# Patient Record
Sex: MALE | Race: WHITE | NOT HISPANIC OR LATINO | Employment: OTHER | URBAN - METROPOLITAN AREA
[De-identification: names, ages, dates, MRNs, and addresses within clinical notes are randomized per-mention and may not be internally consistent; named-entity substitution may affect disease eponyms.]

---

## 2017-02-28 DIAGNOSIS — N48.1 BALANITIS: ICD-10-CM

## 2017-02-28 DIAGNOSIS — I10 ESSENTIAL HYPERTENSION: ICD-10-CM

## 2017-02-28 RX ORDER — TRIAMCINOLONE ACETONIDE 0.25 MG/G
OINTMENT TOPICAL 2 TIMES DAILY PRN
Qty: 15 G | Refills: 0 | Status: SHIPPED | OUTPATIENT
Start: 2017-02-28 | End: 2017-06-09

## 2017-02-28 RX ORDER — AMLODIPINE BESYLATE 5 MG/1
5 TABLET ORAL DAILY
Qty: 90 TABLET | Refills: 0 | Status: SHIPPED | OUTPATIENT
Start: 2017-02-28 | End: 2017-06-09

## 2017-02-28 NOTE — TELEPHONE ENCOUNTER
triamcinolone (KENALOG) 0.025 % ointment      Last Written Prescription Date: 04/18/2016  Last Fill Quantity: 15g,  # refills: 2   [place] [facility] [place] [facility] or  Health prescribing provider: 04/18/2016                                             amLODIPine (NORVASC) 5 MG tablet      Last Written Prescription Date: 05/31/2016  Last Fill Quantity: 90, # refills: 2  Last [place] [facility] [place] [facility] or [facility] prescribing provider: 04/18/2016       Potassium   Date Value Ref Range Status   04/07/2016 4.2 3.4 - 5.3 mmol/L Final     Creatinine   Date Value Ref Range Status   04/07/2016 0.90 0.66 - 1.25 mg/dL Final     BP Readings from Last 3 Encounters:   12/27/16 138/76   12/21/16 148/82   06/20/16 127/64

## 2017-05-03 DIAGNOSIS — I10 ESSENTIAL HYPERTENSION: ICD-10-CM

## 2017-05-03 DIAGNOSIS — E78.5 HYPERLIPIDEMIA LDL GOAL <100: ICD-10-CM

## 2017-05-03 LAB
ALT SERPL W P-5'-P-CCNC: 25 U/L (ref 0–70)
ANION GAP SERPL CALCULATED.3IONS-SCNC: 9 MMOL/L (ref 3–14)
BUN SERPL-MCNC: 17 MG/DL (ref 7–30)
CALCIUM SERPL-MCNC: 8.5 MG/DL (ref 8.5–10.1)
CHLORIDE SERPL-SCNC: 109 MMOL/L (ref 94–109)
CHOLEST SERPL-MCNC: 125 MG/DL
CO2 SERPL-SCNC: 26 MMOL/L (ref 20–32)
CREAT SERPL-MCNC: 0.9 MG/DL (ref 0.66–1.25)
GFR SERPL CREATININE-BSD FRML MDRD: 82 ML/MIN/1.7M2
GLUCOSE SERPL-MCNC: 98 MG/DL (ref 70–99)
HDLC SERPL-MCNC: 47 MG/DL
LDLC SERPL CALC-MCNC: 55 MG/DL
NONHDLC SERPL-MCNC: 78 MG/DL
POTASSIUM SERPL-SCNC: 4.3 MMOL/L (ref 3.4–5.3)
SODIUM SERPL-SCNC: 144 MMOL/L (ref 133–144)
TRIGL SERPL-MCNC: 113 MG/DL

## 2017-05-03 PROCEDURE — 84460 ALANINE AMINO (ALT) (SGPT): CPT | Performed by: INTERNAL MEDICINE

## 2017-05-03 PROCEDURE — 80048 BASIC METABOLIC PNL TOTAL CA: CPT | Performed by: INTERNAL MEDICINE

## 2017-05-03 PROCEDURE — 36415 COLL VENOUS BLD VENIPUNCTURE: CPT | Performed by: INTERNAL MEDICINE

## 2017-05-03 PROCEDURE — 80061 LIPID PANEL: CPT | Performed by: INTERNAL MEDICINE

## 2017-05-08 ENCOUNTER — OFFICE VISIT (OUTPATIENT)
Dept: FAMILY MEDICINE | Facility: CLINIC | Age: 77
End: 2017-05-08
Payer: COMMERCIAL

## 2017-05-08 DIAGNOSIS — D48.5 NEOPLASM OF UNCERTAIN BEHAVIOR OF SKIN: ICD-10-CM

## 2017-05-08 DIAGNOSIS — Z85.828 STATUS POST MOHS SURGERY FOR BASAL CELL CARCINOMA: ICD-10-CM

## 2017-05-08 DIAGNOSIS — L57.0 AK (ACTINIC KERATOSIS): Primary | ICD-10-CM

## 2017-05-08 DIAGNOSIS — Z98.890 STATUS POST MOHS SURGERY FOR BASAL CELL CARCINOMA: ICD-10-CM

## 2017-05-08 PROCEDURE — 99213 OFFICE O/P EST LOW 20 MIN: CPT | Mod: 25 | Performed by: FAMILY MEDICINE

## 2017-05-08 PROCEDURE — 17000 DESTRUCT PREMALG LESION: CPT | Performed by: FAMILY MEDICINE

## 2017-05-08 PROCEDURE — 17003 DESTRUCT PREMALG LES 2-14: CPT | Performed by: FAMILY MEDICINE

## 2017-05-08 NOTE — PROGRESS NOTES
[facility] - PRIMARY CARE SKIN    CC : Rash   SUBJECTIVE:                                                    Ricardo Neely is a 76 year old male who presents to clinic today for follow-up of a(n) chronic, itchy rash on the genital area. He has applied topical triamcinolone 0.025% ointment daily to the area for 4 months; however, the topical steroid (prescribed in 2014) is no longer effective with daily application for control of itchiness. He reports redness in the area without scaling.    When last evaluated in 2014 by me, the area had only mild erythema with no suspicious characteristics at that time.    Pruritic : extremely itchy.  Symptoms appear to be : worsening.  Aggravating factors : none identified.  Relieving factors : none identified.    Previous similar history : NO.  Recent exposure history : none known   Any other family members with similar symptoms : NO.    Issue Two : Lesion on right side of temple has scabbed over and gotten sore.  Onset : unknown.  Enlarging : NO.  Bleeding : YES  Itchy or irritating : NO.  Pain or tenderness : YES.  Changing color : YES.    Sun Exposure History  Previous history of significant sun exposure:  Blistering sunburns : YES - many.  Tanning beds : NO.  Sunscreen Use : YES.  UV-protective clothing use : NO.  Wide-brimmed hats : NO.  UV-protective sunglasses : NO.  Avoids mid-day sun : NO.    Personal Medical History  Skin Cancer : YES - basal cell carcinoma on right ear posterior pinna (s/p [facility] 10/2014)  Eczema Psoriasis Rosacea Autoimmune   NO NO NO NO     Family Medical History  Skin Cancer : YES  Eczema Psoriasis Rosacea Autoimmune   NO NO NO YES - rheumatoid arthritis     Occupation : retired  (indoor).    Patient Active Problem List   Diagnosis     HISTORY OF URINARY CALCULI     Essential hypertension     Hyperlipidemia LDL goal <100     Obesity     GERD     Diaphragmatic hernia     Low Back Pain     Impaired fasting glucose     Hypertrophy of  prostate without urinary obstruction     Thoracic or lumbosacral neuritis or radiculitis, unspecified     Transient cerebral ischemia     Cerebrovascular disease     Advanced directives, counseling/discussion     Anxiety     Chronic low back pain     Sciatica     Nonallopathic lesion of lumbar region     Lumbar stenosis with neurogenic claudication     Cervicalgia     Headache     Status post Mohs surgery for basal cell carcinoma       Past Medical History:   Diagnosis Date     Anxiety 5/26/2011     Basal cell carcinoma      Benign Prostatic Hypertrophy      CEREBROVASC DISEASE, small vessel 12/07     Essential hypertension, benign      GERD      HIATAL HERNIA      HYPERPLASTIC POLYPS COLON 5/93, 3/06     Impaired fasting glucose      Low Back Pain      Obesity, unspecified      Other and unspecified hyperlipidemia      Personal history of urinary calculi 1960's     Pneumonia, organism unspecified 1992     TRANSIENT CEREBRAL ISCHEMIA 12/07    Past Surgical History:   Procedure Laterality Date     C NONSPECIFIC PROCEDURE  1959    pilonidal cystectomy     C NONSPECIFIC PROCEDURE  1966    kidney stone     C NONSPECIFIC PROCEDURE  approx 1999    R knee arthroscopy     Dr. Thomas RYDER NONSPECIFIC PROCEDURE  2005    L3-4 microdiskectomy   Dr. Fercho RYDER TOTAL KNEE ARTHROPLASTY  July 2010    Minimally invasive R TKA   Dr. Nichols     COLONOSCOPY       DISCECTOMY LUMBAR POSTERIOR MICROSCOPIC TWO LEVELS  8/28/2012    DISCECTOMY LUMBAR POSTERIOR MICROSCOPIC TWO LEVELS;  RIGHT L3-L4 REDO EXTENDED HEMILAMINECTOMY AND MICRO FORAMINOTOMY, LEFT L4-L5 EXTENDED HEMILAMINECTOMY AND MICRODISCECTOMY (PRONE) (SONYA MCCALLUM, C-ARM, METRIX II);  Surgeon: Bertram Mirza MD;  Location: [facility] OR     renal calculii removal 40 years ago  1965      Social History   Substance Use Topics     Smoking status: Former Smoker     Quit date: 1/1/1968     Smokeless tobacco: Never Used     Alcohol use No    Family History     Problem (# of  "Occurrences) Relation (Name,Age of Onset)    C.A.D. (1) Father (83): CABG 67    CEREBROVASCULAR DISEASE (1) Mother (89)    DIABETES (2) Brother (b 1940): d:age 66, Mother (89)    Family History Negative (1) Brother (b 1940): 1 healthy brother    HEART DISEASE (1) Father (83)    Hypertension (2) Sister (b 1936), Sister (b 1938)    Lipids (2) Sister (b 1934), Sister (b 1938)    Thyroid Disease (1) Daughter (b 1971): thyroid surgery, on replacement           Prescription Medications as of 5/8/2017             triamcinolone (KENALOG) 0.025 % ointment Apply topically 2 times daily as needed    amLODIPine (NORVASC) 5 MG tablet Take 1 tablet (5 mg) by mouth daily    ondansetron (ZOFRAN) 4 MG tablet Take 1 tablet (4 mg) by mouth every 8 hours as needed for nausea    atorvastatin (LIPITOR) 40 MG tablet Take 1 tablet (40 mg) by mouth daily    metoprolol (TOPROL-XL) 50 MG 24 hr tablet Take 1.5 tablets (75 mg) by mouth daily    clopidogrel (PLAVIX) 75 MG tablet Take 1 tablet (75 mg) by mouth daily    benazepril (LOTENSIN) 40 MG tablet Take 1 tablet (40 mg) by mouth daily    ketoconazole (NIZORAL) 2 % cream Apply topically 2 times daily as needed Profile Rx: patient will call when needed    loratadine (CLARITIN) 10 MG tablet Take 1 tablet (10 mg) by mouth daily    aspirin 81 MG tablet Take 1 tablet by mouth daily.            Allergies   Allergen Reactions     Meclizine Other (See Comments)     Over sedation, concentration problem     Tramadol Hcl Other (See Comments)     Pt feels very drugged, \"cloudy\" if used more than one day. Nausea also     Cardura [Doxazosin Mesylate] Other (See Comments)     fainted     Hydrochlorothiazide Other (See Comments)      lightheadedness     Naproxen Nausea     nausea     Prednisone GI Disturbance     Plan for PPI use in the future if med needed        INTEGUMENTARY/SKIN: POSITIVE for non-healing lesion and pruritis, NEGATIVE for scaling.    ROS : 14 point review of systems was negative except " the symptoms listed above in the HPI.    This document serves as a record of the services and decisions personally performed and made by Santa Pickens MD. It was created on her behalf by Jace De La Cruz, a trained medical scribe.  The creation of this document is based on the scribe's personal observations and the provider's statements to the medical scribe.  Jace De La Cruz, May 8, 2017 8:40 AM      OBJECTIVE:                                                    GENERAL: healthy, alert and no distress  SKIN: Vazquez Skin Type - III.  Face, Neck and Penis were examined. The dermatoscope was used to help evaluate pigmented lesions.  Skin Pertinent Findings:  Head of penis : 3 cm x 1 cm area of erythematous, flat lesion extending over the tip of the penis , 2 mm of pink raised tissue at the urethra  ? Squamous cell carcinoma  ? Other  Patient to return for shave procedure in 1 week after pausing Plavix for 3 days prior.    Face : 2 mm - 3 mm in size, erythematous, scaly, non-indurated lesion(s) most consistent with actinic keratoses.  Name : Liquid Nitrogen Cry-Ac Cryotherapy.  Indication : Pre-malignant lesion.  Locations:  Left temple - x1  Left lateral forehead -x3  Right lateral forehead - 1  Right upper forehead - x1  Right upper cutaneous lip - x1  Completed by : Santa Pickens MD  Note : Discussed natural history of lesion and treatment options. Prior to treatment, we discussed inflammation, tenderness post-procedure, the healing process, and the risks of pain, infection, scarring, blistering, and hypo-/hyperpigmentation after healing. Explained that these lesions may grow back and may need additional treatment or re-treatment. The patient expressed a desire to proceed with cryotherapy.    The lesion(s) was treated with liquid nitrogen Cry-Ac, five second freeze repeated twice with a pause to allow for the area to thaw. If this lesion should recur, then it needs to be re-evaluated.    Patient tolerated the procedure  well and left in good condition.  Total number of lesions treated : 8    Diagnostic Test Results:  none     MDM : discussed concerning appearance of lesion on the head of the penis, recommended shave biopsy, he will stop his Plavix for 3 days and then come in for biopsy.      ASSESSMENT:                                                      Encounter Diagnoses   Name Primary?     AK (actinic keratosis) Yes     Neoplasm of uncertain behavior of skin      Status post Mohs surgery for basal cell carcinoma          PLAN:                                                    Patient Instructions   FUTURE APPOINTMENTS  Follow up in 1 week(s) for shave procedure. Pause usage of Plavix for the three days prior to shave procedure.    CRYOTHERAPY FOR ACTINIC KERATOSES POST-TREATMENT CARE INSTRUCTIONS  Actinic keratoses are benign, scaly or gritty lesions that appear in sun-exposed areas and may progress to skin cancers. For this reason, it is important to treat them before they become cancerous.  Liquid nitrogen is mildly uncomfortable when applied to the skin, but the discomfort rapidly subsides.    Post-Treatment:  You may experience burning and/or stinging immediately following the procedure. The discomfort from the procedure may persist over the next 12-24 hours. The area treated will look pinker and slightly swollen before the healing process begins. You may also notice redness, swelling, tenderness, weeping and crusts or scabs.    Blister - You may or may notice blistering from the freezing. If you develop an uncomfortable blister from today's treatment, you may gently puncture this with a needle that has been cleaned with alcohol. However, do not remove the protective skin layer of the blister.    Scab - After a few days, you may notice scaliness or scab formation. Do not pick at the scabs because this may cause slower healing and a permanent scar.    The skin may appear temporarily darker at the treatment site, but this  "usually fades over a period of months, provided that the area is protected from the sun.    Care of the areas treated:    Wash the area with a mild cleanser.    Gently pat dry.    Do not rub.     Keep protected from the sun during the healing process and for a full year following treatment as the skin continues to remodel during this time.    Apply Vaseline or Aquaphor ointment sparingly to the site for the first 7 days after treatment.    Do not use Neosporin, as many people eventually develop a medication allergy, that can easily be confused with an infection, to Neomycin.    Return if:  There should not be any residual scaling. If there is any concern that the lesion has persisted after 4-6 weeks, make an appointment for a re-check. Healing time does vary depending on your individual healing process and the area of the body treated. Most patients will be healed in one month.    Signs of Infection:  Thankfully this is rare. However if you notice persistent colored drainage, increasing pain, fever or other signs of infection, please call us at: [phone]    TIPS FOR AVOIDING SKIN CANCER AND PREMATURE SKIN AGING  DOs    Wear a wide-brimmed hat and sunglasses.     Wear sun-protective clothing.    [employer] and other [employer] make sun protective clothing that is stylish, comfortable and cool.    [employer] and other [employer] make UV arm sleeves suitable for golfing, gardening and other activities.      Wear sunscreen on your face every day, even in the winter. (UVA \"aging rays\" penetrates window glass and is just as strong in the winter as in the summer) Sunscreen with SPF > 30 is recommended.    Wear sunscreen on your body and re-apply every 2 hours when exposed to sun. Sunscreen with SPF > 50 is recommended.    You should use about 3 tablespoons of sunscreen to protect your whole body. Thus a typical eight ounce bottle of sunscreen should last 4 applications. Remember, that the SPF rating is " "compromised if you don t apply enough. Most people only apply 1/2 - 1/3 of the amount they need. Also don t forget areas such as your ears, feet, upper back and harder to reach places. Keep in mind that these amounts should be increased for larger body sizes.    Note that spray sunscreens are only for touch-up application, not as a base layer. Also, use spray sunscreens with caution around small children due to inhalation risk.    Product Recommendations:    Look for broad spectrum sunscreen (blocks both UVA and UVB).    Look for titanium dioxide and/or zinc oxide in the active ingredients, which are physical blockers as opposed to chemical blockers. Chemical-free sunscreens should not irritate the skin.    Good examples include: Blue Lizard, EltaMD, Vanicream, Solbar, CeraVe.     For sensitive skin, consider : Skin[employer] Essential Defense Mineral Shield Broad Spectrum SPF 35      Avoid combination products that include both sunscreen and insect repellant, as sunscreen should be applied every 2 hours, but insect repellant should not be applied as frequently.    Avoid products that include oxybenzone or retinyl palmitate.    For more information:  http://www.skincancer.org/prevention/sun-protection/sunscreen/sunscreens-safe-and-effective    DON'Ts    All tanning damages the skin. Aim for ivory skin year round and you will have less trouble with your skin in years to come. There is no merit in getting \"a base tan\" before a warm weather vacation, as any tanning indicates your body's response to sun damage.    Never use tanning beds. Tanning beds are associated with much higher risks of skin cancer.    Avoid mid-day sunshine (10 AM to 3 PM), if possible.    Stop smoking. Smokers have higher rates of skin cancer and also have premature skin wrinkling.        PROCEDURES:                                                    None.    TT : 20 minutes.  CT : 15 minutes.      The information in this document, created by the " medical scribe for me, accurately reflects the services I personally performed and the decisions made by me. I have reviewed and approved this document for accuracy prior to leaving the patient care area.  Santa Pickens MD May 8, 2017 8:40 AM  [facility] - PRIMARY CARE SKIN

## 2017-05-08 NOTE — MR AVS SNAPSHOT
After Visit Summary   5/8/2017    Ricardo Neely    MRN: [id number]           Patient Information     Date Of Birth          1940        Visit Information        Provider Department      5/8/2017 8:40 AM Monica Pickens MD [facility] - Primary [facility] Skin        Today's Diagnoses     AK (actinic keratosis)    -  1    Neoplasm of uncertain behavior of skin        Status post Mohs surgery for basal cell carcinoma          Care Instructions    FUTURE APPOINTMENTS  Follow up in 1 week(s) for shave procedure. Pause usage of Plavix for the three days prior to shave procedure.    CRYOTHERAPY FOR ACTINIC KERATOSES POST-TREATMENT CARE INSTRUCTIONS  Actinic keratoses are benign, scaly or gritty lesions that appear in sun-exposed areas and may progress to skin cancers. For this reason, it is important to treat them before they become cancerous.  Liquid nitrogen is mildly uncomfortable when applied to the skin, but the discomfort rapidly subsides.    Post-Treatment:  You may experience burning and/or stinging immediately following the procedure. The discomfort from the procedure may persist over the next 12-24 hours. The area treated will look pinker and slightly swollen before the healing process begins. You may also notice redness, swelling, tenderness, weeping and crusts or scabs.    Blister - You may or may notice blistering from the freezing. If you develop an uncomfortable blister from today's treatment, you may gently puncture this with a needle that has been cleaned with alcohol. However, do not remove the protective skin layer of the blister.    Scab - After a few days, you may notice scaliness or scab formation. Do not pick at the scabs because this may cause slower healing and a permanent scar.    The skin may appear temporarily darker at the treatment site, but this usually fades over a period of months, provided that the area is protected from the sun.    Care of the areas  "treated:    Wash the area with a mild cleanser.    Gently pat dry.    Do not rub.     Keep protected from the sun during the healing process and for a full year following treatment as the skin continues to remodel during this time.    Apply Vaseline or Aquaphor ointment sparingly to the site for the first 7 days after treatment.    Do not use Neosporin, as many people eventually develop a medication allergy, that can easily be confused with an infection, to Neomycin.    Return if:  There should not be any residual scaling. If there is any concern that the lesion has persisted after 4-6 weeks, make an appointment for a re-check. Healing time does vary depending on your individual healing process and the area of the body treated. Most patients will be healed in one month.    Signs of Infection:  Thankfully this is rare. However if you notice persistent colored drainage, increasing pain, fever or other signs of infection, please call us at: [phone]    TIPS FOR AVOIDING SKIN CANCER AND PREMATURE SKIN AGING  DOs    Wear a wide-brimmed hat and sunglasses.     Wear sun-protective clothing.    [employer] and other [employer] make sun protective clothing that is stylish, comfortable and cool.    [employer] and other [employer] make UV arm sleeves suitable for golfing, gardening and other activities.      Wear sunscreen on your face every day, even in the winter. (UVA \"aging rays\" penetrates window glass and is just as strong in the winter as in the summer) Sunscreen with SPF > 30 is recommended.    Wear sunscreen on your body and re-apply every 2 hours when exposed to sun. Sunscreen with SPF > 50 is recommended.    You should use about 3 tablespoons of sunscreen to protect your whole body. Thus a typical eight ounce bottle of sunscreen should last 4 applications. Remember, that the SPF rating is compromised if you don t apply enough. Most people only apply 1/2 - 1/3 of the amount they need. Also don t forget " "areas such as your ears, feet, upper back and harder to reach places. Keep in mind that these amounts should be increased for larger body sizes.    Note that spray sunscreens are only for touch-up application, not as a base layer. Also, use spray sunscreens with caution around small children due to inhalation risk.    Product Recommendations:    Look for broad spectrum sunscreen (blocks both UVA and UVB).    Look for titanium dioxide and/or zinc oxide in the active ingredients, which are physical blockers as opposed to chemical blockers. Chemical-free sunscreens should not irritate the skin.    Good examples include: Blue Lizard, EltaMD, Vanicream, Solbar, CeraVe.     For sensitive skin, consider : [employer] Essential Defense Mineral Shield Broad Spectrum SPF 35      Avoid combination products that include both sunscreen and insect repellant, as sunscreen should be applied every 2 hours, but insect repellant should not be applied as frequently.    Avoid products that include oxybenzone or retinyl palmitate.    For more information:  http://www.skincancer.org/prevention/sun-protection/sunscreen/sunscreens-safe-and-effective    DON'Ts    All tanning damages the skin. Aim for ivory skin year round and you will have less trouble with your skin in years to come. There is no merit in getting \"a base tan\" before a warm weather vacation, as any tanning indicates your body's response to sun damage.    Never use tanning beds. Tanning beds are associated with much higher risks of skin cancer.    Avoid mid-day sunshine (10 AM to 3 PM), if possible.    Stop smoking. Smokers have higher rates of skin cancer and also have premature skin wrinkling.        Follow-ups after your visit        Your next 10 appointments already scheduled     May 12, 2017  8:30 AM CDT   MyChart Long with Storm Huddleston MD   [facility]    [address] [address] "   [phone]              Who to contact     If you have questions or need follow up information about today's clinic visit or your schedule please contact [facility] directly at [phone].  Normal or non-critical lab and imaging results will be communicated to you by MyChart, letter or phone within 4 business days after the clinic has received the results. If you do not hear from us within 7 days, please contact the clinic through MyChart or phone. If you have a critical or abnormal lab result, we will notify you by phone as soon as possible.  Submit refill requests through [employer] or call your pharmacy and they will forward the refill request to us. Please allow 3 business days for your refill to be completed.          Additional Information About Your Visit        [employer] Information     [employer] gives you secure access to your electronic health record. If you see a primary care provider, you can also send messages to your care team and make appointments. If you have questions, please call your primary care clinic.  If you do not have a primary care provider, please call [phone] and they will assist you.        Care EveryWhere ID     This is your Care EveryWhere ID. This could be used by other organizations to access your [place] medical records  [id number]         Blood Pressure from Last 3 Encounters:   12/27/16 138/76   12/21/16 148/82   06/20/16 127/64    Weight from Last 3 Encounters:   12/27/16 218 lb 6.4 oz (99.1 kg)   05/17/16 216 lb (98 kg)   05/11/16 216 lb (98 kg)              Today, you had the following     No orders found for display         Today's Medication Changes          These changes are accurate as of: 5/8/17  9:16 AM.  If you have any questions, ask your nurse or doctor.               These medicines have changed or have updated prescriptions.        Dose/Directions    aspirin 81 MG tablet   This may have changed:  when to take this   Used for:  Routine  general medical examination at a health care facility        Dose:  81 mg   Take 1 tablet by mouth daily.   Quantity:  100 tablet   Refills:  3                Primary Care Provider Office Phone # Fax #    Storm Huddleston [phone]       [facility] [address] [facility] [address]        Thank you!     Thank you for choosing [facility] - PR[facility] C[facility] SK[facility]  for your care. Our goal is always to provide you with excellent care. Hearing back from our patients is one way we can continue to improve our services. Please take a few minutes to complete the written survey that you may receive in the mail after your visit with us. Thank you!             Your Updated Medication List - Protect others around you: Learn how to safely use, store and throw away your medicines at www.disposemymeds.org.          This list is accurate as of: 5/8/17  9:16 AM.  Always use your most recent med list.                   Brand Name Dispense Instructions for use    amLODIPine 5 MG tablet    NORVASC    90 tablet    Take 1 tablet (5 mg) by mouth daily       aspirin 81 MG tablet     100 tablet    Take 1 tablet by mouth daily.       atorvastatin 40 MG tablet    LIPITOR    90 tablet    Take 1 tablet (40 mg) by mouth daily       benazepril 40 MG tablet    LOTENSIN    90 tablet    Take 1 tablet (40 mg) by mouth daily       clopidogrel 75 MG tablet    PLAVIX    90 tablet    Take 1 tablet (75 mg) by mouth daily       ketoconazole 2 % cream    NIZORAL    30 g    Apply topically 2 times daily as needed Profile Rx: patient will call when needed       loratadine 10 MG tablet    CLARITIN     Take 1 tablet (10 mg) by mouth daily       metoprolol 50 MG 24 hr tablet    TOPROL-XL    135 tablet    Take 1.5 tablets (75 mg) by mouth daily       ondansetron 4 MG tablet    ZOFRAN    12 tablet    Take 1 tablet (4 mg) by mouth every 8 hours as needed for nausea       triamcinolone 0.025 % ointment    KENALOG    15 g     Apply topically 2 times daily as needed

## 2017-05-08 NOTE — PATIENT INSTRUCTIONS
FUTURE APPOINTMENTS  Follow up in 1 week(s) for shave procedure. Pause usage of Plavix for the three days prior to shave procedure.    CRYOTHERAPY FOR ACTINIC KERATOSES POST-TREATMENT CARE INSTRUCTIONS  Actinic keratoses are benign, scaly or gritty lesions that appear in sun-exposed areas and may progress to skin cancers. For this reason, it is important to treat them before they become cancerous.  Liquid nitrogen is mildly uncomfortable when applied to the skin, but the discomfort rapidly subsides.    Post-Treatment:  You may experience burning and/or stinging immediately following the procedure. The discomfort from the procedure may persist over the next 12-24 hours. The area treated will look pinker and slightly swollen before the healing process begins. You may also notice redness, swelling, tenderness, weeping and crusts or scabs.    Blister - You may or may notice blistering from the freezing. If you develop an uncomfortable blister from today's treatment, you may gently puncture this with a needle that has been cleaned with alcohol. However, do not remove the protective skin layer of the blister.    Scab - After a few days, you may notice scaliness or scab formation. Do not pick at the scabs because this may cause slower healing and a permanent scar.    The skin may appear temporarily darker at the treatment site, but this usually fades over a period of months, provided that the area is protected from the sun.    Care of the areas treated:    Wash the area with a mild cleanser.    Gently pat dry.    Do not rub.     Keep protected from the sun during the healing process and for a full year following treatment as the skin continues to remodel during this time.    Apply Vaseline or Aquaphor ointment sparingly to the site for the first 7 days after treatment.    Do not use Neosporin, as many people eventually develop a medication allergy, that can easily be confused with an infection, to Neomycin.    Return  "if:  There should not be any residual scaling. If there is any concern that the lesion has persisted after 4-6 weeks, make an appointment for a re-check. Healing time does vary depending on your individual healing process and the area of the body treated. Most patients will be healed in one month.    Signs of Infection:  Thankfully this is rare. However if you notice persistent colored drainage, increasing pain, fever or other signs of infection, please call us at: [phone]    TIPS FOR AVOIDING SKIN CANCER AND PREMATURE SKIN AGING  DOs    Wear a wide-brimmed hat and sunglasses.     Wear sun-protective clothing.    [employer] and other [employer] make sun protective clothing that is stylish, comfortable and cool.    [employer] and other [employer] make UV arm sleeves suitable for golfing, gardening and other activities.      Wear sunscreen on your face every day, even in the winter. (UVA \"aging rays\" penetrates window glass and is just as strong in the winter as in the summer) Sunscreen with SPF > 30 is recommended.    Wear sunscreen on your body and re-apply every 2 hours when exposed to sun. Sunscreen with SPF > 50 is recommended.    You should use about 3 tablespoons of sunscreen to protect your whole body. Thus a typical eight ounce bottle of sunscreen should last 4 applications. Remember, that the SPF rating is compromised if you don t apply enough. Most people only apply 1/2 - 1/3 of the amount they need. Also don t forget areas such as your ears, feet, upper back and harder to reach places. Keep in mind that these amounts should be increased for larger body sizes.    Note that spray sunscreens are only for touch-up application, not as a base layer. Also, use spray sunscreens with caution around small children due to inhalation risk.    Product Recommendations:    Look for broad spectrum sunscreen (blocks both UVA and UVB).    Look for titanium dioxide and/or zinc oxide in the active ingredients, " "which are physical blockers as opposed to chemical blockers. Chemical-free sunscreens should not irritate the skin.    Good examples include: Blue Lizard, EltaMD, Vanicream, Solbar, CeraVe.     For sensitive skin, consider : [employer] Essential Defense Mineral Shield Broad Spectrum SPF 35      Avoid combination products that include both sunscreen and insect repellant, as sunscreen should be applied every 2 hours, but insect repellant should not be applied as frequently.    Avoid products that include oxybenzone or retinyl palmitate.    For more information:  http://www.skincancer.org/prevention/sun-protection/sunscreen/sunscreens-safe-and-effective    DON'Ts    All tanning damages the skin. Aim for ivory skin year round and you will have less trouble with your skin in years to come. There is no merit in getting \"a base tan\" before a warm weather vacation, as any tanning indicates your body's response to sun damage.    Never use tanning beds. Tanning beds are associated with much higher risks of skin cancer.    Avoid mid-day sunshine (10 AM to 3 PM), if possible.    Stop smoking. Smokers have higher rates of skin cancer and also have premature skin wrinkling.  "

## 2017-05-10 DIAGNOSIS — I10 ESSENTIAL HYPERTENSION: ICD-10-CM

## 2017-05-10 RX ORDER — BENAZEPRIL HYDROCHLORIDE 40 MG/1
TABLET ORAL
Qty: 90 TABLET | Refills: 0 | OUTPATIENT
Start: 2017-05-10

## 2017-05-10 RX ORDER — BENAZEPRIL HYDROCHLORIDE 40 MG/1
TABLET ORAL
Qty: 30 TABLET | Refills: 0 | Status: SHIPPED | OUTPATIENT
Start: 2017-05-10 | End: 2017-06-09

## 2017-05-15 ENCOUNTER — OFFICE VISIT (OUTPATIENT)
Dept: FAMILY MEDICINE | Facility: CLINIC | Age: 77
End: 2017-05-15
Payer: COMMERCIAL

## 2017-05-15 DIAGNOSIS — Z98.890 STATUS POST MOHS SURGERY FOR BASAL CELL CARCINOMA: ICD-10-CM

## 2017-05-15 DIAGNOSIS — Z85.828 STATUS POST MOHS SURGERY FOR BASAL CELL CARCINOMA: ICD-10-CM

## 2017-05-15 DIAGNOSIS — D48.5 NEOPLASM OF UNCERTAIN BEHAVIOR OF SKIN: Primary | ICD-10-CM

## 2017-05-15 PROCEDURE — 88305 TISSUE EXAM BY PATHOLOGIST: CPT | Performed by: FAMILY MEDICINE

## 2017-05-15 PROCEDURE — 99213 OFFICE O/P EST LOW 20 MIN: CPT | Mod: 25 | Performed by: FAMILY MEDICINE

## 2017-05-15 PROCEDURE — 54100 BIOPSY OF PENIS: CPT | Performed by: FAMILY MEDICINE

## 2017-05-15 PROCEDURE — 99000 SPECIMEN HANDLING OFFICE-LAB: CPT | Performed by: FAMILY MEDICINE

## 2017-05-15 NOTE — PROGRESS NOTES
[facility] - PRIMARY CARE SKIN    CC : Rash   SUBJECTIVE:                                                    Ricardo Neely is a 76 year old male who presents to clinic today for shave procedure of a lesion on the penis. He has paused Plavix for the last three days in preparation for today's procedure.    Recall that he topical triamcinolone 0.025% ointment has been ineffective in clearing this red lesion; he was previously prescribed the ointment for control of a chronic, itchy rash.    Sun Exposure History  Previous history of significant sun exposure:  Blistering sunburns : YES - many.  Tanning beds : NO.  Sunscreen Use : YES.  UV-protective clothing use : NO.  Wide-brimmed hats : NO.  UV-protective sunglasses : NO.  Avoids mid-day sun : NO.    Personal Medical History  Skin Cancer : YES - basal cell carcinoma on right ear posterior pinna (s/p [facility] 10/2014)  Eczema Psoriasis Rosacea Autoimmune   NO NO NO NO     Family Medical History  Skin Cancer : YES  Eczema Psoriasis Rosacea Autoimmune   NO NO NO YES - rheumatoid arthritis     Occupation : retired  (indoor).    Patient Active Problem List   Diagnosis     HISTORY OF URINARY CALCULI     Essential hypertension     Hyperlipidemia LDL goal <100     Obesity     GERD     Diaphragmatic hernia     Low Back Pain     Impaired fasting glucose     Hypertrophy of prostate without urinary obstruction     Thoracic or lumbosacral neuritis or radiculitis, unspecified     Transient cerebral ischemia     Cerebrovascular disease     Advanced directives, counseling/discussion     Anxiety     Chronic low back pain     Sciatica     Nonallopathic lesion of lumbar region     Lumbar stenosis with neurogenic claudication     Cervicalgia     Headache     Status post Mohs surgery for basal cell carcinoma       Past Medical History:   Diagnosis Date     Anxiety 5/26/2011     Basal cell carcinoma      Benign Prostatic Hypertrophy      CEREBROVASC DISEASE, small vessel 12/07      Essential hypertension, benign      GERD      HIATAL HERNIA      HYPERPLASTIC POLYPS COLON 5/93, 3/06     Impaired fasting glucose      Low Back Pain      Obesity, unspecified      Other and unspecified hyperlipidemia      Personal history of urinary calculi 1960's     Pneumonia, organism unspecified 1992     TRANSIENT CEREBRAL ISCHEMIA 12/07    Past Surgical History:   Procedure Laterality Date     C NONSPECIFIC PROCEDURE  1959    pilonidal cystectomy     C NONSPECIFIC PROCEDURE  1966    kidney stone     C NONSPECIFIC PROCEDURE  approx 1999    R knee arthroscopy     Dr. Thomas RYDER NONSPECIFIC PROCEDURE  2005    L3-4 microdiskectomy   Dr. Fercho RYDER TOTAL KNEE ARTHROPLASTY  July 2010    Minimally invasive R TKA   Dr. Nichols     COLONOSCOPY       DISCECTOMY LUMBAR POSTERIOR MICROSCOPIC TWO LEVELS  8/28/2012    DISCECTOMY LUMBAR POSTERIOR MICROSCOPIC TWO LEVELS;  RIGHT L3-L4 REDO EXTENDED HEMILAMINECTOMY AND MICRO FORAMINOTOMY, LEFT L4-L5 EXTENDED HEMILAMINECTOMY AND MICRODISCECTOMY (PRONE) (SONYA MCCALLUM, C-ARM, METRIX II);  Surgeon: Bertram Mirza MD;  Location: [facility] OR     renal calculii removal 40 years ago  1965      Social History   Substance Use Topics     Smoking status: Former Smoker     Quit date: 1/1/1968     Smokeless tobacco: Never Used     Alcohol use No    Family History     Problem (# of Occurrences) Relation (Name,Age of Onset)    C.A.D. (1) Father (83): CABG 67    CEREBROVASCULAR DISEASE (1) Mother (89)    DIABETES (2) Brother (b 1940): d:age 66, Mother (89)    Family History Negative (1) Brother (b 1940): 1 healthy brother    HEART DISEASE (1) Father (83)    Hypertension (2) Sister (b 1936), Sister (b 1938)    Lipids (2) Sister (b 1934), Sister (b 1938)    Thyroid Disease (1) Daughter (b 1971): thyroid surgery, on replacement           Prescription Medications as of 5/15/2017             benazepril (LOTENSIN) 40 MG tablet TAKE 1 TABLET BY MOUTH EVERY DAY    triamcinolone (KENALOG)  "0.025 % ointment Apply topically 2 times daily as needed    amLODIPine (NORVASC) 5 MG tablet Take 1 tablet (5 mg) by mouth daily    ondansetron (ZOFRAN) 4 MG tablet Take 1 tablet (4 mg) by mouth every 8 hours as needed for nausea    atorvastatin (LIPITOR) 40 MG tablet Take 1 tablet (40 mg) by mouth daily    metoprolol (TOPROL-XL) 50 MG 24 hr tablet Take 1.5 tablets (75 mg) by mouth daily    clopidogrel (PLAVIX) 75 MG tablet Take 1 tablet (75 mg) by mouth daily    ketoconazole (NIZORAL) 2 % cream Apply topically 2 times daily as needed Profile Rx: patient will call when needed    loratadine (CLARITIN) 10 MG tablet Take 1 tablet (10 mg) by mouth daily    aspirin 81 MG tablet Take 1 tablet by mouth daily.            Allergies   Allergen Reactions     Meclizine Other (See Comments)     Over sedation, concentration problem     Tramadol Hcl Other (See Comments)     Pt feels very drugged, \"cloudy\" if used more than one day. Nausea also     Cardura [Doxazosin Mesylate] Other (See Comments)     fainted     Hydrochlorothiazide Other (See Comments)      lightheadedness     Naproxen Nausea     nausea     Prednisone GI Disturbance     Plan for PPI use in the future if med needed        INTEGUMENTARY/SKIN: POSITIVE for non-healing lesion     ROS : 14 point review of systems was negative except the symptoms listed above in the HPI.    This document serves as a record of the services and decisions personally performed and made by Santa Pickens MD. It was created on her behalf by Jace De La Cruz, a trained medical scribe.  The creation of this document is based on the scribe's personal observations and the provider's statements to the medical scribe.  Jace De La Cruz, May 15, 2017 8:39 AM      OBJECTIVE:                                                    GENERAL: healthy, alert and no distress  SKIN: Vazquez Skin Type - III.  Face, Neck and Penis were examined. The dermatoscope was used to help evaluate pigmented lesions.  Skin Pertinent " "Findings:  Head of penis : 3 cm x 1 cm area of erythematous, flat lesion extending over the tip of the penis, 2 mm of pink raised tissue at the urethra  ? Squamous cell carcinoma  ? Other    Diagnostic Test Results:  none           ASSESSMENT:                                                      Encounter Diagnoses   Name Primary?     Neoplasm of uncertain behavior of skin Yes     Status post Mohs surgery for basal cell carcinoma          PLAN:                                                    Patient Instructions   FUTURE APPOINTMENTS  Follow up per pathology report.    WOUND CARE INSTRUCTIONS  1. After 24 hours, change dressing daily.  2. Wash hands before every dressing change.  3. Wash the wound area with a mild soap, then rinse  4. Gently pat dry with a sterile gauze or Q-tip.  5. Apply Vaseline or Aquaphor only over entire wound. Do NOT use Neosporin - as many people react to neomycin.  6. Repeat once daily until wound has healed.    Do not let the wound dry out.  The wound will heal faster and with better cosmetic results if routinely kept moist with step #5. It is a false belief that a wound heals better when it is exposed to air and allowed to dry out.      Soap, water and shampoo will not hurt this area.    Do not go swimming or take baths, but showering is encouraged.    Limit use of the area where the procedure was done for a few days to allow for optimal healing.    If you experience bleeding:  Repeat steps #2-5 and hold firm pressure on the area for 10 minutes without checking to see if the bleeding has stopped. \"Checking\" pulls off the protective wound clot and restarts the bleeding all over again. Re-apply pressure for 10 minutes if necessary to stop bleeding.  Use additional sterile gauze and tape to maintain pressure once bleeding has stopped.    Signs of Infection:  Infection can occur in any area where skin has been disrupted.  If you notice persistent redness, swelling, colored drainage, " increasing pain, fever or other signs of infection, please call us at: [phone] and ask to have me or my colleague paged. We will call you back to discuss.    Pathology Results:  You will be notified, generally via letter or MyChart, in approximately 10 days. If there is anything we need to discuss or further treatment needed, I will call you to discuss it.    Skin tissue can be some of the most challenging tissue for pathologists to examine, therefore we may use a specialist outside the [employer] system to read certain submitted tissue samples. When submitted to these outside specialists, [employer] will notify you that your tissue sample result has returned. However, this only means that the tissue sample has been sent to the specialist. These results are not available in [employer], so I will contact you when I receive the final report.    PATIENT INFORMATION : WOUNDS  During the healing process you will notice a number of changes. All wounds develop a small halo of redness surrounding the wound.  This means healing is occurring. Severe itching with extensive redness usually indicates sensitivity to the ointment or bandage tape used to dress the wound.  You should call our office if this develops.      Swelling  and/or discoloration around your surgical site is common, particularly when performed around the eye.    All wounds normally drain.  The larger the wound the more drainage there will be.  After 7-10 days, you will notice the wound beginning to shrink and new skin will begin to grow.  The wound is healed when you can see skin has formed over the entire area.  A healed wound has a healthy, shiny look to the surface and is red to dark pink in color to normalize.  Wounds may take approximately 4-6 weeks to heal.  Larger wounds may take 6-8 weeks. After the wound is healed you may discontinue dressing changes.    You may experience a sensation of tightness as your wound heals. This is normal and will gradually  subside.    Your healed wound may be sensitive to temperature changes. This sensitivity improves with time, but if you re having a lot of discomfort, try to avoid temperature extremes.    Patients frequently experience itching after their wound appears to have healed because of the continue healing under the skin.  Plain Vaseline will help relieve the itching.        PROCEDURES:                                                    Name : Shave Biopsy  Indication : Biopsy of tissue for pathology evaluation.  Location(s) : Head of penis : 3 cm x 1 cm area of erythematous, flat lesion extending over the tip of the penis , 2 mm of pink raised tissue at the urethra  ? Squamous cell carcinoma  ? Other.  Completed by : Santa Pickens MD  Photo Taken : no.  Anesthesia : Patient was anesthetized by infiltrating the area surrounding the lesion with 1% lidocaine.   epinephrine [id number] : NO.  Buffered with bicarbonate : YES.  Note : Discussed the risk of pain, infection, scarring, hypo- or hyperpigmentation and recurrence or need for re-treatment. The benefits of treatment and alternative treatments were also discussed.    During this procedure, the universal protocol was utilized. The patient's identity was confirmed by no less than two patient identifiers, correct procedure was verified, correct site was verified and marked as applicable and a final pause was completed.    Sterile technique was used throughout the procedure. The skin was cleaned and prepped with surgical cleanser. Once adequate anesthesia was obtained, the lesion was biopsied with a saucerization shave procedure. The specimen was sent to pathology.    Direct pressure and aluminum chloride and monopolar cautery was applied for hemostasis. No bleeding was present upon the completion of the procedure. The wound was coated with antibacterial ointment. A dry sterile dressing was applied. Patient tolerated the procedure well and left in satisfactory  condition.    Primary provider and referring provider will be informed regarding the tissue report when it returns.      The information in this document, created by the medical scribe for me, accurately reflects the services I personally performed and the decisions made by me. I have reviewed and approved this document for accuracy prior to leaving the patient care area.  Santa Pickens MD May 15, 2017 8:42 AM  [facility] - PRIMARY CARE SKIN

## 2017-05-15 NOTE — MR AVS SNAPSHOT
"              After Visit Summary   5/15/2017    Ricardo Neely    MRN: [id number]           Patient Information     Date Of Birth          1940        Visit Information        Provider Department      5/15/2017 8:40 AM Monica Pickens MD [facility] - Primary Care Skin        Today's Diagnoses     Neoplasm of uncertain behavior of skin    -  1    Status post Mohs surgery for basal cell carcinoma          Care Instructions    FUTURE APPOINTMENTS  Follow up per pathology report.    WOUND CARE INSTRUCTIONS  1. After 24 hours, change dressing daily.  2. Wash hands before every dressing change.  3. Wash the wound area with a mild soap, then rinse  4. Gently pat dry with a sterile gauze or Q-tip.  5. Apply Vaseline or Aquaphor only over entire wound. Do NOT use Neosporin - as many people react to neomycin.  6. Repeat once daily until wound has healed.    Do not let the wound dry out.  The wound will heal faster and with better cosmetic results if routinely kept moist with step #5. It is a false belief that a wound heals better when it is exposed to air and allowed to dry out.      Soap, water and shampoo will not hurt this area.    Do not go swimming or take baths, but showering is encouraged.    Limit use of the area where the procedure was done for a few days to allow for optimal healing.    If you experience bleeding:  Repeat steps #2-5 and hold firm pressure on the area for 10 minutes without checking to see if the bleeding has stopped. \"Checking\" pulls off the protective wound clot and restarts the bleeding all over again. Re-apply pressure for 10 minutes if necessary to stop bleeding.  Use additional sterile gauze and tape to maintain pressure once bleeding has stopped.    Signs of Infection:  Infection can occur in any area where skin has been disrupted.  If you notice persistent redness, swelling, colored drainage, increasing pain, fever or other signs of infection, please call us at: [phone] and ask to have me or my colleague paged. We will call you back to discuss.    Pathology Results:  You will be notified, generally via letter or MyChart, in approximately 10 days. If there is anything we need to discuss or further treatment needed, I will call you to discuss it.    Skin tissue can be some of the most challenging tissue for pathologists to examine, therefore we may use a specialist outside the [employer] system to read certain submitted tissue samples. When submitted to these outside specialists, [email or web address] will notify you that your tissue sample result has returned. However, this only means that the tissue sample has been sent to the specialist. These results are not available in [employer], so I will contact you when I receive the final report.    PATIENT INFORMATION : WOUNDS  During the healing process you will notice a number of changes. All wounds develop a small halo of redness surrounding the wound.  This means healing is occurring. Severe itching with extensive redness usually indicates sensitivity to the ointment or bandage tape used to dress the wound.  You should call our office if this develops.      Swelling  and/or discoloration around your surgical site is common, particularly when performed around the eye.    All wounds normally drain.  The larger the wound the more drainage there will be.  After 7-10 days, you will notice the wound beginning to shrink and new skin will begin to grow.  The wound is healed when you can see skin has formed over the entire area.  A healed wound has a healthy, shiny look to the surface and is red to dark pink in color to normalize.  Wounds may take approximately 4-6 weeks to heal.  Larger wounds may take 6-8 weeks. After the wound is healed you may discontinue dressing changes.    You may experience a sensation of tightness as your wound heals. This is normal and will gradually subside.    Your healed wound may be sensitive to temperature changes. This  sensitivity improves with time, but if you re having a lot of discomfort, try to avoid temperature extremes.    Patients frequently experience itching after their wound appears to have healed because of the continue healing under the skin.  Plain Vaseline will help relieve the itching.        Follow-ups after your visit        Your next 10 appointments already scheduled     Jun 09, 2017  9:00 AM CDT   Office Visit with Storm Huddleston MD   [facility]    [address] [address] [phone]           Bring a current list of meds and any records pertaining to this visit.  For Physicals, please bring immunization records and any forms needing to be filled out.  Please arrive 10 minutes early to complete paperwork.              Who to contact     If you have questions or need follow up information about today's clinic visit or your schedule please contact [facility] - PRIMARY CARE SKIN directly at [phone].  Normal or non-critical lab and imaging results will be communicated to you by [employer], letter or phone within 4 business days after the clinic has received the results. If you do not hear from us within 7 days, please contact the clinic through [employer] or phone. If you have a critical or abnormal lab result, we will notify you by phone as soon as possible.  Submit refill requests through [employer] or call your pharmacy and they will forward the refill request to us. Please allow 3 business days for your refill to be completed.          Additional Information About Your Visit        [employer] Information     [employer] gives you secure access to your electronic health record. If you see a primary care provider, you can also send messages to your care team and make appointments. If you have questions, please call your primary care clinic.  If you do not have a primary care provider, please call [phone] and they will  assist you.        Care EveryWhere ID     This is your Care EveryWhere ID. This could be used by other organizations to access your [facility] medical records  [id number]         Blood Pressure from Last 3 Encounters:   12/27/16 138/76   12/21/16 148/82   06/20/16 127/64    Weight from Last 3 Encounters:   12/27/16 218 lb 6.4 oz (99.1 kg)   05/17/16 216 lb (98 kg)   05/11/16 216 lb (98 kg)              Today, you had the following     No orders found for display         Today's Medication Changes          These changes are accurate as of: 5/15/17  8:46 AM.  If you have any questions, ask your nurse or doctor.               These medicines have changed or have updated prescriptions.        Dose/Directions    aspirin 81 MG tablet   This may have changed:  when to take this   Used for:  Routine general medical examination at a health care facility        Dose:  81 mg   Take 1 tablet by mouth daily.   Quantity:  100 tablet   Refills:  3                Primary Care Provider Office Phone # Fax #    Storm Huddleston [phone]       [facility] [address] [place] [phone]        Thank you!     Thank you for choosing [facility] - PRIMARY CARE SKIN  for your care. Our goal is always to provide you with excellent care. Hearing back from our patients is one way we can continue to improve our services. Please take a few minutes to complete the written survey that you may receive in the mail after your visit with us. Thank you!             Your Updated Medication List - Protect others around you: Learn how to safely use, store and throw away your medicines at www.disposemymeds.org.          This list is accurate as of: 5/15/17  8:46 AM.  Always use your most recent med list.                   Brand Name Dispense Instructions for use    amLODIPine 5 MG tablet    NORVASC    90 tablet    Take 1 tablet (5 mg) by mouth daily       aspirin 81 MG tablet     100 tablet    Take 1 tablet by  mouth daily.       atorvastatin 40 MG tablet    LIPITOR    90 tablet    Take 1 tablet (40 mg) by mouth daily       benazepril 40 MG tablet    LOTENSIN    30 tablet    TAKE 1 TABLET BY MOUTH EVERY DAY       clopidogrel 75 MG tablet    PLAVIX    90 tablet    Take 1 tablet (75 mg) by mouth daily       ketoconazole 2 % cream    NIZORAL    30 g    Apply topically 2 times daily as needed Profile Rx: patient will call when needed       loratadine 10 MG tablet    CLARITIN     Take 1 tablet (10 mg) by mouth daily       metoprolol 50 MG 24 hr tablet    TOPROL-XL    135 tablet    Take 1.5 tablets (75 mg) by mouth daily       ondansetron 4 MG tablet    ZOFRAN    12 tablet    Take 1 tablet (4 mg) by mouth every 8 hours as needed for nausea       triamcinolone 0.025 % ointment    KENALOG    15 g    Apply topically 2 times daily as needed

## 2017-05-15 NOTE — PATIENT INSTRUCTIONS
"FUTURE APPOINTMENTS  Follow up per pathology report.    WOUND CARE INSTRUCTIONS  1. After 24 hours, change dressing daily.  2. Wash hands before every dressing change.  3. Wash the wound area with a mild soap, then rinse  4. Gently pat dry with a sterile gauze or Q-tip.  5. Apply Vaseline or Aquaphor only over entire wound. Do NOT use Neosporin - as many people react to neomycin.  6. Repeat once daily until wound has healed.    Do not let the wound dry out.  The wound will heal faster and with better cosmetic results if routinely kept moist with step #5. It is a false belief that a wound heals better when it is exposed to air and allowed to dry out.      Soap, water and shampoo will not hurt this area.    Do not go swimming or take baths, but showering is encouraged.    Limit use of the area where the procedure was done for a few days to allow for optimal healing.    If you experience bleeding:  Repeat steps #2-5 and hold firm pressure on the area for 10 minutes without checking to see if the bleeding has stopped. \"Checking\" pulls off the protective wound clot and restarts the bleeding all over again. Re-apply pressure for 10 minutes if necessary to stop bleeding.  Use additional sterile gauze and tape to maintain pressure once bleeding has stopped.    Signs of Infection:  Infection can occur in any area where skin has been disrupted.  If you notice persistent redness, swelling, colored drainage, increasing pain, fever or other signs of infection, please call us at: [phone] and ask to have me or my colleague paged. We will call you back to discuss.    Pathology Results:  You will be notified, generally via letter or MyChart, in approximately 10 days. If there is anything we need to discuss or further treatment needed, I will call you to discuss it.    Skin tissue can be some of the most challenging tissue for pathologists to examine, therefore we may use a specialist outside the [facility] system to read " certain submitted tissue samples. When submitted to these outside specialists, [facility] will notify you that your tissue sample result has returned. However, this only means that the tissue sample has been sent to the specialist. These results are not available in [facility], so I will contact you when I receive the final report.    PATIENT INFORMATION : WOUNDS  During the healing process you will notice a number of changes. All wounds develop a small halo of redness surrounding the wound.  This means healing is occurring. Severe itching with extensive redness usually indicates sensitivity to the ointment or bandage tape used to dress the wound.  You should call our office if this develops.      Swelling  and/or discoloration around your surgical site is common, particularly when performed around the eye.    All wounds normally drain.  The larger the wound the more drainage there will be.  After 7-10 days, you will notice the wound beginning to shrink and new skin will begin to grow.  The wound is healed when you can see skin has formed over the entire area.  A healed wound has a healthy, shiny look to the surface and is red to dark pink in color to normalize.  Wounds may take approximately 4-6 weeks to heal.  Larger wounds may take 6-8 weeks. After the wound is healed you may discontinue dressing changes.    You may experience a sensation of tightness as your wound heals. This is normal and will gradually subside.    Your healed wound may be sensitive to temperature changes. This sensitivity improves with time, but if you re having a lot of discomfort, try to avoid temperature extremes.    Patients frequently experience itching after their wound appears to have healed because of the continue healing under the skin.  Plain Vaseline will help relieve the itching.

## 2017-05-22 NOTE — PROGRESS NOTES
ADDENDUM:                                                    May 22, 2017 8:25 AM  Pathology report results:

## 2017-05-24 ENCOUNTER — TELEPHONE (OUTPATIENT)
Dept: FAMILY MEDICINE | Facility: CLINIC | Age: 77
End: 2017-05-24

## 2017-05-24 NOTE — TELEPHONE ENCOUNTER
Encounter : phonecall  Discussed lab results :       Pathology report : the shave biopsy was consistent with inflammation or irritation but based on the clinical findings I am still not satisfied that with the nonspecific findings, recommended consult with Dr. Restrepo     We will arrange this appointment.

## 2017-05-30 ENCOUNTER — TELEPHONE (OUTPATIENT)
Dept: DERMATOLOGY | Facility: CLINIC | Age: 77
End: 2017-05-30

## 2017-05-30 NOTE — TELEPHONE ENCOUNTER
----- Message from Raymond Restrepo MD sent at 5/30/2017  7:07 AM CDT -----  Regarding: FW: work in  Please overbook for this Thursday at 1245pm      Thanks    Hernan   ----- Message -----     From: Monica Pickens MD     Sent: 5/24/2017   3:40 PM       To: Raymond Restrepo MD  Subject: work in                                          Hello Hernan,      This is a 76 year male with a 3 cm x 1 cm area of erythematous, flat erythematous  lesion extending over head of the penis to the urethra . I initially saw him in 2014 for small patch of erythema on the penis, treated him with triamcinolone 0.1% cream but then he has been self medicating every day for the past year. I was concerned about squamous cell carcinoma but a shave biopsy was nonspecific. Would it be possible for you to work him on a Thursday so I can get your opinion ?     Thanks ,   Santa

## 2017-06-01 ENCOUNTER — OFFICE VISIT (OUTPATIENT)
Dept: DERMATOLOGY | Facility: CLINIC | Age: 77
End: 2017-06-01
Payer: COMMERCIAL

## 2017-06-01 VITALS — OXYGEN SATURATION: 93 % | DIASTOLIC BLOOD PRESSURE: 70 MMHG | SYSTOLIC BLOOD PRESSURE: 126 MMHG | HEART RATE: 85 BPM

## 2017-06-01 DIAGNOSIS — L24.9 IRRITANT DERMATITIS: Primary | ICD-10-CM

## 2017-06-01 PROCEDURE — 99203 OFFICE O/P NEW LOW 30 MIN: CPT | Performed by: DERMATOLOGY

## 2017-06-01 RX ORDER — FLUOCINONIDE 0.5 MG/G
CREAM TOPICAL
Qty: 60 G | Refills: 1 | Status: SHIPPED | OUTPATIENT
Start: 2017-06-01 | End: 2019-07-01

## 2017-06-01 NOTE — PROGRESS NOTES
Ricardo Neely is a 76 year old year old male patient here today in consultation for rash on openis by Dr. Pickens since 2014. Patient states this has been present for years.  Location penis.  .  Patient reports the following symptoms:  itching .  Patient reports the following previous treatments topicals.  Patient reports the following modifying factors none.  Associated symptoms: none.  Bx was non specific.  No other symptoms, no oral lesions no other rash.  .  Patient has no other skin complaints today.  Remainder of the HPI, Meds, PMH, Allergies, FH, and SH was reviewed in chart.      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 Guzman     C NONSPECIFIC PROCEDURE  2005    L3-4 microdiskectomy   Dr. Fercho RYDER TOTAL KNEE ARTHROPLASTY  July 2010    Minimally invasive R TKA   Dr. Nichols     COLONOSCOPY       DISCECTOMY LUMBAR POSTERIOR MICROSCOPIC TWO LEVELS  8/28/2012    DISCECTOMY LUMBAR POSTERIOR MICROSCOPIC TWO LEVELS;  RIGHT L3-L4 REDO EXTENDED HEMILAMINECTOMY AND MICRO FORAMINOTOMY, LEFT L4-L5 EXTENDED HEMILAMINECTOMY AND MICRODISCECTOMY (PRONE) (SONYA MCCALLUM, C-ARM, METRIX II);  Surgeon: Bertram Mirza MD;  Location: [facility] OR     renal calculii removal 40 years ago  1965        Family History   Problem  Relation Age of Onset     Family History Negative Brother      1 healthy brother     DIABETES Brother      d:age 66     DIABETES Mother      CEREBROVASCULAR DISEASE Mother      C.A.D. Father      CABG 67     HEART DISEASE Father      Lipids Sister      Hypertension Sister      Lipids Sister      Hypertension Sister      Thyroid Disease Daughter      thyroid surgery, on replacement       Social History     Social History     Marital status:      Spouse name: N/A     Number of children: N/A     Years of education: N/A     Occupational History      Retired     Social History Main Topics     Smoking status: Former Smoker     Quit date: 1/1/1968     Smokeless tobacco: Never Used     Alcohol use No     Drug use: No     Sexual activity: Yes     Partners: Female     Other Topics Concern     Caffeine Concern No     Social History Narrative       Outpatient Encounter Prescriptions as of 6/1/2017   Medication Sig Dispense Refill     benazepril (LOTENSIN) 40 MG tablet TAKE 1 TABLET BY MOUTH EVERY DAY 30 tablet 0     triamcinolone (KENALOG) 0.025 % ointment Apply topically 2 times daily as needed 15 g 0     amLODIPine (NORVASC) 5 MG tablet Take 1 tablet (5 mg) by mouth daily 90 tablet 0     ondansetron (ZOFRAN) 4 MG tablet Take 1 tablet (4 mg) by mouth every 8 hours as needed for nausea 12 tablet 0     atorvastatin (LIPITOR) 40 MG tablet Take 1 tablet (40 mg) by mouth daily 90 tablet prn     metoprolol (TOPROL-XL) 50 MG 24 hr tablet Take 1.5 tablets (75 mg) by mouth daily 135 tablet 2     clopidogrel (PLAVIX) 75 MG tablet Take 1 tablet (75 mg) by mouth daily 90 tablet prn     ketoconazole (NIZORAL) 2 % cream Apply topically 2 times daily as needed Profile Rx: patient will call when needed 30 g 2     loratadine (CLARITIN) 10 MG tablet Take 1 tablet (10 mg) by mouth daily       aspirin 81 MG tablet Take 1 tablet by mouth daily. (Patient taking differently: Take 81 mg by mouth At Bedtime ) 100 tablet 3     No  facility-administered encounter medications on file as of 6/1/2017.              Review Of Systems  Skin: As above  Eyes: negative  Ears/Nose/Throat: negative  Respiratory: No shortness of breath, dyspnea on exertion, cough, or hemoptysis  Cardiovascular: negative  Gastrointestinal: negative  Genitourinary: negative  Musculoskeletal: negative  Neurologic: negative  Psychiatric: negative  Hematologic/Lymphatic/Immunologic: negative  Endocrine: negative      O:   NAD, WDWN, Alert & Oriented, Mood & Affect wnl, Vitals stable   Here today alone   /70  Pulse 85  SpO2 93%   General appearance shubham ii   Vitals stable   Alert, oriented and in no acute distress     Erythematous thin palque son head of penis deep in foreskin      Eyes: Conjunctivae/lids:Normal     ENT: Lips, buccal mucosa, tongue: normal    MSK:Normal    Pulm: Breathing Normal    Lymph Nodes: No Head and Neck Lymphadenopathy     Neuro/Psych: Orientation:Normal; Mood/Affect:Normal      A/P:  1. ?irritant derm v fungal  Lidex in am  desitin at ebdtime  Return to clinic 1 week  Skin care regimen reviewed with patient: Eliminate harsh soaps, i.e. Dial, zest, irsih spring; Mild soaps such as Cetaphil or Dove sensitive skin, avoid hot or cold showers, aggressive use of emollients including vanicream, cetaphil or cerave discussed with patient.

## 2017-06-01 NOTE — NURSING NOTE
"Initial /70  Pulse 85  SpO2 93% Estimated body mass index is 33.7 kg/(m^2) as calculated from the following:    Height as of 4/18/16: 1.715 m (5' 7.5\").    Weight as of 12/27/16: 99.1 kg (218 lb 6.4 oz). .      "

## 2017-06-01 NOTE — MR AVS SNAPSHOT
After Visit Summary   6/1/2017    Ricardo Neely    MRN: [id number]           Patient Information     Date Of Birth          1940        Visit Information        Provider Department      6/1/2017 1:00 PM Raymond Restrepo MD [facility]        Today's Diagnoses     Irritant dermatitis    -  1       Follow-ups after your visit        Your next 10 appointments already scheduled     Jun 09, 2017  9:00 AM CDT   Office Visit with Storm Huddleston MD   [facility]    [address] [id number] [phone]           Bring a current list of meds and any records pertaining to this visit.  For Physicals, please bring immunization records and any forms needing to be filled out.  Please arrive 10 minutes early to complete paperwork.              Who to contact     If you have questions or need follow up information about today's clinic visit or your schedule please contact [facility] directly at [phone].  Normal or non-critical lab and imaging results will be communicated to you by [employer], letter or phone within 4 business days after the clinic has received the results. If you do not hear from us within 7 days, please contact the clinic through [employer] or phone. If you have a critical or abnormal lab result, we will notify you by phone as soon as possible.  Submit refill requests through [email or web address] or call your pharmacy and they will forward the refill request to us. Please allow 3 business days for your refill to be completed.          Additional Information About Your Visit        MyChart Information     [email or web address] gives you secure access to your electronic health record. If you see a primary care provider, you can also send messages to your care team and make appointments. If you have questions, please call your primary care clinic.  If you do not have a primary care  provider, please call [phone] and they will assist you.        Care EveryWhere ID     This is your Care EveryWhere ID. This could be used by other organizations to access your [place] medical records  [id number]        Your Vitals Were     Pulse Pulse Oximetry                85 93%           Blood Pressure from Last 3 Encounters:   06/01/17 126/70   12/27/16 138/76   12/21/16 148/82    Weight from Last 3 Encounters:   12/27/16 99.1 kg (218 lb 6.4 oz)   05/17/16 98 kg (216 lb)   05/11/16 98 kg (216 lb)              Today, you had the following     No orders found for display         Today's Medication Changes          These changes are accurate as of: 6/1/17  1:07 PM.  If you have any questions, ask your nurse or doctor.               Start taking these medicines.        Dose/Directions    fluocinonide 0.05 % cream   Commonly known as:  LIDEX   Used for:  Irritant dermatitis   Started by:  Raymond Restrepo MD        Apply sparingly to affected area twice daily as needed.  Do not apply to face.   Quantity:  60 g   Refills:  1         These medicines have changed or have updated prescriptions.        Dose/Directions    aspirin 81 MG tablet   This may have changed:  when to take this   Used for:  Routine general medical examination at a health care facility        Dose:  81 mg   Take 1 tablet by mouth daily.   Quantity:  100 tablet   Refills:  3            Where to get your medicines      These medications were sent to [facility] [address] [place], [place] - [address] AT [facility] [place]  [address], [facility] [address]     Phone:  [phone]     fluocinonide 0.05 % cream                Primary Care Provider Office Phone # Fax #    Storm Huddleston [phone]       [facility] [address]  [facility] [address]        Thank you!     Thank you for choosing [facility]  for your care. Our goal is  always to provide you with excellent care. Hearing back from our patients is one way we can continue to improve our services. Please take a few minutes to complete the written survey that you may receive in the mail after your visit with us. Thank you!             Your Updated Medication List - Protect others around you: Learn how to safely use, store and throw away your medicines at www.disposemymeds.org.          This list is accurate as of: 6/1/17  1:07 PM.  Always use your most recent med list.                   Brand Name Dispense Instructions for use    amLODIPine 5 MG tablet    NORVASC    90 tablet    Take 1 tablet (5 mg) by mouth daily       aspirin 81 MG tablet     100 tablet    Take 1 tablet by mouth daily.       atorvastatin 40 MG tablet    LIPITOR    90 tablet    Take 1 tablet (40 mg) by mouth daily       benazepril 40 MG tablet    LOTENSIN    30 tablet    TAKE 1 TABLET BY MOUTH EVERY DAY       clopidogrel 75 MG tablet    PLAVIX    90 tablet    Take 1 tablet (75 mg) by mouth daily       fluocinonide 0.05 % cream    LIDEX    60 g    Apply sparingly to affected area twice daily as needed.  Do not apply to face.       ketoconazole 2 % cream    NIZORAL    30 g    Apply topically 2 times daily as needed Profile Rx: patient will call when needed       loratadine 10 MG tablet    CLARITIN     Take 1 tablet (10 mg) by mouth daily       metoprolol 50 MG 24 hr tablet    TOPROL-XL    135 tablet    Take 1.5 tablets (75 mg) by mouth daily       ondansetron 4 MG tablet    ZOFRAN    12 tablet    Take 1 tablet (4 mg) by mouth every 8 hours as needed for nausea       triamcinolone 0.025 % ointment    KENALOG    15 g    Apply topically 2 times daily as needed

## 2017-06-08 ENCOUNTER — OFFICE VISIT (OUTPATIENT)
Dept: DERMATOLOGY | Facility: CLINIC | Age: 77
End: 2017-06-08
Payer: COMMERCIAL

## 2017-06-08 VITALS — DIASTOLIC BLOOD PRESSURE: 75 MMHG | OXYGEN SATURATION: 94 % | SYSTOLIC BLOOD PRESSURE: 134 MMHG | HEART RATE: 78 BPM

## 2017-06-08 DIAGNOSIS — L24.9 IRRITANT DERMATITIS: Primary | ICD-10-CM

## 2017-06-08 PROCEDURE — 99212 OFFICE O/P EST SF 10 MIN: CPT | Performed by: DERMATOLOGY

## 2017-06-08 NOTE — NURSING NOTE
"Initial /75  Pulse 78  SpO2 94% Estimated body mass index is 33.7 kg/(m^2) as calculated from the following:    Height as of 4/18/16: 1.715 m (5' 7.5\").    Weight as of 12/27/16: 99.1 kg (218 lb 6.4 oz). .      "

## 2017-06-08 NOTE — MR AVS SNAPSHOT
After Visit Summary   6/8/2017    Ricardo Neely    MRN: [id number]           Patient Information     Date Of Birth          1940        Visit Information        Provider Department      6/8/2017 1:00 PM Raymond Restrepo MD [facility]        Today's Diagnoses     Irritant dermatitis    -  1       Follow-ups after your visit        Your next 10 appointments already scheduled     Jun 09, 2017  9:00 AM CDT   Office Visit with Storm Huddleston MD   [facility]    [address] [id number] [phone]           Bring a current list of meds and any records pertaining to this visit.  For Physicals, please bring immunization records and any forms needing to be filled out.  Please arrive 10 minutes early to complete paperwork.              Who to contact     If you have questions or need follow up information about today's clinic visit or your schedule please contact [facility] directly at [phone].  Normal or non-critical lab and imaging results will be communicated to you by [employer], letter or phone within 4 business days after the clinic has received the results. If you do not hear from us within 7 days, please contact the clinic through [employer] or phone. If you have a critical or abnormal lab result, we will notify you by phone as soon as possible.  Submit refill requests through [employer] or call your pharmacy and they will forward the refill request to us. Please allow 3 business days for your refill to be completed.          Additional Information About Your Visit        MyChart Information     [employer] gives you secure access to your electronic health record. If you see a primary care provider, you can also send messages to your care team and make appointments. If you have questions, please call your primary care clinic.  If you do not have a primary care  provider, please call [phone] and they will assist you.        Care EveryWhere ID     This is your Care EveryWhere ID. This could be used by other organizations to access your [place] medical records  [id number]        Your Vitals Were     Pulse Pulse Oximetry                78 94%           Blood Pressure from Last 3 Encounters:   06/08/17 134/75   06/01/17 126/70   12/27/16 138/76    Weight from Last 3 Encounters:   12/27/16 99.1 kg (218 lb 6.4 oz)   05/17/16 98 kg (216 lb)   05/11/16 98 kg (216 lb)              Today, you had the following     No orders found for display         Today's Medication Changes          These changes are accurate as of: 6/8/17  1:15 PM.  If you have any questions, ask your nurse or doctor.               These medicines have changed or have updated prescriptions.        Dose/Directions    aspirin 81 MG tablet   This may have changed:  when to take this   Used for:  Routine general medical examination at a health care facility        Dose:  81 mg   Take 1 tablet by mouth daily.   Quantity:  100 tablet   Refills:  3                Primary Care Provider Office Phone # Fax #    Storm Huddleston [phone]       [facility] [address] [facility] [address]        Thank you!     Thank you for choosing [facility]  for your care. Our goal is always to provide you with excellent care. Hearing back from our patients is one way we can continue to improve our services. Please take a few minutes to complete the written survey that you may receive in the mail after your visit with us. Thank you!             Your Updated Medication List - Protect others around you: Learn how to safely use, store and throw away your medicines at www.disposemymeds.org.          This list is accurate as of: 6/8/17  1:15 PM.  Always use your most recent med list.                   Brand Name Dispense Instructions for use    amLODIPine 5 MG tablet     NORVASC    90 tablet    Take 1 tablet (5 mg) by mouth daily       aspirin 81 MG tablet     100 tablet    Take 1 tablet by mouth daily.       atorvastatin 40 MG tablet    LIPITOR    90 tablet    Take 1 tablet (40 mg) by mouth daily       benazepril 40 MG tablet    LOTENSIN    30 tablet    TAKE 1 TABLET BY MOUTH EVERY DAY       clopidogrel 75 MG tablet    PLAVIX    90 tablet    Take 1 tablet (75 mg) by mouth daily       fluocinonide 0.05 % cream    LIDEX    60 g    Apply sparingly to affected area twice daily as needed.  Do not apply to face.       ketoconazole 2 % cream    NIZORAL    30 g    Apply topically 2 times daily as needed Profile Rx: patient will call when needed       loratadine 10 MG tablet    CLARITIN     Take 1 tablet (10 mg) by mouth daily       metoprolol 50 MG 24 hr tablet    TOPROL-XL    135 tablet    Take 1.5 tablets (75 mg) by mouth daily       ondansetron 4 MG tablet    ZOFRAN    12 tablet    Take 1 tablet (4 mg) by mouth every 8 hours as needed for nausea       triamcinolone 0.025 % ointment    KENALOG    15 g    Apply topically 2 times daily as needed

## 2017-06-08 NOTE — PROGRESS NOTES
Ricardo Neely is a 77 year old year old male patient here today for f/u rash on penis.  Rash clear. Patient reports the following modifying factors none.  Associated symptoms: none.  Patient has no other skin complaints today.  Remainder of the HPI, Meds, PMH, Allergies, FH, and SH was reviewed in chart.      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 Thomas RYDER NONSPECIFIC PROCEDURE  2005    L3-4 microdiskectomy   Dr. Fercho RYDER TOTAL KNEE ARTHROPLASTY  July 2010    Minimally invasive R TKA   Dr. Nichols     COLONOSCOPY       DISCECTOMY LUMBAR POSTERIOR MICROSCOPIC TWO LEVELS  8/28/2012    DISCECTOMY LUMBAR POSTERIOR MICROSCOPIC TWO LEVELS;  RIGHT L3-L4 REDO EXTENDED HEMILAMINECTOMY AND MICRO FORAMINOTOMY, LEFT L4-L5 EXTENDED HEMILAMINECTOMY AND MICRODISCECTOMY (PRONE) (SONYA MCCALLUM, C-ARM, METRIX II);  Surgeon: Bertram Mirza MD;  Location: [facility]     renal calculii removal 40 years ago  1965        Family History   Problem Relation Age of Onset     Family History Negative Brother      1 healthy brother     DIABETES Brother      d:age 66     DIABETES Mother      CEREBROVASCULAR DISEASE Mother      C.A.D. Father      CABG 67     HEART DISEASE Father      Lipids Sister      Hypertension Sister      Lipids  Sister      Hypertension Sister      Thyroid Disease Daughter      thyroid surgery, on replacement       Social History     Social History     Marital status:      Spouse name: N/A     Number of children: N/A     Years of education: N/A     Occupational History      Retired     Social History Main Topics     Smoking status: Former Smoker     Quit date: 1/1/1968     Smokeless tobacco: Never Used     Alcohol use No     Drug use: No     Sexual activity: Yes     Partners: Female     Other Topics Concern     Caffeine Concern No     Social History Narrative       Outpatient Encounter Prescriptions as of 6/8/2017   Medication Sig Dispense Refill     fluocinonide (LIDEX) 0.05 % cream Apply sparingly to affected area twice daily as needed.  Do not apply to face. 60 g 1     benazepril (LOTENSIN) 40 MG tablet TAKE 1 TABLET BY MOUTH EVERY DAY 30 tablet 0     triamcinolone (KENALOG) 0.025 % ointment Apply topically 2 times daily as needed 15 g 0     amLODIPine (NORVASC) 5 MG tablet Take 1 tablet (5 mg) by mouth daily 90 tablet 0     ondansetron (ZOFRAN) 4 MG tablet Take 1 tablet (4 mg) by mouth every 8 hours as needed for nausea 12 tablet 0     atorvastatin (LIPITOR) 40 MG tablet Take 1 tablet (40 mg) by mouth daily 90 tablet prn     metoprolol (TOPROL-XL) 50 MG 24 hr tablet Take 1.5 tablets (75 mg) by mouth daily 135 tablet 2     clopidogrel (PLAVIX) 75 MG tablet Take 1 tablet (75 mg) by mouth daily 90 tablet prn     ketoconazole (NIZORAL) 2 % cream Apply topically 2 times daily as needed Profile Rx: patient will call when needed 30 g 2     loratadine (CLARITIN) 10 MG tablet Take 1 tablet (10 mg) by mouth daily       aspirin 81 MG tablet Take 1 tablet by mouth daily. (Patient taking differently: Take 81 mg by mouth At Bedtime ) 100 tablet 3     No facility-administered encounter medications on file as of 6/8/2017.              Review Of Systems  Skin: As above  Eyes: negative  Ears/Nose/Throat: negative  Respiratory: No  shortness of breath, dyspnea on exertion, cough, or hemoptysis  Cardiovascular: negative  Gastrointestinal: negative  Genitourinary: negative  Musculoskeletal: negative  Neurologic: negative  Psychiatric: negative  Hematologic/Lymphatic/Immunologic: negative  Endocrine: negative      O:   NAD, WDWN, Alert & Oriented, Mood & Affect wnl, Vitals stable   Here today alone   /75  Pulse 78  SpO2 94%   General appearance normal   Vitals stable   Alert, oriented and in no acute distress     Clear      Eyes: Conjunctivae/lids:Normal     ENT: Lips, buccal mucosa, tongue: normal    MSK:Normal    Cardiovascular: peripheral edema none    Pulm: Breathing Normal    Neuro/Psych: Orientation:Normal; Mood/Affect:Normal      A/P:  1. Irritant derm  Skin care regimen reviewed with patient: Eliminate harsh soaps, i.e. Dial, zest, irsih spring; Mild soaps such as Cetaphil or Dove sensitive skin, avoid hot or cold showers, aggressive use of emollients including vanicream, cetaphil or cerave discussed with patient.    Return to clinic pnr

## 2017-06-09 ENCOUNTER — OFFICE VISIT (OUTPATIENT)
Dept: INTERNAL MEDICINE | Facility: CLINIC | Age: 77
End: 2017-06-09
Payer: COMMERCIAL

## 2017-06-09 VITALS
SYSTOLIC BLOOD PRESSURE: 130 MMHG | RESPIRATION RATE: 16 BRPM | HEART RATE: 67 BPM | BODY MASS INDEX: 32.61 KG/M2 | DIASTOLIC BLOOD PRESSURE: 62 MMHG | HEIGHT: 68 IN | TEMPERATURE: 98.2 F | OXYGEN SATURATION: 99 % | WEIGHT: 215.2 LBS

## 2017-06-09 DIAGNOSIS — G45.0 VERTEBROBASILAR ARTERY SYNDROME: ICD-10-CM

## 2017-06-09 DIAGNOSIS — I67.9 CEREBROVASCULAR DISEASE: ICD-10-CM

## 2017-06-09 DIAGNOSIS — R73.01 IMPAIRED FASTING GLUCOSE: ICD-10-CM

## 2017-06-09 DIAGNOSIS — I10 ESSENTIAL HYPERTENSION: ICD-10-CM

## 2017-06-09 DIAGNOSIS — E78.5 HYPERLIPIDEMIA LDL GOAL <100: Primary | ICD-10-CM

## 2017-06-09 DIAGNOSIS — H61.23 BILATERAL IMPACTED CERUMEN: ICD-10-CM

## 2017-06-09 PROCEDURE — 69210 REMOVE IMPACTED EAR WAX UNI: CPT | Mod: 50 | Performed by: INTERNAL MEDICINE

## 2017-06-09 PROCEDURE — 99214 OFFICE O/P EST MOD 30 MIN: CPT | Mod: 25 | Performed by: INTERNAL MEDICINE

## 2017-06-09 RX ORDER — CLOPIDOGREL BISULFATE 75 MG/1
75 TABLET ORAL DAILY
Qty: 90 TABLET | Status: SHIPPED | OUTPATIENT
Start: 2017-06-09 | End: 2018-03-12

## 2017-06-09 RX ORDER — ATORVASTATIN CALCIUM 40 MG/1
40 TABLET, FILM COATED ORAL DAILY
Qty: 90 TABLET | Status: SHIPPED | OUTPATIENT
Start: 2017-06-09 | End: 2018-06-11

## 2017-06-09 RX ORDER — BENAZEPRIL HYDROCHLORIDE 40 MG/1
40 TABLET ORAL DAILY
Qty: 90 TABLET | Refills: 3 | Status: SHIPPED | OUTPATIENT
Start: 2017-06-09 | End: 2018-06-06

## 2017-06-09 RX ORDER — METOPROLOL SUCCINATE 50 MG/1
75 TABLET, EXTENDED RELEASE ORAL DAILY
Qty: 135 TABLET | Refills: 3 | Status: SHIPPED | OUTPATIENT
Start: 2017-06-09 | End: 2018-06-06

## 2017-06-09 RX ORDER — AMLODIPINE BESYLATE 5 MG/1
5 TABLET ORAL DAILY
Qty: 90 TABLET | Refills: 3 | Status: SHIPPED | OUTPATIENT
Start: 2017-06-09 | End: 2018-06-06

## 2017-06-09 ASSESSMENT — ANXIETY QUESTIONNAIRES
1. FEELING NERVOUS, ANXIOUS, OR ON EDGE: SEVERAL DAYS
2. NOT BEING ABLE TO STOP OR CONTROL WORRYING: NOT AT ALL
6. BECOMING EASILY ANNOYED OR IRRITABLE: SEVERAL DAYS
7. FEELING AFRAID AS IF SOMETHING AWFUL MIGHT HAPPEN: NOT AT ALL
3. WORRYING TOO MUCH ABOUT DIFFERENT THINGS: SEVERAL DAYS
GAD7 TOTAL SCORE: 3
5. BEING SO RESTLESS THAT IT IS HARD TO SIT STILL: NOT AT ALL

## 2017-06-09 ASSESSMENT — PATIENT HEALTH QUESTIONNAIRE - PHQ9: 5. POOR APPETITE OR OVEREATING: NOT AT ALL

## 2017-06-09 NOTE — NURSING NOTE
"Chief Complaint   Patient presents with     Hypertension     Lipids       Initial /62  Pulse 67  Temp 98.2  F (36.8  C) (Oral)  Resp 16  Ht 5' 7.75\" (1.721 m)  Wt 215 lb 3.2 oz (97.6 kg)  SpO2 99%  BMI 32.96 kg/m2 Estimated body mass index is 32.96 kg/(m^2) as calculated from the following:    Height as of this encounter: 5' 7.75\" (1.721 m).    Weight as of this encounter: 215 lb 3.2 oz (97.6 kg).  Medication Reconciliation: complete   Geovanna Fraser MA   "

## 2017-06-09 NOTE — PROGRESS NOTES
"  SUBJECTIVE:                                                    Ricardo Neely is a 77 year old male who presents to clinic today for the following health issues:      Hyperlipidemia Follow-Up      Rate your low fat/cholesterol diet?: fair    Taking statin?  Yes, no muscle aches from statin    Other lipid medications/supplements?:  none     Hypertension Follow-up      Outpatient blood pressures are being checked at home.  Results are 120's/70's.    Low Salt Diet: no added salt       Amount of exercise or physical activity: 6-7 days/week for an average of 45-60 minutes    Problems taking medications regularly: No    Medication side effects: none    Diet: regular (no restrictions)      Reviewed and updated as needed this visit by clinical staff:  Medications  Allergies  Soc Hx   Additional history: as documented    ROS:  Some increased tinnitus, bilat.  No pain, plugging, hearing is fairly good  Constitutional, HEENT, cardiovascular, pulmonary, gi and gu systems are negative, except as otherwise noted.    OBJECTIVE:                                                      /62  Pulse 67  Temp 98.2  F (36.8  C) (Oral)  Resp 16  Ht 5' 7.75\" (1.721 m)  Wt 215 lb 3.2 oz (97.6 kg)  SpO2 99%  BMI 32.96 kg/m2  Body mass index is 32.96 kg/(m^2).   No apparent distress  Huge amount of wax is seen in both ear canals, almost occluding.  At patient request, wax extraction done by MD through use of a curette.   Complete results.  TM dull on right  Reg heart tones  No edema     see labs        ASSESSMENT:                                                      HYPERLIPIDEMIA, controlled  HYPERTENSION, controlled. Associated with the following complications: None  BILATERAL EAR WAX  TINNITUS, unclear if related to age, wax, etc.    PLAN:                                                      1.  MEDICATIONS:  Continue current medications without change  2.  If doing well, Check labs again in a year, then see MD.   3.  Continue " exercise, activity, weight management    Storm Huddleston MD  [facility]

## 2017-06-09 NOTE — MR AVS SNAPSHOT
After Visit Summary   6/9/2017    Ricardo Neely    MRN: [id number]           Patient Information     Date Of Birth          1940        Visit Information        Provider Department      6/9/2017 9:00 AM Storm Huddleston MD [facility]        Today's Diagnoses     Hyperlipidemia LDL goal <100    -  1    Essential hypertension        Cerebrovascular disease        Vertebrobasilar artery syndrome        Impaired fasting glucose        Bilateral impacted cerumen          Care Instructions    PLAN:                                                      1.  MEDICATIONS:  Continue current medications without change  2.  If doing well, Check labs again in a year, then see MD.   3.  Continue exercise, activity, weight management            Follow-ups after your visit        Future tests that were ordered for you today     Open Future Orders        Priority Expected Expires Ordered    Lipid panel reflex to direct LDL Routine 5/1/2018 6/9/2018 6/9/2017    ALT Routine 5/1/2018 6/9/2018 6/9/2017    Basic metabolic panel Routine 5/1/2018 6/9/2018 6/9/2017            Who to contact     If you have questions or need follow up information about today's clinic visit or your schedule please contact [facility] directly at [phone].  Normal or non-critical lab and imaging results will be communicated to you by MyChart, letter or phone within 4 business days after the clinic has received the results. If you do not hear from us within 7 days, please contact the clinic through [facility] or phone. If you have a critical or abnormal lab result, we will notify you by phone as soon as possible.  Submit refill requests through [employer] or call your pharmacy and they will forward the refill request to us. Please allow 3 business days for your refill to be completed.          Additional Information About Your Visit        MyChart Information     [employer] gives you secure access  "to your electronic health record. If you see a primary care provider, you can also send messages to your care team and make appointments. If you have questions, please call your primary care clinic.  If you do not have a primary care provider, please call [phone] and they will assist you.        Care EveryWhere ID     This is your Care EveryWhere ID. This could be used by other organizations to access your [facility] medical records  [id number]        Your Vitals Were     Pulse Temperature Respirations Height Pulse Oximetry BMI (Body Mass Index)    67 98.2  F (36.8  C) (Oral) 16 5' 7.75\" (1.721 m) 99% 32.96 kg/m2       Blood Pressure from Last 3 Encounters:   06/09/17 130/62   06/08/17 134/75   06/01/17 126/70    Weight from Last 3 Encounters:   06/09/17 215 lb 3.2 oz (97.6 kg)   12/27/16 218 lb 6.4 oz (99.1 kg)   05/17/16 216 lb (98 kg)              We Performed the Following     REMOVE IMPACTED SANDHYA          Today's Medication Changes          These changes are accurate as of: 6/9/17  9:58 AM.  If you have any questions, ask your nurse or doctor.               These medicines have changed or have updated prescriptions.        Dose/Directions    aspirin 81 MG tablet   This may have changed:  when to take this   Used for:  Routine general medical examination at a health care facility        Dose:  81 mg   Take 1 tablet by mouth daily.   Quantity:  100 tablet   Refills:  3       benazepril 40 MG tablet   Commonly known as:  LOTENSIN   This may have changed:  See the new instructions.   Used for:  Essential hypertension   Changed by:  Storm Huddleston MD        Dose:  40 mg   Take 1 tablet (40 mg) by mouth daily   Quantity:  90 tablet   Refills:  3            Where to get your medicines      These medications were sent to [facility] [id number] [place] [address] [address] AT [facility] [address]  [address], [facility] [address]     Phone:  [phone]     " amLODIPine 5 MG tablet    atorvastatin 40 MG tablet    benazepril 40 MG tablet    clopidogrel 75 MG tablet    metoprolol 50 MG 24 hr tablet                Primary Care Provider Office Phone # Fax #    Storm Huddleston [phone]       [facility] [address]  [facility] [address]        Thank you!     Thank you for choosing [facility]  for your care. Our goal is always to provide you with excellent care. Hearing back from our patients is one way we can continue to improve our services. Please take a few minutes to complete the written survey that you may receive in the mail after your visit with us. Thank you!             Your Updated Medication List - Protect others around you: Learn how to safely use, store and throw away your medicines at www.disposemymeds.org.          This list is accurate as of: 6/9/17  9:58 AM.  Always use your most recent med list.                   Brand Name Dispense Instructions for use    amLODIPine 5 MG tablet    NORVASC    90 tablet    Take 1 tablet (5 mg) by mouth daily       aspirin 81 MG tablet     100 tablet    Take 1 tablet by mouth daily.       atorvastatin 40 MG tablet    LIPITOR    90 tablet    Take 1 tablet (40 mg) by mouth daily       benazepril 40 MG tablet    LOTENSIN    90 tablet    Take 1 tablet (40 mg) by mouth daily       clopidogrel 75 MG tablet    PLAVIX    90 tablet    Take 1 tablet (75 mg) by mouth daily       fluocinonide 0.05 % cream    LIDEX    60 g    Apply sparingly to affected area twice daily as needed.  Do not apply to face.       loratadine 10 MG tablet    CLARITIN     Take 1 tablet (10 mg) by mouth daily       metoprolol 50 MG 24 hr tablet    TOPROL-XL    135 tablet    Take 1.5 tablets (75 mg) by mouth daily

## 2017-06-09 NOTE — PATIENT INSTRUCTIONS
PLAN:                                                      1.  MEDICATIONS:  Continue current medications without change  2.  If doing well, Check labs again in a year, then see MD.   3.  Continue exercise, activity, weight management

## 2017-06-10 ASSESSMENT — PATIENT HEALTH QUESTIONNAIRE - PHQ9: SUM OF ALL RESPONSES TO PHQ QUESTIONS 1-9: 0

## 2017-06-10 ASSESSMENT — ANXIETY QUESTIONNAIRES: GAD7 TOTAL SCORE: 3

## 2017-08-08 ENCOUNTER — TELEPHONE (OUTPATIENT)
Dept: FAMILY MEDICINE | Facility: CLINIC | Age: 77
End: 2017-08-08

## 2017-09-08 ENCOUNTER — TELEPHONE (OUTPATIENT)
Dept: NEUROSURGERY | Facility: CLINIC | Age: 77
End: 2017-09-08

## 2017-09-08 DIAGNOSIS — M51.369 DDD (DEGENERATIVE DISC DISEASE), LUMBAR: Primary | ICD-10-CM

## 2017-09-08 DIAGNOSIS — M54.50 LOW BACK PAIN: ICD-10-CM

## 2017-09-08 NOTE — TELEPHONE ENCOUNTER
[facility] requesting specifics on injection order (need levels and right or left sided). Requesting new order to be faxed to number listed above. Pt is scheduled for injection on Monday 9/11/17.

## 2017-09-11 ENCOUNTER — HOSPITAL ENCOUNTER (OUTPATIENT)
Facility: CLINIC | Age: 77
Discharge: HOME | End: 2017-09-11
Attending: RADIOLOGY | Admitting: RADIOLOGY
Payer: MEDICARE

## 2017-09-11 ENCOUNTER — HOSPITAL ENCOUNTER (OUTPATIENT)
Dept: GENERAL RADIOLOGY | Facility: CLINIC | Age: 77
End: 2017-09-11
Attending: NURSE PRACTITIONER | Admitting: RADIOLOGY
Payer: MEDICARE

## 2017-09-11 VITALS
DIASTOLIC BLOOD PRESSURE: 60 MMHG | OXYGEN SATURATION: 98 % | HEART RATE: 58 BPM | TEMPERATURE: 97 F | RESPIRATION RATE: 16 BRPM | SYSTOLIC BLOOD PRESSURE: 131 MMHG

## 2017-09-11 DIAGNOSIS — M51.369 DDD (DEGENERATIVE DISC DISEASE), LUMBAR: ICD-10-CM

## 2017-09-11 DIAGNOSIS — M54.50 LOW BACK PAIN: ICD-10-CM

## 2017-09-11 PROCEDURE — 25000128 H RX IP 250 OP 636: Performed by: PHYSICIAN ASSISTANT

## 2017-09-11 PROCEDURE — 62323 NJX INTERLAMINAR LMBR/SAC: CPT

## 2017-09-11 PROCEDURE — 40000863 ZZH STATISTIC RADIOLOGY XRAY, US, CT, MAR, NM

## 2017-09-11 PROCEDURE — 25500064 ZZH RX 255 OP 636: Performed by: PHYSICIAN ASSISTANT

## 2017-09-11 PROCEDURE — 25000125 ZZHC RX 250: Performed by: PHYSICIAN ASSISTANT

## 2017-09-11 RX ORDER — IOPAMIDOL 408 MG/ML
10 INJECTION, SOLUTION INTRATHECAL ONCE
Status: COMPLETED | OUTPATIENT
Start: 2017-09-11 | End: 2017-09-11

## 2017-09-11 RX ORDER — DEXAMETHASONE SODIUM PHOSPHATE 10 MG/ML
10 INJECTION, SOLUTION INTRAMUSCULAR; INTRAVENOUS ONCE
Status: COMPLETED | OUTPATIENT
Start: 2017-09-11 | End: 2017-09-11

## 2017-09-11 RX ORDER — LIDOCAINE HYDROCHLORIDE 10 MG/ML
30 INJECTION, SOLUTION EPIDURAL; INFILTRATION; INTRACAUDAL; PERINEURAL ONCE
Status: COMPLETED | OUTPATIENT
Start: 2017-09-11 | End: 2017-09-11

## 2017-09-11 RX ORDER — LIDOCAINE HYDROCHLORIDE 10 MG/ML
5 INJECTION, SOLUTION EPIDURAL; INFILTRATION; INTRACAUDAL; PERINEURAL ONCE
Status: COMPLETED | OUTPATIENT
Start: 2017-09-11 | End: 2017-09-11

## 2017-09-11 RX ADMIN — LIDOCAINE HYDROCHLORIDE 3 ML: 10 INJECTION, SOLUTION EPIDURAL; INFILTRATION; INTRACAUDAL; PERINEURAL at 09:49

## 2017-09-11 RX ADMIN — DEXAMETHASONE SODIUM PHOSPHATE 20 MG: 10 INJECTION, SOLUTION INTRAMUSCULAR; INTRAVENOUS at 09:54

## 2017-09-11 RX ADMIN — LIDOCAINE HYDROCHLORIDE 3 ML: 10 INJECTION, SOLUTION EPIDURAL; INFILTRATION; INTRACAUDAL; PERINEURAL at 09:54

## 2017-09-11 RX ADMIN — IOPAMIDOL 1 ML: 408 INJECTION, SOLUTION INTRATHECAL at 09:49

## 2017-09-11 NOTE — DISCHARGE INSTRUCTIONS
Steroid Injection Discharge Instructions     After you go home:      You may resume your normal diet.    Care of Puncture Site:      If you have a bandaid on your puncture site, you may remove it the next morning    You may shower tomorrow    No bath tubs, whirlpools or swimming for at least 3 days     Activity:      You may go back to normal activity in 24 hours    You should let pain be your guide as to the extent of your activities    Maintain any activity limitations as ordered by your provider    Do NOT drive a vehicle until tomorrow    Medicines:      You may resume all medications    Resume your Warfarin/Coumadin at your regular dose today. Follow up with your provider to have your INR rechecked    Resume your Platelet Inhibitors and Aspirin tomorrow at your regular dose    For minor pain, you may take Acetaminophen (Tylenol) or Ibuprofen (Advil)    Pain:       You may experience increased or different pain over the next 24-48 hours    For the next 48 hrs - you may use ice packs for discomfort     Call your primary care doctor if:      You have severe pain that does not improve with pain medication    You have chills or a fever greater than 101 F (38 C)    The site is red, swollen, hot or tender    New problems with your bowel or bladder    Any questions or concerns    Other Instructions:      New numbness down your leg post injection is temporary and may last for up to 6 hours. You may need assistance with activity until your leg has normal sensation.    If you are diabetic, monitor your blood sugar closely. Contact the provider who manages your diabetes to help you control your blood sugar if needed.    For Your Information:      A steroid was injected to help decrease swelling and may help to reduce pain. It may take up to 7-10 days to obtain full results.    Some patients will get lasting relief from a single injection. Others may require up to 3 injections to get results. If you have more than one  steroid injection, they should be given 2 weeks apart.    Side effects of your steroid injection are mild and will go away in 2-3 days  - Insomnia  - Heartburn  - Flushed face  - Water retention  - Increased appetite  - Increased blood sugar      If you have questions call:        [facility] Radiology Dept @ [phone]      The provider who performed your procedure was Alex.

## 2017-09-11 NOTE — PROGRESS NOTES
Admitted for lumbar epidural injection. Denies pain on admission. Procedure explained and questions answered. Discharge instructions done with pt and wife. States understanding.    1000Back to room post procedure. VSS. Denies pain. Taking juice. Site D/I    1035 Epidural site D/I. No c/o. Discharged to door #1 with wife.

## 2017-09-11 NOTE — IP AVS SNAPSHOT
[place] [address]    [place] [address]    Phone:  [phone]                                       After Visit Summary   9/11/2017    Ricardo Neely    MRN: [id number]           After Visit Summary Signature Page     I have received my discharge instructions, and my questions have been answered. I have discussed any challenges I see with this plan with the nurse or doctor.    ..........................................................................................................................................  Patient/Patient Representative Signature      ..........................................................................................................................................  Patient Representative Print Name and Relationship to Patient    ..................................................               ................................................  Date                                            Time    ..........................................................................................................................................  Reviewed by Signature/Title    ...................................................              ..............................................  Date                                                            Time

## 2017-09-11 NOTE — IP AVS SNAPSHOT
MRN:[id number]                      After Visit Summary   9/11/2017    Ricardo Neely    MRN: [id number]           Visit Information        Department      9/11/2017  8:32 AM [facility]          Review of your medicines      UNREVIEWED medicines. Ask your doctor about these medicines        Dose / Directions    amLODIPine 5 MG tablet   Commonly known as:  NORVASC   Used for:  Essential hypertension        Dose:  5 mg   Take 1 tablet (5 mg) by mouth daily   Quantity:  90 tablet   Refills:  3       aspirin 81 MG tablet   Used for:  Routine general medical examination at a health care facility        Dose:  81 mg   Take 1 tablet by mouth daily.   Quantity:  100 tablet   Refills:  3       atorvastatin 40 MG tablet   Commonly known as:  LIPITOR   Used for:  Hyperlipidemia LDL goal <100, Cerebrovascular disease        Dose:  40 mg   Take 1 tablet (40 mg) by mouth daily   Quantity:  90 tablet   Refills:  prn       benazepril 40 MG tablet   Commonly known as:  LOTENSIN   Used for:  Essential hypertension        Dose:  40 mg   Take 1 tablet (40 mg) by mouth daily   Quantity:  90 tablet   Refills:  3       clopidogrel 75 MG tablet   Commonly known as:  PLAVIX   Used for:  Cerebrovascular disease, Vertebrobasilar artery syndrome        Dose:  75 mg   Take 1 tablet (75 mg) by mouth daily   Quantity:  90 tablet   Refills:  prn       fluocinonide 0.05 % cream   Commonly known as:  LIDEX   Used for:  Irritant dermatitis        Apply sparingly to affected area twice daily as needed.  Do not apply to face.   Quantity:  60 g   Refills:  1       loratadine 10 MG tablet   Commonly known as:  CLARITIN   Used for:  Allergic rhinitis, unspecified allergic rhinitis type        Dose:  10 mg   Take 1 tablet (10 mg) by mouth daily   Refills:  0       metoprolol 50 MG 24 hr tablet   Commonly known as:  TOPROL-XL   Used for:  Essential hypertension        Dose:  75 mg   Take 1.5 tablets (75 mg) by mouth  daily   Quantity:  135 tablet   Refills:  3                Protect others around you: Learn how to safely use, store and throw away your medicines at www.disposemymeds.org.         Follow-ups after your visit         Care Instructions        Further instructions from your care team       Steroid Injection Discharge Instructions     After you go home:      You may resume your normal diet.    Care of Puncture Site:      If you have a bandaid on your puncture site, you may remove it the next morning    You may shower tomorrow    No bath tubs, whirlpools or swimming for at least 3 days     Activity:      You may go back to normal activity in 24 hours    You should let pain be your guide as to the extent of your activities    Maintain any activity limitations as ordered by your provider    Do NOT drive a vehicle until tomorrow    Medicines:      You may resume all medications    Resume your Warfarin/Coumadin at your regular dose today. Follow up with your provider to have your INR rechecked    Resume your Platelet Inhibitors and Aspirin tomorrow at your regular dose    For minor pain, you may take Acetaminophen (Tylenol) or Ibuprofen (Advil)    Pain:       You may experience increased or different pain over the next 24-48 hours    For the next 48 hrs - you may use ice packs for discomfort     Call your primary care doctor if:      You have severe pain that does not improve with pain medication    You have chills or a fever greater than 101 F (38 C)    The site is red, swollen, hot or tender    New problems with your bowel or bladder    Any questions or concerns    Other Instructions:      New numbness down your leg post injection is temporary and may last for up to 6 hours. You may need assistance with activity until your leg has normal sensation.    If you are diabetic, monitor your blood sugar closely. Contact the provider who manages your diabetes to help you control your blood sugar if needed.    For Your  Information:      A steroid was injected to help decrease swelling and may help to reduce pain. It may take up to 7-10 days to obtain full results.    Some patients will get lasting relief from a single injection. Others may require up to 3 injections to get results. If you have more than one steroid injection, they should be given 2 weeks apart.    Side effects of your steroid injection are mild and will go away in 2-3 days  - Insomnia  - Heartburn  - Flushed face  - Water retention  - Increased appetite  - Increased blood sugar      If you have questions call:        [facility] Radiology Dept @ [phone]      The provider who performed your procedure was Alex.     Additional Information About Your Visit        MyChart Information     [employer] gives you secure access to your electronic health record. If you see a primary care provider, you can also send messages to your care team and make appointments. If you have questions, please call your primary care clinic.  If you do not have a primary care provider, please call [phone] and they will assist you.        Care EveryWhere ID     This is your Care EveryWhere ID. This could be used by other organizations to access your [place] medical records  [id number]        Your Vitals Were     Blood Pressure Pulse Temperature Respirations Pulse Oximetry       133/56 (BP Location: Right arm) 61 97  F (36.1  C) (Oral) 18 98%        Primary Care Provider Office Phone # Fax #    Storm Huddleston [phone]      Equal Access to Services     [facility] : Hadii jane sellers Socristóbal, waaxda luqadaha, qaybta kaalmada ger kidd . So [facility] [phone].    ATENCIÓN: Si habla español, tiene a el disposición servicios gratuitos de asistencia lingüística. Llame al [phone].    We comply with applicable federal civil rights laws and [place] laws. We do not discriminate on the basis of race, color,  national origin, age, disability sex, sexual orientation or gender identity.            Thank you!     Thank you for choosing [facility] for your care. Our goal is always to provide you with excellent care. Hearing back from our patients is one way we can continue to improve our services. Please take a few minutes to complete the written survey that you may receive in the mail after you visit with us. Thank you!             Medication List: This is a list of all your medications and when to take them. Check marks below indicate your daily home schedule. Keep this list as a reference.      Medications           Morning Afternoon Evening Bedtime As Needed    amLODIPine 5 MG tablet   Commonly known as:  NORVASC   Take 1 tablet (5 mg) by mouth daily                                aspirin 81 MG tablet   Take 1 tablet by mouth daily.                                atorvastatin 40 MG tablet   Commonly known as:  LIPITOR   Take 1 tablet (40 mg) by mouth daily                                benazepril 40 MG tablet   Commonly known as:  LOTENSIN   Take 1 tablet (40 mg) by mouth daily                                clopidogrel 75 MG tablet   Commonly known as:  PLAVIX   Take 1 tablet (75 mg) by mouth daily                                fluocinonide 0.05 % cream   Commonly known as:  LIDEX   Apply sparingly to affected area twice daily as needed.  Do not apply to face.                                loratadine 10 MG tablet   Commonly known as:  CLARITIN   Take 1 tablet (10 mg) by mouth daily                                metoprolol 50 MG 24 hr tablet   Commonly known as:  TOPROL-XL   Take 1.5 tablets (75 mg) by mouth daily

## 2017-09-21 ENCOUNTER — VIRTUAL VISIT (OUTPATIENT)
Dept: INTERNAL MEDICINE | Facility: CLINIC | Age: 77
End: 2017-09-21
Payer: COMMERCIAL

## 2017-09-21 ENCOUNTER — TELEPHONE (OUTPATIENT)
Dept: NURSING | Facility: CLINIC | Age: 77
End: 2017-09-21

## 2017-09-21 DIAGNOSIS — T70.20XA ALTITUDE SICKNESS, INITIAL ENCOUNTER: Primary | ICD-10-CM

## 2017-09-21 DIAGNOSIS — I10 ESSENTIAL HYPERTENSION: ICD-10-CM

## 2017-09-21 PROCEDURE — 99442 ZZC PHYSICIAN TELEPHONE EVALUATION 11-20 MIN: CPT | Performed by: INTERNAL MEDICINE

## 2017-09-21 RX ORDER — ACETAZOLAMIDE 250 MG/1
250 TABLET ORAL 2 TIMES DAILY
Qty: 14 TABLET | Refills: 1 | Status: SHIPPED | OUTPATIENT
Start: 2017-09-21 | End: 2018-06-11

## 2017-09-21 NOTE — MR AVS SNAPSHOT
After Visit Summary   9/21/2017    Ricardo Neely    MRN: [id number]           Patient Information     Date Of Birth          1940        Visit Information        Provider Department      9/21/2017 7:15 PM Storm Huddleston MD [facility]        Today's Diagnoses     Altitude sickness, initial encounter    -  1    Essential hypertension           Follow-ups after your visit        Who to contact     If you have questions or need follow up information about today's clinic visit or your schedule please contact [facility] directly at [phone].  Normal or non-critical lab and imaging results will be communicated to you by ZenoLinkhart, letter or phone within 4 business days after the clinic has received the results. If you do not hear from us within 7 days, please contact the clinic through ZenoLinkhart or phone. If you have a critical or abnormal lab result, we will notify you by phone as soon as possible.  Submit refill requests through [employer] or call your pharmacy and they will forward the refill request to us. Please allow 3 business days for your refill to be completed.          Additional Information About Your Visit        MyChart Information     [employer] gives you secure access to your electronic health record. If you see a primary care provider, you can also send messages to your care team and make appointments. If you have questions, please call your primary care clinic.  If you do not have a primary care provider, please call [phone] and they will assist you.        Care EveryWhere ID     This is your Care EveryWhere ID. This could be used by other organizations to access your [facility] medical records  [id number]         Blood Pressure from Last 3 Encounters:   09/11/17 131/60   06/09/17 130/62   06/08/17 134/75    Weight from Last 3 Encounters:   06/09/17 215 lb 3.2 oz (97.6 kg)   12/27/16 218 lb 6.4 oz (99.1 kg)   05/17/16 216 lb (98 kg)               Today, you had the following     No orders found for display         Today's Medication Changes          These changes are accurate as of: 9/21/17  8:42 PM.  If you have any questions, ask your nurse or doctor.               Start taking these medicines.        Dose/Directions    acetaZOLAMIDE 250 MG tablet   Commonly known as:  DIAMOX   Used for:  Altitude sickness, initial encounter   Started by:  Storm Huddleston MD        Dose:  250 mg   Take 1 tablet (250 mg) by mouth 2 times daily   Quantity:  14 tablet   Refills:  1         These medicines have changed or have updated prescriptions.        Dose/Directions    aspirin 81 MG tablet   This may have changed:  when to take this   Used for:  Routine general medical examination at a health care facility        Dose:  81 mg   Take 1 tablet by mouth daily.   Quantity:  100 tablet   Refills:  3            Where to get your medicines      These medications were sent to [facility] [facility], [place] - [address]  [address] [facility] [place] [address]     Phone:  [phone]     acetaZOLAMIDE 250 MG tablet                Primary Care Provider Office Phone # Fax #    Storm Huddleston [phone]       [address] [place] [phone]        Equal Access to Services     REGINA NICHOLAS AH: Hadii jane morrison hadasho Sokatiuskaali, waaxda luqadaha, qaybta kaalmada adeegyada, ger ferrara. So [facility] [phone].    ATENCIÓN: Si habla español, tiene a el disposición servicios gratuitos de asistencia lingüística. [facility] [phone].    We comply with applicable federal civil rights laws and [place] laws. We do not discriminate on the basis of race, color, national origin, age, disability sex, sexual orientation or gender identity.            Thank you!     Thank you for choosing [facility]  for your care. Our goal is always to provide you with excellent care. Hearing back from our patients  is one way we can continue to improve our services. Please take a few minutes to complete the written survey that you may receive in the mail after your visit with us. Thank you!             Your Updated Medication List - Protect others around you: Learn how to safely use, store and throw away your medicines at www.disposemymeds.org.          This list is accurate as of: 9/21/17  8:42 PM.  Always use your most recent med list.                   Brand Name Dispense Instructions for use Diagnosis    acetaZOLAMIDE 250 MG tablet    DIAMOX    14 tablet    Take 1 tablet (250 mg) by mouth 2 times daily    Altitude sickness, initial encounter       ALLEGRA PO      Take 60 mg by mouth daily        amLODIPine 5 MG tablet    NORVASC    90 tablet    Take 1 tablet (5 mg) by mouth daily    Essential hypertension       aspirin 81 MG tablet     100 tablet    Take 1 tablet by mouth daily.    Routine general medical examination at a health care facility       atorvastatin 40 MG tablet    LIPITOR    90 tablet    Take 1 tablet (40 mg) by mouth daily    Hyperlipidemia LDL goal <100, Cerebrovascular disease       benazepril 40 MG tablet    LOTENSIN    90 tablet    Take 1 tablet (40 mg) by mouth daily    Essential hypertension       clopidogrel 75 MG tablet    PLAVIX    90 tablet    Take 1 tablet (75 mg) by mouth daily    Cerebrovascular disease, Vertebrobasilar artery syndrome       fluocinonide 0.05 % cream    LIDEX    60 g    Apply sparingly to affected area twice daily as needed.  Do not apply to face.    Irritant dermatitis       loratadine 10 MG tablet    CLARITIN     Take 1 tablet (10 mg) by mouth daily    Allergic rhinitis, unspecified allergic rhinitis type       metoprolol 50 MG 24 hr tablet    TOPROL-XL    135 tablet    Take 1.5 tablets (75 mg) by mouth daily    Essential hypertension

## 2017-09-21 NOTE — TELEPHONE ENCOUNTER
Yes, [place] be after 8 pm [place] time today, if OK with him.  .    In the meantime, start acetazolamide 250 mg twice daily.  Try to get to lower elevation now, if he can.  Prescription pended and can be approved to pharmacy in [place].

## 2017-09-21 NOTE — TELEPHONE ENCOUNTER
Patient notified. Station coordinator scheduling phone visit. Pharmacy pended. He said he has reservations for their hotel for 10 days.

## 2017-09-21 NOTE — TELEPHONE ENCOUNTER
Patient is in [place], at 10,000 feet, and feels he has altitude problems. Very lightheaded/dizzy and has headaches. Went to pharmacist there today and checked BP- 160/86 and 155/83. Pharmacist recommended he call PCP and did not feel he needs ED. Patient says he is on 3 different BP medications, took them all this morning before BP was checked. Headache 4/10. No blurred vision, no weakness, no numbness. No chest pain. Feels trouble getting enough oxygen d/t the air there. Feels a little SOB. Color is good. No nausea or vomiting. Denies any other symptoms. Oxygen issues, lightheadedness/dizziness, and headaches started once he got to this altitude 5 days ago. Had symptoms like this before in [place] and water pill was discontinued at that time then felt better. No longer on water pill.    Patient willing to do virtual visit- are you ok with this? He thinks he could get on wife's phone to get onto [employer] for evisit or he could do phone visit.

## 2017-09-22 NOTE — PROGRESS NOTES
SCHEDULED PHONE ENCOUNTER (Virtual)  The medication list has been reconciled by nursing staff.     Reason for visit: possible altitude sickness.    First time to [place], in [place].  9600 feet, first time for him to altitude.  Also, low humidity.   There since 9/16.  Next day, started having symptoms despite drinking lots of water.    No mental changes changes.  Symptoms are lightheadedness and headaches.  More in morning, less later in the day.   Mild shortness of breath.  No nausea,   except once.   Travels taking him to higher elevations, up to 11,500 feet.   Will be driving to [employer], will need to go to 10,500.    BP was high at pharmacy, 160/86    Picked up medication, hasn't started yet.    ROS: otherwise negative     ASSESSMENT:   1.  Altitude sickness, most probable  2.  HTN - no med changes for now, follow    PLAN:  1.  Avoid the peaks/higher altitude  2.  Start the acetazolamide twice daily, continue until 24 hour after symptoms gone or down from altitude.  3.  For any mental status changes, get to ED    Time spent on phone with patient:  12 minutes

## 2017-10-02 ENCOUNTER — MYC MEDICAL ADVICE (OUTPATIENT)
Dept: INTERNAL MEDICINE | Facility: CLINIC | Age: 77
End: 2017-10-02

## 2017-10-09 ENCOUNTER — DOCUMENTATION ONLY (OUTPATIENT)
Dept: OTHER | Facility: CLINIC | Age: 77
End: 2017-10-09

## 2017-10-09 DIAGNOSIS — Z71.89 ADVANCED DIRECTIVES, COUNSELING/DISCUSSION: Chronic | ICD-10-CM

## 2017-10-11 ENCOUNTER — ALLIED HEALTH/NURSE VISIT (OUTPATIENT)
Dept: NURSING | Facility: CLINIC | Age: 77
End: 2017-10-11
Payer: COMMERCIAL

## 2017-10-11 DIAGNOSIS — Z23 NEED FOR PROPHYLACTIC VACCINATION AND INOCULATION AGAINST INFLUENZA: Primary | ICD-10-CM

## 2017-10-11 PROCEDURE — 90662 IIV NO PRSV INCREASED AG IM: CPT

## 2017-10-11 PROCEDURE — G0008 ADMIN INFLUENZA VIRUS VAC: HCPCS

## 2017-10-11 NOTE — PROGRESS NOTES
Injectable Influenza Immunization Documentation    1.  Is the person to be vaccinated sick today?   No    2. Does the person to be vaccinated have an allergy to a component   of the vaccine?   No    3. Has the person to be vaccinated ever had a serious reaction   to influenza vaccine in the past?   No    4. Has the person to be vaccinated ever had Guillain-Barré syndrome?   No    Form completed by   Duyen Mustafa [facility]

## 2017-10-11 NOTE — MR AVS SNAPSHOT
After Visit Summary   10/11/2017    Ricardo Neely    MRN: [id number]           Patient Information     Date Of Birth          1940        Visit Information        Provider Department      10/11/2017 8:30 AM [facility]        Today's Diagnoses     Need for prophylactic vaccination and inoculation against influenza    -  1       Follow-ups after your visit        Who to contact     If you have questions or need follow up information about today's clinic visit or your schedule please contact [facility] directly at [phone].  Normal or non-critical lab and imaging results will be communicated to you by Lailaihuihart, letter or phone within 4 business days after the clinic has received the results. If you do not hear from us within 7 days, please contact the clinic through Hypejart or phone. If you have a critical or abnormal lab result, we will notify you by phone as soon as possible.  Submit refill requests through [employer] or call your pharmacy and they will forward the refill request to us. Please allow 3 business days for your refill to be completed.          Additional Information About Your Visit        MyChart Information     [employer] gives you secure access to your electronic health record. If you see a primary care provider, you can also send messages to your care team and make appointments. If you have questions, please call your primary care clinic.  If you do not have a primary care provider, please call [phone] and they will assist you.        Care EveryWhere ID     This is your Care EveryWhere ID. This could be used by other organizations to access your [place] medical records  [id number]         Blood Pressure from Last 3 Encounters:   09/11/17 131/60   06/09/17 130/62   06/08/17 134/75    Weight from Last 3 Encounters:   06/09/17 215 lb 3.2 oz (97.6 kg)   12/27/16 218 lb 6.4 oz (99.1 kg)   05/17/16 216 lb (98 kg)               We Performed the Following     ADMIN INFLUENZA (For MEDICARE Patients ONLY) []     FLU VACCINE, INCREASED ANTIGEN, PRESV FREE, AGE 65+ [47599]          Today's Medication Changes          These changes are accurate as of: 10/11/17  8:41 AM.  If you have any questions, ask your nurse or doctor.               These medicines have changed or have updated prescriptions.        Dose/Directions    aspirin 81 MG tablet   This may have changed:  when to take this   Used for:  Routine general medical examination at a health care facility        Dose:  81 mg   Take 1 tablet by mouth daily.   Quantity:  100 tablet   Refills:  3                Primary Care Provider Office Phone # Fax #    Storm Huddleston [phone] [address] [place] [phone]        Equal Access to Services     ISMA NICHOLAS : Laura munizo Socristóbla, waaxda luqadaha, qaybta kaalmada adeegyaastrid, ger mclean . So [facility] [phone].    ATENCIÓN: Si habla español, tiene a el disposición servicios gratuitos de asistencia lingüística. [facility] [phone].    We comply with applicable federal civil rights laws and [place] laws. We do not discriminate on the basis of race, color, national origin, age, disability, sex, sexual orientation, or gender identity.            Thank you!     Thank you for choosing [facility]  for your care. Our goal is always to provide you with excellent care. Hearing back from our patients is one way we can continue to improve our services. Please take a few minutes to complete the written survey that you may receive in the mail after your visit with us. Thank you!             Your Updated Medication List - Protect others around you: Learn how to safely use, store and throw away your medicines at www.disposemymeds.org.          This list is accurate as of: 10/11/17  8:41 AM.  Always use your most recent med list.                    Brand Name Dispense Instructions for use Diagnosis    acetaZOLAMIDE 250 MG tablet    DIAMOX    14 tablet    Take 1 tablet (250 mg) by mouth 2 times daily    Altitude sickness, initial encounter       ALLEGRA PO      Take 60 mg by mouth daily        amLODIPine 5 MG tablet    NORVASC    90 tablet    Take 1 tablet (5 mg) by mouth daily    Essential hypertension       aspirin 81 MG tablet     100 tablet    Take 1 tablet by mouth daily.    Routine general medical examination at a health care facility       atorvastatin 40 MG tablet    LIPITOR    90 tablet    Take 1 tablet (40 mg) by mouth daily    Hyperlipidemia LDL goal <100, Cerebrovascular disease       benazepril 40 MG tablet    LOTENSIN    90 tablet    Take 1 tablet (40 mg) by mouth daily    Essential hypertension       clopidogrel 75 MG tablet    PLAVIX    90 tablet    Take 1 tablet (75 mg) by mouth daily    Cerebrovascular disease, Vertebrobasilar artery syndrome       fluocinonide 0.05 % cream    LIDEX    60 g    Apply sparingly to affected area twice daily as needed.  Do not apply to face.    Irritant dermatitis       loratadine 10 MG tablet    CLARITIN     Take 1 tablet (10 mg) by mouth daily    Allergic rhinitis, unspecified allergic rhinitis type       metoprolol 50 MG 24 hr tablet    TOPROL-XL    135 tablet    Take 1.5 tablets (75 mg) by mouth daily    Essential hypertension

## 2017-11-15 ENCOUNTER — OFFICE VISIT (OUTPATIENT)
Dept: URGENT CARE | Facility: URGENT CARE | Age: 77
End: 2017-11-15
Payer: COMMERCIAL

## 2017-11-15 VITALS
WEIGHT: 220.3 LBS | BODY MASS INDEX: 34.58 KG/M2 | TEMPERATURE: 97.6 F | DIASTOLIC BLOOD PRESSURE: 60 MMHG | HEART RATE: 78 BPM | SYSTOLIC BLOOD PRESSURE: 140 MMHG | HEIGHT: 67 IN | OXYGEN SATURATION: 93 % | RESPIRATION RATE: 16 BRPM

## 2017-11-15 DIAGNOSIS — H57.11 EYE PAIN, RIGHT: Primary | ICD-10-CM

## 2017-11-15 LAB — ERYTHROCYTE [SEDIMENTATION RATE] IN BLOOD BY WESTERGREN METHOD: 7 MM/H (ref 0–20)

## 2017-11-15 PROCEDURE — 36415 COLL VENOUS BLD VENIPUNCTURE: CPT | Performed by: FAMILY MEDICINE

## 2017-11-15 PROCEDURE — 99214 OFFICE O/P EST MOD 30 MIN: CPT | Performed by: FAMILY MEDICINE

## 2017-11-15 PROCEDURE — 85652 RBC SED RATE AUTOMATED: CPT | Performed by: FAMILY MEDICINE

## 2017-11-15 NOTE — MR AVS SNAPSHOT
"              After Visit Summary   11/15/2017    Ricardo Neely    MRN: [id number]           Patient Information     Date Of Birth          1940        Visit Information        Provider Department      11/15/2017 9:10 AM Alex Parker, DO [facility]        Today's Diagnoses     Eye pain, right    -  1       Follow-ups after your visit        Who to contact     If you have questions or need follow up information about today's clinic visit or your schedule please contact [facility] directly at [phone].  Normal or non-critical lab and imaging results will be communicated to you by [employer], letter or phone within 4 business days after the clinic has received the results. If you do not hear from us within 7 days, please contact the clinic through [employer] or phone. If you have a critical or abnormal lab result, we will notify you by phone as soon as possible.  Submit refill requests through [employer] or call your pharmacy and they will forward the refill request to us. Please allow 3 business days for your refill to be completed.          Additional Information About Your Visit        MyChart Information     [employer] gives you secure access to your electronic health record. If you see a primary care provider, you can also send messages to your care team and make appointments. If you have questions, please call your primary care clinic.  If you do not have a primary care provider, please call [phone] and they will assist you.        Care EveryWhere ID     This is your Care EveryWhere ID. This could be used by other organizations to access your [facility] medical records  [id number]        Your Vitals Were     Pulse Temperature Respirations Height Pulse Oximetry BMI (Body Mass Index)    78 97.6  F (36.4  C) (Oral) 16 5' 7\" (1.702 m) 93% 34.5 kg/m2       Blood Pressure from Last 3 Encounters:   11/15/17 140/60   09/11/17 131/60   06/09/17 130/62    " Weight from Last 3 Encounters:   11/15/17 220 lb 4.8 oz (99.9 kg)   06/09/17 215 lb 3.2 oz (97.6 kg)   12/27/16 218 lb 6.4 oz (99.1 kg)              We Performed the Following     ESR: Erythrocyte sedimentation rate          Today's Medication Changes          These changes are accurate as of: 11/15/17 11:16 AM.  If you have any questions, ask your nurse or doctor.               These medicines have changed or have updated prescriptions.        Dose/Directions    aspirin 81 MG tablet   This may have changed:  when to take this   Used for:  Routine general medical examination at a health care facility        Dose:  81 mg   Take 1 tablet by mouth daily.   Quantity:  100 tablet   Refills:  3                Primary Care Provider Office Phone # Fax #    Storm Huddleston [phone]       [address] [place] [phone]        Equal Access to Services     [facility] : Hadii jane sellers Socristóbal, waaxda luqritu, qaybta kaalmada brandoyaastrid, ger mclean . So [facility] [phone].    ATENCIÓN: Si habla español, tiene a el disposición servicios gratuitos de asistencia lingüística. Llame al [phone].    We comply with applicable federal civil rights laws and [place] laws. We do not discriminate on the basis of race, color, national origin, age, disability, sex, sexual orientation, or gender identity.            Thank you!     Thank you for choosing [facility]  for your care. Our goal is always to provide you with excellent care. Hearing back from our patients is one way we can continue to improve our services. Please take a few minutes to complete the written survey that you may receive in the mail after your visit with us. Thank you!             Your Updated Medication List - Protect others around you: Learn how to safely use, store and throw away your medicines at www.disposemymeds.org.          This list is accurate as of: 11/15/17  11:16 AM.  Always use your most recent med list.                   Brand Name Dispense Instructions for use Diagnosis    acetaZOLAMIDE 250 MG tablet    DIAMOX    14 tablet    Take 1 tablet (250 mg) by mouth 2 times daily    Altitude sickness, initial encounter       ALLEGRA PO      Take 60 mg by mouth daily        amLODIPine 5 MG tablet    NORVASC    90 tablet    Take 1 tablet (5 mg) by mouth daily    Essential hypertension       aspirin 81 MG tablet     100 tablet    Take 1 tablet by mouth daily.    Routine general medical examination at a health care facility       atorvastatin 40 MG tablet    LIPITOR    90 tablet    Take 1 tablet (40 mg) by mouth daily    Hyperlipidemia LDL goal <100, Cerebrovascular disease       benazepril 40 MG tablet    LOTENSIN    90 tablet    Take 1 tablet (40 mg) by mouth daily    Essential hypertension       clopidogrel 75 MG tablet    PLAVIX    90 tablet    Take 1 tablet (75 mg) by mouth daily    Cerebrovascular disease, Vertebrobasilar artery syndrome       fluocinonide 0.05 % cream    LIDEX    60 g    Apply sparingly to affected area twice daily as needed.  Do not apply to face.    Irritant dermatitis       loratadine 10 MG tablet    CLARITIN     Take 1 tablet (10 mg) by mouth daily    Allergic rhinitis, unspecified allergic rhinitis type       metoprolol 50 MG 24 hr tablet    TOPROL-XL    135 tablet    Take 1.5 tablets (75 mg) by mouth daily    Essential hypertension

## 2017-11-15 NOTE — NURSING NOTE
"Chief Complaint   Patient presents with     Urgent Care     pt states right eye hurting, feels like something is inside the eye, right side feels tingling x 3 days        Initial /60 (BP Location: Left arm, Patient Position: Chair, Cuff Size: Adult Regular)  Pulse 78  Temp 97.6  F (36.4  C) (Oral)  Resp 16  Ht 5' 7\" (1.702 m)  Wt 220 lb 4.8 oz (99.9 kg)  SpO2 93%  BMI 34.5 kg/m2 Estimated body mass index is 34.5 kg/(m^2) as calculated from the following:    Height as of this encounter: 5' 7\" (1.702 m).    Weight as of this encounter: 220 lb 4.8 oz (99.9 kg).  Medication Reconciliation: complete      "

## 2017-11-15 NOTE — PROGRESS NOTES
"SUBJECTIVE: Ricardo Neely is a 77 year old male presenting with a chief complaint of rt eye pain 2 dayts ago, felt like something might have been in it, then the 1st day had a sensation of tingling around rt eye that lasted a day and now that discomfort or scratching sensation rt forehead close/periocular.  Course of illness is improving, less p[eriocular symptoms but eye pain cont the same.    Severity moderate  Current and Associated symptoms: none  Treatment measures tried include None tried.  Predisposing factors include None.    Past Medical History:   Diagnosis Date     Anxiety 5/26/2011     Basal cell carcinoma      Benign Prostatic Hypertrophy      CEREBROVASC DISEASE, small vessel 12/07     Essential hypertension, benign      GERD      HIATAL HERNIA      HYPERPLASTIC POLYPS COLON 5/93, 3/06     Impaired fasting glucose      Low Back Pain      Obesity, unspecified      Other and unspecified hyperlipidemia      Personal history of urinary calculi 1960's     Pneumonia, organism unspecified(486) 1992     TRANSIENT CEREBRAL ISCHEMIA 12/07     Allergies   Allergen Reactions     Meclizine Other (See Comments)     Over sedation, concentration problem     Tramadol Hcl Other (See Comments)     Pt feels very drugged, \"cloudy\" if used more than one day. Nausea also     Cardura [Doxazosin Mesylate] Other (See Comments)     fainted     Hydrochlorothiazide Other (See Comments)      lightheadedness     Naproxen Nausea     nausea     Social History   Substance Use Topics     Smoking status: Former Smoker     Quit date: 1/1/1968     Smokeless tobacco: Never Used     Alcohol use No       ROS:  SKIN: no rash  GI: no vomiting    OBJECTIVE:  /60 (BP Location: Left arm, Patient Position: Chair, Cuff Size: Adult Regular)  Pulse 78  Temp 97.6  F (36.4  C) (Oral)  Resp 16  Ht 5' 7\" (1.702 m)  Wt 220 lb 4.8 oz (99.9 kg)  SpO2 93%  BMI 34.5 kg/p9ICZUQAK APPEARANCE: healthy, alert and no distress  EYES: EOMI,  PERRL, " conjunctiva clear  HENT: ear canals and TM's normal.  Nose and mouth without ulcers, erythema or lesions  NECK: supple, nontender, no lymphadenopathy  NEURO: Normal strength and tone, sensory exam grossly normal,  normal speech and mentation  SKIN: no suspicious lesions or rashes  CN 2-12 grossly intact      ICD-10-CM    1. Eye pain, right H57.11 ESR: Erythrocyte sedimentation rate     Will have Opthalmology eval pt today for main symptom of eye pain.

## 2018-03-12 DIAGNOSIS — I67.9 CEREBROVASCULAR DISEASE: ICD-10-CM

## 2018-03-12 DIAGNOSIS — G45.0 VERTEBROBASILAR ARTERY SYNDROME: ICD-10-CM

## 2018-03-12 NOTE — TELEPHONE ENCOUNTER
"Requested Prescriptions   Pending Prescriptions Disp Refills     clopidogrel (PLAVIX) 75 MG tablet  Last Written Prescription Date:  06/09/2017  Last Fill Quantity: 90,  # refills: prn   Last office visit: 6/9/2017 with prescribing provider:     Future Office Visit:     90 tablet prn     Sig: Take 1 tablet (75 mg) by mouth daily    Plavix Failed    3/12/2018  3:39 PM       Failed - Normal HGB on file in past 12 months    Recent Labs   Lab Test  12/27/16   0950   HGB  15.3              Failed - Normal Platelets on file in past 12 months    Recent Labs   Lab Test  12/27/16   0950   PLT  302              Passed - No active PPI on record unless is Protonix       Passed - Recent (12 mo) or future (30 days) visit within the authorizing provider's specialty    Patient had office visit in the last 12 months or has a visit in the next 30 days with authorizing provider or within the authorizing provider's specialty.  See \"Patient Info\" tab in inbasket, or \"Choose Columns\" in Meds & Orders section of the refill encounter.           Passed - Patient is age 18 or older          "

## 2018-03-14 RX ORDER — CLOPIDOGREL BISULFATE 75 MG/1
75 TABLET ORAL DAILY
Qty: 90 TABLET | Status: SHIPPED | OUTPATIENT
Start: 2018-03-14 | End: 2018-10-15

## 2018-03-14 NOTE — TELEPHONE ENCOUNTER
Routing refill request to provider for review/approval because:  Labs not current:  HGB and Platelets done 12/2016

## 2018-05-11 ENCOUNTER — TRANSFERRED RECORDS (OUTPATIENT)
Dept: HEALTH INFORMATION MANAGEMENT | Facility: CLINIC | Age: 78
End: 2018-05-11

## 2018-05-23 DIAGNOSIS — E78.5 HYPERLIPIDEMIA LDL GOAL <100: ICD-10-CM

## 2018-05-23 DIAGNOSIS — I10 ESSENTIAL HYPERTENSION: ICD-10-CM

## 2018-05-23 DIAGNOSIS — R73.01 IMPAIRED FASTING GLUCOSE: ICD-10-CM

## 2018-05-23 LAB
ALT SERPL W P-5'-P-CCNC: 33 U/L (ref 0–70)
ANION GAP SERPL CALCULATED.3IONS-SCNC: 8 MMOL/L (ref 3–14)
BUN SERPL-MCNC: 19 MG/DL (ref 7–30)
CALCIUM SERPL-MCNC: 8.9 MG/DL (ref 8.5–10.1)
CHLORIDE SERPL-SCNC: 104 MMOL/L (ref 94–109)
CHOLEST SERPL-MCNC: 123 MG/DL
CO2 SERPL-SCNC: 25 MMOL/L (ref 20–32)
CREAT SERPL-MCNC: 0.86 MG/DL (ref 0.66–1.25)
GFR SERPL CREATININE-BSD FRML MDRD: 85 ML/MIN/1.7M2
GLUCOSE SERPL-MCNC: 95 MG/DL (ref 70–99)
HDLC SERPL-MCNC: 52 MG/DL
LDLC SERPL CALC-MCNC: 60 MG/DL
NONHDLC SERPL-MCNC: 71 MG/DL
POTASSIUM SERPL-SCNC: 4.5 MMOL/L (ref 3.4–5.3)
SODIUM SERPL-SCNC: 137 MMOL/L (ref 133–144)
TRIGL SERPL-MCNC: 57 MG/DL

## 2018-05-23 PROCEDURE — 80048 BASIC METABOLIC PNL TOTAL CA: CPT | Performed by: INTERNAL MEDICINE

## 2018-05-23 PROCEDURE — 36415 COLL VENOUS BLD VENIPUNCTURE: CPT | Performed by: INTERNAL MEDICINE

## 2018-05-23 PROCEDURE — 80061 LIPID PANEL: CPT | Performed by: INTERNAL MEDICINE

## 2018-05-23 PROCEDURE — 84460 ALANINE AMINO (ALT) (SGPT): CPT | Performed by: INTERNAL MEDICINE

## 2018-06-06 DIAGNOSIS — I10 ESSENTIAL HYPERTENSION: ICD-10-CM

## 2018-06-06 RX ORDER — AMLODIPINE BESYLATE 5 MG/1
TABLET ORAL
Qty: 90 TABLET | Refills: 1 | Status: SHIPPED | OUTPATIENT
Start: 2018-06-06 | End: 2018-12-22

## 2018-06-06 RX ORDER — METOPROLOL SUCCINATE 50 MG/1
TABLET, EXTENDED RELEASE ORAL
Qty: 135 TABLET | Refills: 1 | Status: SHIPPED | OUTPATIENT
Start: 2018-06-06 | End: 2018-12-22

## 2018-06-06 RX ORDER — AMLODIPINE BESYLATE 5 MG/1
TABLET ORAL
Qty: 30 TABLET | Refills: 0 | Status: SHIPPED | OUTPATIENT
Start: 2018-06-06 | End: 2018-06-06

## 2018-06-06 RX ORDER — METOPROLOL SUCCINATE 50 MG/1
TABLET, EXTENDED RELEASE ORAL
Qty: 45 TABLET | Refills: 0 | Status: SHIPPED | OUTPATIENT
Start: 2018-06-06 | End: 2018-06-06

## 2018-06-06 NOTE — TELEPHONE ENCOUNTER
There are no medications in this encounter.     Last Written Prescription Date:  6/6/18  Last Fill Quantity: 30,  # refills: 0   Last office visit: 6/9/2017 with prescribing provider:  6/9/17   Future Office Visit:   Next 5 appointments (look out 90 days)     Jun 11, 2018  8:30 AM CDT   PHYSICAL with Storm Huddleston MD   [facility]    [address] [address] [phone]

## 2018-06-06 NOTE — TELEPHONE ENCOUNTER
"Requested Prescriptions   Pending Prescriptions Disp Refills     metoprolol succinate (TOPROL-XL) 50 MG 24 hr tablet [Pharmacy Med Name: METOPROLOL ER SUCCINATE 50MG TABS] 135 tablet 0     Sig: TAKE 1& 1/2 TABLETS BY MOUTH ONCE DAILY    Beta-Blockers Protocol Failed    6/6/2018 11:13 AM       Failed - Blood pressure under 140/90 in past 12 months    BP Readings from Last 3 Encounters:   11/15/17 140/60   09/11/17 131/60   06/09/17 130/62                Passed - Patient is age 6 or older       Passed - Recent (12 mo) or future (30 days) visit within the authorizing provider's specialty    Patient had office visit in the last 12 months or has a visit in the next 30 days with authorizing provider or within the authorizing provider's specialty.  See \"Patient Info\" tab in inbasket, or \"Choose Columns\" in Meds & Orders section of the refill encounter.            amLODIPine (NORVASC) 5 MG tablet [Pharmacy Med Name: AMLODIPINE BESYLATE 5MG TABLETS] 90 tablet 0     Sig: TAKE 1 TABLET BY MOUTH EVERY DAY    Calcium Channel Blockers Protocol  Failed    6/6/2018 11:13 AM       Failed - Blood pressure under 140/90 in past 12 months    BP Readings from Last 3 Encounters:   11/15/17 140/60   09/11/17 131/60   06/09/17 130/62                Passed - Recent (12 mo) or future (30 days) visit within the authorizing provider's specialty    Patient had office visit in the last 12 months or has a visit in the next 30 days with authorizing provider or within the authorizing provider's specialty.  See \"Patient Info\" tab in inbasket, or \"Choose Columns\" in Meds & Orders section of the refill encounter.           Passed - Patient is age 18 or older       Passed - Normal serum creatinine on file in past 12 months    Recent Labs   Lab Test  05/23/18   0730   CR  0.86             Last Written Prescription Date:  6/6/18  Last Fill Quantity: 45,  # refills: 0   Last office visit: 6/9/2017 with prescribing provider:  6/9/17   Future Office Visit: "   Next 5 appointments (look out 90 days)     Jun 11, 2018  8:30 AM CDT   PHYSICAL with Storm Huddleston MD   [facility] ([facility]    [address] [place] [address] [phone]

## 2018-06-06 NOTE — TELEPHONE ENCOUNTER
"Requested Prescriptions   Pending Prescriptions Disp Refills     metoprolol succinate (TOPROL-XL) 50 MG 24 hr tablet [Pharmacy Med Name: METOPROLOL ER SUCCINATE 50MG TABS] 135 tablet 0     Sig: TAKE 1 AND 1/2 TABLETS BY MOUTH EVERY DAY    Beta-Blockers Protocol Failed    6/6/2018 10:20 AM       Failed - Blood pressure under 140/90 in past 12 months    BP Readings from Last 3 Encounters:   11/15/17 140/60   09/11/17 131/60   06/09/17 130/62                Passed - Patient is age 6 or older       Passed - Recent (12 mo) or future (30 days) visit within the authorizing provider's specialty    Patient had office visit in the last 12 months or has a visit in the next 30 days with authorizing provider or within the authorizing provider's specialty.  See \"Patient Info\" tab in inbasket, or \"Choose Columns\" in Meds & Orders section of the refill encounter.            amLODIPine (NORVASC) 5 MG tablet [Pharmacy Med Name: AMLODIPINE BESYLATE 5MG TABLETS] 90 tablet 0     Sig: TAKE 1 TABLET(5 MG) BY MOUTH DAILY    Calcium Channel Blockers Protocol  Failed    6/6/2018 10:20 AM       Failed - Blood pressure under 140/90 in past 12 months    BP Readings from Last 3 Encounters:   11/15/17 140/60   09/11/17 131/60   06/09/17 130/62                Passed - Recent (12 mo) or future (30 days) visit within the authorizing provider's specialty    Patient had office visit in the last 12 months or has a visit in the next 30 days with authorizing provider or within the authorizing provider's specialty.  See \"Patient Info\" tab in inbasket, or \"Choose Columns\" in Meds & Orders section of the refill encounter.           Passed - Patient is age 18 or older       Passed - Normal serum creatinine on file in past 12 months    Recent Labs   Lab Test  05/23/18   0730   CR  0.86               "

## 2018-06-07 DIAGNOSIS — I10 ESSENTIAL HYPERTENSION: ICD-10-CM

## 2018-06-07 RX ORDER — BENAZEPRIL HYDROCHLORIDE 40 MG/1
TABLET ORAL
Qty: 90 TABLET | Refills: 0 | OUTPATIENT
Start: 2018-06-07

## 2018-06-11 ENCOUNTER — OFFICE VISIT (OUTPATIENT)
Dept: INTERNAL MEDICINE | Facility: CLINIC | Age: 78
End: 2018-06-11
Payer: COMMERCIAL

## 2018-06-11 VITALS
SYSTOLIC BLOOD PRESSURE: 122 MMHG | WEIGHT: 214.6 LBS | TEMPERATURE: 97.9 F | HEART RATE: 71 BPM | DIASTOLIC BLOOD PRESSURE: 64 MMHG | RESPIRATION RATE: 18 BRPM | BODY MASS INDEX: 34.49 KG/M2 | OXYGEN SATURATION: 96 % | HEIGHT: 66 IN

## 2018-06-11 DIAGNOSIS — I67.9 CEREBROVASCULAR DISEASE: ICD-10-CM

## 2018-06-11 DIAGNOSIS — I10 ESSENTIAL HYPERTENSION: ICD-10-CM

## 2018-06-11 DIAGNOSIS — Z00.00 MEDICARE ANNUAL WELLNESS VISIT, SUBSEQUENT: Primary | ICD-10-CM

## 2018-06-11 DIAGNOSIS — E78.5 HYPERLIPIDEMIA LDL GOAL <100: ICD-10-CM

## 2018-06-11 PROCEDURE — 99397 PER PM REEVAL EST PAT 65+ YR: CPT | Performed by: INTERNAL MEDICINE

## 2018-06-11 RX ORDER — BENAZEPRIL HYDROCHLORIDE 40 MG/1
40 TABLET ORAL DAILY
Qty: 90 TABLET | Refills: 3 | Status: SHIPPED | OUTPATIENT
Start: 2018-06-11 | End: 2019-06-25

## 2018-06-11 RX ORDER — ATORVASTATIN CALCIUM 40 MG/1
40 TABLET, FILM COATED ORAL DAILY
Qty: 90 TABLET | Status: SHIPPED | OUTPATIENT
Start: 2018-06-11 | End: 2019-01-29

## 2018-06-11 ASSESSMENT — ANXIETY QUESTIONNAIRES
GAD7 TOTAL SCORE: 2
6. BECOMING EASILY ANNOYED OR IRRITABLE: SEVERAL DAYS
7. FEELING AFRAID AS IF SOMETHING AWFUL MIGHT HAPPEN: NOT AT ALL
2. NOT BEING ABLE TO STOP OR CONTROL WORRYING: NOT AT ALL
IF YOU CHECKED OFF ANY PROBLEMS ON THIS QUESTIONNAIRE, HOW DIFFICULT HAVE THESE PROBLEMS MADE IT FOR YOU TO DO YOUR WORK, TAKE CARE OF THINGS AT HOME, OR GET ALONG WITH OTHER PEOPLE: NOT DIFFICULT AT ALL
3. WORRYING TOO MUCH ABOUT DIFFERENT THINGS: SEVERAL DAYS
1. FEELING NERVOUS, ANXIOUS, OR ON EDGE: NOT AT ALL
5. BEING SO RESTLESS THAT IT IS HARD TO SIT STILL: NOT AT ALL

## 2018-06-11 ASSESSMENT — PATIENT HEALTH QUESTIONNAIRE - PHQ9: 5. POOR APPETITE OR OVEREATING: NOT AT ALL

## 2018-06-11 ASSESSMENT — PAIN SCALES - GENERAL: PAINLEVEL: NO PAIN (0)

## 2018-06-11 NOTE — PROGRESS NOTES
SUBJECTIVE:   Ricardo Neely is a 78 year old male who presents for Preventive Visit.    Are you in the first 12 months of your Medicare Part B coverage?  No    Healthy Habits:    Do you get at least three servings of calcium containing foods daily (dairy, green leafy vegetables, etc.)? yes    Amount of exercise or daily activities, outside of work: 7 day(s) per week    Problems taking medications regularly No    Medication side effects: No    Have you had an eye exam in the past two years? yes    Do you see a dentist twice per year? no    Do you have sleep apnea, excessive snoring or daytime drowsiness?no      Ability to successfully perform activities of daily living: Yes, no assistance needed    Home safety:  none identified     Hearing impairment: No    Fall risk:  Fallen 2 or more times in the past year?: No  Any fall with injury in the past year?: No        COGNITIVE SCREEN  1) Repeat 3 items (Banana, Sunrise, Chair)    2) Clock draw: NORMAL  3) 3 item recall: Recalls 2 objects   Results: NORMAL clock, 1-2 items recalled: COGNITIVE IMPAIRMENT LESS LIKELY    Mini-CogTM Copyright S Didier. Licensed by the author for use in [facility] [employer]; reprinted with permission ([email or web address]). All rights reserved.        Additional issues to address:  1.  Follow-up HTN.  Patient is checking outpatient blood pressures, at home.  Results are good.  He reports no side effects from BP medications.   2.  Follow-up of chronic hyperlipidemia, on Atorvastatin 40 mg.  Diet rating: good.  He reports no side effects of medications, including no significant myalgias or other side effects.    3.  Ongoing L knee pain.   Had steroid injection L knee last winter, fairly good results.    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   Dr. Nichols suggested TKA.  Bothers him a little now, but could not walk well before steroid injection.  Did get another shot last month, and no problems since.   "    Reviewed and updated as needed this visit by clinical staff  Tobacco  Allergies  Meds         Reviewed and updated as needed this visit by Provider        Social History   Substance Use Topics     Smoking status: Former Smoker     Quit date: 1/1/1968     Smokeless tobacco: Never Used     Alcohol use No       If you drink alcohol do you typically have >3 drinks per day or >7 drinks per week? No                        Today's PHQ-2 Score:   PHQ-2 ( 1999 [employer]) 6/11/2018 5/11/2016   Q1: Little interest or pleasure in doing things 0 0   Q2: Feeling down, depressed or hopeless 0 0   PHQ-2 Score 0 0   Q1: Little interest or pleasure in doing things - -   Q2: Feeling down, depressed or hopeless - -   PHQ-2 Score - -       Do you feel safe in your environment - Yes    Do you have a Health Care Directive?: Yes: Advance Directive has been received and scanned.    Current providers sharing in care for this patient include:   Patient Care Team:  Storm Huddleston MD as PCP - General    The following health maintenance items are reviewed in Epic and correct as of today:  Health Maintenance   Topic Date Due     FALL RISK ASSESSMENT  06/09/2018     YOEL QUESTIONNAIRE 1 YEAR  06/09/2018     PHQ-9 Q1YR  06/09/2018     ALT Q1 YR  05/23/2019     BMP Q1 YR  05/23/2019     LIPID MONITORING Q1 YEAR  05/23/2019     ADVANCE DIRECTIVE PLANNING Q5 YRS  10/09/2022     TETANUS IMMUNIZATION (SYSTEM ASSIGNED)  09/13/2023     PNEUMOCOCCAL  Completed     INFLUENZA VACCINE  Completed           ROS:  R eye laterally having floaters, and now spider web effect with a bright light.  Saw eye doctor, nothing wrong on exam  Constitutional, HEENT, cardiovascular, pulmonary, gi and gu systems are negative, except as otherwise noted.  Voiding slow but fine.  No headaches.   Intermittent GERD symptoms, takes TUMS prn.    OBJECTIVE:   /64  Pulse 71  Temp 97.9  F (36.6  C) (Oral)  Resp 18  Ht 5' 6\" (1.676 m)  Wt 214 lb 9.6 oz (97.3 kg)  SpO2 " "96%  BMI 34.64 kg/m2 Estimated body mass index is 34.64 kg/(m^2) as calculated from the following:    Height as of this encounter: 5' 6\" (1.676 m).    Weight as of this encounter: 214 lb 9.6 oz (97.3 kg).  EXAM:   GENERAL: healthy, alert and no distress  NECK: no adenopathy, no asymmetry, masses, or scars and thyroid normal to palpation  CV: regular rate and rhythm, normal S1 S2, no S3 or S4, no murmur, click or rub, no peripheral edema and peripheral pulses strong  ABDOMEN: soft, nontender, no hepatosplenomegaly, no masses and bowel sounds normal  MS: no gross musculoskeletal defects noted, no edema  L knee exam noncontributory, no effusion    ASSESSMENT / PLAN:     ASSESSMENT:   1.  Annual Wellness Visit  2.  HTN, controlled   3.  Hyperlipidemia, controlled   4.  L knee pain, now improved    PLAN:  1.  Continue present medications   2.  Check labs again in a year, then see MD.   3.  Consider new shingles vaccine:  Shingrix      Obtain recent ortho note from Dr. Nichols    End of Life Planning:  Patient currently has an advanced directive: Yes.  Practitioner is supportive of decision.    COUNSELING:  Reviewed preventive health counseling, as reflected in patient instructions      Estimated body mass index is 34.64 kg/(m^2) as calculated from the following:    Height as of this encounter: 5' 6\" (1.676 m).    Weight as of this encounter: 214 lb 9.6 oz (97.3 kg).  Weight management plan: Discussed healthy diet and exercise guidelines and patient will follow up in 12 months in clinic to re-evaluate.     reports that he quit smoking about 50 years ago. He has never used smokeless tobacco.      Appropriate preventive services were discussed with this patient, including applicable screening as appropriate for cardiovascular disease, diabetes, osteopenia/osteoporosis, and glaucoma.  As appropriate for age/gender, discussed screening for colorectal cancer, prostate cancer, breast cancer, and cervical cancer. Checklist " reviewing preventive services available has been given to the patient.    Reviewed patients plan of care and provided an AVS. The Basic Care Plan (routine screening as documented in Health Maintenance) for Ricardo meets the Care Plan requirement. This Care Plan has been established and reviewed with the Patient.    Counseling Resources:  ATP IV Guidelines  Pooled Cohorts Equation Calculator  Breast Cancer Risk Calculator  FRAX Risk Assessment  ICSI Preventive Guidelines  Dietary Guidelines for Americans, 2010  USDA's MyPlate  ASA Prophylaxis  Lung CA Screening    Storm Huddleston MD  [facility]

## 2018-06-11 NOTE — MR AVS SNAPSHOT
After Visit Summary   6/11/2018    Ricardo Neely    MRN: [id number]           Patient Information     Date Of Birth          1940        Visit Information        Provider Department      6/11/2018 8:30 AM Storm Huddleston MD [facility]        Today's Diagnoses     Medicare annual wellness visit, subsequent    -  1    Essential hypertension        Hyperlipidemia LDL goal <100        Cerebrovascular disease          Care Instructions      Preventive Health Recommendations:       Male Ages 65 and over    Yearly exam:             See your health care provider every year in order to  o   Review health changes.   o   Discuss preventive care.    o   Review your medicines if your doctor has prescribed any.    Talk with your health care provider about whether you should have a test to screen for prostate cancer (PSA).    Every 3 years, have a diabetes test (fasting glucose). If you are at risk for diabetes, you should have this test more often.    Every 5 years, have a cholesterol test. Have this test more often if you are at risk for high cholesterol or heart disease.     Every 10 years, have a colonoscopy. Or, have a yearly FIT test (stool test). These exams will check for colon cancer.    Talk to with your health care provider about screening for Abdominal Aortic Aneurysm if you have a family history of AAA or have a history of smoking.  Shots:     Get a flu shot each year.     Get a tetanus shot every 10 years.     Talk to your doctor about your pneumonia vaccines. There are now two you should receive - Pneumovax (PPSV 23) and Prevnar (PCV 13).    Talk to your doctor about a shingles vaccine.     Talk to your doctor about the hepatitis B vaccine.  Nutrition:     Eat at least 5 servings of fruits and vegetables each day.     Eat whole-grain bread, whole-wheat pasta and brown rice instead of white grains and rice.     Talk to your doctor about Calcium and Vitamin D.    Lifestyle    Exercise for at least 150 minutes a week (30 minutes a day, 5 days a week). This will help you control your weight and prevent disease.     Limit alcohol to one drink per day.     No smoking.     Wear sunscreen to prevent skin cancer.     See your dentist every six months for an exam and cleaning.     See your eye doctor every 1 to 2 years to screen for conditions such as glaucoma, macular degeneration and cataracts.    Preventive Health Recommendations:       Male Ages 65 and over    Yearly exam:             See your health care provider every year in order to  o   Review health changes.   o   Discuss preventive care.    o   Review your medicines if your doctor has prescribed any.    Talk with your health care provider about whether you should have a test to screen for prostate cancer (PSA).    Every 3 years, have a diabetes test (fasting glucose). If you are at risk for diabetes, you should have this test more often.    Every 5 years, have a cholesterol test. Have this test more often if you are at risk for high cholesterol or heart disease.     Every 10 years, have a colonoscopy. Or, have a yearly FIT test (stool test). These exams will check for colon cancer.    Talk to with your health care provider about screening for Abdominal Aortic Aneurysm if you have a family history of AAA or have a history of smoking.  Shots:     Get a flu shot each year.     Get a tetanus shot every 10 years.     Talk to your doctor about your pneumonia vaccines. There are now two you should receive - Pneumovax (PPSV 23) and Prevnar (PCV 13).    Talk to your doctor about a shingles vaccine.     Talk to your doctor about the hepatitis B vaccine.  Nutrition:     Eat at least 5 servings of fruits and vegetables each day.     Eat whole-grain bread, whole-wheat pasta and brown rice instead of white grains and rice.     Talk to your doctor about Calcium and Vitamin D.   Lifestyle    Exercise for at least 150 minutes a week (30  minutes a day, 5 days a week). This will help you control your weight and prevent disease.     Limit alcohol to one drink per day.     No smoking.     Wear sunscreen to prevent skin cancer.     See your dentist every six months for an exam and cleaning.     See your eye doctor every 1 to 2 years to screen for conditions such as glaucoma, macular degeneration and cataracts.      PLAN:  1.  Continue present medications   2.  Check labs again in a year, then see MD.   3.  Consider new shingles vaccine:  Shingrix            Follow-ups after your visit        Future tests that were ordered for you today     Open Future Orders        Priority Expected Expires Ordered    Lipid panel reflex to direct LDL Fasting Routine 5/15/2019 6/11/2019 6/11/2018    ALT Routine 5/15/2019 6/11/2019 6/11/2018    Basic metabolic panel Routine 5/15/2019 6/11/2019 6/11/2018            Who to contact     If you have questions or need follow up information about today's clinic visit or your schedule please contact [facility] directly at [phone].  Normal or non-critical lab and imaging results will be communicated to you by Mobile On Serviceshart, letter or phone within 4 business days after the clinic has received the results. If you do not hear from us within 7 days, please contact the clinic through Gastrofyt or phone. If you have a critical or abnormal lab result, we will notify you by phone as soon as possible.  Submit refill requests through GreenPal or call your pharmacy and they will forward the refill request to us. Please allow 3 business days for your refill to be completed.          Additional Information About Your Visit        GreenPal Information     GreenPal gives you secure access to your electronic health record. If you see a primary care provider, you can also send messages to your care team and make appointments. If you have questions, please call your primary care clinic.  If you do not have a primary care  "provider, please call [phone] and they will assist you.        Care EveryWhere ID     This is your Care EveryWhere ID. This could be used by other organizations to access your [facility] medical records  [id number]        Your Vitals Were     Pulse Temperature Respirations Height Pulse Oximetry BMI (Body Mass Index)    71 97.9  F (36.6  C) (Oral) 18 5' 6\" (1.676 m) 96% 34.64 kg/m2       Blood Pressure from Last 3 Encounters:   06/11/18 122/64   11/15/17 140/60   09/11/17 131/60    Weight from Last 3 Encounters:   06/11/18 214 lb 9.6 oz (97.3 kg)   11/15/17 220 lb 4.8 oz (99.9 kg)   06/09/17 215 lb 3.2 oz (97.6 kg)                 Today's Medication Changes          These changes are accurate as of 6/11/18  9:17 AM.  If you have any questions, ask your nurse or doctor.               These medicines have changed or have updated prescriptions.        Dose/Directions    aspirin 81 MG tablet   This may have changed:  when to take this   Used for:  Routine general medical examination at a health care facility        Dose:  81 mg   Take 1 tablet by mouth daily.   Quantity:  100 tablet   Refills:  3       benazepril 40 MG tablet   Commonly known as:  LOTENSIN   This may have changed:  See the new instructions.   Used for:  Essential hypertension   Changed by:  Storm Huddleston MD        Dose:  40 mg   Take 1 tablet (40 mg) by mouth daily   Quantity:  90 tablet   Refills:  3         Stop taking these medicines if you haven't already. Please contact your care team if you have questions.     ALLEGRA PO   Stopped by:  Storm Huddleston MD                Where to get your medicines      These medications were sent to [facility] [address] [place] [address] - [address] AT [facility] [place]  [address], [facility] [address]     Phone:  [phone]     atorvastatin 40 MG tablet    benazepril 40 MG tablet                Primary Care Provider Office Phone # Fax #    Storm Taylor " MD Kar[place] [phone]       [address] [facility] [address]        Equal Access to Services     ISMA NICHOLAS : Hadii aad ku hadaldenjojo [place], walos valenzuelanishantha, asiacarlos a kamarianada bernabe, ger renee laJordynalma delia ferrara. So [facility] [phone].    ATENCIÓN: Si habla español, tiene a el disposición servicios gratuitos de asistencia lingüística. Llame al [phone].    We comply with applicable federal civil rights laws and [place] laws. We do not discriminate on the basis of race, color, national origin, age, disability, sex, sexual orientation, or gender identity.            Thank you!     Thank you for choosing [facility]  for your care. Our goal is always to provide you with excellent care. Hearing back from our patients is one way we can continue to improve our services. Please take a few minutes to complete the written survey that you may receive in the mail after your visit with us. Thank you!             Your Updated Medication List - Protect others around you: Learn how to safely use, store and throw away your medicines at www.disposemymeds.org.          This list is accurate as of 6/11/18  9:17 AM.  Always use your most recent med list.                   Brand Name Dispense Instructions for use Diagnosis    amLODIPine 5 MG tablet    NORVASC    90 tablet    TAKE 1 TABLET BY MOUTH EVERY DAY    Essential hypertension       aspirin 81 MG tablet     100 tablet    Take 1 tablet by mouth daily.    Routine general medical examination at a health care facility       atorvastatin 40 MG tablet    LIPITOR    90 tablet    Take 1 tablet (40 mg) by mouth daily    Hyperlipidemia LDL goal <100, Cerebrovascular disease       benazepril 40 MG tablet    LOTENSIN    90 tablet    Take 1 tablet (40 mg) by mouth daily    Essential hypertension       clopidogrel 75 MG tablet    PLAVIX    90 tablet    Take 1 tablet (75 mg) by mouth daily    Cerebrovascular disease,  Vertebrobasilar artery syndrome       fluocinonide 0.05 % cream    LIDEX    60 g    Apply sparingly to affected area twice daily as needed.  Do not apply to face.    Irritant dermatitis       loratadine 10 MG tablet    CLARITIN     Take 1 tablet (10 mg) by mouth daily    Allergic rhinitis, unspecified allergic rhinitis type       metoprolol succinate 50 MG 24 hr tablet    TOPROL-XL    135 tablet    TAKE 1& 1/2 TABLETS BY MOUTH ONCE DAILY    Essential hypertension

## 2018-06-11 NOTE — PATIENT INSTRUCTIONS
Preventive Health Recommendations:       Male Ages 65 and over    Yearly exam:             See your health care provider every year in order to  o   Review health changes.   o   Discuss preventive care.    o   Review your medicines if your doctor has prescribed any.    Talk with your health care provider about whether you should have a test to screen for prostate cancer (PSA).    Every 3 years, have a diabetes test (fasting glucose). If you are at risk for diabetes, you should have this test more often.    Every 5 years, have a cholesterol test. Have this test more often if you are at risk for high cholesterol or heart disease.     Every 10 years, have a colonoscopy. Or, have a yearly FIT test (stool test). These exams will check for colon cancer.    Talk to with your health care provider about screening for Abdominal Aortic Aneurysm if you have a family history of AAA or have a history of smoking.  Shots:     Get a flu shot each year.     Get a tetanus shot every 10 years.     Talk to your doctor about your pneumonia vaccines. There are now two you should receive - Pneumovax (PPSV 23) and Prevnar (PCV 13).    Talk to your doctor about a shingles vaccine.     Talk to your doctor about the hepatitis B vaccine.  Nutrition:     Eat at least 5 servings of fruits and vegetables each day.     Eat whole-grain bread, whole-wheat pasta and brown rice instead of white grains and rice.     Talk to your doctor about Calcium and Vitamin D.   Lifestyle    Exercise for at least 150 minutes a week (30 minutes a day, 5 days a week). This will help you control your weight and prevent disease.     Limit alcohol to one drink per day.     No smoking.     Wear sunscreen to prevent skin cancer.     See your dentist every six months for an exam and cleaning.     See your eye doctor every 1 to 2 years to screen for conditions such as glaucoma, macular degeneration and cataracts.    Preventive Health Recommendations:       Male Ages 65 and  over    Yearly exam:             See your health care provider every year in order to  o   Review health changes.   o   Discuss preventive care.    o   Review your medicines if your doctor has prescribed any.    Talk with your health care provider about whether you should have a test to screen for prostate cancer (PSA).    Every 3 years, have a diabetes test (fasting glucose). If you are at risk for diabetes, you should have this test more often.    Every 5 years, have a cholesterol test. Have this test more often if you are at risk for high cholesterol or heart disease.     Every 10 years, have a colonoscopy. Or, have a yearly FIT test (stool test). These exams will check for colon cancer.    Talk to with your health care provider about screening for Abdominal Aortic Aneurysm if you have a family history of AAA or have a history of smoking.  Shots:     Get a flu shot each year.     Get a tetanus shot every 10 years.     Talk to your doctor about your pneumonia vaccines. There are now two you should receive - Pneumovax (PPSV 23) and Prevnar (PCV 13).    Talk to your doctor about a shingles vaccine.     Talk to your doctor about the hepatitis B vaccine.  Nutrition:     Eat at least 5 servings of fruits and vegetables each day.     Eat whole-grain bread, whole-wheat pasta and brown rice instead of white grains and rice.     Talk to your doctor about Calcium and Vitamin D.   Lifestyle    Exercise for at least 150 minutes a week (30 minutes a day, 5 days a week). This will help you control your weight and prevent disease.     Limit alcohol to one drink per day.     No smoking.     Wear sunscreen to prevent skin cancer.     See your dentist every six months for an exam and cleaning.     See your eye doctor every 1 to 2 years to screen for conditions such as glaucoma, macular degeneration and cataracts.      PLAN:  1.  Continue present medications   2.  Check labs again in a year, then see MD.   3.  Consider new shingles  vaccine:  Shingrix

## 2018-06-11 NOTE — PROGRESS NOTES
"SUBJECTIVE:   Ricardo Neely is a 78 year old male who presents for Preventive Visit.  {PVP to remind patient that this is not necessarily a physical exam; physical exam may or may not be done:528222::\"click delete button to remove this line now\"}  {PVP to inform patient that additional E&M charge may apply, if additional problems addressed:089741::\"click delete button to remove this line now\"}  Are you in the first 12 months of your Medicare coverage?  {No Yes:632470::\"No\"}    HPI  {Hearing Test Done (Optional):815109}  {Add if <65 person on Medicare  - Required Questions (Optional):885435}  Fall risk:  {Document Fall Risk in the Assessments Section of the Navigator:756344}  {If any of the above assessments are answered yes, consider ordering appropriate referrals (Optional):938002::\"click delete button to remove this line now\"}  {AWV Cognitive Screenin}    Reviewed and updated as needed this visit by clinical staff         Reviewed and updated as needed this visit by Provider        Social History   Substance Use Topics     Smoking status: Former Smoker     Quit date: 1968     Smokeless tobacco: Never Used     Alcohol use No       No flowsheet data found.{add AUDIT responses (Optional) (A score of 7 for adult men is an indication of hazardous drinking; a score of 8 or more is an indication of an alcohol use disorder.  A score of 7 or more for adult women is an indication of hazardous drinking or an alchohol use disorder):776201}    {Outside tests to abstract? :554902}    {additional problems to add (Optional):302778}    Today's PHQ-2 Score:   PHQ-2 (  [employer]) 2016   Q1: Little interest or pleasure in doing things 0   Q2: Feeling down, depressed or hopeless 0   PHQ-2 Score 0   Q1: Little interest or pleasure in doing things -   Q2: Feeling down, depressed or hopeless -   PHQ-2 Score -       Do you feel safe in your environment - {YES/NO/NA:648888}    Do you have a Health Care Directive?: " "{HEALTHCARE DIRECTIVE STATUS:686467}    Current providers sharing in care for this patient include:   Patient Care Team:  Storm Huddleston MD as PCP - General    The following health maintenance items are reviewed in Epic and correct as of today:  Health Maintenance   Topic Date Due     FALL RISK ASSESSMENT  2018     YOEL QUESTIONNAIRE 1 YEAR  2018     PHQ-9 Q1YR  2018     ALT Q1 YR  2019     BMP Q1 YR  2019     LIPID MONITORING Q1 YEAR  2019     ADVANCE DIRECTIVE PLANNING Q5 YRS  10/09/2022     TETANUS IMMUNIZATION (SYSTEM ASSIGNED)  2023     PNEUMOCOCCAL  Completed     INFLUENZA VACCINE  Completed     {Chronicprobdata (Optional):364760}    {Decision Support (Optional):538506}    Review of Systems  {ROS COMP:844519}    OBJECTIVE:   There were no vitals taken for this visit. Estimated body mass index is 34.5 kg/(m^2) as calculated from the following:    Height as of 11/15/17: 5' 7\" (1.702 m).    Weight as of 11/15/17: 220 lb 4.8 oz (99.9 kg).  Physical Exam  {Exam :137095}    ASSESSMENT / PLAN:   {Diag Picklist:286872}    End of Life Planning:  Patient currently has an advanced directive: { :423363}    COUNSELING:  {Medicare Counselin}    {BP Counseling- Complete if BP >= 120/80  (Optional):103297}    Estimated body mass index is 34.5 kg/(m^2) as calculated from the following:    Height as of 11/15/17: 5' 7\" (1.702 m).    Weight as of 11/15/17: 220 lb 4.8 oz (99.9 kg).  {Weight Management Plan -- Complete if patient has an abnormal BMI (Optional):765760}   reports that he quit smoking about 50 years ago. He has never used smokeless tobacco.  {Tobacco Cessation -- Complete if patient is a smoker (Optional):206007}    Appropriate preventive services were discussed with this patient, including applicable screening as appropriate for cardiovascular disease, diabetes, osteopenia/osteoporosis, and glaucoma.  As appropriate for age/gender, discussed screening for " colorectal cancer, prostate cancer, breast cancer, and cervical cancer. Checklist reviewing preventive services available has been given to the patient.    Reviewed patients plan of care and provided an AVS. The {CarePlan:906794} for Ricardo meets the Care Plan requirement. This Care Plan has been established and reviewed with the {PATIENT, FAMILY MEMBER, CAREGIVER:059284}.    Counseling Resources:  ATP IV Guidelines  Pooled Cohorts Equation Calculator  Breast Cancer Risk Calculator  FRAX Risk Assessment  ICSI Preventive Guidelines  Dietary Guidelines for Americans, 2010  USDA's MyPlate  ASA Prophylaxis  Lung CA Screening    Storm Huddleston MD  [facility]

## 2018-06-12 ASSESSMENT — ANXIETY QUESTIONNAIRES: GAD7 TOTAL SCORE: 2

## 2018-09-06 DIAGNOSIS — E78.5 HYPERLIPIDEMIA LDL GOAL <100: ICD-10-CM

## 2018-09-06 DIAGNOSIS — I67.9 CEREBROVASCULAR DISEASE: ICD-10-CM

## 2018-09-07 NOTE — TELEPHONE ENCOUNTER
"Requested Prescriptions   Pending Prescriptions Disp Refills     atorvastatin (LIPITOR) 40 MG tablet  Last Written Prescription Date:  06/11/2018  Last Fill Quantity: 90,  # refills: PRN   Last office visit: 6/11/2018 with prescribing provider:  FRANCIE   Future Office Visit:   Next 5 appointments (look out 90 days)     Sep 10, 2018  9:40 AM CDT   Return Visit with Valerie Serrano NP   [facility] ([facility])    [address] [address] [address] [phone]                  90 tablet prn     Sig: Take 1 tablet (40 mg) by mouth daily    Statins Protocol Passed    9/6/2018  6:31 PM       Passed - LDL on file in past 12 months    Recent Labs   Lab Test  05/23/18   0730   LDL  60            Passed - No abnormal creatine kinase in past 12 months    No lab results found.            Passed - Recent (12 mo) or future (30 days) visit within the authorizing provider's specialty    Patient had office visit in the last 12 months or has a visit in the next 30 days with authorizing provider or within the authorizing provider's specialty.  See \"Patient Info\" tab in inbasket, or \"Choose Columns\" in Meds & Orders section of the refill encounter.           Passed - Patient is age 18 or older          "

## 2018-09-10 ENCOUNTER — TELEPHONE (OUTPATIENT)
Dept: PALLIATIVE MEDICINE | Facility: CLINIC | Age: 78
End: 2018-09-10

## 2018-09-10 ENCOUNTER — OFFICE VISIT (OUTPATIENT)
Dept: NEUROSURGERY | Facility: CLINIC | Age: 78
End: 2018-09-10
Attending: NURSE PRACTITIONER
Payer: COMMERCIAL

## 2018-09-10 VITALS
SYSTOLIC BLOOD PRESSURE: 135 MMHG | BODY MASS INDEX: 33.75 KG/M2 | HEIGHT: 66 IN | HEART RATE: 74 BPM | WEIGHT: 210 LBS | DIASTOLIC BLOOD PRESSURE: 75 MMHG | OXYGEN SATURATION: 97 %

## 2018-09-10 DIAGNOSIS — M51.369 LUMBAR DEGENERATIVE DISC DISEASE: Primary | ICD-10-CM

## 2018-09-10 PROCEDURE — 99213 OFFICE O/P EST LOW 20 MIN: CPT | Performed by: NURSE PRACTITIONER

## 2018-09-10 RX ORDER — ATORVASTATIN CALCIUM 40 MG/1
40 TABLET, FILM COATED ORAL DAILY
Qty: 90 TABLET | OUTPATIENT
Start: 2018-09-10

## 2018-09-10 ASSESSMENT — PAIN SCALES - GENERAL: PAINLEVEL: MODERATE PAIN (4)

## 2018-09-10 NOTE — TELEPHONE ENCOUNTER
Dr Huddleston,     Patient is requesting to schedule an injection with us. This would require an approval to hold the Plavix for 7 days prior to injection with injection occuring on the 8th day. Barring any complications, typically patients resume their Plavix 12 hours post injection.  We are requesting your approval to hold the medication for this time frame.  Please do not close encounter but route back to the pool, we will contact patient for scheduling accordingly.     Sarahi DAVEYN, RN Care Coordinator  [facility]

## 2018-09-10 NOTE — LETTER
"    9/10/2018         RE: Ricardo Neely  [address]  [facility] [address]        Dear Colleague,    Thank you for referring your patient, Ricardo Neely, to the [facility]. Please see a copy of my visit note below.    [facility] Clinic  Neurosurgery followup:    HPI: Ricardo Neely is a 78 year old gentlemen who was seen at our office initially on 5/6/16 for evaluation of low back pain.  His history is significant for three previous lumbar surgeries, lastly in 2012.  He states his pain is mainly low back pain, a constant dull ache that turns more intense with activities, specifically walking.  He states he has intermittent pain extending into the buttocks bilaterally and along the hip and thigh bilaterally, \"Usually it starts after I walk about 1/2 a mile.\"  He denies falls, weakness to BLE or changes to bowel or bladder.  He attends exercises classes at his residential area which he finds helpful.  He has had previous lumbar ESIs, lastly in September 2017 and has found them helpful; the last injection providing about 7-8 months of significant relief.    Exam:  Constitutional:  Alert, well nourished, NAD.  HEENT: Normocephalic, atraumatic.   Pulm:  Without shortness of breath   CV:  No pitting edema of BLE.      Neurological:  Awake  Alert  Oriented x 3  Motor exam:        IP Q DF PF EHL  R   5  5   5   5    5  L   5  5   5   5    5     Lumbar Examination with tenderness to spine midline, minimal paraspinous tenderness.  ROM with some discomfort.  SLR negative    Gait is stead; able to stand on toes/heels       Imaging: Lumbar MRI 5/6/16:  IMPRESSION:    1. At L2-L3 there is moderate central stenosis and mild bilateral  foraminal stenosis.  2. At L3-L4 there is grade 1 degenerative spondylolisthesis. Moderate,if not moderate to severe, central stenosis. Mild-to-moderate bilateral foraminal stenosis.  3. At L4-L5 there is mild central and left foraminal stenosis.  4. No " "significant change.    A/P:   Lumbar DDD  Lumbar Radiculopathy    Ricardo Neely is a 78 year old gentlemen who was seen at our office initially on 5/6/16 for evaluation of low back pain.  His history is significant for three previous lumbar surgeries, lastly in 2012.  He states his pain is mainly low back pain, a constant dull ache that turns more intense with activities, specifically walking.  He states he has intermittent pain extending into the buttocks bilaterally and along the hip and thigh bilaterally, \"Usually it starts after I walk about 1/2 a mile.\"  He denies falls, weakness to BLE or changes to bowel or bladder.  He attends exercises classes at his residential area which he finds helpful.  He has had previous lumbar ESIs, lastly in September 2017 and has found them helpful; the last injection providing about 7-8 months of significant relief. The patient had met with Dr. Padilla lastly on 5/17/16 and he had recommended PT with traction and L3-4 LESI.  The patient states he would like to continue with conservative care, \"I do not want surgery because Dr. Padilla said he will have to put a plate in.\"  The patient feels injections have provided significant relief.  He does not feel the pain has progressed, and is the same pain pattern as before.  We will order a lumbar CHERI, he will wait to hold Plavix until his PCP gives the ok, and he has the appointment coordinated.  We did not order a repeat MRI at this time as the patient describes the pain to the same area without lower extremity weakness.  If an updated MRI is requested by the pain clinic prior to the injection the patient will contact us.    Patient Instructions   Schedule lumbar epidural steroid injection (someone will call you)  DO NOT stop blood thinners until ok'd by your primary physician and pain clinic  Please contact the clinic if pain persists at [phone].      Valerie Serrano [facility]  Spine and Brain Clinic  [facility]  [address] [address]    Tel [phone]  Pager [phone]        Again, thank you for allowing me to participate in the care of your patient.        Sincerely,        Valerie Serrano, NP

## 2018-09-10 NOTE — PROGRESS NOTES
"Spine and Brain Clinic  Neurosurgery followup:    HPI: Ricardo Neely is a 78 year old gentlemen who was seen at our office initially on 5/6/16 for evaluation of low back pain.  His history is significant for three previous lumbar surgeries, lastly in 2012.  He states his pain is mainly low back pain, a constant dull ache that turns more intense with activities, specifically walking.  He states he has intermittent pain extending into the buttocks bilaterally and along the hip and thigh bilaterally, \"Usually it starts after I walk about 1/2 a mile.\"  He denies falls, weakness to BLE or changes to bowel or bladder.  He attends exercises classes at his residential area which he finds helpful.  He has had previous lumbar ESIs, lastly in September 2017 and has found them helpful; the last injection providing about 7-8 months of significant relief.    Exam:  Constitutional:  Alert, well nourished, NAD.  HEENT: Normocephalic, atraumatic.   Pulm:  Without shortness of breath   CV:  No pitting edema of BLE.      Neurological:  Awake  Alert  Oriented x 3  Motor exam:        IP Q DF PF EHL  R   5  5   5   5    5  L   5  5   5   5    5     Lumbar Examination with tenderness to spine midline, minimal paraspinous tenderness.  ROM with some discomfort.  SLR negative    Gait is stead; able to stand on toes/heels       Imaging: Lumbar MRI 5/6/16:  IMPRESSION:    1. At L2-L3 there is moderate central stenosis and mild bilateral  foraminal stenosis.  2. At L3-L4 there is grade 1 degenerative spondylolisthesis. Moderate,if not moderate to severe, central stenosis. Mild-to-moderate bilateral foraminal stenosis.  3. At L4-L5 there is mild central and left foraminal stenosis.  4. No significant change.    A/P:   Lumbar DDD  Lumbar Radiculopathy    Ricardo Neely is a 78 year old gentlemen who was seen at our office initially on 5/6/16 for evaluation of low back pain.  His history is significant for three previous lumbar surgeries, lastly in " "2012.  He states his pain is mainly low back pain, a constant dull ache that turns more intense with activities, specifically walking.  He states he has intermittent pain extending into the buttocks bilaterally and along the hip and thigh bilaterally, \"Usually it starts after I walk about 1/2 a mile.\"  He denies falls, weakness to BLE or changes to bowel or bladder.  He attends exercises classes at his residential area which he finds helpful.  He has had previous lumbar ESIs, lastly in September 2017 and has found them helpful; the last injection providing about 7-8 months of significant relief. The patient had met with Dr. Padilla lastly on 5/17/16 and he had recommended PT with traction and L3-4 LESI.  The patient states he would like to continue with conservative care, \"I do not want surgery because Dr. Padilla said he will have to put a plate in.\"  The patient feels injections have provided significant relief.  He does not feel the pain has progressed, and is the same pain pattern as before.  We will order a lumbar CHERI, he will wait to hold Plavix until his PCP gives the ok, and he has the appointment coordinated.  We did not order a repeat MRI at this time as the patient describes the pain to the same area without lower extremity weakness.  If an updated MRI is requested by the pain clinic prior to the injection the patient will contact us.    Patient Instructions   Schedule lumbar epidural steroid injection (someone will call you)  DO NOT stop blood thinners until ok'd by your primary physician and pain clinic  Please contact the clinic if pain persists at [phone].      Valerie Serrano [facility]  Spine and Brain Clinic  [address] [address]    Tel [phone]  Pager [phone]      "

## 2018-09-10 NOTE — MR AVS SNAPSHOT
After Visit Summary   9/10/2018    Ricardo Neely    MRN: [id number]           Patient Information     Date Of Birth          1940        Visit Information        Provider Department      9/10/2018 9:40 AM Valerie Serrano NP [facility]        Today's Diagnoses     Lumbar degenerative disc disease    -  1      Care Instructions    Schedule lumbar epidural steroid injection (someone will call you)  DO NOT stop blood thinners until ok'd by your primary physician and pain clinic  Please contact the clinic if pain persists at [phone].            Follow-ups after your visit        Additional Services     PAIN MANAGEMENT REFERRAL       Your provider has referred you to: [facility]: [facility] -    Reason for Referral: Procedure Order Epidural:  Lumbar (Advanced imaging required in the last 3 years)      What is your diagnosis for the patient's pain? Lumbar ddd      For any questions, contact the [facility] at [phone].     **ANY DIAGNOSTIC TESTS THAT ARE NOT IN EPIC SHOULD BE SENT TO THE PAIN CENTER**    REGARDING OPIOID MEDICATIONS:  The discussion of opioids management, appropriateness of therapy, and dosing will be discussed in patients being seen for evaluation.  The pain management clinics are not long-term prescribing clinics, with transition of prescribing of medications ultimately going back to the referring provider/PCP.  If prescribing is taken over at the pain clinic, it is in actively involved patients whom are appropriate for opioids, urine drug screening is completed, and long-term prescribing plan has been determined.  Therefore, we will not be automatically taking over prescribing at the patient's first visit.  Is this agreeable to you? agrees.     Please be aware that coverage of these services is subject to the terms and limitations of your health insurance plan.  Call member services at your health plan with any  "benefit or coverage questions.      Please bring the following with you to your appointment:    (1) Any X-Rays, CTs or MRIs which have been performed.  Contact the facility where they were done to arrange for  prior to your scheduled appointment.    (2) List of current medications   (3) This referral request   (4) Any documents/labs given to you for this referral                  Who to contact     If you have questions or need follow up information about today's clinic visit or your schedule please contact [facility] directly at [phone].  Normal or non-critical lab and imaging results will be communicated to you by Piggybackrhart, letter or phone within 4 business days after the clinic has received the results. If you do not hear from us within 7 days, please contact the clinic through [employer] or phone. If you have a critical or abnormal lab result, we will notify you by phone as soon as possible.  Submit refill requests through [employer]) or call your pharmacy and they will forward the refill request to us. Please allow 3 business days for your refill to be completed.          Additional Information About Your Visit        PiggybackrharThinkr Information     [employer] gives you secure access to your electronic health record. If you see a primary care provider, you can also send messages to your care team and make appointments. If you have questions, please call your primary care clinic.  If you do not have a primary care provider, please call [phone] and they will assist you.        Care EveryWhere ID     This is your Care EveryWhere ID. This could be used by other organizations to access your [facility] medical records  [id number]        Your Vitals Were     Pulse Height Pulse Oximetry BMI (Body Mass Index)          74 5' 6\" (1.676 m) 97% 33.89 kg/m2         Blood Pressure from Last 3 Encounters:   09/10/18 135/75   06/11/18 122/64   11/15/17 140/60    Weight from Last 3 Encounters:   09/10/18 210 " lb (95.3 kg)   06/11/18 214 lb 9.6 oz (97.3 kg)   11/15/17 220 lb 4.8 oz (99.9 kg)              We Performed the Following     PAIN MANAGEMENT REFERRAL          Today's Medication Changes          These changes are accurate as of 9/10/18 10:07 AM.  If you have any questions, ask your nurse or doctor.               These medicines have changed or have updated prescriptions.        Dose/Directions    aspirin 81 MG tablet   This may have changed:  when to take this   Used for:  Routine general medical examination at a health care facility        Dose:  81 mg   Take 1 tablet by mouth daily.   Quantity:  100 tablet   Refills:  3                Primary Care Provider Office Phone # Fax #    Storm Brandon Huddleston [phone]       [address] [place] [id number]        Equal Access to Services     ISMA NICHOLAS : Laura Benton, waaxastrid alfredoqritu, qaybta kaalmaastrid kidd, ger mclean . So [facility] [phone].    ATENCIÓN: Si habla español, tiene a el disposición servicios gratuitos de asistencia lingüística. Peace al [phone].    We comply with applicable federal civil rights laws and [place] laws. We do not discriminate on the basis of race, color, national origin, age, disability, sex, sexual orientation, or gender identity.            Thank you!     Thank you for choosing [facility]  for your care. Our goal is always to provide you with excellent care. Hearing back from our patients is one way we can continue to improve our services. Please take a few minutes to complete the written survey that you may receive in the mail after your visit with us. Thank you!             Your Updated Medication List - Protect others around you: Learn how to safely use, store and throw away your medicines at www.disposemymeds.org.          This list is accurate as of 9/10/18 10:07 AM.  Always use your most recent med list.                   Brand Name  Dispense Instructions for use Diagnosis    amLODIPine 5 MG tablet    NORVASC    90 tablet    TAKE 1 TABLET BY MOUTH EVERY DAY    Essential hypertension       aspirin 81 MG tablet     100 tablet    Take 1 tablet by mouth daily.    Routine general medical examination at a health care facility       atorvastatin 40 MG tablet    LIPITOR    90 tablet    Take 1 tablet (40 mg) by mouth daily    Hyperlipidemia LDL goal <100, Cerebrovascular disease       benazepril 40 MG tablet    LOTENSIN    90 tablet    Take 1 tablet (40 mg) by mouth daily    Essential hypertension       clopidogrel 75 MG tablet    PLAVIX    90 tablet    Take 1 tablet (75 mg) by mouth daily    Cerebrovascular disease, Vertebrobasilar artery syndrome       fluocinonide 0.05 % cream    LIDEX    60 g    Apply sparingly to affected area twice daily as needed.  Do not apply to face.    Irritant dermatitis       loratadine 10 MG tablet    CLARITIN     Take 1 tablet (10 mg) by mouth daily    Allergic rhinitis, unspecified allergic rhinitis type       metoprolol succinate 50 MG 24 hr tablet    TOPROL-XL    135 tablet    TAKE 1& 1/2 TABLETS BY MOUTH ONCE DAILY    Essential hypertension

## 2018-09-10 NOTE — NURSING NOTE
"Ricardo Neely is a 78 year old male who presents for:  Chief Complaint   Patient presents with     Neurologic Problem     Continued low back and bilateral leg pain not constant         Vitals:    Vitals:    09/10/18 0953   BP: 135/75   BP Location: Right arm   Patient Position: Sitting   Cuff Size: Adult Large   Pulse: 74   SpO2: 97%   Weight: 210 lb (95.3 kg)   Height: 5' 6\" (1.676 m)       BMI:  Estimated body mass index is 33.89 kg/(m^2) as calculated from the following:    Height as of this encounter: 5' 6\" (1.676 m).    Weight as of this encounter: 210 lb (95.3 kg).    Pain Score:  Moderate Pain (4)      Do you feel safe in your environment?  Yes      Samantha Hernández"

## 2018-09-10 NOTE — TELEPHONE ENCOUNTER
Pre-screening Questions for Radiology Injections:    Injection to be done at which interventional clinic site? [facility]    Instruct patient to arrive as directed prior to the scheduled appointment time:    [place] and [place]: 30 minutes before      Procedure ordered by MAYA    Procedure ordered? Lumbar Epidural Steroid Injection    What insurance would patient like us to bill for this procedure? BCBS      Worker's comp or MVA (motor vehicle accident) -Any injection DO NOT SCHEDULE and route to Selam Stewart.      [employer] - For SI joint injections, DO NOT SCHEDULE and route Gabriella Vaca. Sape NO PA REQUIRED EFFECTIVE 11/1/2017      [employer]- MBB's must be scheduled at LEAST two weeks apart      [employer] - Any injection besides hip/shoulder/knee joint DO NOT SCHEDULE and route to Gabriella Vaca. She will obtain PA and call pt back to schedule procedure or notify pt of denial.       [employer]-Route to Gabriella for review    Any chance of pregnancy? NO   If YES, do NOT schedule and route to RN pool    Is an  needed? No     Patient has a drive home? (mandatory) YES:     Is patient taking any blood thinners (plavix, coumadin, jantoven, warfarin, heparin, pradaxa or dabigatran )? Yes - PLAVIX   If hold needed, do NOT schedule, route to RN pool      Is patient taking any aspirin products? Yes - Pt takes 81mg daily; instructed to hold 0 day(s) prior to procedure.      If more than 325mg/day do NOT schedule; route to RN pool     For CERVICAL procedures, hold all aspirin products for 6 days.      Does the patient have a bleeding or clotting disorder? No     If YES, okay to schedule AND route to RN nurse pool    **For any patients with platelet count <100, must be forwarded to provider**    Is patient diabetic?  NO If YES, have them bring their glucometer.    Does patient have an active infection or treated for one within the past week? No     Is patient currently  taking any antibiotics?  No     For patients on chronic, preventative, or prophylactic antibiotics, procedures may be scheduled.     For patients on antibiotics for active or recent infection:    Nola Ko Burton, Snitzer-antibiotic course must have been completed for 4 days    Is patient currently taking any steroid medications? (i.e. Prednisone, Medrol)  No     For patients on steroid medications:    Nola Ko Burton, Snitzer-steroid course must have been completed for 4 days    Reviewed with patient:  If you are started on any steroids or antibiotics between now and your appointment, you must contact us because it may affect our ability to perform your procedure.  Yes    Is patient actively being treated for cancer or immunocompromised? No  If YES, do NOT schedule and route to RN pool     Are you able to get on and off an exam table with minimal or no assistance? Yes  If NO, do NOT schedule and route to RN pool    Are you able to roll over and lay on your stomach with minimal or no assistance? Yes  If NO, do NOT schedule and route to RN pool     Any allergies to contrast dye, iodine, shellfish, or numbing and steroid medications? No  If YES, route to RN pool AND add allergy information to appointment notes    Allergies: Meclizine; Tramadol hcl; Cardura [doxazosin mesylate]; Hydrochlorothiazide; and Naproxen      Has the patient had a flu shot or any other vaccinations within 7 days before or after the procedure.  No     Does patient have an MRI/CT?  YES:   (SI joint, hip injections, lumbar sympathetic blocks, and stellate ganglion blocks do not require an MRI)    Was the MRI done w/in the last 3 years?  Yes    Was MRI done at [place]? Yes      If not, where was it done? N/A       If MRI was not done at [place] [facility], [facility] or [facility] do NOT schedule and route to nursing.  If pt has an imaging disc, the injection may be scheduled but pt has to bring disc to appt. If they show up  w/out disc the injection cannot be done    Reminders (please tell patient if applicable):       Instructed pt to arrive 30 minutes early for IV start if this is for a cervical procedure, ALL sympathetic (stellate ganglion, hypogastric, or lumbar sympathetic block) and all sedation procedures (RFA, spinal cord stimulation trials).  Not Applicable   -IVs are not routinely placed for Dr. Alexandre cervical cases   -Dr. Gay: IVs for cervical ESIs and cervical TBDs (not CMBBs/facet inj)      If NPO for sedation, informed patient that it is okay to take medications with sips of water (except if they are to hold blood thinners).  Not Applicable   *DO take blood pressure medication if it is prescribed*      If this is for a cervical CHERI, informed patient that aspirin needs to be held for 6 days.   Not Applicable      For all patients not having spinal cord stimulator (SCS) trials or radiofrequency ablations (RFAs), informed patient:    IV sedation is not provided for this procedure.  If you feel that an oral anti-anxiety medication is needed, you can discuss this further with your referring provider or primary care provider.  The Pain Clinic provider will discuss specifics of what the procedure includes at your appointment.  Most procedures last 10-20 minutes.  We use numbing medications to help with any discomfort during the procedure.  Not Applicable      Do not schedule procedures requiring IV placement in the first appointment of the day or first appointment after lunch. Do NOT schedule at 0745, 0815 or 1245.       For patients 85 or older we recommend having an adult stay w/ them for the remainder of the day.       Does the patient have any questions?    Gabriella Vaca  [facility]

## 2018-09-10 NOTE — PATIENT INSTRUCTIONS
Schedule lumbar epidural steroid injection (someone will call you)  DO NOT stop blood thinners until ok'd by your primary physician and pain clinic  Please contact the clinic if pain persists at [phone].

## 2018-09-11 NOTE — TELEPHONE ENCOUNTER
Called patient. Scheduled for 09/19/18. Advised on requirements for holding Plavix. Discussed need for . If becomes sick/ill/infection or started on ABX or steroids that we will need to be contacted for rescheduling. No vaccinations 7 days before or after. States no further questions.      Sarahi DAVEYN, RN Care Coordinator  [facility]

## 2018-09-18 NOTE — PROGRESS NOTES
[facility] - Procedure Note    Date of Service: 9/19/2018    Procedure performed: Left L3-4 transforaminal epidural steroid injection with fluoroscopic guidance  Diagnosis: Lumbar spondylosis; Lumbar radiculitis/radiculopathy  : Richa Alexandre MD & Sunny Guerrero DO (pain fellow)   Anesthesia: none    Indications: Ricardo Neely is a 78 year old male who is seen at the request of Valerie Serrano CNP for lumbar transforaminal epidural steroid injection. The patient describes left low back pain that radiates along the left posterior thigh stopping around knee level. The patient has been exhibiting symptoms consistent with lumbar intraspinal inflammation and radiculopathy. Symptoms have been persistent, disabling, and intermittently severe. The patient reports minimal improvement with conservative treatment, including PT and medications.    This is a repeat injection.  Previous left L3-4 TRANSFORAMINAL EPIDURAL STEROID INJECTION  done by radiology on 9/11/2017 provided good pain relief for a period of 8 months.       Lumbar MRI was done on 5/06/2016 which showed   FINDINGS: The report is dictated assuming five lumbar-type vertebral  bodies.  Sagittal images demonstrate normal vertebral body height.  Bone marrow signal is unremarkable. Tip of the conus medullaris and  cauda equina are unremarkable.     T12-L1:  Mild disc dehydration. Small annular fissure. No disc  herniation or stenosis. Facet joints are unremarkable.     L1-L2:  Mild disc dehydration. No stenosis. Facet joints are  unremarkable.     L2-L3:  Mild disc bulge and facet degenerative changes. Moderate  central stenosis. Mild bilateral foraminal stenosis. No significant  change.     L3-L4:  Grade 1 degenerative spondylolisthesis with facet hypertrophy  and disc bulge. Moderate, if not moderate to severe, central stenosis,  unchanged. Mild-to-moderate bilateral foraminal stenosis. No  significant change.     L4-L5:  Right greater  "than left moderate facet hypertrophy. Mild disc  bulge. Mild central stenosis. Mild left foraminal stenosis. Right  neural foramen is patent. No significant change.     L5-S1:  Mild disc bulge and facet degenerative changes. No central  stenosis. Neural foramina are patent.     Paraspinous soft tissues:  Unremarkable.       IMPRESSION:    1. At L2-L3 there is moderate central stenosis and mild bilateral  foraminal stenosis.  2. At L3-L4 there is grade 1 degenerative spondylolisthesis. Moderate,  if not moderate to severe, central stenosis. Mild-to-moderate  bilateral foraminal stenosis.  3. At L4-L5 there is mild central and left foraminal stenosis.  4. No significant change.    Allergies:      Allergies   Allergen Reactions     Meclizine Other (See Comments)     Over sedation, concentration problem     Tramadol Hcl Other (See Comments)     Pt feels very drugged, \"cloudy\" if used more than one day. Nausea also     Cardura [Doxazosin Mesylate] Other (See Comments)     fainted     Hydrochlorothiazide Other (See Comments)      lightheadedness     Naproxen Nausea     nausea        Vitals:  There were no vitals taken for this visit.    Review of Systems: The patient denies recent fever, chills, illness, use of antibiotics or anticoagulants. All other 10-point review of systems negative.     Procedure: The procedure and risks were explained, and informed written consent was obtained from the patient. Risks include but are not limited to: infection, bleeding, increased pain, and damage to soft tissue, nerve, muscle, and vasculature structures. After getting informed consent, patient was brought into the procedure suite and was placed in a prone position on the procedure table. A Pause for the Cause was performed. Patient was prepped and draped in sterile fashion.     After identifying the left L3-L4 neuroforamen, the C-arm was rotated to a left lateral oblique angle.  A total of 4.5 mL of Lidocaine 1% was used to " anesthetize the skin and the needle track at a skin entry site coaxial with the fluoroscopy beam, and overriding the superior aspect of the neuroforamen.  A 22 gauge 5 inch spinal needle was advanced under intermittent fluoroscopy until it entered the foramen superiorly.    The position was then inspected from anteroposterior and lateral views, and the needle adjusted appropriately.  After negative aspiration, a total of 2 mL of Omnipaque-300 was injected using static and continuous fluoroscopy confirming appropriate position, with spread along the nerve root sheath and into the epidural space, with no intravascular or intrathecal uptake. 8 mL of Omnipaque-300 was wasted.    2mL of 1% lidocaine with 20 mg of dexamethasone was injected.  The needle was removed. Hemostasis was achieved, the area was cleaned, and bandaids were placed when appropriate. Images were saved to PACS.    The patient tolerated the procedure well, and was taken to the recovery room, and there was no evidence of procedural complications. No new sensory or motor deficits were noted following the procedure. The patient was stable and able to ambulate on discharge home. Post-procedure instructions were provided.     Pre-procedure pain score: 5/10 in the back, 6/10 in the leg  Post-procedure pain score: 4/10 in the back, 4/10 in the leg    Assessment/Plan: Ricardo Neely is a 78 year old male s/p left L3-4 transforaminal epidural steroid injection today for lumbar spondylosis, radiculitis/radiculopathy.     1. Following today's procedure, the patient was advised to contact the [facility] for any of the following:   Fever, chills, or night sweats   New onset of pain, numbness, or weakness   Any questions/concerns regarding the procedure  If unable to contact the Pain Center, the patient was instructed to go to a local Emergency Room for any complications.   2. The patient will receive a follow-up call in 1 week.  3. The patient  should follow-up with the referring provider in 2 weeks for post-procedure evaluation.      Richa Alexandre MD   [facility]

## 2018-09-19 ENCOUNTER — RADIANT APPOINTMENT (OUTPATIENT)
Dept: GENERAL RADIOLOGY | Facility: CLINIC | Age: 78
End: 2018-09-19
Attending: ANESTHESIOLOGY
Payer: COMMERCIAL

## 2018-09-19 ENCOUNTER — RADIOLOGY INJECTION OFFICE VISIT (OUTPATIENT)
Dept: PALLIATIVE MEDICINE | Facility: CLINIC | Age: 78
End: 2018-09-19
Payer: COMMERCIAL

## 2018-09-19 VITALS — HEART RATE: 73 BPM | SYSTOLIC BLOOD PRESSURE: 149 MMHG | DIASTOLIC BLOOD PRESSURE: 74 MMHG | OXYGEN SATURATION: 97 %

## 2018-09-19 DIAGNOSIS — M51.369 LUMBAR DEGENERATIVE DISC DISEASE: ICD-10-CM

## 2018-09-19 DIAGNOSIS — M54.16 LUMBAR RADICULOPATHY: Primary | ICD-10-CM

## 2018-09-19 PROCEDURE — 64483 NJX AA&/STRD TFRM EPI L/S 1: CPT | Mod: LT | Performed by: ANESTHESIOLOGY

## 2018-09-19 ASSESSMENT — PAIN SCALES - GENERAL: PAINLEVEL: MODERATE PAIN (4)

## 2018-09-19 NOTE — NURSING NOTE
Pre-procedure Intake    Have you been fasting? No NA    If yes, for how long? NA    Are you taking a prescribed blood thinner such as coumadin, Plavix, Xarelto?    Yes -   Plavix    If yes, when did you take your last dose? 09/10/2018    Do you take aspirin?  Yes     If cervical procedure, have you held aspirin for 6 days?   Yes    Do you have any allergies to contrast dye, iodine, steroid and/or numbing medications?  NO    Are you currently taking antibiotics or have an active infection?  NO    Have you had a fever/elevated temperature within the past week? NO    Are you currently taking oral steroids? NO    Do you have a ? Yes       Are you pregnant or breastfeeding?  Not Applicable    Are the vital signs normal?  Yes

## 2018-09-19 NOTE — PATIENT INSTRUCTIONS
[facility] Procedure Discharge Instructions    Today you saw:   Dr. Richa Alexandre       Your procedure:   Lumbar Epidural steroid injection     Medications used:  Lidocaine (anesthetic)    Dexamethasone (steroid)  Omnipaque (contrast)             Be cautious when walking as numbness and/or weakness in the legs may occur up to 6-8 hours after the procedure due to effect of the local anesthetic    Do not drive for 6 hours. The effect of the local anesthetic could slow your reflexes.     Avoid strenuous activity for the first 24 hours. You may resume your regular activities after that.     You may shower, however avoid swimming, tub baths or hot tubs for 24 hours following your procedure    You may have a mild to moderate increase in pain for several days following the injection.      You may use ice packs for 10-15 minutes, 3 to 4 times a day at the injection site for comfort    Do not use heat to painful areas for 6 to 8 hours. This will give the local anesthetic time to wear off and prevent you from accidentally burning your skin.    You may use anti-inflammatory medications (such as Ibuprofen/Advil or Aleve) or Tylenol for pain control if necessary    It may take up to 14 days for the steroid medication to start working although you may feel the effect as early as a few days after the procedure.     Follow up with your referring provider in 2-3 weeks      If you experience any of the following, call the pain center line during work hours at [phone] or on-call physician after hours at [phone]:  -Fever over 100 degree F  -Swelling, bleeding, redness, drainage, warmth at the injection site  -Progressive weakness or numbness in your legs   -Loss of bowel or bladder function  -Unusual headache that is not relieved by Tylenol or your regular headache medication  -Unusual new onset of pain that is not improving

## 2018-09-19 NOTE — NURSING NOTE
Discharge Information    IV Discontiued Time:  NA    Amount of Fluid Infused:  NA    Discharge Criteria = When patient returns to baseline or as per MD order    Consciousness:  Pt is fully awake    Circulation:  BP +/- 20% of pre-procedure level    Respiration:  Patient is able to breathe deeply    O2 Sat:  Patient is able to maintain O2 Sat >92% on room air    Activity:  Moves 4 extremities on command    Ambulation:  Patient is able to stand and walk or stand and pivot into wheelchair    Dressing:  Clean/dry or No Dressing    Notes:   Discharge instructions and AVS given to patient    Patient meets criteria for discharge?  YES    Admitted to PCU?  No    Responsible adult present to accompany patient home?  Yes    Signature/Title:    Devora Jain RN Care Coordinator  [facility]

## 2018-09-19 NOTE — MR AVS SNAPSHOT
After Visit Summary   9/19/2018    Ricardo Neely    MRN: [id number]           Patient Information     Date Of Birth          1940        Visit Information        Provider Department      9/19/2018 1:45 PM Richa Alexandre MD [facility]        Care Instructions    [facility] Procedure Discharge Instructions    Today you saw:   Dr. Richa Alexandre       Your procedure:   Lumbar Epidural steroid injection     Medications used:  Lidocaine (anesthetic)    Dexamethasone (steroid)  Omnipaque (contrast)             Be cautious when walking as numbness and/or weakness in the legs may occur up to 6-8 hours after the procedure due to effect of the local anesthetic    Do not drive for 6 hours. The effect of the local anesthetic could slow your reflexes.     Avoid strenuous activity for the first 24 hours. You may resume your regular activities after that.     You may shower, however avoid swimming, tub baths or hot tubs for 24 hours following your procedure    You may have a mild to moderate increase in pain for several days following the injection.      You may use ice packs for 10-15 minutes, 3 to 4 times a day at the injection site for comfort    Do not use heat to painful areas for 6 to 8 hours. This will give the local anesthetic time to wear off and prevent you from accidentally burning your skin.    You may use anti-inflammatory medications (such as Ibuprofen/Advil or Aleve) or Tylenol for pain control if necessary    It may take up to 14 days for the steroid medication to start working although you may feel the effect as early as a few days after the procedure.     Follow up with your referring provider in 2-3 weeks      If you experience any of the following, call the pain center line during work hours at [phone] or on-call physician after hours at [phone]:  -Fever over 100 degree F  -Swelling, bleeding, redness, drainage, warmth at the injection site  -Progressive  weakness or numbness in your legs   -Loss of bowel or bladder function  -Unusual headache that is not relieved by Tylenol or your regular headache medication  -Unusual new onset of pain that is not improving            Follow-ups after your visit        Who to contact     If you have questions or need follow up information about today's clinic visit or your schedule please contact [facility] directly at [phone].  Normal or non-critical lab and imaging results will be communicated to you by [email or web address], letter or phone within 4 business days after the clinic has received the results. If you do not hear from us within 7 days, please contact the clinic through [employer] or phone. If you have a critical or abnormal lab result, we will notify you by phone as soon as possible.  Submit refill requests through [employer] or call your pharmacy and they will forward the refill request to us. Please allow 3 business days for your refill to be completed.          Additional Information About Your Visit        [email or web address] Information     [employer] gives you secure access to your electronic health record. If you see a primary care provider, you can also send messages to your care team and make appointments. If you have questions, please call your primary care clinic.  If you do not have a primary care provider, please call [phone] and they will assist you.        Care EveryWhere ID     This is your Care EveryWhere ID. This could be used by other organizations to access your [facility] medical records  [id number]        Your Vitals Were     Pulse Pulse Oximetry                80 96%           Blood Pressure from Last 3 Encounters:   09/19/18 138/67   09/10/18 135/75   06/11/18 122/64    Weight from Last 3 Encounters:   09/10/18 95.3 kg (210 lb)   06/11/18 97.3 kg (214 lb 9.6 oz)   11/15/17 99.9 kg (220 lb 4.8 oz)              Today, you had the following     No orders found for display         Today's Medication Changes           These changes are accurate as of 9/19/18  1:52 PM.  If you have any questions, ask your nurse or doctor.               These medicines have changed or have updated prescriptions.        Dose/Directions    aspirin 81 MG tablet   This may have changed:  when to take this   Used for:  Routine general medical examination at a health care facility        Dose:  81 mg   Take 1 tablet by mouth daily.   Quantity:  100 tablet   Refills:  3                Primary Care Provider Office Phone # Fax #    Storm Huddleston [phone]       [address] [place] [phone]        Equal Access to Services     [facility]: Hadii jane morrison hadasho Soomaali, waaxda luqadaha, qaybta kaalmada bernabe, ger mclean . So [facility] [phone].    ATENCIÓN: Si habla español, tiene a el disposición servicios gratuitos de asistencia lingüística. [facility] [phone].    We comply with applicable federal civil rights laws and [place] laws. We do not discriminate on the basis of race, color, national origin, age, disability, sex, sexual orientation, or gender identity.            Thank you!     Thank you for choosing [facility]  for your care. Our goal is always to provide you with excellent care. Hearing back from our patients is one way we can continue to improve our services. Please take a few minutes to complete the written survey that you may receive in the mail after your visit with us. Thank you!             Your Updated Medication List - Protect others around you: Learn how to safely use, store and throw away your medicines at www.disposemymeds.org.          This list is accurate as of 9/19/18  1:52 PM.  Always use your most recent med list.                   Brand Name Dispense Instructions for use Diagnosis    amLODIPine 5 MG tablet    NORVASC    90 tablet    TAKE 1 TABLET BY MOUTH EVERY DAY    Essential hypertension       aspirin 81 MG tablet     100  tablet    Take 1 tablet by mouth daily.    Routine general medical examination at a health care facility       atorvastatin 40 MG tablet    LIPITOR    90 tablet    Take 1 tablet (40 mg) by mouth daily    Hyperlipidemia LDL goal <100, Cerebrovascular disease       benazepril 40 MG tablet    LOTENSIN    90 tablet    Take 1 tablet (40 mg) by mouth daily    Essential hypertension       clopidogrel 75 MG tablet    PLAVIX    90 tablet    Take 1 tablet (75 mg) by mouth daily    Cerebrovascular disease, Vertebrobasilar artery syndrome       fluocinonide 0.05 % cream    LIDEX    60 g    Apply sparingly to affected area twice daily as needed.  Do not apply to face.    Irritant dermatitis       loratadine 10 MG tablet    CLARITIN     Take 1 tablet (10 mg) by mouth daily    Allergic rhinitis, unspecified allergic rhinitis type       metoprolol succinate 50 MG 24 hr tablet    TOPROL-XL    135 tablet    TAKE 1& 1/2 TABLETS BY MOUTH ONCE DAILY    Essential hypertension

## 2018-09-26 ENCOUNTER — TELEPHONE (OUTPATIENT)
Dept: PALLIATIVE MEDICINE | Facility: CLINIC | Age: 78
End: 2018-09-26

## 2018-09-26 NOTE — TELEPHONE ENCOUNTER
Patient had a Left L3-4 transforaminal epidural steroid injection  on 9/19/18.  Called patient for an update.      Left message that we were calling for an update about how he was doing after the injection.  LM that if he has any problems or questions to call the clinic at [phone].

## 2018-10-15 DIAGNOSIS — I67.9 CEREBROVASCULAR DISEASE: ICD-10-CM

## 2018-10-15 DIAGNOSIS — G45.0 VERTEBROBASILAR ARTERY SYNDROME: ICD-10-CM

## 2018-10-15 NOTE — TELEPHONE ENCOUNTER
"Requested Prescriptions   Pending Prescriptions Disp Refills     clopidogrel (PLAVIX) 75 MG tablet  Last Written Prescription Date:  03/14/2018  Last Fill Quantity: 90,  # refills: PRN   Last office visit: 6/11/2018 with prescribing provider:  FRANCIE   Future Office Visit:     90 tablet prn     Sig: Take 1 tablet (75 mg) by mouth daily    Plavix Failed    10/15/2018  6:27 PM       Failed - Normal HGB on file in past 12 months    Recent Labs   Lab Test  12/27/16   0950   HGB  15.3              Failed - Normal Platelets on file in past 12 months    Recent Labs   Lab Test  12/27/16   0950   PLT  302              Passed - No active PPI on record unless is Protonix       Passed - Recent (12 mo) or future (30 days) visit within the authorizing provider's specialty    Patient had office visit in the last 12 months or has a visit in the next 30 days with authorizing provider or within the authorizing provider's specialty.  See \"Patient Info\" tab in inbasket, or \"Choose Columns\" in Meds & Orders section of the refill encounter.           Passed - Patient is age 18 or older          "

## 2018-10-16 NOTE — TELEPHONE ENCOUNTER
Routing refill request to provider for review/approval because:  Labs not current:  Hgb, platelet

## 2018-10-18 RX ORDER — CLOPIDOGREL BISULFATE 75 MG/1
75 TABLET ORAL DAILY
Qty: 90 TABLET | Status: SHIPPED | OUTPATIENT
Start: 2018-10-18 | End: 2019-01-27

## 2018-11-16 ENCOUNTER — TELEPHONE (OUTPATIENT)
Dept: NEUROSURGERY | Facility: CLINIC | Age: 78
End: 2018-11-16

## 2018-11-16 DIAGNOSIS — M54.16 LUMBAR RADICULOPATHY: Primary | ICD-10-CM

## 2018-11-16 NOTE — TELEPHONE ENCOUNTER
Spoke to patient. He had an injection Sept. 2018 which was his only injection this year. Patient feels he did not recieve relief at all from this injection. He reports past injections provided him with relief but not this previous one.     Reports pain in low back with pain radiating to bilateral hips, and pain also down both legs. Hip pain is worse than the leg pain. Will discuss with care team for recommendation. Patient wanted us aware he is on plavix blood thinner.

## 2018-11-16 NOTE — TELEPHONE ENCOUNTER
Lisa Coleman CNP recommends patient repeat MRI. We will call him with results.     Called and discussed with patient. He is in a agreement with plan. Order placed in EPIC. Provided patient with central scheduling number.

## 2018-11-27 ENCOUNTER — HOSPITAL ENCOUNTER (OUTPATIENT)
Dept: MRI IMAGING | Facility: CLINIC | Age: 78
Discharge: HOME | End: 2018-11-27
Attending: NURSE PRACTITIONER | Admitting: NURSE PRACTITIONER
Payer: MEDICARE

## 2018-11-27 DIAGNOSIS — M54.16 LUMBAR RADICULOPATHY: ICD-10-CM

## 2018-11-27 LAB
CREAT BLD-MCNC: 0.9 MG/DL (ref 0.66–1.25)
GFR SERPL CREATININE-BSD FRML MDRD: 81 ML/MIN/1.7M2

## 2018-11-27 PROCEDURE — 82565 ASSAY OF CREATININE: CPT

## 2018-11-27 PROCEDURE — 72148 MRI LUMBAR SPINE W/O DYE: CPT

## 2018-11-27 PROCEDURE — 25500064 ZZH RX 255 OP 636: Performed by: NURSE PRACTITIONER

## 2018-11-27 PROCEDURE — A9585 GADOBUTROL INJECTION: HCPCS | Performed by: NURSE PRACTITIONER

## 2018-11-27 PROCEDURE — 72158 MRI LUMBAR SPINE W/O & W/DYE: CPT

## 2018-11-27 RX ORDER — GADOBUTROL 604.72 MG/ML
10 INJECTION INTRAVENOUS ONCE
Status: COMPLETED | OUTPATIENT
Start: 2018-11-27 | End: 2018-11-27

## 2018-11-27 RX ADMIN — GADOBUTROL 10 ML: 604.72 INJECTION INTRAVENOUS at 11:15

## 2018-11-29 ENCOUNTER — TELEPHONE (OUTPATIENT)
Dept: NEUROSURGERY | Facility: CLINIC | Age: 78
End: 2018-11-29

## 2018-11-29 DIAGNOSIS — M54.16 LUMBAR RADICULOPATHY: Primary | ICD-10-CM

## 2018-11-29 NOTE — TELEPHONE ENCOUNTER
Per Valerie Serrano, CNP ask pt to schedule appt to come in and review MRI and exam since he has not been seen for awhile. Schedule on a day when Dr Padilla is in clinic. He has cyst at L4-5 level which could be contributing to pain. Also please order flex/ext of lumbar for appt.    Called and discussed above recommendations with patient. He verbalized understanding. He did want us to know that he is leaving for out of town after [place] on 12/26 or 12/27 for  [place] for 4 months. He would like to know if he could get an injection prior to then. He is reporting same pain as previous phone call on 11/16/18 low back with pain radiating to bilateral hips, and pain also down both legs. Hip pain is worse than the leg pain and now Burning sensation in right thigh.

## 2018-11-29 NOTE — TELEPHONE ENCOUNTER
REASON FOR CALL: Pt called for his MRI results. If possible, pt states that he would like to get another INJ based on results.

## 2018-11-30 NOTE — TELEPHONE ENCOUNTER
Patient is requesting [place] location as he can get in sooner than 12/18/18 which is the soonest for [place]. Patient is scheduled on Tuesday 12/4/18 with XR prior.

## 2018-12-04 ENCOUNTER — TELEPHONE (OUTPATIENT)
Dept: PALLIATIVE MEDICINE | Facility: CLINIC | Age: 78
End: 2018-12-04

## 2018-12-04 ENCOUNTER — RADIANT APPOINTMENT (OUTPATIENT)
Dept: GENERAL RADIOLOGY | Facility: CLINIC | Age: 78
End: 2018-12-04
Attending: NURSE PRACTITIONER
Payer: COMMERCIAL

## 2018-12-04 ENCOUNTER — OFFICE VISIT (OUTPATIENT)
Dept: NEUROSURGERY | Facility: CLINIC | Age: 78
End: 2018-12-04
Attending: NEUROLOGICAL SURGERY
Payer: MEDICARE

## 2018-12-04 VITALS
SYSTOLIC BLOOD PRESSURE: 122 MMHG | DIASTOLIC BLOOD PRESSURE: 73 MMHG | OXYGEN SATURATION: 92 % | HEART RATE: 76 BPM | WEIGHT: 214 LBS | BODY MASS INDEX: 34.39 KG/M2 | HEIGHT: 66 IN

## 2018-12-04 DIAGNOSIS — M54.16 LUMBAR RADICULAR PAIN: Primary | ICD-10-CM

## 2018-12-04 DIAGNOSIS — M54.16 LUMBAR RADICULOPATHY: ICD-10-CM

## 2018-12-04 PROCEDURE — 72120 X-RAY BEND ONLY L-S SPINE: CPT

## 2018-12-04 PROCEDURE — G0463 HOSPITAL OUTPT CLINIC VISIT: HCPCS

## 2018-12-04 PROCEDURE — 99213 OFFICE O/P EST LOW 20 MIN: CPT | Performed by: NURSE PRACTITIONER

## 2018-12-04 ASSESSMENT — PAIN SCALES - GENERAL: PAINLEVEL: MILD PAIN (3)

## 2018-12-04 NOTE — MR AVS SNAPSHOT
After Visit Summary   12/4/2018    Ricardo Neely    MRN: [id number]           Patient Information     Date Of Birth          1940        Visit Information        Provider Department      12/4/2018 9:00 AM Lisa Coleman APRN CNP [facility]        Today's Diagnoses     Lumbar radicular pain    -  1      Care Instructions    1. Please schedule your injection. Someone will contact you from the pain clinic within 24 hours to schedule.        2. Please contact the clinic if pain persists at [phone]. Then we would have you see Dr. Johnson Padilla to discuss surgical options.            Follow-ups after your visit        Additional Services     PAIN MANAGEMENT REFERRAL       Your provider has referred you to: G: [facility] -  Dr. Richa Alexadnre     Reason for Referral: Procedure Order Epidural:  Lumbar (Advanced imaging required in the last 3 years)      What is your diagnosis for the patient's pain? Lumbar radicular pain TLESI please       For any questions, contact the [facility] at [phone].     **ANY DIAGNOSTIC TESTS THAT ARE NOT IN EPIC SHOULD BE SENT TO THE PAIN CENTER**    REGARDING OPIOID MEDICATIONS:  The discussion of opioids management, appropriateness of therapy, and dosing will be discussed in patients being seen for evaluation.  The pain management clinics are not long-term prescribing clinics, with transition of prescribing of medications ultimately going back to the referring provider/PCP.  If prescribing is taken over at the pain clinic, it is in actively involved patients whom are appropriate for opioids, urine drug screening is completed, and long-term prescribing plan has been determined.  Therefore, we will not be automatically taking over prescribing at the patient's first visit.  Is this agreeable to you? agrees.     Please be aware that coverage of these services is subject to the terms and  "limitations of your health insurance plan.  Call member services at your health plan with any benefit or coverage questions.      Please bring the following with you to your appointment:    (1) Any X-Rays, CTs or MRIs which have been performed.  Contact the facility where they were done to arrange for  prior to your scheduled appointment.    (2) List of current medications   (3) This referral request   (4) Any documents/labs given to you for this referral                  Who to contact     If you have questions or need follow up information about today's clinic visit or your schedule please contact [facility] directly at [phone].  Normal or non-critical lab and imaging results will be communicated to you by MyChart, letter or phone within 4 business days after the clinic has received the results. If you do not hear from us within 7 days, please contact the clinic through [employer] or phone. If you have a critical or abnormal lab result, we will notify you by phone as soon as possible.  Submit refill requests through [employer] or call your pharmacy and they will forward the refill request to us. Please allow 3 business days for your refill to be completed.          Additional Information About Your Visit        [employer] Information     [employer] gives you secure access to your electronic health record. If you see a primary care provider, you can also send messages to your care team and make appointments. If you have questions, please call your primary care clinic.  If you do not have a primary care provider, please call [phone] and they will assist you.        Care EveryWhere ID     This is your Care EveryWhere ID. This could be used by other organizations to access your [place] medical records  [id number]        Your Vitals Were     Pulse Height Pulse Oximetry BMI (Body Mass Index)          76 5' 6\" (1.676 m) 92% 34.54 kg/m2         Blood Pressure from Last 3 Encounters:   12/04/18 " 122/73   09/19/18 149/74   09/10/18 135/75    Weight from Last 3 Encounters:   12/04/18 214 lb (97.1 kg)   09/10/18 210 lb (95.3 kg)   06/11/18 214 lb 9.6 oz (97.3 kg)              We Performed the Following     PAIN MANAGEMENT REFERRAL          Today's Medication Changes          These changes are accurate as of 12/4/18  9:08 AM.  If you have any questions, ask your nurse or doctor.               These medicines have changed or have updated prescriptions.        Dose/Directions    aspirin 81 MG tablet   Commonly known as:  ASA   This may have changed:  when to take this   Used for:  Routine general medical examination at a health care facility        Dose:  81 mg   Take 1 tablet by mouth daily.   Quantity:  100 tablet   Refills:  3                Primary Care Provider Office Phone # Fax #    Storm Brandon Huddleston [phone]       [address] [facility] [address]        Equal Access to Services     ISMA NICHOLAS AH: Hadii aad ku hadasho Socristóbal, waaxda luqadaha, qaybta kaalmada adeegyada, ger mclean . So [facility] [phone].    ATENCIÓN: Si ashla espharjit, tiene a el disposición servicios gratuitos de asistencia lingüística. Llame al [phone].    We comply with applicable federal civil rights laws and [place] laws. We do not discriminate on the basis of race, color, national origin, age, disability, sex, sexual orientation, or gender identity.            Thank you!     Thank you for choosing [facility]  for your care. Our goal is always to provide you with excellent care. Hearing back from our patients is one way we can continue to improve our services. Please take a few minutes to complete the written survey that you may receive in the mail after your visit with us. Thank you!             Your Updated Medication List - Protect others around you: Learn how to safely use, store and throw away your medicines at www.disposemymeds.org.           This list is accurate as of 12/4/18  9:08 AM.  Always use your most recent med list.                   Brand Name Dispense Instructions for use Diagnosis    amLODIPine 5 MG tablet    NORVASC    90 tablet    TAKE 1 TABLET BY MOUTH EVERY DAY    Essential hypertension       aspirin 81 MG tablet    ASA    100 tablet    Take 1 tablet by mouth daily.    Routine general medical examination at a health care facility       atorvastatin 40 MG tablet    LIPITOR    90 tablet    Take 1 tablet (40 mg) by mouth daily    Hyperlipidemia LDL goal <100, Cerebrovascular disease       benazepril 40 MG tablet    LOTENSIN    90 tablet    Take 1 tablet (40 mg) by mouth daily    Essential hypertension       clopidogrel 75 MG tablet    PLAVIX    90 tablet    Take 1 tablet (75 mg) by mouth daily    Cerebrovascular disease, Vertebrobasilar artery syndrome       fluocinonide 0.05 % external cream    LIDEX    60 g    Apply sparingly to affected area twice daily as needed.  Do not apply to face.    Irritant dermatitis       loratadine 10 MG tablet    CLARITIN     Take 1 tablet (10 mg) by mouth daily    Allergic rhinitis, unspecified allergic rhinitis type       metoprolol succinate ER 50 MG 24 hr tablet    TOPROL-XL    135 tablet    TAKE 1& 1/2 TABLETS BY MOUTH ONCE DAILY    Essential hypertension

## 2018-12-04 NOTE — TELEPHONE ENCOUNTER
Patient is requesting to schedule an injection with the [facility].     This would require the patient to hold:                 Clopidogrel (Plavix)         Hold 7 days prior to procedure, restart 12 hours after procedure      We are requesting your approval to hold the medication for this time frame.    Please keep call open and route back to the nurse pool.     If hold approved, we will contact the patient for scheduling.    Routed to primary care provider.    Kimberly Caceres RN-BSN  [facility]

## 2018-12-04 NOTE — NURSING NOTE
"Ricardo Neely is a 78 year old male who presents for:  Chief Complaint   Patient presents with     Neurologic Problem     Review MRI results        Vitals:    Vitals:    12/04/18 0851   BP: 122/73   BP Location: Right arm   Patient Position: Sitting   Cuff Size: Adult Large   Pulse: 76   SpO2: 92%   Weight: 214 lb (97.1 kg)   Height: 5' 6\" (1.676 m)       BMI:  Estimated body mass index is 34.54 kg/(m^2) as calculated from the following:    Height as of this encounter: 5' 6\" (1.676 m).    Weight as of this encounter: 214 lb (97.1 kg).    Pain Score:  Mild Pain (3)      Do you feel safe in your environment?  Yes      Berenice Tariq    "

## 2018-12-04 NOTE — PATIENT INSTRUCTIONS
1. Please schedule your injection. Someone will contact you from the pain clinic within 24 hours to schedule.        2. Please contact the clinic if pain persists at [phone]. Then we would have you see Dr. Johnson Padilla to discuss surgical options.

## 2018-12-04 NOTE — TELEPHONE ENCOUNTER
Pre-screening Questions for Radiology Injections:    Injection to be done at which interventional clinic site? [facility]    Instruct patient to arrive as directed prior to the scheduled appointment time:    [place] [facility] [place]: 30 minutes before      Procedure ordered by SARAY DEJESUS    Procedure ordered? Lumbar Epidural Steroid Injection    What insurance would patient like us to bill for this procedure? BCBS      Worker's comp or MVA (motor vehicle accident) -Any injection DO NOT SCHEDULE and route to Gabriella Vaca.      [employer] - For SI joint injections, DO NOT SCHEDULE and route Gabriella Vaca. [facility] FREEDOM NO PA REQUIRED EFFECTIVE 11/1/2017      [employer]- MBB's must be scheduled at LEAST two weeks apart      [employer] - Any injection besides hip/shoulder/knee joint DO NOT SCHEDULE and route to Gabriella Vaca. She will obtain PA and call pt back to schedule procedure or notify pt of denial.       [employer]-Route to Gabriella for review    Any chance of pregnancy? Not Applicable   If YES, do NOT schedule and route to RN pool    Is an  needed? No     Patient has a drive home? (mandatory) YES:     Is patient taking any blood thinners (plavix, coumadin, jantoven, warfarin, heparin, pradaxa or dabigatran )? Yes - PLAVIX   If hold needed, do NOT schedule, route to RN pool     Is patient taking any aspirin products (includes Excedrin and Fiorinal)? Yes - Pt takes 81mg daily; instructed to hold 0 day(s) prior to procedure.      If more than 325mg/day do NOT schedule; route to RN pool     For CERVICAL procedures, hold all aspirin products for 6 days.     Tell pt that if aspirin product is not held for 6 days, the procedure WILL BE cancelled.      Does the patient have a bleeding or clotting disorder? No     If YES, okay to schedule AND route to RN nurse pool    For any patients with platelet count <100, must be forwarded to provider    Is patient diabetic?  No  If YES,  have them bring their glucometer.    Does patient have an active infection or treated for one within the past week? No     Is patient currently taking any antibiotics?  No     For patients on chronic, preventative, or prophylactic antibiotics, procedures may be scheduled.     For patients on antibiotics for active or recent infection:    Nola Ko Burton, [place]itzer-antibiotic course must have been completed for 4 days    Is patient currently taking any steroid medications? (i.e. Prednisone, Medrol)  No     For patients on steroid medications:    Nola Ko Burton, [place]itzer-steroid course must have been completed for 4 days    Reviewed with patient:  If you are started on any steroids or antibiotics between now and your appointment, you must contact us because the procedure may need to be cancelled.  Yes    Is patient actively being treated for cancer or immunocompromised? No  If YES, do NOT schedule and route to RN pool     Are you able to get on and off an exam table with minimal or no assistance? Yes  If NO, do NOT schedule and route to RN pool    Are you able to roll over and lay on your stomach with minimal or no assistance? Yes  If NO, do NOT schedule and route to RN pool     Any allergies to contrast dye, iodine, shellfish, or numbing and steroid medications? No  If YES, route to RN pool AND add allergy information to appointment notes    Allergies: Meclizine; Tramadol hcl; Cardura [doxazosin mesylate]; Hydrochlorothiazide; and Naproxen      Has the patient had a flu shot or any other vaccinations within 7 days before or after the procedure.  No     Does patient have an MRI/CT?  YES:   (SI joint, hip injections, lumbar sympathetic blocks, and stellate ganglion blocks do not require an MRI)    Was the MRI done w/in the last 3 years?  Yes    Was MRI done at [facility]? Yes      If not, where was it done? N/A       If MRI was not done at [facility], [facility] or [facility] do NOT schedule  and route to nursing.  If pt has an imaging disc, the injection may be scheduled but pt has to bring disc to appt. If they show up w/out disc the injection cannot be done    Reminders (please tell patient if applicable):       Instructed pt to arrive 30 minutes early for IV start if this is for a cervical procedure, ALL sympathetic (stellate ganglion, hypogastric, or lumbar sympathetic block) and all sedation procedures (RFA, spinal cord stimulation trials).  Not Applicable   -IVs are not routinely placed for Dr. Alexandre cervical cases   -Dr. Gay: IVs for cervical ESIs and cervical TBDs (not CMBBs/facet inj)      If NPO for sedation, informed patient that it is okay to take medications with sips of water (except if they are to hold blood thinners).  Not Applicable   *DO take blood pressure medication if it is prescribed*      If this is for a cervical CHERI, informed patient that aspirin needs to be held for 6 days.   Not Applicable      For all patients not having spinal cord stimulator (SCS) trials or radiofrequency ablations (RFAs), informed patient:    IV sedation is not provided for this procedure.  If you feel that an oral anti-anxiety medication is needed, you can discuss this further with your referring provider or primary care provider.  The Pain Clinic provider will discuss specifics of what the procedure includes at your appointment.  Most procedures last 10-20 minutes.  We use numbing medications to help with any discomfort during the procedure.  Not Applicable      Do not schedule procedures requiring IV placement in the first appointment of the day or first appointment after lunch. Do NOT schedule at 0745, 0815 or 1245.       For patients 85 or older we recommend having an adult stay w/ them for the remainder of the day.       Does the patient have any questions?    Gabreilla Vaca  [facility]

## 2018-12-04 NOTE — LETTER
12/4/2018         RE: Ricardo Neely  [address]  [place] [address]        Dear Colleague,    Thank you for referring your patient, Ricardo Neely, to the [facility]. Please see a copy of my visit note below.    [facility]  Neurosurgery followup:    HPI: Mr. Neely is a 78 year old male that returns today with ongoing low back and right leg pain. He notes constant but variable pain to his low back with radicular pain down his right lateral thigh to the knee. He states that this affects his daily activities. He denies any leg weakness. He has had extensive PT and injections without long term relief.  He states that ROM and walking make it worse.  He states that bending over can make his pain slightly better.       Exam:  Constitutional:  Alert, well nourished, NAD.  HEENT: Normocephalic, atraumatic.   Pulm:  Without shortness of breath   CV:  No pitting edema of BLE.      Neurological:  Awake  Alert  Oriented x 3  Motor exam:        IP Q DF PF EHL  R   5  5   5   5    5  L   5  5   5   5    5     Able to spontaneously move L/E bilaterally  Sensation intact throughout all L/E dermatomes       Imaging: IMPRESSION:   1. No significant changes since the prior exam of 5/6/2016.  2. Multilevel degenerative disc disease and postoperative changes as  described above. No acute disc protrusion. Mild central stenosis at  L2-L3 and L4-L5 and moderate central stenosis at L3-L4 related to  multiple factors.  3. Multilevel foraminal narrowing bilaterally as described above, most  prominent at L3-L4.       A/P: Mr. Neely is a 78 year old male that returns today with ongoing low back and right leg pain. He notes constant but variable pain to his low back with radicular pain down his right lateral thigh to the knee. He states that this affects his daily activities. He denies any leg weakness. He has had extensive PT and injections without long term relief.  He states that  ROM and walking make it worse.  He states that bending over can make his pain slightly better.  The pt is neurologically intact.  He states that [place] so he would like to hold off on surgery at this time.  He states that he was just concerned that he had a cyst. He is doing well and will call if his pain worsens.      Patient Instructions   1. Please schedule your injection. Someone will contact you from the pain clinic within 24 hours to schedule.        2. Please contact the clinic if pain persists at [phone]. Then we would have you see Dr. Johnson Padilla to discuss surgical options.           Lisa Cloeman CNP  [facility]  [address] [address]    Tel [phone]  Pager [phone]        Again, thank you for allowing me to participate in the care of your patient.        Sincerely,        CHERI Escamilla CNP

## 2018-12-04 NOTE — PROGRESS NOTES
[facility]  Neurosurgery followup:    HPI: Mr. Neely is a 78 year old male that returns today with ongoing low back and right leg pain. He notes constant but variable pain to his low back with radicular pain down his right lateral thigh to the knee. He states that this affects his daily activities. He denies any leg weakness. He has had extensive PT and injections without long term relief.  He states that ROM and walking make it worse.  He states that bending over can make his pain slightly better.       Exam:  Constitutional:  Alert, well nourished, NAD.  HEENT: Normocephalic, atraumatic.   Pulm:  Without shortness of breath   CV:  No pitting edema of BLE.      Neurological:  Awake  Alert  Oriented x 3  Motor exam:        IP Q DF PF EHL  R   5  5   5   5    5  L   5  5   5   5    5     Able to spontaneously move L/E bilaterally  Sensation intact throughout all L/E dermatomes       Imaging: IMPRESSION:   1. No significant changes since the prior exam of 5/6/2016.  2. Multilevel degenerative disc disease and postoperative changes as  described above. No acute disc protrusion. Mild central stenosis at  L2-L3 and L4-L5 and moderate central stenosis at L3-L4 related to  multiple factors.  3. Multilevel foraminal narrowing bilaterally as described above, most  prominent at L3-L4.       A/P: Mr. Neely is a 78 year old male that returns today with ongoing low back and right leg pain. He notes constant but variable pain to his low back with radicular pain down his right lateral thigh to the knee. He states that this affects his daily activities. He denies any leg weakness. He has had extensive PT and injections without long term relief.  He states that ROM and walking make it worse.  He states that bending over can make his pain slightly better.  The pt is neurologically intact.  He states that [place] is [place] so he would like to hold off on surgery at this time.  He states that he was  just concerned that he had a cyst. He is doing well and will call if his pain worsens.      Patient Instructions   1. Please schedule your injection. Someone will contact you from the pain clinic within 24 hours to schedule.        2. Please contact the clinic if pain persists at [phone]. Then we would have you see Dr. Johnson Padilla to discuss surgical options.           Lisa Coleman [facility]  [facility]  [address] [address]    Tel [phone]  Pager [phone]

## 2018-12-04 NOTE — TELEPHONE ENCOUNTER
Left message with household member to schedule YUNIER.          Gabriella SALAZAR    [facility]

## 2018-12-06 NOTE — TELEPHONE ENCOUNTER
Pt called back to inform that he is leaving on vacation in 3wks and would like to get this scheduled asap.      Julia CASTRO    [facility]

## 2018-12-06 NOTE — TELEPHONE ENCOUNTER
Called patient. Advised on requirements for holding Plavix. . Discussed need for . If becomes sick/ill/infection or started on ABX or steroids that we will need to be contacted for rescheduling. States no further questions.    Sarahi DAVEYN, RN Care Coordinator  [facility]

## 2018-12-12 NOTE — PROGRESS NOTES
[facility] - Procedure Note    Date of Service: 12/13/2018    Procedure performed: Bilateral L3-4 transforaminal epidural steroid injection with fluoroscopic guidance  Diagnosis: Lumbar spondylosis; Lumbar radiculitis/radiculopathy  : Antelmo Cook DO   Anesthesia: none    Indications: Ricardo Neely is a 78 year old male who is seen at the request of Lisa Coleman CNP for lumbar transforaminal epidural steroid injection. The patient describes pain in his low back and the right thigh. Pain is typically worse with ambulation and feels like a cramping in the buttocks and thighs. The patient has been exhibiting symptoms consistent with lumbar intraspinal inflammation and radiculopathy. Symptoms have been persistent, disabling, and intermittently severe. The patient reports minimal improvement with conservative treatment, including oral medications and therapy.      He underwent the following injections in the past:  9/19/18 Left L3-4 TFESI no relief  9/11/17 Left L3-4 TFESI with 8 months of relief    Lumbar MRI was done on 11/27/18 which showed   MR LUMBAR SPINE WITHOUT CONTRAST  11/27/2018 11:12 AM      HISTORY: Bilateral back pain. Prior lumbar surgery.     TECHNIQUE: Sagittal and axial pre- and post-IV contrast-enhanced  T1-weighted images, sagittal and axial T2-weighted images and sagittal  STIR images. 10 mL Gadavist contrast IV.     COMPARISON: 5/6/2016.     FINDINGS: Five functional lumbar vertebral segments are documented on  lumbar myelogram of 7/17/2012. The caudal thecal sac contents remain  intrinsically normal. Minimal grade 1 anterolisthesis L3 on L4 which  may be degenerative or postoperative but is unchanged. Alignment in  the sagittal view is otherwise normal. Benign peridiscal degenerative  signal change about the L3-L4 and T12-L1 disc spaces. No acute bony  abnormality.     T12-L1: Signal loss, mild posterior disc space narrowing and minimal  right central  annular fissuring with no disc protrusion or significant  central or foraminal stenosis. Posterior facets are normal. No change.     L1-L2: Early signal loss without disc bulge/protrusion or central or  foraminal stenosis. No significant posterior facet degenerative  changes. No change.     L2-L3: Signal loss, mild disc space narrowing and diffuse disc bulging  without acute disc protrusion. This combines with a congenitally small  bony canal, thickening of the ligament flavum and dorsal epidural fat  to cause at least mild central stenosis. Mild bilateral foraminal  stenosis. No significant posterior facet degenerative changes. No  change.     L3-L4: Signal loss and advanced degenerative disc space narrowing.  Prior right-sided partial facetectomy and laminotomy. Moderate central  stenosis and left L4 lateral recess stenosis related to multiple  factors, including grade 1 anterolisthesis and moderate degenerative  changes of the remaining left posterior facet joint and left-sided  thickening of the ligamentum flavum. Mild to moderate bilateral  foraminal stenosis. No change.     L4-L5: Signal loss, mild disc space narrowing and mild disc bulging  without acute disc protrusion or significant central stenosis.  Moderate to marked posterior facet degenerative changes bilaterally.  At least mild left foraminal stenosis, and the right foramen is  unremarkable. Just inferior to the disc space level there is a  multiloculated cystic focus in the left paramidline dorsal epidural  space measuring 1 x 0.6 x 0.5 cm consistent with a synovial cyst  (image 36 of series 8). This mildly indents the left dorsal lateral  thecal sac and is unchanged since the prior exam. There is mild  central stenosis at this level (image 36 of series 8). Probable prior  right laminotomy. Overall, no change at this level.     L5-S1: Signal loss, posterior disc space narrowing and mild disc  bulging without disc protrusion or significant central or  "foraminal  stenosis. Moderate left and minimal right posterior facet degenerative  changes. No change.     Paraspinal soft tissues are unremarkable.                                                                      IMPRESSION:   1. No significant changes since the prior exam of 5/6/2016.  2. Multilevel degenerative disc disease and postoperative changes as  described above. No acute disc protrusion. Mild central stenosis at  L2-L3 and L4-L5 and moderate central stenosis at L3-L4 related to  multiple factors.  3. Multilevel foraminal narrowing bilaterally as described above, most  prominent at L3-L4.     BO GOODWIN MD      Allergies:      Allergies   Allergen Reactions     Meclizine Other (See Comments)     Over sedation, concentration problem     Tramadol Hcl Other (See Comments)     Pt feels very drugged, \"cloudy\" if used more than one day. Nausea also     Cardura [Doxazosin Mesylate] Other (See Comments)     fainted     Hydrochlorothiazide Other (See Comments)      lightheadedness     Naproxen Nausea     nausea        Vitals:  /85   Pulse 74   SpO2 94%     Review of Systems: The patient denies recent fever, chills, illness, use of antibiotics or anticoagulants. All other 10-point review of systems negative.     Procedure: The procedure and risks were explained, and informed written consent was obtained from the patient. Risks include but are not limited to: infection, bleeding, increased pain, and damage to soft tissue, nerve, muscle, and vasculature structures. After getting informed consent, patient was brought into the procedure suite and was placed in a prone position on the procedure table. A Pause for the Cause was performed. Patient was prepped and draped in sterile fashion.     After identifying the right L3-4 neuroforamen, the C-arm was rotated to a right lateral oblique angle.  A total of 3 mL of Lidocaine 1% was used to anesthetize the skin and the needle track at a skin entry site " coaxial with the fluoroscopy beam, and overriding the superior aspect of the neuroforamen.  A 25 gauge 5 inch spinal needle was advanced under intermittent fluoroscopy until it entered the foramen superiorly.    This exact procedure was repeated at the left L3-4 neuroforamen.    The position was then inspected from anteroposterior and lateral views, and the needle adjusted appropriately.  After negative aspiration, a total of 1 mL of Omnipaque-300 was injected on each side using static and continuous fluoroscopy confirming appropriate position, with spread along the nerve root sheath and into the epidural space, with no intravascular or intrathecal uptake. 8 mL of Omnipaque-300 was wasted.    1mL of 1% lidocaine with 10 mg of dexamethasone was injected on each side.  The needle was removed. Hemostasis was achieved, the area was cleaned, and bandaids were placed when appropriate. Images were saved to PACS.    The patient tolerated the procedure well, and was taken to the recovery room, and there was no evidence of procedural complications. No new sensory or motor deficits were noted following the procedure. The patient was stable and able to ambulate on discharge home. Post-procedure instructions were provided.     Pre-procedure pain score: 5/10 in the back, 4/10 in the leg  Post-procedure pain score: 0/10 in the back, 0/10 in the leg    Assessment/Plan: Ricardo Neely is a 78 year old male s/p bilateral L3-4 transforaminal epidural steroid injection today for lumbar spondylosis, radiculitis/radiculopathy.     1. Following today's procedure, the patient was advised to contact the [facility] for any of the following:   Fever, chills, or night sweats   New onset of pain, numbness, or weakness   Any questions/concerns regarding the procedure  If unable to contact the Pain Center, the patient was instructed to go to a local Emergency Room for any complications.   2. The patient will receive a  follow-up call in 1 week.  3. The patient should follow-up with the referring provider in 2 weeks for post-procedure evaluation.        Antelmo Cook DO  [facility]

## 2018-12-13 ENCOUNTER — RADIOLOGY INJECTION OFFICE VISIT (OUTPATIENT)
Dept: PALLIATIVE MEDICINE | Facility: CLINIC | Age: 78
End: 2018-12-13
Payer: COMMERCIAL

## 2018-12-13 ENCOUNTER — ANCILLARY PROCEDURE (OUTPATIENT)
Dept: GENERAL RADIOLOGY | Facility: CLINIC | Age: 78
End: 2018-12-13
Attending: PHYSICAL MEDICINE & REHABILITATION
Payer: COMMERCIAL

## 2018-12-13 VITALS — SYSTOLIC BLOOD PRESSURE: 159 MMHG | DIASTOLIC BLOOD PRESSURE: 85 MMHG | OXYGEN SATURATION: 94 % | HEART RATE: 74 BPM

## 2018-12-13 DIAGNOSIS — M54.16 LUMBAR RADICULOPATHY: Primary | ICD-10-CM

## 2018-12-13 DIAGNOSIS — M54.16 LUMBAR RADICULAR PAIN: ICD-10-CM

## 2018-12-13 PROCEDURE — 64483 NJX AA&/STRD TFRM EPI L/S 1: CPT | Mod: 50 | Performed by: PHYSICAL MEDICINE & REHABILITATION

## 2018-12-13 NOTE — PATIENT INSTRUCTIONS
[facility] Procedure Discharge Instructions    Today you saw:     Dr. Antelmo Cook    Your procedure: Lumbar Epidural steroid injection      Medications used:  Lidocaine (anesthetic)  Dexamethasone (steroid)  Omnipaque (contrast)               Be cautious when walking as numbness and/or weakness in the legs may occur up to 6-8 hours after the procedure due to effect of the local anesthetic    Do not drive for 6 hours. The effect of the local anesthetic could slow your reflexes.     Avoid strenuous activity for the first 24 hours. You may resume your regular activities after that.     You may shower, however avoid swimming, tub baths or hot tubs for 24 hours following your procedure    You may have a mild to moderate increase in pain for several days following the injection.      You may use ice packs for 10-15 minutes, 3 to 4 times a day at the injection site for comfort    Do not use heat to painful areas for 6 to 8 hours. This will give the local anesthetic time to wear off and prevent you from accidentally burning your skin.    You may use anti-inflammatory medications (such as Ibuprofen/Advil or Aleve) or Tylenol for pain control if necessary    With diabetes, check your blood sugar more frequently than usual as your blood sugar may be higher than normal for 10-14 days following a steroid injection. Contact your doctor who manages your diabetes if your blood sugar is higher than usual    It may take up to 14 days for the steroid medication to start working although you may feel the effect as early as a few days after the procedure.     Follow up with your referring provider in 2-3 weeks      If you experience any of the following, call the pain center line during work hours at [phone] or on-call physician after hours at [phone]:  -Fever over 100 degree F  -Swelling, bleeding, redness, drainage, warmth at the injection site  -Progressive weakness or numbness in your legs or arms  -Loss  of bowel or bladder function  -Unusual headache that is not relieved by Tylenol or your regular headache medication  -Unusual new onset of pain that is not improving

## 2018-12-22 DIAGNOSIS — I10 ESSENTIAL HYPERTENSION: ICD-10-CM

## 2018-12-23 NOTE — TELEPHONE ENCOUNTER
"Requested Prescriptions   Pending Prescriptions Disp Refills     metoprolol succinate ER (TOPROL-XL) 50 MG 24 hr tablet [Pharmacy Med Name: METOPROLOL ER SUCCINATE 50MG TABS] 135 tablet 0    Last Written Prescription Date:  6/6/2018  Last Fill Quantity: 135,  # refills: 1   Last office visit: 6/11/2018 with prescribing provider:  6/11/2018   Future Office Visit:     Sig: TAKE 1 AND 1/2 TABLETS BY MOUTH EVERY DAY    Beta-Blockers Protocol Failed - 12/22/2018  7:06 PM       Failed - Blood pressure under 140/90 in past 12 months    BP Readings from Last 3 Encounters:   12/13/18 159/85   12/04/18 122/73   09/19/18 149/74                Passed - Patient is age 6 or older       Passed - Recent (12 mo) or future (30 days) visit within the authorizing provider's specialty    Patient had office visit in the last 12 months or has a visit in the next 30 days with authorizing provider or within the authorizing provider's specialty.  See \"Patient Info\" tab in inbasket, or \"Choose Columns\" in Meds & Orders section of the refill encounter.              amLODIPine (NORVASC) 5 MG tablet [Pharmacy Med Name: AMLODIPINE BESYLATE 5MG TABLETS] 90 tablet 0    Last Written Prescription Date:  6/6/2018  Last Fill Quantity: 90,  # refills: 1   Last office visit: 6/11/2018 with prescribing provider:  6/11/2018   Future Office Visit:     Sig: TAKE 1 TABLET BY MOUTH EVERY DAY    Calcium Channel Blockers Protocol  Failed - 12/22/2018  7:06 PM       Failed - Blood pressure under 140/90 in past 12 months    BP Readings from Last 3 Encounters:   12/13/18 159/85   12/04/18 122/73   09/19/18 149/74                Passed - Recent (12 mo) or future (30 days) visit within the authorizing provider's specialty    Patient had office visit in the last 12 months or has a visit in the next 30 days with authorizing provider or within the authorizing provider's specialty.  See \"Patient Info\" tab in inbasket, or \"Choose Columns\" in Meds & Orders section of the " refill encounter.             Passed - Patient is age 18 or older       Passed - Normal serum creatinine on file in past 12 months    Recent Labs   Lab Test 11/27/18  1036 05/23/18  0730   CR  --  0.86   CREAT 0.9  --

## 2018-12-24 RX ORDER — METOPROLOL SUCCINATE 50 MG/1
TABLET, EXTENDED RELEASE ORAL
Qty: 135 TABLET | Refills: 0 | Status: SHIPPED | OUTPATIENT
Start: 2018-12-24 | End: 2019-03-22

## 2018-12-24 RX ORDER — AMLODIPINE BESYLATE 5 MG/1
TABLET ORAL
Qty: 90 TABLET | Refills: 0 | Status: SHIPPED | OUTPATIENT
Start: 2018-12-24 | End: 2019-03-22

## 2019-01-27 DIAGNOSIS — G45.0 VERTEBROBASILAR ARTERY SYNDROME: ICD-10-CM

## 2019-01-27 DIAGNOSIS — I67.9 CEREBROVASCULAR DISEASE: ICD-10-CM

## 2019-01-28 DIAGNOSIS — I67.9 CEREBROVASCULAR DISEASE: ICD-10-CM

## 2019-01-28 DIAGNOSIS — E78.5 HYPERLIPIDEMIA LDL GOAL <100: ICD-10-CM

## 2019-01-28 NOTE — TELEPHONE ENCOUNTER
Requested Prescriptions   Pending Prescriptions Disp Refills     atorvastatin (LIPITOR) 40 MG tablet 90 tablet prn     Sig: Take 1 tablet (40 mg) by mouth daily    There is no refill protocol information for this order        Last Written Prescription Date:  6/11/18  Last Fill Quantity: 90,  # refills: prn   Last office visit: 6/11/2018 with prescribing provider:  6/11/18   Future Office Visit:

## 2019-01-28 NOTE — TELEPHONE ENCOUNTER
"Requested Prescriptions   Pending Prescriptions Disp Refills     clopidogrel (PLAVIX) 75 MG tablet [Pharmacy Med Name: CLOPIDOGREL 75MG TABLETS] 90 tablet 0     Sig: TAKE 1 TABLET(75 MG) BY MOUTH DAILY    Plavix Failed - 1/27/2019 12:01 PM       Failed - Normal HGB on file in past 12 months    Recent Labs   Lab Test 12/27/16  0950   HGB 15.3              Failed - Normal Platelets on file in past 12 months    Recent Labs   Lab Test 12/27/16  0950                 Passed - No active PPI on record unless is Protonix       Passed - Recent (12 mo) or future (30 days) visit within the authorizing provider's specialty    Patient had office visit in the last 12 months or has a visit in the next 30 days with authorizing provider or within the authorizing provider's specialty.  See \"Patient Info\" tab in inbasket, or \"Choose Columns\" in Meds & Orders section of the refill encounter.             Passed - Medication is active on med list       Passed - Patient is age 18 or older        Last Written Prescription Date:  10/18/18  Last Fill Quantity: 90,  # refills: prn   Last office visit: 6/11/2018 with prescribing provider:  6/11/18   Future Office Visit:      "

## 2019-01-29 RX ORDER — ATORVASTATIN CALCIUM 40 MG/1
40 TABLET, FILM COATED ORAL DAILY
Qty: 90 TABLET | Refills: 1 | Status: SHIPPED | OUTPATIENT
Start: 2019-01-29 | End: 2019-07-01

## 2019-01-29 RX ORDER — CLOPIDOGREL BISULFATE 75 MG/1
TABLET ORAL
Qty: 90 TABLET | Refills: 3 | Status: SHIPPED | OUTPATIENT
Start: 2019-01-29 | End: 2019-06-25

## 2019-01-29 NOTE — TELEPHONE ENCOUNTER
"Prescription approved per [facility] [place] [facility].    Requested Prescriptions   Signed Prescriptions Disp Refills     atorvastatin (LIPITOR) 40 MG tablet 90 tablet 1     Sig: Take 1 tablet (40 mg) by mouth daily    Statins Protocol Passed - 1/28/2019  2:04 PM       Passed - LDL on file in past 12 months    Recent Labs   Lab Test 05/23/18  0730   LDL 60            Passed - No abnormal creatine kinase in past 12 months    No lab results found.            Passed - Recent (12 mo) or future (30 days) visit within the authorizing provider's specialty    Patient had office visit in the last 12 months or has a visit in the next 30 days with authorizing provider or within the authorizing provider's specialty.  See \"Patient Info\" tab in inbasket, or \"Choose Columns\" in Meds & Orders section of the refill encounter.             Passed - Medication is active on med list       Passed - Patient is age 18 or older          "

## 2019-03-22 DIAGNOSIS — I10 ESSENTIAL HYPERTENSION: ICD-10-CM

## 2019-03-22 NOTE — TELEPHONE ENCOUNTER
"Requested Prescriptions   Pending Prescriptions Disp Refills     amLODIPine (NORVASC) 5 MG tablet [Pharmacy Med Name: AMLODIPINE BESYLATE 5MG TABLETS] 90 tablet 0     Sig: TAKE 1 TABLET BY MOUTH EVERY DAY    Calcium Channel Blockers Protocol  Failed - 3/22/2019 12:17 PM       Failed - Blood pressure under 140/90 in past 12 months    BP Readings from Last 3 Encounters:   12/13/18 159/85   12/04/18 122/73   09/19/18 149/74                Passed - Recent (12 mo) or future (30 days) visit within the authorizing provider's specialty    Patient had office visit in the last 12 months or has a visit in the next 30 days with authorizing provider or within the authorizing provider's specialty.  See \"Patient Info\" tab in inbasket, or \"Choose Columns\" in Meds & Orders section of the refill encounter.             Passed - Medication is active on med list       Passed - Patient is age 18 or older       Passed - Normal serum creatinine on file in past 12 months    Recent Labs   Lab Test 11/27/18  1036 05/23/18  0730   CR  --  0.86   CREAT 0.9  --              Last Written Prescription Date:  12/24/2018  Last Fill Quantity: 90,  # refills: 0   Last office visit: 6/11/2018 with prescribing provider:  Dr Perez   Future Office Visit:      "

## 2019-03-22 NOTE — TELEPHONE ENCOUNTER
"Requested Prescriptions   Pending Prescriptions Disp Refills     metoprolol succinate ER (TOPROL-XL) 50 MG 24 hr tablet [Pharmacy Med Name: METOPROLOL ER SUCCINATE 50MG TABS] 135 tablet 0    Last Written Prescription Date:  12/24/2018  Last Fill Quantity: 135,  # refills: 0   Last office visit: 6/11/2018 with prescribing provider:  6/11/2018   Future Office Visit:     Sig: TAKE 1 AND 1/2 TABLETS BY MOUTH EVERY DAY    Beta-Blockers Protocol Failed - 3/22/2019 11:41 AM       Failed - Blood pressure under 140/90 in past 12 months    BP Readings from Last 3 Encounters:   12/13/18 159/85   12/04/18 122/73   09/19/18 149/74                Passed - Patient is age 6 or older       Passed - Recent (12 mo) or future (30 days) visit within the authorizing provider's specialty    Patient had office visit in the last 12 months or has a visit in the next 30 days with authorizing provider or within the authorizing provider's specialty.  See \"Patient Info\" tab in inbasket, or \"Choose Columns\" in Meds & Orders section of the refill encounter.             Passed - Medication is active on med list          "

## 2019-03-25 RX ORDER — METOPROLOL SUCCINATE 50 MG/1
TABLET, EXTENDED RELEASE ORAL
Qty: 135 TABLET | Refills: 0 | Status: SHIPPED | OUTPATIENT
Start: 2019-03-25 | End: 2019-06-25

## 2019-03-25 RX ORDER — AMLODIPINE BESYLATE 5 MG/1
TABLET ORAL
Qty: 90 TABLET | Refills: 1 | Status: SHIPPED | OUTPATIENT
Start: 2019-03-25 | End: 2019-07-01

## 2019-04-18 ENCOUNTER — TELEPHONE (OUTPATIENT)
Dept: NEUROSURGERY | Facility: CLINIC | Age: 79
End: 2019-04-18

## 2019-04-18 DIAGNOSIS — M54.16 LUMBAR RADICULAR PAIN: Primary | ICD-10-CM

## 2019-04-18 NOTE — TELEPHONE ENCOUNTER
Patient called clinic requesting to repeat injection.    Type of injection: LESSTEPHANIA    Most recent injection date: 12/13/18     How long did injection provide symptomatic relief: received about 75% relief for 3-4 months    Current symptoms: low back radiates to right leg with some right foot numbness    Number of injections in last 12 months: 2    Plan: Will forward to care team for approval       Patient voiced understanding and agreement with plan.     Advised patient to call back with any questions or concerns.

## 2019-04-19 NOTE — TELEPHONE ENCOUNTER
Per Suleman Burden PA-C  Ok for inj and then can f/u with us if no better.    Order placed with [place] [facility]. They will call patient to schedule. Left detailed message with above for patient.

## 2019-04-23 ENCOUNTER — TELEPHONE (OUTPATIENT)
Dept: PALLIATIVE MEDICINE | Facility: CLINIC | Age: 79
End: 2019-04-23

## 2019-04-23 NOTE — TELEPHONE ENCOUNTER
CELINE for patient to schedule Lumbar CHERI      Gladis Rider    [facility]

## 2019-04-24 NOTE — TELEPHONE ENCOUNTER
Pre-screening Questions for Radiology Injections:    Injection to be done at which interventional clinic site? [facility]    Instruct patient to arrive as directed prior to the scheduled appointment time:    [place] minutes before      [place]: 30 minutes before; if IV needed 1 hour before     Procedure ordered by Suleman Burden    Procedure ordered? Lumbar Epidural Steroid Injection    What insurance would patient like us to bill for this procedure? [employer]      Worker's comp or MVA (motor vehicle accident) -Any injection DO NOT SCHEDULE and route to Gabriella Vaca.      [employer] insurance - For SI joint injections, DO NOT SCHEDULE and route Gabriella Vaca.       [employer] - Any injection besides hip/shoulder/knee joint DO NOT SCHEDULE and route to Gabriella Vaca. She will obtain PA and call pt back to schedule procedure or notify pt of denial.        [employer]-Route to [place] for review        IF SCHEDULING IN [place] AND NEEDS A PA, IT IS OKAY TO SCHEDULE. [place] HANDLES THEIR OWN PA'S AFTER THE PATIENT IS SCHEDULED. PLEASE SCHEDULE AT LEAST 1 WEEK OUT SO A PA CAN BE OBTAINED.      Any chance of pregnancy? Not Applicable   If YES, do NOT schedule and route to RN pool    Is an  needed? No     Patient has a drive home? (mandatory) YES: INFORMED    Is patient taking any blood thinners (plavix, coumadin, jantoven, warfarin, heparin, pradaxa or dabigatran )? Yes - PLAVIX   If hold needed, do NOT schedule, route to RN pool     Is patient taking any aspirin products (includes Excedrin and Fiorinal)? Yes - Pt takes 81mg daily; instructed to hold 0 day(s) prior to procedure.      If more than 325mg/day do NOT schedule; route to RN pool     For CERVICAL procedures, hold all aspirin products for 6 days.     Tell pt that if aspirin product is not held for 6 days, the procedure WILL BE cancelled.      Does the patient have a bleeding or clotting disorder? No     If YES, okay to schedule AND route to RN  nurse pool    For any patients with platelet count <100, must be forwarded to provider    Is patient diabetic?  No  If YES, have them bring their glucometer.    Does patient have an active infection or treated for one within the past week? No     Is patient currently taking any antibiotics?  No     For patients on chronic, preventative, or prophylactic antibiotics, procedures may be scheduled.     For patients on antibiotics for active or recent infection:antibiotic course must have been completed for 4 days    Is patient currently taking any steroid medications? (i.e. Prednisone, Medrol)  No     For patients on steroid medications, course must have been completed for 4 days    Reviewed with patient:  If you are started on any steroids or antibiotics between now and your appointment, you must contact us because the procedure may need to be cancelled.  Yes    Is patient actively being treated for cancer or immunocompromised? No  If YES, do NOT schedule and route to RN pool     Are you able to get on and off an exam table with minimal or no assistance? Yes  If NO, do NOT schedule and route to RN pool    Are you able to roll over and lay on your stomach with minimal or no assistance? Yes  If NO, do NOT schedule and route to RN pool     Any allergies to contrast dye, iodine, shellfish, or numbing and steroid medications? No  If YES, route to RN pool AND add allergy information to appointment notes    Allergies: Meclizine; Tramadol hcl; Cardura [doxazosin mesylate]; Hydrochlorothiazide; and Naproxen      Has the patient had a flu shot or any other vaccinations within 7 days before or after the procedure.  No     Does patient have an MRI/CT?  YES: MRI  (SI joint, hip injections, lumbar sympathetic blocks, and stellate ganglion blocks do not require an MRI)    Was the MRI done w/in the last 3 years?  Yes    Was MRI done at [place]? Yes      If not, where was it done? N/A       If MRI was not done at [place], [facility] or  [facility] do NOT schedule and route to nursing.  If pt has an imaging disc, the injection may be scheduled but pt has to bring disc to appt. If they show up w/out disc the injection cannot be done    Reminders (please tell patient if applicable):       Instructed pt to arrive 30 minutes early for IV start if this is for a cervical procedure, ALL sympathetic (stellate ganglion, hypogastric, or lumbar sympathetic block) and all sedation procedures (RFA, spinal cord stimulation trials).  Not Applicable   -IVs are not routinely placed for Dr. Alexandre cervical cases   -Dr. Gay: IVs for cervical ESIs and cervical TBDs (not CMBBs/facet inj)      If NPO for sedation, informed patient that it is okay to take medications with sips of water (except if they are to hold blood thinners).  Not Applicable   *DO take blood pressure medication if it is prescribed*      If this is for a cervical CHERI, informed patient that aspirin needs to be held for 6 days.   Not Applicable      For all patients not having spinal cord stimulator (SCS) trials or radiofrequency ablations (RFAs), informed patient:    IV sedation is not provided for this procedure.  If you feel that an oral anti-anxiety medication is needed, you can discuss this further with your referring provider or primary care provider.  The Pain Clinic provider will discuss specifics of what the procedure includes at your appointment.  Most procedures last 10-20 minutes.  We use numbing medications to help with any discomfort during the procedure.  Not Applicable      Do not schedule procedures requiring IV placement in the first appointment of the day or first appointment after lunch. Do NOT schedule at 0745, 0815 or 1245. N/A      For patients 85 or older we recommend having an adult stay w/ them for the remainder of the day.   N/A    Does the patient have any questions?  NO  Gladis Rider  [facility]

## 2019-04-26 NOTE — TELEPHONE ENCOUNTER
I would recommend repeating a bilateral L3-4 transforaminal epidural steroid injection. Per chart review he had this done in December with Dr. Cook with 75% relief for 3-4 months.     Kim Iniguez MD  [facility]

## 2019-04-26 NOTE — TELEPHONE ENCOUNTER
PA pending additional information - please specify exact level and laterality to be injected. Please also include the approach as there are different procedure codes for different approaches.         Gabriella SALAZAR    [facility]

## 2019-04-27 NOTE — TELEPHONE ENCOUNTER
Auth No / Request ID [id number]  Status Auto-Approved  Decision Approved  Effective Date 04/29/2019  Expiration Date 06/13/2019  Decision Date 04/27/2019      Routing to nursing, patient is on bloodthinner        Gabriella SALAZAR    [facility]

## 2019-04-29 NOTE — TELEPHONE ENCOUNTER
Patient is requesting to schedule an injection with the [facility].     This would require the patient to hold:               Clopidogrel (Plavix)      Hold 7 days prior to procedure, restart 12 hours after procedure  We are requesting your approval to hold the medication for this time frame.    Please keep call open and route back to the PAIN NURSE [[id number]] pool.     Routed to primary care provider.    Kimberly Caceres RN-BSN  [facility]

## 2019-04-29 NOTE — TELEPHONE ENCOUNTER
OK to hold plavix for this procedure, which typically occurs by 5 days after stopping.   That said, OK to hold for 7 days.

## 2019-04-29 NOTE — TELEPHONE ENCOUNTER
Called pt and left message that would call later or he can call the nurse line and ask to be transferred to a nurse.    Kimberly Caceres RN-BSN  [facility]

## 2019-05-02 NOTE — TELEPHONE ENCOUNTER
Called pt.  He is coming home from [place] right now. He will call to schedule when he returns.  A Aiming message was sent to pt w/ the phone #.    Kimberly Caceres RN-BSN  [facility]

## 2019-05-06 NOTE — TELEPHONE ENCOUNTER
Received call from patient who is wanting to schedule his Lumbar CHERI.       Gladis Rider    [facility]

## 2019-05-07 NOTE — TELEPHONE ENCOUNTER
Called patient to schedule injection. Patient scheduled for 5/16/19. Plavix hold starting 5/9/19. All questions answered.    Devora DAVEYN-RN Care Coordinator  [facility]

## 2019-05-16 ENCOUNTER — RADIOLOGY INJECTION OFFICE VISIT (OUTPATIENT)
Dept: PALLIATIVE MEDICINE | Facility: CLINIC | Age: 79
End: 2019-05-16
Payer: COMMERCIAL

## 2019-05-16 ENCOUNTER — TELEPHONE (OUTPATIENT)
Dept: PALLIATIVE MEDICINE | Facility: CLINIC | Age: 79
End: 2019-05-16

## 2019-05-16 ENCOUNTER — ANCILLARY PROCEDURE (OUTPATIENT)
Dept: GENERAL RADIOLOGY | Facility: CLINIC | Age: 79
End: 2019-05-16
Attending: ANESTHESIOLOGY
Payer: COMMERCIAL

## 2019-05-16 VITALS — HEART RATE: 76 BPM | OXYGEN SATURATION: 96 % | SYSTOLIC BLOOD PRESSURE: 145 MMHG | DIASTOLIC BLOOD PRESSURE: 75 MMHG

## 2019-05-16 DIAGNOSIS — M54.16 LUMBAR RADICULOPATHY: Primary | ICD-10-CM

## 2019-05-16 DIAGNOSIS — M54.16 LUMBAR RADICULOPATHY: ICD-10-CM

## 2019-05-16 PROCEDURE — 64483 NJX AA&/STRD TFRM EPI L/S 1: CPT | Mod: 50 | Performed by: ANESTHESIOLOGY

## 2019-05-16 NOTE — NURSING NOTE
Pre-procedure Intake    Have you been fasting? NA    If yes, for how long?     Are you taking a prescribed blood thinner such as coumadin, Plavix, Xarelto?    Yes -   Plavix    If yes, when did you take your last dose? 5/9/2019    Do you take aspirin?  Yes -   ASA    If cervical procedure, have you held aspirin for 6 days?   NA    Do you have any allergies to contrast dye, iodine, steroid and/or numbing medications?  NO    Are you currently taking antibiotics or have an active infection?  NO    Have you had a fever/elevated temperature within the past week? NO    Are you currently taking oral steroids? NO    Do you have a ? Yes       Are you pregnant or breastfeeding?  Not Applicable    Are the vital signs normal?  Yes

## 2019-05-16 NOTE — NURSING NOTE
Discharge Information    IV Discontiued Time:  NA    Amount of Fluid Infused:  NA    Discharge Criteria = When patient returns to baseline or as per MD order    Consciousness:  Pt is fully awake    Circulation:  BP +/- 20% of pre-procedure level    Respiration:  Patient is able to breathe deeply    O2 Sat:  Patient is able to maintain O2 Sat >92% on room air    Activity:  Moves 4 extremities on command    Ambulation:  Patient is able to stand and walk or stand and pivot into wheelchair    Dressing:  Clean/dry or No Dressing    Notes:   Discharge instructions and AVS given to patient    Patient meets criteria for discharge?  YES    Admitted to PCU?  No    Responsible adult present to accompany patient home?  Yes    Signature/Title:    Sarahi Martinez  RN Care Coordinator  [facility] [place]

## 2019-05-16 NOTE — TELEPHONE ENCOUNTER
Scheduled brandy Alexandre today.     Devora Jain   BSN-RN Care Coordinator  [facility] [place]

## 2019-05-16 NOTE — TELEPHONE ENCOUNTER
Patient was scheduled with Dr Cook today for an injection. He has been holding his plavix, please call to schedule ASAP. Provider is out today          Gabriella SALAZAR    [facility]

## 2019-05-16 NOTE — PATIENT INSTRUCTIONS
[facility] Procedure Discharge Instructions    Today you saw:   Dr. Richa Alexandre      Your procedure:  Epidural steroid injection      Medications used:  Lidocaine (anesthetic)   Dexamethasone (steroid)  Omnipaque (contrast)      **You may restart your Plavix 12 hours after your injection         Be cautious when walking as numbness and/or weakness in the legs may occur up to 6-8 hours after the procedure due to effect of the local anesthetic    Do not drive for 6 hours. The effect of the local anesthetic could slow your reflexes.     Avoid strenuous activity for the first 24 hours. You may resume your regular activities after that.     You may shower, however avoid swimming, tub baths or hot tubs for 24 hours following your procedure    You may have a mild to moderate increase in pain for several days following the injection.      You may use ice packs for 10-15 minutes, 3 to 4 times a day at the injection site for comfort    Do not use heat to painful areas for 6 to 8 hours. This will give the local anesthetic time to wear off and prevent you from accidentally burning your skin.    Unless you have been directed to avoid the use of anti-inflammatory medications (NSAIDS-ibuprofen, Aleve, Motrin), you may use these medications or Tylenol for pain control if needed.     With diabetes, check your blood sugar more frequently than usual as your blood sugar may be higher than normal for 10-14 days following a steroid injection. Contact your doctor who manages your diabetes if your blood sugar is higher than usual    Possible side effects of steroids that you may experience include flushing, elevated blood pressure, increased appetite, mild headaches and restlessness.  All of these symptoms will get better with time.    It may take up to 14 days for the steroid medication to start working although you may feel the effect as early as a few days after the procedure.     Follow up with your referring provider in 2-3  weeks      If you experience any of the following, call the pain center line during work hours at [phone] or on-call physician after hours at [phone]:  -Fever over 100 degree F  -Swelling, bleeding, redness, drainage, warmth at the injection site  -Progressive weakness or numbness in your legs or arms  -Loss of bowel or bladder function  -Unusual headache that is not relieved by Tylenol or your regular headache medication  -Unusual new onset of pain that is not improving

## 2019-05-21 DIAGNOSIS — E78.5 HYPERLIPIDEMIA LDL GOAL <100: ICD-10-CM

## 2019-05-21 DIAGNOSIS — I10 ESSENTIAL HYPERTENSION: ICD-10-CM

## 2019-05-21 LAB
ALT SERPL W P-5'-P-CCNC: 30 U/L (ref 0–70)
ANION GAP SERPL CALCULATED.3IONS-SCNC: 4 MMOL/L (ref 3–14)
BUN SERPL-MCNC: 14 MG/DL (ref 7–30)
CALCIUM SERPL-MCNC: 9.4 MG/DL (ref 8.5–10.1)
CHLORIDE SERPL-SCNC: 105 MMOL/L (ref 94–109)
CHOLEST SERPL-MCNC: 132 MG/DL
CO2 SERPL-SCNC: 28 MMOL/L (ref 20–32)
CREAT SERPL-MCNC: 0.87 MG/DL (ref 0.66–1.25)
GFR SERPL CREATININE-BSD FRML MDRD: 82 ML/MIN/{1.73_M2}
GLUCOSE SERPL-MCNC: 107 MG/DL (ref 70–99)
HDLC SERPL-MCNC: 51 MG/DL
LDLC SERPL CALC-MCNC: 57 MG/DL
NONHDLC SERPL-MCNC: 81 MG/DL
POTASSIUM SERPL-SCNC: 4.6 MMOL/L (ref 3.4–5.3)
SODIUM SERPL-SCNC: 137 MMOL/L (ref 133–144)
TRIGL SERPL-MCNC: 122 MG/DL

## 2019-05-21 PROCEDURE — 80061 LIPID PANEL: CPT | Performed by: INTERNAL MEDICINE

## 2019-05-21 PROCEDURE — 80048 BASIC METABOLIC PNL TOTAL CA: CPT | Performed by: INTERNAL MEDICINE

## 2019-05-21 PROCEDURE — 84460 ALANINE AMINO (ALT) (SGPT): CPT | Performed by: INTERNAL MEDICINE

## 2019-05-21 PROCEDURE — 36415 COLL VENOUS BLD VENIPUNCTURE: CPT | Performed by: INTERNAL MEDICINE

## 2019-06-25 DIAGNOSIS — G45.0 VERTEBROBASILAR ARTERY SYNDROME: ICD-10-CM

## 2019-06-25 DIAGNOSIS — I67.9 CEREBROVASCULAR DISEASE: ICD-10-CM

## 2019-06-25 DIAGNOSIS — I10 ESSENTIAL HYPERTENSION: ICD-10-CM

## 2019-06-26 NOTE — TELEPHONE ENCOUNTER
"Requested Prescriptions   Pending Prescriptions Disp Refills     benazepril (LOTENSIN) 40 MG tablet [Pharmacy Med Name: BENAZEPRIL 40MG TABLETS] 90 tablet 0     Sig: TAKE 1 TABLET BY MOUTH EVERY DAY       ACE Inhibitors (Including Combos) Protocol Failed - 6/25/2019  7:57 PM        Failed - Blood pressure under 140/90 in past 12 months     BP Readings from Last 3 Encounters:   05/16/19 145/75   12/13/18 159/85   12/04/18 122/73                 Passed - Recent (12 mo) or future (30 days) visit within the authorizing provider's specialty     Patient had office visit in the last 12 months or has a visit in the next 30 days with authorizing provider or within the authorizing provider's specialty.  See \"Patient Info\" tab in inbasket, or \"Choose Columns\" in Meds & Orders section of the refill encounter.              Passed - Medication is active on med list        Passed - Patient is age 18 or older        Passed - Normal serum creatinine on file in past 12 months     Recent Labs   Lab Test 05/21/19  0812 11/27/18  1036   CR 0.87  --    CREAT  --  0.9             Passed - Normal serum potassium on file in past 12 months     Recent Labs   Lab Test 05/21/19  0812   POTASSIUM 4.6             clopidogrel (PLAVIX) 75 MG tablet [Pharmacy Med Name: CLOPIDOGREL 75MG TABLETS] 90 tablet 0     Sig: TAKE 1 TABLET BY MOUTH EVERY DAY       Plavix Failed - 6/25/2019  7:57 PM        Failed - Normal HGB on file in past 12 months     Recent Labs   Lab Test 12/27/16  0950   HGB 15.3               Failed - Normal Platelets on file in past 12 months     Recent Labs   Lab Test 12/27/16  0950                  Passed - No active PPI on record unless is Protonix        Passed - Recent (12 mo) or future (30 days) visit within the authorizing provider's specialty     Patient had office visit in the last 12 months or has a visit in the next 30 days with authorizing provider or within the authorizing provider's specialty.  See \"Patient Info\" " "tab in inbasket, or \"Choose Columns\" in Meds & Orders section of the refill encounter.              Passed - Medication is active on med list        Passed - Patient is age 18 or older        metoprolol succinate ER (TOPROL-XL) 50 MG 24 hr tablet [Pharmacy Med Name: METOPROLOL ER SUCCINATE 50MG TABS] 135 tablet 0     Sig: TAKE 1 AND 1/2 TABLETS BY MOUTH EVERY DAY       Beta-Blockers Protocol Failed - 6/25/2019  7:57 PM        Failed - Blood pressure under 140/90 in past 12 months     BP Readings from Last 3 Encounters:   05/16/19 145/75   12/13/18 159/85   12/04/18 122/73                 Passed - Patient is age 6 or older        Passed - Recent (12 mo) or future (30 days) visit within the authorizing provider's specialty     Patient had office visit in the last 12 months or has a visit in the next 30 days with authorizing provider or within the authorizing provider's specialty.  See \"Patient Info\" tab in inEyeSciencesket, or \"Choose Columns\" in Meds & Orders section of the refill encounter.              Passed - Medication is active on med list      benazepril (LOTENSIN) 40 MG tablet  Last Written Prescription Date:  6/11/18  Last Fill Quantity: 90,  # refills: 3   Last office visit: 6/11/2018 with prescribing provider:  6/11/18   Future Office Visit:   Next 5 appointments (look out 90 days)    Jul 01, 2019  2:30 PM CDT  [facility] [address] [place] [id number] [phone]           clopidogrel (PLAVIX) 75 MG tablet  Last Written Prescription Date:  1/29/19  Last Fill Quantity: 90,  # refills: 3   Last office visit: 6/11/2018 with prescribing provider:  6/11/18   Future Office Visit:   Next 5 appointments (look out 90 days)    Jul 01, 2019  2:30 PM CDT  PHYSICAL with [facility]  [facility] [address] [place] [address] " [id number]  [phone]           metoprolol succinate ER (TOPROL-XL) 50 MG 24 hr tablet  Last Written Prescription Date:  3/25/19  Last Fill Quantity: 135,  # refills: 0   Last office visit: 6/11/2018 with prescribing provider:  6/11/18   Future Office Visit:   Next 5 appointments (look out 90 days)    Jul 01, 2019  2:30 PM CDT  PHYSICAL with [facility] MD  [facility] [address] [address]  [phone]

## 2019-06-27 RX ORDER — METOPROLOL SUCCINATE 50 MG/1
TABLET, EXTENDED RELEASE ORAL
Qty: 135 TABLET | Refills: 0 | Status: SHIPPED | OUTPATIENT
Start: 2019-06-27 | End: 2019-07-01

## 2019-06-27 RX ORDER — CLOPIDOGREL BISULFATE 75 MG/1
TABLET ORAL
Qty: 90 TABLET | Refills: 0 | Status: SHIPPED | OUTPATIENT
Start: 2019-06-27 | End: 2019-07-01

## 2019-06-27 RX ORDER — BENAZEPRIL HYDROCHLORIDE 40 MG/1
TABLET ORAL
Qty: 90 TABLET | Refills: 0 | Status: SHIPPED | OUTPATIENT
Start: 2019-06-27 | End: 2019-07-01

## 2019-07-01 ENCOUNTER — OFFICE VISIT (OUTPATIENT)
Dept: INTERNAL MEDICINE | Facility: CLINIC | Age: 79
End: 2019-07-01
Payer: COMMERCIAL

## 2019-07-01 VITALS
TEMPERATURE: 97.8 F | DIASTOLIC BLOOD PRESSURE: 62 MMHG | OXYGEN SATURATION: 98 % | SYSTOLIC BLOOD PRESSURE: 130 MMHG | WEIGHT: 219 LBS | BODY MASS INDEX: 35.2 KG/M2 | HEIGHT: 66 IN | RESPIRATION RATE: 16 BRPM | HEART RATE: 77 BPM

## 2019-07-01 DIAGNOSIS — G45.0 VERTEBROBASILAR ARTERY SYNDROME: ICD-10-CM

## 2019-07-01 DIAGNOSIS — L24.9 IRRITANT DERMATITIS: ICD-10-CM

## 2019-07-01 DIAGNOSIS — M54.16 LUMBAR RADICULOPATHY: ICD-10-CM

## 2019-07-01 DIAGNOSIS — Z00.00 MEDICARE ANNUAL WELLNESS VISIT, SUBSEQUENT: ICD-10-CM

## 2019-07-01 DIAGNOSIS — E66.01 SEVERE OBESITY WITH BODY MASS INDEX (BMI) OF 35.0 TO 39.9 WITH COMORBIDITY (H): ICD-10-CM

## 2019-07-01 DIAGNOSIS — I10 ESSENTIAL HYPERTENSION: ICD-10-CM

## 2019-07-01 DIAGNOSIS — E78.5 HYPERLIPIDEMIA LDL GOAL <100: ICD-10-CM

## 2019-07-01 PROCEDURE — 99214 OFFICE O/P EST MOD 30 MIN: CPT | Mod: 25 | Performed by: INTERNAL MEDICINE

## 2019-07-01 PROCEDURE — 99397 PER PM REEVAL EST PAT 65+ YR: CPT | Performed by: INTERNAL MEDICINE

## 2019-07-01 RX ORDER — ATORVASTATIN CALCIUM 40 MG/1
40 TABLET, FILM COATED ORAL DAILY
Qty: 90 TABLET | Refills: 3 | Status: SHIPPED | OUTPATIENT
Start: 2019-07-01 | End: 2019-09-16

## 2019-07-01 RX ORDER — FLUOCINONIDE 0.5 MG/G
CREAM TOPICAL
Qty: 60 G | Refills: 1 | Status: SHIPPED | OUTPATIENT
Start: 2019-07-01 | End: 2022-10-16

## 2019-07-01 RX ORDER — AMLODIPINE BESYLATE 5 MG/1
5 TABLET ORAL DAILY
Qty: 90 TABLET | Refills: 3 | Status: SHIPPED | OUTPATIENT
Start: 2019-07-01 | End: 2020-05-14

## 2019-07-01 RX ORDER — METOPROLOL SUCCINATE 50 MG/1
TABLET, EXTENDED RELEASE ORAL
Qty: 135 TABLET | Refills: 3 | Status: ON HOLD | OUTPATIENT
Start: 2019-07-01 | End: 2019-08-26

## 2019-07-01 RX ORDER — CLOPIDOGREL BISULFATE 75 MG/1
75 TABLET ORAL DAILY
Qty: 90 TABLET | Refills: 3 | Status: ON HOLD | OUTPATIENT
Start: 2019-07-01 | End: 2019-08-26

## 2019-07-01 RX ORDER — BENAZEPRIL HYDROCHLORIDE 40 MG/1
40 TABLET ORAL DAILY
Qty: 90 TABLET | Refills: 3 | Status: SHIPPED | OUTPATIENT
Start: 2019-07-01 | End: 2020-05-14 | Stop reason: DRUGHIGH

## 2019-07-01 ASSESSMENT — ACTIVITIES OF DAILY LIVING (ADL): CURRENT_FUNCTION: NO ASSISTANCE NEEDED

## 2019-07-01 ASSESSMENT — MIFFLIN-ST. JEOR: SCORE: 1646.13

## 2019-07-01 NOTE — PATIENT INSTRUCTIONS
Talk with pharmacy down stairs if they still have supply of Shnigrix. Repeat 1nd dose 2-6 months later  Dr Cook will review chart and talk with Dr Karo sun: if still need combo aspirin and Plavix  Calorie/carbohydrate (sugar, bread, potato, pasta, etc) reduction in diet for weight loss.  Increase color on your plate with fruits and vegetables. Increase  frequency of walking or other aerobic exercise as able (goal is daily)  Follow-up with Neurosurgery clinic to discuss possible surgical traertment with Dr Perez vs medication (gabapentin/Lyrica). Call for appt  Continue current meds  Prescriptions refilled.

## 2019-07-02 PROBLEM — E66.01 SEVERE OBESITY WITH BODY MASS INDEX (BMI) OF 35.0 TO 39.9 WITH COMORBIDITY (H): Status: ACTIVE | Noted: 2019-07-02

## 2019-07-14 DIAGNOSIS — E78.5 HYPERLIPIDEMIA LDL GOAL <100: ICD-10-CM

## 2019-07-14 DIAGNOSIS — I10 ESSENTIAL HYPERTENSION: Primary | ICD-10-CM

## 2019-07-22 ENCOUNTER — OFFICE VISIT (OUTPATIENT)
Dept: NEUROSURGERY | Facility: CLINIC | Age: 79
End: 2019-07-22
Attending: NEUROLOGICAL SURGERY
Payer: COMMERCIAL

## 2019-07-22 VITALS
SYSTOLIC BLOOD PRESSURE: 138 MMHG | TEMPERATURE: 96.9 F | HEART RATE: 75 BPM | DIASTOLIC BLOOD PRESSURE: 71 MMHG | WEIGHT: 212 LBS | OXYGEN SATURATION: 96 % | BODY MASS INDEX: 34.22 KG/M2

## 2019-07-22 DIAGNOSIS — M54.16 LUMBAR RADICULOPATHY: ICD-10-CM

## 2019-07-22 PROCEDURE — G0463 HOSPITAL OUTPT CLINIC VISIT: HCPCS

## 2019-07-22 PROCEDURE — 99213 OFFICE O/P EST LOW 20 MIN: CPT | Performed by: NEUROLOGICAL SURGERY

## 2019-07-22 ASSESSMENT — PAIN SCALES - GENERAL: PAINLEVEL: MODERATE PAIN (5)

## 2019-07-22 NOTE — PROGRESS NOTES
[facility]  Neurosurgery followup:    HPI: Mr. Neely is a 78 year old male that returns today with ongoing low back and right leg pain. He notes constant but variable pain to his low back with radicular pain down his right lateral thigh to the knee. He states that this affects his daily activities. He denies any leg weakness. He has had extensive PT and injections without long term relief.  He states that ROM and walking make it worse.  He states that bending over can make his pain slightly better.      Returns for follow up.  6 months of worsening right leg pain radiating to the knee.  9/10 sharp pain, worse with walking or activity.  Has undergone PT and injections without improvement.  MRI with L3-4 post-decompression spondylolisthesis with central and foraminal stenosis.  Also with prior left L4-5 decompression and synovial cyst.     Exam:  Constitutional:  Alert, well nourished, NAD.  HEENT: Normocephalic, atraumatic.   Pulm:  Without shortness of breath   CV:  No pitting edema of BLE.      Neurological:  Awake  Alert  Oriented x 3  Motor exam:        IP Q DF PF EHL  R   5  4+   5   5    5  L   5  5   5   5    5     Able to spontaneously move L/E bilaterally  Right thigh numbness       Imaging: IMPRESSION:   1. No significant changes since the prior exam of 5/6/2016.  2. Multilevel degenerative disc disease and postoperative changes as  described above. No acute disc protrusion. Mild central stenosis at  L2-L3 and L4-L5 and moderate central stenosis at L3-L4 related to  multiple factors.  3. Multilevel foraminal narrowing bilaterally as described above, most  prominent at L3-L4.       A/P:     Discussed options of continued conservative management versus surgery  If he decides to proceed with surgery, would first obtain new MRI and CT  Likely surgical plan of L3-5 TLIFs

## 2019-07-22 NOTE — NURSING NOTE
"Ricardo Neely is a 79 year old male who presents for:  Chief Complaint   Patient presents with     Neurologic Problem     lumbar rad. was a canidate of Dr. Padilla         Initial Vitals:  /71   Pulse 75   Temp 96.9  F (36.1  C)   Wt 212 lb (96.2 kg)   SpO2 96%   BMI 34.22 kg/m   Estimated body mass index is 34.22 kg/m  as calculated from the following:    Height as of 7/1/19: 5' 6\" (1.676 m).    Weight as of this encounter: 212 lb (96.2 kg).. Body surface area is 2.12 meters squared. BP completed   Moderate Pain (5)        Nursing Comments: LBP into right leg         Gloria Vo LPN  [facility]    "

## 2019-07-22 NOTE — LETTER
7/22/2019         RE: Ricardo Neely  [address]  [place] [address]        Dear Colleague,    Thank you for referring your patient, Ricardo Neely, to the [facility]. Please see a copy of my visit note below.    [facility]  Neurosurgery followup:    HPI: Mr. Neely is a 78 year old male that returns today with ongoing low back and right leg pain. He notes constant but variable pain to his low back with radicular pain down his right lateral thigh to the knee. He states that this affects his daily activities. He denies any leg weakness. He has had extensive PT and injections without long term relief.  He states that ROM and walking make it worse.  He states that bending over can make his pain slightly better.      Returns for follow up.  6 months of worsening right leg pain radiating to the knee.  9/10 sharp pain, worse with walking or activity.  Has undergone PT and injections without improvement.  MRI with L3-4 post-decompression spondylolisthesis with central and foraminal stenosis.  Also with prior left L4-5 decompression and synovial cyst.     Exam:  Constitutional:  Alert, well nourished, NAD.  HEENT: Normocephalic, atraumatic.   Pulm:  Without shortness of breath   CV:  No pitting edema of BLE.      Neurological:  Awake  Alert  Oriented x 3  Motor exam:        IP Q DF PF EHL  R   5  4+   5   5    5  L   5  5   5   5    5     Able to spontaneously move L/E bilaterally  Right thigh numbness       Imaging: IMPRESSION:   1. No significant changes since the prior exam of 5/6/2016.  2. Multilevel degenerative disc disease and postoperative changes as  described above. No acute disc protrusion. Mild central stenosis at  L2-L3 and L4-L5 and moderate central stenosis at L3-L4 related to  multiple factors.  3. Multilevel foraminal narrowing bilaterally as described above, most  prominent at L3-L4.       A/P:     Discussed options of continued  conservative management versus surgery  If he decides to proceed with surgery, would first obtain new MRI and CT  Likely surgical plan of L3-5 TLIFs          Again, thank you for allowing me to participate in the care of your patient.        Sincerely,        Harrison Perez MD

## 2019-07-26 ENCOUNTER — TELEPHONE (OUTPATIENT)
Dept: NEUROSURGERY | Facility: CLINIC | Age: 79
End: 2019-07-26

## 2019-07-26 DIAGNOSIS — M54.16 LUMBAR RADICULOPATHY: Primary | ICD-10-CM

## 2019-07-26 NOTE — TELEPHONE ENCOUNTER
Spoke to patient. He saw Dr Perez 7/22/19. He would like to move forward with surgery. Per Dr Perez, patient needs new MRI and CT prior to surgery. Patient verbalized understanding. Orders placed. Provided central scheduling number. Also , scheduled patient to meet with Dr Perez 8/5/19 to discuss MRI and CT results and surgical plan.

## 2019-08-01 ENCOUNTER — HOSPITAL ENCOUNTER (OUTPATIENT)
Dept: CT IMAGING | Facility: CLINIC | Age: 79
End: 2019-08-01
Attending: NEUROLOGICAL SURGERY
Payer: COMMERCIAL

## 2019-08-01 ENCOUNTER — TELEPHONE (OUTPATIENT)
Dept: NEUROSURGERY | Facility: OTHER | Age: 79
End: 2019-08-01

## 2019-08-01 ENCOUNTER — HOSPITAL ENCOUNTER (OUTPATIENT)
Dept: MRI IMAGING | Facility: CLINIC | Age: 79
Discharge: HOME | End: 2019-08-01
Attending: NEUROLOGICAL SURGERY | Admitting: NEUROLOGICAL SURGERY
Payer: COMMERCIAL

## 2019-08-01 DIAGNOSIS — M54.16 LUMBAR RADICULOPATHY: ICD-10-CM

## 2019-08-01 PROCEDURE — 72131 CT LUMBAR SPINE W/O DYE: CPT

## 2019-08-01 PROCEDURE — 72148 MRI LUMBAR SPINE W/O DYE: CPT

## 2019-08-01 NOTE — TELEPHONE ENCOUNTER
LVM requesting a return call to discuss MRI results.    Per Dr. Perez As expected with L3-4 spondy, L4-5 post-surgical change.  If he wants to do surgery, he can come back to finalize plans.

## 2019-08-05 ENCOUNTER — OFFICE VISIT (OUTPATIENT)
Dept: NEUROSURGERY | Facility: CLINIC | Age: 79
End: 2019-08-05
Attending: NEUROLOGICAL SURGERY
Payer: COMMERCIAL

## 2019-08-05 VITALS
SYSTOLIC BLOOD PRESSURE: 145 MMHG | OXYGEN SATURATION: 94 % | WEIGHT: 212 LBS | HEART RATE: 78 BPM | TEMPERATURE: 98.2 F | DIASTOLIC BLOOD PRESSURE: 77 MMHG | HEIGHT: 67 IN | BODY MASS INDEX: 33.27 KG/M2

## 2019-08-05 DIAGNOSIS — M43.16 SPONDYLOLISTHESIS OF LUMBAR REGION: Primary | ICD-10-CM

## 2019-08-05 PROCEDURE — G0463 HOSPITAL OUTPT CLINIC VISIT: HCPCS

## 2019-08-05 PROCEDURE — 99213 OFFICE O/P EST LOW 20 MIN: CPT | Performed by: NEUROLOGICAL SURGERY

## 2019-08-05 ASSESSMENT — PAIN SCALES - GENERAL: PAINLEVEL: MODERATE PAIN (5)

## 2019-08-05 ASSESSMENT — MIFFLIN-ST. JEOR: SCORE: 1630.26

## 2019-08-05 NOTE — PATIENT INSTRUCTIONS
Surgery scheduled at [facility] for  R L3-L5 TLIF (transforaminal lumbar interbody fusion)     Pre-Operative:  -Surgical risks: blood clots in the leg or lung, problems urinating, nerve damage, drainage from the incision, infection, stiffness  - Pre-operative physical with primary care physician within 30 days of surgical date.   -Stop all solid foods and liquids 8 hours prior to surgery.  -Shower procedure: Please shower with antibacterial soap the night before surgery and morning of surgery. Refer to information sheet in folder.  - Discontinue Aspirin, NSAIDs (Advil, Ibuprofen, Naproxen, Nuprin, Diclofenac, Meloxicam, Aleve, Celebrex) x 7 days prior to surgical date. After surgery, do not begin taking these medications until given clearance. May cause bleeding and interfere with healing.  - May try Tylenol for pain.    Post-Operative:  - 2-4 night hospitalization stay  - Post operative pain may require pain medications and muscle relaxants. You will receive medications upon discharge.  -Do NOT drive while taking narcotic pain medication.  -Post operative incision care- Watch for signs of infection: redness, swelling, warmth, drainage, and fever of 101 degrees or higher. Notify clinic [phone].  -Keep incision clean and dry. You may shower. No submerging incision in water such as pools, hot tubs, baths for at least 8 weeks or until incision is healed.   - Post operative activity limitations for 6 weeks after surgery: no lifting > 10 pounds, no bending, twisting, or overhead reaching. You will be re-evaluated at your follow up appointments.   -If a brace is required per Dr. Perez, [facility] will fit you for the brace in the hospital.  -If you are currently employed, you will need to be off work for recovery and healing. Please fax any FMLA/short term disability paperwork to [phone]. You may call our clinic when you'd like to return to work and we can provide a work letter.   - Follow up appointments:  2 weeks post op for suture/staple removal. Then 6 week post op, 3 months post op, 6 months post op, 1 year post op with an xray before each appointment. Please call to schedule these appointments at [phone].

## 2019-08-05 NOTE — NURSING NOTE
"Ricardo Neely is a 79 year old male who presents for:  Chief Complaint   Patient presents with     Follow Up     Discuss surgical plan and review MRI and CT scans.         Initial Vitals:  BP (!) 145/77 (BP Location: Right arm, Patient Position: Sitting, Cuff Size: Adult Large)   Pulse 78   Temp 98.2  F (36.8  C) (Oral)   Ht 5' 7\" (1.702 m)   Wt 212 lb (96.2 kg)   SpO2 94%   BMI 33.20 kg/m   Estimated body mass index is 33.2 kg/m  as calculated from the following:    Height as of this encounter: 5' 7\" (1.702 m).    Weight as of this encounter: 212 lb (96.2 kg).. Body surface area is 2.13 meters squared. BP completed using cuff size: large  Moderate Pain (5)        Nursing Comments: Patient states his pain level today is at a 5 out of 10 and he is having low back pain along with right sided leg pain.         Oriana Ordonez    "

## 2019-08-05 NOTE — PROGRESS NOTES
[facility]  Neurosurgery followup:    HPI: Mr. Neely is a 78 year old male that returns today with ongoing low back and right leg pain. He notes constant but variable pain to his low back with radicular pain down his right lateral thigh to the knee. He states that this affects his daily activities. He denies any leg weakness. He has had extensive PT and injections without long term relief.  He states that ROM and walking make it worse.  He states that bending over can make his pain slightly better.      Returns for follow up.  6 months of worsening right leg pain radiating to the knee.  9/10 sharp pain, worse with walking or activity.  Has undergone PT and injections without improvement.  MRI with L3-4 post-decompression spondylolisthesis with central and foraminal stenosis.  Also with prior left L4-5 decompression and synovial cyst.    Returns for follow up.  Continued severe symptoms as above.  New CT and MR obtained with continued L3-4 spondylolisthesis and L4-5 post-surgical changes.     Exam:  Constitutional:  Alert, well nourished, NAD.  HEENT: Normocephalic, atraumatic.   Pulm:  Without shortness of breath   CV:  No pitting edema of BLE.      Neurological:  Awake  Alert  Oriented x 3  Motor exam:        IP Q DF PF EHL  R   5  4+   5   5    5  L   5  5   5   5    5     Able to spontaneously move L/E bilaterally  Right thigh numbness       Imaging: IMPRESSION:   1. No significant changes since the prior exam of 5/6/2016.  2. Multilevel degenerative disc disease and postoperative changes as  described above. No acute disc protrusion. Mild central stenosis at  L2-L3 and L4-L5 and moderate central stenosis at L3-L4 related to  multiple factors.  3. Multilevel foraminal narrowing bilaterally as described above, most  prominent at L3-L4.       A/P:     Will plan for L3-5 TLIFs  Risks and benefits discussed

## 2019-08-05 NOTE — LETTER
8/5/2019         RE: Ricardo Neely  [address]  [place] [place] [address]        Dear Colleague,    Thank you for referring your patient, Ricardo Neely, to the [facility]. Please see a copy of my visit note below.    [facility]  Neurosurgery followup:    HPI: Mr. Neely is a 78 year old male that returns today with ongoing low back and right leg pain. He notes constant but variable pain to his low back with radicular pain down his right lateral thigh to the knee. He states that this affects his daily activities. He denies any leg weakness. He has had extensive PT and injections without long term relief.  He states that ROM and walking make it worse.  He states that bending over can make his pain slightly better.      Returns for follow up.  6 months of worsening right leg pain radiating to the knee.  9/10 sharp pain, worse with walking or activity.  Has undergone PT and injections without improvement.  MRI with L3-4 post-decompression spondylolisthesis with central and foraminal stenosis.  Also with prior left L4-5 decompression and synovial cyst.    Returns for follow up.  Continued severe symptoms as above.  New CT and MR obtained with continued L3-4 spondylolisthesis and L4-5 post-surgical changes.     Exam:  Constitutional:  Alert, well nourished, NAD.  HEENT: Normocephalic, atraumatic.   Pulm:  Without shortness of breath   CV:  No pitting edema of BLE.      Neurological:  Awake  Alert  Oriented x 3  Motor exam:        IP Q DF PF EHL  R   5  4+   5   5    5  L   5  5   5   5    5     Able to spontaneously move L/E bilaterally  Right thigh numbness       Imaging: IMPRESSION:   1. No significant changes since the prior exam of 5/6/2016.  2. Multilevel degenerative disc disease and postoperative changes as  described above. No acute disc protrusion. Mild central stenosis at  L2-L3 and L4-L5 and moderate central stenosis at L3-L4 related to  multiple  factors.  3. Multilevel foraminal narrowing bilaterally as described above, most  prominent at L3-L4.       A/P:     Will plan for L3-5 TLIFs  Risks and benefits discussed          Again, thank you for allowing me to participate in the care of your patient.        Sincerely,        Harrison Perez MD

## 2019-08-05 NOTE — NURSING NOTE
Patient Education    Education included but not limited to:  - Surgical risks: blood clots, urinating difficulties, nerve damage, infection.  - Pre-operative physical with primary care physician within 30 days of surgical date.   - Pre-operative clearance from other pertaining specialties.   - Discontinue NSAIDS x 7 days prior to surgical date.   - Discontinue Plavix x 7-10 days prior to surgical date, stay off Plavix 3-4 days post operatively. Discontinue Xarelto 5-7 days prior to surgery. Discontinue coumadin/warfarin 5-7 days prior to surgery. INR needs to be <1.4.  - Do not begin taking NSAIDs (Advil, Motrin, Ibuprofen, Nuprin, Diclofenac, Meloxicam, Aleve, Celebrex, Aspirin, etc.) until 6 weeks after surgery if you had a fusion. May cause bleeding and interfere with bone healing.    -May try Tylenol for pain.    -Discussed being off work after surgery, short term disability, FMLA, etc.   -Forms to be completed    -Pre-op timeline: NPO, shower, medications    -Hospital stay: Checking in, surgery, recovery room, hospital room.    - Post operative pain management: narcotics, muscle relaxants, ice, etc.   -No driving while taking narcotics     -Post operative incision care:   Keep your incision clean and dry.   Okay to shower. No submerging in water until incision healed.   Watch for signs of infection and notify clinic if drainage or fever develops.   - Post operative activity limitations recommended until follow up appointment: no lifting > 10 pounds; limited bending, twisting, overhead reaching.  -If a brace is required per Dr. Perez, [facility] will fit you for the brace in the hospital.  - Follow up appointments: Suture/staple removal at 2 weeks post op, 6 week post op, 3 months post op, 6 months post op, 1 year post op. You will need to an xray before each appointment. Please call to schedule follow up appointment at [phone].   - Education book was also given to the patient for further review.       Patient verbalized understanding of above instructions. All questions were answered to the best of my ability and the patient's satisfaction. Patient advised to call with any additional questions or concerns.    Gloria Kasper RN

## 2019-08-06 ENCOUNTER — OFFICE VISIT (OUTPATIENT)
Dept: INTERNAL MEDICINE | Facility: CLINIC | Age: 79
End: 2019-08-06
Payer: COMMERCIAL

## 2019-08-06 VITALS
SYSTOLIC BLOOD PRESSURE: 130 MMHG | BODY MASS INDEX: 34.37 KG/M2 | WEIGHT: 219 LBS | TEMPERATURE: 97.9 F | HEART RATE: 77 BPM | DIASTOLIC BLOOD PRESSURE: 70 MMHG | OXYGEN SATURATION: 97 % | RESPIRATION RATE: 14 BRPM | HEIGHT: 67 IN

## 2019-08-06 DIAGNOSIS — M43.16 SPONDYLOLISTHESIS OF LUMBAR REGION: ICD-10-CM

## 2019-08-06 DIAGNOSIS — E78.5 HYPERLIPIDEMIA LDL GOAL <100: ICD-10-CM

## 2019-08-06 DIAGNOSIS — M54.16 LUMBAR RADICULOPATHY: ICD-10-CM

## 2019-08-06 DIAGNOSIS — I10 ESSENTIAL HYPERTENSION: ICD-10-CM

## 2019-08-06 DIAGNOSIS — E66.01 SEVERE OBESITY WITH BODY MASS INDEX (BMI) OF 35.0 TO 39.9 WITH COMORBIDITY (H): ICD-10-CM

## 2019-08-06 DIAGNOSIS — R73.01 IMPAIRED FASTING GLUCOSE: ICD-10-CM

## 2019-08-06 DIAGNOSIS — Z01.818 PREOP GENERAL PHYSICAL EXAM: Primary | ICD-10-CM

## 2019-08-06 DIAGNOSIS — I67.9 CEREBROVASCULAR DISEASE: ICD-10-CM

## 2019-08-06 LAB
ANION GAP SERPL CALCULATED.3IONS-SCNC: 7 MMOL/L (ref 3–14)
BUN SERPL-MCNC: 18 MG/DL (ref 7–30)
CALCIUM SERPL-MCNC: 8.7 MG/DL (ref 8.5–10.1)
CHLORIDE SERPL-SCNC: 107 MMOL/L (ref 94–109)
CO2 SERPL-SCNC: 27 MMOL/L (ref 20–32)
CREAT SERPL-MCNC: 0.84 MG/DL (ref 0.66–1.25)
ERYTHROCYTE [DISTWIDTH] IN BLOOD BY AUTOMATED COUNT: 13.2 % (ref 10–15)
GFR SERPL CREATININE-BSD FRML MDRD: 83 ML/MIN/{1.73_M2}
GLUCOSE SERPL-MCNC: 100 MG/DL (ref 70–99)
HCT VFR BLD AUTO: 44.3 % (ref 40–53)
HGB BLD-MCNC: 15.4 G/DL (ref 13.3–17.7)
MCH RBC QN AUTO: 29.8 PG (ref 26.5–33)
MCHC RBC AUTO-ENTMCNC: 34.8 G/DL (ref 31.5–36.5)
MCV RBC AUTO: 86 FL (ref 78–100)
PLATELET # BLD AUTO: 247 10E9/L (ref 150–450)
POTASSIUM SERPL-SCNC: 4.4 MMOL/L (ref 3.4–5.3)
RBC # BLD AUTO: 5.17 10E12/L (ref 4.4–5.9)
SODIUM SERPL-SCNC: 141 MMOL/L (ref 133–144)
WBC # BLD AUTO: 7.5 10E9/L (ref 4–11)

## 2019-08-06 PROCEDURE — 99215 OFFICE O/P EST HI 40 MIN: CPT | Performed by: PHYSICIAN ASSISTANT

## 2019-08-06 PROCEDURE — 93000 ELECTROCARDIOGRAM COMPLETE: CPT | Performed by: PHYSICIAN ASSISTANT

## 2019-08-06 PROCEDURE — 85027 COMPLETE CBC AUTOMATED: CPT | Performed by: PHYSICIAN ASSISTANT

## 2019-08-06 PROCEDURE — 36415 COLL VENOUS BLD VENIPUNCTURE: CPT | Performed by: PHYSICIAN ASSISTANT

## 2019-08-06 PROCEDURE — 80048 BASIC METABOLIC PNL TOTAL CA: CPT | Performed by: PHYSICIAN ASSISTANT

## 2019-08-06 ASSESSMENT — MIFFLIN-ST. JEOR: SCORE: 1662.01

## 2019-08-06 NOTE — PATIENT INSTRUCTIONS
Stop aspirin 7- 10 days prior to surgery - Stop August 12th  Stop Plavix 7- 10 days prior to surgery     Before Your Surgery      Call your surgeon if there is any change in your health. This includes signs of a cold or flu (such as a sore throat, runny nose, cough, rash or fever).    Do not smoke, drink alcohol or take over the counter medicine (unless your surgeon or primary care doctor tells you to) for the 24 hours before and after surgery.    If you take prescribed drugs: Follow your doctor s orders about which medicines to take and which to stop until after surgery.    Eating and drinking prior to surgery: follow the instructions from your surgeon    Take a shower or bath the night before surgery. Use the soap your surgeon gave you to gently clean your skin. If you do not have soap from your surgeon, use your regular soap. Do not shave or scrub the surgery site.  Wear clean pajamas and have clean sheets on your bed.

## 2019-08-06 NOTE — PROGRESS NOTES
[address] [address]  [phone]  Dept: [phone]    PRE-OP EVALUATION:  Today's date: 2019    Ricardo Neely (: 1940) presents for pre-operative evaluation assessment as requested by Harrison Donaldson MD.  He requires evaluation and anesthesia risk assessment prior to undergoing surgery/procedure for treatment of lumbar radiculopathy .    Proposed Surgery/ Procedure: L3-5 LUMBAR TRANSFORAMINAL INTERBODY FUSION WITH STEALTH (SONYA FRAME, O-ARM, MIDAS SONYA, LEICA MICROSCOPE, JEANNIE SERATO)    Date of Surgery/ Procedure: 19  Time of Surgery/ Procedure: 7:30   Hospital/Surgical Facility:  [facility]  Fax number for surgical facility: [phone]  Primary Physician: Storm Huddleston  Type of Anesthesia Anticipated: General    Patient has a Health Care Directive or Living Will:  YES in chart    1. NO - Do you have a history of heart attack, stroke, stent, bypass or surgery on an artery in the head, neck, heart or legs?  2. NO - Do you ever have any pain or discomfort in your chest?  3. NO - Do you have a history of  Heart Failure?  4. NO - Are you troubled by shortness of breath when: walking on the level, up a slight hill or at night?  5. NO - Do you currently have a cold, bronchitis or other respiratory infection?  6. No- DO YOU HAVE A COUGH, SHORTNESS OF BREATH OR WHEEZING?   7. YES - DO YOU SOMETIMES GET PAINS IN THE CALVES OF YOUR LEGS WHEN YOU WALK? Due to low back pain and radiculopathy   8. YES - DO YOU OR ANYONE IN YOUR FAMILY HAVE PREVIOUS HISTORY OF BLOOD CLOTS? Mother with hx of blood clot   9. NO - Do you or does anyone in your family have a serious bleeding problem such as prolonged bleeding following surgeries or cuts?  10. NO - Have you ever had problems with anemia or been told to take iron pills?  11. NO - Have you had any abnormal blood loss such as black, tarry or bloody stools, or abnormal vaginal  bleeding?  12. NO - Have you ever had a blood transfusion?  13. NO - Have you or any of your relatives ever had problems with anesthesia?  14. NO - Do you have sleep apnea, excessive snoring or daytime drowsiness?  15. NO - Do you have any prosthetic heart valves?  16. YES - DO YOU HAVE PROSTHETIC JOINTS? knee  17. NO - Is there any chance that you may be pregnant?      HPI:     HPI related to upcoming procedure: lumbar radiculopathy. Pain continues despite PT, and injections.         See problem list for active medical problems.  Problems all longstanding and stable, except as noted/documented.  See ROS for pertinent symptoms related to these conditions.    HYPERLIPIDEMIA - Patient has a long history of significant Hyperlipidemia requiring medication for treatment with recent good control. Patient reports no problems or side effects with the medication.     HYPERTENSION - Patient has longstanding history of HTN , currently denies any symptoms referable to elevated blood pressure. Specifically denies chest pain, palpitations, dyspnea, orthopnea, PND or peripheral edema. Blood pressure readings have been in normal range. Current medication regimen is as listed below. Patient denies any side effects of medication.       MEDICAL HISTORY:     Patient Active Problem List    Diagnosis Date Noted     Advance Care Planning 05/26/2011     Priority: High     Advance Directive Problem List Overview:Advance Directive received and scanned. Click on Code in the patient header to view. 1/23/2012   Advance Directive Follow-up Visit Ricardo Neely presents for advanced care planning session accompanied by designated health care agent.  Reviewed definitions of advanced care planning and advance directive form, and informational handouts given to patient previously.  Patient voices understanding of advance care planning and advance directive form Designated healthcare agent is identifed and accepts responsibility for this role.  Healthcare agent s name is  Lisa Neely. Designated healthcare agent concerns:  None. My Hopes and Wishes reviewed and patient and designated healthcare agent are in agreement. Finalized wishes are clear to both patient and designated healthcare agent: Yes. Patient and designated healthcare agent are aware that document may be changed at any time in the future. Advanced directive form: completed at this visit. Advanced directive form: verified with notary signature. Original and two copies of advanced directive form given to patient copy sent to  for scanning into EMR GENE Ragsdale RN  Advance Directive Problem List Overview: Discussed advance care planning with patient; information given to patient to review. 5/26/2011        Severe obesity with body mass index (BMI) of 35.0 to 39.9 with comorbidity (H) 07/02/2019     Priority: Medium     Status post Mohs surgery for basal cell carcinoma 05/08/2017     Priority: Medium     Cervicalgia 10/22/2014     Priority: Medium     Headache 10/22/2014     Priority: Medium     Lumbar stenosis with neurogenic claudication 08/06/2012     Priority: Medium     Chronic low back pain 07/20/2011     Priority: Medium     Sciatica 07/20/2011     Priority: Medium     Epidural 11/2014 effective       Nonallopathic lesion of lumbar region 07/20/2011     Priority: Medium     Problem list name updated by automated process. Provider to review       Anxiety 05/26/2011     Priority: Medium     Transient cerebral ischemia      Priority: Medium     Vision disturbance, felt due to vertebrobasilar insufficiency  ASA/Plavix       Cerebrovascular disease      Priority: Medium     Also with vertebral artery stenosis seen on MRA       Thoracic or lumbosacral neuritis or radiculitis, unspecified 11/15/2007     Priority: Medium     Hypertrophy of prostate without urinary obstruction      Priority: Medium     Impaired fasting glucose      Priority: Medium     Normal oral glucose tolerance test  10/2012       Diaphragmatic hernia 09/23/2002     Priority: Medium     Problem list name updated by automated process. Provider to review       HISTORY OF URINARY CALCULI 08/19/2002     Priority: Medium     Essential hypertension 08/19/2002     Priority: Medium     Hyperlipidemia LDL goal <100 08/19/2002     Priority: Medium     Obesity 08/19/2002     Priority: Medium     GERD 08/19/2002     Priority: Medium      Past Medical History:   Diagnosis Date     Anxiety 5/26/2011     Basal cell carcinoma      Benign Prostatic Hypertrophy      CEREBROVASC DISEASE, small vessel 12/07     Essential hypertension, benign      GERD      HIATAL HERNIA      HYPERPLASTIC POLYPS COLON 5/93, 3/06     Impaired fasting glucose      Low Back Pain      Obesity, unspecified      Other and unspecified hyperlipidemia      Personal history of urinary calculi 1960's     Pneumonia, organism unspecified(486) 1992     TRANSIENT CEREBRAL ISCHEMIA 12/07     Past Surgical History:   Procedure Laterality Date     C NONSPECIFIC PROCEDURE  1959    pilonidal cystectomy     C NONSPECIFIC PROCEDURE  1966    kidney stone     C NONSPECIFIC PROCEDURE  approx 1999    R knee arthroscopy     Dr. Thomas RYDER NONSPECIFIC PROCEDURE  2005    L3-4 microdiskectomy   Dr. Fercho RYDER TOTAL KNEE ARTHROPLASTY  July 2010    Minimally invasive R TKA   Dr. Nichols     COLONOSCOPY       DISCECTOMY LUMBAR POSTERIOR MICROSCOPIC TWO LEVELS  8/28/2012    DISCECTOMY LUMBAR POSTERIOR MICROSCOPIC TWO LEVELS;  RIGHT L3-L4 REDO EXTENDED HEMILAMINECTOMY AND MICRO FORAMINOTOMY, LEFT L4-L5 EXTENDED HEMILAMINECTOMY AND MICRODISCECTOMY (PRONE) (SONYA MCCALLUM, C-ARM, METRIX II);  Surgeon: Bertram Mirza MD;  Location: [facility]     renal calculii removal 40 years ago  1965     Current Outpatient Medications   Medication Sig Dispense Refill     amLODIPine (NORVASC) 5 MG tablet Take 1 tablet (5 mg) by mouth daily 90 tablet 3     aspirin 81 MG tablet Take 1 tablet by mouth daily.  "(Patient taking differently: Take 81 mg by mouth At Bedtime ) 100 tablet 3     atorvastatin (LIPITOR) 40 MG tablet Take 1 tablet (40 mg) by mouth daily 90 tablet 3     benazepril (LOTENSIN) 40 MG tablet Take 1 tablet (40 mg) by mouth daily 90 tablet 3     clopidogrel (PLAVIX) 75 MG tablet Take 1 tablet (75 mg) by mouth daily 90 tablet 3     fluocinonide (LIDEX) 0.05 % external cream Apply sparingly to affected area twice daily as needed.  Do not apply to face. 60 g 1     loratadine (CLARITIN) 10 MG tablet Take 1 tablet (10 mg) by mouth daily       metoprolol succinate ER (TOPROL-XL) 50 MG 24 hr tablet TAKE 1 AND 1/2 TABLETS BY MOUTH EVERY  tablet 3     OTC products: None, except as noted above    Allergies   Allergen Reactions     Meclizine Other (See Comments)     Over sedation, concentration problem     Tramadol Hcl Other (See Comments)     Pt feels very drugged, \"cloudy\" if used more than one day. Nausea also     Cardura [Doxazosin Mesylate] Other (See Comments)     fainted     Hydrochlorothiazide Other (See Comments)      lightheadedness     Naproxen Nausea     nausea      Latex Allergy: NO    Social History     Tobacco Use     Smoking status: Former Smoker     Last attempt to quit: 1968     Years since quittin.6     Smokeless tobacco: Never Used   Substance Use Topics     Alcohol use: No     Alcohol/week: 0.0 oz     History   Drug Use No       REVIEW OF SYSTEMS:   CONSTITUTIONAL: NEGATIVE for fever, chills, change in weight  INTEGUMENTARY/SKIN: NEGATIVE for worrisome rashes, moles or lesions  EYES: NEGATIVE for vision changes   Irritation due to eyelid droop,   ENT/MOUTH: NEGATIVE for ear, mouth and throat problems  RESP: NEGATIVE for significant cough or SOB  CV: NEGATIVE for chest pain, palpitations or peripheral edema  GI: NEGATIVE for nausea, abdominal pain, heartburn, or change in bowel habits  MUSCULOSKELETAL: NEGATIVE for significant arthralgias or myalgia  NEURO: NEGATIVE for weakness, " "dizziness or paresthesias  ENDOCRINE: NEGATIVE for temperature intolerance, skin/hair changes  HEME/ALLERGY/IMMUNE: NEGATIVE for bleeding problems  PSYCHIATRIC: NEGATIVE for changes in mood or affect  ROS otherwise negative    EXAM:   /70   Pulse 77   Temp 97.9  F (36.6  C) (Oral)   Resp 14   Ht 1.702 m (5' 7\")   Wt 99.3 kg (219 lb)   SpO2 97%   BMI 34.30 kg/m    GENERAL APPEARANCE: healthy, alert and no distress  HENT: ear canals and TM's normal and nose and mouth without ulcers or lesions  NECK: no adenopathy, no asymmetry, masses, or scars and thyroid normal to palpation  RESP: lungs clear to auscultation - no rales, rhonchi or wheezes  CV: regular rate and rhythm, normal S1 S2, no S3 or S4 and no murmur, click or rub   ABDOMEN: soft, nontender, no HSM or masses and bowel sounds normal  MS: extremities normal- no gross deformities noted  SKIN: no suspicious lesions or rashes  NEURO: Normal strength and tone, sensory exam grossly normal, mentation intact and speech normal  PSYCH: mentation appears normal and affect normal/bright    DIAGNOSTICS:     EKG: appears normal, NSR, anterior fasicular block, left axis, normal intervals, no acute ST/T changes c/w ischemia, no LVH by voltage criteria, unchanged from previous tracings  Labs Drawn and in Process:   Unresulted Labs Ordered in the Past 30 Days of this Admission     Date and Time Order Name Status Description    8/6/2019 0829 BASIC METABOLIC PANEL In process     8/6/2019 0829 CBC WITH PLATELETS In process           Recent Labs   Lab Test 05/21/19  0812 05/23/18  0730  12/27/16  0950  09/13/13  0929  08/28/12  1706   HGB  --   --   --  15.3  --  14.5  --   --    PLT  --   --   --  302  --  307  --   --     137   < >  --    < > 144   < >  --    POTASSIUM 4.6 4.5   < >  --    < > 4.6   < >  --    CR 0.87 0.86   < >  --    < > 0.75   < >  --    A1C  --   --   --   --   --   --   --  5.5    < > = values in this interval not displayed.    "     IMPRESSION:   Reason for surgery/procedure: spondylolistheses lumbar, lumbar radiculopathy   Diagnosis/reason for consult: hx of CVA, TIA, Hyperlipidemia, HTN, Impaired fasting glucose obesity     The proposed surgical procedure is considered INTERMEDIATE risk.    REVISED CARDIAC RISK INDEX  The patient has the following serious cardiovascular risks for perioperative complications such as (MI, PE, VFib and 3  AV Block):  Cerebrovascular Disease (TIA or CVA)  INTERPRETATION: 1 risks: Class II (low risk - 0.9% complication rate)    The patient has the following additional risks for perioperative complications:   obesity      ICD-10-CM    1. Preop general physical exam Z01.818 EKG 12-lead complete w/read - Clinics     CBC with platelets     Basic metabolic panel   2. Spondylolisthesis of lumbar region M43.16 EKG 12-lead complete w/read - Clinics     CBC with platelets     Basic metabolic panel   3. Lumbar radiculopathy M54.16 EKG 12-lead complete w/read - Clinics     CBC with platelets     Basic metabolic panel   4. Cerebrovascular disease I67.9    5. Hyperlipidemia LDL goal <100 E78.5    6. Essential hypertension I10 EKG 12-lead complete w/read - Clinics     CBC with platelets     Basic metabolic panel   7. Impaired fasting glucose R73.01 EKG 12-lead complete w/read - Clinics     CBC with platelets     Basic metabolic panel   8. Severe obesity with body mass index (BMI) of 35.0 to 39.9 with comorbidity (H) E66.01        RECOMMENDATIONS:     --Consult hospital rounder / IM to assist post-op medical management    Cardiovascular Risk  Patient is already on a Beta Blocker. Continue Betablocker therapy after surgery, using Beta blocker order set as necessary for NPO status.      --Patient is to take all scheduled medications on the day of surgery EXCEPT for modifications listed below.    Anticoagulant or Antiplatelet Medication Use  ASPIRIN: Discontinue ASA 7-10 days prior to procedure to reduce bleeding risk.  It  should be resumed post-operatively.  PLAVIX: No contraindication to stopping plavix.  Stop 7-10 days prior to surgery        ACE Inhibitor or Angiotensin Receptor Blocker (ARB) Use  Ace inhibitor or Angiotensin Receptor Blocker (ARB) and will continue this medication due to the higher risk of uncontrolled perioperative hypertension (e.g. neurosurgical procedure)      Pending review of diagnostic evaluation, APPROVAL GIVEN to proceed with proposed procedure, without further diagnostic evaluation.       Signed Electronically by: Keara Mcfarlane PA-C    Copy of this evaluation report is provided to requesting physician.    [place] Preop Guidelines    Revised Cardiac Risk Index

## 2019-08-09 ENCOUNTER — TELEPHONE (OUTPATIENT)
Dept: NEUROSURGERY | Facility: CLINIC | Age: 79
End: 2019-08-09

## 2019-08-09 NOTE — TELEPHONE ENCOUNTER
Type of surgery: L3-5 TLIFs  Location of surgery: [facility]  Date and time of surgery: 08/21/22019 at 7:30am  Surgeon: ORION Perez  Pre-Op Appt Date: 08/06/2019 at 8:00am w/ RONAL Mcfarlane ([facility])  Post-Op Appt Date: 09/04/2019  Packet sent out: Yes  Pre-cert/Authorization completed:  Yes  Date: 08/09/2019 STELLA

## 2019-08-20 ENCOUNTER — ANESTHESIA EVENT (OUTPATIENT)
Dept: SURGERY | Facility: CLINIC | Age: 79
DRG: 460 | End: 2019-08-20
Payer: COMMERCIAL

## 2019-08-21 ENCOUNTER — APPOINTMENT (OUTPATIENT)
Dept: GENERAL RADIOLOGY | Facility: CLINIC | Age: 79
DRG: 460 | End: 2019-08-21
Attending: NEUROLOGICAL SURGERY
Payer: COMMERCIAL

## 2019-08-21 ENCOUNTER — ANESTHESIA (OUTPATIENT)
Dept: SURGERY | Facility: CLINIC | Age: 79
DRG: 460 | End: 2019-08-21
Payer: COMMERCIAL

## 2019-08-21 ENCOUNTER — HOSPITAL ENCOUNTER (INPATIENT)
Facility: CLINIC | Age: 79
LOS: 5 days | Discharge: HOME | DRG: 460 | End: 2019-08-26
Attending: NEUROLOGICAL SURGERY | Admitting: NEUROLOGICAL SURGERY
Payer: COMMERCIAL

## 2019-08-21 DIAGNOSIS — Z98.1 STATUS POST LUMBAR SPINAL FUSION: Primary | ICD-10-CM

## 2019-08-21 DIAGNOSIS — Z00.00 ROUTINE GENERAL MEDICAL EXAMINATION AT A HEALTH CARE FACILITY: ICD-10-CM

## 2019-08-21 DIAGNOSIS — I10 ESSENTIAL HYPERTENSION: ICD-10-CM

## 2019-08-21 DIAGNOSIS — G45.0 VERTEBROBASILAR ARTERY SYNDROME: ICD-10-CM

## 2019-08-21 LAB
ABO + RH BLD: NORMAL
ABO + RH BLD: NORMAL
BLD GP AB SCN SERPL QL: NORMAL
BLOOD BANK CMNT PATIENT-IMP: NORMAL
POTASSIUM SERPL-SCNC: 3.9 MMOL/L (ref 3.4–5.3)
SPECIMEN EXP DATE BLD: NORMAL

## 2019-08-21 PROCEDURE — 0ST20ZZ RESECTION OF LUMBAR VERTEBRAL DISC, OPEN APPROACH: ICD-10-PCS | Performed by: NEUROLOGICAL SURGERY

## 2019-08-21 PROCEDURE — 25000128 H RX IP 250 OP 636: Performed by: NURSE ANESTHETIST, CERTIFIED REGISTERED

## 2019-08-21 PROCEDURE — 25800030 ZZH RX IP 258 OP 636: Performed by: PHYSICIAN ASSISTANT

## 2019-08-21 PROCEDURE — 71000013 ZZH RECOVERY PHASE 1 LEVEL 1 EA ADDTL HR: Performed by: NEUROLOGICAL SURGERY

## 2019-08-21 PROCEDURE — 22634 ARTHRD CMBN 1NTRSPC EA ADDL: CPT | Performed by: NEUROLOGICAL SURGERY

## 2019-08-21 PROCEDURE — 27210794 ZZH OR GENERAL SUPPLY STERILE: Performed by: NEUROLOGICAL SURGERY

## 2019-08-21 PROCEDURE — 12000000 ZZH R&B MED SURG/OB

## 2019-08-21 PROCEDURE — 86850 RBC ANTIBODY SCREEN: CPT | Performed by: ANESTHESIOLOGY

## 2019-08-21 PROCEDURE — 22633 ARTHRD CMBN 1NTRSPC LUMBAR: CPT | Mod: AS | Performed by: PHYSICIAN ASSISTANT

## 2019-08-21 PROCEDURE — P9041 ALBUMIN (HUMAN),5%, 50ML: HCPCS | Performed by: NURSE ANESTHETIST, CERTIFIED REGISTERED

## 2019-08-21 PROCEDURE — 25800030 ZZH RX IP 258 OP 636: Performed by: ANESTHESIOLOGY

## 2019-08-21 PROCEDURE — 63048 LAM FACETEC &FORAMOT EA ADDL: CPT | Mod: AS | Performed by: PHYSICIAN ASSISTANT

## 2019-08-21 PROCEDURE — 36415 COLL VENOUS BLD VENIPUNCTURE: CPT | Performed by: ANESTHESIOLOGY

## 2019-08-21 PROCEDURE — 63047 LAM FACETEC & FORAMOT LUMBAR: CPT | Mod: AS | Performed by: PHYSICIAN ASSISTANT

## 2019-08-21 PROCEDURE — 25000125 ZZHC RX 250: Performed by: NEUROLOGICAL SURGERY

## 2019-08-21 PROCEDURE — 37000008 ZZH ANESTHESIA TECHNICAL FEE, 1ST 30 MIN: Performed by: NEUROLOGICAL SURGERY

## 2019-08-21 PROCEDURE — 22634 ARTHRD CMBN 1NTRSPC EA ADDL: CPT | Mod: AS | Performed by: PHYSICIAN ASSISTANT

## 2019-08-21 PROCEDURE — 25000128 H RX IP 250 OP 636: Performed by: ANESTHESIOLOGY

## 2019-08-21 PROCEDURE — 63048 LAM FACETEC &FORAMOT EA ADDL: CPT | Performed by: NEUROLOGICAL SURGERY

## 2019-08-21 PROCEDURE — 25000132 ZZH RX MED GY IP 250 OP 250 PS 637: Performed by: PHYSICIAN ASSISTANT

## 2019-08-21 PROCEDURE — C1763 CONN TISS, NON-HUMAN: HCPCS | Performed by: NEUROLOGICAL SURGERY

## 2019-08-21 PROCEDURE — 20930 SP BONE ALGRFT MORSEL ADD-ON: CPT | Performed by: NEUROLOGICAL SURGERY

## 2019-08-21 PROCEDURE — 36000077 ZZH SURGERY LEVEL 6 W FLUORO 1ST 30 MIN: Performed by: NEUROLOGICAL SURGERY

## 2019-08-21 PROCEDURE — 84132 ASSAY OF SERUM POTASSIUM: CPT | Performed by: ANESTHESIOLOGY

## 2019-08-21 PROCEDURE — 22842 INSERT SPINE FIXATION DEVICE: CPT | Performed by: NEUROLOGICAL SURGERY

## 2019-08-21 PROCEDURE — 25000566 ZZH SEVOFLURANE, EA 15 MIN: Performed by: NEUROLOGICAL SURGERY

## 2019-08-21 PROCEDURE — 25800030 ZZH RX IP 258 OP 636: Performed by: NURSE ANESTHETIST, CERTIFIED REGISTERED

## 2019-08-21 PROCEDURE — 86900 BLOOD TYPING SEROLOGIC ABO: CPT | Performed by: ANESTHESIOLOGY

## 2019-08-21 PROCEDURE — 25000128 H RX IP 250 OP 636: Performed by: PHYSICIAN ASSISTANT

## 2019-08-21 PROCEDURE — 71000012 ZZH RECOVERY PHASE 1 LEVEL 1 FIRST HR: Performed by: NEUROLOGICAL SURGERY

## 2019-08-21 PROCEDURE — 22633 ARTHRD CMBN 1NTRSPC LUMBAR: CPT | Performed by: NEUROLOGICAL SURGERY

## 2019-08-21 PROCEDURE — C1713 ANCHOR/SCREW BN/BN,TIS/BN: HCPCS | Performed by: NEUROLOGICAL SURGERY

## 2019-08-21 PROCEDURE — 01NB0ZZ RELEASE LUMBAR NERVE, OPEN APPROACH: ICD-10-PCS | Performed by: NEUROLOGICAL SURGERY

## 2019-08-21 PROCEDURE — 22853 INSJ BIOMECHANICAL DEVICE: CPT | Performed by: NEUROLOGICAL SURGERY

## 2019-08-21 PROCEDURE — 86901 BLOOD TYPING SEROLOGIC RH(D): CPT | Performed by: ANESTHESIOLOGY

## 2019-08-21 PROCEDURE — 8E0WXBG COMPUTER ASSISTED PROCEDURE OF TRUNK REGION, WITH COMPUTERIZED TOMOGRAPHY: ICD-10-PCS | Performed by: NEUROLOGICAL SURGERY

## 2019-08-21 PROCEDURE — 37000009 ZZH ANESTHESIA TECHNICAL FEE, EACH ADDTL 15 MIN: Performed by: NEUROLOGICAL SURGERY

## 2019-08-21 PROCEDURE — 22853 INSJ BIOMECHANICAL DEVICE: CPT | Mod: AS | Performed by: PHYSICIAN ASSISTANT

## 2019-08-21 PROCEDURE — 36000075 ZZH SURGERY LEVEL 6 EA 15 ADDTL MIN: Performed by: NEUROLOGICAL SURGERY

## 2019-08-21 PROCEDURE — 25000125 ZZHC RX 250: Performed by: NURSE ANESTHETIST, CERTIFIED REGISTERED

## 2019-08-21 PROCEDURE — 0SG10AJ FUSION OF 2 OR MORE LUMBAR VERTEBRAL JOINTS WITH INTERBODY FUSION DEVICE, POSTERIOR APPROACH, ANTERIOR COLUMN, OPEN APPROACH: ICD-10-PCS | Performed by: NEUROLOGICAL SURGERY

## 2019-08-21 PROCEDURE — 40000170 ZZH STATISTIC PRE-PROCEDURE ASSESSMENT II: Performed by: NEUROLOGICAL SURGERY

## 2019-08-21 PROCEDURE — 22842 INSERT SPINE FIXATION DEVICE: CPT | Mod: AS | Performed by: PHYSICIAN ASSISTANT

## 2019-08-21 PROCEDURE — 25000128 H RX IP 250 OP 636: Performed by: NEUROLOGICAL SURGERY

## 2019-08-21 PROCEDURE — 63047 LAM FACETEC & FORAMOT LUMBAR: CPT | Mod: 51 | Performed by: NEUROLOGICAL SURGERY

## 2019-08-21 PROCEDURE — 40000277 XR SURGERY CARM FLUORO LESS THAN 5 MIN W STILLS

## 2019-08-21 DEVICE — IMPLANTABLE DEVICE: Type: IMPLANTABLE DEVICE | Site: SPINE LUMBAR | Status: FUNCTIONAL

## 2019-08-21 DEVICE — GRAFT BONE FOAM STRIP VITOSS BIO ACTIVE 25X100X8MM 2102-1520: Type: IMPLANTABLE DEVICE | Site: SPINE LUMBAR | Status: FUNCTIONAL

## 2019-08-21 RX ORDER — SODIUM CHLORIDE 9 MG/ML
INJECTION, SOLUTION INTRAVENOUS CONTINUOUS
Status: DISCONTINUED | OUTPATIENT
Start: 2019-08-21 | End: 2019-08-23

## 2019-08-21 RX ORDER — OXYCODONE HYDROCHLORIDE 5 MG/1
5-10 TABLET ORAL EVERY 4 HOURS PRN
Status: DISCONTINUED | OUTPATIENT
Start: 2019-08-21 | End: 2019-08-26 | Stop reason: HOSPADM

## 2019-08-21 RX ORDER — BUPIVACAINE HYDROCHLORIDE AND EPINEPHRINE 5; 5 MG/ML; UG/ML
INJECTION, SOLUTION PERINEURAL PRN
Status: DISCONTINUED | OUTPATIENT
Start: 2019-08-21 | End: 2019-08-21 | Stop reason: HOSPADM

## 2019-08-21 RX ORDER — AMOXICILLIN 250 MG
1 CAPSULE ORAL 2 TIMES DAILY
Status: DISCONTINUED | OUTPATIENT
Start: 2019-08-21 | End: 2019-08-26 | Stop reason: HOSPADM

## 2019-08-21 RX ORDER — METOCLOPRAMIDE HYDROCHLORIDE 5 MG/ML
5 INJECTION INTRAMUSCULAR; INTRAVENOUS EVERY 6 HOURS PRN
Status: DISCONTINUED | OUTPATIENT
Start: 2019-08-21 | End: 2019-08-26 | Stop reason: HOSPADM

## 2019-08-21 RX ORDER — NALOXONE HYDROCHLORIDE 0.4 MG/ML
.1-.4 INJECTION, SOLUTION INTRAMUSCULAR; INTRAVENOUS; SUBCUTANEOUS
Status: ACTIVE | OUTPATIENT
Start: 2019-08-21 | End: 2019-08-22

## 2019-08-21 RX ORDER — NEOSTIGMINE METHYLSULFATE 1 MG/ML
VIAL (ML) INJECTION PRN
Status: DISCONTINUED | OUTPATIENT
Start: 2019-08-21 | End: 2019-08-21

## 2019-08-21 RX ORDER — PROCHLORPERAZINE MALEATE 5 MG
5 TABLET ORAL EVERY 6 HOURS PRN
Status: DISCONTINUED | OUTPATIENT
Start: 2019-08-21 | End: 2019-08-26 | Stop reason: HOSPADM

## 2019-08-21 RX ORDER — CEFAZOLIN SODIUM 1 G/3ML
1 INJECTION, POWDER, FOR SOLUTION INTRAMUSCULAR; INTRAVENOUS EVERY 8 HOURS
Status: COMPLETED | OUTPATIENT
Start: 2019-08-21 | End: 2019-08-22

## 2019-08-21 RX ORDER — HYDROMORPHONE HYDROCHLORIDE 1 MG/ML
.3-.5 INJECTION, SOLUTION INTRAMUSCULAR; INTRAVENOUS; SUBCUTANEOUS EVERY 5 MIN PRN
Status: DISCONTINUED | OUTPATIENT
Start: 2019-08-21 | End: 2019-08-21 | Stop reason: HOSPADM

## 2019-08-21 RX ORDER — ACETAMINOPHEN 325 MG/1
650 TABLET ORAL EVERY 4 HOURS PRN
Status: DISCONTINUED | OUTPATIENT
Start: 2019-08-24 | End: 2019-08-26 | Stop reason: HOSPADM

## 2019-08-21 RX ORDER — SODIUM CHLORIDE, SODIUM LACTATE, POTASSIUM CHLORIDE, CALCIUM CHLORIDE 600; 310; 30; 20 MG/100ML; MG/100ML; MG/100ML; MG/100ML
INJECTION, SOLUTION INTRAVENOUS CONTINUOUS
Status: DISCONTINUED | OUTPATIENT
Start: 2019-08-21 | End: 2019-08-21 | Stop reason: HOSPADM

## 2019-08-21 RX ORDER — ACETAMINOPHEN 325 MG/1
650 TABLET ORAL EVERY 4 HOURS PRN
Status: DISCONTINUED | OUTPATIENT
Start: 2019-08-21 | End: 2019-08-21

## 2019-08-21 RX ORDER — LIDOCAINE HYDROCHLORIDE 20 MG/ML
INJECTION, SOLUTION INFILTRATION; PERINEURAL PRN
Status: DISCONTINUED | OUTPATIENT
Start: 2019-08-21 | End: 2019-08-21

## 2019-08-21 RX ORDER — ACETAMINOPHEN 325 MG/1
975 TABLET ORAL ONCE
Status: COMPLETED | OUTPATIENT
Start: 2019-08-21 | End: 2019-08-21

## 2019-08-21 RX ORDER — MEPERIDINE HYDROCHLORIDE 25 MG/ML
12.5 INJECTION INTRAMUSCULAR; INTRAVENOUS; SUBCUTANEOUS EVERY 5 MIN PRN
Status: DISCONTINUED | OUTPATIENT
Start: 2019-08-21 | End: 2019-08-21 | Stop reason: HOSPADM

## 2019-08-21 RX ORDER — VECURONIUM BROMIDE 1 MG/ML
INJECTION, POWDER, LYOPHILIZED, FOR SOLUTION INTRAVENOUS PRN
Status: DISCONTINUED | OUTPATIENT
Start: 2019-08-21 | End: 2019-08-21

## 2019-08-21 RX ORDER — CEFAZOLIN SODIUM 2 G/100ML
2 INJECTION, SOLUTION INTRAVENOUS
Status: COMPLETED | OUTPATIENT
Start: 2019-08-21 | End: 2019-08-21

## 2019-08-21 RX ORDER — SODIUM CHLORIDE, SODIUM LACTATE, POTASSIUM CHLORIDE, CALCIUM CHLORIDE 600; 310; 30; 20 MG/100ML; MG/100ML; MG/100ML; MG/100ML
INJECTION, SOLUTION INTRAVENOUS CONTINUOUS PRN
Status: DISCONTINUED | OUTPATIENT
Start: 2019-08-21 | End: 2019-08-21

## 2019-08-21 RX ORDER — ONDANSETRON 4 MG/1
4 TABLET, ORALLY DISINTEGRATING ORAL EVERY 6 HOURS PRN
Status: DISCONTINUED | OUTPATIENT
Start: 2019-08-21 | End: 2019-08-26 | Stop reason: HOSPADM

## 2019-08-21 RX ORDER — FENTANYL CITRATE 50 UG/ML
INJECTION, SOLUTION INTRAMUSCULAR; INTRAVENOUS PRN
Status: DISCONTINUED | OUTPATIENT
Start: 2019-08-21 | End: 2019-08-21

## 2019-08-21 RX ORDER — ONDANSETRON 2 MG/ML
4 INJECTION INTRAMUSCULAR; INTRAVENOUS EVERY 30 MIN PRN
Status: DISCONTINUED | OUTPATIENT
Start: 2019-08-21 | End: 2019-08-21 | Stop reason: HOSPADM

## 2019-08-21 RX ORDER — ALBUMIN, HUMAN INJ 5% 5 %
SOLUTION INTRAVENOUS CONTINUOUS PRN
Status: DISCONTINUED | OUTPATIENT
Start: 2019-08-21 | End: 2019-08-21

## 2019-08-21 RX ORDER — CEFAZOLIN SODIUM 1 G/3ML
1 INJECTION, POWDER, FOR SOLUTION INTRAMUSCULAR; INTRAVENOUS SEE ADMIN INSTRUCTIONS
Status: DISCONTINUED | OUTPATIENT
Start: 2019-08-21 | End: 2019-08-21 | Stop reason: HOSPADM

## 2019-08-21 RX ORDER — GABAPENTIN 100 MG/1
100 CAPSULE ORAL
Status: COMPLETED | OUTPATIENT
Start: 2019-08-21 | End: 2019-08-21

## 2019-08-21 RX ORDER — ACETAMINOPHEN 325 MG/1
650 TABLET ORAL EVERY 6 HOURS PRN
Status: ON HOLD | COMMUNITY
End: 2019-08-26

## 2019-08-21 RX ORDER — METHOCARBAMOL 750 MG/1
750 TABLET, FILM COATED ORAL EVERY 6 HOURS PRN
Status: DISCONTINUED | OUTPATIENT
Start: 2019-08-21 | End: 2019-08-26 | Stop reason: HOSPADM

## 2019-08-21 RX ORDER — BACITRACIN ZINC 500 [USP'U]/G
OINTMENT TOPICAL PRN
Status: DISCONTINUED | OUTPATIENT
Start: 2019-08-21 | End: 2019-08-21 | Stop reason: HOSPADM

## 2019-08-21 RX ORDER — EPHEDRINE SULFATE 50 MG/ML
INJECTION, SOLUTION INTRAMUSCULAR; INTRAVENOUS; SUBCUTANEOUS PRN
Status: DISCONTINUED | OUTPATIENT
Start: 2019-08-21 | End: 2019-08-21

## 2019-08-21 RX ORDER — METOCLOPRAMIDE 5 MG/1
5 TABLET ORAL EVERY 6 HOURS PRN
Status: DISCONTINUED | OUTPATIENT
Start: 2019-08-21 | End: 2019-08-26 | Stop reason: HOSPADM

## 2019-08-21 RX ORDER — HYDROXYZINE HYDROCHLORIDE 10 MG/1
10 TABLET, FILM COATED ORAL EVERY 6 HOURS PRN
Status: DISCONTINUED | OUTPATIENT
Start: 2019-08-21 | End: 2019-08-26 | Stop reason: HOSPADM

## 2019-08-21 RX ORDER — LIDOCAINE 40 MG/G
CREAM TOPICAL
Status: DISCONTINUED | OUTPATIENT
Start: 2019-08-21 | End: 2019-08-26 | Stop reason: HOSPADM

## 2019-08-21 RX ORDER — AMOXICILLIN 250 MG
2 CAPSULE ORAL 2 TIMES DAILY
Status: DISCONTINUED | OUTPATIENT
Start: 2019-08-21 | End: 2019-08-26 | Stop reason: HOSPADM

## 2019-08-21 RX ORDER — NALOXONE HYDROCHLORIDE 0.4 MG/ML
.1-.4 INJECTION, SOLUTION INTRAMUSCULAR; INTRAVENOUS; SUBCUTANEOUS
Status: DISCONTINUED | OUTPATIENT
Start: 2019-08-21 | End: 2019-08-26 | Stop reason: HOSPADM

## 2019-08-21 RX ORDER — PROPOFOL 10 MG/ML
INJECTION, EMULSION INTRAVENOUS PRN
Status: DISCONTINUED | OUTPATIENT
Start: 2019-08-21 | End: 2019-08-21

## 2019-08-21 RX ORDER — ONDANSETRON 2 MG/ML
4 INJECTION INTRAMUSCULAR; INTRAVENOUS EVERY 6 HOURS PRN
Status: DISCONTINUED | OUTPATIENT
Start: 2019-08-21 | End: 2019-08-26 | Stop reason: HOSPADM

## 2019-08-21 RX ORDER — ONDANSETRON 2 MG/ML
INJECTION INTRAMUSCULAR; INTRAVENOUS PRN
Status: DISCONTINUED | OUTPATIENT
Start: 2019-08-21 | End: 2019-08-21

## 2019-08-21 RX ORDER — HYDROMORPHONE HYDROCHLORIDE 1 MG/ML
.3-.5 INJECTION, SOLUTION INTRAMUSCULAR; INTRAVENOUS; SUBCUTANEOUS
Status: DISCONTINUED | OUTPATIENT
Start: 2019-08-21 | End: 2019-08-26 | Stop reason: HOSPADM

## 2019-08-21 RX ORDER — ONDANSETRON 4 MG/1
4 TABLET, ORALLY DISINTEGRATING ORAL EVERY 30 MIN PRN
Status: DISCONTINUED | OUTPATIENT
Start: 2019-08-21 | End: 2019-08-21 | Stop reason: HOSPADM

## 2019-08-21 RX ORDER — ACETAMINOPHEN 325 MG/1
975 TABLET ORAL EVERY 8 HOURS
Status: COMPLETED | OUTPATIENT
Start: 2019-08-21 | End: 2019-08-24

## 2019-08-21 RX ORDER — FENTANYL CITRATE 50 UG/ML
25-50 INJECTION, SOLUTION INTRAMUSCULAR; INTRAVENOUS
Status: DISCONTINUED | OUTPATIENT
Start: 2019-08-21 | End: 2019-08-21 | Stop reason: HOSPADM

## 2019-08-21 RX ORDER — GLYCOPYRROLATE 0.2 MG/ML
INJECTION, SOLUTION INTRAMUSCULAR; INTRAVENOUS PRN
Status: DISCONTINUED | OUTPATIENT
Start: 2019-08-21 | End: 2019-08-21

## 2019-08-21 RX ORDER — CALCIUM CARBONATE 500 MG/1
1000 TABLET, CHEWABLE ORAL 4 TIMES DAILY PRN
Status: DISCONTINUED | OUTPATIENT
Start: 2019-08-21 | End: 2019-08-26 | Stop reason: HOSPADM

## 2019-08-21 RX ADMIN — SODIUM CHLORIDE, POTASSIUM CHLORIDE, SODIUM LACTATE AND CALCIUM CHLORIDE: 600; 310; 30; 20 INJECTION, SOLUTION INTRAVENOUS at 08:38

## 2019-08-21 RX ADMIN — PHENYLEPHRINE HYDROCHLORIDE 100 MCG: 10 INJECTION INTRAVENOUS at 08:23

## 2019-08-21 RX ADMIN — FENTANYL CITRATE 25 MCG: 50 INJECTION, SOLUTION INTRAMUSCULAR; INTRAVENOUS at 09:53

## 2019-08-21 RX ADMIN — FENTANYL CITRATE 50 MCG: 50 INJECTION, SOLUTION INTRAMUSCULAR; INTRAVENOUS at 10:46

## 2019-08-21 RX ADMIN — ALBUMIN HUMAN: 0.05 INJECTION, SOLUTION INTRAVENOUS at 08:21

## 2019-08-21 RX ADMIN — VECURONIUM BROMIDE 1 MG: 1 INJECTION, POWDER, LYOPHILIZED, FOR SOLUTION INTRAVENOUS at 09:54

## 2019-08-21 RX ADMIN — SODIUM CHLORIDE, POTASSIUM CHLORIDE, SODIUM LACTATE AND CALCIUM CHLORIDE: 600; 310; 30; 20 INJECTION, SOLUTION INTRAVENOUS at 13:20

## 2019-08-21 RX ADMIN — PROPOFOL 140 MG: 10 INJECTION, EMULSION INTRAVENOUS at 07:42

## 2019-08-21 RX ADMIN — CEFAZOLIN 1 G: 1 INJECTION, POWDER, FOR SOLUTION INTRAMUSCULAR; INTRAVENOUS at 17:13

## 2019-08-21 RX ADMIN — RANITIDINE 150 MG: 150 TABLET ORAL at 22:24

## 2019-08-21 RX ADMIN — Medication 5 MG: at 08:10

## 2019-08-21 RX ADMIN — HYDROMORPHONE HYDROCHLORIDE 0.5 MG: 1 INJECTION, SOLUTION INTRAMUSCULAR; INTRAVENOUS; SUBCUTANEOUS at 09:59

## 2019-08-21 RX ADMIN — GABAPENTIN 100 MG: 100 CAPSULE ORAL at 06:08

## 2019-08-21 RX ADMIN — PHENYLEPHRINE HYDROCHLORIDE 100 MCG: 10 INJECTION INTRAVENOUS at 08:30

## 2019-08-21 RX ADMIN — SENNOSIDES AND DOCUSATE SODIUM 1 TABLET: 8.6; 5 TABLET ORAL at 22:24

## 2019-08-21 RX ADMIN — DEXMEDETOMIDINE HYDROCHLORIDE 12 MCG: 100 INJECTION, SOLUTION INTRAVENOUS at 10:51

## 2019-08-21 RX ADMIN — Medication 5 MG: at 08:21

## 2019-08-21 RX ADMIN — ACETAMINOPHEN 975 MG: 325 TABLET, FILM COATED ORAL at 17:05

## 2019-08-21 RX ADMIN — CEFAZOLIN 1 G: 1 INJECTION, POWDER, FOR SOLUTION INTRAMUSCULAR; INTRAVENOUS at 09:59

## 2019-08-21 RX ADMIN — FENTANYL CITRATE 50 MCG: 50 INJECTION, SOLUTION INTRAMUSCULAR; INTRAVENOUS at 08:17

## 2019-08-21 RX ADMIN — Medication 5 MG: at 08:15

## 2019-08-21 RX ADMIN — OXYCODONE HYDROCHLORIDE 5 MG: 5 TABLET ORAL at 22:28

## 2019-08-21 RX ADMIN — VECURONIUM BROMIDE 1 MG: 1 INJECTION, POWDER, LYOPHILIZED, FOR SOLUTION INTRAVENOUS at 10:27

## 2019-08-21 RX ADMIN — ROCURONIUM BROMIDE 50 MG: 10 INJECTION INTRAVENOUS at 07:42

## 2019-08-21 RX ADMIN — SODIUM CHLORIDE, POTASSIUM CHLORIDE, SODIUM LACTATE AND CALCIUM CHLORIDE: 600; 310; 30; 20 INJECTION, SOLUTION INTRAVENOUS at 07:55

## 2019-08-21 RX ADMIN — SODIUM CHLORIDE: 9 INJECTION, SOLUTION INTRAVENOUS at 17:13

## 2019-08-21 RX ADMIN — PHENYLEPHRINE HYDROCHLORIDE 100 MCG: 10 INJECTION INTRAVENOUS at 08:27

## 2019-08-21 RX ADMIN — LIDOCAINE HYDROCHLORIDE 70 MG: 20 INJECTION, SOLUTION INFILTRATION; PERINEURAL at 07:42

## 2019-08-21 RX ADMIN — PHENYLEPHRINE HYDROCHLORIDE 50 MCG: 10 INJECTION INTRAVENOUS at 07:42

## 2019-08-21 RX ADMIN — PHENYLEPHRINE HYDROCHLORIDE 0.3 MCG/KG/MIN: 10 INJECTION INTRAVENOUS at 08:35

## 2019-08-21 RX ADMIN — PHENYLEPHRINE HYDROCHLORIDE 100 MCG: 10 INJECTION INTRAVENOUS at 08:20

## 2019-08-21 RX ADMIN — Medication 1 LOZENGE: at 17:05

## 2019-08-21 RX ADMIN — SODIUM CHLORIDE, POTASSIUM CHLORIDE, SODIUM LACTATE AND CALCIUM CHLORIDE: 600; 310; 30; 20 INJECTION, SOLUTION INTRAVENOUS at 06:08

## 2019-08-21 RX ADMIN — FENTANYL CITRATE 25 MCG: 50 INJECTION, SOLUTION INTRAMUSCULAR; INTRAVENOUS at 09:19

## 2019-08-21 RX ADMIN — ALBUMIN HUMAN: 0.05 INJECTION, SOLUTION INTRAVENOUS at 09:45

## 2019-08-21 RX ADMIN — NEOSTIGMINE METHYLSULFATE 5 MG: 1 INJECTION, SOLUTION INTRAVENOUS at 11:19

## 2019-08-21 RX ADMIN — HYDROMORPHONE HYDROCHLORIDE 0.5 MG: 1 INJECTION, SOLUTION INTRAMUSCULAR; INTRAVENOUS; SUBCUTANEOUS at 17:10

## 2019-08-21 RX ADMIN — HYDROMORPHONE HYDROCHLORIDE 0.5 MG: 1 INJECTION, SOLUTION INTRAMUSCULAR; INTRAVENOUS; SUBCUTANEOUS at 13:16

## 2019-08-21 RX ADMIN — CEFAZOLIN SODIUM 2 G: 2 INJECTION, SOLUTION INTRAVENOUS at 08:00

## 2019-08-21 RX ADMIN — ACETAMINOPHEN 975 MG: 325 TABLET, FILM COATED ORAL at 22:24

## 2019-08-21 RX ADMIN — ONDANSETRON 4 MG: 2 INJECTION INTRAMUSCULAR; INTRAVENOUS at 10:57

## 2019-08-21 RX ADMIN — HYDROMORPHONE HYDROCHLORIDE 0.3 MG: 1 INJECTION, SOLUTION INTRAMUSCULAR; INTRAVENOUS; SUBCUTANEOUS at 22:33

## 2019-08-21 RX ADMIN — GLYCOPYRROLATE 1 MG: 0.2 INJECTION, SOLUTION INTRAMUSCULAR; INTRAVENOUS at 11:19

## 2019-08-21 RX ADMIN — VECURONIUM BROMIDE 3 MG: 1 INJECTION, POWDER, LYOPHILIZED, FOR SOLUTION INTRAVENOUS at 08:33

## 2019-08-21 RX ADMIN — ACETAMINOPHEN 975 MG: 325 TABLET, FILM COATED ORAL at 06:09

## 2019-08-21 RX ADMIN — PHENYLEPHRINE HYDROCHLORIDE 100 MCG: 10 INJECTION INTRAVENOUS at 08:09

## 2019-08-21 RX ADMIN — FENTANYL CITRATE 100 MCG: 50 INJECTION, SOLUTION INTRAMUSCULAR; INTRAVENOUS at 07:42

## 2019-08-21 ASSESSMENT — MIFFLIN-ST. JEOR: SCORE: 1634.79

## 2019-08-21 ASSESSMENT — ACTIVITIES OF DAILY LIVING (ADL)
ADLS_ACUITY_SCORE: 11
ADLS_ACUITY_SCORE: 11

## 2019-08-21 NOTE — OP NOTE
Date of surgery: 8/21/2019  Surgeon: Harrison Perez MD  Assistant: RONAL Webber  Note: Suleman Burden was present for and assisted with the entire surgery, and his/her role as an assistant was crucial for aid in positioning, exposure, suctioning, retraction, and closure     Preoperative diagnosis: L3-4 spondylolisthesis, L3-4 and L4-5 prior decompression surgery, Severe lumbar stenosis  Postoperative diagnosis: L3-4 spondylolisthesis, L3-4 and L4-5 prior decompression surgery, Severe lumbar stenosis     Procedure:  1.  L3-L5 posterior segmental instrumentation with insertion of bilateral pedicle screws and rods ([place]rent[place] Garcia)  2.  L3-4 bilateral laminectomies for decompression of central stenosis  3.  L4-5 bilateral laminectomies for decompression of central stenosis  4.  Right L3-4 complete facetectomy, transforaminal discectomy, and interbody arthrodesis  5.  Right L4-5 complete facetectomy, transforaminal discectomy, and interbody arthrodesis  6.  L3-4 insertion of [place] Tritanium intervertebral graft with Vitoss allograft  7.  L4-5 insertion of [place] Tritanium intervertebral graft with Vitoss allograft  8.  Use of O-Arm intraoperative CT Scan  9.  Use of Medtronic Stealth intraoperative neuro-navigation  10.  Use of intraoperative microscope and fluoroscopy     EBL: 500 mL     Indications: 79-year-old male with multiple prior lumbar decompressions, presented with severe back pain, right leg pain, and weakness.  MRI showed L3-4 sspondylolisthesis and severe stenosis, as well as L4-5 degeneration and surgical changes from prior surgeries, with stenosis.  Risks, benefits, indications, and alternatives were discussed with the patient and family in detail, and they wished to proceed.      Description of surgery: The patient was positioned prone.  Sterile prepping and draping procedures were performed.  Antibiotics were administered and timeout was performed.  A midline lumbar incision was performed.  The  monopolar was used to expose the spinous processes, lamina, facets, and transverse processes from L3-L5.  The reference frame was attached, and an O-arm intra-operative CT scan was performed.  Medtronic Stealth neuro-navigation was registered.  Under navigation, bilateral pedicle screws were inserted at L3, L4, and L5.  The [place] rongeurs was used to remove the spinous processes at L3-4 and L4-5.  The high speed drill was then used to perform bilateral laminectomies at L3-4, and L4-5.  The [place] rongeurs were then used to dissect the scar tissue and remove the ligamentum flavum with decompression of the nerve roots.  The drill was used to perform complete right facetectomies at L3-4 and L4-5.  The 15 blade was used to perform an annulotomy in the disk space at L4-5.  A complete discectomy was performed with joseph, the pituitary rongeurs, and curets.  Interbody arthrodesis was performed with joseph and curets.  A StrykerTritanium intervertebral graft was inserted into the disk space at L4-5 with Vitoss allograft.  Next, the 15 blade was used to perform an annulotomy in the disk space at L3-4.  A complete discectomy was performed with joseph, the pituitary rongeurs, and curets.  Interbody arthrodesis was performed with joseph and curets.  A [place] Tritanium intervertebral graft was inserted into the disk space at L3-4 with Vitoss allograft.  Rods and set screws were inserted.  Antibiotic irrigation was performed.  Hemostasis was achieved.  Fluoroscopy demonstrated excellent positioning of the hardware.  The fascia was closed with 0-Vicryl sutures, and the dermal layer was closed with 2-0 vicryl sutures.  The skin was closed with staples.  There were no intraprocedural complications.

## 2019-08-21 NOTE — BRIEF OP NOTE
[facility] Brief Operative Note    Pre-operative diagnosis: lumbar spondylolisthesis   Post-operative diagnosis same   Procedure: Procedure(s):  L3-5 LUMBAR TRANSFORAMINAL INTERBODY FUSION WITH STEALTH   Surgeon(s): Surgeon(s) and Role:     * Harrison Perez MD - Primary     * Suleman Burden PA-C - Assisting   Estimated blood loss: 900 mL    Specimens: * No specimens in log *   Findings: Lumbar disc degeneration       Suleman Burden PA-C  [facility]

## 2019-08-21 NOTE — PROGRESS NOTES
Admission medication history interview status for the 8/21/2019  admission is complete. See EPIC admission navigator for prior to admission medications     Medication history source reliability:Good    Medication history interview source(s):Patient    Medication history resources (including written lists, pill bottles, clinic record):None    [email or web address]    Additional medication history information not noted on PTA med list :none    Time spent in this activity: 35 minutes    Prior to Admission medications    Medication Sig Last Dose Taking? Auth Provider   amLODIPine (NORVASC) 5 MG tablet Take 1 tablet (5 mg) by mouth daily 8/21/2019 at 0400 Yes Familia Cook MD   aspirin 81 MG tablet Take 1 tablet by mouth daily.  Patient taking differently: Take 81 mg by mouth At Bedtime  8/12/2019 at 0700 Yes Storm Huddleston MD   atorvastatin (LIPITOR) 40 MG tablet Take 1 tablet (40 mg) by mouth daily  Patient taking differently: Take 40 mg by mouth every evening  8/20/2019 at 1900 Yes Familia Cook MD   benazepril (LOTENSIN) 40 MG tablet Take 1 tablet (40 mg) by mouth daily 8/20/2019 at 1900 Yes Familia Cook MD   loratadine (CLARITIN) 10 MG tablet Take 1 tablet (10 mg) by mouth daily 8/20/2019 at 1900 Yes Storm Huddleston MD   metoprolol succinate ER (TOPROL-XL) 50 MG 24 hr tablet TAKE 1 AND 1/2 TABLETS BY MOUTH EVERY DAY 8/21/2019 at 0400 Yes Familia Cook MD   clopidogrel (PLAVIX) 75 MG tablet Take 1 tablet (75 mg) by mouth daily 8/12/2019 at 0800  Familia Cook MD   fluocinonide (LIDEX) 0.05 % external cream Apply sparingly to affected area twice daily as needed.  Do not apply to face. Unknown at prn  Familia Cook MD

## 2019-08-21 NOTE — OR NURSING
Patient Ricardo Neely is in stable condition and has met criteria for PACU discharge.  MALISSA Friedman at bedside and a sign out was given for Unit/Station transfer.

## 2019-08-21 NOTE — PLAN OF CARE
A&Ox4, VSS, O2 saturation at 91% on 1 ltr via nasal cannula, LS clear, BS hypoactive, denies nausea, tolerating clear liquids well, CMS intact, drssing on incision clean dry and intact, Pain controlled with PRN medications.

## 2019-08-21 NOTE — ANESTHESIA PREPROCEDURE EVALUATION
Anesthesia Pre-Procedure Evaluation    Patient: Ricardo Fowleropp   MRN: [id number] : 1940          Preoperative Diagnosis: lumbar spondylothesis    Procedure(s):  L3-5 LUMBAR TRANSFORAMINAL INTERBODY FUSION WITH STEALTH (SONYA FRAME, O-ARM, MIDAS SONYA, LEICA MICROSCOPE, JEANNIE SERATO)^    Past Medical History:   Diagnosis Date     Anxiety 2011     Basal cell carcinoma      Benign Prostatic Hypertrophy      CEREBROVASC DISEASE, small vessel      Essential hypertension, benign      GERD      HIATAL HERNIA      HYPERPLASTIC POLYPS COLON , 3/06     Impaired fasting glucose      Low Back Pain      Obesity, unspecified      Other and unspecified hyperlipidemia      Personal history of urinary calculi      Pneumonia, organism unspecified(486)      TRANSIENT CEREBRAL ISCHEMIA      Past Surgical History:   Procedure Laterality Date     C NONSPECIFIC PROCEDURE      pilonidal cystectomy     C NONSPECIFIC PROCEDURE      kidney stone     C NONSPECIFIC PROCEDURE  approx     R knee arthroscopy     Dr. Thomas RYDER NONSPECIFIC PROCEDURE      L3-4 microdiskectomy   Dr. Fercho RYDER TOTAL KNEE ARTHROPLASTY  2010    Minimally invasive R TKA   Dr. Nichols     COLONOSCOPY       DISCECTOMY LUMBAR POSTERIOR MICROSCOPIC TWO LEVELS  2012    DISCECTOMY LUMBAR POSTERIOR MICROSCOPIC TWO LEVELS;  RIGHT L3-L4 REDO EXTENDED HEMILAMINECTOMY AND MICRO FORAMINOTOMY, LEFT L4-L5 EXTENDED HEMILAMINECTOMY AND MICRODISCECTOMY (PRONE) (SONYA ON XENA, C-ARM, METRIX II);  Surgeon: Bertram Mirza MD;  Location: [facility]     renal calculii removal 40 years ago         Anesthesia Evaluation     .             ROS/MED HX    ENT/Pulmonary:       Neurologic:     (+)TIA other neuro LBP/Sciatica    Cardiovascular:     (+) Dyslipidemia, hypertension----. Taking blood thinners Pt has received instructions: . . . :. .       METS/Exercise Tolerance:     Hematologic:  - neg hematologic  ROS      "  Musculoskeletal:  - neg musculoskeletal ROS       GI/Hepatic:     (+) GERD hiatal hernia,       Renal/Genitourinary:     (+) BPH,       Endo:     (+) Obesity, .      Psychiatric:     (+) psychiatric history anxiety      Infectious Disease:         Malignancy:         Other:                          Physical Exam  Normal systems: cardiovascular and pulmonary    Airway   Mallampati: II  TM distance: >3 FB  Neck ROM: full    Dental   (+) lower dentures and upper dentures    Cardiovascular       Pulmonary             Lab Results   Component Value Date    WBC 7.5 08/06/2019    HGB 15.4 08/06/2019    HCT 44.3 08/06/2019     08/06/2019    SED 7 11/15/2017     08/06/2019    POTASSIUM 3.9 08/21/2019    CHLORIDE 107 08/06/2019    CO2 27 08/06/2019    BUN 18 08/06/2019    CR 0.84 08/06/2019     (H) 08/06/2019    RICARDO 8.7 08/06/2019    ALBUMIN 4.0 09/13/2013    PROTTOTAL 7.4 09/13/2013    ALT 30 05/21/2019    AST 25 09/13/2013    ALKPHOS 99 09/13/2013    BILITOTAL 0.5 09/13/2013    AMYLASE 81 09/13/2013    PTT 26 07/12/2010    INR 1.93 (H) 07/15/2010    TSH 0.69 07/09/2013       Preop Vitals  BP Readings from Last 3 Encounters:   08/21/19 136/65   08/06/19 130/70   08/05/19 (!) 145/77    Pulse Readings from Last 3 Encounters:   08/21/19 82   08/06/19 77   08/05/19 78      Resp Readings from Last 3 Encounters:   08/21/19 16   08/06/19 14   07/01/19 16    SpO2 Readings from Last 3 Encounters:   08/21/19 95%   08/06/19 97%   08/05/19 94%      Temp Readings from Last 1 Encounters:   08/21/19 36.8  C (98.2  F) (Temporal)    Ht Readings from Last 1 Encounters:   08/21/19 1.702 m (5' 7\")      Wt Readings from Last 1 Encounters:   08/21/19 96.6 kg (213 lb)    Estimated body mass index is 33.36 kg/m  as calculated from the following:    Height as of this encounter: 1.702 m (5' 7\").    Weight as of this encounter: 96.6 kg (213 lb).       Anesthesia Plan      History & Physical Review  History and physical reviewed " and following examination; no interval change.    ASA Status:  3 .    NPO Status:  > 8 hours    Plan for General and ETT with Intravenous induction. Maintenance will be Balanced.    PONV prophylaxis:  Ondansetron (or other 5HT-3) and Dexamethasone or Solumedrol  Additional equipment: Arterial Line and 2nd IV      Postoperative Care  Postoperative pain management:  IV analgesics.      Consents  Anesthetic plan, risks, benefits and alternatives discussed with:  Patient.  Use of blood products discussed: Yes.   Use of blood products discussed with Patient.  Consented to blood products.  .                 Dell Mera, , DO

## 2019-08-21 NOTE — PLAN OF CARE
Alert and oriented X 4. Lethargic, sleeping between cares since arrival from PACU. Opens eyes to voice or spontaneously. VSS on 1L. Tolerating clear liquids. Pain improved to 1/10 with IV Dilaudid. IVF's running. Dressing to LB CDI. NEELA drain in place. Bull patent and draining. Neuros, CMS intact. Continue to monitor.

## 2019-08-21 NOTE — ANESTHESIA CARE TRANSFER NOTE
Patient: Ricardo Fowleroptevin    Procedure(s):  L3-5 LUMBAR TRANSFORAMINAL INTERBODY FUSION WITH STEALTH    Diagnosis: lumbar spondylothesis  Diagnosis Additional Information: No value filed.    Anesthesia Type:   General, ETT     Note:  Airway :Face Mask  Patient transferred to:PACU  Comments: Neuromuscular blockade reversed after TOF 4/4, spontaneous respirations, adequate tidal volumes, followed commands to voice, oropharynx suctioned with soft flexible catheter, extubated atraumatically, airway patent after extubation.  Oxygen via facemask at 6 liters per minute to PACU. Oxygen tubing connected to wall O2 in PACU, SpO2, NiBP, and EKG monitors and alarms on and functioning, report on patient's clinical status given to PACU RN, RN questions answered. Handoff Report: Identifed the Patient, Identified the Reponsible Provider, Reviewed the pertinent medical history, Discussed the surgical course, Reviewed Intra-OP anesthesia mangement and issues during anesthesia, Set expectations for post-procedure period and Allowed opportunity for questions and acknowledgement of understanding      Vitals: (Last set prior to Anesthesia Care Transfer)    CRNA VITALS  8/21/2019 1101 - 8/21/2019 1137      8/21/2019             Resp Rate (observed): 10                Electronically Signed By: CHERI Wharton CRNA  August 21, 2019  11:37 AM

## 2019-08-21 NOTE — ANESTHESIA PROCEDURE NOTES
ARTERIAL LINE PROCEDURE NOTE:   Pre-Procedure  Performed by Dell Mera DO  Location: pre-op      Pre-Anesthestic Checklist: patient identified, IV checked, risks and benefits discussed, informed consent, monitors and equipment checked, pre-op evaluation and at physician/surgeon's request    Timeout  Correct Patient: Yes   Correct Procedure: Yes   Correct Site: Yes   Correct Laterality: Yes   Correct Position: Yes   Site Marked: Yes   .   Procedure Documentation  Procedure: arterial line    Supine  Insertion Site:radial, left.Skin infiltrated with 1 mL of 1% lidocaine. Injection technique: Seldinger Technique and ultrasound guided  .  .  Patient Prep;all elements of maximal sterile barrier technique followed, mask, hat, sterile gown, sterile gloves, draped, hand hygiene, chlorhexidine gluconate and isopropyl alcohol    Assessment/Narrative    Catheter: 20 gauge, 1.75 in/4.5 cm quick cath (integral wire)      Tegaderm dressing used.    Arterial waveform: Yes IBP within 10% of NIBP: Yes

## 2019-08-21 NOTE — ANESTHESIA POSTPROCEDURE EVALUATION
Patient: Ricardo Fowleroptevin    Procedure(s):  L3-5 LUMBAR TRANSFORAMINAL INTERBODY FUSION WITH STEALTH    Diagnosis:lumbar spondylothesis  Diagnosis Additional Information: No value filed.    Anesthesia Type:  General, ETT    Note:  Anesthesia Post Evaluation    Patient location during evaluation: PACU  Patient participation: Able to fully participate in evaluation  Level of consciousness: awake  Pain management: adequate  Airway patency: patent  Cardiovascular status: acceptable  Respiratory status: acceptable  Hydration status: acceptable  PONV: none     Anesthetic complications: None          Last vitals:  Vitals:    08/21/19 1320 08/21/19 1326 08/21/19 1400   BP: 100/54  117/46   Pulse: 80     Resp: 16 17 16   Temp:   36.9  C (98.4  F)   SpO2: 92%  91%         Electronically Signed By: Jessee Friedman MD  August 21, 2019  2:22 PM

## 2019-08-22 ENCOUNTER — APPOINTMENT (OUTPATIENT)
Dept: PHYSICAL THERAPY | Facility: CLINIC | Age: 79
DRG: 460 | End: 2019-08-22
Attending: NEUROLOGICAL SURGERY
Payer: COMMERCIAL

## 2019-08-22 ENCOUNTER — APPOINTMENT (OUTPATIENT)
Dept: OCCUPATIONAL THERAPY | Facility: CLINIC | Age: 79
DRG: 460 | End: 2019-08-22
Attending: PHYSICIAN ASSISTANT
Payer: COMMERCIAL

## 2019-08-22 LAB
CREAT SERPL-MCNC: 0.9 MG/DL (ref 0.66–1.25)
GFR SERPL CREATININE-BSD FRML MDRD: 80 ML/MIN/{1.73_M2}
GLUCOSE BLDC GLUCOMTR-MCNC: 103 MG/DL (ref 70–99)
HGB BLD-MCNC: 11.3 G/DL (ref 13.3–17.7)

## 2019-08-22 PROCEDURE — 97530 THERAPEUTIC ACTIVITIES: CPT | Mod: GO

## 2019-08-22 PROCEDURE — 25000128 H RX IP 250 OP 636: Performed by: PHYSICIAN ASSISTANT

## 2019-08-22 PROCEDURE — 97116 GAIT TRAINING THERAPY: CPT | Mod: GP

## 2019-08-22 PROCEDURE — 00000146 ZZHCL STATISTIC GLUCOSE BY METER IP

## 2019-08-22 PROCEDURE — 97161 PT EVAL LOW COMPLEX 20 MIN: CPT | Mod: GP

## 2019-08-22 PROCEDURE — 36415 COLL VENOUS BLD VENIPUNCTURE: CPT | Performed by: NEUROLOGICAL SURGERY

## 2019-08-22 PROCEDURE — 25000132 ZZH RX MED GY IP 250 OP 250 PS 637: Performed by: PHYSICIAN ASSISTANT

## 2019-08-22 PROCEDURE — 97166 OT EVAL MOD COMPLEX 45 MIN: CPT | Mod: GO

## 2019-08-22 PROCEDURE — 25800030 ZZH RX IP 258 OP 636: Performed by: PHYSICIAN ASSISTANT

## 2019-08-22 PROCEDURE — 85018 HEMOGLOBIN: CPT | Performed by: PHYSICIAN ASSISTANT

## 2019-08-22 PROCEDURE — 97530 THERAPEUTIC ACTIVITIES: CPT | Mod: GP

## 2019-08-22 PROCEDURE — 12000000 ZZH R&B MED SURG/OB

## 2019-08-22 PROCEDURE — 36415 COLL VENOUS BLD VENIPUNCTURE: CPT | Performed by: PHYSICIAN ASSISTANT

## 2019-08-22 PROCEDURE — 97535 SELF CARE MNGMENT TRAINING: CPT | Mod: GO

## 2019-08-22 PROCEDURE — 25000132 ZZH RX MED GY IP 250 OP 250 PS 637: Performed by: NEUROLOGICAL SURGERY

## 2019-08-22 PROCEDURE — 82565 ASSAY OF CREATININE: CPT | Performed by: NEUROLOGICAL SURGERY

## 2019-08-22 RX ORDER — ASPIRIN 81 MG/1
81 TABLET ORAL AT BEDTIME
Status: DISCONTINUED | OUTPATIENT
Start: 2019-08-22 | End: 2019-08-25

## 2019-08-22 RX ORDER — METOPROLOL SUCCINATE 50 MG/1
50 TABLET, EXTENDED RELEASE ORAL DAILY
Status: DISCONTINUED | OUTPATIENT
Start: 2019-08-22 | End: 2019-08-22

## 2019-08-22 RX ORDER — LISINOPRIL 40 MG/1
40 TABLET ORAL EVERY EVENING
Status: DISCONTINUED | OUTPATIENT
Start: 2019-08-22 | End: 2019-08-23

## 2019-08-22 RX ORDER — AMLODIPINE BESYLATE 5 MG/1
5 TABLET ORAL DAILY
Status: DISCONTINUED | OUTPATIENT
Start: 2019-08-22 | End: 2019-08-23

## 2019-08-22 RX ORDER — LORATADINE 10 MG/1
10 TABLET ORAL EVERY EVENING
Status: DISCONTINUED | OUTPATIENT
Start: 2019-08-22 | End: 2019-08-26 | Stop reason: HOSPADM

## 2019-08-22 RX ORDER — ATORVASTATIN CALCIUM 40 MG/1
40 TABLET, FILM COATED ORAL EVERY EVENING
Status: DISCONTINUED | OUTPATIENT
Start: 2019-08-22 | End: 2019-08-26 | Stop reason: HOSPADM

## 2019-08-22 RX ORDER — CLOPIDOGREL BISULFATE 75 MG/1
75 TABLET ORAL DAILY
Status: DISCONTINUED | OUTPATIENT
Start: 2019-08-22 | End: 2019-08-25

## 2019-08-22 RX ADMIN — METOPROLOL SUCCINATE 75 MG: 50 TABLET, EXTENDED RELEASE ORAL at 11:01

## 2019-08-22 RX ADMIN — SENNOSIDES AND DOCUSATE SODIUM 2 TABLET: 8.6; 5 TABLET ORAL at 20:21

## 2019-08-22 RX ADMIN — AMLODIPINE BESYLATE 5 MG: 5 TABLET ORAL at 09:27

## 2019-08-22 RX ADMIN — OXYCODONE HYDROCHLORIDE 5 MG: 5 TABLET ORAL at 02:34

## 2019-08-22 RX ADMIN — ACETAMINOPHEN 975 MG: 325 TABLET, FILM COATED ORAL at 22:10

## 2019-08-22 RX ADMIN — OXYCODONE HYDROCHLORIDE 5 MG: 5 TABLET ORAL at 13:55

## 2019-08-22 RX ADMIN — RANITIDINE 150 MG: 150 TABLET ORAL at 20:21

## 2019-08-22 RX ADMIN — CLOPIDOGREL BISULFATE 75 MG: 75 TABLET ORAL at 09:27

## 2019-08-22 RX ADMIN — ACETAMINOPHEN 975 MG: 325 TABLET, FILM COATED ORAL at 13:56

## 2019-08-22 RX ADMIN — SENNOSIDES AND DOCUSATE SODIUM 1 TABLET: 8.6; 5 TABLET ORAL at 09:27

## 2019-08-22 RX ADMIN — ATORVASTATIN CALCIUM 40 MG: 40 TABLET, FILM COATED ORAL at 20:21

## 2019-08-22 RX ADMIN — SODIUM CHLORIDE: 9 INJECTION, SOLUTION INTRAVENOUS at 04:01

## 2019-08-22 RX ADMIN — ACETAMINOPHEN 975 MG: 325 TABLET, FILM COATED ORAL at 06:11

## 2019-08-22 RX ADMIN — LORATADINE 10 MG: 10 TABLET ORAL at 20:21

## 2019-08-22 RX ADMIN — HYDROMORPHONE HYDROCHLORIDE 0.3 MG: 1 INJECTION, SOLUTION INTRAMUSCULAR; INTRAVENOUS; SUBCUTANEOUS at 06:11

## 2019-08-22 RX ADMIN — ASPIRIN 81 MG: 81 TABLET, DELAYED RELEASE ORAL at 22:10

## 2019-08-22 RX ADMIN — CEFAZOLIN 1 G: 1 INJECTION, POWDER, FOR SOLUTION INTRAMUSCULAR; INTRAVENOUS at 02:25

## 2019-08-22 RX ADMIN — LISINOPRIL 40 MG: 40 TABLET ORAL at 20:21

## 2019-08-22 RX ADMIN — RANITIDINE 150 MG: 150 TABLET ORAL at 09:26

## 2019-08-22 ASSESSMENT — ACTIVITIES OF DAILY LIVING (ADL)
PREVIOUS_RESPONSIBILITIES: HOUSEKEEPING;MEDICATION MANAGEMENT;FINANCES;DRIVING
ADLS_ACUITY_SCORE: 11
ADLS_ACUITY_SCORE: 13
ADLS_ACUITY_SCORE: 13
ADLS_ACUITY_SCORE: 11
ADLS_ACUITY_SCORE: 13
ADLS_ACUITY_SCORE: 11

## 2019-08-22 NOTE — PLAN OF CARE
POD 1 Posterior lumbar spinal fusion. A&Ox4. CMS/ neuros intact. VSS on 1 L NC, unable to wean to RA- drops to 90% O2. Tmax 99.3. Tolerating regular diet. Incisional pain controlled with scheduled Tylenol and PRN Oxycodone 5mg x1. PIV SL'd x2. Up with 1 + GB walker. Bull removed at 0600, voiding frequent small amounts since. NEELA drain with copious amount of bloody/ red output (470 ml in 12 hours)- RONAL Daniel aware. Ice applied to NEELA site to decrease output. Hgb 11.3. Incisional dressing with scant amount of drainage marked. Discharge date pending.

## 2019-08-22 NOTE — PLAN OF CARE
Discharge Planner PT   Patient plan for discharge: Home  Current status: PT orders received, eval completed, treatment initiated, Pt is 79 y.o. M POD # 1 L3-L5 transforaminal interbody fusion. Pt lives with spouse in apartment in senior co-op building. Ind with all mobility at baseline, pt does own FWW and SEC. Bathroom set-up includes walk in shower with grab bar and build-in small bench, elevated toilet. Pt does have to ambulate ~200' from elevator to apartment.    Currently, pt received seated at EOB, requesting to use bathroom, agreeable to PT. Edu on role of PT, POC, precautions. Sit<>stand from bed and commode over toilet with SBA and FWW, cues for arm placement. Ambulated 1x 150' with FWW and SBA, 1 small LOB crossing threshold, recovered with SBA of PT for safety, cues for upright posture and forward gaze. Ambulated 1 x 100' with no AD with CGA with some unsteadiness, WBOS, short step length, decreased arm swing. Pt with 2L O2 via NC at start of session, removed for mobility with SpSO2 90-96 throughout session. SpO2 95 on RA upon departure of PT, pt sitting in chair, all needs in reach.    Barriers to return to prior living situation: none anticipated  Recommendations for discharge: Home  Rationale for recommendations: Pt moving well during session. Anticipate pt will progress with continued therapy in order for discharge home with use of AD as needed. Will continue to assess need for AD with continued therapy in IP setting. Pt does have FWW and SEC available at home.       Entered by: Brisa Brown 08/22/2019 11:54 AM

## 2019-08-22 NOTE — PROVIDER NOTIFICATION
MD Notification    Notified Person: MD    Notified Person Name: RONAL Lee    Notification Date/Time: 8/22 0829    Notification Interaction: page    Purpose of Notification: Can we order PTA BP/cardiac medications ASAP? Thank you!    Orders Received: OK to re-order PTA medications    Comments:

## 2019-08-22 NOTE — PROVIDER NOTIFICATION
Paged out to hospitalist & then Dr. Perez's office regarding pt's Temperature of 102.6, asking for PRN tylenol.

## 2019-08-22 NOTE — PLAN OF CARE
"Pt A&Ox4, Kvxs326.6 with other VSS on 1L O2. Scheduled tylenol given early. Neuros intact, CMS intact. Pain managed with IV dil for breakthrough and then PO oxycodone. Dressing CDI. NEELA drain output minimal. Bull in place. Discharge TBD.    0700 update: NEELA draining copious amounts 330ml from 0000 to 0600. Dressing bled through, changed linens, reinforced dressing with ABD pads, drops of drainage have come through the new dressing. Dangled at bedside, stood and took side steps. Reported some lightheadedness when he first sat up that subsided. Bull removed at 0600, DTV. Pt is \"ready to get moving.\"  "

## 2019-08-22 NOTE — PLAN OF CARE
OT: Eval complete and Tx initiated. Seen POD #1 of L3-5 transforminal interbody fusion. Prior to surgery, pt lives in a senior apt with wife and reports I with ADL/IADLs, including driving.     Discharge Planner OT   Patient plan for discharge: Home in ~3 days    Current status: Pt required min A with FWW for functional transfers. Declined trial of LE dressing, but education provided. O2 sats remain in low 90s on room air during activity; thus, discontinued supplemental O2 at end of session.    Barriers to return to prior living situation: None anticipated    Recommendations for discharge: Home with A from wife for dressing, bathing, and household tasks as needed    Rationale for recommendations: Pt limited by spinal precautions, minimal pain, impaired safety, and decreased activity tolerance; however, doing fairly well. OT will continue with daily intervention to ensure safety and I with ADLs. Pt reports adequate A from wife at discharge.        Entered by: Aric Suarez 08/22/2019 9:18 AM

## 2019-08-22 NOTE — PROVIDER NOTIFICATION
MD Notification    Notified Person: MD    Notified Person Name: RONAL Webber     Notification Date/Time: 8/22 1685    Notification Interaction: web page     Purpose of Notification: NEELA with copious amounts serosang output (345ml in past 8 hrs) and bladder scan for 315ml. Bull removed at 0600. please advise    Orders Received: NEELA amount OK for POD 1- continue to monitor. See if pt can void on his own this evening, if unable to void will consider starting pt on Flomax.     Comments:

## 2019-08-22 NOTE — PROGRESS NOTES
08/22/19 1100   Quick Adds   Type of Visit Initial PT Evaluation   Living Environment   Lives With spouse   Living Arrangements apartment  (senior co-op)   Home Accessibility no concerns   Transportation Anticipated car, drives self;family or friend will provide   Living Environment Comment Bathroom set up includes walk in shower with grab bars and built in small bench, elevated toilet no grab bars.   Self-Care   Usual Activity Tolerance good   Current Activity Tolerance moderate   Regular Exercise Yes   Activity/Exercise Type   (exercise classes 3x/week in building)   Equipment Currently Used at Home grab bar, tub/shower   Functional Level Prior   Ambulation 0-->independent   Transferring 0-->independent   Toileting 1-->assistive equipment  (elevated toilet)   Bathing 1-->assistive equipment  (grab bar)   Fall history within last six months no   Which of the above functional risks had a recent onset or change? ambulation;transferring   General Information   Onset of Illness/Injury or Date of Surgery - Date 08/21/19   Referring Physician Suleman Burden, ANAMARIA   Patient/Family Goals Statement to go home   Pertinent History of Current Problem (include personal factors and/or comorbidities that impact the POC) Pt is 79 y.o. M POD # 1 L3-L5 transforaminal interbody fusion.   Precautions/Limitations fall precautions   General Info Comments Activity: Ambulate   Cognitive Status Examination   Orientation orientation to person, place and time   Level of Consciousness alert   Follows Commands and Answers Questions 100% of the time;able to follow multistep instructions   Personal Safety and Judgment intact   Pain Assessment   Patient Currently in Pain Yes, see Vital Sign flowsheet  (denies pain but rates soreness 5/10)   Integumentary/Edema   Integumentary/Edema Comments large dressing on back appears intact, NEELA drain noted   Posture    Posture Forward head position   Range of Motion (ROM)   ROM Comment Spine ROM limited  "secondary to post-op status and precautions, UE and LE ROM appears WFL   Strength   Strength Comments B hip flexion 4+/5, B knee extension & flexion 5/5, B ankle DF 5/5   Bed Mobility   Bed Mobility Comments sit<>supine with SBA at flat bed with log roll technique without rail.   Transfer Skills   Transfer Comments Sit<>stand from bed with FWW and SBA   Gait   Gait Comments Ambulated 15' to/from bathroom with FWW and CGA with moderate pace, moderate step length.   Balance   Balance Comments Good balance with UE support on FWW, fair balance without UE support   Sensory Examination   Sensory Perception Comments Pt reports R leg was \"a little bit numb\" earlier today sitting up in chair but feels OK now. States he did not have this prior to surgery.   Modality Interventions   Planned Modality Interventions Cryotherapy   General Therapy Interventions   Planned Therapy Interventions balance training;bed mobility training;gait training;neuromuscular re-education;strengthening;transfer training;home program guidelines;progressive activity/exercise   Clinical Impression   Criteria for Skilled Therapeutic Intervention yes, treatment indicated   PT Diagnosis difficulty ambulating   Influenced by the following impairments decreased balance, decreased activity tolerance, pain   Functional limitations due to impairments difficulty with bed mobility, transfers, ambulation and stairs   Clinical Presentation Stable/Uncomplicated   Clinical Presentation Rationale clinical judgement   Clinical Decision Making (Complexity) Low complexity   Therapy Frequency 2x/day   Predicted Duration of Therapy Intervention (days/wks) 2 days   Anticipated Equipment Needs at Discharge   (none, pt owns FWW)   Anticipated Discharge Disposition Home with Assist  (assist for household tasks, transportation)   Risk & Benefits of therapy have been explained Yes   Patient, Family & other staff in agreement with plan of care Yes   Clinical Impression Comments " "Pt moving well during session. Anticipate pt will progress with continued therapy in order for discharge home with use of AD as needed. Will continue to assess need for AD with continued therapy in IP setting. Pt does have FWW and SEC available at home.   [facility] TM \"6 Clicks\"   2016, Trustees of [facility], under license to [employer].  All rights reserved.   6 Clicks Short Forms Basic Mobility Inpatient Short Form   [facility]  \"6 Clicks\" V.2 Basic Mobility Inpatient Short Form   1. Turning from your back to your side while in a flat bed without using bedrails? 3 - A Little   2. Moving from lying on your back to sitting on the side of a flat bed without using bedrails? 3 - A Little   3. Moving to and from a bed to a chair (including a wheelchair)? 3 - A Little   4. Standing up from a chair using your arms (e.g., wheelchair, or bedside chair)? 3 - A Little   5. To walk in hospital room? 3 - A Little   6. Climbing 3-5 steps with a railing? 3 - A Little   Basic Mobility Raw Score (Score out of 24.Lower scores equate to lower levels of function) 18   Total Evaluation Time   Total Evaluation Time (Minutes) 10     "

## 2019-08-22 NOTE — PROGRESS NOTES
[facility]    Neurosurgery  Daily Post-Op Note    Assessment & Plan   Procedure(s):  L3-5 LUMBAR TRANSFORAMINAL INTERBODY FUSION WITH STEALTH   -1 Day Post-Op  Doing well.  Tolerating physical therapy and rehabilitation well.  Still with same right leg pain, maybe a bit improved.   Plan:  -Advance activity as tolerated  -Continue supportive and symptomatic treatment  -Start or continue physical therapy  -leave NEELA today    Suleman Burden    Interval History   Stable.  Doing well.  Improving slowly.  Pain is reasonably controlled.  No fevers.     Physical Exam   Temp: 98.4  F (36.9  C) Temp src: Oral BP: (!) 152/66 Pulse: 81 Heart Rate: 96 Resp: 20 SpO2: 90 % O2 Device: None (Room air) Oxygen Delivery: 1 LPM  Vitals:    08/21/19 0550   Weight: 213 lb (96.6 kg)     Vital Signs with Ranges  Temp:  [97.3  F (36.3  C)-102.6  F (39.2  C)] 98.4  F (36.9  C)  Pulse:  [] 81  Heart Rate:  [] 96  Resp:  [14-22] 20  BP: (100-152)/(46-66) 152/66  SpO2:  [89 %-98 %] 90 %  I/O last 3 completed shifts:  In: 3600 [I.V.:3100]  Out: 2335 [Urine:1050; Drains:385; Blood:900]    Alert and oriented.  Moves all extremities equally.  \  Incision: CDI      Medications     sodium chloride 100 mL/hr at 08/22/19 0401        acetaminophen  975 mg Oral Q8H     amLODIPine  5 mg Oral Daily     aspirin  81 mg Oral At Bedtime     atorvastatin  40 mg Oral QPM     clopidogrel  75 mg Oral Daily     ranitidine  150 mg Oral Q12H    Or     famotidine  20 mg Intravenous Q12H     lisinopril  40 mg Oral QPM     loratadine  10 mg Oral QPM     metoprolol succinate ER  75 mg Oral Daily     senna-docusate  1 tablet Oral BID    Or     senna-docusate  2 tablet Oral BID     sodium chloride (PF)  3 mL Intracatheter Q8H       Data         Suleman Burden PA-C  [facility]

## 2019-08-22 NOTE — PROGRESS NOTES
08/22/19 0843   Quick Adds   Type of Visit Initial Occupational Therapy Evaluation   Living Environment   Lives With spouse   Living Arrangements apartment  (Senior building)   Home Accessibility no concerns   Transportation Anticipated car, drives self;family or friend will provide  (Wife can provide for now.)   Self-Care   Usual Activity Tolerance good   Current Activity Tolerance moderate   Equipment Currently Used at Home grab bar, tub/shower   Functional Level   Ambulation 0-->independent   Transferring 0-->independent   Toileting 0-->independent  (comfort height toilets)   Bathing 1-->assistive equipment   Dressing 0-->independent   Eating 0-->independent   Communication 0-->understands/communicates without difficulty   Swallowing 0-->swallows foods/liquids without difficulty   Cognition 0 - no cognition issues reported   Fall history within last six months no   General Information   Onset of Illness/Injury or Date of Surgery - Date 08/21/19   Referring Physician Suleman Burden PA-C   Patient/Family Goals Statement Pt plans to discharge home in 3 days   Additional Occupational Profile Info/Pertinent History of Current Problem Seen POD #1 of L3-5 lumbar transforminal interbody fusion.    Precautions/Limitations fall precautions;spinal precautions   Cognitive Status Examination   Orientation orientation to person, place and time   Level of Consciousness alert   Follows Commands (Cognition) WNL   Memory intact   Visual Perception   Visual Perception No deficits were identified;Wears glasses   Pain Assessment   Patient Currently in Pain Yes, see Vital Sign flowsheet  (1/10 at rest and with activity)   Strength   Strength Comments B UE not tested due to spinal precautions   Mobility   Bed Mobility Bed mobility skill: Rolling/Turning;Bed mobility skill: Scooting/Bridging;Bed mobility skill: Sit to supine;Bed mobility skill: Supine to sit;Bed mobility analysis   Bed Mobility Skill: Rolling/Turning   Level of  Humble - Bed Mobility Skill Rolling Turning minimum assist (75% patients effort)   Bed Mobility Skill: Scooting/Bridging   Level of Humble: Scooting/Bridging minimum assist (75% patients effort)   Bed Mobility Skill: Sit to Supine   Level of Humble: Sit/Supine minimum assist (75% patients effort)   Bed Mobility Skill: Supine to Sit   Level of Humble: Supine/Sit minimum assist (75% patients effort)   Bed Mobility Analysis   Impairments Contributing to Impaired Bed Mobility pain   Transfer Skills   Transfer Transfer Safety Analysis Bed/Chair;Transfer Skill: Stand to Sit;Transfer Safety Analysis Sit/Stand   Transfer Skill: Bed to Chair/Chair to Bed   Level of Humble: Bed to Chair minimum assist (75% patients effort)   Assistive Device - Transfer Skill Bed to Chair Chair to Bed Rehab Eval standard walker   Transfer Safety Analysis Bed/Chair   Impairments Contributing to Impaired Transfers pain   Transfer Skill: Sit to Stand   Level of Humble: Sit/Stand minimum assist (75% patients effort)   Transfer Safety Analysis Sit/Stand   Impaired Transfers: Sit/Stand impaired balance;pain   Toilet Transfer   Toilet Transfer Toilet Transfer Skill;Toilet Transfer Safety Analysis   Transfer Skill: Toilet Transfer   Level of Humble: Toilet minimum assist (75% patients effort)   Assistive Device seat riser;grab bars;standard walker   Transfer Safety Analysis Toilet   Transfer Safety Analysis Toilet pain;impaired balance   Upper Body Dressing   Level of Humble: Dress Upper Body minimum assist (75% patients effort)   Lower Body Dressing   Level of Humble: Dress Lower Body moderate assist (50% patients effort)   Toileting   Level of Humble: Toilet moderate assist (50% patients effort)   Instrumental Activities of Daily Living (IADL)   Previous Responsibilities housekeeping;medication management;finances;driving   Activities of Daily Living Analysis   Impairments Contributing to  "Impaired Activities of Daily Living pain;post surgical precautions;balance impaired   General Therapy Interventions   Planned Therapy Interventions ADL retraining;transfer training   Clinical Impression   Criteria for Skilled Therapeutic Interventions Met yes, treatment indicated   OT Diagnosis Decreased I and safety with ADLs   Influenced by the following impairments Pain; Spinal precautions; Impaired safety; decreased balance and activity tolerance   Assessment of Occupational Performance 3-5 Performance Deficits   Identified Performance Deficits Dressing; toileting; bathing   Clinical Decision Making (Complexity) Moderate complexity   Therapy Frequency Daily   Predicted Duration of Therapy Intervention (days/wks) 4 days   Anticipated Discharge Disposition Home with Assist   Risks and Benefits of Treatment have been explained. Yes   Patient, Family & other staff in agreement with plan of care Yes   [facility] TM \"6 Clicks\"   2016, Trustees of [facility], under license to [employer].  All rights reserved.   6 Clicks Short Forms Daily Activity Inpatient Short Form   [facility]  \"6 Clicks\" Daily Activity Inpatient Short Form   1. Putting on and taking off regular lower body clothing? 2 - A Lot   2. Bathing (including washing, rinsing, drying)? 3 - A Little   3. Toileting, which includes using toilet, bedpan or urinal? 3 - A Little   4. Putting on and taking off regular upper body clothing? 3 - A Little   5. Taking care of personal grooming such as brushing teeth? 4 - None   6. Eating meals? 4 - None   Daily Activity Raw Score (Score out of 24.Lower scores equate to lower levels of function) 19   Total Evaluation Time   Total Evaluation Time (Minutes) 10     "

## 2019-08-23 ENCOUNTER — APPOINTMENT (OUTPATIENT)
Dept: PHYSICAL THERAPY | Facility: CLINIC | Age: 79
DRG: 460 | End: 2019-08-23
Attending: NEUROLOGICAL SURGERY
Payer: COMMERCIAL

## 2019-08-23 ENCOUNTER — APPOINTMENT (OUTPATIENT)
Dept: OCCUPATIONAL THERAPY | Facility: CLINIC | Age: 79
DRG: 460 | End: 2019-08-23
Attending: NEUROLOGICAL SURGERY
Payer: COMMERCIAL

## 2019-08-23 ENCOUNTER — APPOINTMENT (OUTPATIENT)
Dept: GENERAL RADIOLOGY | Facility: CLINIC | Age: 79
DRG: 460 | End: 2019-08-23
Attending: HOSPITALIST
Payer: COMMERCIAL

## 2019-08-23 LAB
ALBUMIN SERPL-MCNC: 2.6 G/DL (ref 3.4–5)
ALP SERPL-CCNC: 76 U/L (ref 40–150)
ALT SERPL W P-5'-P-CCNC: 15 U/L (ref 0–70)
ANION GAP SERPL CALCULATED.3IONS-SCNC: 7 MMOL/L (ref 3–14)
AST SERPL W P-5'-P-CCNC: 18 U/L (ref 0–45)
BILIRUB SERPL-MCNC: 0.8 MG/DL (ref 0.2–1.3)
BUN SERPL-MCNC: 12 MG/DL (ref 7–30)
CALCIUM SERPL-MCNC: 8.3 MG/DL (ref 8.5–10.1)
CHLORIDE SERPL-SCNC: 102 MMOL/L (ref 94–109)
CO2 SERPL-SCNC: 25 MMOL/L (ref 20–32)
CREAT SERPL-MCNC: 0.78 MG/DL (ref 0.66–1.25)
ERYTHROCYTE [DISTWIDTH] IN BLOOD BY AUTOMATED COUNT: 12.9 % (ref 10–15)
GFR SERPL CREATININE-BSD FRML MDRD: 85 ML/MIN/{1.73_M2}
GLUCOSE SERPL-MCNC: 112 MG/DL (ref 70–99)
HCT VFR BLD AUTO: 28.3 % (ref 40–53)
HGB BLD-MCNC: 10 G/DL (ref 13.3–17.7)
HGB BLD-MCNC: 10.1 G/DL (ref 13.3–17.7)
LACTATE BLD-SCNC: 1.3 MMOL/L (ref 0.7–2)
MCH RBC QN AUTO: 29.7 PG (ref 26.5–33)
MCHC RBC AUTO-ENTMCNC: 35.3 G/DL (ref 31.5–36.5)
MCV RBC AUTO: 84 FL (ref 78–100)
PLATELET # BLD AUTO: 187 10E9/L (ref 150–450)
POTASSIUM SERPL-SCNC: 3.4 MMOL/L (ref 3.4–5.3)
PROCALCITONIN SERPL-MCNC: 0.27 NG/ML
PROT SERPL-MCNC: 6 G/DL (ref 6.8–8.8)
RBC # BLD AUTO: 3.37 10E12/L (ref 4.4–5.9)
SODIUM SERPL-SCNC: 134 MMOL/L (ref 133–144)
TROPONIN I SERPL-MCNC: <0.015 UG/L (ref 0–0.04)
TSH SERPL DL<=0.005 MIU/L-ACNC: 0.32 MU/L (ref 0.4–4)
WBC # BLD AUTO: 13.4 10E9/L (ref 4–11)

## 2019-08-23 PROCEDURE — 81001 URINALYSIS AUTO W/SCOPE: CPT | Performed by: HOSPITALIST

## 2019-08-23 PROCEDURE — 25000132 ZZH RX MED GY IP 250 OP 250 PS 637: Performed by: NEUROLOGICAL SURGERY

## 2019-08-23 PROCEDURE — 83605 ASSAY OF LACTIC ACID: CPT | Performed by: HOSPITALIST

## 2019-08-23 PROCEDURE — 87040 BLOOD CULTURE FOR BACTERIA: CPT | Performed by: HOSPITALIST

## 2019-08-23 PROCEDURE — 12000000 ZZH R&B MED SURG/OB

## 2019-08-23 PROCEDURE — 80053 COMPREHEN METABOLIC PANEL: CPT | Performed by: HOSPITALIST

## 2019-08-23 PROCEDURE — 25000132 ZZH RX MED GY IP 250 OP 250 PS 637: Performed by: PHYSICIAN ASSISTANT

## 2019-08-23 PROCEDURE — 84439 ASSAY OF FREE THYROXINE: CPT | Performed by: HOSPITALIST

## 2019-08-23 PROCEDURE — 84145 PROCALCITONIN (PCT): CPT | Performed by: HOSPITALIST

## 2019-08-23 PROCEDURE — 36415 COLL VENOUS BLD VENIPUNCTURE: CPT | Performed by: PHYSICIAN ASSISTANT

## 2019-08-23 PROCEDURE — 71045 X-RAY EXAM CHEST 1 VIEW: CPT

## 2019-08-23 PROCEDURE — 97116 GAIT TRAINING THERAPY: CPT | Mod: GP

## 2019-08-23 PROCEDURE — 99222 1ST HOSP IP/OBS MODERATE 55: CPT | Performed by: HOSPITALIST

## 2019-08-23 PROCEDURE — 85027 COMPLETE CBC AUTOMATED: CPT | Performed by: HOSPITALIST

## 2019-08-23 PROCEDURE — 36415 COLL VENOUS BLD VENIPUNCTURE: CPT | Performed by: HOSPITALIST

## 2019-08-23 PROCEDURE — 84443 ASSAY THYROID STIM HORMONE: CPT | Performed by: HOSPITALIST

## 2019-08-23 PROCEDURE — 97535 SELF CARE MNGMENT TRAINING: CPT | Mod: GO | Performed by: OCCUPATIONAL THERAPIST

## 2019-08-23 PROCEDURE — 25800030 ZZH RX IP 258 OP 636: Performed by: HOSPITALIST

## 2019-08-23 PROCEDURE — 84484 ASSAY OF TROPONIN QUANT: CPT | Performed by: HOSPITALIST

## 2019-08-23 PROCEDURE — 93005 ELECTROCARDIOGRAM TRACING: CPT

## 2019-08-23 PROCEDURE — 97530 THERAPEUTIC ACTIVITIES: CPT | Mod: GO | Performed by: OCCUPATIONAL THERAPIST

## 2019-08-23 PROCEDURE — 99207 ZZC CONSULT E&M CHANGED TO INITIAL LEVEL: CPT | Performed by: HOSPITALIST

## 2019-08-23 PROCEDURE — 85018 HEMOGLOBIN: CPT | Performed by: PHYSICIAN ASSISTANT

## 2019-08-23 PROCEDURE — 93010 ELECTROCARDIOGRAM REPORT: CPT | Performed by: INTERNAL MEDICINE

## 2019-08-23 PROCEDURE — 40000275 ZZH STATISTIC RCP TIME EA 10 MIN

## 2019-08-23 PROCEDURE — 25000128 H RX IP 250 OP 636: Performed by: HOSPITALIST

## 2019-08-23 RX ORDER — LABETALOL HYDROCHLORIDE 5 MG/ML
10 INJECTION, SOLUTION INTRAVENOUS
Status: DISCONTINUED | OUTPATIENT
Start: 2019-08-23 | End: 2019-08-26 | Stop reason: HOSPADM

## 2019-08-23 RX ORDER — SODIUM CHLORIDE 9 MG/ML
INJECTION, SOLUTION INTRAVENOUS CONTINUOUS
Status: DISCONTINUED | OUTPATIENT
Start: 2019-08-23 | End: 2019-08-26 | Stop reason: HOSPADM

## 2019-08-23 RX ADMIN — METOPROLOL SUCCINATE 75 MG: 50 TABLET, EXTENDED RELEASE ORAL at 08:01

## 2019-08-23 RX ADMIN — RANITIDINE 150 MG: 150 TABLET ORAL at 21:05

## 2019-08-23 RX ADMIN — OXYCODONE HYDROCHLORIDE 5 MG: 5 TABLET ORAL at 21:51

## 2019-08-23 RX ADMIN — SODIUM CHLORIDE: 9 INJECTION, SOLUTION INTRAVENOUS at 23:55

## 2019-08-23 RX ADMIN — LORATADINE 10 MG: 10 TABLET ORAL at 21:04

## 2019-08-23 RX ADMIN — ACETAMINOPHEN 975 MG: 325 TABLET, FILM COATED ORAL at 06:17

## 2019-08-23 RX ADMIN — SODIUM CHLORIDE 500 ML: 9 INJECTION, SOLUTION INTRAVENOUS at 22:40

## 2019-08-23 RX ADMIN — AMLODIPINE BESYLATE 5 MG: 5 TABLET ORAL at 08:01

## 2019-08-23 RX ADMIN — CLOPIDOGREL BISULFATE 75 MG: 75 TABLET ORAL at 08:01

## 2019-08-23 RX ADMIN — ACETAMINOPHEN 975 MG: 325 TABLET, FILM COATED ORAL at 21:51

## 2019-08-23 RX ADMIN — SENNOSIDES AND DOCUSATE SODIUM 2 TABLET: 8.6; 5 TABLET ORAL at 08:01

## 2019-08-23 RX ADMIN — ASPIRIN 81 MG: 81 TABLET, DELAYED RELEASE ORAL at 21:51

## 2019-08-23 RX ADMIN — SENNOSIDES AND DOCUSATE SODIUM 1 TABLET: 8.6; 5 TABLET ORAL at 21:04

## 2019-08-23 RX ADMIN — RANITIDINE 150 MG: 150 TABLET ORAL at 08:01

## 2019-08-23 RX ADMIN — ATORVASTATIN CALCIUM 40 MG: 40 TABLET, FILM COATED ORAL at 21:04

## 2019-08-23 RX ADMIN — ACETAMINOPHEN 975 MG: 325 TABLET, FILM COATED ORAL at 13:19

## 2019-08-23 ASSESSMENT — ACTIVITIES OF DAILY LIVING (ADL)
ADLS_ACUITY_SCORE: 15
ADLS_ACUITY_SCORE: 15
ADLS_ACUITY_SCORE: 16
ADLS_ACUITY_SCORE: 15

## 2019-08-23 NOTE — PLAN OF CARE
Discharge Planner PT   Patient plan for discharge: home  Current status: Pt received supine in bed, agreeable to PT. Demos mod ind supine>sit with log roll technique from flat bed without rail, needs extra time. Sit>stand mod ind with cane and FWW. Ambulated 1x200', 1x130' with SEC and SBA, slight unsteadiness when fatigued with 1 LOB- self recovered, reaching for wall rail when fatigued.  seated post gait, with 3' rest needed to recover to 110s, fluctuating during recovery. Ambulated 1x100' with FWW- demos mod ind level with FWW. HR 140s seated post gait, recovering to 120s with ~1.5' seated rest. Pt sitting up in chair upon departure, all needs in reach. RN updated.  Barriers to return to prior living situation: no mobility barriers  Recommendations for discharge: Home with assist of spouse for household tasks  Rationale for recommendations: Patient moving well, demos mod ind ambulation with FWW. Mobility appropriate for return home. Will continue to follow to build toward ambulation with SEC/ without AD.       Entered by: Brisa Brown 08/23/2019 10:30 AM

## 2019-08-23 NOTE — PROGRESS NOTES
[facility]    Neurosurgery  Daily Post-Op Note    Assessment & Plan   Procedure(s):  L3-5 LUMBAR TRANSFORAMINAL INTERBODY FUSION WITH STEALTH   -2 Days Post-Op  Doing well.  Pain well-controlled.  Tolerating physical therapy and rehabilitation well.  Ambulating in halls with minimal assistance.   Plan:  -Advance activity as tolerated  -Continue supportive and symptomatic treatment  -Start or continue physical therapy  -home tomorrow after drain is out.     Suleman Burden    Interval History   Stable.  Doing well.  Improving slowly.  Pain is reasonably controlled.  No fevers.     Physical Exam   Temp: 99.5  F (37.5  C) Temp src: Oral BP: 110/54 Pulse: 81 Heart Rate: 93 Resp: 18 SpO2: 92 % O2 Device: None (Room air) Oxygen Delivery: 1 LPM  Vitals:    08/21/19 0550   Weight: 96.6 kg (213 lb)     Vital Signs with Ranges  Temp:  [98.3  F (36.8  C)-99.5  F (37.5  C)] 99.5  F (37.5  C)  Pulse:  [81] 81  Heart Rate:  [89-93] 93  Resp:  [18-20] 18  BP: (109-152)/(51-66) 110/54  SpO2:  [89 %-94 %] 92 %  I/O last 3 completed shifts:  In: 480 [P.O.:480]  Out: 1885 [Urine:1175; Drains:710]    Alert and oriented.  Moves all extremities equally.    Incision: CDI.       Medications        acetaminophen  975 mg Oral Q8H     amLODIPine  5 mg Oral Daily     aspirin  81 mg Oral At Bedtime     atorvastatin  40 mg Oral QPM     clopidogrel  75 mg Oral Daily     ranitidine  150 mg Oral Q12H    Or     famotidine  20 mg Intravenous Q12H     lisinopril  40 mg Oral QPM     loratadine  10 mg Oral QPM     metoprolol succinate ER  75 mg Oral Daily     senna-docusate  1 tablet Oral BID    Or     senna-docusate  2 tablet Oral BID     sodium chloride (PF)  3 mL Intracatheter Q8H             Suleman Burden PA-C  [facility]

## 2019-08-23 NOTE — PLAN OF CARE
DOS 8/21/19 Lumbar Fusion.  CMS intact.  NEELA drain 60cc 7a-3p.  Back dressing intact, marked with no change. Back pain managed with scheduled tylenol, states pain when he transitions.  Bowel movement today.  Up to chair frequently with SBA, gait belt and walker.  Plan for discharge home tomorrow.  Aggression prevention code: Green

## 2019-08-23 NOTE — PLAN OF CARE
POD 2 posterior lumbar spine fusion per Dr. Perez. Pt is A&Ox4. VSS on 1L O2 overnight. Denies pain, declines pain meds. CMS/neuro intact. Dressing to back with scant dried drainage, dressing reinforced. NEELA drain with large serosanguinous output- MD aware. Voiding frequently. PIV SL x2. Tolerating regular diet. Up SBA + GB. Discharge pending

## 2019-08-23 NOTE — PLAN OF CARE
POD 1 lumbar spinal fusion. A&Ox4. Tmax 99.4, other VSS on RA. CMS intact. Denies pain at rest, declines PRN pain meds, scheduled tylenol given. Voiding frequently and in small amounts, reports this is baseline. BS active, denies nausea. NEELA with serosanguinous output- 90mL for 7-11pm shift. Dressing with small amount of marked dried drainage. Up with A1 using BG and walker. PIV SL. Discharge pending.

## 2019-08-24 ENCOUNTER — APPOINTMENT (OUTPATIENT)
Dept: PHYSICAL THERAPY | Facility: CLINIC | Age: 79
DRG: 460 | End: 2019-08-24
Attending: NEUROLOGICAL SURGERY
Payer: COMMERCIAL

## 2019-08-24 LAB
ALBUMIN UR-MCNC: 10 MG/DL
APPEARANCE UR: CLEAR
BILIRUB UR QL STRIP: NEGATIVE
COLOR UR AUTO: YELLOW
ERYTHROCYTE [DISTWIDTH] IN BLOOD BY AUTOMATED COUNT: 13 % (ref 10–15)
GLUCOSE BLDC GLUCOMTR-MCNC: 103 MG/DL (ref 70–99)
GLUCOSE UR STRIP-MCNC: NEGATIVE MG/DL
HCT VFR BLD AUTO: 28.8 % (ref 40–53)
HGB BLD-MCNC: 10.1 G/DL (ref 13.3–17.7)
HGB UR QL STRIP: ABNORMAL
KETONES UR STRIP-MCNC: 10 MG/DL
LEUKOCYTE ESTERASE UR QL STRIP: NEGATIVE
MCH RBC QN AUTO: 29.7 PG (ref 26.5–33)
MCHC RBC AUTO-ENTMCNC: 35.1 G/DL (ref 31.5–36.5)
MCV RBC AUTO: 85 FL (ref 78–100)
NITRATE UR QL: NEGATIVE
PH UR STRIP: 6 PH (ref 5–7)
PLATELET # BLD AUTO: 211 10E9/L (ref 150–450)
RBC # BLD AUTO: 3.4 10E12/L (ref 4.4–5.9)
RBC #/AREA URNS AUTO: 2 /HPF (ref 0–2)
SOURCE: ABNORMAL
SP GR UR STRIP: 1.01 (ref 1–1.03)
UROBILINOGEN UR STRIP-MCNC: NORMAL MG/DL (ref 0–2)
WBC # BLD AUTO: 11 10E9/L (ref 4–11)
WBC #/AREA URNS AUTO: 3 /HPF (ref 0–5)

## 2019-08-24 PROCEDURE — 99207 ZZC MOONLIGHTING INDICATOR: CPT | Performed by: PHYSICIAN ASSISTANT

## 2019-08-24 PROCEDURE — 97116 GAIT TRAINING THERAPY: CPT | Mod: GP

## 2019-08-24 PROCEDURE — 25000125 ZZHC RX 250: Performed by: HOSPITALIST

## 2019-08-24 PROCEDURE — 36415 COLL VENOUS BLD VENIPUNCTURE: CPT | Performed by: PHYSICIAN ASSISTANT

## 2019-08-24 PROCEDURE — 25000132 ZZH RX MED GY IP 250 OP 250 PS 637: Performed by: PHYSICIAN ASSISTANT

## 2019-08-24 PROCEDURE — 25800030 ZZH RX IP 258 OP 636: Performed by: HOSPITALIST

## 2019-08-24 PROCEDURE — 85027 COMPLETE CBC AUTOMATED: CPT | Performed by: PHYSICIAN ASSISTANT

## 2019-08-24 PROCEDURE — 99207 ZZC CDG-MDM COMPONENT: MEETS MODERATE - UP CODED: CPT | Performed by: PHYSICIAN ASSISTANT

## 2019-08-24 PROCEDURE — 00000146 ZZHCL STATISTIC GLUCOSE BY METER IP

## 2019-08-24 PROCEDURE — 12000000 ZZH R&B MED SURG/OB

## 2019-08-24 PROCEDURE — 99232 SBSQ HOSP IP/OBS MODERATE 35: CPT | Performed by: PHYSICIAN ASSISTANT

## 2019-08-24 PROCEDURE — 25000132 ZZH RX MED GY IP 250 OP 250 PS 637: Performed by: NEUROLOGICAL SURGERY

## 2019-08-24 PROCEDURE — 25000128 H RX IP 250 OP 636: Performed by: INTERNAL MEDICINE

## 2019-08-24 RX ORDER — LISINOPRIL 40 MG/1
40 TABLET ORAL EVERY EVENING
Status: DISCONTINUED | OUTPATIENT
Start: 2019-08-24 | End: 2019-08-26 | Stop reason: HOSPADM

## 2019-08-24 RX ORDER — METOPROLOL TARTRATE 1 MG/ML
2.5-5 INJECTION, SOLUTION INTRAVENOUS EVERY 6 HOURS PRN
Status: DISCONTINUED | OUTPATIENT
Start: 2019-08-24 | End: 2019-08-26 | Stop reason: HOSPADM

## 2019-08-24 RX ORDER — AMLODIPINE BESYLATE 5 MG/1
5 TABLET ORAL DAILY
Status: DISCONTINUED | OUTPATIENT
Start: 2019-08-24 | End: 2019-08-26 | Stop reason: HOSPADM

## 2019-08-24 RX ADMIN — SODIUM CHLORIDE: 9 INJECTION, SOLUTION INTRAVENOUS at 12:20

## 2019-08-24 RX ADMIN — AMLODIPINE BESYLATE 5 MG: 5 TABLET ORAL at 18:09

## 2019-08-24 RX ADMIN — SENNOSIDES AND DOCUSATE SODIUM 1 TABLET: 8.6; 5 TABLET ORAL at 20:32

## 2019-08-24 RX ADMIN — METOPROLOL TARTRATE 5 MG: 5 INJECTION INTRAVENOUS at 00:55

## 2019-08-24 RX ADMIN — ACETAMINOPHEN 650 MG: 325 TABLET ORAL at 18:12

## 2019-08-24 RX ADMIN — ACETAMINOPHEN 975 MG: 325 TABLET, FILM COATED ORAL at 05:49

## 2019-08-24 RX ADMIN — ACETAMINOPHEN 650 MG: 325 TABLET ORAL at 22:20

## 2019-08-24 RX ADMIN — SENNOSIDES AND DOCUSATE SODIUM 1 TABLET: 8.6; 5 TABLET ORAL at 09:15

## 2019-08-24 RX ADMIN — CLOPIDOGREL BISULFATE 75 MG: 75 TABLET ORAL at 09:15

## 2019-08-24 RX ADMIN — LISINOPRIL 40 MG: 40 TABLET ORAL at 20:32

## 2019-08-24 RX ADMIN — ATORVASTATIN CALCIUM 40 MG: 40 TABLET, FILM COATED ORAL at 20:32

## 2019-08-24 RX ADMIN — RANITIDINE 150 MG: 150 TABLET ORAL at 20:32

## 2019-08-24 RX ADMIN — METOPROLOL SUCCINATE 75 MG: 50 TABLET, EXTENDED RELEASE ORAL at 09:15

## 2019-08-24 RX ADMIN — SODIUM CHLORIDE 1000 ML: 9 INJECTION, SOLUTION INTRAVENOUS at 10:32

## 2019-08-24 RX ADMIN — RANITIDINE 150 MG: 150 TABLET ORAL at 09:15

## 2019-08-24 RX ADMIN — ASPIRIN 81 MG: 81 TABLET, DELAYED RELEASE ORAL at 22:20

## 2019-08-24 RX ADMIN — OXYCODONE HYDROCHLORIDE 5 MG: 5 TABLET ORAL at 13:25

## 2019-08-24 RX ADMIN — SODIUM CHLORIDE: 9 INJECTION, SOLUTION INTRAVENOUS at 05:53

## 2019-08-24 RX ADMIN — ACETAMINOPHEN 650 MG: 325 TABLET ORAL at 13:15

## 2019-08-24 RX ADMIN — LORATADINE 10 MG: 10 TABLET ORAL at 20:32

## 2019-08-24 ASSESSMENT — ACTIVITIES OF DAILY LIVING (ADL)
ADLS_ACUITY_SCORE: 13
ADLS_ACUITY_SCORE: 15
ADLS_ACUITY_SCORE: 13
ADLS_ACUITY_SCORE: 15
ADLS_ACUITY_SCORE: 13
ADLS_ACUITY_SCORE: 13

## 2019-08-24 NOTE — PROVIDER NOTIFICATION
MD Notification    Notified Person: MD    Notified Person Name: kristian anthony [facility]    Notification Date/Time:8/24/19 0043    Notification Interaction: page    Purpose of Notification: Pt HR still sustaining in 140-150s, asymptomatic.     Orders Received:  TORB 2.5-5mg IV metroprolol q6hr PRN for HR greater than 120     Comments:

## 2019-08-24 NOTE — PROVIDER NOTIFICATION
MD Notification    Notified Person: MD    Notified Person Name: nicolas VILLEDA    Notification Date/Time: 8/24 0002    Notification Interaction: page    Purpose of Notification: NEELA with 75mL/hr out in 1.5 hours. Do you want drain open to gravity?     Orders Received:    Comments:

## 2019-08-24 NOTE — PROGRESS NOTES
[facility]    Neurosurgery  Daily Post-Op Note    Assessment & Plan   Procedure(s):  L3-5 LUMBAR TRANSFORAMINAL INTERBODY FUSION WITH STEALTH   -3 Days Post-Op  Doing well.  Pain well-controlled.  Tolerating physical therapy and rehabilitation well.  NEELA still with more than 90 mL overnight.     Plan:  -Advance activity as tolerated  -Continue supportive and symptomatic treatment  -keep drain for today, discontinue once output less than 30 mL/shift.   -home when drain is out.   -recommended that he f/u with his PCP regarding episodes of tachycardia.       Suleman Burden    Interval History   Stable.  Doing well.  Improving slowly.  Pain is reasonably controlled.  No fevers.     Physical Exam   Temp: 99  F (37.2  C) Temp src: Oral BP: 130/59   Heart Rate: 92 Resp: 16 SpO2: 96 % O2 Device: None (Room air)    Vitals:    08/21/19 0550   Weight: 96.6 kg (213 lb)     Vital Signs with Ranges  Temp:  [97.4  F (36.3  C)-100.7  F (38.2  C)] 99  F (37.2  C)  Heart Rate:  [] 92  Resp:  [16] 16  BP: (104-139)/(58-86) 130/59  SpO2:  [91 %-97 %] 96 %  I/O last 3 completed shifts:  In: 1214 [P.O.:240; I.V.:974]  Out: 1570 [Urine:1350; Drains:220]    Alert and oriented.  Moves all extremities equally.    Incision: CDI, NEELA with 90 mL overnight.       Medications     sodium chloride 75 mL/hr at 08/24/19 0553        sodium chloride 0.9%  1,000 mL Intravenous Once     aspirin  81 mg Oral At Bedtime     atorvastatin  40 mg Oral QPM     clopidogrel  75 mg Oral Daily     ranitidine  150 mg Oral Q12H    Or     famotidine  20 mg Intravenous Q12H     loratadine  10 mg Oral QPM     metoprolol succinate ER  75 mg Oral Daily     senna-docusate  1 tablet Oral BID    Or     senna-docusate  2 tablet Oral BID     sodium chloride (PF)  3 mL Intracatheter Q8H       Data         Suleman Burden PAKIMBERLEY  [facility]

## 2019-08-24 NOTE — PROVIDER NOTIFICATION
"MD Notification    Notified Person: MD    Notified Person Name: Fredy Burden PA    Notification Date/Time: 2132 8/23    Notification Interaction: page    Purpose of Notification: \"Pt HR sustaining in 150s at rest, asymptomatic.  systolic. fever 100.7. O2 sats 97%. please advise.\"    Orders Received: hospitalist consult    Comments:    "

## 2019-08-24 NOTE — PROVIDER NOTIFICATION
MD Notification    Notified Person: MD    Notified Person Name: hosptialist    Notification Date/Time: 2140    Notification Interaction: page    Purpose of Notification:Pt HR sustaining in 150s at rest, asymptomatic.  systolic. fever 100.7. O2 sats 97%. please advise.    Orders Received: stat EKG, tele    Comments:

## 2019-08-24 NOTE — CONSULTS
[facility]    Hospitalist Consultation    Date of Admission:  8/21/2019  Date of Consult (When I saw the patient): 08/23/19    Assessment & Plan   Ricardo Neely is a 79 year old male who was admitted on 8/21/2019. I was asked to see the patient for tachycardia.    Tachycardia with fever: Patient with elevated heart rate and EKG showing sinus tachycardia. Nursing staff also reporting fever.   - U/a with CXR. BC x2.   - TSH with reflex.   - IVF.   - Monitor hemodynamic status closely.   - Hold PTA Amlodipine  - Hold PTA Benazepril.   - PTA Atorvastatin.   - PTA Metoprolol.     Status post L3-L5 lumbar transforaminal interbody fusion: Surgery completed on 8/21/19.   - Defer PTA ASA 81mg to surgery team.    - Defer PTA Clopidogrel to Surgery team.   - Surgery following.     DVT Prophylaxis: Defer to primary service  Code Status: Full Code    Disposition: Defer to primary service.    Dr. Jorge James D.O.  [facility]  Pager [phone]    Reason for Consult   Reason for consult: I was asked by Neurosurgery to evaluate this patient for tachycardia.    Primary Care Physician   Dr. Storm Huddleston    Chief Complaint   Tachycardia    History is obtained from the patient and medical records.    History of Present Illness   Ricardo Neely is a 79 year old male who presents with tachycardia.  Patient with recent lumbar fusion completed by neurosurgery service.  Patient states that he has been having higher heart rate.  Patient denies abdominal pain, nausea, vomiting, dysuria, blood in stool or urine, headache, sore throat, fever.  Patient notes minimal shortness of breath and no active chest pain.  Patient states that intermittently has pain after surgery in his back.    Past Medical History    I have reviewed this patient's medical history and updated it with pertinent information if needed.   Past Medical History:   Diagnosis Date     Anxiety 5/26/2011     Basal cell carcinoma       Benign Prostatic Hypertrophy      CEREBROVASC DISEASE, small vessel 12/07     Essential hypertension, benign      GERD      HIATAL HERNIA      HYPERPLASTIC POLYPS COLON 5/93, 3/06     Impaired fasting glucose      Low Back Pain      Obesity, unspecified      Other and unspecified hyperlipidemia      Personal history of urinary calculi 1960's     Pneumonia, organism unspecified(486) 1992     TRANSIENT CEREBRAL ISCHEMIA 12/07       Past Surgical History   I have reviewed this patient's surgical history and updated it with pertinent information if needed.  Past Surgical History:   Procedure Laterality Date     C NONSPECIFIC PROCEDURE  1959    pilonidal cystectomy     C NONSPECIFIC PROCEDURE  1966    kidney stone     C NONSPECIFIC PROCEDURE  approx 1999    R knee arthroscopy     Dr. Thomas RYDER NONSPECIFIC PROCEDURE  2005    L3-4 microdiskectomy   Dr. Fercho RYDER TOTAL KNEE ARTHROPLASTY  July 2010    Minimally invasive R TKA   Dr. Nichols     COLONOSCOPY       DISCECTOMY LUMBAR POSTERIOR MICROSCOPIC TWO LEVELS  8/28/2012    DISCECTOMY LUMBAR POSTERIOR MICROSCOPIC TWO LEVELS;  RIGHT L3-L4 REDO EXTENDED HEMILAMINECTOMY AND MICRO FORAMINOTOMY, LEFT L4-L5 EXTENDED HEMILAMINECTOMY AND MICRODISCECTOMY (PRONE) (SONYA MCCALLUM, C-ARM, METRIX II);  Surgeon: Bertram Mirza MD;  Location: [facility] OR     renal calculii removal 40 years ago  1965       Prior to Admission Medications   Prior to Admission Medications   Prescriptions Last Dose Informant Patient Reported? Taking?   acetaminophen (TYLENOL) 325 MG tablet 8/19/2019 at 2200 Self Yes Yes   Sig: Take 650 mg by mouth every 6 hours as needed   amLODIPine (NORVASC) 5 MG tablet 8/21/2019 at 0400 Self No Yes   Sig: Take 1 tablet (5 mg) by mouth daily   aspirin 81 MG tablet 8/12/2019 at 0700 Self No Yes   Sig: Take 1 tablet by mouth daily.   Patient taking differently: Take 81 mg by mouth At Bedtime    atorvastatin (LIPITOR) 40 MG tablet 8/20/2019 at 1900 Self No Yes  "  Sig: Take 1 tablet (40 mg) by mouth daily   Patient taking differently: Take 40 mg by mouth every evening    benazepril (LOTENSIN) 40 MG tablet 8/20/2019 at 1900 Self No Yes   Sig: Take 1 tablet (40 mg) by mouth daily   clopidogrel (PLAVIX) 75 MG tablet 8/12/2019 at 0800 Self No No   Sig: Take 1 tablet (75 mg) by mouth daily   fluocinonide (LIDEX) 0.05 % external cream Unknown at prn Self No No   Sig: Apply sparingly to affected area twice daily as needed.  Do not apply to face.   loratadine (CLARITIN) 10 MG tablet 8/20/2019 at 1900 Self No Yes   Sig: Take 1 tablet (10 mg) by mouth daily   metoprolol succinate ER (TOPROL-XL) 50 MG 24 hr tablet 8/21/2019 at 0400 Self No Yes   Sig: TAKE 1 AND 1/2 TABLETS BY MOUTH EVERY DAY      Facility-Administered Medications: None     Allergies   Allergies   Allergen Reactions     Meclizine Other (See Comments)     Over sedation, concentration problem     Tramadol Hcl Other (See Comments)     Pt feels very drugged, \"cloudy\" if used more than one day. Nausea also     Cardura [Doxazosin Mesylate] Other (See Comments)     fainted     Hydrochlorothiazide Other (See Comments)      lightheadedness     Naproxen Nausea     nausea       Social History   I have reviewed this patient's social history and updated it with pertinent information if needed. Ricardo Jones Chin  reports that he quit smoking about 51 years ago. He has never used smokeless tobacco. He reports that he does not drink alcohol or use drugs.    Family History   I have reviewed this patient's family history and updated it with pertinent information if needed.   Family History   Problem Relation Age of Onset     Family History Negative Brother         1 healthy brother     Diabetes Brother         d:age 66     Diabetes Mother      Cerebrovascular Disease Mother      C.A.D. Father         CABG 67     Heart Disease Father      Lipids Sister      Hypertension Sister      Lipids Sister      Hypertension Sister      Thyroid " Disease Daughter         thyroid surgery, on replacement       Review of Systems   The 10 point Review of Systems is negative other than noted in the HPI or here.     Physical Exam   Temp: 100.7  F (38.2  C) Temp src: Oral BP: 126/86   Heart Rate: 150 Resp: 16 SpO2: 97 % O2 Device: None (Room air) Oxygen Delivery: 1 LPM  Vital Signs with Ranges  Temp:  [98.3  F (36.8  C)-100.7  F (38.2  C)] 100.7  F (38.2  C)  Heart Rate:  [] 150  Resp:  [14-18] 16  BP: (102-126)/(40-86) 126/86  SpO2:  [89 %-99 %] 97 %  213 lbs 0 oz    GENERAL: Alert and oriented. NAD. Conversational, appropriate.   HEENT: Normocephalic. EOMI. No icterus or injection. Nares normal.   LUNGS: Clear to auscultation. No dyspnea at rest.   HEART: Regular rate, tachycardic. Extremities perfused.   ABDOMEN: Soft, nontender, and nondistended. Positive bowel sounds.   EXTREMITIES: No LE edema noted.   NEUROLOGIC: Moves extremities x4, follows commands.     Data   -Data reviewed today: All pertinent laboratory and imaging results from this encounter were reviewed.   Recent Labs   Lab 08/23/19  0825 08/22/19  1129 08/22/19  0801 08/21/19  0554   HGB 10.1*  --  11.3*  --    POTASSIUM  --   --   --  3.9   CR  --  0.90  --   --        No results found for this or any previous visit (from the past 24 hour(s)).

## 2019-08-24 NOTE — PLAN OF CARE
Pt is POD 3 L3-5 Fusion with Dr. Perez. A/O x4. VSS except Tmax 99.5, tylenol given. Pain controlled with as needed tylenol and oxycodone. Completed 1 L bolus, continues on 0.9 at 75 mL/hr. CMS intact. Dressing with scant drainage. NEELA intact to gravity, 50 mL total shift. Tolerating regular diet. Vdg frequently, PVR 5. Up with SBA, walker and belt. Plan for discharge to home pending drain output tomorrow.     3-7 PM Addendum: no changes.

## 2019-08-24 NOTE — PLAN OF CARE
Discharge Planner PT   Patient plan for discharge: home  Current status: Pt received supine in bed, agreeable to PT. Mod ind supine>sit with log roll technique at start of session. Mod ind sit<>stand with FWW. Ambulated 220' with FWW and supervision- demos mod ind level. Ambulated 220' with no AD with SBA, reduced step length, increased TANI, some unsteadiness but no LOB noted. Pt somewhat short of breath, with 2 brief standing rest breaks. , SpO2 94 seated post gait. Performed sit>supine with SBA, pt with increased pain. Pt supine in bed upon departure, all needs in reach.   Barriers to return to prior living situation: no mobility barriers  Recommendations for discharge: Home with assist of spouse for household tasks  Rationale for recommendations: Patient moving well, demos mod ind ambulation with FWW. Mobility appropriate for return home. Will continue to see daily to build toward ambulation with SEC/ without AD.       Entered by: Brisa Brown 08/24/2019 10:03 AM

## 2019-08-24 NOTE — PLAN OF CARE
POD 3 from a L3-5 lumbar fusion with dr Perez. A&Ox4. CMS intact. Denies numbness and tingeling. Bowel sounds active, passing flatus, tolerating regular diet. VSS ex fever and HR sustaining in the 150s, asymptomatic- MD aware, Bolus given and IV fluids initiated and 2.5 mg of IV metoprolol given- HR improved to the 90s. Dressing reinforced with tape- CDI. NEELA open to gravity. Up with A1 GBW. C/o 3/10  pain, decreased with oxycodone x1 and scheduled tylenol. Pt scoring green on the Aggression Stop Light Tool. Plan for possible Discharge pending drain output.

## 2019-08-24 NOTE — PROVIDER NOTIFICATION
MD Notification    Notified Person: MD    Notified Person Name: kristian anthony    Notification Date/Time: 8/23 2219    Notification Interaction: page    Purpose of Notification: EKG resulted thanks    Orders Received:    Comments:

## 2019-08-24 NOTE — PROGRESS NOTES
[facility]  Hospitalist Progress Note  Date of Service (when I saw the patient): 08/24/2019    Assessment & Plan   Ricardo Neely is a 79 year old year old male who has a PMH significant for hx of CVA, TIA, Hyperlipidemia, HTN, Impaired fasting glucose, obesity, BPH and low back pain . Pt was admitted to [facility] on 8/21/2019 by Dr. Perez following a L3-L5 decompression for severe lumbar stenosis. Post-operatively, pt was noted to have tachycardia, so the hospitalist service was consulted  The following problems were addressed during his hospitalization:    Summary:  Tachycardia with mild fever: Patient with elevated heart rate and EKG showing sinus tachycardia. Pt was asymptomatic and denies weakness, SOB, or palpitations. UA and CXR unremarkable; TSH benign. WBC elevated 8/23, but WNL 8/24 following fluids and incentive spirometry. Blood cultures 8/23 without growth. Pt with diminished appetite post-operatively, but no nausea or vomiting. BP improved today, so plan to resume PTA Amlodipine and Benzapril tomorrow as long as pt continues to improve. Pt feeling well overall and is ambulating and voiding without concern.   - Monitor vitals and hemodynamic status closely  - IV fluid bolus today with continued IV fluids  - Hold PTA Amlodipine  - Hold PTA Benazepril  - Continue PTA Atorvastatin.   - Increased PTA Metoprolol from 50mg to 75mg     Status post L3-L5 lumbar transforaminal interbody fusion: Surgery completed on 8/21/19.   - Defer post-operative cares to surgery team including pain control and disposition  - Defer PTA ASA 81mg to surgery team.    - Defer PTA Clopidogrel to surgery team.       DVT Prophylaxis: Defer to primary service  Code Status: Full Code  Disposition: Defer to primary service    Rosalind Díaz PA-C    Interval History   Pt found sitting at bedside with wife. Pt very pleasant and feels he's doing well post-operatively. Pt feels his pain is very  well-controlled and he has no complaints. He is walking well without concern. He did feel warm with fever/sweats and then cold with shivering once overnight, but no recurrence of this. He feels he's breathing well without cough or congestion. He has had a diminished appetite post-op, but this seems to be slowly improving and no nausea or vomiting.     -Data reviewed today: I reviewed all new labs and imaging results over the last 24 hours. I personally reviewed no images or EKG's today.    Physical Exam   Temp: 99.5  F (37.5  C) Temp src: Oral BP: 137/64   Heart Rate: 95 Resp: 16 SpO2: 94 % O2 Device: None (Room air)    Vitals:    08/21/19 0550   Weight: 96.6 kg (213 lb)     Vital Signs with Ranges  Temp:  [98.3  F (36.8  C)-100.7  F (38.2  C)] 99.5  F (37.5  C)  Heart Rate:  [] 95  Resp:  [14-18] 16  BP: (102-137)/(40-86) 137/64  SpO2:  [91 %-99 %] 94 %  I/O last 3 completed shifts:  In: 1214 [P.O.:240; I.V.:974]  Out: 1570 [Urine:1350; Drains:220]    Constitutional: alert, oriented, no acute distress  Eyes: No scleral icterus or injection  ENT: Normocephalic, atraumatic  Respiratory: No secondary muscle use or labored breathing. Lungs clear to auscultation bilaterally without wheezes or rhonchi   Cardiovascular: RRR without murmur  GI: Abdomen soft, non-tender to palpation, non-distended.  Bowel sounds active  MSK: able to move extremities on command without weakness, walks without weakness or ataxia. Lumbar dressing C/D/I with NEELA drain in place  Skin/Integumen: warm, dry, in tact without rash or hives  Lymph:No LE edema     Medications     sodium chloride 75 mL/hr at 08/24/19 0553       aspirin  81 mg Oral At Bedtime     atorvastatin  40 mg Oral QPM     clopidogrel  75 mg Oral Daily     ranitidine  150 mg Oral Q12H    Or     famotidine  20 mg Intravenous Q12H     loratadine  10 mg Oral QPM     metoprolol succinate ER  75 mg Oral Daily     senna-docusate  1 tablet Oral BID    Or     senna-docusate  2 tablet  Oral BID     sodium chloride (PF)  3 mL Intracatheter Q8H       Data   Recent Labs   Lab 08/23/19  2248 08/23/19  0825 08/22/19  1129 08/22/19  0801 08/21/19  0554   WBC 13.4*  --   --   --   --    HGB 10.0* 10.1*  --  11.3*  --    MCV 84  --   --   --   --      --   --   --   --      --   --   --   --    POTASSIUM 3.4  --   --   --  3.9   CHLORIDE 102  --   --   --   --    CO2 25  --   --   --   --    BUN 12  --   --   --   --    CR 0.78  --  0.90  --   --    ANIONGAP 7  --   --   --   --    RICARDO 8.3*  --   --   --   --    *  --   --   --   --    ALBUMIN 2.6*  --   --   --   --    PROTTOTAL 6.0*  --   --   --   --    BILITOTAL 0.8  --   --   --   --    ALKPHOS 76  --   --   --   --    ALT 15  --   --   --   --    AST 18  --   --   --   --    TROPI <0.015  --   --   --   --        No results found for this or any previous visit (from the past 24 hour(s)).

## 2019-08-24 NOTE — PLAN OF CARE
Assumed care from 7- 10:10am. POD 3 from a L3-L5 L/F. A&O x4. CMS intact. Bowel sounds active, +flatus. Tolerating regular diet. VSS on RA except tachy at times. Tele ST. Dressing CDI. NEELA open to gravity. Up A1/gb/walker. Voiding adequately. Pt scoring green on the Aggression Stop Light Tool. Report given to oncoming Nurse.

## 2019-08-25 ENCOUNTER — APPOINTMENT (OUTPATIENT)
Dept: PHYSICAL THERAPY | Facility: CLINIC | Age: 79
DRG: 460 | End: 2019-08-25
Attending: NEUROLOGICAL SURGERY
Payer: COMMERCIAL

## 2019-08-25 LAB — T4 FREE SERPL-MCNC: 1.68 NG/DL (ref 0.76–1.46)

## 2019-08-25 PROCEDURE — 25000132 ZZH RX MED GY IP 250 OP 250 PS 637: Performed by: NEUROLOGICAL SURGERY

## 2019-08-25 PROCEDURE — 97116 GAIT TRAINING THERAPY: CPT | Mod: GP

## 2019-08-25 PROCEDURE — 12000000 ZZH R&B MED SURG/OB

## 2019-08-25 PROCEDURE — 25000132 ZZH RX MED GY IP 250 OP 250 PS 637: Performed by: PHYSICIAN ASSISTANT

## 2019-08-25 PROCEDURE — 99207 ZZC MOONLIGHTING INDICATOR: CPT | Performed by: PHYSICIAN ASSISTANT

## 2019-08-25 PROCEDURE — 99231 SBSQ HOSP IP/OBS SF/LOW 25: CPT | Performed by: PHYSICIAN ASSISTANT

## 2019-08-25 PROCEDURE — 25800030 ZZH RX IP 258 OP 636: Performed by: HOSPITALIST

## 2019-08-25 RX ORDER — METOPROLOL SUCCINATE 100 MG/1
100 TABLET, EXTENDED RELEASE ORAL DAILY
Status: DISCONTINUED | OUTPATIENT
Start: 2019-08-25 | End: 2019-08-26 | Stop reason: HOSPADM

## 2019-08-25 RX ADMIN — RANITIDINE 150 MG: 150 TABLET ORAL at 20:52

## 2019-08-25 RX ADMIN — ACETAMINOPHEN 650 MG: 325 TABLET ORAL at 06:21

## 2019-08-25 RX ADMIN — ACETAMINOPHEN 650 MG: 325 TABLET ORAL at 17:49

## 2019-08-25 RX ADMIN — LISINOPRIL 40 MG: 40 TABLET ORAL at 20:52

## 2019-08-25 RX ADMIN — METOPROLOL SUCCINATE 100 MG: 100 TABLET, EXTENDED RELEASE ORAL at 08:28

## 2019-08-25 RX ADMIN — SODIUM CHLORIDE: 9 INJECTION, SOLUTION INTRAVENOUS at 15:25

## 2019-08-25 RX ADMIN — SENNOSIDES AND DOCUSATE SODIUM 1 TABLET: 8.6; 5 TABLET ORAL at 20:52

## 2019-08-25 RX ADMIN — SODIUM CHLORIDE: 9 INJECTION, SOLUTION INTRAVENOUS at 02:17

## 2019-08-25 RX ADMIN — OXYCODONE HYDROCHLORIDE 5 MG: 5 TABLET ORAL at 00:56

## 2019-08-25 RX ADMIN — AMLODIPINE BESYLATE 5 MG: 5 TABLET ORAL at 08:28

## 2019-08-25 RX ADMIN — OXYCODONE HYDROCHLORIDE 5 MG: 5 TABLET ORAL at 23:12

## 2019-08-25 RX ADMIN — LORATADINE 10 MG: 10 TABLET ORAL at 20:52

## 2019-08-25 RX ADMIN — RANITIDINE 150 MG: 150 TABLET ORAL at 08:28

## 2019-08-25 RX ADMIN — ACETAMINOPHEN 650 MG: 325 TABLET ORAL at 13:12

## 2019-08-25 RX ADMIN — ATORVASTATIN CALCIUM 40 MG: 40 TABLET, FILM COATED ORAL at 20:52

## 2019-08-25 RX ADMIN — ACETAMINOPHEN 650 MG: 325 TABLET ORAL at 23:12

## 2019-08-25 ASSESSMENT — ACTIVITIES OF DAILY LIVING (ADL)
ADLS_ACUITY_SCORE: 15

## 2019-08-25 NOTE — PROGRESS NOTES
[facility]  Hospitalist Progress Note  Date of Service (when I saw the patient): 08/25/2019    Assessment & Plan   Ricardo Neely is a 79 year old year old male who has a PMH significant for hx of CVA, TIA, Hyperlipidemia, HTN, Impaired fasting glucose, obesity, BPH and low back pain . Pt was admitted to [facility] on 8/21/2019 by Dr. Perez following a L3-L5 decompression for severe lumbar stenosis. Post-operatively, pt was noted to have tachycardia, so the hospitalist service was consulted  The following problems were addressed during his hospitalization:    Summary:  Tachycardia (mild) with mild fever (fever resolved 8/24): Patient with elevated heart rate and EKG showing sinus tachycardia. Pt was asymptomatic and denies weakness, SOB, or palpitations. UA and CXR unremarkable; TSH benign. WBC elevated 8/23, but WNL 8/24 following fluids and incentive spirometry. Blood cultures 8/23 without growth. Pt with diminished appetite post-operatively, but no nausea or vomiting. BP improved 8/24 and mildly Hypertensive in the evening, resumed PTA Amlodipine and Benzapril. Pt on Metoprolol 50mg daily outpatient, but given tachycardia and recovered SBP, have increased this to 100mg daily outpatient. Pt feeling well overall and is ambulating and voiding without concern. Suspect mild fever likely related to post-op atelectasis. Pt at baseline has mild tachycardia that may be increased post-operatively given acute surgical blood loss and expected post-operative pain.   - Monitor vitals and hemodynamic status  - Continued IV fluids, discontinue at discharge  - Resumed PTA Amlodipine  - Resumed PTA Benazepril  - Continue PTA Atorvastatin.   - Increased PTA Metoprolol from 50mg to 100mg daily     Status post L3-L5 lumbar transforaminal interbody fusion: Surgery completed on 8/21/19.   - Defer post-operative cares to spinal surgery team including pain control and disposition  - Defer PTA ASA 81mg  to surgery team.    - Defer PTA Clopidogrel to surgery team.      DVT Prophylaxis: Defer to primary service  Code Status: Full Code  Disposition: Defer to primary service. Pt medically cleared to discharge    Rosalind Díaz PA-C    Interval History   Pt found sleeping sound this morning. Pt reports that he continues to feel well.     -Data reviewed today: I reviewed all new labs and imaging results over the last 24 hours. I personally reviewed no images or EKG's today.    Physical Exam   Temp: 98.9  F (37.2  C) Temp src: Oral BP: 133/66 Pulse: 93 Heart Rate: 93 Resp: 16 SpO2: 94 % O2 Device: None (Room air)    Vitals:    08/21/19 0550   Weight: 96.6 kg (213 lb)     Vital Signs with Ranges  Temp:  [98.1  F (36.7  C)-99.7  F (37.6  C)] 98.9  F (37.2  C)  Pulse:  [] 93  Heart Rate:  [] 93  Resp:  [16] 16  BP: (118-154)/(51-88) 133/66  SpO2:  [94 %-97 %] 94 %  I/O last 3 completed shifts:  In: 2107 [P.O.:960; I.V.:1147]  Out: 2415 [Urine:2250; Drains:165]    Constitutional: alert, oriented, no acute distress  Eyes: No scleral icterus or injection  ENT: Normocephalic, atraumatic  Respiratory: No secondary muscle use or labored breathing. Lungs clear to auscultation bilaterally without wheezes or rhonchi   Cardiovascular: RRR without murmur  GI: Abdomen soft, non-tender to palpation, non-distended.  Bowel sounds active  MSK: able to move extremities on command without weakness, walks without weakness or ataxia. Lumbar dressing C/D/I with NEELA drain in place  Skin/Integumen: warm, dry, in tact without rash or hives  Lymph:No LE edema or calf tenderness      Medications     sodium chloride 75 mL/hr at 08/25/19 0217       amLODIPine  5 mg Oral Daily     aspirin  81 mg Oral At Bedtime     atorvastatin  40 mg Oral QPM     ranitidine  150 mg Oral Q12H    Or     famotidine  20 mg Intravenous Q12H     lisinopril  40 mg Oral QPM     loratadine  10 mg Oral QPM     metoprolol succinate ER  100 mg Oral Daily      senna-docusate  1 tablet Oral BID    Or     senna-docusate  2 tablet Oral BID     sodium chloride (PF)  3 mL Intracatheter Q8H       Data   Recent Labs   Lab 08/24/19  1051 08/23/19  2248 08/23/19  0825 08/22/19  1129  08/21/19  0554   WBC 11.0 13.4*  --   --   --   --    HGB 10.1* 10.0* 10.1*  --    < >  --    MCV 85 84  --   --   --   --     187  --   --   --   --    NA  --  134  --   --   --   --    POTASSIUM  --  3.4  --   --   --  3.9   CHLORIDE  --  102  --   --   --   --    CO2  --  25  --   --   --   --    BUN  --  12  --   --   --   --    CR  --  0.78  --  0.90  --   --    ANIONGAP  --  7  --   --   --   --    RICARDO  --  8.3*  --   --   --   --    GLC  --  112*  --   --   --   --    ALBUMIN  --  2.6*  --   --   --   --    PROTTOTAL  --  6.0*  --   --   --   --    BILITOTAL  --  0.8  --   --   --   --    ALKPHOS  --  76  --   --   --   --    ALT  --  15  --   --   --   --    AST  --  18  --   --   --   --    TROPI  --  <0.015  --   --   --   --     < > = values in this interval not displayed.       No results found for this or any previous visit (from the past 24 hour(s)).

## 2019-08-25 NOTE — PLAN OF CARE
Pt is POD 4 L3-5 Fusion with Dr. Perez. A/O x4. VSS. Tele ST with BBB. Pain controlled with as needed tylenol and rest. 0.9 at 75 mL/hr. CMS intact except RLE leg sensation feels different- baseline per pt. Dressing with scant drainage. NEELA intact to gravity, 65 mL total shift. Tolerating regular diet. Vdg frequently. Up with SBA, walker and belt. Plan for discharge to home pending drain output tomorrow. Pt scoring Green on the Stoplight Aggression tool.    3-7 PM addendum: no changes.

## 2019-08-25 NOTE — PROVIDER NOTIFICATION
"Fredy VILLEDA, \"Please confirm if you would like pt on Plavix. Thank you.\"    1006: Spoke with Fredy VILLEDA. Pt should not have been restarted on Plavix. PA will discontinue medication. Pt to hold medication 2 weeks after surgery date. PA will update orders.   "

## 2019-08-25 NOTE — PLAN OF CARE
POD 4 from a L3-5 fusion with ranjeet. A&Ox4. CMS intact. Bowel sounds active, postive flatus, tolerating regualr diet. VSS on RA ex low grade fever. Dressing reinforced with tape- CDI. Hemovac/NEELA patent open to gravity 40 mL out overnight. Up with A1GBW. C/o 4/10 pain, decreased with tylenol x2, and oxycodone x1. Pt scoring green on the Aggression Stop Light Tool. Plan for discharge home once drain removed.

## 2019-08-25 NOTE — PROGRESS NOTES
[facility]    Neurosurgery  Daily Post-Op Note    Assessment & Plan   Procedure(s):  L3-5 LUMBAR TRANSFORAMINAL INTERBODY FUSION WITH STEALTH   -4 Days Post-Op  Doing well.  No immediate surgical complications identified.  Pain well-controlled.  Tolerating physical therapy and rehabilitation well.  NEELA with more than 50 out overnight.   Plan:  -Advance activity as tolerated  -Continue supportive and symptomatic treatment  -can discharge home once drain output slows enough.       Suleman Burden    Interval History   Stable.  Doing well.  Improving slowly.  Pain is reasonably controlled.  No fevers.     Physical Exam   Temp: 98.8  F (37.1  C) Temp src: Oral BP: 118/51 Pulse: 108 Heart Rate: 108 Resp: 16 SpO2: 94 % O2 Device: None (Room air)    Vitals:    08/21/19 0550   Weight: 96.6 kg (213 lb)     Vital Signs with Ranges  Temp:  [98.1  F (36.7  C)-99.7  F (37.6  C)] 98.8  F (37.1  C)  Pulse:  [100-108] 108  Heart Rate:  [] 108  Resp:  [16] 16  BP: (118-154)/(51-88) 118/51  SpO2:  [94 %-97 %] 94 %  I/O last 3 completed shifts:  In: 2107 [P.O.:960; I.V.:1147]  Out: 2415 [Urine:2250; Drains:165]    Alert and oriented.  Moves all extremities equally.      Incision: CDI      Medications     sodium chloride 75 mL/hr at 08/25/19 0217        amLODIPine  5 mg Oral Daily     aspirin  81 mg Oral At Bedtime     atorvastatin  40 mg Oral QPM     ranitidine  150 mg Oral Q12H    Or     famotidine  20 mg Intravenous Q12H     lisinopril  40 mg Oral QPM     loratadine  10 mg Oral QPM     metoprolol succinate ER  100 mg Oral Daily     senna-docusate  1 tablet Oral BID    Or     senna-docusate  2 tablet Oral BID     sodium chloride (PF)  3 mL Intracatheter Q8H       Data         Suleman Burden PA-C  [facility]

## 2019-08-26 ENCOUNTER — APPOINTMENT (OUTPATIENT)
Dept: PHYSICAL THERAPY | Facility: CLINIC | Age: 79
DRG: 460 | End: 2019-08-26
Attending: NEUROLOGICAL SURGERY
Payer: COMMERCIAL

## 2019-08-26 VITALS
TEMPERATURE: 98.7 F | HEART RATE: 93 BPM | WEIGHT: 213 LBS | HEIGHT: 67 IN | SYSTOLIC BLOOD PRESSURE: 135 MMHG | DIASTOLIC BLOOD PRESSURE: 64 MMHG | RESPIRATION RATE: 16 BRPM | BODY MASS INDEX: 33.43 KG/M2 | OXYGEN SATURATION: 94 %

## 2019-08-26 PROCEDURE — 97116 GAIT TRAINING THERAPY: CPT | Mod: GP

## 2019-08-26 PROCEDURE — 25000132 ZZH RX MED GY IP 250 OP 250 PS 637: Performed by: PHYSICIAN ASSISTANT

## 2019-08-26 PROCEDURE — 25000132 ZZH RX MED GY IP 250 OP 250 PS 637: Performed by: HOSPITALIST

## 2019-08-26 PROCEDURE — 97530 THERAPEUTIC ACTIVITIES: CPT | Mod: GP

## 2019-08-26 PROCEDURE — 25800030 ZZH RX IP 258 OP 636: Performed by: HOSPITALIST

## 2019-08-26 PROCEDURE — 99231 SBSQ HOSP IP/OBS SF/LOW 25: CPT | Performed by: HOSPITALIST

## 2019-08-26 RX ORDER — ACETAMINOPHEN 325 MG/1
650 TABLET ORAL EVERY 4 HOURS PRN
Start: 2019-08-26 | End: 2020-10-16

## 2019-08-26 RX ORDER — CLOPIDOGREL BISULFATE 75 MG/1
75 TABLET ORAL DAILY
Qty: 90 TABLET | Refills: 3
Start: 2019-09-04 | End: 2020-05-14

## 2019-08-26 RX ORDER — METOPROLOL SUCCINATE 50 MG/1
TABLET, EXTENDED RELEASE ORAL
Qty: 135 TABLET | Refills: 3
Start: 2019-08-26 | End: 2019-11-19

## 2019-08-26 RX ORDER — OXYCODONE HYDROCHLORIDE 5 MG/1
5-10 TABLET ORAL EVERY 4 HOURS PRN
Qty: 25 TABLET | Refills: 0 | Status: SHIPPED | OUTPATIENT
Start: 2019-08-26 | End: 2019-08-30

## 2019-08-26 RX ADMIN — Medication 1 LOZENGE: at 03:51

## 2019-08-26 RX ADMIN — RANITIDINE 150 MG: 150 TABLET ORAL at 08:11

## 2019-08-26 RX ADMIN — METOPROLOL SUCCINATE 100 MG: 100 TABLET, EXTENDED RELEASE ORAL at 08:11

## 2019-08-26 RX ADMIN — Medication 1 LOZENGE: at 08:11

## 2019-08-26 RX ADMIN — AMLODIPINE BESYLATE 5 MG: 5 TABLET ORAL at 08:11

## 2019-08-26 RX ADMIN — SENNOSIDES AND DOCUSATE SODIUM 2 TABLET: 8.6; 5 TABLET ORAL at 08:11

## 2019-08-26 RX ADMIN — SODIUM CHLORIDE: 9 INJECTION, SOLUTION INTRAVENOUS at 04:01

## 2019-08-26 RX ADMIN — ACETAMINOPHEN 650 MG: 325 TABLET ORAL at 03:51

## 2019-08-26 RX ADMIN — ACETAMINOPHEN 650 MG: 325 TABLET ORAL at 08:10

## 2019-08-26 ASSESSMENT — ACTIVITIES OF DAILY LIVING (ADL)
ADLS_ACUITY_SCORE: 15

## 2019-08-26 NOTE — DISCHARGE INSTRUCTIONS
[facility] at [facility]  Dr. Perez Discharge Instructions Following Spine Surgery  [phone]  Monday - Friday; 8:00 AM - 4:00 PM    In General:   After you have had surgery on your spine, remember do not twist, or excessively flex or extend the area that you had surgery.  These activities can prevent healing.  Pain is normal and to be expected following surgery.  Please call our office to schedule your appointment follow up appointment.      Bowel Care:  Many people have constipation (hard stools) after surgery.  To help prevent constipation: Drink plenty of fluid (8-10 glasses/day); Eat more fiber, such as whole grain bread, bran cereal, and fruits and vegetables; Stay active by walking; Over the counter stool softener may also help.      Medications:  Spine surgery and pain management is unique to all patients.  You will generally be given medications for pain, muscle spasms or tightness, and for constipation during the immediate post op period.  It is important that you use these as prescribed.  Please remember to bring your pill bottles to all of your appointments. Avoid alcoholic beverages while taking narcotic pain medications. You can use ice to areas of pain as needed, 20 minutes at a time.  Changing positions and walking will help loosen your muscles as well.    Driving:  No driving while on narcotic pain medications.  It is state law not to drive while under the influence of a drug to a degree which renders you incapable of safely driving.  The narcotic medication you will be taking after surgery falls under this category.     Activity:   After surgery, most people feel less pain than they have had in a long time.  Walking and light activities will help you regain the use of your muscles.  You are encouraged to walk: start with short walks 5-10 minutes at a time for 4-5 times per day and increase as tolerated.  Stair climbing as tolerated, we recommend you use the railing.  No lifting greater than 10 pounds: approximately equal to one gallon of milk. No twisting, bending in the area you have had surgery. No housework, vacuuming, laundry, leaf raking, lawn mowing, or snow removal. Wear your brace (if ordered) as directed.    Showers:  If you have sutures or staples you may shower two days after surgery. It is ok to let water run over your incision but do not touch or scrub on the incision. Pat dry immediately after showering. If there is a dressing in place, you may remove it 2 days after surgery. If you were closed with Derma mao (glue), you may shower without covering the incision. No baths, hot tubs, or pool activity for at least 6 weeks.     Nutrition:  In general, your diet restrictions will not change with your surgery.  You may need to eat small frequent meals initially until your appetite returns.  Eat plenty of high fiber foods and drink plenty of fluids. If you do not have a fluid restriction from or prior to surgery, we recommend 6-8 (8oz) glasses of water per day. Other fluids are fine, but water is best. Nausea is not uncommon; it is a common side effect to many pain medications.  We recommend that you take the pain medications with food, if this does not improve your symptoms, please call us.     Smoking:  For proper healing it is required that you quit using all tobacco products.  This includes smoking, chewing, nicotine gums, and nicotine patches.  Call Dr. Perez if these occur: Drainage from your incision, increased pain/redness/swelling, temperatures greater than 101.5, increased leg pain or swelling or unrelieved headaches    Go to the nearest Emergency Room if you experience: chest pain, shortness of breath, neck swelling or swallowing problems

## 2019-08-26 NOTE — PLAN OF CARE
POD 5 from a L3-5 fusion with ranjeet. A&Ox4. CMS intact ex some decreased sensation on RLE- baseline per patient. Bowel sounds active, postive flatus, tolerating regualr diet. VSS on RA. NEELA with 25 out on eves- NEELA removed at 0015, dressing changed- CDI. Up with A1GB. C/o 4/10 pain, decreased with tylenol x2, and oxycodone x1. Showered this AM- dressing replaced. Pt scoring green on the Aggression Stop Light Tool. Plan for discharge home today.

## 2019-08-26 NOTE — PLAN OF CARE
A and O x4. VSS. Up with 1, belt. Good appetite on regular diet. Voiding without difficulty. Dressing CDI. Pain well controlled, took tylenol only this morning for pain level 2/10. Numbness in RLE. Wife at bedside for discharge instructions. Pt discharged home with assistance from wife.

## 2019-08-26 NOTE — DISCHARGE SUMMARY
[facility]    Discharge Summary  Neurosurgery    Date of Admission:  8/21/2019  Date of Discharge:  8/26/2019  Discharging Provider: Flores Portillo  Date of Service (when I saw the patient): 08/26/19    Discharge Diagnoses   Active Problems:    Status post lumbar spinal fusion      History of Present Illness   Ricardo Neely is an 79 year old male who presented with multiple prior lumbar decompressions, presented with severe back pain, right leg pain, and weakness. 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 On 8/21/2019 he underwent a L3-5 TLIF with Dr. Perez. Today he reports mild incisional pain, but denies radicular pain. He reports right thigh numbness. He denies weakness. He has been tolerating a diet and ambulating with therapies who recommend home with assistance from wife.     Hospital Course   Ricardo Neely was admitted on 8/21/2019.  The following problems were addressed during his hospitalization:    Active Problems:    Status post lumbar spinal fusion    Assessment: s/p L3-5 TLIF    Plan: Discharge to home        I have discussed the following assessment and plan with Dr. Perez who is in agreement with initial plan and will follow up with further consultation recommendations.    Flores Portillo CNP  [facility]  [address] [address]    Tel [phone]  Pager [phone]        Code Status   Full Code    Primary Care Physician   Storm Huddleston    Physical Exam   Temp: 98.7  F (37.1  C) Temp src: Oral BP: 135/64 Pulse: 93 Heart Rate: 100 Resp: 16 SpO2: 94 % O2 Device: None (Room air)    Vitals:    08/21/19 0550   Weight: 213 lb (96.6 kg)     Vital Signs with Ranges  Temp:  [98.6  F (37  C)-99.1  F (37.3  C)] 98.7  F (37.1  C)  Pulse:  [93] 93  Heart Rate:  [] 100  Resp:  [16] 16  BP:  (133-148)/(59-73) 135/64  SpO2:  [93 %-96 %] 94 %  I/O last 3 completed shifts:  In: 2797 [P.O.:1180; I.V.:1617]  Out: 2925 [Urine:2635; Drains:90; Blood:200]    Constitutional: Awake, alert, cooperative, no apparent distress, and appears stated age.  Eyes: Lids and lashes normal, pupils equal, round and reactive to light, extra ocular muscles intact  ENT: Normocephalic, without obvious abnormality, atraumatic  Respiratory: No increased work of breathing  Skin: No bruising or bleeding, normal skin color, texture, turgor, no redness, warmth, or swelling.  Incision with staples intact, minimal drainage, open to air.  Musculoskeletal: There is no redness, warmth, or swelling of the joints.  Full range of motion noted.  Motor strength is 5 out of 5 all extremities bilaterally.  Tone is normal.  Neurologic: Awake, alert, oriented to name, place and time.  Cranial nerves II-XII are grossly intact.  Motor is 5 out of 5 bilaterally.     Neuropsychiatric: Calm, normal eye contact, alert, normal affect, oriented to self, place, time and situation, memory for past and recent events intact and thought process normal.       Time Spent on this Encounter   I, Flores Portillo NP, personally saw the patient today and spent less than or equal to 30 minutes discharging this patient.    Discharge Disposition   Discharged to home  Condition at discharge: Stable    Consultations This Hospital Stay   OCCUPATIONAL THERAPY ADULT IP CONSULT  PHYSICAL THERAPY ADULT IP CONSULT  HOSPITALIST IP CONSULT    Discharge Orders      X-ray lumbar spine 2-3 views*     Follow-up and recommended labs and tests     Follow up with primary care provider, Storm Huddleston, within 7 days for hospital follow- up.  The following labs/tests are recommended: TSH/free T4 in about 4 weeks.     Reason for your hospital stay    Back surgery     Follow-up and recommended labs and tests     Please [facility] at [facility] in 2 weeks for staple removal  and in 6 weeks with lumbar x-ray prior.  Please call the clinic at [phone] to schedule your appointment.     Activity    Discharge instructions:  No lifting of more than 10 pounds, no bending, no twisting until follow up visit.    Ok to shampoo hair, shower or bathe, but, do not scrub or submerge incision under water until evaluated post-operatively in clinic.    Ok to walk as tolerated, avoid bed rest and prolonged sitting.    No contact sports until after follow up visit  No high impact activities such as; running/jogging, snowmobile or 4 waldron riding or any other recreational vehicles.    Do not take NSAIDS (ibuprofen, advil, aleve, naproxen, etc) for pain control.    Do NOT drive while taking narcotic pain medication.    Call the [facility] at [phone] for increasing redness, swelling or pus draining from the incision, increased pain or any other questions and concerns.     Wound care and dressings    Keep your incision clean and dry at all times.  OK to remove dressing on postop day 2.  OK to shower on postop day 3 and allow water to run over incision, pat dry after shower.  No bathing swimming or submerging in water.  Call immediately or come to ED if any drainage occurs, or you develop new pain, redness, or swelling.     Diet    Follow this diet upon discharge: home diet     Discharge Medications   Current Discharge Medication List      START taking these medications    Details   oxyCODONE (ROXICODONE) 5 MG tablet Take 1-2 tablets (5-10 mg) by mouth every 4 hours as needed for moderate to severe pain  Qty: 25 tablet, Refills: 0    Associated Diagnoses: Status post lumbar spinal fusion         CONTINUE these medications which have CHANGED    Details   acetaminophen (TYLENOL) 325 MG tablet Take 2 tablets (650 mg) by mouth every 4 hours as needed for other (multimodal surgical pain management along with NSAIDS and opioid medication as indicated based on pain control and physical function.)  "   Associated Diagnoses: Status post lumbar spinal fusion      aspirin (ASA) 81 MG tablet Take 1 tablet (81 mg) by mouth At Bedtime    Associated Diagnoses: Routine general medical examination at a health care facility      clopidogrel (PLAVIX) 75 MG tablet Take 1 tablet (75 mg) by mouth daily  Qty: 90 tablet, Refills: 3    Associated Diagnoses: Vertebrobasilar artery syndrome      metoprolol succinate ER (TOPROL-XL) 50 MG 24 hr tablet TAKE 2 TABLETS BY MOUTH EVERY DAY  Qty: 135 tablet, Refills: 3    Associated Diagnoses: Essential hypertension         CONTINUE these medications which have NOT CHANGED    Details   amLODIPine (NORVASC) 5 MG tablet Take 1 tablet (5 mg) by mouth daily  Qty: 90 tablet, Refills: 3    Associated Diagnoses: Essential hypertension      atorvastatin (LIPITOR) 40 MG tablet Take 1 tablet (40 mg) by mouth daily  Qty: 90 tablet, Refills: 3    Comments: Profile only. Pt doesn't require refill at this time  Associated Diagnoses: Hyperlipidemia LDL goal <100      benazepril (LOTENSIN) 40 MG tablet Take 1 tablet (40 mg) by mouth daily  Qty: 90 tablet, Refills: 3    Comments: Profile only. Pt doesn't require refill at this time  Associated Diagnoses: Essential hypertension      loratadine (CLARITIN) 10 MG tablet Take 1 tablet (10 mg) by mouth daily    Associated Diagnoses: Allergic rhinitis, unspecified allergic rhinitis type      fluocinonide (LIDEX) 0.05 % external cream Apply sparingly to affected area twice daily as needed.  Do not apply to face.  Qty: 60 g, Refills: 1    Associated Diagnoses: Irritant dermatitis           Allergies   Allergies   Allergen Reactions     Meclizine Other (See Comments)     Over sedation, concentration problem     Tramadol Hcl Other (See Comments)     Pt feels very drugged, \"cloudy\" if used more than one day. Nausea also     Cardura [Doxazosin Mesylate] Other (See Comments)     fainted     Hydrochlorothiazide Other (See Comments)      lightheadedness     Naproxen " Nausea     nausea

## 2019-08-26 NOTE — PLAN OF CARE
Discharge Planner PT   Patient plan for discharge: home  Current status: pt demonstrates independence with bed mobility using log roll technique, transfers, gait x 600' with no LOB despite balance challenges. Able to recall 3/3 spine precautions.  Barriers to return to prior living situation: none  Recommendations for discharge: home with wife assist for household tasks  Rationale for recommendations: Goals met. Will sign off.       Entered by: Hope Muñoz 08/26/2019 8:36 AM       Physical Therapy Discharge Summary    Reason for therapy discharge:    Discharged to home.    Progress towards therapy goal(s). See goals on Care Plan in [facility] electronic health record for goal details.  Goals met    Therapy recommendation(s):    Continue home exercise program.

## 2019-08-26 NOTE — PROGRESS NOTES
[facility]    Medicine Progress Note - Hospitalist Service       Date of Admission:  8/21/2019  Assessment & Plan   Ricardo Neely is a 79 year old male admitted on 8/21/2019.  PMH significant for hx of CVA, TIA, Hyperlipidemia, HTN, Impaired fasting glucose, obesity, BPH and low back pain . Pt was admitted to [facility] on 8/21/2019 by Dr. Perez following a L3-L5 decompression for severe lumbar stenosis. Post-operatively, pt was noted to have tachycardia, so the hospitalist service was consulted.       Tachycardia (mild) with mild fever (fever resolved 8/24)  Possibly related to surgery.  Initial fever 8/21 (102.6) and leukocytosis (13.4 on 8/23) both resolved without intervention.  Infectious work-up including CXR, UA, blood cultures all unremarkable and patient without infectious complaints.  EKG showed sinus tachycardia.    - vitals wnl 8/26    Possible hyperthyroidism  TSH mildly low with mildly elevated free T4 this admission.  Denies any symptoms of hyperthyroidism.    - follow up with PCP with repeat studies in about 4 weeks    HTN  - continue PTA amlodipine, benazepril  - metoprolol increased to 100 mg daily  - follow up with PCP within 1 week for BP check    Hx CVA  HLD  - continue PTA atorvastatin  - Plavix held per NSG     Status post L3-L5 lumbar transforaminal interbody fusion 8/21/19  - Defer post-operative cares to spinal surgery team including pain control and disposition         Diet: Advance Diet as Tolerated: Regular Diet Adult    DVT Prophylaxis: Defer to primary service  Bull Catheter: not present  Code Status: Full Code      Disposition Plan   Expected discharge: per Neurosurgery, ok to discharge from Hospitalist perspective  Entered: Madi Vicente MD 08/26/2019, 8:16 AM       The patient's care was discussed with the Bedside Nurse and Patient.    Madi Vicente MD  Hospitalist Service  [facility]  [facility]    ______________________________________________________________________    Interval History   Feeling well, offers no complaints.  Denies any fever/chills, palpitations, heat intolerance, increased appetite, weight loss.    Data reviewed today: I reviewed all medications, new labs and imaging results over the last 24 hours. I personally reviewed no images or EKG's today.    Physical Exam   Vital Signs: Temp: 98.7  F (37.1  C) Temp src: Oral BP: 135/64 Pulse: 93 Heart Rate: 100 Resp: 16 SpO2: 94 % O2 Device: None (Room air)    Weight: 213 lbs 0 oz  General Appearance: Well developed, well nourished male in NAD  Respiratory: lungs CTAB, no wheezes or crackles, no tachypnea  Cardiovascular: RRR, normal s1/s2 without murmur  GI: normal bowel sounds  Lymph: no peripheral edema  Other: Alert and appropriate, cranial nerves grossly intact     Data   Recent Labs   Lab 08/24/19  1051 08/23/19  2248 08/23/19  0825 08/22/19  1129  08/21/19  0554   WBC 11.0 13.4*  --   --   --   --    HGB 10.1* 10.0* 10.1*  --    < >  --    MCV 85 84  --   --   --   --     187  --   --   --   --    NA  --  134  --   --   --   --    POTASSIUM  --  3.4  --   --   --  3.9   CHLORIDE  --  102  --   --   --   --    CO2  --  25  --   --   --   --    BUN  --  12  --   --   --   --    CR  --  0.78  --  0.90  --   --    ANIONGAP  --  7  --   --   --   --    RICARDO  --  8.3*  --   --   --   --    GLC  --  112*  --   --   --   --    ALBUMIN  --  2.6*  --   --   --   --    PROTTOTAL  --  6.0*  --   --   --   --    BILITOTAL  --  0.8  --   --   --   --    ALKPHOS  --  76  --   --   --   --    ALT  --  15  --   --   --   --    AST  --  18  --   --   --   --    TROPI  --  <0.015  --   --   --   --     < > = values in this interval not displayed.

## 2019-08-27 ENCOUNTER — TELEPHONE (OUTPATIENT)
Dept: INTERNAL MEDICINE | Facility: CLINIC | Age: 79
End: 2019-08-27

## 2019-08-28 ENCOUNTER — TELEPHONE (OUTPATIENT)
Dept: INTERNAL MEDICINE | Facility: CLINIC | Age: 79
End: 2019-08-28

## 2019-08-28 LAB — INTERPRETATION ECG - MUSE: NORMAL

## 2019-08-28 NOTE — TELEPHONE ENCOUNTER
I wouldn't do anything about the 92 pulse, since this is in the normal range (though high for him).   Continue present dose of beta-blocker (metoprolol)

## 2019-08-28 NOTE — TELEPHONE ENCOUNTER
ED / Discharge Outreach Protocol    Patient Contact    Attempt # 1    Was call answered?  No.  Left message on voicemail with information to call me back.    Claribel ARCHULETA RN, BSN, PHN

## 2019-08-28 NOTE — TELEPHONE ENCOUNTER
"Patient called back. Notes his pulse is still high today-92. No symptoms. Says his pulse was 159 one night after surgery. Metoprolol was increased. Also notes a nurse started him on plavix in the hospital before it was supposed to be started so had issues with drainage form incision after that and had to stay an extra 2 days. Now ASA and plavix on hold until the 4th.    Hospital/TCU/ED for chronic condition Discharge Protocol    \"Hi, my name is Divya Pollock RN, a registered nurse, and I am calling from [facility].  I am calling to follow up and see how things are going for you after your recent emergency visit/hospital/TCU stay.\"    Tell me how you are doing now that you are home?\" fine, pulse still high but no symptoms with that. See above.      Discharge Instructions    \"Let's review your discharge instructions.  What is/are the follow-up recommendations?  Pt. Response: f/u with surgery and PCP    \"Has an appointment with your primary care provider been scheduled?\"   Yes. (confirm)    \"When you see the provider, I would recommend that you bring your medications with you.\"    Medications    \"Tell me what changed about your medicines when you discharged?\"    Changes to chronic meds?    0-1    \"What questions do you have about your medications?\"    None     New diagnoses of heart failure, COPD, diabetes, or MI?    No              Post Discharge Medication Reconciliation Status: discharge medications reconciled and changed, per note/orders (see AVS).    Was MTM referral placed (*Make sure to put transitions as reason for referral)?   No    Call Summary    \"What questions or concerns do you have about your recent visit and your follow-up care?\"     none    \"If you have questions or things don't continue to improve, we encourage you contact us through the main clinic number (give number).  Even if the clinic is not open, triage nurses are available 24/7 to help you.     We would like you to know that our clinic " "has extended hours (provide information).  We also have urgent care (provide details on closest location and hours/contact info)\"      \"Thank you for your time and take care!\"             "

## 2019-08-30 DIAGNOSIS — Z98.1 STATUS POST LUMBAR SPINAL FUSION: ICD-10-CM

## 2019-08-30 LAB
BACTERIA SPEC CULT: NO GROWTH
BACTERIA SPEC CULT: NO GROWTH
Lab: NORMAL
Lab: NORMAL
SPECIMEN SOURCE: NORMAL
SPECIMEN SOURCE: NORMAL

## 2019-08-30 RX ORDER — AMOXICILLIN 250 MG
1-2 CAPSULE ORAL 2 TIMES DAILY PRN
Qty: 30 TABLET | Refills: 0 | Status: SHIPPED | OUTPATIENT
Start: 2019-08-30 | End: 2019-09-04

## 2019-08-30 RX ORDER — OXYCODONE HYDROCHLORIDE 5 MG/1
5-10 TABLET ORAL EVERY 4 HOURS PRN
Qty: 30 TABLET | Refills: 0 | Status: SHIPPED | OUTPATIENT
Start: 2019-08-30 | End: 2019-09-10

## 2019-08-30 NOTE — TELEPHONE ENCOUNTER
Prescription Refill Request    Medication: Oxycodone    Surgical procedure/date: L3-5 TLIF on 8/21/19    Current regimen/number of tabs per day: 4-5 tabs per day. He has 12 tabs left but would like a refill before the weekend.    Last refill:   8/26/19 (upon hospital discharge)    Pain scale today (0-10):  4/10     location: [facility]      Any patient questions or concerns: Patient also reports constipation. He hasn't had a BM since he got home on Monday. He would like a prescription if possible. Will prescribe Senna. Advised to also try increasing fiber and fluid intake.     Patient states his wife will  both prescriptions at our [facility] clinic and bring to their pharmacy.     Will forward request to care team for approval.

## 2019-09-03 ENCOUNTER — OFFICE VISIT (OUTPATIENT)
Dept: INTERNAL MEDICINE | Facility: CLINIC | Age: 79
End: 2019-09-03
Payer: COMMERCIAL

## 2019-09-03 VITALS
RESPIRATION RATE: 16 BRPM | DIASTOLIC BLOOD PRESSURE: 66 MMHG | BODY MASS INDEX: 32.83 KG/M2 | SYSTOLIC BLOOD PRESSURE: 148 MMHG | OXYGEN SATURATION: 96 % | HEART RATE: 94 BPM | WEIGHT: 209.6 LBS

## 2019-09-03 DIAGNOSIS — R94.6 ABNORMAL FINDING ON THYROID FUNCTION TEST: ICD-10-CM

## 2019-09-03 DIAGNOSIS — M79.604 PAIN OF RIGHT LOWER EXTREMITY: Primary | ICD-10-CM

## 2019-09-03 PROCEDURE — 99214 OFFICE O/P EST MOD 30 MIN: CPT | Performed by: INTERNAL MEDICINE

## 2019-09-03 RX ORDER — GABAPENTIN 300 MG/1
600 CAPSULE ORAL AT BEDTIME
Qty: 60 CAPSULE | Refills: 1 | Status: SHIPPED | OUTPATIENT
Start: 2019-09-03 | End: 2019-10-25

## 2019-09-03 NOTE — PATIENT INSTRUCTIONS
PLAN:                                                      1.  MEDICATIONS:        - Trial of gabapentin 600 mg at bedtime       - Continue other medications without change  2.  Discuss R leg pain with neurosurgery at appointment tomorrow.   If symptoms not helped by gabapentin, consider trial of non-steroidal anti-inflammatory drug (NSAID)   3.  Recheck thyroid function tests middle of October, then message MD.   Recheck blood pressure same day.

## 2019-09-03 NOTE — PROGRESS NOTES
Subjective     Ricardo Neely is a 79 year old male who presents to clinic today for the following health issues:    [facility]       Hospital Follow-up Visit:    Hospital/Nursing Home/IP Rehab Facility: [facility]  Date of Admission: 08/21/2019  Date of Discharge: 08/26/2019  Reason(s) for Admission: Lumbar Spinal Fusion            Problems taking medications regularly:  None       Medication changes since discharge: Oxycodone and senna        Problems adhering to non-medication therapy:  None    Summary of hospitalization:  [facility] discharge summary reviewed  Diagnostic Tests/Treatments reviewed.  Follow up needed: neurosurg  Other Healthcare Providers Involved in Patient s Care:         Specialist appointment - tomorrow  Update since discharge:  Moderate pain at noc.   Pseudoclaudication symptoms in R gone, but now with new pain R hip down to below the knee.  Only happens at noc, fine when up and moving.   Gets up at noc, and moving helps a little.   Gone 2 hours after gets up.   Taking oxycodone 5 mg q 6 hours and tylenol.   Oxy doesn't help R leg pains at noc.       Post Discharge Medication Reconciliation: discharge medications reconciled, continue medications without change.  Plan of care communicated with patient     Coding guidelines for this visit:  Type of Medical   Decision Making Face-to-Face Visit       within 7 Days of discharge Face-to-Face Visit        within 14 days of discharge   Moderate Complexity [id number]   High Complexity [id number]            Problem list and histories reviewed & adjusted, as indicated.  Additional history: as documented    Additional issues to address:  1.  Constipated after surgery, and now feels he had hemorroids.   Using Preparation H suppository.   Has now been put on stool softener.    2.  Follow-up HTN.  Patient is checking outpatient blood pressures, at home.  Results are normal except today - 159/76.  He reports no side effects from BP  medications.  Toprol was increased in hospital.       ROS:    Constitutional, HEENT, cardiovascular, pulmonary, gi and gu systems are negative, except as otherwise noted.    OBJECTIVE:                                                    BP (!) 148/66   Pulse 94   Resp 16   Wt 95.1 kg (209 lb 9.6 oz)   SpO2 96%   BMI 32.83 kg/m    Body mass index is 32.83 kg/m .   No apparent distress  Recheck 142/60  Reg tach heart tones with 1/6 systolic ejection type murmur heard best at the base    negative straight leg-raise bilat  Minimal tenderness R lateral thigh below the hip  No tenderness over trochanter, bursa  Normal ROM R leg  patient declines rectal exam  Thyroid normal, no masses or enlargement       ASSESSMENT:                                                      1.  Postop lumbar surgery, recovering well.  2.  New R lateral thigh pain, with minimal tenderness.   Unclear if tenderness  3.  HTN, blood pressure higher just today.   Will observe.    4.  Constipation from surgery/meds, improved with treatment   5.  Abnormal thyroid function tests found in hospital.   No history of thyroid problems.   Positive family history.       PLAN:                                                      1.  MEDICATIONS:        - Trial of gabapentin 600 mg at bedtime       - Continue other medications without change  2.  Discuss R leg pain with neurosurgery at appointment tomorrow.   If symptoms not helped by gabapentin, consider trial of non-steroidal anti-inflammatory drug (NSAID)   3.  Recheck thyroid function tests middle of October, then message MD.    Recheck blood pressure same day with RN.    Storm Huddleston MD  [facility]

## 2019-09-04 ENCOUNTER — OFFICE VISIT (OUTPATIENT)
Dept: NEUROSURGERY | Facility: CLINIC | Age: 79
End: 2019-09-04
Attending: PHYSICIAN ASSISTANT
Payer: COMMERCIAL

## 2019-09-04 VITALS
HEIGHT: 67 IN | HEART RATE: 83 BPM | SYSTOLIC BLOOD PRESSURE: 138 MMHG | TEMPERATURE: 98 F | RESPIRATION RATE: 19 BRPM | DIASTOLIC BLOOD PRESSURE: 72 MMHG | OXYGEN SATURATION: 98 % | BODY MASS INDEX: 32.8 KG/M2 | WEIGHT: 209 LBS

## 2019-09-04 DIAGNOSIS — K59.00 CONSTIPATION: Primary | ICD-10-CM

## 2019-09-04 DIAGNOSIS — Z98.1 STATUS POST LUMBAR SPINAL FUSION: ICD-10-CM

## 2019-09-04 PROCEDURE — 99207 ZZC NO CHARGE NURSE ONLY: CPT

## 2019-09-04 PROCEDURE — 40000269 ZZH STATISTIC NO CHARGE FACILITY FEE

## 2019-09-04 RX ORDER — AMOXICILLIN 250 MG
1-2 CAPSULE ORAL 2 TIMES DAILY PRN
Qty: 30 TABLET | Refills: 0 | Status: SHIPPED | OUTPATIENT
Start: 2019-09-04 | End: 2020-05-14

## 2019-09-04 ASSESSMENT — MIFFLIN-ST. JEOR: SCORE: 1616.65

## 2019-09-04 ASSESSMENT — PAIN SCALES - GENERAL: PAINLEVEL: MILD PAIN (3)

## 2019-09-04 NOTE — NURSING NOTE
"Ricardo Neely is a 79 year old male who presents for:  Chief Complaint   Patient presents with     Surgical Followup     2 weeks f/u for staple removal s/p 8/21/2019 L3-5 TLIF        Initial Vitals:  /72   Pulse 83   Temp 98  F (36.7  C) (Oral)   Resp 19   Ht 5' 7\" (1.702 m)   Wt 209 lb (94.8 kg)   SpO2 98%   BMI 32.73 kg/m   Estimated body mass index is 32.73 kg/m  as calculated from the following:    Height as of this encounter: 5' 7\" (1.702 m).    Weight as of this encounter: 209 lb (94.8 kg).. Body surface area is 2.12 meters squared. BP completed using cuff size: regular  Mild Pain (3)        Nursing Comments: Pt present today for 2 weeks  post op f/u. Pt states that off and on pain of about 3/10. Pt denies any numbness ot=r tingling.        Byron Orellana, TED    "

## 2019-09-04 NOTE — PROGRESS NOTES
Nurse Suture/Staple removal visit note:  Pain  Patient presents today s/p L3-L5 posterior instrumentation by Dr. Perez. Pain rates 3/10 at night, on his lower back. Patient taking 1 Oxycodone every 6 hours. PMD started patient on Gabapentin yesterday. Provided patient with Stool Softener refill today.  Incision  Wound is healing nicely without erythema, fluctuation, induration, drainage, or fever. Incision edges well approximated without signs of infection. Staples removed by writer. Steri-strips applied. Patient tolerated with minimal discomfort.  Assessment/Patient Questions  Radiculopathy down right leg resolved as of today. Continues to have lower back pain.    Denies numbness/tingling to bilateral lower extremities. Denies weakness. Able to dorsi-flex and plantar flex with equal strength.  All questions answered.  Instructions for Patient    Keep your incision clean and dry at all times. Steri strips applied and should remain in place for 7-10 days.    No bathing, swimming, or submerging in water until incision is well healed.      No lifting greater than 10-15 pounds. No bending, twisting, or overhead reaching.    Return in 4 weeks for follow up appointment and xray piror.    Weaning from narcotic pain medications: When it is time, start weaning by extending the time between doses.     For refills, please call our clinic. A nurse will call you back to obtain a pain assessment. Please call 3-4 business days before you run out so we can ensure there is a provider available at the location you prefer for .    Call the clinic or go to Emergency Room if you develop any new pain, drainage, swelling, or fever. Go to the Emergency Room if sudden onset of severe headache, weakness, confusion, change in level of consciousness, pain, or loss of movement.    Post-operative appointments: Arrive 30 minutes before your 6 week post op, 3 months post op, 6 months post op, 1 year post-op appointments to allow time for an  x-ray before each.    Please call clinic with any further questions or concerns    ROXI Austin  [facility]  [place] [address]  [place] [address]  T:  [phone]  F:  [phone]

## 2019-09-04 NOTE — LETTER
9/4/2019         RE: Ricardo Neely  [address]  [facility] [address]        Dear Colleague,    Thank you for referring your patient, Ricardo Neely, to the [facility]UR[facility]IC. Please see a copy of my visit note below.    Nurse Suture/Staple removal visit note:  Pain  Patient presents today s/p L3-L5 posterior instrumentation by Dr. Perez. Pain rates 3/10 at night, on his lower back. Patient taking 1 Oxycodone every 6 hours. PMD started patient on Gabapentin yesterday. Provided patient with Stool Softener refill today.  Incision  Wound is healing nicely without erythema, fluctuation, induration, drainage, or fever. Incision edges well approximated without signs of infection. Staples removed by writer. Steri-strips applied. Patient tolerated with minimal discomfort.  Assessment/Patient Questions  Radiculopathy down right leg resolved as of today. Continues to have lower back pain.    Denies numbness/tingling to bilateral lower extremities. Denies weakness. Able to dorsi-flex and plantar flex with equal strength.  All questions answered.  Instructions for Patient    Keep your incision clean and dry at all times. Steri strips applied and should remain in place for 7-10 days.    No bathing, swimming, or submerging in water until incision is well healed.      No lifting greater than 10-15 pounds. No bending, twisting, or overhead reaching.    Return in 4 weeks for follow up appointment and xray piror.    Weaning from narcotic pain medications: When it is time, start weaning by extending the time between doses.     For refills, please call our clinic. A nurse will call you back to obtain a pain assessment. Please call 3-4 business days before you run out so we can ensure there is a provider available at the location you prefer for .    Call the clinic or go to Emergency Room if you develop any new pain, drainage, swelling, or fever. Go to the Emergency Room if sudden  onset of severe headache, weakness, confusion, change in level of consciousness, pain, or loss of movement.    Post-operative appointments: Arrive 30 minutes before your 6 week post op, 3 months post op, 6 months post op, 1 year post-op appointments to allow time for an x-ray before each.    Please call clinic with any further questions or concerns    [facility] [place]  [facility]  [address] [address]  T:  [phone]  F:  [phone]      Again, thank you for allowing me to participate in the care of your patient.        Sincerely,         Neurosurgery Nurse

## 2019-09-04 NOTE — PATIENT INSTRUCTIONS
Instructions for Patient  - Start taking your Aspirin and Plavix as prescribed beginning today.    Keep your incision clean and dry at all times. Steri strips intact please keep on for 10-14 days they will fall off on their own.    No bathing, swimming, or submerging in water until incision is well healed.      No lifting greater than 10-15 pounds. No bending, twisting, or overhead reaching.    Return in 4 weeks for follow up appointment and xray prior.    Weaning from narcotic pain medications: When it is time, start weaning by extending the time between doses.     For refills, please call our clinic. A nurse will call you back to obtain a pain assessment. Please call 3-4 business days before you run out so we can ensure there is a provider available at the location you prefer for .    Call the clinic or go to Emergency Room if you develop any new pain, drainage, swelling, or fever. Go to the Emergency Room if sudden onset of severe headache, weakness, confusion, change in level of consciousness, pain, or loss of movement.    Post-operative appointments: Arrive 30 minutes before your 6 week post op, 3 months post op, 6 months post op, 1 year post-op appointments to allow time for an x-ray before each.    Please call clinic with any further questions or concerns

## 2019-09-06 ENCOUNTER — TELEPHONE (OUTPATIENT)
Dept: NEUROSURGERY | Facility: CLINIC | Age: 79
End: 2019-09-06

## 2019-09-06 DIAGNOSIS — Z98.1 STATUS POST LUMBAR SPINAL FUSION: Primary | ICD-10-CM

## 2019-09-06 RX ORDER — METHOCARBAMOL 750 MG/1
750 TABLET, FILM COATED ORAL 3 TIMES DAILY PRN
Qty: 60 TABLET | Refills: 0 | Status: SHIPPED | OUTPATIENT
Start: 2019-09-06 | End: 2020-05-14

## 2019-09-06 NOTE — TELEPHONE ENCOUNTER
Spoke with Caridad CONRAD and she suggested Robaxin for patient. Sent to patient's preferred pharmacy.

## 2019-09-06 NOTE — TELEPHONE ENCOUNTER
Patient is s/p L3-5 LUMBAR TRANSFORAMINAL INTERBODY FUSION on 08/21/19 by Dr. Perez. Patient calling today stating overnight he had sharp right leg pain extending from his hip to toes. His pain is currently 5/10, tolerable but not comfortable. His PMD started Gabapentin. Currently he is taking 1 Oxycodone every six hours and 1 750mg Tylenol every 6 hours. He has been walking daily since surgery. Yesterday he walked 2 miles and wondering if that was too much.  Nursing reassured him that his nerves are still irritated from surgery, and have good memory. Educated him that if he does activity that causes more pain, to scale back until his body is more recovered. The pain, confirmed by him, is the same pain he had preop- last night it was just more intense.    He stated he was never given a muscle relaxant when he discharged, so requesting this be sent to his preferred pharmacy to see if this provides more relief.   Will discuss with care team to make sure there is no contraindication for the muscle relaxant and send to preferred pharmacy [facility] in [place].

## 2019-09-10 ENCOUNTER — TELEPHONE (OUTPATIENT)
Dept: INTERNAL MEDICINE | Facility: CLINIC | Age: 79
End: 2019-09-10

## 2019-09-10 ENCOUNTER — TELEPHONE (OUTPATIENT)
Dept: NEUROSURGERY | Facility: CLINIC | Age: 79
End: 2019-09-10

## 2019-09-10 DIAGNOSIS — Z98.1 STATUS POST LUMBAR SPINAL FUSION: ICD-10-CM

## 2019-09-10 NOTE — TELEPHONE ENCOUNTER
Patient is s/p L3-5 LUMBAR TRANSFORAMINAL INTERBODY FUSION on 08/21/19 by Dr. Perez. Nursing last spoke with him on 09/06, when Robaxin was sent to his preferred pharmacy. Patient rates pain in hip down anterior portion of leg to knee 5/10, worsening at night up to 10/10.   Patient states he is taking 2 Oxycodone per day. Robaxin he states he is taking 2 per day. He takes 2 Gabapentin at bedtime prescribed by his PMD. Ambulating no issues.  No bowel or bladder issues. No increased weakness.   Patient wondering if there are any further recommendations to help with pain, and also requesting a refill of Oxycodone to be picked up at [facility]. Will discuss with team.

## 2019-09-10 NOTE — TELEPHONE ENCOUNTER
Patient informed of note below. Expressed understanding.     Encouraged patient to follow up with Neurosurgery just incase. Patient had already called today. Per chart review still awaiting NP response.     Patient will try the recommendations below and update if no improvement.     Claribel ARCHULETA RN, BSN, PHN

## 2019-09-10 NOTE — TELEPHONE ENCOUNTER
Patient calling regarding right leg pain.    Pain is keeping him up at night.  Not sleeping. Pain is sharp. No swelling. He is up and walking as tolerated but felt that his leg became weak when walking down some stairs. He is taking the  Gabapentin as prescribed and not helping; Also took muscle relaxer and oxycodone (spreaded out). Reports no relief.       Would like to know how else to go about pain relief. Gabapentin was started about 1 week ago, please advise if patient should give medication more time.    Claribel ARCHULETA RN, BSN, PHN

## 2019-09-10 NOTE — TELEPHONE ENCOUNTER
1.   Will notify surgeon of worsening symptoms -->  Unclear if this is a complication of recent surgery, as symptoms were not there previously.    May need imaging.   Please also call neurosurgery to make sure they are aware.  2.  Please contact patient and find out if having any side effects from the gabapentin.   If not, then increase dose to 900-1200 mg at bedtime and may take 300 mg in am and midday, as well  3.  See if he can tolerate ibuprofen without problems.   If so, try taking 600 mg three times daily prn next few days.   Stop for any GI distress.  OTC fine, or we can Rx.  4.  Will need follow-up appointment if not improving

## 2019-09-11 RX ORDER — OXYCODONE HYDROCHLORIDE 5 MG/1
5-10 TABLET ORAL EVERY 4 HOURS PRN
Qty: 30 TABLET | Refills: 0 | Status: SHIPPED | OUTPATIENT
Start: 2019-09-11 | End: 2020-05-14

## 2019-09-11 NOTE — TELEPHONE ENCOUNTER
Called patient to let him know Oxycodone script was ready. He will  today at the [facility]. No further questions.

## 2019-09-11 NOTE — TELEPHONE ENCOUNTER
Called patient to follow up and offer a Medrol dose pack to help with his 10/10 nighttime pain that he spoke with nursing about yesterday. He stated today that he had a good night, his primary increased his Gabapentin by 1 pill. He decided to forego the dose pack at this time. Nursing will call him this afternoon when Oxycodone script is ready to be picked up.

## 2019-09-16 DIAGNOSIS — E78.5 HYPERLIPIDEMIA LDL GOAL <100: ICD-10-CM

## 2019-09-17 RX ORDER — ATORVASTATIN CALCIUM 40 MG/1
TABLET, FILM COATED ORAL
Qty: 90 TABLET | Refills: 2 | Status: SHIPPED | OUTPATIENT
Start: 2019-09-17 | End: 2020-05-14

## 2019-09-17 NOTE — TELEPHONE ENCOUNTER
"Requested Prescriptions   Pending Prescriptions Disp Refills     atorvastatin (LIPITOR) 40 MG tablet [Pharmacy Med Name: ATORVASTATIN 40MG TABLETS]  Last Written Prescription Date:  07/01/2019  Last Fill Quantity: 90,  # refills: 03   Last Office Visit: 9/3/2019   Future Office Visit:    Next 5 appointments (look out 90 days)    Oct 02, 2019  3:00 PM CDT  Return Visit with Flores Portillo NP  [facility] ([facility]) [address] [address]  [phone]   Oct 16, 2019  9:00 AM CDT  [facility] ([facility]) [address] [id number] [phone]   Nov 20, 2019  1:30 PM CST  Return Visit with Caridad Russo PA-C  [facility] ([facility]) [address] [place] [id number]  [phone]          90 tablet 0     Sig: TAKE 1 TABLET BY MOUTH EVERY DAY       [facility] Protocol Passed - 9/16/2019  7:59 PM        Passed - LDL on file in past 12 months     Recent Labs   Lab Test 05/21/19  0812   LDL 57             Passed - No abnormal creatine kinase in past 12 months     No lab results found.             Passed - Recent (12 mo) or future (30 days) visit within the authorizing provider's specialty     Patient had office visit in the last 12 months or has a visit in the next 30 days with authorizing provider or within the authorizing provider's specialty.  See \"Patient Info\" tab in inbasket, or \"Choose Columns\" in Meds & Orders section of the refill encounter.              Passed - Medication is active on med list        Passed - Patient is age 18 or older          "

## 2019-10-02 ENCOUNTER — HEALTH MAINTENANCE LETTER (OUTPATIENT)
Age: 79
End: 2019-10-02

## 2019-10-02 ENCOUNTER — ANCILLARY PROCEDURE (OUTPATIENT)
Dept: GENERAL RADIOLOGY | Facility: CLINIC | Age: 79
End: 2019-10-02
Attending: NURSE PRACTITIONER
Payer: COMMERCIAL

## 2019-10-02 ENCOUNTER — OFFICE VISIT (OUTPATIENT)
Dept: NEUROSURGERY | Facility: CLINIC | Age: 79
End: 2019-10-02
Attending: NURSE PRACTITIONER
Payer: COMMERCIAL

## 2019-10-02 VITALS
TEMPERATURE: 98.4 F | WEIGHT: 209 LBS | OXYGEN SATURATION: 96 % | HEART RATE: 96 BPM | RESPIRATION RATE: 16 BRPM | DIASTOLIC BLOOD PRESSURE: 64 MMHG | BODY MASS INDEX: 32.8 KG/M2 | HEIGHT: 67 IN | SYSTOLIC BLOOD PRESSURE: 126 MMHG

## 2019-10-02 DIAGNOSIS — Z98.1 STATUS POST LUMBAR SPINAL FUSION: Primary | ICD-10-CM

## 2019-10-02 DIAGNOSIS — Z98.1 STATUS POST LUMBAR SPINAL FUSION: ICD-10-CM

## 2019-10-02 PROCEDURE — G0463 HOSPITAL OUTPT CLINIC VISIT: HCPCS

## 2019-10-02 PROCEDURE — 72100 X-RAY EXAM L-S SPINE 2/3 VWS: CPT

## 2019-10-02 PROCEDURE — 99024 POSTOP FOLLOW-UP VISIT: CPT | Performed by: NURSE PRACTITIONER

## 2019-10-02 ASSESSMENT — MIFFLIN-ST. JEOR: SCORE: 1616.65

## 2019-10-02 ASSESSMENT — PAIN SCALES - GENERAL: PAINLEVEL: MODERATE PAIN (4)

## 2019-10-02 NOTE — PATIENT INSTRUCTIONS
- May increase lifting restriction to 20 pounds   - Follow up in 6 weeks with xray prior   - Call the clinic at [phone] for increased pain or any other questions and concerns.

## 2019-10-02 NOTE — NURSING NOTE
"Ricardo Neely is a 79 year old male who presents for:  Chief Complaint   Patient presents with     Follow Up        Initial Vitals:  /64   Pulse 96   Temp 98.4  F (36.9  C) (Oral)   Resp 16   Ht 5' 7\" (1.702 m)   Wt 209 lb (94.8 kg)   SpO2 96%   BMI 32.73 kg/m   Estimated body mass index is 32.73 kg/m  as calculated from the following:    Height as of this encounter: 5' 7\" (1.702 m).    Weight as of this encounter: 209 lb (94.8 kg).. Body surface area is 2.12 meters squared. BP completed using cuff size: regular  Moderate Pain (4)        Nursing Comments: Pt present today for follow up.        Byron Orellana [facility]    "

## 2019-10-02 NOTE — LETTER
10/2/2019         RE: Ricardo Neely  [address]  [facility] [address]        Dear Colleague,    Thank you for referring your patient, Ricardo Neely, to the [facility]UROSURGERY CLINIC. Please see a copy of my visit note below.    [facility]  Neurosurgery followup:    HPI: 6 weeks s/p L3-5 TLIF.  Doing well. Continues to have low back and right thigh pain. States this is similar as prior to surgery. He states he tries to continue restrictions as advised but may be overdoing it. No weakness.   Exam:  Constitutional:  Alert, well nourished, NAD.  HEENT: Normocephalic, atraumatic.   Pulm:  Without shortness of breath   CV:  No pitting edema of BLE.      Neurological:  Awake  Alert  Oriented x 3  Motor exam:        IP Q DF PF EHL  R   5  5   5   5    5  L   5  5   5   5    5     Able to spontaneously move L/E bilaterally  Sensation intact throughout all L/E dermatomes     Incisions:  Healing nicely.  Imaging: AP and lateral films reveal intact and stable hardware.    A/P: 6 weeks s/p L3-5 TLIF.  Doing well. Continues to have low back and right thigh pain. States this is similar as prior to surgery. He states he tries to continue restrictions as advised but may be overdoing it. No weakness. Reviewed lumbar XR results in clinic. May increase weight restrictions to 20 pounds. Follow up in 6 weeks with XR prior. He verbalized understanding and agreement.    Patient Instructions   - May increase lifting restriction to 20 pounds   - Follow up in 6 weeks with xray prior   - Call the clinic at [phone] for increased pain or any other questions and concerns.    Flores Portillo CNP  [facility]  [address] [address]    Tel [phone]      Again, thank you for allowing me to participate in the care of your patient.        Sincerely,        Flores Portillo, GURWINDER

## 2019-10-02 NOTE — PROGRESS NOTES
[facility]  Neurosurgery followup:    HPI: 6 weeks s/p L3-5 TLIF.  Doing well. Continues to have low back and right thigh pain. States this is similar as prior to surgery. He states he tries to continue restrictions as advised but may be overdoing it. No weakness.   Exam:  Constitutional:  Alert, well nourished, NAD.  HEENT: Normocephalic, atraumatic.   Pulm:  Without shortness of breath   CV:  No pitting edema of BLE.      Neurological:  Awake  Alert  Oriented x 3  Motor exam:        IP Q DF PF EHL  R   5  5   5   5    5  L   5  5   5   5    5     Able to spontaneously move L/E bilaterally  Sensation intact throughout all L/E dermatomes     Incisions:  Healing nicely.  Imaging: AP and lateral films reveal intact and stable hardware.    A/P: 6 weeks s/p L3-5 TLIF.  Doing well. Continues to have low back and right thigh pain. States this is similar as prior to surgery. He states he tries to continue restrictions as advised but may be overdoing it. No weakness. Reviewed lumbar XR results in clinic. May increase weight restrictions to 20 pounds. Follow up in 6 weeks with XR prior. He verbalized understanding and agreement.    Patient Instructions   - May increase lifting restriction to 20 pounds   - Follow up in 6 weeks with xray prior   - Call the clinic at [phone] for increased pain or any other questions and concerns.    Flores Portillo CNP  [facility]  [address] [address]    Tel [phone]

## 2019-10-16 ENCOUNTER — ALLIED HEALTH/NURSE VISIT (OUTPATIENT)
Dept: NURSING | Facility: CLINIC | Age: 79
End: 2019-10-16
Payer: COMMERCIAL

## 2019-10-16 VITALS — DIASTOLIC BLOOD PRESSURE: 74 MMHG | SYSTOLIC BLOOD PRESSURE: 124 MMHG

## 2019-10-16 DIAGNOSIS — R94.6 ABNORMAL FINDING ON THYROID FUNCTION TEST: ICD-10-CM

## 2019-10-16 DIAGNOSIS — I10 ESSENTIAL HYPERTENSION: Primary | ICD-10-CM

## 2019-10-16 LAB
T4 FREE SERPL-MCNC: 0.88 NG/DL (ref 0.76–1.46)
TSH SERPL DL<=0.005 MIU/L-ACNC: 0.86 MU/L (ref 0.4–4)

## 2019-10-16 PROCEDURE — 36415 COLL VENOUS BLD VENIPUNCTURE: CPT | Performed by: INTERNAL MEDICINE

## 2019-10-16 PROCEDURE — 84443 ASSAY THYROID STIM HORMONE: CPT | Performed by: INTERNAL MEDICINE

## 2019-10-16 PROCEDURE — 84439 ASSAY OF FREE THYROXINE: CPT | Performed by: INTERNAL MEDICINE

## 2019-10-25 DIAGNOSIS — M79.604 PAIN OF RIGHT LOWER EXTREMITY: ICD-10-CM

## 2019-10-25 RX ORDER — GABAPENTIN 300 MG/1
CAPSULE ORAL
Qty: 60 CAPSULE | Refills: 0 | Status: SHIPPED | OUTPATIENT
Start: 2019-10-25 | End: 2019-11-29

## 2019-10-25 NOTE — TELEPHONE ENCOUNTER
Requested Prescriptions   Pending Prescriptions Disp Refills     gabapentin (NEURONTIN) 300 MG capsule [Pharmacy Med Name: GABAPENTIN 300MG CAPSULES] 60 capsule 0     Sig: TAKE 2 CAPSULES(600 MG) BY MOUTH AT BEDTIME       There is no refill protocol information for this order          Last Written Prescription Date:  9/3/19  Last Fill Quantity: 60,   # refills: 1  Last Office Visit: 09/03/19  Future Office visit:    Next 5 appointments (look out 90 days)    Nov 20, 2019  1:30 PM CST  Return Visit with Caridad Russo PA-C  [facility] ([facility]) [address] [place] [phone]  [phone]           Routing refill request to provider for review/approval because:  Drug not on the [facility], [facility] or [facility] refill protocol or controlled substance

## 2019-11-18 DIAGNOSIS — I10 ESSENTIAL HYPERTENSION: ICD-10-CM

## 2019-11-19 RX ORDER — METOPROLOL SUCCINATE 50 MG/1
TABLET, EXTENDED RELEASE ORAL
Qty: 135 TABLET | Refills: 2 | Status: SHIPPED | OUTPATIENT
Start: 2019-11-19 | End: 2019-12-09

## 2019-11-19 NOTE — TELEPHONE ENCOUNTER
"Requested Prescriptions   Pending Prescriptions Disp Refills     metoprolol succinate ER (TOPROL-XL) 50 MG 24 hr tablet 135 tablet 3     Sig: TAKE 2 TABLETS BY MOUTH EVERY DAY       Beta-Blockers Protocol Passed - 11/18/2019  8:38 AM        Passed - Blood pressure under 140/90 in past 12 months     BP Readings from Last 3 Encounters:   10/16/19 124/74   10/02/19 126/64   09/04/19 138/72                 Passed - Patient is age 6 or older        Passed - Recent (12 mo) or future (30 days) visit within the authorizing provider's specialty     Patient has had an office visit with the authorizing provider or a provider within the authorizing providers department within the previous 12 mos or has a future within next 30 days. See \"Patient Info\" tab in inbasket, or \"Choose Columns\" in Meds & Orders section of the refill encounter.              Passed - Medication is active on med list          "

## 2019-11-20 ENCOUNTER — OFFICE VISIT (OUTPATIENT)
Dept: NEUROSURGERY | Facility: CLINIC | Age: 79
End: 2019-11-20
Attending: PHYSICIAN ASSISTANT
Payer: COMMERCIAL

## 2019-11-20 ENCOUNTER — ANCILLARY PROCEDURE (OUTPATIENT)
Dept: GENERAL RADIOLOGY | Facility: CLINIC | Age: 79
End: 2019-11-20
Payer: COMMERCIAL

## 2019-11-20 VITALS
DIASTOLIC BLOOD PRESSURE: 70 MMHG | TEMPERATURE: 98.3 F | HEART RATE: 77 BPM | SYSTOLIC BLOOD PRESSURE: 132 MMHG | WEIGHT: 210 LBS | OXYGEN SATURATION: 97 % | HEIGHT: 67 IN | BODY MASS INDEX: 32.96 KG/M2

## 2019-11-20 DIAGNOSIS — Z98.1 STATUS POST LUMBAR SPINAL FUSION: Primary | ICD-10-CM

## 2019-11-20 PROCEDURE — 72100 X-RAY EXAM L-S SPINE 2/3 VWS: CPT

## 2019-11-20 PROCEDURE — 99024 POSTOP FOLLOW-UP VISIT: CPT | Performed by: PHYSICIAN ASSISTANT

## 2019-11-20 PROCEDURE — G0463 HOSPITAL OUTPT CLINIC VISIT: HCPCS

## 2019-11-20 ASSESSMENT — MIFFLIN-ST. JEOR: SCORE: 1621.18

## 2019-11-20 ASSESSMENT — PAIN SCALES - GENERAL: PAINLEVEL: MILD PAIN (2)

## 2019-11-20 NOTE — LETTER
11/20/2019         RE: Ricardo Neely  [address]  [place] [facility] [address]        Dear Colleague,    Thank you for referring your patient, Ricardo Neely, to the [facility]. Please see a copy of my visit note below.    [facility]  Neurosurgery followup:    HPI: 3 months s/p L3-5 TLIF.  Recovering well with improvement in back and right leg pain. States he does have some increased right leg pain into calf with increased physical activity and walking, but overall much improved. He is leaving for [place] until April and hoping to increase exercise. No weakness.  Exam:  Constitutional:  Alert, well nourished, NAD.  HEENT: Normocephalic, atraumatic.   Pulm:  Without shortness of breath   CV:  No pitting edema of BLE.      Neurological:  Awake  Alert  Oriented x 3  Motor exam:        IP Q DF PF EHL  R   5  5   5   5    5  L   5  5   5   5    5     Reflexes are 2+ in the patellar and Achilles. There is no clonus. Downgoing Babinski.    Able to spontaneously move L/E bilaterally  Sensation intact throughout all L/E dermatomes     Incision: Nicely healed  Imaging: AP and lateral lumbar films revealed with stable hardware  A/P: 3 months s/p L3-5 TLIF. Recovering well with improvement in back and right leg pain. States he does have some increased right leg pain into calf with increased physical activity and walking, but overall much improved. XRs with stable hardware. Incision nicely healed. Full strength on exam. PT script given to start when he is in [place]. Advised he can follow up when he returns in April and can call with any questions/concerns in the mean time. He can also taper off of gabapentin if he feels he is not benefiting from it (currently down to 1 tab per day). Patient voiced understanding and agreement.   - May increase lifting restriction to 30 pounds and gradually increase as tolerated  - followup in 3 months with xray prior   - Call the clinic at  [phone] for increased pain or any other questions and concerns.      Caridad Russo PA-C  [facility]  [address] [address]    Tel [phone]  Pager [phone]      Again, thank you for allowing me to participate in the care of your patient.        Sincerely,        Caridad Russo PA-C

## 2019-11-20 NOTE — NURSING NOTE
"Ricardo Neely is a 79 year old male who presents for:  Chief Complaint   Patient presents with     Surgical Followup     90 day follow up s/p 8/21/19 L3-5 TLIF.         Initial Vitals:  /70 (BP Location: Right arm, Patient Position: Sitting, Cuff Size: Adult Large)   Pulse 77   Temp 98.3  F (36.8  C) (Oral)   Ht 5' 7\" (1.702 m)   Wt 210 lb (95.3 kg)   SpO2 97%   BMI 32.89 kg/m   Estimated body mass index is 32.89 kg/m  as calculated from the following:    Height as of this encounter: 5' 7\" (1.702 m).    Weight as of this encounter: 210 lb (95.3 kg).. Body surface area is 2.12 meters squared. BP completed using cuff size: large  Mild Pain (2)        Nursing Comments: Patient states that as he walks the pain increases in his right leg.         Oriana Ordonez, TED    "

## 2019-11-20 NOTE — PATIENT INSTRUCTIONS
-Order for physical therapy. You can bring this to a clinic close to your home in [place].    -May increase lifting to 30 lbs then gradually increase per comfort.    -Follow up in clinic with xray prior once you return from [place].    Please call with any further questions/concerns.    [facility]  Phone: [phone]

## 2019-11-20 NOTE — PROGRESS NOTES
[facility]  Neurosurgery followup:    HPI: 3 months s/p L3-5 TLIF.  Recovering well with improvement in back and right leg pain. States he does have some increased right leg pain into calf with increased physical activity and walking, but overall much improved. He is leaving for [place] until April and hoping to increase exercise. No weakness.  Exam:  Constitutional:  Alert, well nourished, NAD.  HEENT: Normocephalic, atraumatic.   Pulm:  Without shortness of breath   CV:  No pitting edema of BLE.      Neurological:  Awake  Alert  Oriented x 3  Motor exam:        IP Q DF PF EHL  R   5  5   5   5    5  L   5  5   5   5    5     Reflexes are 2+ in the patellar and Achilles. There is no clonus. Downgoing Babinski.    Able to spontaneously move L/E bilaterally  Sensation intact throughout all L/E dermatomes     Incision: Nicely healed  Imaging: AP and lateral lumbar films revealed with stable hardware  A/P: 3 months s/p L3-5 TLIF. Recovering well with improvement in back and right leg pain. States he does have some increased right leg pain into calf with increased physical activity and walking, but overall much improved. XRs with stable hardware. Incision nicely healed. Full strength on exam. PT script given to start when he is in [place]. Advised he can follow up when he returns in April and can call with any questions/concerns in the mean time. He can also taper off of gabapentin if he feels he is not benefiting from it (currently down to 1 tab per day). Patient voiced understanding and agreement.   - May increase lifting restriction to 30 pounds and gradually increase as tolerated  - followup in 3 months with xray prior   - Call the clinic at [phone] for increased pain or any other questions and concerns.      Caridad Russo PA-C  [facility]  [address]  [address] [place] [address]    Tel [phone]  Pager [phone]

## 2019-11-29 DIAGNOSIS — M79.604 PAIN OF RIGHT LOWER EXTREMITY: ICD-10-CM

## 2019-11-29 RX ORDER — GABAPENTIN 300 MG/1
CAPSULE ORAL
Qty: 180 CAPSULE | Refills: 3 | Status: SHIPPED | OUTPATIENT
Start: 2019-11-29 | End: 2020-05-14

## 2019-11-29 NOTE — TELEPHONE ENCOUNTER
Requested Prescriptions   Pending Prescriptions Disp Refills     gabapentin (NEURONTIN) 300 MG capsule [Pharmacy Med Name: GABAPENTIN 300MG CAPSULES] 60 capsule 0     Sig: TAKE 2 CAPSULES(600 MG) BY MOUTH AT BEDTIME       There is no refill protocol information for this order        Last Written Prescription Date:  10/25/2019  Last Fill Quantity: 60,  # refills: 0   Last Office Visit: 9/3/2019   Future Office Visit:

## 2019-11-29 NOTE — TELEPHONE ENCOUNTER
Routing refill request to provider for review/approval because:  Listed as patient taking differently on current med list  Drug not on [facility] protocol or is controlled substance

## 2019-12-09 DIAGNOSIS — I10 ESSENTIAL HYPERTENSION: ICD-10-CM

## 2019-12-09 NOTE — TELEPHONE ENCOUNTER
Patient wants 3 months sent to the [facility] while they are there for 4 months.

## 2019-12-10 RX ORDER — METOPROLOL SUCCINATE 50 MG/1
TABLET, EXTENDED RELEASE ORAL
Qty: 180 TABLET | Refills: 0 | Status: SHIPPED | OUTPATIENT
Start: 2019-12-10 | End: 2020-02-24

## 2019-12-10 NOTE — TELEPHONE ENCOUNTER
"Requested Prescriptions   Pending Prescriptions Disp Refills     metoprolol succinate ER (TOPROL-XL) 50 MG 24 hr tablet 180 tablet 0     Sig: TAKE 2 TABLETS BY MOUTH EVERY DAY       Beta-Blockers Protocol Passed - 12/9/2019 11:44 AM        Passed - Blood pressure under 140/90 in past 12 months     BP Readings from Last 3 Encounters:   11/20/19 132/70   10/16/19 124/74   10/02/19 126/64                 Passed - Patient is age 6 or older        Passed - Recent (12 mo) or future (30 days) visit within the authorizing provider's specialty     Patient has had an office visit with the authorizing provider or a provider within the authorizing providers department within the previous 12 mos or has a future within next 30 days. See \"Patient Info\" tab in inbasket, or \"Choose Columns\" in Meds & Orders section of the refill encounter.              Passed - Medication is active on med list          Prescription approved per [facility] Refill Protocol.    Claribel DAVEYN, RN, PHN      "

## 2020-01-21 ENCOUNTER — TRANSFERRED RECORDS (OUTPATIENT)
Dept: HEALTH INFORMATION MANAGEMENT | Facility: CLINIC | Age: 80
End: 2020-01-21

## 2020-02-22 DIAGNOSIS — I10 ESSENTIAL HYPERTENSION: ICD-10-CM

## 2020-02-24 RX ORDER — METOPROLOL SUCCINATE 50 MG/1
TABLET, EXTENDED RELEASE ORAL
Qty: 180 TABLET | Refills: 1 | Status: SHIPPED | OUTPATIENT
Start: 2020-02-24 | End: 2020-05-14

## 2020-04-24 ENCOUNTER — NURSE TRIAGE (OUTPATIENT)
Dept: INTERNAL MEDICINE | Facility: CLINIC | Age: 80
End: 2020-04-24

## 2020-04-24 NOTE — TELEPHONE ENCOUNTER
Dr. Huddleston, - please advise.    Right hip pain (sharp pain in hip is constant), travels down leg - Burning sensation in thigh, and stops at knee.   Gets better when sitting, but walking/movement brings pain back.  [place] is [place] now.  Tyl not helping at all.  Back surgery in August.   Was walking 5 miles/day.  Got up 1 morning, and pain was just there.   Has been there 4-5 days.  When walking will have some lower back pain too.  No waking up at night, walks with a cane (maybe slight limp).  No fever.   Ever since back surgery has had tingling in right leg. But nothing more than normal. No numbness or tingling on leg.    Please advise on recommendations, and call pt back.   (pharmacy pending).      Additional Information    Negative: Looks like a broken bone or dislocated joint (e.g., crooked or deformed)    Negative: Sounds like a life-threatening emergency to the triager    Negative: SEVERE pain (e.g., excruciating, unable to do any normal activities) and fever    Negative: Can't stand (bear weight) or walk    Negative: Fever and red area (or area very tender to touch)    Negative: Patient sounds very sick or weak to the triager    Negative: SEVERE pain (e.g., excruciating, unable to do any normal activities)    Negative: Red area or streak and large (> 2 in. or 5 cm)    Negative: Painful rash with multiple small blisters grouped together (i.e., dermatomal distribution or 'band' or 'stripe')    Negative: Looks like a boil, infected sore, deep ulcer, or other infected rash (spreading redness, pus)    Negative: Localized rash is very painful (no fever)    Negative: Numbness in a leg or foot (i.e., loss of sensation)    Negative: Patient wants to be seen    MODERATE pain (e.g., interferes with normal activities, limping) and present > 3 days    Protocols used: HIP PAIN-A-OH

## 2020-04-24 NOTE — TELEPHONE ENCOUNTER
Let me suggest video visit as best place to start.  I can then decide if I need to bring him in the office for an exam.  Thanks

## 2020-04-27 ENCOUNTER — TELEPHONE (OUTPATIENT)
Dept: NEUROSURGERY | Facility: CLINIC | Age: 80
End: 2020-04-27

## 2020-04-27 NOTE — TELEPHONE ENCOUNTER
Spoke with patient, he has appt with Dr. Perez in Neurosurgery, he would like to f/u with him,      Will call back and schedule Dr. Huddleston if needed    Karly Ho CMA

## 2020-04-27 NOTE — TELEPHONE ENCOUNTER
Per Britney NP, he should be scheduled for a follow up. Can be done via phone visit as it sounds like he is in [place].  Notified patient. Scheduled for a phone visit with Britney this Thursday.

## 2020-04-27 NOTE — TELEPHONE ENCOUNTER
LOV with Caridad Russo PA-C on 11/20/2019. He had a procedure with Dr. Perez on 08/21/2019 with Dr. Perez.  Patient currently good in [place]. Patient stated he was back to walking 5 miles per day and back to normal speed.   He woke up one morning about 4-5 days ago with a sharp pain in hip to outer thigh down to front of knee. He also has lower back pain across the whole back. 3/10 aching, constant pain. Nothing triggered this pain that he knows of.  Some days are worse than others.   No new numbness/tingling, weakness, or bowel/bladder incontinence.    Has tried heat, helped for awhile but no longer. Will attempt ice.    Patient has not had any follow up imaging since surgery due to being in [place]. Will discuss with provider to see what they prefer in order to make further recommendations.

## 2020-04-30 ENCOUNTER — VIRTUAL VISIT (OUTPATIENT)
Dept: NEUROSURGERY | Facility: CLINIC | Age: 80
End: 2020-04-30
Attending: NURSE PRACTITIONER
Payer: COMMERCIAL

## 2020-04-30 DIAGNOSIS — Z98.1 STATUS POST LUMBAR SPINAL FUSION: Primary | ICD-10-CM

## 2020-04-30 PROCEDURE — 99213 OFFICE O/P EST LOW 20 MIN: CPT | Mod: 95 | Performed by: NURSE PRACTITIONER

## 2020-04-30 RX ORDER — METHYLPREDNISOLONE 4 MG
TABLET, DOSE PACK ORAL
Qty: 21 TABLET | Refills: 0 | Status: SHIPPED | OUTPATIENT
Start: 2020-04-30 | End: 2020-05-14

## 2020-04-30 NOTE — PROGRESS NOTES
"The patient has been notified of following:     \"This telephone visit will be conducted via a call between you and your physician/provider. We have found that certain health care needs can be provided without the need for a physical exam.  This service lets us provide the care you need with a short phone conversation.  If a prescription is necessary we can send it directly to your pharmacy.  If lab work is needed we can place an order for that and you can then stop by our lab to have the test done at a later time.    If during the course of the call the physician/provider feels a telephone visit is not appropriate, you will not be charged for this service.\"     Physician has received verbal consent for a Telephone Visit from the patient? Yes      HPI: Ricardo Neely is a 79 year old male 8 months s/p L3-5 TLIF with Dr. Perez. Was doing well up until 10 days ago. At that time he woke up with worsening low back and right hip pain that radiated to his right anterior thigh to the knee. He denies injury at the onset of symptoms. He denies weakness. He denies bowel/bladder incontinence and foot drop. He states pain has improved since initial onset.     Pain at its worst 7-8 (at onset)  Pain right now:  3    Medical, surgical, family, and social history unchanged since prior exam.    ROS: 10 point ROS neg other than the symptoms noted above in the HPI.    Imaging: None to review    Assessment/Plan:  S/p L3-5 TLIF  - Medrol dose pack sent to the pharmacy.  - Continue to monitor symptoms. Contact the clinic if symptoms persist or worsen.  - Routine follow up in 4 months.    Phone call duration: 9 minutes    Flores Portillo, [facility]  [address] [place] [id number]  Tel [phone]  Fax [phone]      "

## 2020-05-14 ENCOUNTER — VIRTUAL VISIT (OUTPATIENT)
Dept: INTERNAL MEDICINE | Facility: CLINIC | Age: 80
End: 2020-05-14
Payer: COMMERCIAL

## 2020-05-14 DIAGNOSIS — G45.0 VERTEBROBASILAR ARTERY SYNDROME: ICD-10-CM

## 2020-05-14 DIAGNOSIS — M79.604 PAIN OF RIGHT LOWER EXTREMITY: ICD-10-CM

## 2020-05-14 DIAGNOSIS — I10 ESSENTIAL HYPERTENSION: ICD-10-CM

## 2020-05-14 DIAGNOSIS — E78.5 HYPERLIPIDEMIA LDL GOAL <100: ICD-10-CM

## 2020-05-14 PROCEDURE — 99214 OFFICE O/P EST MOD 30 MIN: CPT | Mod: 95 | Performed by: INTERNAL MEDICINE

## 2020-05-14 RX ORDER — ATORVASTATIN CALCIUM 40 MG/1
40 TABLET, FILM COATED ORAL DAILY
Qty: 90 TABLET | Refills: 0 | Status: SHIPPED | OUTPATIENT
Start: 2020-05-14 | End: 2020-09-16

## 2020-05-14 RX ORDER — CLOPIDOGREL BISULFATE 75 MG/1
75 TABLET ORAL DAILY
Qty: 90 TABLET | Refills: 0 | Status: SHIPPED | OUTPATIENT
Start: 2020-05-14 | End: 2020-08-13

## 2020-05-14 RX ORDER — AMLODIPINE BESYLATE 5 MG/1
5 TABLET ORAL DAILY
Qty: 90 TABLET | Refills: 0 | Status: SHIPPED | OUTPATIENT
Start: 2020-05-14 | End: 2020-08-11

## 2020-05-14 RX ORDER — METOPROLOL SUCCINATE 50 MG/1
TABLET, EXTENDED RELEASE ORAL
Qty: 180 TABLET | Refills: 0 | Status: SHIPPED | OUTPATIENT
Start: 2020-05-14 | End: 2020-10-16 | Stop reason: DRUGHIGH

## 2020-05-14 RX ORDER — BENAZEPRIL HYDROCHLORIDE 20 MG/1
20 TABLET ORAL DAILY
Qty: 90 TABLET | Refills: 0 | Status: SHIPPED | OUTPATIENT
Start: 2020-05-14 | End: 2020-08-11

## 2020-05-14 RX ORDER — GABAPENTIN 300 MG/1
CAPSULE ORAL
Qty: 180 CAPSULE | Refills: 0 | Status: SHIPPED | OUTPATIENT
Start: 2020-05-14 | End: 2020-08-11

## 2020-05-14 NOTE — PROGRESS NOTES
"Ricardo Neely is a 79 year old male who is being evaluated via a billable video visit.      The patient has been notified of following:     \"This video visit will be conducted via a call between you and your physician/provider. We have found that certain health care needs can be provided without the need for an in-person physical exam.  This service lets us provide the care you need with a video conversation.  If a prescription is necessary we can send it directly to your pharmacy.  If lab work is needed we can place an order for that and you can then stop by our lab to have the test done at a later time.    Video visits are billed at different rates depending on your insurance coverage.  Please reach out to your insurance provider with any questions.    If during the course of the call the physician/provider feels a video visit is not appropriate, you will not be charged for this service.\"    Patient has given verbal consent for Video visit? Yes    How would you like to obtain your AVS? Booker    Patient would like the video invitation sent by: Send to e-mail at: [email or web address]!    Will anyone else be joining your video visit? No      Subjective     Ricardo Neely is a 79 year old male who presents today via video visit for the following health issues:    HPI  Hyperlipidemia Follow-Up      Are you regularly taking any medication or supplement to lower your cholesterol?   Yes- Atorvastatin    Are you having muscle aches or other side effects that you think could be caused by your cholesterol lowering medication?  No    Hypertension Follow-up      Do you check your blood pressure regularly outside of the clinic? Yes     Are you following a low salt diet? Yes    Are your blood pressures ever more than 140 on the top number (systolic) OR more   than 90 on the bottom number (diastolic), for example 140/90? Yes-One last Saturday 134/72      How many servings of fruits and vegetables do you eat daily?  2-3    On " average, how many sweetened beverages do you drink each day (Examples: soda, juice, sweet tea, etc.  Do NOT count diet or artificially sweetened beverages)?   0    How many days per week do you exercise enough to make your heart beat faster? 3 or less-Having Hip Issues    How many minutes a day do you exercise enough to make your heart beat faster? 9 or less    How many days per week do you miss taking your medication? 0       Video Start Time: 2:15 pm    Pt's past medical history, family history, habits, medications and allergies were reviewed with the patient today.  Most recent lab results reviewed with pt. Problem list and histories reviewed & adjusted, as indicated.  Additional history as below:    HPI:   Normally followed by Dr Huddleston.  BP and statin meds reviewed. Last FLP done 1 year ago and at goal.  Denies chest pain, shortness of breath, abdominal pain, headache, vision changes or side effects with medications.   Hx s/p L3-5 TLIF with Dr. Perez 9 ms ago. Was doing well up until 3 weeks ago when  woke up with worsening low back and right hip pain that radiated to his right anterior thigh to the knee. Had f/u virtual visit with neurosurgery 4/30/20 and treated with Medrol dose pack.  Had some lightheadeness with steroids that resolved. Had been walking 5 miles/day and now  only 2 miles. Has some pain in low back but most pain is in the hip and thigh area  Patient currently living in [place] where he spends the winter.  However, because of this, patient and his wife have made a decision to stay in [place] where he had a home able to socially isolate rather than living in a senior apartment building here in [place]  Chart shows patient is on benazepril 40 mg tablet the patient states he has been cutting the medication in half for months so blood pressure above reflects a 20 mg daily dose actually.    Additional ROS: .    Constitutional, HEENT, Cardiovascular, Pulmonary, GI and , Neuro, MSK and Psych review  of systems/symptoms are otherwise negative or unchanged from previous, except as noted above.           Objective     No vitals obtained today. Recent /72 per pt as above    Physical Exam   GENERAL: Healthy, alert and no distress  EYES: Eyes grossly normal to inspection, conjunctivae and sclerae normal  RESP: no audible wheeze, cough, or visible cyanosis.  No visible retractions or increased work of breathing.  Able to speak fully in complete sentences.  NEURO: Cranial nerves grossly intact, mentation intact and speech normal  PSYCH: mentation appears normal, affect normal/bright, judgement and insight intact, normal speech and appearance well-groomed  MSK: Patient able to bend well from the waist without inducing much back pain.  Exhibits some  signs of pain when he pushes on the right trochanter bursa area       ASSESSMENT:  1. Essential hypertension  Recent blood pressure at goal.  Since patient has been cutting benazepril 40 mg tablets in half, will change to a 20 mg daily tablet  - metoprolol succinate ER (TOPROL-XL) 50 MG 24 hr tablet; TAKE 2 TABLETS BY MOUTH EVERY DAY  Dispense: 180 tablet; Refill: 0  - amLODIPine (NORVASC) 5 MG tablet; Take 1 tablet (5 mg) by mouth daily  Dispense: 90 tablet; Refill: 0  - benazepril (LOTENSIN) 20 MG tablet; Take 1 tablet (20 mg) by mouth daily  Dispense: 90 tablet; Refill: 0    2. Hyperlipidemia LDL goal <100  On statin therapy.  Due for fasting lipid panel but not able to draw as the patient will be staying in [place].  Will have done through outside clinic.  See plan below  - atorvastatin (LIPITOR) 40 MG tablet; Take 1 tablet (40 mg) by mouth daily  Dispense: 90 tablet; Refill: 0    3. Vertebrobasilar artery syndrome  Patient denies vertigo or other neurologic deficits.  Continue medication  - clopidogrel (PLAVIX) 75 MG tablet; Take 1 tablet (75 mg) by mouth daily  Dispense: 90 tablet; Refill: 0    4. Pain of right lower extremity  On gabapentin chronically and  refilled. However,  Some of issue may be related to trochanter bursitis. Pt to see MD in [place] for exam and possible trial of bursa steroid injection vs other  - gabapentin (NEURONTIN) 300 MG capsule; TAKE 2 CAPSULES(600 MG) BY MOUTH AT BEDTIME  Dispense: 180 capsule; Refill: 0      PLAN:   Stop Benazepril 40mg tab and start Benazepril 20mg tab, 1 tab daily in PM for blood pressure   Continue other meds.  Prescriptions refilled for 90 days   Establish care with Nurse Practitioner/ MD in clinic there in [place] to get fasting labs done (Lipid panel, Comprehensive Metabolic panel) and for further evaluation of right  Hip area pain (pssible trochanter bursitis) and probable physical therapy vs trial bursa steroid injection. Have clinic in [place] manage care until planning on returning to [place].  Future med refills per  [place] clinic until returning to [place]          Video-Visit Details    Type of service:  Video Visit    Video End Time: 2:39 pm    Originating Location (pt. Location): Home    Distant Location (provider location):  [facility]     Platform used for Video Visit: Monty Cook MD  Internal Medicine Department  [facility]        (Chart documentation was completed, in part, with [employer] voice-recognition software. Even though reviewed, some grammatical, spelling, and word errors may remain.)

## 2020-05-14 NOTE — PATIENT INSTRUCTIONS
Stop Benazepril 40mg tab and start Benazepril 20mg tab, 1 tab daily in PM for blood pressure   Continue other meds.  Prescriptions refilled for 90 days   Establish care with Nurse Practitioner/ MD in clinic there in [place] top get fasting labs done (Lipid panel, Comprehensive Metabolic panel) and for further evaluation of right  Hip area pain (pssible trochanter bursitis) and probable physical therapy vs trial bursa steroid injection. Have clinic in [place] manage care until planning on returning to [place].  Future med refills per  [place] clinic until returning to [place]

## 2020-05-18 ENCOUNTER — TRANSFERRED RECORDS (OUTPATIENT)
Dept: HEALTH INFORMATION MANAGEMENT | Facility: CLINIC | Age: 80
End: 2020-05-18

## 2020-05-18 LAB
ALT SERPL-CCNC: 21 IU/L (ref 0–44)
AST SERPL-CCNC: 19 IU/L (ref 0–40)
CHOLEST SERPL-MCNC: 137 MG/DL (ref 100–199)
CREAT SERPL-MCNC: 0.83 MG/DL (ref 0.76–1.27)
GFR SERPL CREATININE-BSD FRML MDRD: 83 ML/MIN/1.73M2
GLUCOSE SERPL-MCNC: 99 MG/DL (ref 65–99)
HDLC SERPL-MCNC: 47 MG/DL
LDLC SERPL CALC-MCNC: 70 MG/DL (ref 0–99)
POTASSIUM SERPL-SCNC: 5.1 MMOL/L (ref 3.5–5.2)
TRIGL SERPL-MCNC: 102 MG/DL (ref 0–149)

## 2020-08-11 DIAGNOSIS — M79.604 PAIN OF RIGHT LOWER EXTREMITY: ICD-10-CM

## 2020-08-11 DIAGNOSIS — I10 ESSENTIAL HYPERTENSION: ICD-10-CM

## 2020-08-11 RX ORDER — AMLODIPINE BESYLATE 5 MG/1
TABLET ORAL
Qty: 90 TABLET | Refills: 0 | Status: SHIPPED | OUTPATIENT
Start: 2020-08-11 | End: 2020-10-16 | Stop reason: ALTCHOICE

## 2020-08-11 RX ORDER — BENAZEPRIL HYDROCHLORIDE 20 MG/1
TABLET ORAL
Qty: 90 TABLET | Refills: 0 | Status: SHIPPED | OUTPATIENT
Start: 2020-08-11 | End: 2020-10-16 | Stop reason: ALTCHOICE

## 2020-08-11 RX ORDER — GABAPENTIN 300 MG/1
CAPSULE ORAL
Qty: 180 CAPSULE | Refills: 0 | Status: SHIPPED | OUTPATIENT
Start: 2020-08-11 | End: 2020-10-16

## 2020-08-11 NOTE — TELEPHONE ENCOUNTER
Amlodipine & Benazepril    Prescription approved per [facility] Refill Protocol.    Claribel DAVEYN, RN, PHN

## 2020-08-11 NOTE — TELEPHONE ENCOUNTER
Gabapentin      Last Written Prescription Date:  5/14/2020  Last Fill Quantity: 180,   # refills: 0  Last Office Visit: 5/14/2020-virtual visit  Future Office visit:    Next 5 appointments (look out 90 days)    Aug 17, 2020 11:30 AM CDT  Return Visit with Flores Portillo NP  [facility] ([facility]) [address] [place] [id number] [phone]           Routing refill request to provider for review/approval because:  Drug not on the [facility], [facility] or  Health refill protocol or controlled substance    Claribel ARCHULETA BSN, RN, PHN

## 2020-08-17 ENCOUNTER — ANCILLARY PROCEDURE (OUTPATIENT)
Dept: GENERAL RADIOLOGY | Facility: CLINIC | Age: 80
End: 2020-08-17
Attending: PHYSICIAN ASSISTANT
Payer: COMMERCIAL

## 2020-08-17 ENCOUNTER — OFFICE VISIT (OUTPATIENT)
Dept: NEUROSURGERY | Facility: CLINIC | Age: 80
End: 2020-08-17
Attending: NURSE PRACTITIONER
Payer: COMMERCIAL

## 2020-08-17 VITALS
DIASTOLIC BLOOD PRESSURE: 70 MMHG | WEIGHT: 184 LBS | HEIGHT: 67 IN | TEMPERATURE: 98 F | SYSTOLIC BLOOD PRESSURE: 126 MMHG | BODY MASS INDEX: 28.88 KG/M2 | HEART RATE: 61 BPM | OXYGEN SATURATION: 97 %

## 2020-08-17 DIAGNOSIS — Z98.1 STATUS POST LUMBAR SPINAL FUSION: Primary | ICD-10-CM

## 2020-08-17 DIAGNOSIS — Z98.1 STATUS POST LUMBAR SPINAL FUSION: ICD-10-CM

## 2020-08-17 PROCEDURE — 72100 X-RAY EXAM L-S SPINE 2/3 VWS: CPT

## 2020-08-17 PROCEDURE — 99213 OFFICE O/P EST LOW 20 MIN: CPT | Performed by: NURSE PRACTITIONER

## 2020-08-17 PROCEDURE — G0463 HOSPITAL OUTPT CLINIC VISIT: HCPCS

## 2020-08-17 ASSESSMENT — MIFFLIN-ST. JEOR: SCORE: 1498.25

## 2020-08-17 ASSESSMENT — PAIN SCALES - GENERAL: PAINLEVEL: NO PAIN (0)

## 2020-08-17 NOTE — PROGRESS NOTES
"[facility]  Neurosurgery Follow Up:    HPI: Ricardo Neely is a 80 year old male 12 months s/p L3-5 TLIF with Dr. Perez.  Doing well. Continues to have some mild back pain. Denies radicular pain. Reports right foot paresthesias that are resolved with moving the foot around. No weakness.     Medical, surgical, family, and social history unchanged since prior exam.  Exam:  Constitutional:  Alert, well nourished, NAD.  HEENT: Normocephalic, atraumatic.   Pulm:  Without shortness of breath   CV:  No pitting edema of BLE.      Vital Signs:  /70 (BP Location: Right arm, Patient Position: Sitting)   Pulse 61   Temp 98  F (36.7  C)   Ht 5' 7\" (1.702 m)   Wt 184 lb (83.5 kg)   SpO2 97%   BMI 28.82 kg/m      Neurological:  Awake  Alert  Oriented x 3  Motor exam:        IP Q DF PF EHL  R   5  5   5   5    5  L   5  5   5   5    5     Able to spontaneously move L/E bilaterally  Sensation intact throughout all L/E dermatomes     Incisions:  Healed nicely.  Imaging: AP and lateral films reveal intact and stable hardware.     A/P:  Ricardo Neely is a 80 year old male 12 months s/p L3-5 TLIF with Dr. Perez.  Doing well. Continues to have some mild back pain. Denies radicular pain. Reports right foot paresthesias that are resolved with moving the foot around. No weakness. Interested in therapy. Will place order. No scheduled follow up. Follow up with new or worsening symptoms. He verbalized understanding and agreement.    Patient Instructions   -Physical therapy ordered. They will contact you to schedule.  -No scheduled follow up at this time. Follow up as needed.  -Please contact the clinic with questions or concerns at [phone].      Floers Portillo, JENNIFER  [facility]  [address] [place] [address]  Tel [phone]  Fax [phone]    "

## 2020-08-17 NOTE — PATIENT INSTRUCTIONS
-Physical therapy ordered. They will contact you to schedule.  -No scheduled follow up at this time. Follow up as needed.  -Please contact the clinic with questions or concerns at [phone].

## 2020-08-17 NOTE — NURSING NOTE
"Ricardo Neely is a 80 year old male who presents for:  Chief Complaint   Patient presents with     Neurologic Problem     1 year f/u lumbar fusion        Initial Vitals:  /70 (BP Location: Right arm, Patient Position: Sitting)   Pulse 61   Temp 98  F (36.7  C)   Ht 5' 7\" (1.702 m)   Wt 184 lb (83.5 kg)   SpO2 97%   BMI 28.82 kg/m   Estimated body mass index is 28.82 kg/m  as calculated from the following:    Height as of this encounter: 5' 7\" (1.702 m).    Weight as of this encounter: 184 lb (83.5 kg).. Body surface area is 1.99 meters squared. BP completed using cuff size: regular  No Pain (0)    Nursing Comments: Patient presents for 1 year f/u lumbar fusion    Hubert Randolph MA  "

## 2020-08-17 NOTE — LETTER
"    8/17/2020         RE: Ricardo Neely  [address]  [place] [address]        Dear Colleague,    Thank you for referring your patient, Ricardo Neely, to the [facility]. Please see a copy of my visit note below.    [facility]  Neurosurgery Follow Up:    HPI: Ricardo Neely is a 80 year old male 12 months s/p L3-5 TLIF with Dr. Perez.  Doing well. Continues to have some mild back pain. Denies radicular pain. Reports right foot paresthesias that are resolved with moving the foot around. No weakness.     Medical, surgical, family, and social history unchanged since prior exam.  Exam:  Constitutional:  Alert, well nourished, NAD.  HEENT: Normocephalic, atraumatic.   Pulm:  Without shortness of breath   CV:  No pitting edema of BLE.      Vital Signs:  /70 (BP Location: Right arm, Patient Position: Sitting)   Pulse 61   Temp 98  F (36.7  C)   Ht 5' 7\" (1.702 m)   Wt 184 lb (83.5 kg)   SpO2 97%   BMI 28.82 kg/m      Neurological:  Awake  Alert  Oriented x 3  Motor exam:        IP Q DF PF EHL  R   5  5   5   5    5  L   5  5   5   5    5     Able to spontaneously move L/E bilaterally  Sensation intact throughout all L/E dermatomes     Incisions:  Healed nicely.  Imaging: AP and lateral films reveal intact and stable hardware.     A/P:  Ricardo Neely is a 80 year old male 12 months s/p L3-5 TLIF with Dr. Perez.  Doing well. Continues to have some mild back pain. Denies radicular pain. Reports right foot paresthesias that are resolved with moving the foot around. No weakness. Interested in therapy. Will place order. No scheduled follow up. Follow up with new or worsening symptoms. He verbalized understanding and agreement.    Patient Instructions   -Physical therapy ordered. They will contact you to schedule.  -No scheduled follow up at this time. Follow up as needed.  -Please contact the clinic with questions or concerns at " [phone].      Flores Portillo, JENNIFER  [facility]  [address] [address]  Tel [phone]  Fax [phone]      Again, thank you for allowing me to participate in the care of your patient.        Sincerely,        Flores Portillo, NP

## 2020-08-24 ENCOUNTER — THERAPY VISIT (OUTPATIENT)
Dept: PHYSICAL THERAPY | Facility: CLINIC | Age: 80
End: 2020-08-24
Attending: NURSE PRACTITIONER
Payer: COMMERCIAL

## 2020-08-24 DIAGNOSIS — M54.16 LUMBAR BACK PAIN WITH RADICULOPATHY AFFECTING RIGHT LOWER EXTREMITY: ICD-10-CM

## 2020-08-24 DIAGNOSIS — Z98.1 STATUS POST LUMBAR SPINAL FUSION: ICD-10-CM

## 2020-08-24 PROCEDURE — 97110 THERAPEUTIC EXERCISES: CPT | Mod: GP | Performed by: PHYSICAL THERAPIST

## 2020-08-24 PROCEDURE — 97161 PT EVAL LOW COMPLEX 20 MIN: CPT | Mod: GP | Performed by: PHYSICAL THERAPIST

## 2020-08-24 NOTE — PROGRESS NOTES
"[facility] Initial Evaluation  Subjective:  The history is provided by the patient. No  was used.   Therapist Generated HPI Evaluation  Problem details: Patient had chronic LB and right LE pain, multiple injections and PT and underwent L3-L4 fusion August 2019.  Pain right LE was abolished after surgery but LBP remained.  January 2020 had onset of right great toe \"coldness/numbness\" with walking, progressed and now toes 1-4 go numb after walking 30'.  Patient continues to have intermittent pain across the LB ranging 0-5/10.  Also, had right lateral hip and thigh pain with walking in March 2020, went away. Symptoms (toe numbness) only with walking ~30', no worse with further walking.   Currently has LBP with standing <5' and right 1-4 toe numbness with walking >30'.  Symptoms decrease/abolish with sitting.  No current surgical restrictions.  PT orders 8-17-20.  Patient continues to do several exercises (3x/day) from post-surgical rehab: right standing gastroc stretch, sidestepping, and right eccentric gastroc strengthening on step and 2-leg concentric gastroc on step 10-15 reps 3x/day.  Patient also walks 2x/day - 2.5 miles in a.m. and 1-1.5 miles in the afternoon.  Patient denies weakness.  Patient spends significant time sitting during the day - recliner primarily.  Patient sleeps prone without issue.  .                     Pain is the same all the time.  Since onset symptoms are unchanged.     Special tests included:  X-ray.  Previous treatment includes physical therapy (post surgical).   Barriers include:  None as reported by patient.    Patient Health History           General health as reported by patient is excellent.  Pertinent medical history includes: high blood pressure and implanted device.   Red flags:  None as reported by patient.  Medical allergies: none.   Surgeries include:  Orthopedic surgery. Other surgery history details: lumbar 3 surgeries, last one " fusion, right TKA.    Current medications:  High blood pressure medication (cholesterol).    Current occupation is Retired .                                       Objective:  Standing Alignment:        Lumbar:  Lordosis decr, convex scoliosis L and lateral shift L (healed surgical incision)            Gait:    Gait Type:  Normal   Assistive Devices:  None                 Lumbar/SI Evaluation  ROM:    AROM Lumbar:   Flexion:          WNL  Ext:                    50%   Side Bend:        Left:     Right:   Rotation:           Left:     Right:   Side Glide:        Left:  Moderate loss    Right:  Moderate loss           Lumbar Myotomes:  normal            Lumbar DTR's:  not assessed        Lumbar Dermtomes:  Lumbar dermatomes: decreased right L4 dermatome right, rest WNL.                Neural Tension/Mobility:      Left side:Slump  negative.     Right side:   Slump  negative.                                                        General     ROS  Symptoms prior to test movements:  0/10  Correction of sitting posture with lumbar roll:  No effect  Hooklyin/10  RFIL:  Pain right LB initially, decrease with further reps  Prone:  0/10  REIL:  No effect  YAHIR sustained:  Produced tingling right toes 1-4    Assessment/Plan:    Patient is a 80 year old male with lumbar complaints.    Patient has the following significant findings with corresponding treatment plan.                Diagnosis 1:  LBP s/p spinal fusion    Pain -  self management, education and home program  Decreased ROM/flexibility - therapeutic exercise and home program    Cumulative Therapy Evaluation is: Low complexity.    Previous and current functional limitations:  (See Goal Flow Sheet for this information)    Short term and Long term goals: (See Goal Flow Sheet for this information)     Communication ability:  Patient appears to be able to clearly communicate and understand verbal and written communication and follow directions  correctly.  Treatment Explanation - The following has been discussed with the patient:   RX ordered/plan of care  Anticipated outcomes  Possible risks and side effects  This patient would benefit from PT intervention to resume normal activities.   Rehab potential is good.    Frequency:  1 X week, once daily  Duration:  for 6 weeks  Discharge Plan:  Achieve all LTG.  Independent in home treatment program.  Reach maximal therapeutic benefit.    Please refer to the daily flowsheet for treatment today, total treatment time and time spent performing 1:1 timed codes.

## 2020-09-16 DIAGNOSIS — E78.5 HYPERLIPIDEMIA LDL GOAL <100: ICD-10-CM

## 2020-09-16 RX ORDER — ATORVASTATIN CALCIUM 40 MG/1
40 TABLET, FILM COATED ORAL DAILY
Qty: 90 TABLET | Refills: 2 | Status: SHIPPED | OUTPATIENT
Start: 2020-09-16 | End: 2020-10-16

## 2020-10-11 ENCOUNTER — IMMUNIZATION (OUTPATIENT)
Dept: NURSING | Facility: CLINIC | Age: 80
End: 2020-10-11
Payer: COMMERCIAL

## 2020-10-11 DIAGNOSIS — Z23 NEED FOR PROPHYLACTIC VACCINATION AND INOCULATION AGAINST INFLUENZA: Primary | ICD-10-CM

## 2020-10-11 PROCEDURE — 90662 IIV NO PRSV INCREASED AG IM: CPT

## 2020-10-11 PROCEDURE — G0008 ADMIN INFLUENZA VIRUS VAC: HCPCS

## 2020-10-11 NOTE — PROGRESS NOTES
Patient consents to receive outdoor care: Yes    Upon arrival, patient instructed to proceed to designated location, place vehicle in park, turn off, and remove keys     If we are unable to safely and ergonomically able to provide care- is the patient able to safely able to get out of car and transfer to a chair? Yes        Patient would like to receive their AVS .

## 2020-10-15 ASSESSMENT — ACTIVITIES OF DAILY LIVING (ADL): CURRENT_FUNCTION: NO ASSISTANCE NEEDED

## 2020-10-16 ENCOUNTER — OFFICE VISIT (OUTPATIENT)
Dept: INTERNAL MEDICINE | Facility: CLINIC | Age: 80
End: 2020-10-16
Payer: COMMERCIAL

## 2020-10-16 VITALS
WEIGHT: 183 LBS | TEMPERATURE: 97.2 F | OXYGEN SATURATION: 98 % | SYSTOLIC BLOOD PRESSURE: 124 MMHG | RESPIRATION RATE: 16 BRPM | DIASTOLIC BLOOD PRESSURE: 66 MMHG | HEART RATE: 62 BPM | BODY MASS INDEX: 28.72 KG/M2 | HEIGHT: 67 IN

## 2020-10-16 DIAGNOSIS — E78.5 HYPERLIPIDEMIA LDL GOAL <100: ICD-10-CM

## 2020-10-16 DIAGNOSIS — G45.0 VERTEBROBASILAR ARTERY SYNDROME: ICD-10-CM

## 2020-10-16 DIAGNOSIS — I10 ESSENTIAL HYPERTENSION: ICD-10-CM

## 2020-10-16 DIAGNOSIS — M72.0 CONTRACTURE OF PALMAR FASCIA: ICD-10-CM

## 2020-10-16 DIAGNOSIS — Z00.00 MEDICARE ANNUAL WELLNESS VISIT, SUBSEQUENT: ICD-10-CM

## 2020-10-16 DIAGNOSIS — M25.532 LEFT WRIST PAIN: ICD-10-CM

## 2020-10-16 PROBLEM — E66.01 SEVERE OBESITY WITH BODY MASS INDEX (BMI) OF 35.0 TO 39.9 WITH COMORBIDITY (H): Status: RESOLVED | Noted: 2019-07-02 | Resolved: 2020-10-16

## 2020-10-16 PROBLEM — M54.16 LUMBAR BACK PAIN WITH RADICULOPATHY AFFECTING RIGHT LOWER EXTREMITY: Status: RESOLVED | Noted: 2020-08-24 | Resolved: 2020-10-16

## 2020-10-16 PROCEDURE — 99214 OFFICE O/P EST MOD 30 MIN: CPT | Mod: 25 | Performed by: INTERNAL MEDICINE

## 2020-10-16 PROCEDURE — 99397 PER PM REEVAL EST PAT 65+ YR: CPT | Performed by: INTERNAL MEDICINE

## 2020-10-16 RX ORDER — CLOPIDOGREL BISULFATE 75 MG/1
TABLET ORAL
Qty: 90 TABLET | Refills: 3 | Status: SHIPPED | OUTPATIENT
Start: 2020-10-16 | End: 2021-11-08

## 2020-10-16 RX ORDER — AMLODIPINE AND BENAZEPRIL HYDROCHLORIDE 5; 20 MG/1; MG/1
1 CAPSULE ORAL DAILY
Qty: 90 CAPSULE | Refills: 3 | Status: SHIPPED | OUTPATIENT
Start: 2020-10-16 | End: 2020-11-19

## 2020-10-16 RX ORDER — METOPROLOL SUCCINATE 100 MG/1
100 TABLET, EXTENDED RELEASE ORAL DAILY
Qty: 90 TABLET | Refills: 3 | Status: SHIPPED | OUTPATIENT
Start: 2020-10-16 | End: 2021-11-08

## 2020-10-16 RX ORDER — ATORVASTATIN CALCIUM 40 MG/1
40 TABLET, FILM COATED ORAL DAILY
Qty: 90 TABLET | Refills: 3 | Status: SHIPPED | OUTPATIENT
Start: 2020-10-16 | End: 2021-06-08

## 2020-10-16 ASSESSMENT — ACTIVITIES OF DAILY LIVING (ADL): CURRENT_FUNCTION: NO ASSISTANCE NEEDED

## 2020-10-16 ASSESSMENT — MIFFLIN-ST. JEOR: SCORE: 1493.71

## 2020-10-16 NOTE — PROGRESS NOTES
"SUBJECTIVE:   Ricardo Neely is a 80 year old male who presents for Preventive Visit and follow-up regarding hypertension, hyperlipidemia and evaluation of hand/wrist issues.     Are you in the first 12 months of your Medicare coverage?  No    Healthy Habits:     In general, how would you rate your overall health?  Good    Frequency of exercise:  6-7 days/week    Duration of exercise:  30-45 minutes    Do you usually eat at least 4 servings of fruit and vegetables a day, include whole grains    & fiber and avoid regularly eating high fat or \"junk\" foods?  Yes    Taking medications regularly:  Yes    Medication side effects:  None    Ability to successfully perform activities of daily living:  No assistance needed    Home Safety:  No safety concerns identified    Hearing Impairment:  No hearing concerns    In the past 6 months, have you been bothered by leaking of urine?  No    In general, how would you rate your overall mental or emotional health?  Good      PHQ-2 Total Score: 0    Additional concerns today:  Yes    Do you feel safe in your environment? Yes    Have you ever done Advance Care Planning? (For example, a Health Directive, POLST, or a discussion with a medical provider or your loved ones about your wishes): Yes, advance care planning is on file.      Fall risk  Fallen 2 or more times in the past year?: No  Any fall with injury in the past year?: No    Cognitive Screening   1) Repeat 3 items (Leader, Season, Table)    2) Clock draw: NORMAL  3) 3 item recall: Recalls 2 objects   Results: NORMAL clock, 1-2 items recalled: COGNITIVE IMPAIRMENT LESS LIKELY    Mini-CogTM Copyright KALI Velásquez. Licensed by the author for use in [facility]; reprinted with permission (leandro@.[facility]). All rights reserved.      Do you have sleep apnea, excessive snoring or daytime drowsiness?: no    Reviewed and updated as needed this visit by clinical staff  Tobacco  Allergies  Meds              Reviewed and " updated as needed this visit by Provider                Social History     Tobacco Use     Smoking status: Former Smoker     Quit date: 1968     Years since quittin.8     Smokeless tobacco: Never Used   Substance Use Topics     Alcohol use: No     Alcohol/week: 0.0 standard drinks     If you drink alcohol do you typically have >3 drinks per day or >7 drinks per week? No    Alcohol Use 10/15/2020   Prescreen: >3 drinks/day or >7 drinks/week? Not Applicable   Prescreen: >3 drinks/day or >7 drinks/week? -         Current providers sharing in care for this patient include:   Patient Care Team:  Storm Huddleston MD as PCP - Familia Rodriguez MD as Assigned PCP    The following health maintenance items are reviewed in Epic and correct as of today:  Health Maintenance   Topic Date Due     URINE DRUG SCREEN  1940     MEDICARE ANNUAL WELLNESS VISIT  2020     FALL RISK ASSESSMENT  2020     BMP  2020     ZOSTER IMMUNIZATION (3 of 3) 2019     ALT  2021     LIPID  2021     ADVANCE CARE PLANNING  10/09/2022     DTAP/TDAP/TD IMMUNIZATION (4 - Td) 2023     PHQ-2  Completed     INFLUENZA VACCINE  Completed     Pneumococcal Vaccine: 65+ Years  Completed     Pneumococcal Vaccine: Pediatrics (0 to 5 Years) and At-Risk Patients (6 to 64 Years)  Aged Out     IPV IMMUNIZATION  Aged Out     MENINGITIS IMMUNIZATION  Aged Out     HEPATITIS B IMMUNIZATION  Aged Out     Labs reviewed in EPIC      Review of Systems  CONSTITUTIONAL: NEGATIVE for fever, chills. Weigh down 27 pounds with diet  INTEGUMENTARY/SKIN: NEGATIVE for worrisome rashes, moles or lesions  EYES: NEGATIVE for vision changes or irritation. Eye exam 2 years ago  ENT/MOUTH: NEGATIVE for ear, mouth and throat problems. occ clear rhinorrhea. Not bothersome  RESP: NEGATIVE for significant cough or SOB  CV: NEGATIVE for chest pain, palpitations or peripheral edema. BP well controlled  GI: NEGATIVE for nausea, abdominal  "pain, heartburn, or change in bowel habits  : NEGATIVE for frequency, dysuria, or hematuria  MUSCULOSKELETAL: NEGATIVE for significant arthralgias or myalgia except right 3rd finger  Slight contracture with occ catch and some pain in mid palm. occ left wrist p[ain. Plays guitar. Back much better after previous surgery  NEURO: NEGATIVE for weakness, dizziness. Rare  right numbness/tingling RLE  foot after undergoing previous back surgery. Down to Gabapentin 300mg daily and will be stopping this week after prior taper  ENDOCRINE: NEGATIVE for temperature intolerance. On statin therapy  HEME: NEGATIVE for bleeding problems  PSYCHIATRIC: NEGATIVE for changes in mood or affect. Denies any current anxiety/depression issues    OBJECTIVE:   /66   Pulse 62   Temp 97.2  F (36.2  C) (Temporal)   Resp 16   Ht 1.702 m (5' 7\")   Wt 83 kg (183 lb)   SpO2 98%   BMI 28.66 kg/m   Estimated body mass index is 28.66 kg/m  as calculated from the following:    Height as of this encounter: 1.702 m (5' 7\").    Weight as of this encounter: 83 kg (183 lb).  Physical Exam  General appearance - healthy, alert, no distress  Skin - No rashes or lesions.  Head - normocephalic, atraumatic  Eyes - JOSHUA, EOMI, fundi exam with nondilated pupils negative.  Ears - External ears normal. Canals clear except for trace cerumen right ear. TM's normal.  Nose/Sinuses - Nares normal. Septum midline. Mucosa normal. No drainage or sinus tenderness.  Oropharynx - No erythema, no adenopathy, no exudates.  Neck - Supple without adenopathy or thyromegaly. No bruits.  Lungs - Clear to auscultation without wheezes/rhonchi.  Heart - Regular rate and rhythm without murmurs, clicks, or gallops.  Nodes - No supraclavicular, axillary, or inguinal adenopathy palpable.  Abdomen - Abdomen soft, non-tender. BS normal. No masses or hepatosplenomegaly palpable. No bruits.  Extremities -No cyanosis, clubbing or edema.    Musculoskeletal - Spine ROM normal. " Muscular strength intact.  Partial flexion contracture right 3rd finger with some thickness palpable distal right palm area. Mlid tednerness to F/E left wrist.  No current tenderness to palpation paralumbar musculature  Peripheral pulses - radial=4/4, femoral=4/4, posterior tibial=4/4, dorsalis pedis=4/4,  Neuro - Gait normal. Reflexes normal and symmetric. Sensation grossly WNL including light touch sensation bilateral distal lower extremity.  Genital - Normal-appearing male external genitalia. No scrotal masses or inguinal hernia palpable.   Rectal - Guaic negative stool. Normal tone. Prostate 1 plus  in size to palpation. No rectal masses or prostate nodularity palpable      ASSESSMENT / PLAN:   1. Medicare annual wellness visit, subsequent  Healthcare maintenance up-to-date.  No safety concerns.  Patient plans on monitoring again in [place] and will return to [place] next May.  Practicing mask use and good social distancing in times of Covid pandemic.  Has improved diet with excellent weight loss and now at goal weight for age      2. Hyperlipidemia LDL goal <100  Controlled.  Last lipids done in [place] May 2020.  Continue current statin therapy and repeat labs in May 2021 when patient returns back to [place]  - atorvastatin (LIPITOR) 40 MG tablet; Take 1 tablet (40 mg) by mouth daily  Dispense: 90 tablet; Refill: 3  - Lipid panel reflex to direct LDL Fasting; Future  - Comprehensive metabolic panel; Future    3. Vertebrobasilar artery syndrome  Patient denies lightheadedness, vertigo or other associated symptoms.  Continue Plavix therapy  - clopidogrel (PLAVIX) 75 MG tablet; TAKE 1 TABLET(75 MG) BY MOUTH DAILY  Dispense: 90 tablet; Refill: 3    4. Essential hypertension  Controlled.  Continue current antihypertensive doses but to reduce pill quantity, will combine amlodipine and benazepril to generic Lotrel and increase metoprolol tablet dosage as below  - metoprolol succinate ER (TOPROL-XL) 100 MG 24 hr  "tablet; Take 1 tablet (100 mg) by mouth daily  Dispense: 90 tablet; Refill: 3  - amLODIPine-benazepril (LOTREL) 5-20 MG capsule; Take 1 capsule by mouth daily  Dispense: 90 capsule; Refill: 3    5. Contracture of palmar fascia  Causing contracture of the right third finger with decreased flexion and causing some pulmonary discomfort.  Patient to see hand surgeon to discuss possible injection therapy versus other  - Orthopedic & Spine  Referral; Future    6. Left wrist pain  Relates this to his guitar playing.  Mild tenderness to full flexion/extension of the wrist.  No history of other trauma.  Patient to see hand surgeon as above.  Will defer x-rays to Ortho as needed  - Orthopedic & Spine  Referral; Future      Patient has been advised of split billing requirements and indicates understanding: Yes       COUNSELING:  Reviewed preventive health counseling, as reflected in patient instructions    Estimated body mass index is 28.66 kg/m  as calculated from the following:    Height as of this encounter: 1.702 m (5' 7\").    Weight as of this encounter: 83 kg (183 lb).        He reports that he quit smoking about 52 years ago. He has never used smokeless tobacco.      Appropriate preventive services were discussed with this patient, including applicable screening as appropriate for cardiovascular disease, diabetes, osteopenia/osteoporosis, and glaucoma.  As appropriate for age/gender, discussed screening for colorectal cancer, prostate cancer, breast cancer, and cervical cancer. Checklist reviewing preventive services available has been given to the patient.    Reviewed patients plan of care and provided an AVS. The Basic Care Plan (routine screening as documented in Health Maintenance) for Ricardo meets the Care Plan requirement. This Care Plan has been established and reviewed with the Patient.    Counseling Resources:  ATP IV Guidelines  Pooled Cohorts Equation Calculator  Breast Cancer Risk " Calculator  Breast Cancer: Medication to Reduce Risk  FRAX Risk Assessment  ICSI Preventive Guidelines  Dietary Guidelines for Americans, 2010  USDA's MyPlate  ASA Prophylaxis  Lung CA Screening      PLAN:   When run out of Metoprolol XL 50mg tabs (2 per day), then change to Metoprolol XL 100mg tab, 1 tab daily for blood pressure   When run out of separate  Amlodipine and benazepril tabs, then change to generic Lotrel (Amlodipine/ benazepril 5/20ng capsule), 1 capsule daily for blood pressure   Continue other meds   Call  [phone] or use [employer] to schedule a future lab appointment  fasting in May 2021   when back in [place]  For fasting labs, please refrain from eating for 8 hours or more.   Drink 2 glasses of water before your lab appointment. It is fine to take your  oral medications on the morning of the lab test as usual  Referral to Dr Toya Washington at  ortho re: right palm contracture and left wrist pain. Call [phone] to schedule appt at the [place] site  Pt was informed regarding extra E&M billing for management of new or established medical issues not related to today's wellness visit  Continue diet and exercise      Familia Cook MD  [facility]

## 2020-10-16 NOTE — PATIENT INSTRUCTIONS
When run out of Metoprolol XL 50mg tabs (2 per day), then change to Metoprolol XL 100mg tab, 1 tab daily for blood pressure   When run out of separate  Amlodipine and benazepril tabs, then change to generic Lotrel (Amlodipine/ benazepril 5/20ng capsule), 1 capsule daily for blood pressure   Continue other meds   Call  [phone] or use [employer] to schedule a future lab appointment  fasting in May 2021   when back in [place]  For fasting labs, please refrain from eating for 8 hours or more.   Drink 2 glasses of water before your lab appointment. It is fine to take your  oral medications on the morning of the lab test as usual  Referral to Dr Toya Washington at  ortho re: right palm contracture and left wrist pain. Call [phone] to schedule appt at the [place] site  Pt was informed regarding extra E&M billing for management of new or established medical issues not related to today's wellness visit  Continue diet and exercise

## 2020-10-29 ENCOUNTER — TRANSFERRED RECORDS (OUTPATIENT)
Dept: HEALTH INFORMATION MANAGEMENT | Facility: CLINIC | Age: 80
End: 2020-10-29

## 2020-11-09 DIAGNOSIS — I10 ESSENTIAL HYPERTENSION: ICD-10-CM

## 2020-11-10 ENCOUNTER — TELEPHONE (OUTPATIENT)
Dept: INTERNAL MEDICINE | Facility: CLINIC | Age: 80
End: 2020-11-10

## 2020-11-10 DIAGNOSIS — I10 ESSENTIAL HYPERTENSION: ICD-10-CM

## 2020-11-10 NOTE — TELEPHONE ENCOUNTER
Patient should be seen for progressive symptoms, signs of infection, etc.   Without those, could treat symptomatically.    Suggest warm soaks in tub, stool softener if and only if having tenesmus, and use of over the counter hydrocortisone cream twice daily, as needed.   If a fissure, should heal on it's own.    I have added patient instructions.   Can you please share with him?    Thanks

## 2020-11-10 NOTE — TELEPHONE ENCOUNTER
"Patient calling,     Reports he has a \"tear\" from the tail bone somewhere in the middle of backside (between tail bone and close to rectum). He can feel an open sore which makes it hard to sit down due to tenderness.     Sx have been ongoing for a week now. He has tried OTC creams (Vasaline and an old tube of Ketoconazole 2%). He has not been able to see the area but can feel it is the size of a 1/2\". No bleeding. No drainage. No rash. No recent bedrest or prolonged travel. However, patient is planning his road trip for [place] to vacation during the winter months.      Patient recalls he had this several years ago> 10 years ago. Patient not able to attach photo to [email or web address].     PCP are you able to advise on this? Triage encouraged an in clinic visit for re-assement in which patient was ok with. Pt. Was asking for providers thoughts?    Claribel BRADSHAW, RN, PHN      "

## 2020-11-10 NOTE — TELEPHONE ENCOUNTER
Reason for Call:  Other call back    Detailed comments: The patient called and stated that he thinks he may have anal fissures.  He is wondering if Dr. Huddleston can prescribe something for him without an appointment. He would like a call back to discuss what the best course of action would be.     Phone Number Patient can be reached at: Home number on file [phone] (home)    Best Time: Any    Can we leave a detailed message on this number? YES    Call taken on 11/10/2020 at 10:43 AM by Genesis Davila

## 2020-11-10 NOTE — PATIENT INSTRUCTIONS
Patient Education     Understanding Anal Fissures  An anal fissure is a small rip or tear in the lining of the anus. The anus is the opening where stool leaves the body.  Anal fissures are common. They are sometimes confused with hemorrhoids, which can cause similar symptoms.   What causes an anal fissure?  Having constipation or straining during bowel movements is the most common cause of an anal fissure. You can also get one from:    Diarrhea    Childbirth    Anal sex  Less commonly, a fissure may be caused by:    Inflammatory bowel disease    An anal infection    An anal tumor  Symptoms of an anal fissure  The main symptom of an anal fissure is sharp pain during a bowel movement. This pain may last a few minutes to hours after. You may also have:    Minor bleeding during or after a bowel movement    Itching or irritation around the anus    A small lump near the anus  Treatment for an anal fissure  Most anal fissures will get better with no or minor treatment. Your healthcare provider may advise:    Changes in your diet. Eating more fiber-filled foods, such as fruits and vegetables, can help keep your bowel movements regular. It can also make your stool easier to pass. So, too, can drinking more water. Your healthcare provider may also tell you to take a fiber supplement if diet changes are not enough.    Stool softeners or laxatives. These medicines can help prevent constipation.    Sitz baths. Soaking in warm water for 10 to 20 minutes a day can help ease symptoms.    Medicines. Ointments, creams, and suppositories may help ease pain and aid with healing.    Botulinum toxin injections. This treatment may be used if an anal fissure is not healing well. It can help relax the anal sphincter muscles. This may increase blood flow to the area and help healing.    Surgery. Your healthcare provider may advise surgery if the anal fissure isn t healing or keeps coming back. You ll likely have a lateral internal  sphincterotomy. During this procedure, a cut is made in the anal sphincter to lower pressure inside the anus and increase blood flow. One possible complication of surgery is fecal incontinence.     When to call your healthcare provider  Call your healthcare provider right away if you have any of these:    Fever of 100.4 F (38 C) or higher, or as directed by your healthcare provider    Symptoms that don t get better, or get worse    New symptoms   Marvin last reviewed this educational content on     [id number] [employer]. [address] [place], [place] [id number]. All rights reserved. This information is not intended as a substitute for professional medical care. Always follow your healthcare professional's instructions.

## 2020-11-10 NOTE — TELEPHONE ENCOUNTER
Is there a reason we are switching from the combined med to individual components?    See 10/16 visit with Dr. Cook.  Patient was given a year's Rx for combined med.

## 2020-11-11 RX ORDER — METOPROLOL SUCCINATE 50 MG/1
TABLET, EXTENDED RELEASE ORAL
Qty: 180 TABLET | Refills: 2 | Status: SHIPPED | OUTPATIENT
Start: 2020-11-11 | End: 2021-07-20 | Stop reason: DRUGHIGH

## 2020-11-11 RX ORDER — AMLODIPINE BESYLATE 5 MG/1
TABLET ORAL
Qty: 90 TABLET | Refills: 0 | OUTPATIENT
Start: 2020-11-11

## 2020-11-11 RX ORDER — BENAZEPRIL HYDROCHLORIDE 20 MG/1
TABLET ORAL
Qty: 90 TABLET | Refills: 0 | OUTPATIENT
Start: 2020-11-11

## 2020-11-11 NOTE — TELEPHONE ENCOUNTER
Error in request. They have combo pill stored on file for him to  at any time.    Claribel DAVEYN, RN, PHN

## 2020-11-12 ENCOUNTER — OFFICE VISIT (OUTPATIENT)
Dept: INTERNAL MEDICINE | Facility: CLINIC | Age: 80
End: 2020-11-12
Payer: COMMERCIAL

## 2020-11-12 VITALS
WEIGHT: 180.1 LBS | DIASTOLIC BLOOD PRESSURE: 64 MMHG | BODY MASS INDEX: 28.21 KG/M2 | RESPIRATION RATE: 16 BRPM | OXYGEN SATURATION: 98 % | TEMPERATURE: 98 F | SYSTOLIC BLOOD PRESSURE: 118 MMHG | HEART RATE: 65 BPM

## 2020-11-12 DIAGNOSIS — L89.151 PRESSURE INJURY OF SACRAL REGION, STAGE 1: Primary | ICD-10-CM

## 2020-11-12 PROCEDURE — 99213 OFFICE O/P EST LOW 20 MIN: CPT | Performed by: PHYSICIAN ASSISTANT

## 2020-11-12 NOTE — PROGRESS NOTES
Subjective     Ricardo Neely is a 80 year old male who presents to clinic today for the following health issues:    HPI         Anal concern - Coccyx tear   Onset/Duration: 10 days  Description:   Marie-anal lump: no  Pain: YES  Itching: YES  Accompanying Signs & Symptoms:  Blood in stool: no  Changes in stool pattern: no  History:   Any previous GI studies done:none  Family History of colon cancer: no  Precipitating factors:   None  Alleviating factors:  None  Therapies tried and outcome: vaseline, hot pack       Objective    /64   Pulse 65   Temp 98  F (36.7  C) (Temporal)   Resp 16   Wt 81.7 kg (180 lb 1.6 oz)   SpO2 98%   BMI 28.21 kg/m    Body mass index is 28.21 kg/m .  Physical Exam   GENERAL: healthy, alert and no distress  SKIN: superficial skin breakdown with small ulceration in midline, no surrounding erythema, drainage or warmth     No results found for this or any previous visit (from the past 24 hour(s)).        Assessment & Plan     Pressure injury of sacral region, stage 1  -Reviewed treatment options with keeping the area clean and dry, discussed holding off on any ointments in the area, and needs to avoid pressure in the area as well as moving around frequently  -Follow-up if symptoms fail to improve or worsen          Return in about 2 weeks (around 11/26/2020) for only if no improvement .    ANAMARIA Vargas [facility]

## 2020-11-19 DIAGNOSIS — I10 ESSENTIAL HYPERTENSION: ICD-10-CM

## 2020-11-19 RX ORDER — AMLODIPINE AND BENAZEPRIL HYDROCHLORIDE 5; 20 MG/1; MG/1
1 CAPSULE ORAL DAILY
Qty: 90 CAPSULE | Refills: 3 | Status: SHIPPED | OUTPATIENT
Start: 2020-11-19 | End: 2021-11-08

## 2020-11-19 NOTE — TELEPHONE ENCOUNTER
Reason for Call:  Medication or medication refill:    Do you use a [facility]?  Name of the pharmacy and phone number for the current request:     [id number] [address] AT [facility] [address]      Name of the medication requested: amLODIPine-benazepril (LOTREL) 5-20 MG capsule       Other request: The patient called and stated that he needs this medication filled. He is completely out of medication so he will need this filled as soon as possible.     Can we leave a detailed message on this number? YES    Phone number patient can be reached at: Home number on file [phone] (home)    Best Time: Any    Call taken on 11/19/2020 at 9:51 AM by Genesis Davila

## 2020-12-01 ENCOUNTER — TRANSFERRED RECORDS (OUTPATIENT)
Dept: HEALTH INFORMATION MANAGEMENT | Facility: CLINIC | Age: 80
End: 2020-12-01

## 2021-02-08 RX ORDER — GABAPENTIN 300 MG/1
CAPSULE ORAL
Status: CANCELLED | OUTPATIENT
Start: 2021-02-08

## 2021-02-08 RX ORDER — GABAPENTIN 300 MG/1
CAPSULE ORAL
COMMUNITY
Start: 2021-01-05 | End: 2021-07-20

## 2021-02-09 NOTE — TELEPHONE ENCOUNTER
Gabapentin    Routing refill request to provider for review/approval because:  Drug not on the [facility] refill protocol   Medication is reported/historical    Claribel DAVEYN, RN, PHN

## 2021-02-12 NOTE — TELEPHONE ENCOUNTER
Please verify patient still taking 600 mg at bedtime, and that it is working for him.  Has he tried going off medication?  What symptoms is he still taking it for?

## 2021-02-25 NOTE — TELEPHONE ENCOUNTER
Left message on machine to call back. When pt calls back please ask questions below and then route back to PCP. Thank you.

## 2021-03-30 NOTE — TELEPHONE ENCOUNTER
CTC in chart, detailed message left asking pt to return call and let us know how many gabapentin he is taking per day, and when    Karly Ho CMA

## 2021-03-31 ENCOUNTER — MYC MEDICAL ADVICE (OUTPATIENT)
Dept: INTERNAL MEDICINE | Facility: CLINIC | Age: 81
End: 2021-03-31

## 2021-03-31 DIAGNOSIS — M79.604 PAIN OF RIGHT LOWER EXTREMITY: Primary | ICD-10-CM

## 2021-03-31 RX ORDER — GABAPENTIN 300 MG/1
CAPSULE ORAL
Status: CANCELLED | OUTPATIENT
Start: 2021-03-31

## 2021-04-01 RX ORDER — GABAPENTIN 300 MG/1
300 CAPSULE ORAL AT BEDTIME
Qty: 90 CAPSULE | Refills: 1 | Status: SHIPPED | OUTPATIENT
Start: 2021-04-01 | End: 2021-05-05

## 2021-04-01 NOTE — TELEPHONE ENCOUNTER
Patient clarified he take Gabapentin 300 mg, one daily at hs.    Amanda Cooper, MSN, RN  Triage RN  [facility]

## 2021-05-04 ENCOUNTER — HOSPITAL ENCOUNTER (OUTPATIENT)
Dept: GENERAL RADIOLOGY | Facility: CLINIC | Age: 81
Discharge: HOME | End: 2021-05-04
Attending: NURSE PRACTITIONER | Admitting: NURSE PRACTITIONER
Payer: COMMERCIAL

## 2021-05-04 DIAGNOSIS — Z98.1 STATUS POST LUMBAR SPINAL FUSION: ICD-10-CM

## 2021-05-04 PROCEDURE — 72100 X-RAY EXAM L-S SPINE 2/3 VWS: CPT

## 2021-05-05 ENCOUNTER — OFFICE VISIT (OUTPATIENT)
Dept: NEUROSURGERY | Facility: CLINIC | Age: 81
End: 2021-05-05
Attending: NURSE PRACTITIONER
Payer: COMMERCIAL

## 2021-05-05 VITALS — HEART RATE: 66 BPM | DIASTOLIC BLOOD PRESSURE: 76 MMHG | OXYGEN SATURATION: 96 % | SYSTOLIC BLOOD PRESSURE: 137 MMHG

## 2021-05-05 DIAGNOSIS — M54.16 LUMBAR RADICULOPATHY: ICD-10-CM

## 2021-05-05 DIAGNOSIS — Z98.1 STATUS POST LUMBAR SPINAL FUSION: Primary | ICD-10-CM

## 2021-05-05 PROCEDURE — G0463 HOSPITAL OUTPT CLINIC VISIT: HCPCS

## 2021-05-05 PROCEDURE — 99213 OFFICE O/P EST LOW 20 MIN: CPT | Performed by: NURSE PRACTITIONER

## 2021-05-05 NOTE — NURSING NOTE
"May 5, 2021 10:41 AM   Ricardo Neely is a 80 year old male who presents for:    Chief Complaint   Patient presents with     RECHECK     Results     LUMBAR XRAYS     Initial Vitals: /76   Pulse 66   SpO2 96%  Estimated body mass index is 28.21 kg/m  as calculated from the following:    Height as of 10/16/20: 5' 7\" (1.702 m).    Weight as of 11/12/20: 180 lb 1.6 oz (81.7 kg). There is no height or weight on file to calculate BSA.  Data Unavailable Comment: Data Unavailable       Clinical concerns: Ricardo Neely is here today for IMAGING REVIEW  Haley Galvez MA  "

## 2021-05-05 NOTE — PATIENT INSTRUCTIONS
-Physical therapy ordered. They will contact you to schedule.  -Lumbar MRI ordered. Please contact [phone] to schedule.  -Please contact our clinic with questions or concerns at [phone].

## 2021-05-05 NOTE — LETTER
5/5/2021         RE: Ricardo Neely  [address]  [place] [address]        Dear Colleague,    Thank you for referring your patient, Ricardo Neely, to the [facility] [place]. Please see a copy of my visit note below.    [facility]  Neurosurgery Follow Up:    HPI: 1 year 9 months s/p L3-5 TLIF with Dr. Perez.  Was doing well postoperatively with improvement in preoperative leg pain up until he moved back from [place] last summer. He states he was doing PT in [place] and it was going well. When he moved here he started with a different PT and did a different routine. He states since that time he has been experiencing low back pain and right foot paresthesias. He denies overt weakness, bowel/bladder complaints and foot drop. He states symptoms are improved with sitting and aggravated by movement. He has undergone PT with worsening of symptoms. He has had a lumbar XR with intact hardware. He has not had a recent lumbar MRI.    Medical, surgical, family, and social history unchanged since prior exam.  Exam:  Constitutional:  Alert, well nourished, NAD.  HEENT: Normocephalic, atraumatic.   Pulm:  Without shortness of breath   CV:  No pitting edema of BLE.      Vital Signs:  /76   Pulse 66   SpO2 96%     Neurological:  Awake  Alert  Oriented x 3  Motor exam:        IP Q DF PF EHL  R   5  5   5   5    5  L   5  5   5   5    5     Able to spontaneously move L/E bilaterally  Sensation intact throughout all L/E dermatomes     Incisions:  Healed nicely.  Imaging: AP and lateral films reveal intact and stable hardware.     A/P: Lumbar back pain s/p lumbar spine fusion. Undergone PT with worsening of symptoms. Plan for different PT to work with him and lumbar MRI for further evaluation. He verbalized understanding and agreement.    Patient Instructions   -Physical therapy ordered. They will contact you to schedule.  -Lumbar MRI ordered. Please contact  [phone] to schedule.  -Please contact our clinic with questions or concerns at [phone].    Flores Portillo CNP  [address] [address]  Tel [phone]  Fax [phone]        Again, thank you for allowing me to participate in the care of your patient.        Sincerely,        Flores Portillo, NP

## 2021-05-05 NOTE — PROGRESS NOTES
[facility]  Neurosurgery Follow Up:    HPI: 1 year 9 months s/p L3-5 TLIF with Dr. Perez.  Was doing well postoperatively with improvement in preoperative leg pain up until he moved back from [place] last summer. He states he was doing PT in [place] and it was going well. When he moved here he started with a different PT and did a different routine. He states since that time he has been experiencing low back pain and right foot paresthesias. He denies overt weakness, bowel/bladder complaints and foot drop. He states symptoms are improved with sitting and aggravated by movement. He has undergone PT with worsening of symptoms. He has had a lumbar XR with intact hardware. He has not had a recent lumbar MRI.    Medical, surgical, family, and social history unchanged since prior exam.  Exam:  Constitutional:  Alert, well nourished, NAD.  HEENT: Normocephalic, atraumatic.   Pulm:  Without shortness of breath   CV:  No pitting edema of BLE.      Vital Signs:  /76   Pulse 66   SpO2 96%     Neurological:  Awake  Alert  Oriented x 3  Motor exam:        IP Q DF PF EHL  R   5  5   5   5    5  L   5  5   5   5    5     Able to spontaneously move L/E bilaterally  Sensation intact throughout all L/E dermatomes     Incisions:  Healed nicely.  Imaging: AP and lateral films reveal intact and stable hardware.     A/P: Lumbar back pain s/p lumbar spine fusion. Undergone PT with worsening of symptoms. Plan for different PT to work with him and lumbar MRI for further evaluation. He verbalized understanding and agreement.    Patient Instructions   -Physical therapy ordered. They will contact you to schedule.  -Lumbar MRI ordered. Please contact [phone] to schedule.  -Please contact our clinic with questions or concerns at [phone].    Flores Portillo, JENNIFER  [facility]  [address] [place] [address]  [address] [place] [id number]  Tel [phone]  Fax [phone]

## 2021-05-06 ENCOUNTER — THERAPY VISIT (OUTPATIENT)
Dept: PHYSICAL THERAPY | Facility: CLINIC | Age: 81
End: 2021-05-06
Attending: NURSE PRACTITIONER
Payer: COMMERCIAL

## 2021-05-06 DIAGNOSIS — Z98.1 STATUS POST LUMBAR SPINAL FUSION: ICD-10-CM

## 2021-05-06 DIAGNOSIS — M54.16 LUMBAR RADICULOPATHY: ICD-10-CM

## 2021-05-06 PROCEDURE — 97162 PT EVAL MOD COMPLEX 30 MIN: CPT | Mod: GP | Performed by: PHYSICAL THERAPIST

## 2021-05-06 PROCEDURE — 97110 THERAPEUTIC EXERCISES: CPT | Mod: GP | Performed by: PHYSICAL THERAPIST

## 2021-05-06 NOTE — PROGRESS NOTES
Physical Therapy Initial Evaluation  Subjective:  Patient is referred with c/o gradually increasing LBP along with R anterior thigh and R calf soreness. Sxs have been increasing over the past 2 1/2 months. PMH includes L 3-L5 lumbar fusion performed 21 months ago. He was doing well until summer of 2020 when he started noting increasing sxs in back and L/E. Notes increasing sxs in leg after walking 1 1/2 miles and he has to flex his trunk which abolishes sxs temporarily. Better sitting and worse standing and walking.    The history is provided by the patient.   Therapist Generated HPI Evaluation         Type of problem:  Lumbar.    This is a recurrent condition.  Condition occurred with:  Insidious onset.  Where condition occurred: for unknown reasons.  Patient reports pain:  Lumbar spine right, lumbar spine left and central lumbar spine.  Pain is described as aching and sharp and is intermittent.  Pain radiates to:  Thigh right, lower leg right and foot right. Pain is worse during the day.  Since onset symptoms are gradually worsening.  Associated symptoms:  Loss of motion/stiffness, numbness and tingling (burning in R thigh). Symptoms are exacerbated by standing and walking  Relieved by: trunk flexion standing, sitting.  Special tests included:  X-ray.  Previous treatment includes physical therapy. There was mild improvement following previous treatment.  Barriers include:  None as reported by patient.    Patient Health History  Ricardo Neely being seen for back pain, R leg pain.          Pain is reported as 0/10 on pain scale.  General health as reported by patient is good.  Pertinent medical history includes: high blood pressure.   Red flags:  None as reported by patient.     Surgeries include:  Orthopedic surgery. Other surgery history details: 2 level lumbar fusion, L3-L5 in 2019.    Current medications:  High blood pressure medication.                                           Objective:  System    Physical  Exam      Ike Lumbar Evaluation    Posture:  Sitting: fair  Standing: fair  Lordosis: Accentuated  Lateral Shift: no  Correction of Posture: no effect    Movement Loss:  Flexion (Flex): nil  Extension (EXT): mod and pain  Side Glide R (SG R): mod  Side Glide L (SG L): mod  Test Movements:  FIS: During: produces  After: no worse  Mechanical Response: no effectPretest Movements: no pain  Repeat FIS: During: no effect  After: no effect  Mechanical Response: no effect  EIS: During: produces  After: worse  Mechanical Response: no effect    FILIBERTO: During: increases  After: no worse  Mechanical Response: no effect  Repeat FILIBERTO: During: decreases  After: better  Mechanical Response: no effect          Conclusion: other (DDD, stenosis)  Principle of Treatment:    Flexion: SKTC x 10/ 3 x daily, FISitting x 10/ 3 x daily, standing flexion PRN      Other: core strengthening                                       ROS    Assessment/Plan:    Patient is a 80 year old male with lumbar complaints.    Patient has the following significant findings with corresponding treatment plan.                Diagnosis 1:  LBP, R sciatica  Pain -  manual therapy, self management, education, directional preference exercise and home program  Decreased ROM/flexibility - manual therapy, therapeutic exercise and home program  Impaired gait - gait training and home program  Decreased function - therapeutic activities and home program    Therapy Evaluation Codes:   1) History comprised of:   Personal factors that impact the plan of care:      Time since onset of symptoms.    Comorbidity factors that impact the plan of care are:      None.     Medications impacting care: None.  2) Examination of Body Systems comprised of:   Body structures and functions that impact the plan of care:      Lumbar spine.   Activity limitations that impact the plan of care are:      Cooking, Standing and Walking.  3) Clinical presentation characteristics  are:   Evolving/Changing.  4) Decision-Making    Moderate complexity using standardized patient assessment instrument and/or measureable assessment of functional outcome.  Cumulative Therapy Evaluation is: Moderate complexity.    Previous and current functional limitations:  (See Goal Flow Sheet for this information)    Short term and Long term goals: (See Goal Flow Sheet for this information)     Communication ability:  Patient appears to be able to clearly communicate and understand verbal and written communication and follow directions correctly.  Treatment Explanation - The following has been discussed with the patient:   RX ordered/plan of care  Anticipated outcomes  Possible risks and side effects  This patient would benefit from PT intervention to resume normal activities.   Rehab potential is fair.    Frequency:  1 X week, once daily  Duration:  for 4 weeks  Discharge Plan:  Achieve all LTG.  Independent in home treatment program.  Reach maximal therapeutic benefit.    Please refer to the daily flowsheet for treatment today, total treatment time and time spent performing 1:1 timed codes.        Congruent

## 2021-05-07 ENCOUNTER — HOSPITAL ENCOUNTER (OUTPATIENT)
Dept: MRI IMAGING | Facility: CLINIC | Age: 81
Discharge: HOME | End: 2021-05-07
Attending: NURSE PRACTITIONER | Admitting: NURSE PRACTITIONER
Payer: COMMERCIAL

## 2021-05-07 DIAGNOSIS — Z98.1 STATUS POST LUMBAR SPINAL FUSION: ICD-10-CM

## 2021-05-07 DIAGNOSIS — M54.16 LUMBAR RADICULOPATHY: ICD-10-CM

## 2021-05-07 LAB
CREAT BLD-MCNC: 0.8 MG/DL (ref 0.66–1.25)
GFR SERPL CREATININE-BSD FRML MDRD: >90 ML/MIN/{1.73_M2}

## 2021-05-07 PROCEDURE — 82565 ASSAY OF CREATININE: CPT

## 2021-05-07 PROCEDURE — 255N000002 HC RX 255 OP 636: Performed by: NURSE PRACTITIONER

## 2021-05-07 PROCEDURE — 72158 MRI LUMBAR SPINE W/O & W/DYE: CPT

## 2021-05-07 PROCEDURE — A9585 GADOBUTROL INJECTION: HCPCS | Performed by: NURSE PRACTITIONER

## 2021-05-07 RX ORDER — GADOBUTROL 604.72 MG/ML
8 INJECTION INTRAVENOUS ONCE
Status: COMPLETED | OUTPATIENT
Start: 2021-05-07 | End: 2021-05-07

## 2021-05-07 RX ADMIN — GADOBUTROL 8 ML: 604.72 INJECTION INTRAVENOUS at 17:52

## 2021-05-11 ENCOUNTER — TELEPHONE (OUTPATIENT)
Dept: NEUROSURGERY | Facility: CLINIC | Age: 81
End: 2021-05-11

## 2021-05-11 DIAGNOSIS — M54.16 LUMBAR RADICULOPATHY: Primary | ICD-10-CM

## 2021-05-12 NOTE — RESULT ENCOUNTER NOTE
No notes recorded by provider Nsaids Counseling: NSAID Counseling: I discussed with the patient that NSAIDs should be taken with food. Prolonged use of NSAIDs can result in the development of stomach ulcers.  Patient advised to stop taking NSAIDs if abdominal pain occurs.  The patient verbalized understanding of the proper use and possible adverse effects of NSAIDs.  All of the patient's questions and concerns were addressed.

## 2021-05-13 ENCOUNTER — TELEPHONE (OUTPATIENT)
Dept: PALLIATIVE MEDICINE | Facility: CLINIC | Age: 81
End: 2021-05-13

## 2021-05-13 NOTE — TELEPHONE ENCOUNTER
PA pending additional information - please specify exact laterality and if it will be interlaminar or transforaminal       Gabriella SALAZAR    [facility]

## 2021-05-13 NOTE — TELEPHONE ENCOUNTER
Screening Questions for Radiology Injections:    Injection to be done at which interventional clinic site? [facility]    If [facility] location, tell patient that this procedure requires a COVID-19 lab test be done within 4 days of the procedure. Would you still like to move forward with scheduling the procedure?  Not Applicable   If YES, let patient know that someone will call them to schedule the COVID-19 test and that they will only receive a call back if the result is positive. Route to nursing to enter order.     Instruct patient to arrive as directed prior to the scheduled appointment time:    [place] minutes before      [place]: 30 minutes before; if IV needed 1 hour before     Procedure ordered by Bandar     Procedure ordered? L2-3 CHERI      Transforaminal Cervical CHERI - no pain provider currently performing    As a reminder, receiving steroids can decrease your body's ability to fight infection.   Would you still like to move forward with scheduling the injection?  Yes    What insurance would patient like us to bill for this procedure? [employer]- pt stated he has the special insurance where they cover out of network      Worker's comp or MVA (motor vehicle accident) -Any injection DO NOT SCHEDULE and route to Gabriella Vaca.      [employer] insurance - For SI joint injections, DO NOT SCHEDULE and route Gabriella Vaca.       ALL BCBS, [employer] and [employer]-Route to Gabriella for review DO NOT SCHEDULE      IF SCHEDULING IN [place] AND NEEDS A PA, IT IS OKAY TO SCHEDULE. [place] HANDLES THEIR OWN PA'S AFTER THE PATIENT IS SCHEDULED. PLEASE SCHEDULE AT LEAST 1 WEEK OUT SO A PA CAN BE OBTAINED.    Any chance of pregnancy? NO   If YES, do NOT schedule and route to RN pool    Is an  needed? No     Patient has a drive home? (mandatory) YES: Informed     Is patient taking any blood thinners (i.e. plavix, coumadin, jantoven, warfarin, heparin, pradaxa or dabigatran, etc)? Yes -  Plavix    If hold needed, do NOT schedule, route to RN pool     Is patient taking any aspirin products (includes Excedrin and Fiorinal)? Yes 80- no Hold     If more than 325mg/day, OK to schedule; Instruct pt to decrease to less than 325 mg for 7 days AND route to RN pool    For CERVICAL procedures, hold all aspirin products for 6 days.     Tell pt that if aspirin product is not held for 6 days, the procedure WILL BE cancelled.      Does the patient have a bleeding or clotting disorder? No     If YES, okay to schedule AND route to RN nurse pool    For any patients with platelet count <100, must be forwarded to provider    Any allergies to contrast dye, iodine, shellfish, or numbing and steroid medications? No    If YES, add allergy information to appointment notes AND route to the RN pool     If CHERI and Contrast Dye Allergy? DO NOT SCHEDULE, route to RN pool    Allergies: Meclizine, Tramadol hcl, Cardura [doxazosin mesylate], Hydrochlorothiazide, and Naproxen     Is patient diabetic?  No  If YES, instruct them to bring their glucometer.    Does patient have an active infection or treated for one within the past week? No     Is patient currently taking any antibiotics?  No     For patients on chronic, preventative, or prophylactic antibiotics, procedures may be scheduled.     For patients on antibiotics for active or recent infection:antibiotic course must have been completed for 4 days    Is patient currently taking any steroid medications? (i.e. Prednisone, Medrol)  No     For patients on steroid medications, course must have been completed for 4 days    Is patient actively being treated for cancer or immunocompromised? No  If YES, do NOT schedule and route to RN pool     Are you able to get on and off an exam table with minimal or no assistance? Yes  If NO, do NOT schedule and route to RN pool    Are you able to roll over and lay on your stomach with minimal or no assistance? Yes  If NO, do NOT schedule and route  to RN pool     Has the patient had a flu shot or any other vaccinations within 7 days before or after the procedure.  No     Have you recently had a COVID vaccine or have plans to get it in the near future? Yes - Last one( 02/24)     If yes, explain that for the vaccine to work best they need to:       wait 1 week before and 1 week after getting Vaccine #1    wait 1 week before and 2 weeks after getting Vaccine #2    If patient has concerns about the timing, send to RN pool     Does patient have an MRI/CT?  YES: 2021  Check Procedure Scheduling Grid to see if required.      Was the MRI done within the last 3 years?  Yes    If yes, where was the MRI done i.e.[facility], [facility], [facility], [facility] etc? [place]      If no, do not schedule and route to RN pool    If MRI was not done at [facility], [facility] or [facility] do NOT schedule and route to RN pool.      If pt has an imaging disc, the injection MAY be scheduled but pt has to bring disc to appt.     If they show up without the disc the injection cannot be done    Procedure Specific Instructions:      If celiac plexus block, informed patient NPO for 6 hours and that it is okay to take medications with sips of water, especially blood pressure medications  Not Applicable         If this is for a cervical procedure, informed patient that aspirin needs to be held for 6 days.   Not Applicable      If IV needed:    Do not schedule procedures requiring IV placement in the first appointment of the day or first appointment after lunch. Do NOT schedule at 0745, 0815 or 1245.     Instructed pt to arrive 30 minutes early for IV start if required. (Check Procedure Scheduling Grid)  Not Applicable    Reminders:      If you are started on any steroids or antibiotics between now and your appointment, you must contact us because the procedure may need to be cancelled.  No      For all procedures except radiofrequency ablations (RFAs) and spinal cord stimulator (SCS)  trials, informed patient:    IV sedation is not provided for this procedure.  If you feel that an oral anti-anxiety medication is needed, you can discuss this further with your referring provider or primary care provider.  The Pain Clinic provider will discuss specifics of what the procedure includes at your appointment.  Most procedures last 10-20 minutes.  We use numbing medications to help with any discomfort during the procedure.  Not Applicable      For patients 85 or older we recommend having an adult stay w/ them for the remainder of the day.       Does the patient have any questions?  NO  Giselle Edouard  [facility]

## 2021-05-13 NOTE — TELEPHONE ENCOUNTER
PA approved.  Effective date: 5/13/21-06/27/2021  PA reference #: [id number].(temp web auth #)  Pt. notified:   Okay to schedule with Dr Baldomero SALAZAR    [facility]

## 2021-05-13 NOTE — TELEPHONE ENCOUNTER
Patient is requesting to schedule an injection with the [facility].     This would require the patient to hold               Clopidogrel (Plavix)      Hold 7 days prior to procedure, restart 12 hours after procedure    We are requesting your approval to hold the medication for this time frame.    Please keep call open and route back to the PAIN NURSE [[id number]] pool.     Routed to primary care provider    ROXI Harris-BSN  [facility]

## 2021-05-17 NOTE — TELEPHONE ENCOUNTER
Called pt.     Injection scheduled for:  6/3/21  Plavix hold starts on:  5/26/21  Is lovenox bridging needed?  Not Applicable  INR order in?: Not Applicable  Lab appointment done?  Not Applicable  Injection intake questions reviewed?  YES  Infection/antibiotic information reviewed?  YES  Location confirmed: :Yes [facility]  Is pt arriving early?:No  COVID test order in? N/A  Has pt had COVID vaccine?  :Yes 2/2021    It is recommended that the?scheduling?of elective steroid injections (for joint pain, tendinopathies, and spine procedures) should be  by at least one week before or after receiving the 1st COVID vaccine and at least 2 weeks after receiving the 2nd COVID vaccine in order to allow for one's body to fully respond to the vaccine.     Kimberly Caceres RN-BSN  [facility]

## 2021-05-20 ENCOUNTER — THERAPY VISIT (OUTPATIENT)
Dept: PHYSICAL THERAPY | Facility: CLINIC | Age: 81
End: 2021-05-20
Payer: COMMERCIAL

## 2021-05-20 DIAGNOSIS — M54.41 RIGHT-SIDED LOW BACK PAIN WITH RIGHT-SIDED SCIATICA: ICD-10-CM

## 2021-05-20 DIAGNOSIS — E78.5 HYPERLIPIDEMIA LDL GOAL <100: ICD-10-CM

## 2021-05-20 DIAGNOSIS — Z98.1 STATUS POST LUMBAR SPINAL FUSION: Primary | ICD-10-CM

## 2021-05-20 LAB
ALBUMIN SERPL-MCNC: 3.8 G/DL (ref 3.4–5)
ALP SERPL-CCNC: 98 U/L (ref 40–150)
ALT SERPL W P-5'-P-CCNC: 30 U/L (ref 0–70)
ANION GAP SERPL CALCULATED.3IONS-SCNC: 2 MMOL/L (ref 3–14)
AST SERPL W P-5'-P-CCNC: 21 U/L (ref 0–45)
BILIRUB SERPL-MCNC: 0.7 MG/DL (ref 0.2–1.3)
BUN SERPL-MCNC: 15 MG/DL (ref 7–30)
CALCIUM SERPL-MCNC: 9.2 MG/DL (ref 8.5–10.1)
CHLORIDE SERPL-SCNC: 103 MMOL/L (ref 94–109)
CHOLEST SERPL-MCNC: 140 MG/DL
CO2 SERPL-SCNC: 30 MMOL/L (ref 20–32)
CREAT SERPL-MCNC: 0.88 MG/DL (ref 0.66–1.25)
GFR SERPL CREATININE-BSD FRML MDRD: 80 ML/MIN/{1.73_M2}
GLUCOSE SERPL-MCNC: 92 MG/DL (ref 70–99)
HDLC SERPL-MCNC: 58 MG/DL
LDLC SERPL CALC-MCNC: 66 MG/DL
NONHDLC SERPL-MCNC: 82 MG/DL
POTASSIUM SERPL-SCNC: 4.6 MMOL/L (ref 3.4–5.3)
PROT SERPL-MCNC: 7.1 G/DL (ref 6.8–8.8)
SODIUM SERPL-SCNC: 135 MMOL/L (ref 133–144)
TRIGL SERPL-MCNC: 81 MG/DL

## 2021-05-20 PROCEDURE — 97110 THERAPEUTIC EXERCISES: CPT | Mod: GP | Performed by: PHYSICAL THERAPIST

## 2021-05-20 PROCEDURE — 36415 COLL VENOUS BLD VENIPUNCTURE: CPT | Performed by: INTERNAL MEDICINE

## 2021-05-20 PROCEDURE — 80061 LIPID PANEL: CPT | Performed by: INTERNAL MEDICINE

## 2021-05-20 PROCEDURE — 97012 MECHANICAL TRACTION THERAPY: CPT | Mod: GP | Performed by: PHYSICAL THERAPIST

## 2021-05-20 PROCEDURE — 80053 COMPREHEN METABOLIC PANEL: CPT | Performed by: INTERNAL MEDICINE

## 2021-05-24 ENCOUNTER — OFFICE VISIT (OUTPATIENT)
Dept: DERMATOLOGY | Facility: CLINIC | Age: 81
End: 2021-05-24
Payer: COMMERCIAL

## 2021-05-24 VITALS — DIASTOLIC BLOOD PRESSURE: 65 MMHG | SYSTOLIC BLOOD PRESSURE: 125 MMHG

## 2021-05-24 DIAGNOSIS — L82.1 SEBORRHEIC KERATOSES: ICD-10-CM

## 2021-05-24 DIAGNOSIS — Z85.828 HISTORY OF BASAL CELL CARCINOMA (BCC): ICD-10-CM

## 2021-05-24 DIAGNOSIS — D22.9 MULTIPLE BENIGN NEVI: ICD-10-CM

## 2021-05-24 DIAGNOSIS — Z91.89 HISTORY OF SUN EXPOSURE, SEVERE: ICD-10-CM

## 2021-05-24 DIAGNOSIS — L57.8 SOLAR ELASTOSIS: ICD-10-CM

## 2021-05-24 DIAGNOSIS — D48.5 NEOPLASM OF UNCERTAIN BEHAVIOR OF SKIN: ICD-10-CM

## 2021-05-24 DIAGNOSIS — L81.4 LENTIGINES: ICD-10-CM

## 2021-05-24 DIAGNOSIS — L57.0 ACTINIC KERATOSIS: Primary | ICD-10-CM

## 2021-05-24 PROCEDURE — 11102 TANGNTL BX SKIN SINGLE LES: CPT | Performed by: PHYSICIAN ASSISTANT

## 2021-05-24 PROCEDURE — 17003 DESTRUCT PREMALG LES 2-14: CPT | Mod: 59 | Performed by: PHYSICIAN ASSISTANT

## 2021-05-24 PROCEDURE — 17000 DESTRUCT PREMALG LESION: CPT | Mod: 59 | Performed by: PHYSICIAN ASSISTANT

## 2021-05-24 PROCEDURE — 99214 OFFICE O/P EST MOD 30 MIN: CPT | Mod: 25 | Performed by: PHYSICIAN ASSISTANT

## 2021-05-24 PROCEDURE — 88305 TISSUE EXAM BY PATHOLOGIST: CPT | Performed by: PATHOLOGY

## 2021-05-24 NOTE — LETTER
5/24/2021         RE: Ricardo Neely  [address]  [facility] [address]        Dear Colleague,    Thank you for referring your patient, Ricardo Neely, to the [facility]. Please see a copy of my visit note below.    HPI:  I was asked to see pt by Dr. Huddleston. Ricardo Neely is a 80 year old male patient here today for spot on ear .  Patient states this has been present for a while.  Patient reports the following symptoms: scaly, took about three months to heal .  Patient reports the following previous treatments: none.  Patient reports the following modifying factors: none.  Associated symptoms: none. Pt states he tans at the pool for about 1.5hours every day.  Patient has no other skin complaints today.  Remainder of the HPI, Meds, PMH, Allergies, FH, and SH was reviewed in chart.    Pertinent Hx:   Family history of BCC. No family history of skin cancer    Past Medical History:   Diagnosis Date     Anxiety 5/26/2011     Basal cell carcinoma      Benign Prostatic Hypertrophy      CEREBROVASC DISEASE, small vessel 12/07     Essential hypertension, benign      GERD      HIATAL HERNIA      HYPERPLASTIC POLYPS COLON 5/93, 3/06     Impaired fasting glucose      Low Back Pain      Obesity, unspecified      Other and unspecified hyperlipidemia      Personal history of urinary calculi 1960's     Pneumonia, organism unspecified(486) 1992     TRANSIENT CEREBRAL ISCHEMIA 12/07       Past Surgical History:   Procedure Laterality Date     C TOTAL KNEE ARTHROPLASTY  July 2010    Minimally invasive R TKA   Dr. Nichols     COLONOSCOPY       DISCECTOMY LUMBAR POSTERIOR MICROSCOPIC TWO LEVELS  8/28/2012    DISCECTOMY LUMBAR POSTERIOR MICROSCOPIC TWO LEVELS;  RIGHT L3-L4 REDO EXTENDED HEMILAMINECTOMY AND MICRO FORAMINOTOMY, LEFT L4-L5 EXTENDED HEMILAMINECTOMY AND MICRODISCECTOMY (PRONE) (SONYA MCCALLUM, C-ARM, METRIX II);  Surgeon: Bertram Mirza MD;  Location:  OR      OPTICAL TRACKING SYSTEM FUSION SPINE POSTERIOR LUMBAR TWO LEVELS N/A 2019    Procedure: L3-5 LUMBAR TRANSFORAMINAL INTERBODY FUSION WITH STEALTH;  Surgeon: Harrison Perez MD;  Location: [facility] OR     renal calculii removal 40 years ago       Z NONSPECIFIC PROCEDURE      pilonidal cystectomy     Z NONSPECIFIC PROCEDURE  1966    kidney stone     [facility] NONSPECIFIC PROCEDURE  approx 1999    R knee arthroscopy     Dr. Guzman     [facility] NONSPECIFIC PROCEDURE  2005    L3-4 microdiskectomy   Dr. Brantley        Family History   Problem Relation Age of Onset     Family History Negative Brother         1 healthy brother     Diabetes Brother         d:age 66     Diabetes Mother      Cerebrovascular Disease Mother      C.A.D. Father         CABG 67     Heart Disease Father      Lipids Sister      Hypertension Sister      Lipids Sister      Hypertension Sister      Thyroid Disease Daughter         thyroid surgery, on replacement       Social History     Socioeconomic History     Marital status:      Spouse name: Not on file     Number of children: Not on file     Years of education: Not on file     Highest education level: Not on file   Occupational History     Employer: RETIRED   Social Needs     Financial resource strain: Not on file     Food insecurity     Worry: Not on file     Inability: Not on file     Transportation needs     Medical: Not on file     Non-medical: Not on file   Tobacco Use     Smoking status: Former Smoker     Quit date: 1968     Years since quittin.4     Smokeless tobacco: Never Used   Substance and Sexual Activity     Alcohol use: No     Alcohol/week: 0.0 standard drinks     Drug use: No     Sexual activity: Yes     Partners: Female   Lifestyle     Physical activity     Days per week: Not on file     Minutes per session: Not on file     Stress: Not on file   Relationships     Social connections     Talks on phone: Not on file     Gets together: Not on file     Attends  Temple service: Not on file     Active member of club or organization: Not on file     Attends meetings of clubs or organizations: Not on file     Relationship status: Not on file     Intimate partner violence     Fear of current or ex partner: Not on file     Emotionally abused: Not on file     Physically abused: Not on file     Forced sexual activity: Not on file   Other Topics Concern      Service Not Asked     Blood Transfusions Not Asked     Caffeine Concern No     Occupational Exposure Not Asked     Hobby Hazards Not Asked     Sleep Concern Not Asked     Stress Concern Not Asked     Weight Concern Not Asked     Special Diet Not Asked     Back Care Not Asked     Exercise Not Asked     Bike Helmet Not Asked     Seat Belt Not Asked     Self-Exams Not Asked     Parent/sibling w/ CABG, MI or angioplasty before 65F 55M? Not Asked   Social History Narrative     Not on file       Outpatient Encounter Medications as of 5/24/2021   Medication Sig Dispense Refill     amLODIPine-benazepril (LOTREL) 5-20 MG capsule Take 1 capsule by mouth daily 90 capsule 3     aspirin (ASA) 81 MG tablet Take 1 tablet (81 mg) by mouth At Bedtime       atorvastatin (LIPITOR) 40 MG tablet Take 1 tablet (40 mg) by mouth daily 90 tablet 3     clopidogrel (PLAVIX) 75 MG tablet TAKE 1 TABLET(75 MG) BY MOUTH DAILY 90 tablet 3     fluocinonide (LIDEX) 0.05 % external cream Apply sparingly to affected area twice daily as needed.  Do not apply to face. 60 g 1     gabapentin (NEURONTIN) 300 MG capsule        metoprolol succinate ER (TOPROL-XL) 100 MG 24 hr tablet Take 1 tablet (100 mg) by mouth daily 90 tablet 3     metoprolol succinate ER (TOPROL-XL) 50 MG 24 hr tablet TAKE 2 TABLETS BY MOUTH EVERY  tablet 2     No facility-administered encounter medications on file as of 5/24/2021.        Review Of Systems:  Skin: spot  Eyes: negative  Ears/Nose/Throat: negative  Respiratory: No shortness of breath, dyspnea on exertion, cough, or  hemoptysis  Cardiovascular: negative  Gastrointestinal: negative  Genitourinary: negative  Musculoskeletal: negative  Neurologic: negative  Psychiatric: negative  Hematologic/Lymphatic/Immunologic: negative  Endocrine: negative      Objective:     /65   Eyes: Conjunctivae/lids: Normal   ENT: Lips:  Normal  MSK: Normal  Cardiovascular: Peripheral edema none  Pulm: Breathing Normal  Neuro/Psych: Orientation: A/O x 3. Normal; Mood/Affect: Normal, NAD, WDWN  Pt accompanied by: self  Following areas examined: Scalp, face, eyelids, lips, neck, chest, abdomen, back, and R&L upper and lower extremities,  buttock, hips. Defers exam of groin and genitals.   Vazquez skin type:iii-pt very tan   Findings:  Well circumscribed macules with symmetric color distribution on trunk and extremities.  Tan WD smooth macules on face, neck, trunk, and extremities.  Pink scaly papule on right upper back 0.6cm  Pink gritty macule/s on right mid helix, forehead, cheeks x 6  Rhytides, hypo/hyperpigmentation, and atrophy  Brown, stuck-on scaly appearing papules on trunk and extremities.      Assessment and Plan:     1) Benign nevi, Lentigines, seborrheic keratoses     I discussed the specifics of tumor, prognosis, and genetics of benign lesions.  I explained that treatment of these lesions would be purely cosmetic and not medically neccessary.  I discussed with patient different removal options including excision, cryotherapy, cautery and /or laser.  Lesion may recur and/or may not completely resolve. May need additional treatment.     2) Neoplasm of uncertain behavior on right upper back 0.6cm  KA vs SCC vs ruptured cyst  TANGENTIAL BIOPSY:  After consent, anesthesia with LEC and prep, tangential biopsy performed.  No complications and routine wound care.  May grow back and will get a scar. Based on lesion type may need to completely remove lesion. Patient will be notified in 7-10 days of results. Wound care directions given.    3)  Actinic keratoses x 6 and solar elastosis    LN2 for 5 seconds x 2. Discussed AE include hypopigmentation (white spot) and recurrence. Follow up in 2-3 months to recheck lesions. There is a risk of AKs developing into a SCC.   Treatment options include LN2 vs PDT vs Efudex. Pt elected LN2    4) history of bcc, history of severe sun exposure  No evidence of recurrence of bcc  Disc discharge tanning and sun protection    Signs and Symptoms of non-melanoma skin cancer and ABCDEs of melanoma reviewed with patient. Patient encouraged to perform monthly self skin exams and educated on how to perform them. UV precautions reviewed with patient. Patient was asked about new or changing moles/lesions on body.   Wear a sunscreen with at least SPF 30 on your face, ears, neck and V of the chest daily. Wear sunscreen on other areas of the body if those areas are exposed to the sun throughout the day. Sunscreens can contain physical and/or chemical blockers. Physical blockers are less likely to clog pores, these include zinc oxide and titanium dioxide. Reapply every two hour and after swimming. Sunscreen examples include Neutrogena, CeraVe, Blue Lizard, Elta MD and many others.    Proper skin care from [facility]:    -Eliminate harsh soaps as they strip the natural oils from the skin, often resulting in dry itchy skin ( i.e. Dial, Zest, [place] Spring)  -Use mild soaps such as Cetaphil or Dove Sensitive Skin in the shower. You do not need to use soap on arms, legs, and trunk every time you shower unless visibly soiled.   -Avoid hot or cold showers.  -After showering, lightly dry off and apply moisturizing within 2-3 minutes. This will help trap moisture in the skin.   -Aggressive use of a moisturizer at least 1-2 times a day to the entire body (including -Vanicream, Cetaphil, Aquaphor or Cerave) and moisturize hands after every washing.  -We recommend using moisturizers that come in a tub that needs to be scooped out, not a  pump. This has more of an oil base. It will hold moisture in your skin much better than a water base moisturizer. The above recommended are non-pore clogging.         It was a pleasure speaking with Ricardo Neely today.       Follow up in 1-2 months to recheck aks.  Yearly FBE        Again, thank you for allowing me to participate in the care of your patient.        Sincerely,        Shakila Loredo PA-C

## 2021-05-24 NOTE — PROGRESS NOTES
HPI:  I was asked to see pt by Dr. Huddleston. Ricardo Neely is a 80 year old male patient here today for spot on ear .  Patient states this has been present for a while.  Patient reports the following symptoms: scaly, took about three months to heal .  Patient reports the following previous treatments: none.  Patient reports the following modifying factors: none.  Associated symptoms: none. Pt states he tans at the pool for about 1.5hours every day.  Patient has no other skin complaints today.  Remainder of the HPI, Meds, PMH, Allergies, FH, and SH was reviewed in chart.    Pertinent Hx:   Family history of BCC. No family history of skin cancer    Past Medical History:   Diagnosis Date     Anxiety 5/26/2011     Basal cell carcinoma      Benign Prostatic Hypertrophy      CEREBROVASC DISEASE, small vessel 12/07     Essential hypertension, benign      GERD      HIATAL HERNIA      HYPERPLASTIC POLYPS COLON 5/93, 3/06     Impaired fasting glucose      Low Back Pain      Obesity, unspecified      Other and unspecified hyperlipidemia      Personal history of urinary calculi 1960's     Pneumonia, organism unspecified(486) 1992     TRANSIENT CEREBRAL ISCHEMIA 12/07       Past Surgical History:   Procedure Laterality Date     C TOTAL KNEE ARTHROPLASTY  July 2010    Minimally invasive R TKA   Dr. Nichols     COLONOSCOPY       DISCECTOMY LUMBAR POSTERIOR MICROSCOPIC TWO LEVELS  8/28/2012    DISCECTOMY LUMBAR POSTERIOR MICROSCOPIC TWO LEVELS;  RIGHT L3-L4 REDO EXTENDED HEMILAMINECTOMY AND MICRO FORAMINOTOMY, LEFT L4-L5 EXTENDED HEMILAMINECTOMY AND MICRODISCECTOMY (PRONE) (SONYA MCCALLUM, C-ARM, METRIX II);  Surgeon: Bertram Mirza MD;  Location:  OR     OPTICAL TRACKING SYSTEM FUSION SPINE POSTERIOR LUMBAR TWO LEVELS N/A 8/21/2019    Procedure: L3-5 LUMBAR TRANSFORAMINAL INTERBODY FUSION WITH STEALTH;  Surgeon: Harrison Perez MD;  Location:  OR     renal calculii removal 40 years ago  1965     [facility] NONSPECIFIC  PROCEDURE  1959    pilonidal cystectomy     [facility] NONSPECIFIC PROCEDURE  1966    kidney stone     Z NONSPECIFIC PROCEDURE  approx 1999    R knee arthroscopy     Dr. Guzman     [facility] NONSPECIFIC PROCEDURE  2005    L3-4 microdiskectomy   Dr. Brantley        Family History   Problem Relation Age of Onset     Family History Negative Brother         1 healthy brother     Diabetes Brother         d:age 66     Diabetes Mother      Cerebrovascular Disease Mother      C.A.D. Father         CABG 67     Heart Disease Father      Lipids Sister      Hypertension Sister      Lipids Sister      Hypertension Sister      Thyroid Disease Daughter         thyroid surgery, on replacement       Social History     Socioeconomic History     Marital status:      Spouse name: Not on file     Number of children: Not on file     Years of education: Not on file     Highest education level: Not on file   Occupational History     Employer: RETIRED   Social Needs     Financial resource strain: Not on file     Food insecurity     Worry: Not on file     Inability: Not on file     Transportation needs     Medical: Not on file     Non-medical: Not on file   Tobacco Use     Smoking status: Former Smoker     Quit date: 1968     Years since quittin.4     Smokeless tobacco: Never Used   Substance and Sexual Activity     Alcohol use: No     Alcohol/week: 0.0 standard drinks     Drug use: No     Sexual activity: Yes     Partners: Female   Lifestyle     Physical activity     Days per week: Not on file     Minutes per session: Not on file     Stress: Not on file   Relationships     Social connections     Talks on phone: Not on file     Gets together: Not on file     Attends Gnosticist service: Not on file     Active member of club or organization: Not on file     Attends meetings of clubs or organizations: Not on file     Relationship status: Not on file     Intimate partner violence     Fear of current or ex partner: Not on file     Emotionally  abused: Not on file     Physically abused: Not on file     Forced sexual activity: Not on file   Other Topics Concern      Service Not Asked     Blood Transfusions Not Asked     Caffeine Concern No     Occupational Exposure Not Asked     Hobby Hazards Not Asked     Sleep Concern Not Asked     Stress Concern Not Asked     Weight Concern Not Asked     Special Diet Not Asked     Back Care Not Asked     Exercise Not Asked     Bike Helmet Not Asked     Seat Belt Not Asked     Self-Exams Not Asked     Parent/sibling w/ CABG, MI or angioplasty before 65F 55M? Not Asked   Social History Narrative     Not on file       Outpatient Encounter Medications as of 5/24/2021   Medication Sig Dispense Refill     amLODIPine-benazepril (LOTREL) 5-20 MG capsule Take 1 capsule by mouth daily 90 capsule 3     aspirin (ASA) 81 MG tablet Take 1 tablet (81 mg) by mouth At Bedtime       atorvastatin (LIPITOR) 40 MG tablet Take 1 tablet (40 mg) by mouth daily 90 tablet 3     clopidogrel (PLAVIX) 75 MG tablet TAKE 1 TABLET(75 MG) BY MOUTH DAILY 90 tablet 3     fluocinonide (LIDEX) 0.05 % external cream Apply sparingly to affected area twice daily as needed.  Do not apply to face. 60 g 1     gabapentin (NEURONTIN) 300 MG capsule        metoprolol succinate ER (TOPROL-XL) 100 MG 24 hr tablet Take 1 tablet (100 mg) by mouth daily 90 tablet 3     metoprolol succinate ER (TOPROL-XL) 50 MG 24 hr tablet TAKE 2 TABLETS BY MOUTH EVERY  tablet 2     No facility-administered encounter medications on file as of 5/24/2021.        Review Of Systems:  Skin: spot  Eyes: negative  Ears/Nose/Throat: negative  Respiratory: No shortness of breath, dyspnea on exertion, cough, or hemoptysis  Cardiovascular: negative  Gastrointestinal: negative  Genitourinary: negative  Musculoskeletal: negative  Neurologic: negative  Psychiatric: negative  Hematologic/Lymphatic/Immunologic: negative  Endocrine: negative      Objective:     /65   Eyes:  Conjunctivae/lids: Normal   ENT: Lips:  Normal  MSK: Normal  Cardiovascular: Peripheral edema none  Pulm: Breathing Normal  Neuro/Psych: Orientation: A/O x 3. Normal; Mood/Affect: Normal, NAD, WDWN  Pt accompanied by: self  Following areas examined: Scalp, face, eyelids, lips, neck, chest, abdomen, back, and R&L upper and lower extremities,  buttock, hips. Defers exam of groin and genitals.   Vazquez skin type:iii-pt very tan   Findings:  Well circumscribed macules with symmetric color distribution on trunk and extremities.  Tan WD smooth macules on face, neck, trunk, and extremities.  Pink scaly papule on right upper back 0.6cm  Pink gritty macule/s on right mid helix, forehead, cheeks x 6  Rhytides, hypo/hyperpigmentation, and atrophy  Brown, stuck-on scaly appearing papules on trunk and extremities.      Assessment and Plan:     1) Benign nevi, Lentigines, seborrheic keratoses     I discussed the specifics of tumor, prognosis, and genetics of benign lesions.  I explained that treatment of these lesions would be purely cosmetic and not medically neccessary.  I discussed with patient different removal options including excision, cryotherapy, cautery and /or laser.  Lesion may recur and/or may not completely resolve. May need additional treatment.     2) Neoplasm of uncertain behavior on right upper back 0.6cm  KA vs SCC vs ruptured cyst  TANGENTIAL BIOPSY:  After consent, anesthesia with LEC and prep, tangential biopsy performed.  No complications and routine wound care.  May grow back and will get a scar. Based on lesion type may need to completely remove lesion. Patient will be notified in 7-10 days of results. Wound care directions given.    3) Actinic keratoses x 6 and solar elastosis    LN2 for 5 seconds x 2. Discussed AE include hypopigmentation (white spot) and recurrence. Follow up in 2-3 months to recheck lesions. There is a risk of AKs developing into a SCC.   Treatment options include LN2 vs PDT vs  Efudex. Pt elected LN2    4) history of bcc, history of severe sun exposure  No evidence of recurrence of bcc  Disc discharge tanning and sun protection    Signs and Symptoms of non-melanoma skin cancer and ABCDEs of melanoma reviewed with patient. Patient encouraged to perform monthly self skin exams and educated on how to perform them. UV precautions reviewed with patient. Patient was asked about new or changing moles/lesions on body.   Wear a sunscreen with at least SPF 30 on your face, ears, neck and V of the chest daily. Wear sunscreen on other areas of the body if those areas are exposed to the sun throughout the day. Sunscreens can contain physical and/or chemical blockers. Physical blockers are less likely to clog pores, these include zinc oxide and titanium dioxide. Reapply every two hour and after swimming. Sunscreen examples include Neutrogena, CeraVe, Blue Lizard, Elta MD and many others.    Proper skin care from [facility]:    -Eliminate harsh soaps as they strip the natural oils from the skin, often resulting in dry itchy skin ( i.e. Dial, Zest, Wendy Spring)  -Use mild soaps such as Cetaphil or Dove Sensitive Skin in the shower. You do not need to use soap on arms, legs, and trunk every time you shower unless visibly soiled.   -Avoid hot or cold showers.  -After showering, lightly dry off and apply moisturizing within 2-3 minutes. This will help trap moisture in the skin.   -Aggressive use of a moisturizer at least 1-2 times a day to the entire body (including -Vanicream, Cetaphil, Aquaphor or Cerave) and moisturize hands after every washing.  -We recommend using moisturizers that come in a tub that needs to be scooped out, not a pump. This has more of an oil base. It will hold moisture in your skin much better than a water base moisturizer. The above recommended are non-pore clogging.         It was a pleasure speaking with Ricardo Neely today.       Follow up in 1-2 months to recheck aks.   Yearly FBE

## 2021-05-24 NOTE — PATIENT INSTRUCTIONS
Wound Care Instructions     FOR SUPERFICIAL WOUNDS     [facility] [phone]    [facility] [phone]          AFTER 24 HOURS YOU SHOULD REMOVE THE BANDAGE AND BEGIN DAILY DRESSING CHANGES AS FOLLOWS:     1) Remove Dressing.     2) Clean and dry the area with tap water using a Q-tip or sterile gauze pad.     3) Apply Vaseline, Aquaphor, Polysporin ointment or Bacitracin ointment over entire wound.  Do NOT use Neosporin ointment.     4) Cover the wound with a band-aid, or a sterile non-stick gauze pad and micropore paper tape      REPEAT THESE INSTRUCTIONS AT LEAST ONCE A DAY UNTIL THE WOUND HAS COMPLETELY HEALED.    It is an old wives tale that a wound heals better when it is exposed to air and allowed to dry out. The wound will heal faster with a better cosmetic result if it is kept moist with ointment and covered with a bandage.    **Do not let the wound dry out.**      Supplies Needed:      *Cotton tipped applicators (Q-tips)    *Polysporin Ointment or Bacitracin Ointment (NOT NEOSPORIN)    *Band-aids or non-stick gauze pads and micropore paper tape.      PATIENT INFORMATION:    During the healing process you will notice a number of changes. All wounds develop a small halo of redness surrounding the wound.  This means healing is occurring. Severe itching with extensive redness usually indicates sensitivity to the ointment or bandage tape used to dress the wound.  You should call our office if this develops.      Swelling  and/or discoloration around your surgical site is common, particularly when performed around the eye.    All wounds normally drain.  The larger the wound the more drainage there will be.  After 7-10 days, you will notice the wound beginning to shrink and new skin will begin to grow.  The wound is healed when you can see skin has formed over the entire area.  A healed wound has a healthy, shiny look to the surface and is red to dark pink in color to  normalize.  Wounds may take approximately 4-6 weeks to heal.  Larger wounds may take 6-8 weeks.  After the wound is healed you may discontinue dressing changes.    You may experience a sensation of tightness as your wound heals. This is normal and will gradually subside.    Your healed wound may be sensitive to temperature changes. This sensitivity improves with time, but if you re having a lot of discomfort, try to avoid temperature extremes.    Patients frequently experience itching after their wound appears to have healed because of the continue healing under the skin.  Plain Vaseline will help relieve the itching.        POSSIBLE COMPLICATIONS    BLEEDIN. Leave the bandage in place.  2. Use tightly rolled up gauze or a cloth to apply direct pressure over the bandage for 30  minutes.  3. Reapply pressure for an additional 30 minutes if necessary  4. Use additional gauze and tape to maintain pressure once the bleeding has stopped.    WOUND CARE INSTRUCTIONS  FOR CRYOSURGERY        This area treated with liquid nitrogen will form a blister. You do not need to bandage the area until after the blister forms and breaks (which may be a few days).  When the blister breaks, begin daily dressing changes as follows:    1) Clean and dry the area with tap water using clean Q-tip or sterile gauze pad.    2) Apply Polysporin ointment or Bacitracin ointment over entire wound.  Do NOT use Neosporin ointment.    3) Cover the wound with a band-aid or sterile non-stick gauze pad and micropore paper tape.      REPEAT THESE INSTRUCTIONS AT LEAST ONCE A DAY UNTIL THE WOUND HAS COMPLETELY HEALED.        It is an old wives tale that a wound heals better when it is exposed to air and allowed to dry out. The wound will heal faster with a better cosmetic result if it is kept moist with ointment and covered with a bandage.  Do not let the wound dry out.      Supplies Needed:     *Cotton tipped applicators (Q-tips)   *Polysporin  ointment or Bacitracin ointment (NOT NEOSPORIN)   *Band-aids, or non stick gauze pads and micropore paper tape    PATIENT INFORMATION    During the healing process you will notice a number of changes. All wounds develop a small halo of redness surrounding the wound.  This means healing is occurring. Severe itching with extensive redness usually indicates sensitivity to the ointment or bandage tape used to dress the wound.  You should call our office if this develops.      Swelling and/or discoloration around your surgical site is common, particularly when performed around the eye.    All wounds normally drain.  The larger the wound the more drainage there will be.  After 7-10 days, you will notice the wound beginning to shrink and new skin will begin to grow.  The wound is healed when you can see skin has formed over the entire area.  A healed wound has a healthy, shiny look to the surface and is red to dark pink in color to normalize.  Wounds may take approximately 4-6 weeks to heal.  Larger wounds may take 6-8 weeks.  After the wound is healed you may discontinue dressing changes.    You may experience a sensation of tightness as your wound heals. This is normal and will gradually subside.    Your healed wound may be sensitive to temperature changes. This sensitivity improves with time, but if you re having a lot of discomfort, try to avoid temperature extremes.    Patients frequently experience itching after their wound appears to have healed because of the continue healing under the skin.  Plain Vaseline will help relieve the itching.             Proper skin care from [facility]:    -Eliminate harsh soaps as they strip the natural oils from the skin, often resulting in dry itchy skin ( i.e. Dial, Zest, Wendy Spring)  -Use mild soaps such as Cetaphil or Dove Sensitive Skin in the shower. You do not need to use soap on arms, legs, and trunk every time you shower unless visibly soiled.   -Avoid hot or  cold showers.  -After showering, lightly dry off and apply moisturizing within 2-3 minutes. This will help trap moisture in the skin.   -Aggressive use of a moisturizer at least 1-2 times a day to the entire body (including -Vanicream, Cetaphil, Aquaphor or Cerave) and moisturize hands after every washing.  -We recommend using moisturizers that come in a tub that needs to be scooped out, not a pump. This has more of an oil base. It will hold moisture in your skin much better than a water base moisturizer. The above recommended are non-pore clogging.      Wear a sunscreen with at least SPF 30 on your face, ears, neck and V of the chest daily. Wear sunscreen on other areas of the body if those areas are exposed to the sun throughout the day. Sunscreens can contain physical and/or chemical blockers. Physical blockers are less likely to clog pores, these include zinc oxide and titanium dioxide. Reapply every two hour and after swimming. Sunscreen examples include Neutrogena, CeraVe, Augie Lizard, Elta MD and many others.    UV radiation  UVA radiation remains constant throughout the day and throughout the year. It is a longer wavelength than UVB and therefore penetrates deeper into the skin leading to immediate and delayed tanning, photoaging, and skin cancer. 70-80% of UVA and UVB radiation occurs between the hours of 10am-2pm.  UVB radiation  UVB radiation causes the most harmful effects and is more significant during the summer months. However, snow and ice can reflect UVB radiation leading to skin damage during the winter months as well. UVB radiation is responsible for tanning, burning, inflammation, delayed erythema (pinkness), pigmentation (brown spots), and skin cancer.     I recommend self monthly full body exams and yearly full body exams with a dermatology provider. If you develop a new or changing lesion please follow up for examination. Most skin cancers are pink and scaly or pink and pearly. However, we do  see blue/brown/black skin cancers.  Consider the ABCDEs of melanoma when giving yourself your monthly full body exam ( don't forget the groin, buttocks, feet, toes, etc). A-asymmetry, B-borders, C-color, D-diameter, E-elevation or evolving. If you see any of these changes please follow up in clinic. If you cannot see your back I recommend purchasing a hand held mirror to use with a larger wall mirror.

## 2021-05-26 LAB — COPATH REPORT: NORMAL

## 2021-06-01 ENCOUNTER — TELEPHONE (OUTPATIENT)
Dept: DERMATOLOGY | Facility: CLINIC | Age: 81
End: 2021-06-01

## 2021-06-01 NOTE — TELEPHONE ENCOUNTER
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

## 2021-06-01 NOTE — LETTER
[facility]  [address]  [address]  [phone]        6/3/2021       Ricardo Neely  [address]   [facility] [address]      Dear Ricardo:    You are scheduled for Mohs Surgery on: Thursday, July 22, 2021 at 1:30 pm    Please check in at [facility], [address].     You don't need to arrive more than 5-10 minutes prior to your appointment time.     Be sure to eat a good breakfast and bathe and wash your hair prior to surgery.     If you are taking any anti-coagulants that are prescribed by your Doctor (such as Coumadin/Warfarin, Plavix, Aspirin, Ibuprofen), please continue taking them.     However, if you are taking anti-coagulants over the counter without a Doctor's order for a medical condition, please discontinue them 10 days prior to surgery.     Please wear loose comfortable clothing as it could possibly be 4-6 hours until your surgery is completed depending upon how many layers of tissue need to be removed.      Thank you,    SHONNA Restrepo MD

## 2021-06-01 NOTE — TELEPHONE ENCOUNTER
Called and LM for patient to call back in regards to biopsy results x1.    ROXI Avila-BSN-PHN  [facility]  [phone]

## 2021-06-02 ENCOUNTER — TELEPHONE (OUTPATIENT)
Dept: PALLIATIVE MEDICINE | Facility: CLINIC | Age: 81
End: 2021-06-02

## 2021-06-02 NOTE — TELEPHONE ENCOUNTER
Spoke to patient, reminded patient of date, time and location of appointment.      Reminded patient to eat and drink as usual.    Remained to hold any blood thinners or pain medications that they were asked to hold per nurse.     Reminded patient they will need a  for this appointment.      Reminded patient that both patient and  must wear a mask during their visit.        Cece Mills MA  [facility] [place]

## 2021-06-02 NOTE — TELEPHONE ENCOUNTER
Called and LM for patient to call back in regards to biopsy results x1.    ROXI Avila-BSN-PHN  [facility]  [phone]

## 2021-06-03 ENCOUNTER — RADIOLOGY INJECTION OFFICE VISIT (OUTPATIENT)
Dept: PALLIATIVE MEDICINE | Facility: CLINIC | Age: 81
End: 2021-06-03
Payer: COMMERCIAL

## 2021-06-03 VITALS — SYSTOLIC BLOOD PRESSURE: 138 MMHG | HEART RATE: 61 BPM | OXYGEN SATURATION: 96 % | DIASTOLIC BLOOD PRESSURE: 66 MMHG

## 2021-06-03 DIAGNOSIS — M54.16 LUMBAR RADICULOPATHY: Primary | ICD-10-CM

## 2021-06-03 PROCEDURE — 62323 NJX INTERLAMINAR LMBR/SAC: CPT | Performed by: PHYSICAL MEDICINE & REHABILITATION

## 2021-06-03 NOTE — NURSING NOTE
Discharge Information    IV Discontiued Time:  NA    Amount of Fluid Infused:  NA    Discharge Criteria = When patient returns to baseline or as per MD order    Consciousness:  Pt is fully awake    Circulation:  BP +/- 20% of pre-procedure level    Respiration:  Patient is able to breathe deeply    O2 Sat:  Patient is able to maintain O2 Sat >92% on room air    Activity:  Moves 4 extremities on command    Ambulation:  Patient is able to stand and walk or stand and pivot into wheelchair    Dressing:  Clean/dry or No Dressing    Notes:   Discharge instructions and AVS given to patient    Patient meets criteria for discharge?  YES    Admitted to PCU?  No    Responsible adult present to accompany patient home?  Yes    Signature/Title:    Corina Haas RN Care Coordinator  [facility] [place]

## 2021-06-03 NOTE — Clinical Note
L1-2 melba completed. If no significant improvement, consider caudal melba for radicular symptoms.    [facility]

## 2021-06-03 NOTE — TELEPHONE ENCOUNTER
patient notified of test results and mohs procedure explained- appointment scheduled- letter mailed.    Shereen BROWN RN BSN  [facility]  [phone]  [facility]   [phone]

## 2021-06-03 NOTE — PATIENT INSTRUCTIONS
[facility] Procedure Discharge Instructions    Today you saw:     Dr. Antelmo Cook    Your procedure:  Epidural steroid injection       Medications used:  Lidocaine (anesthetic) Kenalog (steroid)  Omnipaque (contrast)    Normal Saline    You were holding your plavix medication, please restart taking it: 12 hours after the procedure          Be cautious when walking as numbness and/or weakness in the legs may occur up to 6-8 hours after the procedure due to effect of the local anesthetic    Do not drive for 6 hours. The effect of the local anesthetic could slow your reflexes.     Avoid strenuous activity for the first 24 hours. You may resume your regular activities after that.     You may shower, however avoid swimming, tub baths or hot tubs for 24 hours following your procedure    You may have a mild to moderate increase in pain for several days following the injection.      You may use ice packs for 10-15 minutes, 3 to 4 times a day at the injection site for comfort    Do not use heat to painful areas for 6 to 8 hours. This will give the local anesthetic time to wear off and prevent you from accidentally burning your skin.    Unless you have been directed to avoid the use of anti-inflammatory medications (NSAIDS-ibuprofen, Aleve, Motrin), you may use these medications or Tylenol for pain control if needed.     With diabetes, check your blood sugar more frequently than usual as your blood sugar may be higher than normal for 10-14 days following a steroid injection. Contact your doctor who manages your diabetes if your blood sugar is higher than usual    Possible side effects of steroids that you may experience include flushing, elevated blood pressure, increased appetite, mild headaches and restlessness.  All of these symptoms will get better with time.    It may take up to 14 days for the steroid medication to start working although you may feel the effect as early as a few days after the  procedure.     Follow up with your referring provider in 2-3 weeks. Dr Portillo.      If you experience any of the following, call the pain center line during work hours at [phone] or on-call physician after hours at [phone]:  -Fever over 100 degree F  -Swelling, bleeding, redness, drainage, warmth at the injection site  -Progressive weakness or numbness in your legs  -Loss of bowel or bladder function  -Unusual headache that is not relieved by Tylenol or your regular headache medication  -Unusual new onset of pain that is not improving

## 2021-06-03 NOTE — PROGRESS NOTES
[facility] - Procedure Note    Date of Visit: 6/3/2021    Procedure performed: Lumbar 1-2 interlaminar epidural steroid injection  Diagnosis: Lumbar spondylosis; Lumbar radiculitis/radiculopathy  : Antelmo Cook DO & Madi Garcia DO (Fellow)   Anesthesia: none    Indications: Ricardo Neely is a 81 year old male who is seen at the request of Flores Portillo,  for a lumbar epidural steroid injection. The patient describes pain across his low back and into the bilateral lower extremities. The patient has been exhibiting symptoms consistent with lumbar intraspinal inflammation and radiculopathy. Symptoms have been persistent, disabling, and intermittently severe. The patient reports minimal improvement with conservative treatment, including oral medications and exercises. They have had a L3-5 decompression and fusion in the past.    Lumbar MRI was done on 5/7/21 which showed   Segmental analysis:  T12-L1: Disc desiccation with minimal disc height loss. Posterocentral  annular fissure. Shallow symmetric disc bulge. Mild facet arthropathy.  No spinal canal stenosis. No significant neural foraminal stenosis.  Overall, no significant change.     L1-L2: Disc desiccation without disc height loss. No herniation. Mild  facet arthropathy. No spinal canal stenosis. No significant neural  foraminal stenosis. Overall, no change.     L2-L3: Interval progression of now mild to moderate disc height loss.  Increased size of a diffuse disc bulge with posterolateral endplate  osteophytes and moderate to severe facet arthropathy. Ligamentum  flavum thickening appears mildly increased. These factors result in  now moderate to severe spinal canal stenosis (series 801 image 20)  with bilateral lateral recess stenosis. There is mild bilateral neural  foraminal stenosis which is slightly progressed from prior.     L3-L4: Postoperative level. No spinal canal stenosis. Unchanged mild  bilateral  "neural foraminal stenosis.     L4-L5: Postoperative level. No spinal canal stenosis. Unchanged mild  left neural foraminal stenosis. The right neural foramen is patent.     L5-S1: Mild to moderate disc height loss. Symmetric disc bulge with  small posterolateral endplate osteophytes. Moderate left and mild  right facet arthropathy. No significant spinal canal stenosis. No  significant neural foraminal stenosis.                                                                      IMPRESSION:  1. Postoperative changes of posterior decompression and interbody and  posterior instrumented fusion from L3-L5, as described.  2. Increased degenerative findings at the L2-L3 level now resulting in  moderate to severe spinal canal stenosis with bilateral lateral recess  stenosis.  3. No significant change in mild multilevel neural foraminal stenosis.  No high-grade neural foraminal narrowing is identified.     ANYA AVILES MD    Allergies:      Allergies   Allergen Reactions     Meclizine Other (See Comments)     Over sedation, concentration problem     Tramadol Hcl Other (See Comments)     Pt feels very drugged, \"cloudy\" if used more than one day. Nausea also     Cardura [Doxazosin Mesylate] Other (See Comments)     fainted     Hydrochlorothiazide Other (See Comments)      lightheadedness     Naproxen Nausea     nausea        Vitals:  /66 (BP Location: Left arm, Cuff Size: Adult Regular)   Pulse 61   SpO2 96%     Review of Systems: The patient denies recent fever, chills, illness, use of antibiotics or anticoagulants. All other 10-point review of systems negative.     Procedure: The procedure and risks were explained, and informed written consent was obtained from the patient. Risks include but are not limited to: infection, bleeding, increased pain, and damage to soft tissue, nerve, muscle, and vasculature structures. After getting informed consent, patient was brought into the procedure suite and was placed in a " prone position on the procedure table. A Pause for the Cause was performed. Patient was prepped and draped in sterile fashion.     The L1-2 interspace was identified with use of fluoroscopy in AP view. A 25-gauge, 1.5 inch needle was used to anesthetize the skin and subcutaneous tissue entry site with a total of 2 ml of 1% lidocaine. Under fluoroscopic visualization, a 22-gauge, 4.5 inch Tuohy epidural needle was slowly advanced towards the epidural space a few millimeters left-sided of midline. The latter part of the needle advancement was guided with fluoroscopy in the lateral view. The epidural space was identified using loss of resistance technique. After negative aspiration for heme and cerebrospinal fluid, a total of 1 mL of non-ionic contrast was injected to confirm needle placement with 9 mL of contrast wasted. Epidurogram confirmed spread within the posterior epidural space. 1 ml of 40mg/ml of triamcinolone, 1 ml of 1% lidocaine, and 3 ml of preservative free saline was injected. The needle was removed.  Images were saved to PACS.    The patient tolerated the procedure well, and there was no evidence of procedural complications. No new sensory or motor deficits were noted following the procedure. The patient was stable and able to ambulate on discharge home. Post-procedure instructions were provided.     Pre-procedure pain score: 7/10 in the back, 8/10 in the leg  Post-procedure pain score: 0/10 in the back, 0/10 in the leg    Assessment/Plan: Ricardo Neely is a 81 year old male s/p lumbar interlaminar epidural steroid injection today for lumbar spondylosis and radiculitis/radiculopathy.     1. Following today's procedure, the patient was advised to contact the [facility] for any of the following:   Fever, chills, or night sweats   New onset of pain, numbness, or weakness   Any questions/concerns regarding the procedure  If unable to contact the Pain Center, the patient was instructed  to go to a local Emergency Room for any complications.   2. Follow-up with the referring provider in 2-4 weeks for post-procedure evaluation.        Antelmo Cook DO  [facility] [place]

## 2021-06-03 NOTE — NURSING NOTE
Pre-procedure Intake    Have you been fasting? NA    If yes, for how long? NA    Are you taking a prescribed blood thinner such as coumadin, Plavix, Xarelto?    Yes -   Plavix    If yes, when did you take your last dose? Patient been on a Plavix Hold for 9 days.     Do you take aspirin?  Yes -   ASA    If cervical procedure, have you held aspirin for 6 days?   NA    Do you have any allergies to contrast dye, iodine, steroid and/or numbing medications?  NO    Are you currently taking antibiotics or have an active infection?  NO    Have you had a fever/elevated temperature within the past week? NO    Are you currently taking oral steroids? NO    Do you have a ? Yes       Are you pregnant or breastfeeding?  Not Applicable    Have you received the COVID-19 vaccine? Yes       If yes, was it your 1st, 2nd or only dose needed? 2nd dose    Date of most recent vaccine: 02/24/21    Notify provider and RNs if systolic BP >170, diastolic BP >100, P >100 or O2 sats < 90%

## 2021-06-08 ENCOUNTER — THERAPY VISIT (OUTPATIENT)
Dept: PHYSICAL THERAPY | Facility: CLINIC | Age: 81
End: 2021-06-08
Payer: COMMERCIAL

## 2021-06-08 DIAGNOSIS — E78.5 HYPERLIPIDEMIA LDL GOAL <100: ICD-10-CM

## 2021-06-08 DIAGNOSIS — M54.41 RIGHT-SIDED LOW BACK PAIN WITH RIGHT-SIDED SCIATICA: ICD-10-CM

## 2021-06-08 PROCEDURE — 97110 THERAPEUTIC EXERCISES: CPT | Mod: GP | Performed by: PHYSICAL THERAPIST

## 2021-06-08 RX ORDER — ATORVASTATIN CALCIUM 40 MG/1
TABLET, FILM COATED ORAL
Qty: 90 TABLET | Refills: 0 | Status: SHIPPED | OUTPATIENT
Start: 2021-06-08 | End: 2021-11-08

## 2021-07-20 ENCOUNTER — OFFICE VISIT (OUTPATIENT)
Dept: INTERNAL MEDICINE | Facility: CLINIC | Age: 81
End: 2021-07-20
Payer: COMMERCIAL

## 2021-07-20 VITALS
BODY MASS INDEX: 27.45 KG/M2 | HEART RATE: 61 BPM | OXYGEN SATURATION: 98 % | TEMPERATURE: 97.3 F | DIASTOLIC BLOOD PRESSURE: 62 MMHG | WEIGHT: 174.9 LBS | HEIGHT: 67 IN | SYSTOLIC BLOOD PRESSURE: 132 MMHG

## 2021-07-20 DIAGNOSIS — I10 ESSENTIAL HYPERTENSION: ICD-10-CM

## 2021-07-20 DIAGNOSIS — D64.9 ANEMIA, UNSPECIFIED TYPE: ICD-10-CM

## 2021-07-20 DIAGNOSIS — H02.109 ECTROPION OF LOWER EYELID: ICD-10-CM

## 2021-07-20 DIAGNOSIS — N48.1 BALANITIS CIRCINATA: ICD-10-CM

## 2021-07-20 DIAGNOSIS — Z86.73 HX OF TRANSIENT ISCHEMIC ATTACK (TIA): ICD-10-CM

## 2021-07-20 DIAGNOSIS — Z01.818 PREOPERATIVE EXAMINATION: Primary | ICD-10-CM

## 2021-07-20 DIAGNOSIS — E78.5 HYPERLIPIDEMIA LDL GOAL <100: ICD-10-CM

## 2021-07-20 PROBLEM — Z85.828 STATUS POST MOHS SURGERY FOR BASAL CELL CARCINOMA: Status: RESOLVED | Noted: 2017-05-08 | Resolved: 2021-07-20

## 2021-07-20 PROBLEM — Z98.890 STATUS POST MOHS SURGERY FOR BASAL CELL CARCINOMA: Status: RESOLVED | Noted: 2017-05-08 | Resolved: 2021-07-20

## 2021-07-20 LAB
ERYTHROCYTE [DISTWIDTH] IN BLOOD BY AUTOMATED COUNT: 13.2 % (ref 10–15)
HCT VFR BLD AUTO: 46.2 % (ref 40–53)
HGB BLD-MCNC: 15.6 G/DL (ref 13.3–17.7)
IRON SATN MFR SERPL: 34 % (ref 15–46)
IRON SERPL-MCNC: 126 UG/DL (ref 35–180)
MCH RBC QN AUTO: 30.1 PG (ref 26.5–33)
MCHC RBC AUTO-ENTMCNC: 33.8 G/DL (ref 31.5–36.5)
MCV RBC AUTO: 89 FL (ref 78–100)
PLATELET # BLD AUTO: 277 10E3/UL (ref 150–450)
RBC # BLD AUTO: 5.19 10E6/UL (ref 4.4–5.9)
TIBC SERPL-MCNC: 368 UG/DL (ref 240–430)
WBC # BLD AUTO: 6.6 10E3/UL (ref 4–11)

## 2021-07-20 PROCEDURE — 36415 COLL VENOUS BLD VENIPUNCTURE: CPT | Performed by: INTERNAL MEDICINE

## 2021-07-20 PROCEDURE — 99214 OFFICE O/P EST MOD 30 MIN: CPT | Performed by: INTERNAL MEDICINE

## 2021-07-20 PROCEDURE — 83550 IRON BINDING TEST: CPT | Performed by: INTERNAL MEDICINE

## 2021-07-20 PROCEDURE — 85027 COMPLETE CBC AUTOMATED: CPT | Performed by: INTERNAL MEDICINE

## 2021-07-20 RX ORDER — GABAPENTIN 300 MG/1
300 CAPSULE ORAL AT BEDTIME
COMMUNITY
Start: 2021-07-20 | End: 2021-11-08

## 2021-07-20 ASSESSMENT — MIFFLIN-ST. JEOR: SCORE: 1451.97

## 2021-07-20 NOTE — PROGRESS NOTES
[address] [address]  Phone: [phone]  Primary Provider: Familia Arredondo  Pre-op Performing Provider: FAMILIA ARREDONDO      PREOPERATIVE EVALUATION:  Today's date: 7/20/2021    Ricardo Neely is a 81 year old male who presents for a preoperative evaluation.    Surgical Information:  Surgery/Procedure: Ectropion repair  Surgery Location: [facility]  Surgeon: Dr. Tito Plascencia  Surgery Date: 08/27/2021  Time of Surgery: 130pm  Where patient plans to recover: At home with family  Fax number for surgical facility: [phone]    Type of Anesthesia Anticipated: to be determined    Assessment & Plan     The proposed surgical procedure is considered LOW risk.    Preoperative examination  Appears medically stable to proceed with surgery    Ectropion of lower eyelid    Hyperlipidemia LDL goal <100  Controlled with statin    Essential hypertension  Controlled with current medication    Anemia, unspecified type  Per labs 1 year ago.  Follow-up labs today show resolution of anemia  - CBC with platelets; Future  - Iron & Iron Binding Capacity; Future  - CBC with platelets  - Iron & Iron Binding Capacity    Hx of transient ischemic attack (TIA)  Distant past.  On aspirin/Plavix.  No recurrence.  Currently asymptomatic     Balanitis  Appears related to tinea under the foreskin. Will treat with Clotrimazole      Risks and Recommendations:  The patient has the following additional risks and recommendations for perioperative complications:   - No identified additional risk factors other than previously addressed    Patient instructions:   Clotrimazole/Lotrimin antifungal cream applied to  head of penis twice a  day (AM after bathing and bedtime) until rash resolved   Covid testing through surgery office  No solid food after midnight. May have clear liquids up to 4 hours prior to surgery   Hold Aspirin 10 days prior to surgery and hold Plavix/Clopidogrel 5  days prior to surgery (per instructions from [facility] for oculoplastic procedures)   Restart both after surgery as usual   Take other usual meds the AM of surgery    RECOMMENDATION:  APPROVAL GIVEN to proceed with proposed procedure, without further diagnostic evaluation.           Subjective     HPI related to upcoming procedure:   Hx right eye lower  eyelid ectropion for a couple years. Causing intermittent pain. Presents for clearance to undergo surgical correction  Walking 2 miles per day without CP or SOB    Preop Questions 7/17/2021   1. Have you ever had a heart attack or stroke? No stroke. Hx prior TIA in distant past   2. Have you ever had surgery on your heart or blood vessels, such as a stent placement, a coronary artery bypass, or surgery on an artery in your head, neck, heart, or legs? No   3. Do you have chest pain with activity? No   4. Do you have a history of  heart failure? No   5. Do you currently have a cold, bronchitis or symptoms of other infection? No   6. Do you have a cough, shortness of breath, or wheezing? No   7. Do you or anyone in your family have previous history of blood clots? No   8. Do you or does anyone in your family have a serious bleeding problem such as prolonged bleeding following surgeries or cuts? No   9. Have you ever had problems with anemia or been told to take iron pills? No   10. Have you had any abnormal blood loss such as black, tarry or bloody stools? No   11. Have you ever had a blood transfusion? No   12. Are you willing to have a blood transfusion if it is medically needed before, during, or after your surgery? Yes   13. Have you or any of your relatives ever had problems with anesthesia? No   14. Do you have sleep apnea, excessive snoring or daytime drowsiness? No   15. Do you have any artifical heart valves or other implanted medical devices like a pacemaker, defibrillator, or continuous glucose monitor? No   16. Do you have artificial joints? YES -  right TKA   17. Are you allergic to latex? No       Health Care Directive:  Patient has a Health Care Directive on file      Preoperative Review of :   reviewed - no record of controlled substances prescribed.      Status of Chronic Conditions:  HYPERLIPIDEMIA - Patient has a long history of significant Hyperlipidemia requiring medication for treatment with recent good control. Patient reports no problems or side effects with the medication.     HYPERTENSION - Patient has longstanding history of HTN , currently denies any symptoms referable to elevated blood pressure. Specifically denies chest pain, palpitations, dyspnea, orthopnea, PND or peripheral edema. Blood pressure readings have been in normal range. Current medication regimen is as listed below. Patient denies any side effects of medication.       Review of Systems  CONSTITUTIONAL: NEGATIVE for fever, chills. Weight down 10 pounds in 1 year with diet  INTEGUMENTARY/SKIN:  POSITIVE for SCC right upper back. Having Mohs later this week  EYES: NEGATIVE for vision changes with glasses. See HPI in addition  ENT/MOUTH: NEGATIVE for mouth and throat problems. Has 6 mos of occ soreness right ear  RESP: NEGATIVE for significant cough or SOB  CV: NEGATIVE for chest pain, palpitations or peripheral edema  GI: NEGATIVE for nausea, abdominal pain, heartburn, or change in bowel habits  : NEGATIVE for frequency, dysuria, or hematuria. Has some soreness on penis  MUSCULOSKELETAL:  POSITIVE for chronic right LBP with occ radicular sx to the RLE. Previous LESI without benefit. Follwoed by pain clinic  NEURO: NEGATIVE for weakness, dizziness. See above RLE  ENDOCRINE: NEGATIVE for temperature intolerance. On statin  HEME: NEGATIVE for bleeding problems overall. Occ bruising with Plavix/ASA  PSYCHIATRIC: NEGATIVE for changes in mood or affect    Patient Active Problem List    Diagnosis Date Noted     Advance Care Planning 05/26/2011     Priority: High     Advance  Directive Problem List Overview:Advance Directive received and scanned. Click on Code in the patient header to view. 1/23/2012   Advance Directive Follow-up Visit Ricardo Neely presents for advanced care planning session accompanied by designated health care agent.  Reviewed definitions of advanced care planning and advance directive form, and informational handouts given to patient previously.  Patient voices understanding of advance care planning and advance directive form Designated healthcare agent is identifed and accepts responsibility for this role. Healthcare agent s name is  Lisa Fowleryaima. Designated healthcare agent concerns:  None. My Hopes and Wishes reviewed and patient and designated healthcare agent are in agreement. Finalized wishes are clear to both patient and designated healthcare agent: Yes. Patient and designated healthcare agent are aware that document may be changed at any time in the future. Advanced directive form: completed at this visit. Advanced directive form: verified with notary signature. Original and two copies of advanced directive form given to patient copy sent to  for scanning into EMR GENE Ragsdale RN  Advance Directive Problem List Overview: Discussed advance care planning with patient; information given to patient to review. 5/26/2011        Hx of transient ischemic attack (TIA) 08/01/2021     Priority: Medium     Right-sided low back pain with right-sided sciatica 05/20/2021     Priority: Medium     Contracture of palmar fascia 10/16/2020     Priority: Medium     Anxiety 05/26/2011     Priority: Medium     Hypertrophy of prostate without urinary obstruction      Priority: Medium     Diaphragmatic hernia 09/23/2002     Priority: Medium     Problem list name updated by automated process. Provider to review       HISTORY OF URINARY CALCULI 08/19/2002     Priority: Medium     Essential hypertension 08/19/2002     Priority: Medium     Hyperlipidemia LDL goal <100 08/19/2002      Priority: Medium     GERD 08/19/2002     Priority: Medium      Past Medical History:   Diagnosis Date     Anxiety 5/26/2011     Basal cell carcinoma      Benign Prostatic Hypertrophy      CEREBROVASC DISEASE, small vessel 12/07     Essential hypertension, benign      GERD      HIATAL HERNIA      HYPERPLASTIC POLYPS COLON 5/93, 3/06     Impaired fasting glucose      Low Back Pain      Obesity, unspecified      Other and unspecified hyperlipidemia      Personal history of urinary calculi 1960's     Pneumonia, organism unspecified(486) 1992     TRANSIENT CEREBRAL ISCHEMIA 12/07     Past Surgical History:   Procedure Laterality Date     C TOTAL KNEE ARTHROPLASTY  July 2010    Minimally invasive R TKA   Dr. Nichols     COLONOSCOPY       DISCECTOMY LUMBAR POSTERIOR MICROSCOPIC TWO LEVELS  8/28/2012    DISCECTOMY LUMBAR POSTERIOR MICROSCOPIC TWO LEVELS;  RIGHT L3-L4 REDO EXTENDED HEMILAMINECTOMY AND MICRO FORAMINOTOMY, LEFT L4-L5 EXTENDED HEMILAMINECTOMY AND MICRODISCECTOMY (PRONE) (SONYA MCCALLUM, C-ARM, METRIX II);  Surgeon: Bertram Mirza MD;  Location:  OR     OPTICAL TRACKING SYSTEM FUSION SPINE POSTERIOR LUMBAR TWO LEVELS N/A 8/21/2019    Procedure: L3-5 LUMBAR TRANSFORAMINAL INTERBODY FUSION WITH STEALTH;  Surgeon: Harrison Perez MD;  Location:  OR     renal calculii removal 40 years ago  1965     [facility] NONSPECIFIC PROCEDURE  1959    pilonidal cystectomy     [facility] NONSPECIFIC PROCEDURE  1966    kidney stone     [facility] NONSPECIFIC PROCEDURE  approx 1999    R knee arthroscopy     Dr. Guzman     [facility] NONSPECIFIC PROCEDURE  2005    L3-4 microdiskectomy   Dr. Brantley     Current Outpatient Medications   Medication Sig Dispense Refill     amLODIPine-benazepril (LOTREL) 5-20 MG capsule Take 1 capsule by mouth daily 90 capsule 3     aspirin (ASA) 81 MG tablet Take 1 tablet (81 mg) by mouth At Bedtime       atorvastatin (LIPITOR) 40 MG tablet TAKE 1 TABLET(40 MG) BY MOUTH DAILY 90 tablet 0     clopidogrel (PLAVIX)  "75 MG tablet TAKE 1 TABLET(75 MG) BY MOUTH DAILY 90 tablet 3     fluocinonide (LIDEX) 0.05 % external cream Apply sparingly to affected area twice daily as needed.  Do not apply to face. 60 g 1     gabapentin (NEURONTIN) 300 MG capsule Take 1 capsule (300 mg) by mouth At Bedtime       metoprolol succinate ER (TOPROL-XL) 100 MG 24 hr tablet Take 1 tablet (100 mg) by mouth daily 90 tablet 3       Allergies   Allergen Reactions     Meclizine Other (See Comments)     Over sedation, concentration problem     Tramadol Hcl Other (See Comments)     Pt feels very drugged, \"cloudy\" if used more than one day. Nausea also     Cardura [Doxazosin Mesylate] Other (See Comments)     fainted     Hydrochlorothiazide Other (See Comments)      lightheadedness     Naproxen Nausea     nausea        Social History     Tobacco Use     Smoking status: Former Smoker     Quit date: 1968     Years since quittin.5     Smokeless tobacco: Never Used   Substance Use Topics     Alcohol use: No     Alcohol/week: 0.0 standard drinks     Family History   Problem Relation Age of Onset     Family History Negative Brother         1 healthy brother     Diabetes Brother         d:age 66     Diabetes Mother      Cerebrovascular Disease Mother      C.A.D. Father         CABG 67     Heart Disease Father      Lipids Sister      Hypertension Sister      Lipids Sister      Hypertension Sister      Thyroid Disease Daughter         thyroid surgery, on replacement     History   Drug Use No         Objective     /62   Pulse 61   Temp 97.3  F (36.3  C) (Tympanic)   Ht 1.702 m (5' 7\")   Wt 79.3 kg (174 lb 14.4 oz)   SpO2 98%   BMI 27.39 kg/m      Physical Exam  Eye: PERRL, EOMI. Ectropion right lower lid  HENT: ear canals and TM's normal and nose and mouth without ulcers or lesions   Neck: no adenopathy. Thyroid normal to palpation. No bruits  Pulm: Lungs clear to auscultation   CV: Regular rates and rhythm  GI: Soft, nontender, Normal active " bowel sounds, No hepatosplenomegaly or masses palpable  Ext: Peripheral pulses intact. No edema.  Neuro: Normal strength and tone, sensory exam grossly normal   : Uncircumcised. Mild erythema under foreskin around penis head      Recent Labs   Lab Test 05/20/21  0946 05/18/20  0000 08/24/19  1051 08/23/19  2248   HGB  --   --  10.1* 10.0*   PLT  --   --  211 187     --   --  134   POTASSIUM 4.6 5.1  --  3.4   CR 0.88 0.83  --  0.78        Diagnostics:  Component      Latest Ref Rng & Units 7/20/2021   WBC      4.0 - 11.0 10e3/uL 6.6   RBC Count      4.40 - 5.90 10e6/uL 5.19   Hemoglobin      13.3 - 17.7 g/dL 15.6   Hematocrit      40.0 - 53.0 % 46.2   MCV      78 - 100 fL 89   MCH      26.5 - 33.0 pg 30.1   MCHC      31.5 - 36.5 g/dL 33.8   RDW      10.0 - 15.0 % 13.2   Platelet Count      150 - 450 10e3/uL 277   Iron      35 - 180 ug/dL 126   Iron Binding Cap      240 - 430 ug/dL 368   Iron Saturation Index      15 - 46 % 34       No EKG required for low risk surgery      Revised Cardiac Risk Index (RCRI):  The patient has the following serious cardiovascular risks for perioperative complications:   - Cerebrovascular Disease (TIA or CVA) = 1 point     RCRI Interpretation: 1 point: Class II (low risk - 0.9% complication rate)           Signed Electronically by: Familia Cook MD  Copy of this evaluation report is provided to requesting physician.

## 2021-07-20 NOTE — PATIENT INSTRUCTIONS
Clotrimazole/Lotrimin antifungal cream applied to  head of penis twice a  day (AM after bathing and bedtime) until rash resolved   Covid testing through surgery office  No solid food after midnight. May have clear liquids up to 4 hours prior to surgery   Hold Aspirin 10 days prior to surgery   Hold Plavix/Clopidogrel 5 days prior to surgery   Restart both after surgery as usual   Take other usual meds the AM of surgery

## 2021-07-22 ENCOUNTER — OFFICE VISIT (OUTPATIENT)
Dept: DERMATOLOGY | Facility: CLINIC | Age: 81
End: 2021-07-22
Payer: COMMERCIAL

## 2021-07-22 DIAGNOSIS — C44.529 SQUAMOUS CELL CARCINOMA OF BACK: Primary | ICD-10-CM

## 2021-07-22 PROCEDURE — 12001 RPR S/N/AX/GEN/TRNK 2.5CM/<: CPT | Performed by: DERMATOLOGY

## 2021-07-22 PROCEDURE — 17313 MOHS 1 STAGE T/A/L: CPT | Performed by: DERMATOLOGY

## 2021-07-22 PROCEDURE — 99207 PR NO CHARGE LOS: CPT | Performed by: DERMATOLOGY

## 2021-07-22 NOTE — PROGRESS NOTES
Ricardo Neely is an extremely pleasant 81 year old year old male patient here today for evaluation and managment of squamous cell carcinoma on right upper back. Patient has no other skin complaints today.  Remainder of the HPI, Meds, PMH, Allergies, FH, and SH was reviewed in chart.      Past Medical History:   Diagnosis Date     Anxiety 5/26/2011     Basal cell carcinoma      Benign Prostatic Hypertrophy      CEREBROVASC DISEASE, small vessel 12/07     Essential hypertension, benign      GERD      HIATAL HERNIA      HYPERPLASTIC POLYPS COLON 5/93, 3/06     Impaired fasting glucose      Low Back Pain      Obesity, unspecified      Other and unspecified hyperlipidemia      Personal history of urinary calculi 1960's     Pneumonia, organism unspecified(486) 1992     Squamous cell carcinoma of skin, unspecified      TRANSIENT CEREBRAL ISCHEMIA 12/07       Past Surgical History:   Procedure Laterality Date     C TOTAL KNEE ARTHROPLASTY  July 2010    Minimally invasive R TKA   Dr. Nichols     COLONOSCOPY       DISCECTOMY LUMBAR POSTERIOR MICROSCOPIC TWO LEVELS  8/28/2012    DISCECTOMY LUMBAR POSTERIOR MICROSCOPIC TWO LEVELS;  RIGHT L3-L4 REDO EXTENDED HEMILAMINECTOMY AND MICRO FORAMINOTOMY, LEFT L4-L5 EXTENDED HEMILAMINECTOMY AND MICRODISCECTOMY (PRONE) (SONYA MCCALLUM, C-ARM, METRIX II);  Surgeon: Bertram Mirza MD;  Location:  OR     OPTICAL TRACKING SYSTEM FUSION SPINE POSTERIOR LUMBAR TWO LEVELS N/A 8/21/2019    Procedure: L3-5 LUMBAR TRANSFORAMINAL INTERBODY FUSION WITH STEALTH;  Surgeon: Harrison Perez MD;  Location:  OR     renal calculii removal 40 years ago  1965     [facility] NONSPECIFIC PROCEDURE  1959    pilonidal cystectomy     [facility] NONSPECIFIC PROCEDURE  1966    kidney stone     [facility] NONSPECIFIC PROCEDURE  approx 1999    R knee arthroscopy     Dr. Guzman     [facility] NONSPECIFIC PROCEDURE  2005    L3-4 microdiskectomy   Dr. Brantley        Family History   Problem Relation Age of Onset     Family History  Negative Brother         1 healthy brother     Diabetes Brother         d:age 66     Diabetes Mother      Cerebrovascular Disease Mother      C.A.D. Father         CABG 67     Heart Disease Father      Lipids Sister      Hypertension Sister      Lipids Sister      Hypertension Sister      Thyroid Disease Daughter         thyroid surgery, on replacement       Social History     Socioeconomic History     Marital status:      Spouse name: Not on file     Number of children: Not on file     Years of education: Not on file     Highest education level: Not on file   Occupational History     Employer: RETIRED   Tobacco Use     Smoking status: Former Smoker     Quit date: 1968     Years since quittin.5     Smokeless tobacco: Never Used   Substance and Sexual Activity     Alcohol use: No     Alcohol/week: 0.0 standard drinks     Drug use: No     Sexual activity: Yes     Partners: Female   Other Topics Concern      Service Not Asked     Blood Transfusions Not Asked     Caffeine Concern No     Occupational Exposure Not Asked     Hobby Hazards Not Asked     Sleep Concern Not Asked     Stress Concern Not Asked     Weight Concern Not Asked     Special Diet Not Asked     Back Care Not Asked     Exercise Not Asked     Bike Helmet Not Asked     Seat Belt Not Asked     Self-Exams Not Asked     Parent/sibling w/ CABG, MI or angioplasty before 65F 55M? Not Asked   Social History Narrative     Not on file     Social Determinants of Health     Financial Resource Strain:      Difficulty of Paying Living Expenses:    Food Insecurity:      Worried About Running Out of Food in the Last Year:      Ran Out of Food in the Last Year:    Transportation Needs:      Lack of Transportation (Medical):      Lack of Transportation (Non-Medical):    Physical Activity:      Days of Exercise per Week:      Minutes of Exercise per Session:    Stress:      Feeling of Stress :    Social Connections:      Frequency of Communication  with Friends and Family:      Frequency of Social Gatherings with Friends and Family:      Attends Temple Services:      Active Member of Clubs or Organizations:      Attends Club or Organization Meetings:      Marital Status:    Intimate Partner Violence:      Fear of Current or Ex-Partner:      Emotionally Abused:      Physically Abused:      Sexually Abused:        Outpatient Encounter Medications as of 7/22/2021   Medication Sig Dispense Refill     amLODIPine-benazepril (LOTREL) 5-20 MG capsule Take 1 capsule by mouth daily 90 capsule 3     aspirin (ASA) 81 MG tablet Take 1 tablet (81 mg) by mouth At Bedtime       atorvastatin (LIPITOR) 40 MG tablet TAKE 1 TABLET(40 MG) BY MOUTH DAILY 90 tablet 0     clopidogrel (PLAVIX) 75 MG tablet TAKE 1 TABLET(75 MG) BY MOUTH DAILY 90 tablet 3     fluocinonide (LIDEX) 0.05 % external cream Apply sparingly to affected area twice daily as needed.  Do not apply to face. 60 g 1     gabapentin (NEURONTIN) 300 MG capsule Take 1 capsule (300 mg) by mouth At Bedtime       metoprolol succinate ER (TOPROL-XL) 100 MG 24 hr tablet Take 1 tablet (100 mg) by mouth daily 90 tablet 3     No facility-administered encounter medications on file as of 7/22/2021.             O:   NAD, WDWN, Alert & Oriented, Mood & Affect wnl, Vitals stable   Here today alone   General appearance normal   Vitals stable   Alert, oriented and in no acute distress      Following lymph nodes palpated: axilla no lad   R upper back 7mm scaly papule   Eyes: Conjunctivae/lids:Normal     ENT: Lips, buccal mucosa, tongue: normal    MSK:Normal    Cardiovascular: peripheral edema none    Pulm: Breathing Normal    Neuro/Psych: Orientation:Alert and Orientedx3 ; Mood/Affect:normal       A/P:  1. R upper back squamous cell carcinoma   MOHS:   Size    The rationale for Mohs surgery was discussed with the patient and consent was obtained.  The risks and benefits as well as alternatives to therapy were discussed, in detail.   Specifically, the risks of infection, scarring, bleeding, prolonged wound healing, incomplete removal, allergy to anesthesia, nerve injury and recurrence were addressed.  Indication for Mohs was Size. Prior to the procedure, the treatment site was clearly identified and, if available, confirmed with previous photos and confirmed by the patient   All components of the Universal Protocol/PAUSE rule were completed.  The Mohs surgeon operated in two distinct and integrated capacities as the surgeon and pathologist.      The area was prepped with Betasept.  A rim of normal appearing skin was marked circumferentially around the lesion.  The area was infiltrated with local anesthesia.  The tumor was first debulked to remove all clinically apparent tumor.  An incision following the standard Mohs approach was done and the specimen was oriented,mapped and placed in 1 block(s).  Each specimen was then chromacoded and processed in the Mohs laboratory using standard Mohs technique and submitted for frozen section histology.  Frozen section analysis showed no residual tumor but CLEAR MARGINS.      The tumor was excised using standard Mohs technique in 1 stages(s).  CLEAR MARGINS OBTAINED and Final defect size was 1.2 cm.     We discussed the options for wound management in full with the patient including risks/benefits/ possible outcomes.        REPAIR SECOND INTENT: We discussed the options for wound management in full with the patient including risks/benefits/possible outcomes. Decision made to allow the wound to heal by second intention. Cautery was used for for hemostasis. EBL minimal; complications none; wound care routine.  The patient was discharged in good condition and will return in one month or prn for wound evaluation.  It was a pleasure speaking to Ricardo Neely today.  Previous clinic notes and pertinent laboratory tests were reviewed prior to Ricardo Neely's visit.  Signs and Symptoms of skin cancer discussed  with patient.  Patient encouraged to perform monthly skin exams.  UV precautions reviewed with patient.  Risks of non-melanoma skin cancer discussed with patient   Return to clinic 6 months

## 2021-07-22 NOTE — LETTER
7/22/2021         RE: Ricardo Neely  [address]  [facility] [address]        Dear Colleague,    Thank you for referring your patient, Ricardo Neely, to the [facility]. Please see a copy of my visit note below.    Surgical Office Location :   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 Patient has no other skin complaints today.  Remainder of the HPI, Meds, PMH, Allergies, FH, and SH was reviewed in chart.      Past Medical History:   Diagnosis Date     Anxiety 5/26/2011     Basal cell carcinoma      Benign Prostatic Hypertrophy      CEREBROVASC DISEASE, small vessel 12/07     Essential hypertension, benign      GERD      HIATAL HERNIA      HYPERPLASTIC POLYPS COLON 5/93, 3/06     Impaired fasting glucose      Low Back Pain      Obesity, unspecified      Other and unspecified hyperlipidemia      Personal history of urinary calculi 1960's     Pneumonia, organism unspecified(486) 1992     Squamous cell carcinoma of skin, unspecified      TRANSIENT CEREBRAL ISCHEMIA 12/07       Past Surgical History:   Procedure Laterality Date     C TOTAL KNEE ARTHROPLASTY  July 2010    Minimally invasive R TKA   Dr. Nichols     COLONOSCOPY       DISCECTOMY LUMBAR POSTERIOR MICROSCOPIC TWO LEVELS  8/28/2012    DISCECTOMY LUMBAR POSTERIOR MICROSCOPIC TWO LEVELS;  RIGHT L3-L4 REDO EXTENDED HEMILAMINECTOMY AND MICRO FORAMINOTOMY, LEFT L4-L5 EXTENDED HEMILAMINECTOMY AND MICRODISCECTOMY (PRONE) (SONYA MCCALLUM, C-ARM, METRIX II);  Surgeon: Bertram Mirza MD;  Location:  OR     OPTICAL TRACKING SYSTEM FUSION SPINE POSTERIOR LUMBAR TWO LEVELS N/A 8/21/2019    Procedure: L3-5 LUMBAR TRANSFORAMINAL INTERBODY FUSION WITH STEALTH;  Surgeon: Harrison Perez MD;  Location:  OR      renal calculii removal 40 years ago       [facility] NONSPECIFIC PROCEDURE      pilonidal cystectomy     [facility] NONSPECIFIC PROCEDURE  1966    kidney stone     [facility] NONSPECIFIC PROCEDURE  approx 1999    R knee arthroscopy     Dr. Guzman     [facility] NONSPECIFIC PROCEDURE  2005    L3-4 microdiskectomy   Dr. Brantley        Family History   Problem Relation Age of Onset     Family History Negative Brother         1 healthy brother     Diabetes Brother         d:age 66     Diabetes Mother      Cerebrovascular Disease Mother      C.A.D. Father         CABG 67     Heart Disease Father      Lipids Sister      Hypertension Sister      Lipids Sister      Hypertension Sister      Thyroid Disease Daughter         thyroid surgery, on replacement       Social History     Socioeconomic History     Marital status:      Spouse name: Not on file     Number of children: Not on file     Years of education: Not on file     Highest education level: Not on file   Occupational History     Employer: RETIRED   Tobacco Use     Smoking status: Former Smoker     Quit date: 1968     Years since quittin.5     Smokeless tobacco: Never Used   Substance and Sexual Activity     Alcohol use: No     Alcohol/week: 0.0 standard drinks     Drug use: No     Sexual activity: Yes     Partners: Female   Other Topics Concern      Service Not Asked     Blood Transfusions Not Asked     Caffeine Concern No     Occupational Exposure Not Asked     Hobby Hazards Not Asked     Sleep Concern Not Asked     Stress Concern Not Asked     Weight Concern Not Asked     Special Diet Not Asked     Back Care Not Asked     Exercise Not Asked     Bike Helmet Not Asked     Seat Belt Not Asked     Self-Exams Not Asked     Parent/sibling w/ CABG, MI or angioplasty before 65F 55M? Not Asked   Social History Narrative     Not on file     Social Determinants of Health     Financial Resource Strain:      Difficulty of Paying Living Expenses:    Food Insecurity:       Worried About Running Out of Food in the Last Year:      Ran Out of Food in the Last Year:    Transportation Needs:      Lack of Transportation (Medical):      Lack of Transportation (Non-Medical):    Physical Activity:      Days of Exercise per Week:      Minutes of Exercise per Session:    Stress:      Feeling of Stress :    Social Connections:      Frequency of Communication with Friends and Family:      Frequency of Social Gatherings with Friends and Family:      Attends [employer] Services:      Active Member of Clubs or Organizations:      Attends Club or Organization Meetings:      Marital Status:    Intimate Partner Violence:      Fear of Current or Ex-Partner:      Emotionally Abused:      Physically Abused:      Sexually Abused:        Outpatient Encounter Medications as of 7/22/2021   Medication Sig Dispense Refill     amLODIPine-benazepril (LOTREL) 5-20 MG capsule Take 1 capsule by mouth daily 90 capsule 3     aspirin (ASA) 81 MG tablet Take 1 tablet (81 mg) by mouth At Bedtime       atorvastatin (LIPITOR) 40 MG tablet TAKE 1 TABLET(40 MG) BY MOUTH DAILY 90 tablet 0     clopidogrel (PLAVIX) 75 MG tablet TAKE 1 TABLET(75 MG) BY MOUTH DAILY 90 tablet 3     fluocinonide (LIDEX) 0.05 % external cream Apply sparingly to affected area twice daily as needed.  Do not apply to face. 60 g 1     gabapentin (NEURONTIN) 300 MG capsule Take 1 capsule (300 mg) by mouth At Bedtime       metoprolol succinate ER (TOPROL-XL) 100 MG 24 hr tablet Take 1 tablet (100 mg) by mouth daily 90 tablet 3     No facility-administered encounter medications on file as of 7/22/2021.             O:   NAD, WDWN, Alert & Oriented, Mood & Affect wnl, Vitals stable   Here today alone   General appearance normal   Vitals stable   Alert, oriented and in no acute distress      Following lymph nodes palpated: axilla no lad   R upper back 7mm scaly papule   Eyes: Conjunctivae/lids:Normal     ENT: Lips, buccal mucosa, tongue:  normal    MSK:Normal    Cardiovascular: peripheral edema none    Pulm: Breathing Normal    Neuro/Psych: Orientation:Alert and Orientedx3 ; Mood/Affect:normal       A/P:  1. R upper back squamous cell carcinoma   MOHS:   Size    The rationale for Mohs surgery was discussed with the patient and consent was obtained.  The risks and benefits as well as alternatives to therapy were discussed, in detail.  Specifically, the risks of infection, scarring, bleeding, prolonged wound healing, incomplete removal, allergy to anesthesia, nerve injury and recurrence were addressed.  Indication for Mohs was Size. Prior to the procedure, the treatment site was clearly identified and, if available, confirmed with previous photos and confirmed by the patient   All components of the Universal Protocol/PAUSE rule were completed.  The Mohs surgeon operated in two distinct and integrated capacities as the surgeon and pathologist.      The area was prepped with Betasept.  A rim of normal appearing skin was marked circumferentially around the lesion.  The area was infiltrated with local anesthesia.  The tumor was first debulked to remove all clinically apparent tumor.  An incision following the standard Mohs approach was done and the specimen was oriented,mapped and placed in 1 block(s).  Each specimen was then chromacoded and processed in the Mohs laboratory using standard Mohs technique and submitted for frozen section histology.  Frozen section analysis showed no residual tumor but CLEAR MARGINS.      The tumor was excised using standard Mohs technique in 1 stages(s).  CLEAR MARGINS OBTAINED and Final defect size was 1.2 cm.     We discussed the options for wound management in full with the patient including risks/benefits/ possible outcomes.        REPAIR SECOND INTENT: We discussed the options for wound management in full with the patient including risks/benefits/possible outcomes. Decision made to allow the wound to heal by second  intention. Cautery was used for for hemostasis. EBL minimal; complications none; wound care routine.  The patient was discharged in good condition and will return in one month or prn for wound evaluation.  It was a pleasure speaking to Ricardo Neely today.  Previous clinic notes and pertinent laboratory tests were reviewed prior to Ricardo Neely's visit.  Signs and Symptoms of skin cancer discussed with patient.  Patient encouraged to perform monthly skin exams.  UV precautions reviewed with patient.  Risks of non-melanoma skin cancer discussed with patient   Return to clinic 6 months        Again, thank you for allowing me to participate in the care of your patient.        Sincerely,        Raymond Restrepo MD

## 2021-07-22 NOTE — PATIENT INSTRUCTIONS
Open Wound Care     for ______________    ? No strenuous activity for 48 hours    ? Take Tylenol as needed for discomfort.                                                .         ? Do not drink alcoholic beverages for 48 hours.    ? Keep the pressure bandage in place for 24 hours. If the bandage becomes blood tinged or loose, reinforce it with gauze and tape.        (Refer to the reverse side of this page for management of bleeding).    ? Remove bandage in 24 hours and begin wound care as follows:     1. Clean area with tap water using a Q tip or gauze pad, (shower / bathe normally)  2. Dry wound with Q tip or gauze pad  3. Apply Aquaphor, Vaseline, Polysporin or Bacitracin Ointment with a Q tip    Do NOT use Neosporin Ointment   4. Cover the wound with a band-aid or nonstick gauze pad and paper tape.  5. Repeat wound care once a day until wound is completely healed.    It is an old wives tale that a wound heals better when it is exposed to air and allowed to dry out. The wound will heal faster with a better cosmetic result if it is kept moist with ointment and covered with a bandage.  Do not let the wound dry out.    Supplies Needed:                Qtips or gauze pads                Vaseline, Aquaphor, Polysporin Ointment or Bacitracin Ointment (NOT NEOSPORIN)                Bandaids or nonstick gauze pads and paper tape    Wound care kits and brown paper tape are available for purchase at   the pharmacy.    BLEEDIN. Use tightly rolled up gauze or cloth to apply direct pressure over the bandage for 20   minutes.  2. Reapply pressure for an additional 20 minutes if necessary  3. Call the office or go to the nearest emergency room if pressure fails to stop the bleeding.  4. Use additional gauze and tape to maintain pressure once the bleeding has stopped.  5. Begin wound care 24 hours after surgery as directed.                WOUND HEALING    1. One week after surgery a pink / red halo will form around the  outside of the wound.   This is new skin.  2. The center of the wound will appear yellowish white and produce some drainage.  3. The pink halo will slowly migrate in toward the center of the wound until the wound is covered with new shiny pink skin.  4. There will be no more drainage when the wound is completely healed.  5. It will take six months to one year for the redness to fade.  6. The scar may be itchy, tight and sensitive to extreme temperatures for a year after the surgery.  7. Massaging the area several times a day for several minutes after the wound is completely healed will help the scar soften and normalize faster. Begin massage only after healing is complete.    In case of emergency call: Dr Restrepo: [phone]     [facility]: [phone]    [facility]: [phone]

## 2021-07-22 NOTE — PROGRESS NOTES
Surgical Office Location :   [facility]  [address] [place] [address]

## 2021-08-01 PROBLEM — Z86.73 HX OF TRANSIENT ISCHEMIC ATTACK (TIA): Status: ACTIVE | Noted: 2021-08-01

## 2021-08-19 PROBLEM — M54.41 RIGHT-SIDED LOW BACK PAIN WITH RIGHT-SIDED SCIATICA: Status: RESOLVED | Noted: 2021-05-20 | Resolved: 2021-08-19

## 2021-08-19 NOTE — PROGRESS NOTES
Discharge Note    Progress reporting period is from last progress note on   to Jun 8, 2021.    Ricardo failed to follow up and current status is unknown.  Please see information below for last relevant information on current status.  Patient seen for 3 visits.    SUBJECTIVE  Subjective changes noted by patient:  Had epidural last week and has noted no pain since.  .  Current pain level is  .     Previous pain level was  0/10.   Changes in function:  Yes (See Goal flowsheet attached for changes in current functional level)  Adverse reaction to treatment or activity: None    OBJECTIVE  Changes noted in objective findings: Sxs-free today. Reviewed HEP and added HS release, nerve flossing and progression of core routine     ASSESSMENT/PLAN  Diagnosis: LBP, R sciatica   Updated problem list and treatment plan:   Pain - HEP  STG/LTGs have been met or progress has been made towards goals:  Yes, please see goal flowsheet for most current information  Assessment of Progress: current status is unknown.    Last current status: Pt is progressing well   Self Management Plans:  HEP  I have re-evaluated this patient and find that the nature, scope, duration and intensity of the therapy is appropriate for the medical condition of the patient.  Ricardo continues to require the following intervention to meet STG and LTG's:  HEP.    Recommendations:  Discharge with current home program.  Patient to follow up with MD as needed.    Please refer to the daily flowsheet for treatment today, total treatment time and time spent performing 1:1 timed codes.

## 2021-09-04 ENCOUNTER — HEALTH MAINTENANCE LETTER (OUTPATIENT)
Age: 81
End: 2021-09-04

## 2021-09-14 ENCOUNTER — TRANSFERRED RECORDS (OUTPATIENT)
Dept: HEALTH INFORMATION MANAGEMENT | Facility: CLINIC | Age: 81
End: 2021-09-14

## 2021-10-11 ENCOUNTER — OFFICE VISIT (OUTPATIENT)
Dept: INTERNAL MEDICINE | Facility: CLINIC | Age: 81
End: 2021-10-11
Payer: COMMERCIAL

## 2021-10-11 VITALS
WEIGHT: 182.6 LBS | SYSTOLIC BLOOD PRESSURE: 130 MMHG | DIASTOLIC BLOOD PRESSURE: 60 MMHG | HEART RATE: 74 BPM | OXYGEN SATURATION: 99 % | TEMPERATURE: 97.6 F | BODY MASS INDEX: 28.6 KG/M2 | RESPIRATION RATE: 16 BRPM

## 2021-10-11 DIAGNOSIS — H93.13 TINNITUS, BILATERAL: Primary | ICD-10-CM

## 2021-10-11 PROCEDURE — 99213 OFFICE O/P EST LOW 20 MIN: CPT | Performed by: PHYSICIAN ASSISTANT

## 2021-10-11 NOTE — PROGRESS NOTES
"    Assessment & Plan     Tinnitus, bilateral    - Otolaryngology Referral; Future             BMI:   Estimated body mass index is 28.6 kg/m  as calculated from the following:    Height as of 7/20/21: 1.702 m (5' 7\").    Weight as of this encounter: 82.8 kg (182 lb 9.6 oz).       See Patient Instructions    No follow-ups on file.    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      Objective    /60   Pulse 74   Temp 97.6  F (36.4  C) (Tympanic)   Resp 16   Wt 82.8 kg (182 lb 9.6 oz)   SpO2 99%   BMI 28.60 kg/m    Body mass index is 28.6 kg/m .  Physical Exam   GENERAL: healthy, alert and no distress  HENT: normal cephalic/atraumatic, right ear: normal: no effusions, no erythema, normal landmarks and left ear: normal: no effusions, no erythema, normal landmarks  RESP: lungs clear to auscultation - no rales, rhonchi or wheezes  CV: regular rates and rhythm and normal S1 S2, no S3 or S4  SKIN: no suspicious lesions or rashes                "

## 2021-10-25 ENCOUNTER — TELEPHONE (OUTPATIENT)
Dept: INTERNAL MEDICINE | Facility: CLINIC | Age: 81
End: 2021-10-25

## 2021-10-25 NOTE — TELEPHONE ENCOUNTER
Patient Quality Outreach      Summary:    Patient has the following on his problem list/HM:   IVD     ASA: Passed    Last LDL:    Lab Results   Component Value Date    CHOL 140 2021     Lab Results   Component Value Date    HDL 58 2021     Lab Results   Component Value Date    LDL 66 2021     Lab Results   Component Value Date    TRIG 81 2021        Lab Results   Component Value Date    CHOLHDLRATIO 2.5 2015        Is the patient on a Statin? Yes   Is the patient on Aspirin? Yes                  Medications     HMG CoA Reductase Inhibitors     atorvastatin (LIPITOR) 40 MG tablet       Salicylates     aspirin (ASA) 81 MG tablet             Last three blood pressure readings:  BP Readings from Last 3 Encounters:   10/11/21 130/60   21 132/62   21 138/66        Tobacco History:       Tobacco Use      Smoking status: Former Smoker        Quit date: 1968        Years since quittin.8      Smokeless tobacco: Never Used      Hypertension   Last three blood pressure readings:  BP Readings from Last 3 Encounters:   10/11/21 130/60   21 132/62   21 138/66     Blood pressure: Passed    HTN Guidelines:  ? 139/89     Patient is due/failing the following:   Physical  - Due after 10/16/2021  Immunizations  -  Influenza    Type of outreach:    Sent [employer] message.    Questions for provider review:    None                                                                                                                                     Keara Shepherd CMA       Chart routed to Care Team.

## 2021-10-27 ENCOUNTER — TRANSFERRED RECORDS (OUTPATIENT)
Dept: HEALTH INFORMATION MANAGEMENT | Facility: CLINIC | Age: 81
End: 2021-10-27
Payer: COMMERCIAL

## 2021-11-08 ENCOUNTER — OFFICE VISIT (OUTPATIENT)
Dept: INTERNAL MEDICINE | Facility: CLINIC | Age: 81
End: 2021-11-08
Payer: COMMERCIAL

## 2021-11-08 VITALS
DIASTOLIC BLOOD PRESSURE: 68 MMHG | WEIGHT: 178 LBS | HEIGHT: 67 IN | RESPIRATION RATE: 16 BRPM | BODY MASS INDEX: 27.94 KG/M2 | OXYGEN SATURATION: 99 % | TEMPERATURE: 97.6 F | SYSTOLIC BLOOD PRESSURE: 120 MMHG | HEART RATE: 65 BPM

## 2021-11-08 DIAGNOSIS — I10 ESSENTIAL HYPERTENSION: ICD-10-CM

## 2021-11-08 DIAGNOSIS — M70.62 TROCHANTERIC BURSITIS OF LEFT HIP: ICD-10-CM

## 2021-11-08 DIAGNOSIS — M54.16 LUMBAR RADICULOPATHY: ICD-10-CM

## 2021-11-08 DIAGNOSIS — Z00.00 MEDICARE ANNUAL WELLNESS VISIT, SUBSEQUENT: ICD-10-CM

## 2021-11-08 DIAGNOSIS — E78.5 HYPERLIPIDEMIA LDL GOAL <100: ICD-10-CM

## 2021-11-08 DIAGNOSIS — G45.0 VERTEBROBASILAR ARTERY SYNDROME: ICD-10-CM

## 2021-11-08 PROCEDURE — 99397 PER PM REEVAL EST PAT 65+ YR: CPT | Performed by: INTERNAL MEDICINE

## 2021-11-08 PROCEDURE — 99214 OFFICE O/P EST MOD 30 MIN: CPT | Mod: 25 | Performed by: INTERNAL MEDICINE

## 2021-11-08 RX ORDER — AMLODIPINE AND BENAZEPRIL HYDROCHLORIDE 5; 20 MG/1; MG/1
1 CAPSULE ORAL DAILY
Qty: 90 CAPSULE | Refills: 3 | Status: SHIPPED | OUTPATIENT
Start: 2021-11-08 | End: 2022-11-02

## 2021-11-08 RX ORDER — METOPROLOL SUCCINATE 100 MG/1
100 TABLET, EXTENDED RELEASE ORAL DAILY
Qty: 90 TABLET | Refills: 3 | Status: SHIPPED | OUTPATIENT
Start: 2021-11-08 | End: 2022-11-02

## 2021-11-08 RX ORDER — ATORVASTATIN CALCIUM 40 MG/1
40 TABLET, FILM COATED ORAL DAILY
Qty: 90 TABLET | Refills: 3 | Status: SHIPPED | OUTPATIENT
Start: 2021-11-08 | End: 2022-11-02

## 2021-11-08 RX ORDER — CLOPIDOGREL BISULFATE 75 MG/1
TABLET ORAL
Qty: 90 TABLET | Refills: 3 | Status: SHIPPED | OUTPATIENT
Start: 2021-11-08 | End: 2022-11-02

## 2021-11-08 RX ORDER — GABAPENTIN 300 MG/1
300 CAPSULE ORAL AT BEDTIME
Qty: 90 CAPSULE | Refills: 3 | Status: SHIPPED | OUTPATIENT
Start: 2021-11-08 | End: 2022-10-16

## 2021-11-08 SDOH — HEALTH STABILITY: PHYSICAL HEALTH: ON AVERAGE, HOW MANY MINUTES DO YOU ENGAGE IN EXERCISE AT THIS LEVEL?: 40 MIN

## 2021-11-08 SDOH — ECONOMIC STABILITY: INCOME INSECURITY: HOW HARD IS IT FOR YOU TO PAY FOR THE VERY BASICS LIKE FOOD, HOUSING, MEDICAL CARE, AND HEATING?: NOT HARD AT ALL

## 2021-11-08 SDOH — ECONOMIC STABILITY: FOOD INSECURITY: WITHIN THE PAST 12 MONTHS, THE FOOD YOU BOUGHT JUST DIDN'T LAST AND YOU DIDN'T HAVE MONEY TO GET MORE.: NEVER TRUE

## 2021-11-08 SDOH — ECONOMIC STABILITY: TRANSPORTATION INSECURITY
IN THE PAST 12 MONTHS, HAS LACK OF TRANSPORTATION KEPT YOU FROM MEETINGS, WORK, OR FROM GETTING THINGS NEEDED FOR DAILY LIVING?: NO

## 2021-11-08 SDOH — ECONOMIC STABILITY: TRANSPORTATION INSECURITY
IN THE PAST 12 MONTHS, HAS THE LACK OF TRANSPORTATION KEPT YOU FROM MEDICAL APPOINTMENTS OR FROM GETTING MEDICATIONS?: NO

## 2021-11-08 SDOH — HEALTH STABILITY: PHYSICAL HEALTH: ON AVERAGE, HOW MANY DAYS PER WEEK DO YOU ENGAGE IN MODERATE TO STRENUOUS EXERCISE (LIKE A BRISK WALK)?: 6 DAYS

## 2021-11-08 SDOH — ECONOMIC STABILITY: FOOD INSECURITY: WITHIN THE PAST 12 MONTHS, YOU WORRIED THAT YOUR FOOD WOULD RUN OUT BEFORE YOU GOT MONEY TO BUY MORE.: NEVER TRUE

## 2021-11-08 SDOH — ECONOMIC STABILITY: INCOME INSECURITY: IN THE LAST 12 MONTHS, WAS THERE A TIME WHEN YOU WERE NOT ABLE TO PAY THE MORTGAGE OR RENT ON TIME?: NO

## 2021-11-08 ASSESSMENT — SOCIAL DETERMINANTS OF HEALTH (SDOH)
HOW OFTEN DO YOU ATTEND CHURCH OR RELIGIOUS SERVICES?: NEVER
HOW OFTEN DO YOU GET TOGETHER WITH FRIENDS OR RELATIVES?: ONCE A WEEK
DO YOU BELONG TO ANY CLUBS OR ORGANIZATIONS SUCH AS CHURCH GROUPS UNIONS, FRATERNAL OR ATHLETIC GROUPS, OR SCHOOL GROUPS?: NO
IN A TYPICAL WEEK, HOW MANY TIMES DO YOU TALK ON THE PHONE WITH FAMILY, FRIENDS, OR NEIGHBORS?: ONCE A WEEK

## 2021-11-08 ASSESSMENT — ACTIVITIES OF DAILY LIVING (ADL): CURRENT_FUNCTION: NO ASSISTANCE NEEDED

## 2021-11-08 ASSESSMENT — MIFFLIN-ST. JEOR: SCORE: 1466.03

## 2021-11-08 ASSESSMENT — LIFESTYLE VARIABLES
HOW OFTEN DO YOU HAVE SIX OR MORE DRINKS ON ONE OCCASION: PATIENT DECLINED
HOW MANY STANDARD DRINKS CONTAINING ALCOHOL DO YOU HAVE ON A TYPICAL DAY: PATIENT DECLINED
HOW OFTEN DO YOU HAVE A DRINK CONTAINING ALCOHOL: NEVER

## 2021-11-08 NOTE — PATIENT INSTRUCTIONS
May try stopping gabapentin to see if nerve pain from back into the legs remain absent.  If so, then can try stopping gabapentin.  If symptoms return, then continue gabapentin therapy at bedtime.  Gabapentin prescription put on file at pharmacy  Continue other medications.  Those medications have been refilled at pharmacy  Call  [phone] or use [employer] to schedule a future lab appointment  fasting in 6 months.   For fasting labs, please refrain from eating for 8 hours or more.   Drink 2 glasses of water before your lab appointment. It is fine to take your  oral medications on the morning of the lab test as usual  Schedule a follow up appointment with me in clinic a few days after these future labs are drawn to review results and other medical issues as necessary  Reduce calorie/carbohydrate (sugar, bread, potato, pasta, rice, alcohol etc)  intake in diet.  Increase color on your plate with fruits and vegetables. Continue frequency of walking or other aerobic exercise as able (goal is daily)   Stretching of left trochanter bursa and  ITB  in the AM (left foot on right knee and then bring the left knee inward to the right). Ice later part of the day to the bursa area as needed. If worsened, then see ortho for possible cortisone injeciton  Pt was informed regarding extra E&M billing for management of new or established medical issues not related to today's wellness visit

## 2021-11-08 NOTE — PROGRESS NOTES
"SUBJECTIVE:   Ricardo Neely is a 81 year old male who presents for Preventive Visit and follow-up regarding hypertension, hyperlipidemia, vascular disease.      Patient has been advised of split billing requirements and indicates understanding: Yes   Are you in the first 12 months of your Medicare coverage?  No    Healthy Habits:     In general, how would you rate your overall health?  Excellent    Frequency of exercise:  6-7 days/week    Duration of exercise:  45-60 minutes    Do you usually eat at least 4 servings of fruit and vegetables a day, include whole grains    & fiber and avoid regularly eating high fat or \"junk\" foods?  Yes    Taking medications regularly:  Yes    Medication side effects:  None    Ability to successfully perform activities of daily living:  No assistance needed    Home Safety:  No safety concerns identified    Hearing Impairment:  No hearing concerns    In the past 6 months, have you been bothered by leaking of urine?  No    In general, how would you rate your overall mental or emotional health?  Excellent      PHQ-2 Total Score: 0    Additional concerns today:  No    Do you feel safe in your environment? Yes    Have you ever done Advance Care Planning? (For example, a Health Directive, POLST, or a discussion with a medical provider or your loved ones about your wishes): Yes, advance care planning is on file.       Fall risk  Fallen 2 or more times in the past year?: No  Any fall with injury in the past year?: No    Cognitive Screening   1) Repeat 3 items (Leader, Season, Table)    2) Clock draw: NORMAL  3) 3 item recall: Recalls 3 objects  Results: NORMAL clock, 3 items recalled: COGNITIVE IMPAIRMENT LESS LIKELY    Mini-CogTM Copyright KALI Velásquez. Licensed by the author for use in [facility]; reprinted with permission (leandro@.[facility]). All rights reserved.      Do you have sleep apnea, excessive snoring or daytime drowsiness?: no    Reviewed and updated as needed this visit by " clinical staff   Allergies  Meds             Reviewed and updated as needed this visit by Provider               Social History     Tobacco Use     Smoking status: Former Smoker     Quit date: 1968     Years since quittin.8     Smokeless tobacco: Never Used   Substance Use Topics     Alcohol use: No     Alcohol/week: 0.0 standard drinks     If you drink alcohol do you typically have >3 drinks per day or >7 drinks per week? No    Alcohol Use 10/16/2020   Prescreen: >3 drinks/day or >7 drinks/week? -   Prescreen: >3 drinks/day or >7 drinks/week? No         Current providers sharing in care for this patient include:   Patient Care Team:  Familia Cook MD as PCP - General (Internal Medicine)  Familia Cook MD as Assigned PCP  Flores Portillo NP as Assigned Neuroscience Provider  Raymond Restrepo MD as Assigned Surgical Provider    The following health maintenance items are reviewed in Epic and correct as of today:  Health Maintenance Due   Topic Date Due     COVID-19 Vaccine (3 - Booster for Pfizer series) 2021     INFLUENZA VACCINE (1) 2021     FALL RISK ASSESSMENT  10/16/2021     MEDICARE ANNUAL WELLNESS VISIT  10/16/2021     Labs reviewed in EPIC          Review of Systems  CONSTITUTIONAL: NEGATIVE for fever, chills, change in weight past 1 year overall  INTEGUMENTARY/SKIN: NEGATIVE for worrisome rashes, moles or lesions  EYES: NEGATIVE for vision changes or irritation. Has glasses. Eye exam 2 weeks ago  ENT/MOUTH: NEGATIVE for ear pain, mouth and throat problems. Saw ENT last week. Told candidate for hearing aids but not erady to use yet and will follow-up again 1 year later per pt report  RESP: NEGATIVE for significant cough or SOB  CV: NEGATIVE for chest pain, palpitations or peripheral edema  GI: NEGATIVE for nausea, abdominal pain, heartburn, or change in bowel habits  : NEGATIVE for frequency, dysuria, or hematuria  MUSCULOSKELETAL: NEGATIVE for significant arthralgias  "or myalgia that limit activity overall. Some mild chronic LBP in AM. No radiation to LEs. Better with stretching in AM. Rare Tylenol. Occ left trochanter bursa area soreness. No groin pain,. Walking 4 miles/day  NEURO: NEGATIVE for weakness, dizziness or paresthesias  ENDOCRINE: NEGATIVE for temperature intolerance. On statin and lipids UTD and at goal  HEME: NEGATIVE for bleeding problems overall. Occ bruise with Plavix  PSYCHIATRIC: NEGATIVE for changes in mood or affect    OBJECTIVE:   /68   Pulse 65   Temp 97.6  F (36.4  C) (Temporal)   Resp 16   Ht 1.702 m (5' 7\")   Wt 80.7 kg (178 lb)   SpO2 99%   BMI 27.88 kg/m   Estimated body mass index is 27.88 kg/m  as calculated from the following:    Height as of this encounter: 1.702 m (5' 7\").    Weight as of this encounter: 80.7 kg (178 lb).  Physical Exam  General appearance - healthy, alert, no distress  Skin - No rashes or lesions. Single skin trag left low back  Head - normocephalic, atraumatic  Eyes - JOSHUA, EOMI, fundi exam with nondilated pupils negative.  Ears - External ears normal. Canals clear. TM's normal.  Nose/Sinuses - Nares normal. Septum midline. Mucosa normal. No drainage or sinus tenderness.  Oropharynx - No erythema, no adenopathy, no exudates. Has dentures  Neck - Supple without adenopathy or thyromegaly. No bruits.  Lungs - Clear to auscultation without wheezes/rhonchi.  Heart - Regular rate and rhythm without murmurs, clicks, or gallops.  Nodes - No supraclavicular, axillary, or inguinal adenopathy palpable.  Abdomen - Abdomen soft, non-tender. BS normal. No masses or hepatosplenomegaly palpable. No bruits.  Extremities -No cyanosis, clubbing or edema.    Musculoskeletal - Spine ROM normal. Muscular strength intact.  Mild tenderness to palpation left trochanter bursa. No tenderness to I/E  Rotation or F/E of the l;eft hip joint  Peripheral pulses - radial=4/4, femoral=4/4, posterior tibial=4/4, dorsalis pedis=4/4,  Neuro - Gait " normal. Reflexes normal and symmetric. Sensation grossly WNL.  Genital - Normal-appearing male external genitalia. No scrotal masses or inguinal hernia palpable.  Uncircumcised  Rectal - Guaic negative stool. Normal tone. Prostate 1 plus  in size to palpation. No rectal masses or prostate nodularity palpable      ASSESSMENT / PLAN:   1. Medicare annual wellness visit, subsequent  Healthcare maintenance reviewed and up-to-date for age    2. Hyperlipidemia LDL goal <100  Previously controlled.  Continue current medication.  Repeat lab 6 months  - Lipid panel reflex to direct LDL Fasting; Future  - Comprehensive metabolic panel; Future  - atorvastatin (LIPITOR) 40 MG tablet; Take 1 tablet (40 mg) by mouth daily  Dispense: 90 tablet; Refill: 3    3. Trochanteric bursitis of left hip  No tenderness at the hip joint.  Patient tender over the left trochanteric bursa.  Discussed stretching exercises and icing.  Cortisone injection in future if not improving.  Patient still able to walk 4 miles per day currently    4. Essential hypertension  Controlled.  Continue current medication  - amLODIPine-benazepril (LOTREL) 5-20 MG capsule; Take 1 capsule by mouth daily  Dispense: 90 capsule; Refill: 3  - metoprolol succinate ER (TOPROL-XL) 100 MG 24 hr tablet; Take 1 tablet (100 mg) by mouth daily  Dispense: 90 tablet; Refill: 3    5. Vertebrobasilar artery syndrome  Denies any vertigo/dizziness issues.  History of mild to moderate vertebral stenosis on MRA 2008.  Continue medical management with Plavix/aspirin and blood pressure lipid control as above  - clopidogrel (PLAVIX) 75 MG tablet; TAKE 1 TABLET(75 MG) BY MOUTH DAILY  Dispense: 90 tablet; Refill: 3    6. Lumbar radiculopathy  Patient not having radicular symptoms currently.  On low-dose gabapentin at night only.  Patient may try stopping the medication to see if symptoms remain absent.  If so, can stop taking gabapentin.  Otherwise continue medication if symptoms recur off  "of medication.  Medication placed on file at pharmacy  - gabapentin (NEURONTIN) 300 MG capsule; Take 1 capsule (300 mg) by mouth At Bedtime  Dispense: 90 capsule; Refill: 3      Patient has been advised of split billing requirements and indicates understanding: Yes       COUNSELING:  Reviewed preventive health counseling, as reflected in patient instructions    Estimated body mass index is 28.6 kg/m  as calculated from the following:    Height as of 7/20/21: 1.702 m (5' 7\").    Weight as of 10/11/21: 82.8 kg (182 lb 9.6 oz).        He reports that he quit smoking about 53 years ago. He has never used smokeless tobacco.      Appropriate preventive services were discussed with this patient, including applicable screening as appropriate for cardiovascular disease, diabetes, osteopenia/osteoporosis, and glaucoma.  As appropriate for age/gender, discussed screening for colorectal cancer, prostate cancer, breast cancer, and cervical cancer. Checklist reviewing preventive services available has been given to the patient.    Reviewed patients plan of care and provided an AVS. The Basic Care Plan (routine screening as documented in Health Maintenance) for Ricardo meets the Care Plan requirement. This Care Plan has been established and reviewed with the Patient.    Counseling Resources:  ATP IV Guidelines  Pooled Cohorts Equation Calculator  Breast Cancer Risk Calculator  Breast Cancer: Medication to Reduce Risk  FRAX Risk Assessment  ICSI Preventive Guidelines  Dietary Guidelines for Americans, 2010  USDA's MyPlate  ASA Prophylaxis  Lung CA Screening      PLAN:  May try stopping gabapentin to see if nerve pain from back into the legs remain absent.  If so, then can try stopping gabapentin.  If symptoms return, then continue gabapentin therapy at bedtime.  Gabapentin prescription put on file at pharmacy  Continue other medications.  Those medications have been refilled at pharmacy  Call  [phone] or use [facility] to schedule " a future lab appointment  fasting in 6 months.   For fasting labs, please refrain from eating for 8 hours or more.   Drink 2 glasses of water before your lab appointment. It is fine to take your  oral medications on the morning of the lab test as usual  Schedule a follow up appointment with me in clinic a few days after these future labs are drawn to review results and other medical issues as necessary  Reduce calorie/carbohydrate (sugar, bread, potato, pasta, rice, alcohol etc)  intake in diet.  Increase color on your plate with fruits and vegetables. Continue frequency of walking or other aerobic exercise as able (goal is daily)   Stretching of left trochanter bursa and  ITB  in the AM (left foot on right knee and then bring the left knee inward to the right). Ice later part of the day to the bursa area as needed. If worsened, then see ortho for possible cortisone injeciton  Pt was informed regarding extra E&M billing for management of new or established medical issues not related to today's wellness visit    Familia Cook MD  [facility]

## 2022-05-13 ENCOUNTER — LAB (OUTPATIENT)
Dept: LAB | Facility: CLINIC | Age: 82
End: 2022-05-13
Payer: COMMERCIAL

## 2022-05-13 DIAGNOSIS — E78.5 HYPERLIPIDEMIA LDL GOAL <100: Primary | ICD-10-CM

## 2022-05-13 LAB
ALBUMIN SERPL-MCNC: 3.9 G/DL (ref 3.4–5)
ALP SERPL-CCNC: 96 U/L (ref 40–150)
ALT SERPL W P-5'-P-CCNC: 24 U/L (ref 0–70)
ANION GAP SERPL CALCULATED.3IONS-SCNC: 7 MMOL/L (ref 3–14)
AST SERPL W P-5'-P-CCNC: 11 U/L (ref 0–45)
BILIRUB SERPL-MCNC: 0.7 MG/DL (ref 0.2–1.3)
BUN SERPL-MCNC: 14 MG/DL (ref 7–30)
CALCIUM SERPL-MCNC: 9 MG/DL (ref 8.5–10.1)
CHLORIDE BLD-SCNC: 100 MMOL/L (ref 94–109)
CHOLEST SERPL-MCNC: 127 MG/DL
CO2 SERPL-SCNC: 27 MMOL/L (ref 20–32)
CREAT SERPL-MCNC: 0.73 MG/DL (ref 0.66–1.25)
FASTING STATUS PATIENT QL REPORTED: YES
GFR SERPL CREATININE-BSD FRML MDRD: >90 ML/MIN/1.73M2
GLUCOSE BLD-MCNC: 99 MG/DL (ref 70–99)
HDLC SERPL-MCNC: 60 MG/DL
LDLC SERPL CALC-MCNC: 50 MG/DL
NONHDLC SERPL-MCNC: 67 MG/DL
POTASSIUM BLD-SCNC: 4 MMOL/L (ref 3.4–5.3)
PROT SERPL-MCNC: 7.1 G/DL (ref 6.8–8.8)
SODIUM SERPL-SCNC: 134 MMOL/L (ref 133–144)
TRIGL SERPL-MCNC: 87 MG/DL

## 2022-05-13 PROCEDURE — 80061 LIPID PANEL: CPT

## 2022-05-13 PROCEDURE — 36415 COLL VENOUS BLD VENIPUNCTURE: CPT

## 2022-05-13 PROCEDURE — 80053 COMPREHEN METABOLIC PANEL: CPT

## 2022-07-26 ENCOUNTER — TRANSFERRED RECORDS (OUTPATIENT)
Dept: INTERNAL MEDICINE | Facility: CLINIC | Age: 82
End: 2022-07-26

## 2022-08-01 ENCOUNTER — OFFICE VISIT (OUTPATIENT)
Dept: URGENT CARE | Facility: URGENT CARE | Age: 82
End: 2022-08-01
Payer: COMMERCIAL

## 2022-08-01 ENCOUNTER — TELEPHONE (OUTPATIENT)
Dept: NURSING | Facility: CLINIC | Age: 82
End: 2022-08-01

## 2022-08-01 VITALS
SYSTOLIC BLOOD PRESSURE: 130 MMHG | RESPIRATION RATE: 16 BRPM | HEART RATE: 75 BPM | TEMPERATURE: 97.8 F | DIASTOLIC BLOOD PRESSURE: 62 MMHG | OXYGEN SATURATION: 99 %

## 2022-08-01 DIAGNOSIS — B37.0 THRUSH: ICD-10-CM

## 2022-08-01 DIAGNOSIS — R07.0 THROAT PAIN: Primary | ICD-10-CM

## 2022-08-01 LAB
DEPRECATED S PYO AG THROAT QL EIA: NEGATIVE
GROUP A STREP BY PCR: NOT DETECTED

## 2022-08-01 PROCEDURE — 99213 OFFICE O/P EST LOW 20 MIN: CPT | Performed by: FAMILY MEDICINE

## 2022-08-01 PROCEDURE — 87651 STREP A DNA AMP PROBE: CPT | Performed by: FAMILY MEDICINE

## 2022-08-01 RX ORDER — CLOTRIMAZOLE 10 MG/1
10 LOZENGE ORAL
Qty: 50 LOZENGE | Refills: 0 | Status: SHIPPED | OUTPATIENT
Start: 2022-08-01 | End: 2022-08-11

## 2022-08-01 NOTE — TELEPHONE ENCOUNTER
Pt called, stating that [facility] does not have his prescription for mycelex. His chart shows the prescription was sent electronically at 1400. Advised pt that prescriptions were slowly going through and to wait about an hour before calling and checking again. Pt advised ok.     ALEXANDER DUNLAP RN

## 2022-08-01 NOTE — PROGRESS NOTES
"SUBJECTIVE:Ricardo Neely is a 82 year old male with a chief complaint of sore throat.    Onset of symptoms was 3 day(s) ago.    Course of illness: still present and worsening.    Severity moderate  Predisposing factors include noted after steroid shot.    Past Medical History:   Diagnosis Date     Anxiety 2011     Basal cell carcinoma      Benign Prostatic Hypertrophy      CEREBROVASC DISEASE, small vessel      Essential hypertension, benign      GERD      HIATAL HERNIA      HYPERPLASTIC POLYPS COLON , 3/06     Impaired fasting glucose      Low Back Pain      Obesity, unspecified      Other and unspecified hyperlipidemia      Personal history of urinary calculi      Pneumonia, organism unspecified(486)      Squamous cell carcinoma of skin, unspecified      TRANSIENT CEREBRAL ISCHEMIA      Allergies   Allergen Reactions     Meclizine Other (See Comments)     Over sedation, concentration problem     Tramadol Hcl Other (See Comments)     Pt feels very drugged, \"cloudy\" if used more than one day. Nausea also     Cardura [Doxazosin Mesylate] Other (See Comments)     fainted     Hydrochlorothiazide Other (See Comments)      lightheadedness     Naproxen Nausea     nausea     Social History     Tobacco Use     Smoking status: Former Smoker     Quit date: 1968     Years since quittin.6     Smokeless tobacco: Never Used   Substance Use Topics     Alcohol use: No     Alcohol/week: 0.0 standard drinks       ROS:  SKIN: no rash  GI: no vomiting    OBJECTIVE:   /62   Pulse 75   Temp 97.8  F (36.6  C) (Tympanic)   Resp 16   SpO2 99% GENERAL APPEARANCE: healthy, alert and no distress  EYES: EOMI,  PERRL, conjunctiva clear  HENT: TM's normal bilaterally and white patches diffuse oralcavity  RESP: lungs clear to auscultation - no rales, rhonchi or wheezes  SKIN: no suspicious lesions or rashes    Rapid Strep test is negative; await throat culture results.      ICD-10-CM    1. Throat " pain  R07.0 Streptococcus A Rapid Screen w/Reflex to PCR     Group A Streptococcus PCR Throat Swab     clotrimazole (MYCELEX) 10 MG lozenge   2. Thrush  B37.0 clotrimazole (MYCELEX) 10 MG lozenge       Symptomatic treat with gargles, lozenges, and OTC analgesic as needed.  Follow-up with primary clinic if not improving.

## 2022-08-26 ENCOUNTER — TRANSFERRED RECORDS (OUTPATIENT)
Dept: HEALTH INFORMATION MANAGEMENT | Facility: CLINIC | Age: 82
End: 2022-08-26

## 2022-10-16 ENCOUNTER — APPOINTMENT (OUTPATIENT)
Dept: CT IMAGING | Facility: CLINIC | Age: 82
End: 2022-10-16
Attending: EMERGENCY MEDICINE
Payer: COMMERCIAL

## 2022-10-16 ENCOUNTER — APPOINTMENT (OUTPATIENT)
Dept: GENERAL RADIOLOGY | Facility: CLINIC | Age: 82
End: 2022-10-16
Attending: EMERGENCY MEDICINE
Payer: COMMERCIAL

## 2022-10-16 ENCOUNTER — HOSPITAL ENCOUNTER (OUTPATIENT)
Facility: CLINIC | Age: 82
Setting detail: OBSERVATION
Discharge: HOME | End: 2022-10-17
Attending: EMERGENCY MEDICINE | Admitting: EMERGENCY MEDICINE
Payer: COMMERCIAL

## 2022-10-16 DIAGNOSIS — R00.0 TACHYCARDIA: ICD-10-CM

## 2022-10-16 DIAGNOSIS — R55 NEAR SYNCOPE: ICD-10-CM

## 2022-10-16 LAB
ALBUMIN SERPL-MCNC: 3.5 G/DL (ref 3.4–5)
ALBUMIN UR-MCNC: NEGATIVE MG/DL
ALP SERPL-CCNC: 86 U/L (ref 40–150)
ALT SERPL W P-5'-P-CCNC: 27 U/L (ref 0–70)
ANION GAP SERPL CALCULATED.3IONS-SCNC: 7 MMOL/L (ref 3–14)
APPEARANCE UR: CLEAR
AST SERPL W P-5'-P-CCNC: 22 U/L (ref 0–45)
ATRIAL RATE - MUSE: 128 BPM
ATRIAL RATE - MUSE: 83 BPM
BASOPHILS # BLD AUTO: 0.1 10E3/UL (ref 0–0.2)
BASOPHILS NFR BLD AUTO: 1 %
BILIRUB SERPL-MCNC: 0.5 MG/DL (ref 0.2–1.3)
BILIRUB UR QL STRIP: NEGATIVE
BUN SERPL-MCNC: 14 MG/DL (ref 7–30)
CALCIUM SERPL-MCNC: 8.9 MG/DL (ref 8.5–10.1)
CHLORIDE BLD-SCNC: 103 MMOL/L (ref 94–109)
CO2 SERPL-SCNC: 25 MMOL/L (ref 20–32)
COLOR UR AUTO: NORMAL
CREAT SERPL-MCNC: 0.64 MG/DL (ref 0.66–1.25)
D DIMER PPP FEU-MCNC: 0.9 UG/ML FEU (ref 0–0.5)
DIASTOLIC BLOOD PRESSURE - MUSE: NORMAL MMHG
DIASTOLIC BLOOD PRESSURE - MUSE: NORMAL MMHG
EOSINOPHIL # BLD AUTO: 0.1 10E3/UL (ref 0–0.7)
EOSINOPHIL NFR BLD AUTO: 1 %
ERYTHROCYTE [DISTWIDTH] IN BLOOD BY AUTOMATED COUNT: 13.3 % (ref 10–15)
GFR SERPL CREATININE-BSD FRML MDRD: >90 ML/MIN/1.73M2
GLUCOSE BLD-MCNC: 146 MG/DL (ref 70–99)
GLUCOSE UR STRIP-MCNC: NEGATIVE MG/DL
HCT VFR BLD AUTO: 41 % (ref 40–53)
HGB BLD-MCNC: 14.4 G/DL (ref 13.3–17.7)
HGB UR QL STRIP: NEGATIVE
HOLD SPECIMEN: NORMAL
IMM GRANULOCYTES # BLD: 0 10E3/UL
IMM GRANULOCYTES NFR BLD: 0 %
INTERPRETATION ECG - MUSE: NORMAL
INTERPRETATION ECG - MUSE: NORMAL
KETONES UR STRIP-MCNC: NEGATIVE MG/DL
LEUKOCYTE ESTERASE UR QL STRIP: NEGATIVE
LYMPHOCYTES # BLD AUTO: 3.1 10E3/UL (ref 0.8–5.3)
LYMPHOCYTES NFR BLD AUTO: 34 %
MCH RBC QN AUTO: 29.9 PG (ref 26.5–33)
MCHC RBC AUTO-ENTMCNC: 35.1 G/DL (ref 31.5–36.5)
MCV RBC AUTO: 85 FL (ref 78–100)
MONOCYTES # BLD AUTO: 1 10E3/UL (ref 0–1.3)
MONOCYTES NFR BLD AUTO: 12 %
NEUTROPHILS # BLD AUTO: 4.7 10E3/UL (ref 1.6–8.3)
NEUTROPHILS NFR BLD AUTO: 52 %
NITRATE UR QL: NEGATIVE
NRBC # BLD AUTO: 0 10E3/UL
NRBC BLD AUTO-RTO: 0 /100
NT-PROBNP SERPL-MCNC: 84 PG/ML (ref 0–1800)
P AXIS - MUSE: 35 DEGREES
P AXIS - MUSE: 72 DEGREES
PH UR STRIP: 5.5 [PH] (ref 5–7)
PLATELET # BLD AUTO: 248 10E3/UL (ref 150–450)
POTASSIUM BLD-SCNC: 3.4 MMOL/L (ref 3.4–5.3)
PR INTERVAL - MUSE: 130 MS
PR INTERVAL - MUSE: 184 MS
PROT SERPL-MCNC: 7.1 G/DL (ref 6.8–8.8)
QRS DURATION - MUSE: 140 MS
QRS DURATION - MUSE: 154 MS
QT - MUSE: 338 MS
QT - MUSE: 380 MS
QTC - MUSE: 446 MS
QTC - MUSE: 493 MS
R AXIS - MUSE: -68 DEGREES
R AXIS - MUSE: -87 DEGREES
RBC # BLD AUTO: 4.81 10E6/UL (ref 4.4–5.9)
RBC URINE: 1 /HPF
SARS-COV-2 RNA RESP QL NAA+PROBE: NEGATIVE
SODIUM SERPL-SCNC: 135 MMOL/L (ref 133–144)
SP GR UR STRIP: 1.01 (ref 1–1.03)
SYSTOLIC BLOOD PRESSURE - MUSE: NORMAL MMHG
SYSTOLIC BLOOD PRESSURE - MUSE: NORMAL MMHG
T AXIS - MUSE: 65 DEGREES
T AXIS - MUSE: 73 DEGREES
TROPONIN I SERPL HS-MCNC: 26 NG/L
TROPONIN I SERPL HS-MCNC: 33 NG/L
TSH SERPL DL<=0.005 MIU/L-ACNC: 0.58 MU/L (ref 0.4–4)
UROBILINOGEN UR STRIP-MCNC: NORMAL MG/DL
VENTRICULAR RATE- MUSE: 128 BPM
VENTRICULAR RATE- MUSE: 83 BPM
WBC # BLD AUTO: 9 10E3/UL (ref 4–11)
WBC URINE: <1 /HPF

## 2022-10-16 PROCEDURE — 71275 CT ANGIOGRAPHY CHEST: CPT

## 2022-10-16 PROCEDURE — 84484 ASSAY OF TROPONIN QUANT: CPT | Performed by: EMERGENCY MEDICINE

## 2022-10-16 PROCEDURE — 250N000009 HC RX 250: Performed by: EMERGENCY MEDICINE

## 2022-10-16 PROCEDURE — 80053 COMPREHEN METABOLIC PANEL: CPT | Performed by: EMERGENCY MEDICINE

## 2022-10-16 PROCEDURE — 250N000011 HC RX IP 250 OP 636: Performed by: EMERGENCY MEDICINE

## 2022-10-16 PROCEDURE — 99285 EMERGENCY DEPT VISIT HI MDM: CPT | Mod: 25

## 2022-10-16 PROCEDURE — 250N000013 HC RX MED GY IP 250 OP 250 PS 637: Performed by: INTERNAL MEDICINE

## 2022-10-16 PROCEDURE — 96361 HYDRATE IV INFUSION ADD-ON: CPT

## 2022-10-16 PROCEDURE — 93005 ELECTROCARDIOGRAM TRACING: CPT

## 2022-10-16 PROCEDURE — 36415 COLL VENOUS BLD VENIPUNCTURE: CPT | Performed by: EMERGENCY MEDICINE

## 2022-10-16 PROCEDURE — 85025 COMPLETE CBC W/AUTO DIFF WBC: CPT | Performed by: EMERGENCY MEDICINE

## 2022-10-16 PROCEDURE — U0005 INFEC AGEN DETEC AMPLI PROBE: HCPCS | Performed by: EMERGENCY MEDICINE

## 2022-10-16 PROCEDURE — 85379 FIBRIN DEGRADATION QUANT: CPT | Performed by: EMERGENCY MEDICINE

## 2022-10-16 PROCEDURE — 84484 ASSAY OF TROPONIN QUANT: CPT | Performed by: INTERNAL MEDICINE

## 2022-10-16 PROCEDURE — C9803 HOPD COVID-19 SPEC COLLECT: HCPCS

## 2022-10-16 PROCEDURE — 258N000003 HC RX IP 258 OP 636: Performed by: INTERNAL MEDICINE

## 2022-10-16 PROCEDURE — 83880 ASSAY OF NATRIURETIC PEPTIDE: CPT | Performed by: EMERGENCY MEDICINE

## 2022-10-16 PROCEDURE — 99219 PR INITIAL OBSERVATION CARE,LEVEL II: CPT | Performed by: INTERNAL MEDICINE

## 2022-10-16 PROCEDURE — G0378 HOSPITAL OBSERVATION PER HR: HCPCS

## 2022-10-16 PROCEDURE — 96374 THER/PROPH/DIAG INJ IV PUSH: CPT | Mod: 59

## 2022-10-16 PROCEDURE — 71045 X-RAY EXAM CHEST 1 VIEW: CPT

## 2022-10-16 PROCEDURE — 84443 ASSAY THYROID STIM HORMONE: CPT | Performed by: EMERGENCY MEDICINE

## 2022-10-16 PROCEDURE — 81001 URINALYSIS AUTO W/SCOPE: CPT | Performed by: EMERGENCY MEDICINE

## 2022-10-16 PROCEDURE — 36415 COLL VENOUS BLD VENIPUNCTURE: CPT | Performed by: INTERNAL MEDICINE

## 2022-10-16 RX ORDER — SODIUM CHLORIDE 9 MG/ML
INJECTION, SOLUTION INTRAVENOUS CONTINUOUS
Status: ACTIVE | OUTPATIENT
Start: 2022-10-16 | End: 2022-10-17

## 2022-10-16 RX ORDER — METOPROLOL TARTRATE 1 MG/ML
5 INJECTION, SOLUTION INTRAVENOUS ONCE
Status: COMPLETED | OUTPATIENT
Start: 2022-10-16 | End: 2022-10-16

## 2022-10-16 RX ORDER — LISINOPRIL 20 MG/1
20 TABLET ORAL DAILY
Status: DISCONTINUED | OUTPATIENT
Start: 2022-10-17 | End: 2022-10-17 | Stop reason: HOSPADM

## 2022-10-16 RX ORDER — IOPAMIDOL 755 MG/ML
67 INJECTION, SOLUTION INTRAVASCULAR ONCE
Status: COMPLETED | OUTPATIENT
Start: 2022-10-16 | End: 2022-10-16

## 2022-10-16 RX ORDER — ACETAMINOPHEN 650 MG/1
650 SUPPOSITORY RECTAL EVERY 6 HOURS PRN
Status: DISCONTINUED | OUTPATIENT
Start: 2022-10-16 | End: 2022-10-17 | Stop reason: HOSPADM

## 2022-10-16 RX ORDER — ACETAMINOPHEN 325 MG/1
650 TABLET ORAL EVERY 6 HOURS PRN
Status: DISCONTINUED | OUTPATIENT
Start: 2022-10-16 | End: 2022-10-17 | Stop reason: HOSPADM

## 2022-10-16 RX ORDER — CLOPIDOGREL BISULFATE 75 MG/1
75 TABLET ORAL EVERY EVENING
Status: DISCONTINUED | OUTPATIENT
Start: 2022-10-16 | End: 2022-10-17 | Stop reason: HOSPADM

## 2022-10-16 RX ORDER — ASPIRIN 81 MG/1
81 TABLET ORAL AT BEDTIME
Status: DISCONTINUED | OUTPATIENT
Start: 2022-10-17 | End: 2022-10-17 | Stop reason: HOSPADM

## 2022-10-16 RX ORDER — AMOXICILLIN 250 MG
2 CAPSULE ORAL 2 TIMES DAILY PRN
Status: DISCONTINUED | OUTPATIENT
Start: 2022-10-16 | End: 2022-10-17 | Stop reason: HOSPADM

## 2022-10-16 RX ORDER — ONDANSETRON 2 MG/ML
4 INJECTION INTRAMUSCULAR; INTRAVENOUS EVERY 6 HOURS PRN
Status: DISCONTINUED | OUTPATIENT
Start: 2022-10-16 | End: 2022-10-17 | Stop reason: HOSPADM

## 2022-10-16 RX ORDER — AMLODIPINE BESYLATE 5 MG/1
5 TABLET ORAL DAILY
Status: DISCONTINUED | OUTPATIENT
Start: 2022-10-17 | End: 2022-10-17 | Stop reason: HOSPADM

## 2022-10-16 RX ORDER — AMOXICILLIN 250 MG
1 CAPSULE ORAL 2 TIMES DAILY PRN
Status: DISCONTINUED | OUTPATIENT
Start: 2022-10-16 | End: 2022-10-17 | Stop reason: HOSPADM

## 2022-10-16 RX ORDER — METOPROLOL SUCCINATE 100 MG/1
100 TABLET, EXTENDED RELEASE ORAL AT BEDTIME
Status: DISCONTINUED | OUTPATIENT
Start: 2022-10-16 | End: 2022-10-17 | Stop reason: HOSPADM

## 2022-10-16 RX ORDER — ONDANSETRON 4 MG/1
4 TABLET, ORALLY DISINTEGRATING ORAL EVERY 6 HOURS PRN
Status: DISCONTINUED | OUTPATIENT
Start: 2022-10-16 | End: 2022-10-17 | Stop reason: HOSPADM

## 2022-10-16 RX ADMIN — IOPAMIDOL 67 ML: 755 INJECTION, SOLUTION INTRAVENOUS at 14:58

## 2022-10-16 RX ADMIN — CLOPIDOGREL BISULFATE 75 MG: 75 TABLET ORAL at 20:12

## 2022-10-16 RX ADMIN — SODIUM CHLORIDE 92 ML: 900 INJECTION INTRAVENOUS at 14:58

## 2022-10-16 RX ADMIN — METOPROLOL TARTRATE 5 MG: 5 INJECTION INTRAVENOUS at 13:30

## 2022-10-16 RX ADMIN — SODIUM CHLORIDE: 9 INJECTION, SOLUTION INTRAVENOUS at 20:13

## 2022-10-16 RX ADMIN — METOPROLOL SUCCINATE 100 MG: 100 TABLET, EXTENDED RELEASE ORAL at 20:13

## 2022-10-16 ASSESSMENT — ACTIVITIES OF DAILY LIVING (ADL)
ADLS_ACUITY_SCORE: 35
ADLS_ACUITY_SCORE: 31
ADLS_ACUITY_SCORE: 35
ADLS_ACUITY_SCORE: 31
ADLS_ACUITY_SCORE: 31

## 2022-10-16 ASSESSMENT — COLUMBIA-SUICIDE SEVERITY RATING SCALE - C-SSRS
3. HAVE YOU BEEN THINKING ABOUT HOW YOU MIGHT KILL YOURSELF?: NO
1. IN THE PAST MONTH, HAVE YOU WISHED YOU WERE DEAD OR WISHED YOU COULD GO TO SLEEP AND NOT WAKE UP?: NO
2. HAVE YOU ACTUALLY HAD ANY THOUGHTS OF KILLING YOURSELF IN THE PAST MONTH?: NO

## 2022-10-16 NOTE — PROGRESS NOTES
RECEIVING UNIT ED HANDOFF REVIEW    ED Nurse Handoff Report was reviewed by: Mitzi Noonan RN on October 16, 2022 at 5:56 PM

## 2022-10-16 NOTE — PROGRESS NOTES
"Paged Dr. Quinones the following, \"509 KR - new admission on obs. Can you address code status?\"    MD to address when she finishes her admission/H&P.       "

## 2022-10-16 NOTE — ED PROVIDER NOTES
History     Chief Complaint:  Syncope       HPI   Ricardo Neely is a 82 year old male who presents to the emergency department via EMS with an episode of near syncope.  The patient was at home walking around when he felt suddenly very weak and lightheaded like he might pass out but did not lose consciousness.  He denied any chest pain but did state that he felt palpitations.  He is otherwise been in his normal health without any fevers, cough, chest pain, shortness of breath, vomiting, diarrhea, urinary symptoms.  He does take metoprolol once a day and is not sure if he took it today.  Medics report that they performed an EKG which they were not sure if it was a STEMI so they gave him 325 of aspirin and 1 sublingual nitro however in the setting of no chest pain or shortness of breath.  They report that his blood sugar was 140 on the scene.  They report that he was hypertensive with a blood pressure of 180.  On arrival to the emergency room the patient has no complaints when sitting on the gurney.  He denies any smoking any recent travel or any change in his medications.  He denies any cardiac history or arrhythmia.  He does not drink caffeine or alcohol.  Of note on chart review he did have a sinus tachycardia with fascicular block back in 2019 while in the hospital after back surgery this had resolved by the time he was discharged.  He also states that he has worn a Holter monitor in the past many years ago that showed about 402 beat flutters an hour but they did not put him on any medication.    ROS:  Review of Systems  As per the HPI the remaining 10 system review was negative    Allergies:  Meclizine  Tramadol Hcl  Cardura [Doxazosin Mesylate]  Hydrochlorothiazide  Naproxen     Medications:    No current outpatient medications on file.      Past Medical History:    Past Medical History:   Diagnosis Date     Anxiety 5/26/2011     Basal cell carcinoma      Benign Prostatic Hypertrophy      CEREBROVASC DISEASE,  small vessel 12/07     Essential hypertension, benign      GERD      HIATAL HERNIA      HYPERPLASTIC POLYPS COLON 5/93, 3/06     Impaired fasting glucose      Low Back Pain      Obesity, unspecified      Other and unspecified hyperlipidemia      Personal history of urinary calculi 1960's     Pneumonia, organism unspecified(486) 1992     Squamous cell carcinoma of skin, unspecified      TRANSIENT CEREBRAL ISCHEMIA 12/07       Past Surgical History:    Past Surgical History:   Procedure Laterality Date     COLONOSCOPY       DISCECTOMY LUMBAR POSTERIOR MICROSCOPIC TWO LEVELS  8/28/2012    DISCECTOMY LUMBAR POSTERIOR MICROSCOPIC TWO LEVELS;  RIGHT L3-L4 REDO EXTENDED HEMILAMINECTOMY AND MICRO FORAMINOTOMY, LEFT L4-L5 EXTENDED HEMILAMINECTOMY AND MICRODISCECTOMY (PRONE) (SONYA MCCALLUM, C-ARM, METRIX II);  Surgeon: Bertram Mirza MD;  Location:  OR     OPTICAL TRACKING SYSTEM FUSION SPINE POSTERIOR LUMBAR TWO LEVELS N/A 8/21/2019    Procedure: L3-5 LUMBAR TRANSFORAMINAL INTERBODY FUSION WITH STEALTH;  Surgeon: Harrison Perez MD;  Location:  OR     renal calculii removal 40 years ago  1965     [facility] NONSPECIFIC PROCEDURE  1959    pilonidal cystectomy     [facility] NONSPECIFIC PROCEDURE  1966    kidney stone     [facility] NONSPECIFIC PROCEDURE  approx 1999    R knee arthroscopy     Dr. Guzman     [facility] NONSPECIFIC PROCEDURE  2005    L3-4 microdiskectomy   Dr. Brantley     [facility] TOTAL KNEE ARTHROPLASTY  July 2010    Minimally invasive R TKA   Dr. Nichols        Family History:    family history includes C.A.D. in his father; Cerebrovascular Disease in his mother; Diabetes in his brother and mother; Family History Negative in his brother; Heart Disease in his father; Hypertension in his sister and sister; Lipids in his sister and sister; Thyroid Disease in his daughter.    Social History:   reports that he quit smoking about 54 years ago. He has never used smokeless tobacco. He reports that he does not drink alcohol and does  not use drugs.  PCP: Familia Cook     Physical Exam     Patient Vitals for the past 24 hrs:   BP Temp Temp src Pulse Resp SpO2   10/16/22 1820 -- -- -- 75 -- 95 %   10/16/22 1814 120/61 -- -- 79 20 --   10/16/22 1600 125/65 -- -- 84 23 98 %   10/16/22 1427 123/57 -- -- -- -- --   10/16/22 1344 121/59 -- -- 92 20 98 %   10/16/22 1332 137/71 -- -- 115 24 98 %   10/16/22 1331 -- 98.5  F (36.9  C) Temporal -- -- --   10/16/22 1327 (!) 155/81 -- -- 94 14 97 %      Orthostatics  Supine blood pressure 151/58 pulse is 81  Upright blood pressure 114/63 pulse is 81  There were no symptoms    Physical Exam    Physical Exam   Constitutional:  Patient is oriented to person, place, and time. They appear well-developed and well-nourished.    HENT:   Mouth/Throat:   Oropharynx is clear and moist.   Eyes:    Conjunctivae normal and EOM are normal. Pupils are equal, round, and reactive to light.   Neck:    Normal range of motion.   Cardiovascular: Tachycardic, regular rhythm and normal heart sounds.  Exam reveals no gallop and no friction rub.  No murmur heard.  Pulmonary/Chest:  Effort normal and breath sounds normal. Patient has no wheezes. Patient has no rales.   Abdominal:   Soft. Bowel sounds are normal. Patient exhibits no mass. There is no tenderness. There is no rebound and no guarding.   Musculoskeletal:  Normal range of motion. Patient exhibits no edema.   Neurological:   Patient is alert and oriented to person, place, and time. Patient has normal strength. No cranial nerve deficit or sensory deficit. GCS 15  Skin:   Skin is warm and dry. No rash noted. No erythema.   Psychiatric:   Patient has a normal mood and affect. Patient's behavior is normal. Judgment and thought content normal.         Emergency Department Course   ECG:  ECG results from 10/16/22   EKG 12-lead, tracing only     Value    Systolic Blood Pressure     Diastolic Blood Pressure     Ventricular Rate 128    Atrial Rate 128    NY Interval 130    QRS Duration  140        QTc 493    P Axis 35    R AXIS -87    T Axis 73    Interpretation ECG      Sinus tachycardia  Right bundle branch block  Left anterior fascicular block   Bifascicular block   Abnormal ECG  When compared with ECG of 23-AUG-2019 22:00,  Sinus rhythm has replaced Ectopic atrial rhythm  (RBBB and left anterior fascicular block) is now Present  Confirmed by GENERATED REPORT, COMPUTER ([id number]),  Clinton Keith ([id number]) on 10/16/2022 1:53:42 PM     EKG 12-lead, tracing only     Value    Systolic Blood Pressure     Diastolic Blood Pressure     Ventricular Rate 83    Atrial Rate 83    TN Interval 184    QRS Duration 154        QTc 446    P Axis 72    R AXIS -68    T Axis 65    Interpretation ECG      Sinus rhythm  Left axis deviation  Right bundle branch block  Abnormal ECG  When compared with ECG of 16-OCT-2022 13:24,  Vent. rate has decreased BY  45 BPM  ST now depressed in Inferior leads  T wave inversion no longer evident in Anterior leads  Confirmed by GENERATED REPORT, COMPUTER ([id number]),  Clinton Keith ([id number]) on 10/16/2022 2:36:15 PM         Imaging:  CT Chest Pulmonary Embolism w Contrast   Final Result   IMPRESSION:   No acute abnormality in the chest. No evidence for pulmonary embolism.      XR Chest Port 1 View   Final Result   IMPRESSION: Negative chest.         Report per radiology    Laboratory:  Labs Ordered and Resulted from Time of ED Arrival to Time of ED Departure   COMPREHENSIVE METABOLIC PANEL - Abnormal       Result Value    Sodium 135      Potassium 3.4      Chloride 103      Carbon Dioxide (CO2) 25      Anion Gap 7      Urea Nitrogen 14      Creatinine 0.64 (*)     Calcium 8.9      Glucose 146 (*)     Alkaline Phosphatase 86      AST 22      ALT 27      Protein Total 7.1      Albumin 3.5      Bilirubin Total 0.5      GFR Estimate >90     D DIMER QUANTITATIVE - Abnormal    D-Dimer Quantitative 0.90 (*)    TROPONIN I - Normal    Troponin I High Sensitivity 26     NT  PROBNP INPATIENT - Normal    N terminal Pro BNP Inpatient 84     COVID-19 VIRUS (CORONAVIRUS) BY PCR - Normal    SARS CoV2 PCR Negative     ROUTINE UA WITH MICROSCOPIC REFLEX TO CULTURE - Normal    Color Urine Straw      Appearance Urine Clear      Glucose Urine Negative      Bilirubin Urine Negative      Ketones Urine Negative      Specific Gravity Urine 1.011      Blood Urine Negative      pH Urine 5.5      Protein Albumin Urine Negative      Urobilinogen Urine Normal      Nitrite Urine Negative      Leukocyte Esterase Urine Negative      RBC Urine 1      WBC Urine <1     TSH WITH FREE T4 REFLEX - Normal    TSH 0.58     CBC WITH PLATELETS AND DIFFERENTIAL    WBC Count 9.0      RBC Count 4.81      Hemoglobin 14.4      Hematocrit 41.0      MCV 85      MCH 29.9      MCHC 35.1      RDW 13.3      Platelet Count 248      % Neutrophils 52      % Lymphocytes 34      % Monocytes 12      % Eosinophils 1      % Basophils 1      % Immature Granulocytes 0      NRBCs per 100 WBC 0      Absolute Neutrophils 4.7      Absolute Lymphocytes 3.1      Absolute Monocytes 1.0      Absolute Eosinophils 0.1      Absolute Basophils 0.1      Absolute Immature Granulocytes 0.0      Absolute NRBCs 0.0          Emergency Department Course:         Reviewed:  I reviewed nursing notes, vitals and past medical history    Assessments:  1321 patient arrived via EMS to stable 3.  I obtained history and examined the patient as noted above.   1324     EKG performed.  I was able to compare to an EKG done in August 2019 in which he had it looks like this tachycardia    I rechecked the patient and explained findings.         Interventions:  Medications   ondansetron (ZOFRAN ODT) ODT tab 4 mg (has no administration in time range)     Or   ondansetron (ZOFRAN) injection 4 mg (has no administration in time range)   acetaminophen (TYLENOL) tablet 650 mg (has no administration in time range)     Or   acetaminophen (TYLENOL) Suppository 650 mg (has no  administration in time range)   senna-docusate (SENOKOT-S/PERICOLACE) 8.6-50 MG per tablet 1 tablet (has no administration in time range)     Or   senna-docusate (SENOKOT-S/PERICOLACE) 8.6-50 MG per tablet 2 tablet (has no administration in time range)   metoprolol (LOPRESSOR) injection 5 mg (5 mg Intravenous Given 10/16/22 1330)   iopamidol (ISOVUE-370) solution 67 mL (67 mLs Intravenous Given 10/16/22 1458)   Saline (92 mLs As instructed Given 10/16/22 1458)        Disposition:  The patient was admitted to the hospital under the care of Dr. Quinones.     Impression & Plan    CMS Diagnoses: None    Medical Decision Making:  Ricardo Neely is an 82-year-old gentleman presenting to the emergency room with tachycardia lightheadedness but no chest pain shortness of breath or focal neurologic deficits.  On arrival we performed a twelve-lead there was a sinus tachycardia with bifascicular block.  He had no symptoms at that time.  Blood work and imaging was obtained.  He has no acute anemia or leukocytosis.  His TSH is negative he is COVID-negative as well.  He has no acute metabolic derangement.  His troponin and BNP are within normal limits.  I did do a D-dimer and this was mildly elevatedAnd so a CTA of his chest was performed.  The CT does not show anything acute such as PE dissection infiltrate effusion.    He did receive a dose of metoprolol on arrival as he does take this normally and was not sure if he took it today as his wife normally helps him with his medications he did come down to more regular rate and a repeat EKG was performed.  It does show some abnormalities but no STEMI.  At this time I am recommending admission to hospital for further eval evaluation of his palpitations and tachycardia.  Again he had worn a Holter monitor in the past which does sound abnormal.  He has no diagnosis of a arrhythmia.  Although troponin is normal it still within an hour or so of the symptoms onset however they have fully  resolved at this time.  Orthostatics were performed he was asymptomatic he got up to the bathroom and did not have any lightheadedness weakness or palpitations.  Admit to Dr. Quinones.    Diagnosis:     ICD-10-CM    1. Tachycardia  R00.0       2. Near syncope  R55            Discharge Medications:  Current Discharge Medication List           10/16/2022   Keara Abreu MD Bochert, Michelle Ann, MD  10/16/22 1828

## 2022-10-16 NOTE — H&P
[facility]    History and Physical - Hospitalist Service       Date of Admission:  10/16/2022    Assessment & Plan      Ricardo Neely is a 82 year old male admitted on 10/16/2022. He sudden onset of lightheadedness/dizziness and tachycardia.  He is noted to be in sinus tach, right bundle branch block and other work-up was negative.  Admission requested under observation.    Lightheadedness/dizziness-resolved  Sinus tachycardia-resolved  Presents with sudden onset of dizziness/lightheadedness and palpitation.  No syncopal episode.  EKG in the ED showed sinus tachycardia, right bundle branch block.  He was normotensive.  Orthostatics were within the normal range.  He was given metoprolol 5 mg IV and heart rate slowed down to the 80s.  Admission for observation and monitoring was requested.  Currently patient is symptom-free.  CT chest negative for PE or other acute findings.  BMP, LFT in the normal range.  N-terminal proBNP is 84.  Troponin 26.  TSH is 0.58.  UA is negative.  SARS-CoV-2 is negative.  -obtain serial troponin  -Echocardiogram  - IVF 75cc/hr x 12h  -Telemetry for any signs of arrhythmia  -Consider discharge with Zio patch if no acute finding on telemetry    Hypertension  Hyperlipidemia  -Resume prior to admission amlodipine-benazepril or formulary substitute  -Resume PTA metoprolol 100 mg daily in the evening  -Resume statin at discharge since and admitted under observation    Vertebrobasilar artery syndrome  Per chart review, MRA in 2008 showed mild to moderate vertebral stenosis.  This is medically managed and he takes aspirin and Plavix.  -Resume PTA aspirin and Plavix.  Aspirin ordered for tomorrow since he already got full dose aspirin by EMS in route    History of lumbar radiculopathy  It appears as he was previously on gabapentin.  No longer on this medication     Diet: Regular Diet Adult    DVT Prophylaxis: Ambulate every shift  Bull Catheter: Not present  Central  "Lines: None  Cardiac Monitoring: ACTIVE order. Indication: tachycardia, presyncope  Code Status:   no intubation-ok for CPR, shocks and code meds, discussed with patient.    Clinically Significant Risk Factors Present on Admission                # Platelet Defect: home medication list includes an antiplatelet medication  # Hypertension: home medication list includes antihypertensive(s)    # Overweight: Estimated body mass index is 28.51 kg/m  as calculated from the following:    Height as of this encounter: 1.702 m (5' 7\").    Weight as of this encounter: 82.6 kg (182 lb).        Disposition Plan      Expected Discharge Date: 10/17/2022                The patient's care was discussed with the Bedside Nurse and Patient.    Gray Quinones MD  Hospitalist Service  [facility]  Securely message with the Vocera Web Console (learn more here)  Text page via streamOnce Paging/Directory         ______________________________________________________________________    Chief Complaint   Lightheadedness    History is obtained from the patient, chart review and discussion with ED provider    History of Present Illness   Ricardo Neely is a 82 year old male with past medical history of hyperlipidemia, hypertension, vertebral basilar artery dizziness, lumbar radiculopathy who presents for evaluation of dizziness/lightheadedness.  Patient reports after lunch he was walking when he suddenly started feeling dizzy/lightheaded and also felt his heart pounding in his ears.  He checked his blood pressure and systolic was in the 160s and heart rate in the 120s.  He called his insurance line and advised him to go to the emergency room and called EMS for him while he was still on the phone.  Patient otherwise denies associated chest pain, shortness of breath, diaphoresis, nausea, vomiting, headache, focal weakness or numbness.  Denies any recent URI symptoms, cough or fever.  He denies similar symptoms in the past. "  Patient reports symptoms lasted about 30 minutes.  He currently feels back to his baseline without symptoms of dizziness or palpitation.    Per report, medics performed an EKG which they were not sure if it was STEMI so they gave aspirin and 1 sublingual nitro glycerin, however no reported chest pain or shortness of breath.      In the ED, temperature was 98.5, pulse 155/81, saturation 97% on room air. BMP, CMP, troponin, N-terminal proBNP, TSH within the normal limit.  CBC also within normal limits.  UA was negative.  SARS-CoV-2 negative.  D-dimer 0.9.  CT chest was negative for pulmonary embolism or other acute abnormalities in the chest.  Orthostatic vitals are negative.  Initial EKG showed sinus tachycardia with heart rate of 128, right bundle branch block and left anterior fascicular block.  He was given metoprolol IV 5 mg.  Repeat EKG after metoprolol shows sinus rhythm with heart rate of 83, right bundle branch block and left axis deviation.  Given his presentation, admission for observation requested.      Review of Systems    The 10 point Review of Systems is negative other than noted in the HPI    Past Medical History    I have reviewed this patient's medical history and updated it with pertinent information if needed.   Past Medical History:   Diagnosis Date     Anxiety 5/26/2011     Basal cell carcinoma      Benign Prostatic Hypertrophy      CEREBROVASC DISEASE, small vessel 12/07     Essential hypertension, benign      GERD      HIATAL HERNIA      HYPERPLASTIC POLYPS COLON 5/93, 3/06     Impaired fasting glucose      Low Back Pain      Obesity, unspecified      Other and unspecified hyperlipidemia      Personal history of urinary calculi 1960's     Pneumonia, organism unspecified(486) 1992     Squamous cell carcinoma of skin, unspecified      TRANSIENT CEREBRAL ISCHEMIA 12/07       Past Surgical History   I have reviewed this patient's surgical history and updated it with pertinent information if  needed.  Past Surgical History:   Procedure Laterality Date     COLONOSCOPY       DISCECTOMY LUMBAR POSTERIOR MICROSCOPIC TWO LEVELS  2012    DISCECTOMY LUMBAR POSTERIOR MICROSCOPIC TWO LEVELS;  RIGHT L3-L4 REDO EXTENDED HEMILAMINECTOMY AND MICRO FORAMINOTOMY, LEFT L4-L5 EXTENDED HEMILAMINECTOMY AND MICRODISCECTOMY (PRONE) (SONYA MCCALLUM, C-ARM, METRIX II);  Surgeon: Bertram Mirza MD;  Location:  OR     OPTICAL TRACKING SYSTEM FUSION SPINE POSTERIOR LUMBAR TWO LEVELS N/A 2019    Procedure: L3-5 LUMBAR TRANSFORAMINAL INTERBODY FUSION WITH STEALTH;  Surgeon: Harrison Perez MD;  Location:  OR     renal calculii removal 40 years ago       [facility] NONSPECIFIC PROCEDURE      pilonidal cystectomy     [facility] NONSPECIFIC PROCEDURE      kidney stone     [facility] NONSPECIFIC PROCEDURE  1999    R knee arthroscopy     Dr. Guzman     [facility] NONSPECIFIC PROCEDURE      L3-4 microdiskectomy   Dr. Brantley     [facility] TOTAL KNEE ARTHROPLASTY  2010    Minimally invasive R TKA   Dr. Nichols       Social History   I have reviewed this patient's social history and updated it with pertinent information if needed.  Social History     Tobacco Use     Smoking status: Former     Types: Cigarettes     Quit date: 1968     Years since quittin.8     Smokeless tobacco: Never   Substance Use Topics     Alcohol use: No     Alcohol/week: 0.0 standard drinks     Drug use: No       Family History   I have reviewed this patient's family history and updated it with pertinent information if needed.  Family History   Problem Relation Age of Onset     Family History Negative Brother         1 healthy brother     Diabetes Brother         d:age 66     Diabetes Mother      Cerebrovascular Disease Mother      C.A.D. Father         CABG 67     Heart Disease Father      Lipids Sister      Hypertension Sister      Lipids Sister      Hypertension Sister      Thyroid Disease Daughter         thyroid surgery, on  "replacement       Prior to Admission Medications   Prior to Admission Medications   Prescriptions Last Dose Informant Patient Reported? Taking?   amLODIPine-benazepril (LOTREL) 5-20 MG capsule 10/16/2022 at am Pharmacy No Yes   Sig: Take 1 capsule by mouth daily   aspirin (ASA) 81 MG tablet 10/15/2022 at pm Pharmacy, Self No Yes   Sig: Take 1 tablet (81 mg) by mouth At Bedtime   atorvastatin (LIPITOR) 40 MG tablet 10/16/2022 at am Pharmacy No Yes   Sig: Take 1 tablet (40 mg) by mouth daily   clopidogrel (PLAVIX) 75 MG tablet 10/15/2022 at pm Pharmacy No Yes   Sig: TAKE 1 TABLET(75 MG) BY MOUTH DAILY   metoprolol succinate ER (TOPROL-XL) 100 MG 24 hr tablet 10/15/2022 at pm Pharmacy No Yes   Sig: Take 1 tablet (100 mg) by mouth daily      Facility-Administered Medications: None     Allergies   Allergies   Allergen Reactions     Meclizine Other (See Comments)     Over sedation, concentration problem     Tramadol Hcl Other (See Comments)     Pt feels very drugged, \"cloudy\" if used more than one day. Nausea also     Cardura [Doxazosin Mesylate] Other (See Comments)     fainted     Hydrochlorothiazide Other (See Comments)      lightheadedness     Naproxen Nausea     nausea       Physical Exam   Vital Signs: Temp: 98.1  F (36.7  C) Temp src: Oral BP: 120/61 Pulse: 75   Resp: 20 SpO2: 95 % O2 Device: None (Room air)    Weight: 182 lbs 0 oz    General Appearance: Alert, awake, no apparent distress  HEENT: Normocephalic, atraumatic, oral mucosa is moist, no pharyngeal erythema or exudates, oral mucosa is moist, no pharyngeal erythema or exudates  Respiratory-clear to auscultation bilaterally, no wheezing  Cardiovascular: Regular rate and rhythm  GI: Soft and nontender, nondistended  Skin: Warm and dry  Musculoskeletal: Normal muscle tone, no lower extremity edema  Neurologic: Alert and oriented, moving all extremities equally  Psychiatric: Mood and affect are normal    Data   Data reviewed today: I reviewed all medications, " new labs and imaging results over the last 24 hours. I personally reviewed the CT chest and EKG image(s) showing As mentioned above.    Recent Labs   Lab 10/16/22  1329   WBC 9.0   HGB 14.4   MCV 85         POTASSIUM 3.4   CHLORIDE 103   CO2 25   BUN 14   CR 0.64*   ANIONGAP 7   RICARDO 8.9   *   ALBUMIN 3.5   PROTTOTAL 7.1   BILITOTAL 0.5   ALKPHOS 86   ALT 27   AST 22     Recent Results (from the past 24 hour(s))   XR Chest Port 1 View    Narrative    EXAM: XR CHEST PORT 1 VIEW  LOCATION: [facility]  DATE/TIME: 10/16/2022 1:40 PM    INDICATION: Lightheadedness.  COMPARISON: 08/23/2019.      Impression    IMPRESSION: Negative chest.   CT Chest Pulmonary Embolism w Contrast    Narrative    EXAM: CT CHEST PULMONARY EMBOLISM W CONTRAST  LOCATION: [facility]  DATE/TIME: 10/16/2022 3:06 PM    INDICATION: Tachycardia. Elevated d-dimer.  COMPARISON: None.  TECHNIQUE: CT chest pulmonary angiogram during arterial phase injection of IV contrast. Multiplanar reformats and MIP reconstructions were performed. Dose reduction techniques were used.   CONTRAST: 67 mL Isovue 370    FINDINGS:  ANGIOGRAM CHEST: Pulmonary arteries are normal caliber and negative for pulmonary emboli. The thoracic aorta is not well opacified, but there is no evidence for aneurysm. Moderate atherosclerotic calcification of the thoracic aorta.    LUNGS AND PLEURA: No pleural effusions. No lung masses or consolidations. No pneumothorax.    MEDIASTINUM/AXILLAE: No enlarged lymph nodes in the chest. No pericardial effusion.    CORONARY ARTERY CALCIFICATION: Moderate.    UPPER ABDOMEN: Small hiatal hernia.    MUSCULOSKELETAL: Degenerative changes in the thoracic spine.      Impression    IMPRESSION:  No acute abnormality in the chest. No evidence for pulmonary embolism.

## 2022-10-16 NOTE — ED NOTES
"[facility]  ED Nurse Handoff Report    ED Chief complaint: Syncope      ED Diagnosis:   Final diagnoses:   Tachycardia   Near syncope       Code Status: Full Code    Allergies:   Allergies   Allergen Reactions     Meclizine Other (See Comments)     Over sedation, concentration problem     Tramadol Hcl Other (See Comments)     Pt feels very drugged, \"cloudy\" if used more than one day. Nausea also     Cardura [Doxazosin Mesylate] Other (See Comments)     fainted     Hydrochlorothiazide Other (See Comments)      lightheadedness     Naproxen Nausea     nausea       Patient Story: Patient was walking around his house when he suddenly became very weak and light headed like he might pass out. Did not pass out. Denies chest pain but had palpitations. Medics reported abnormal EKG possible STEMI. 324 mg Aspirin and 1 nitroglycerin.   Focused Assessment:  Light headed/ weakness.     Treatments and/or interventions provided: EKG, Lab work, PE CT  Patient's response to treatments and/or interventions: Elevated D-dimer. No other abnormalities in blood work. No PE on CT    To be done/followed up on inpatient unit:  Cardiology follow up    Does this patient have any cognitive concerns?: none    Activity level - Baseline/Home:  Independent  Activity Level - Current:   Independent    Patient's Preferred language: English   Needed?: No    Isolation: None  Infection: Not Applicable  Patient tested for COVID 19 prior to admission: YES  Bariatric?: No    Vital Signs:   Vitals:    10/16/22 1332 10/16/22 1344 10/16/22 1427 10/16/22 1600   BP: 137/71 121/59 123/57 125/65   Pulse: 115 92  84   Resp: 24 20  23   Temp:       TempSrc:       SpO2: 98% 98%  98%       Cardiac Rhythm:     Was the PSS-3 completed:   Yes  What interventions are required if any?               Family Comments: None  OBS brochure/video discussed/provided to patient/family: Yes              Name of person given brochure if not patient: n/a     "          Relationship to patient: n/a    For the majority of the shift this patient's behavior was Green.   Behavioral interventions performed were none.    ED NURSE PHONE NUMBER: [phone]

## 2022-10-16 NOTE — PHARMACY-ADMISSION MEDICATION HISTORY
Pharmacy Medication History  Admission medication history interview status for the 10/16/2022  admission is complete. See EPIC admission navigator for prior to admission medications     Location of Interview: Patient room  Medication history sources: Patient, Surescripts, patient's written list from home    Significant changes made to the medication list:  1) Removed flucinonide and gabapentin - patient reports he no longer takes    In the past week, patient estimated taking medication this percent of the time: greater than 90%      Medication reconciliation completed by provider prior to medication history? No    Time spent in this activity: 10 minutes    Prior to Admission medications    Medication Sig Last Dose Taking? Auth Provider Long Term End Date   amLODIPine-benazepril (LOTREL) 5-20 MG capsule Take 1 capsule by mouth daily 10/16/2022 at am Yes Familia Cook MD Yes    aspirin (ASA) 81 MG tablet Take 1 tablet (81 mg) by mouth At Bedtime 10/15/2022 at pm Yes Flores Portillo NP     atorvastatin (LIPITOR) 40 MG tablet Take 1 tablet (40 mg) by mouth daily 10/16/2022 at am Yes Familia Cook MD Yes    clopidogrel (PLAVIX) 75 MG tablet TAKE 1 TABLET(75 MG) BY MOUTH DAILY 10/15/2022 at pm Yes Familia Cook MD Yes    metoprolol succinate ER (TOPROL-XL) 100 MG 24 hr tablet Take 1 tablet (100 mg) by mouth daily 10/15/2022 at pm Yes Familia Cook MD Yes        The information provided in this note is only as accurate as the sources available at the time of update(s)

## 2022-10-16 NOTE — PLAN OF CARE
Arrived to unit at approx 1820. Settled patient in room and completed admission. A&O4. VSS on RA. Denies chest pain, dizziness, nausea. D dimer elevated 0.90, but chest CT negative for CT. EKG NSR. Up independently in room. Tolerating regular diet. Continent. Has baseline frequency per patient. IVSL. Skin intact with scattered bruising. Needs orthostatics completed and echocardiogram ordered for tomorrow. Discharge pending.

## 2022-10-17 ENCOUNTER — APPOINTMENT (OUTPATIENT)
Dept: CARDIOLOGY | Facility: CLINIC | Age: 82
End: 2022-10-17
Attending: INTERNAL MEDICINE
Payer: COMMERCIAL

## 2022-10-17 ENCOUNTER — APPOINTMENT (OUTPATIENT)
Dept: ULTRASOUND IMAGING | Facility: CLINIC | Age: 82
End: 2022-10-17
Attending: INTERNAL MEDICINE
Payer: COMMERCIAL

## 2022-10-17 ENCOUNTER — PATIENT OUTREACH (OUTPATIENT)
Dept: INTERNAL MEDICINE | Facility: CLINIC | Age: 82
End: 2022-10-17

## 2022-10-17 VITALS
TEMPERATURE: 98.1 F | BODY MASS INDEX: 28.56 KG/M2 | OXYGEN SATURATION: 92 % | DIASTOLIC BLOOD PRESSURE: 63 MMHG | WEIGHT: 182 LBS | HEIGHT: 67 IN | RESPIRATION RATE: 16 BRPM | SYSTOLIC BLOOD PRESSURE: 129 MMHG | HEART RATE: 66 BPM

## 2022-10-17 LAB
LVEF ECHO: NORMAL
TROPONIN I SERPL HS-MCNC: 23 NG/L

## 2022-10-17 PROCEDURE — 93880 EXTRACRANIAL BILAT STUDY: CPT

## 2022-10-17 PROCEDURE — 250N000013 HC RX MED GY IP 250 OP 250 PS 637: Performed by: INTERNAL MEDICINE

## 2022-10-17 PROCEDURE — G0378 HOSPITAL OBSERVATION PER HR: HCPCS

## 2022-10-17 PROCEDURE — 36415 COLL VENOUS BLD VENIPUNCTURE: CPT | Performed by: INTERNAL MEDICINE

## 2022-10-17 PROCEDURE — 99204 OFFICE O/P NEW MOD 45 MIN: CPT | Performed by: INTERNAL MEDICINE

## 2022-10-17 PROCEDURE — 96361 HYDRATE IV INFUSION ADD-ON: CPT

## 2022-10-17 PROCEDURE — 93306 TTE W/DOPPLER COMPLETE: CPT

## 2022-10-17 PROCEDURE — 93306 TTE W/DOPPLER COMPLETE: CPT | Mod: 26 | Performed by: INTERNAL MEDICINE

## 2022-10-17 PROCEDURE — 84484 ASSAY OF TROPONIN QUANT: CPT | Performed by: INTERNAL MEDICINE

## 2022-10-17 RX ADMIN — AMLODIPINE BESYLATE 5 MG: 5 TABLET ORAL at 07:43

## 2022-10-17 RX ADMIN — LISINOPRIL 20 MG: 20 TABLET ORAL at 07:43

## 2022-10-17 ASSESSMENT — ACTIVITIES OF DAILY LIVING (ADL)
ADLS_ACUITY_SCORE: 31

## 2022-10-17 NOTE — PRE-PROCEDURE
Observation goals  PRIOR TO DISCHARGE        Comments: -diagnostic tests and consults completed and resulted Met  -vital signs normal or at patient baseline Met  Nurse to notify provider when observation goals have been met and patient is ready for discharge.

## 2022-10-17 NOTE — PROGRESS NOTES
Diagnostic tests and consults completed and resulted. Not Met- ECHO for tomorrow and trending trops.    Vital signs normal or at patient baseline. Met

## 2022-10-17 NOTE — TELEPHONE ENCOUNTER
What type of discharge? Observation  Risk of Hospital admission or ED visit: 11%  Is a TCM episode required? No  When should the patient follow up with PCP? 14 days of discharge.    Lisset Jules RN  [facility]

## 2022-10-17 NOTE — PLAN OF CARE
Goal Outcome Evaluation:  Orientation/Cognitive: AxO x4  Observation Goals (Met/ Not Met): Met  Mobility Level/Assist Equipment: Independent   Fall Risk (Y/N): N  Behavior Concerns: None  Pain Management: None this shift  Tele/VS/O2: VSS on RA. Tele: SR with BBB  ABNL Lab/BG: No abnormal labs today  Diet: Mod Carb  Bowel/Bladder: No Concerns  Skin Concerns: No Concerns  Drains/Devices: PIV SL  Tests/Procedures for next shift: Discharge  Anticipated DC date & active delays: 10/17/2022  Patient Stated Goal for Today: To go Home     Pt discharged to home Via family. IV removed. AVS printed and reviewed. All questions answered. Education complete. All pt belongings returned at discharge.

## 2022-10-17 NOTE — PROVIDER NOTIFICATION
MD Notification    Notified Person: MD    Notified Person Name: Roselia Lundberg GURWINDER    Notification Date/Time:   10/17/2022 1112  Notification Interaction: amcom    Purpose of Notification: FYI Cardiology signed off on pt to d/c. All tests are completed.     Orders Received: discharge orders placed      Comments:

## 2022-10-17 NOTE — DISCHARGE SUMMARY
[facility]  Hospitalist Discharge Summary      Date of Admission:  10/16/2022  Date of Discharge:  10/17/2022 11:39 AM  Discharging Provider: Roselia Mcgovern NP  Discharge Service: Hospitalist Service    Discharge Diagnoses   1. Tachycardia    2. Near syncope        Follow-ups Needed After Discharge     Unresulted Labs Ordered in the Past 30 Days of this Admission     No orders found for last 31 day(s).          Discharge Disposition   Discharged to home  Condition at discharge: Good    Hospital Course      Ricardo Neely is a 82 year old male admitted on 10/16/2022. He sudden onset of lightheadedness/dizziness and tachycardia.  He is noted to be in sinus tach, right bundle branch block and other work-up was negative.  Admission requested under observation.    Lightheadedness/dizziness-resolved  Sinus tachycardia-resolved  Presents with sudden onset of dizziness/lightheadedness and palpitation.  No syncopal episode.  EKG in the ED showed sinus tachycardia, right bundle branch block.  He was normotensive.  Orthostatics were within the normal range.  He was given metoprolol 5 mg IV and heart rate slowed down to the 80s.  Admission for observation and monitoring was requested.  Currently patient is symptom-free.  CT chest negative for PE or other acute findings.  BMP, LFT in the normal range.  N-terminal proBNP is 84.  Troponin 26.  TSH is 0.58.  UA is negative.  SARS-CoV-2 is negative. Ultrasound bilateral carotids revealed there are 50-69% stenoses in the internal carotid arteries bilaterally. Degree of stenosis measured relative to the diameter of the normal internal carotid artery per NASCET or NASCET type criteria. Cardiology did evaluate patient and cleared him for discharge today. He is to follow up if he has further episodes of concern.    Hypertension  Hyperlipidemia  -Resume prior to admission amlodipine-benazepril or formulary substitute  -Resume PTA metoprolol 100 mg daily in  the evening  -Resume statin at discharge since and admitted under observation    Vertebrobasilar artery syndrome  Per chart review, MRA in 2008 showed mild to moderate vertebral stenosis.  This is medically managed and he takes aspirin and Plavix.  -Resume PTA aspirin and Plavix.  Aspirin ordered for tomorrow since he already got full dose aspirin by EMS in route    History of lumbar radiculopathy  It appears as he was previously on gabapentin.  No longer on this medication    Consultations This Hospital Stay   CARDIOLOGY IP CONSULT    Code Status   Special Code    Time Spent on this Encounter   I, Roselia Mcgovern NP, personally saw the patient today and spent less than or equal to 30 minutes discharging this patient.       Roselia Mcgovern NP  [facility]TEND[facility]OV[facility]ORT STAY  [address] [address]  Phone: [phone]  ______________________________________________________________________    Physical Exam   Vital Signs: Temp: 98.1  F (36.7  C) Temp src: Oral BP: 129/63 Pulse: 66   Resp: 16 SpO2: 92 % O2 Device: None (Room air)    Weight: 182 lbs 0 oz  General Appearance: Alert, oriented; in no acute distress.   Respiratory: Chest symmetric with easy respirations bilaterally; lungs sounds clear and equal.  Cardiovascular: RRR  GI: soft, non-distended and non-tender  Skin: warm and dry         Primary Care Physician   Familia Cook    Discharge Orders      Reason for your hospital stay    Fast heart beat-resolved     Activity    Your activity upon discharge: activity as tolerated     Follow-up and recommended labs and tests     Follow up with primary care provider, Familia Cook, within 7 days for hospital follow- up.  No follow up labs or test are needed.     Diet    Follow this diet upon discharge: Orders Placed This Encounter      Regular Diet Adult       Significant Results and Procedures   Most Recent 3 CBC's:Recent Labs   Lab Test 10/16/22  1329  07/20/21  0919 08/24/19  1051   WBC 9.0 6.6 11.0   HGB 14.4 15.6 10.1*   MCV 85 89 85    277 211     Most Recent 3 BMP's:Recent Labs   Lab Test 10/16/22  1329 05/13/22  0940 05/20/21  0946    134 135   POTASSIUM 3.4 4.0 4.6   CHLORIDE 103 100 103   CO2 25 27 30   BUN 14 14 15   CR 0.64* 0.73 0.88   ANIONGAP 7 7 2*   RICARDO 8.9 9.0 9.2   * 99 92   ,   Results for orders placed or performed during the hospital encounter of 10/16/22   XR Chest Port 1 View    Narrative    EXAM: XR CHEST PORT 1 VIEW  LOCATION: [facility]  DATE/TIME: 10/16/2022 1:40 PM    INDICATION: Lightheadedness.  COMPARISON: 08/23/2019.      Impression    IMPRESSION: Negative chest.   CT Chest Pulmonary Embolism w Contrast    Narrative    EXAM: CT CHEST PULMONARY EMBOLISM W CONTRAST  LOCATION: [facility]  DATE/TIME: 10/16/2022 3:06 PM    INDICATION: Tachycardia. Elevated d-dimer.  COMPARISON: None.  TECHNIQUE: CT chest pulmonary angiogram during arterial phase injection of IV contrast. Multiplanar reformats and MIP reconstructions were performed. Dose reduction techniques were used.   CONTRAST: 67 mL Isovue 370    FINDINGS:  ANGIOGRAM CHEST: Pulmonary arteries are normal caliber and negative for pulmonary emboli. The thoracic aorta is not well opacified, but there is no evidence for aneurysm. Moderate atherosclerotic calcification of the thoracic aorta.    LUNGS AND PLEURA: No pleural effusions. No lung masses or consolidations. No pneumothorax.    MEDIASTINUM/AXILLAE: No enlarged lymph nodes in the chest. No pericardial effusion.    CORONARY ARTERY CALCIFICATION: Moderate.    UPPER ABDOMEN: Small hiatal hernia.    MUSCULOSKELETAL: Degenerative changes in the thoracic spine.      Impression    IMPRESSION:  No acute abnormality in the chest. No evidence for pulmonary embolism.   US Carotid Bilateral    Narrative    US CAROTID BILATERAL 10/17/2022 11:07 AM    HISTORY: Carotid bruit,  syncope. Stroke like symptoms.    COMPARISON: None    FINDINGS:     Right side:   On the grayscale images, there is calcified plaque at the carotid  bifurcation extending into the internal carotid artery.  Spectral waveform analysis was performed. Peak systolic and diastolic  velocities in the right internal carotid artery are 138 and 34 cm/s.  Per sonographic criteria, degree of stenosis in the right internal  carotid artery is 50-69%.  Flow in the right vertebral artery is antegrade.     Left side:   On the grayscale images, there is scattered calcified plaque in the  common carotid artery. There is significant calcified plaque at the  carotid bifurcation extending into the internal and external carotid  arteries.  Spectral waveform analysis was performed. Peak systolic and diastolic   velocities in the left internal carotid artery are 150 and 30 cm/s.  Per sonographic criteria, degree of stenosis in the left internal  carotid artery is 50-69%.  Flow in the left vertebral artery is antegrade.       Impression    IMPRESSION: Per sonographic criteria, there are 50-69% stenoses in the  internal carotid arteries bilaterally.    Degree of stenosis measured relative to the diameter of the normal  internal carotid artery per NASCET or NASCET type criteria    TRIXIE NEAL MD         SYSTEM ID:  [id number]   Echocardiogram Complete     Value    LVEF  65%    Narrative    [id number]  [id number]  [id number]  [id number]^BLAKE^ROGELIO^NIK     [facility]  Echocardiography Laboratory  [address] [place] [address]     Name: MARIANELA WARD  MRN: [id number]  : 1940  Study Date: 10/17/2022 08:13 AM  Age: 82 yrs  Gender: Male  Patient Location: [facility]  Reason For Study: Tachycardia  Ordering Physician: ROGELIO LOZANO  Referring Physician: Familia Cook  Performed By: Yolanda Blanchard     BSA: 1.9 m2  Height: 67 in  Weight: 182 lb  HR: 71  BP: 120/61  mmHg  ______________________________________________________________________________  Procedure  Complete Portable Echo Adult.  ______________________________________________________________________________  Interpretation Summary     1. The left ventricle is normal in size. The visual ejection fraction is  estimated at 65%.  2. The right ventricle is normal in structure, function and size.  3. There is mild (1+) tricuspid regurgitation. The right ventricular systolic  pressure is approximated at 42mmHg plus the right atrial pressure.  4. Aortic valve is sclerotic. Mildly elevated mean gradient of 11mmHg from  high flow.  5. The ascending aorta is Mildly dilated. 4.1cm.     Echo 2007 showed EF 70%.  ______________________________________________________________________________  Left Ventricle  The left ventricle is normal in size. There is normal left ventricular wall  thickness. The visual ejection fraction is estimated at 65%. Left ventricular  diastolic function is normal. Normal left ventricular wall motion.     Right Ventricle  The right ventricle is normal in structure, function and size.     Atria  Normal left atrial size. Right atrial size is normal. There is no atrial shunt  seen.     Mitral Valve  There is mild (1+) mitral regurgitation.     Tricuspid Valve  There is mild (1+) tricuspid regurgitation. The right ventricular systolic  pressure is approximated at 42mmHg plus the right atrial pressure.     Aortic Valve  Aortic valve is sclerotic. Mildly elevated mean gradient of 11mmHg from high  flow. No aortic regurgitation is present.     Pulmonic Valve  The pulmonic valve is normal in structure and function.     Vessels  The ascending aorta is Mildly dilated. The inferior vena cava was normal in  size with preserved respiratory variability.     Pericardium  There is no pericardial effusion.     Rhythm  Sinus rhythm was  noted.  ______________________________________________________________________________  MMode/2D Measurements & Calculations  IVSd: 1.1 cm     LVIDd: 4.2 cm  LVIDs: 2.0 cm  LVPWd: 0.98 cm  FS: 53.8 %  LV mass(C)d: 144.1 grams  LV mass(C)dI: 74.2 grams/m2  Ao root diam: 3.6 cm  LA dimension: 3.4 cm  asc Aorta Diam: 4.1 cm  LA/Ao: 0.96  LVOT diam: 2.4 cm  LVOT area: 4.5 cm2  LA Volume (BP): 51.0 ml  LA Volume Index (BP): 26.3 ml/m2  RWT: 0.46     Doppler Measurements & Calculations  MV E max nahum: 102.0 cm/sec  MV A max nahum: 87.3 cm/sec  MV E/A: 1.2  MV dec slope: 654.3 cm/sec2  Ao V2 max: 223.7 cm/sec  Ao max P.0 mmHg  Ao V2 mean: 156.6 cm/sec  Ao mean PG: 10.9 mmHg  Ao V2 VTI: 48.5 cm  DOMI(I,D): 2.4 cm2  DOMI(V,D): 2.2 cm2  LV V1 max P.8 mmHg  LV V1 max: 109.6 cm/sec  LV V1 VTI: 25.8 cm  SV(LVOT): 115.7 ml  SI(LVOT): 59.5 ml/m2  PA acc time: 0.16 sec  PI end-d nahum: 100.4 cm/sec  TR max nahum: 308.3 cm/sec  TR max P.8 mmHg  AV Nahum Ratio (DI): 0.49  DOMI Index (cm2/m2): 1.2  E/E' av.1  Lateral E/e': 14.6  Medial E/e': 13.6     ______________________________________________________________________________  Report approved by: Libia Thorpe 10/17/2022 10:18 AM               Discharge Medications   Discharge Medication List as of 10/17/2022 11:25 AM      CONTINUE these medications which have NOT CHANGED    Details   amLODIPine-benazepril (LOTREL) 5-20 MG capsule Take 1 capsule by mouth daily, Disp-90 capsule, R-3, E-Prescribe      aspirin (ASA) 81 MG tablet Take 1 tablet (81 mg) by mouth At Bedtime, No Print Out      atorvastatin (LIPITOR) 40 MG tablet Take 1 tablet (40 mg) by mouth daily, Disp-90 tablet, R-3, E-Prescribe      clopidogrel (PLAVIX) 75 MG tablet TAKE 1 TABLET(75 MG) BY MOUTH DAILY, Disp-90 tablet, R-3, E-Prescribe      metoprolol succinate ER (TOPROL-XL) 100 MG 24 hr tablet Take 1 tablet (100 mg) by mouth daily, Disp-90 tablet, R-3, E-Prescribe           Allergies   Allergies  "  Allergen Reactions     Meclizine Other (See Comments)     Over sedation, concentration problem     Tramadol Hcl Other (See Comments)     Pt feels very drugged, \"cloudy\" if used more than one day. Nausea also     Cardura [Doxazosin Mesylate] Other (See Comments)     fainted     Hydrochlorothiazide Other (See Comments)      lightheadedness     Naproxen Nausea     nausea     "

## 2022-10-17 NOTE — PLAN OF CARE
Goal Outcome Evaluation:         Orientation/Cognitive: WDL  Observation Goals (Met/ Not Met): Not met  Mobility Level/Assist Equipment: SBA  Fall Risk (Y/N): Yes  Behavior Concerns: None  Pain Management: Pt denied pain  Tele/VS/O2: Tele SR with BBB; AVSS; RA  ABNL Lab/BG: D-Dimer 0.9; Chest CT negative for PE; troponins negative  Diet: Regular  Bowel/Bladder: Voiding; no BM overnight  Skin Concerns: None  Drains/Devices: None  Tests/Procedures for next shift: Echo  Anticipated DC date & active delays: To be decided  Patient Stated Goal for Today: None given    Pt denied dizziness, lightheadedness, IV NS at 75 ml/hr x 12 hours; (to be discontinued at 0700).

## 2022-10-17 NOTE — CONSULTS
[facility]    Cardiology Consultation     Date of Admission:  10/16/2022    Assessment & Plan   Ricardo Neely is a 82 year old male who was admitted on 10/16/2022.    1.  Near syncope, resolved, associated with palpitations identified as sinus tachycardia by ECG, and elevated blood pressure.  The cause is unclear, but seems to be consistent with beta-blocker withdrawal even though the patient suggests that he has been consistent with his medications.  A dose of IV metoprolol in the emergency room resolved all symptoms and improved blood pressure and heart rate.  Dehydration is less likely due to lack of orthostatic change in vital signs.  Echocardiogram is essentially normal for age.    2.  History of vertebral artery disease, 2008.  He has bilateral carotid bruits, or possibly referred murmur now.  Maintained on low-dose aspirin plus Plavix long-term.  No evidence for significant bleeding.    3.  Hypertension, on Lotrel and metoprolol    4.  Dyslipidemia    Recommendations:    I have reviewed all the information and cannot identify any specific cause for her this patient's near syncopal episode.  His symptoms have completely resolved.  Significant arrhythmia seems to be unlikely in this situation as a primary cause of this patient's symptoms.  So far we have seen only sinus tachycardia associated with his baseline right bundle branch block and left anterior fascicular block.    No significant underlying cardiac pathology was identified by echocardiogram.  I reviewed the echo images myself.  CT scan of the chest was performed for elevated D-dimer and shows no evidence of pulmonary embolism.    He is now comfortable and symptom-free.  He does have a history of cerebrovascular disease and has bilateral carotid bruits, or peripheral possibly referred murmur, and I think carotid ultrasound would be appropriate.  If we can get that done prior to discharge, that would be helpful.  Otherwise, I  think he would be satisfactory for discharge today.  I have warned him to seek medical attention if he has recurrent episodes of lightheadedness or unexpected palpitations.  He will be traveling to [place] in the next few weeks and have encouraged him to seek medical attention there if these episodes recur.  I am not inclined to do any rhythm monitoring at this time but would reconsider that option if he has recurrent episodes.  He is agreeable to this.    Moderate complexity     SHERYL VILLANUEVA MD, MD    Primary Care Physician   Familia Cook    Reason for Consult   Reason for consult: I was asked by hospitalist service to evaluate this patient for tachycardia and lightheadedness.    History of Present Illness   Ricardo Neely is a 82 year old male who presents with sudden onset of lightheadedness and palpitations consistent with rapid heart beating at home.  He had just eaten lunch yesterday and got up from the kitchen table to walk into the TV room when he suddenly developed the onset of rapid heart beating and felt lightheaded.  He did not have syncope.  He sat down symptoms persisted he got his blood pressure cuff out and took his blood pressure and it was elevated.  His heart rates were in the 120s.  He called the insurance company nurse line and the nurse helped him call 911.  When the ambulance arrived, they confirmed that he had sinus tachycardia with elevated blood pressure.  There was no drop in the blood pressure when rising from seated to standing.  He was transported to the emergency room and got a sublingual nitroglycerin along the way.  When he arrived, he was given an IV dose of metoprolol 5 mg and his symptoms gradually resolved.  Since then, he has been asymptomatic and feeling well.  He does not recall any recent similar episodes of rapid heart beating although many years ago he had similar symptoms when he was on a diuretic.  After stopping the diuretic this resolved.    He had no chest pain,  shortness of breath, or syncope.  He did have an elevated D-dimer in the emergency room prompting a CT scan of the chest which was normal.  There is no pulmonary embolism.  I reviewed his ECG and it demonstrates sinus tachycardia with bifascicular block, including right bundle branch block and left anterior fascicular block.  His laboratory studies are otherwise normal, including BNP and troponin.    He had an echocardiogram this morning which I also reviewed.  He has mild aortic sclerosis without stenosis.  He has mildly elevated pulmonary artery pressures but no other significant valvular disease.  He has some mild right ventricular enlargement and dysfunction.  There is no evidence of significant lung disease on his CT scan of the chest and he denies a history of chronic lung disease.  He quit smoking    Past Medical History   Past Medical History:   Diagnosis Date     Anxiety 5/26/2011     Basal cell carcinoma      Benign Prostatic Hypertrophy      CEREBROVASC DISEASE, small vessel 12/07     Essential hypertension, benign      GERD      HIATAL HERNIA      HYPERPLASTIC POLYPS COLON 5/93, 3/06     Impaired fasting glucose      Low Back Pain      Obesity, unspecified      Other and unspecified hyperlipidemia      Personal history of urinary calculi 1960's     Pneumonia, organism unspecified(486) 1992     Squamous cell carcinoma of skin, unspecified      TRANSIENT CEREBRAL ISCHEMIA 12/07         Past Surgical History   Past Surgical History:   Procedure Laterality Date     COLONOSCOPY       DISCECTOMY LUMBAR POSTERIOR MICROSCOPIC TWO LEVELS  8/28/2012    DISCECTOMY LUMBAR POSTERIOR MICROSCOPIC TWO LEVELS;  RIGHT L3-L4 REDO EXTENDED HEMILAMINECTOMY AND MICRO FORAMINOTOMY, LEFT L4-L5 EXTENDED HEMILAMINECTOMY AND MICRODISCECTOMY (PRONE) (SONYA MCCALLUM, C-ARM, METRIX II);  Surgeon: Bertram Mirza MD;  Location:  OR     OPTICAL TRACKING SYSTEM FUSION SPINE POSTERIOR LUMBAR TWO LEVELS N/A 8/21/2019     "Procedure: L3-5 LUMBAR TRANSFORAMINAL INTERBODY FUSION WITH STEALTH;  Surgeon: Harrison Perez MD;  Location: [facility] OR     renal calculii removal 40 years ago  1965     [facility] NONSPECIFIC PROCEDURE  1959    pilonidal cystectomy     [facility] NONSPECIFIC PROCEDURE  1966    kidney stone     [facility] NONSPECIFIC PROCEDURE  approx 1999    R knee arthroscopy     Dr. Guzman     [facility] NONSPECIFIC PROCEDURE  2005    L3-4 microdiskectomy   Dr. Brantley     [facility] TOTAL KNEE ARTHROPLASTY  July 2010    Minimally invasive R TKA   Dr. Nichols         Prior to Admission Medications   Prior to Admission Medications   Prescriptions Last Dose Informant Patient Reported? Taking?   amLODIPine-benazepril (LOTREL) 5-20 MG capsule 10/16/2022 at am Pharmacy No Yes   Sig: Take 1 capsule by mouth daily   aspirin (ASA) 81 MG tablet 10/15/2022 at pm Pharmacy, Self No Yes   Sig: Take 1 tablet (81 mg) by mouth At Bedtime   atorvastatin (LIPITOR) 40 MG tablet 10/16/2022 at am Pharmacy No Yes   Sig: Take 1 tablet (40 mg) by mouth daily   clopidogrel (PLAVIX) 75 MG tablet 10/15/2022 at pm Pharmacy No Yes   Sig: TAKE 1 TABLET(75 MG) BY MOUTH DAILY   metoprolol succinate ER (TOPROL-XL) 100 MG 24 hr tablet 10/15/2022 at pm Pharmacy No Yes   Sig: Take 1 tablet (100 mg) by mouth daily      Facility-Administered Medications: None     Current Facility-Administered Medications   Medication Dose Route Frequency     amLODIPine  5 mg Oral Daily    And     lisinopril  20 mg Oral Daily     aspirin  81 mg Oral At Bedtime     clopidogrel  75 mg Oral QPM     metoprolol succinate ER  100 mg Oral At Bedtime     Current Facility-Administered Medications   Medication Last Rate     Allergies   Allergies   Allergen Reactions     Meclizine Other (See Comments)     Over sedation, concentration problem     Tramadol Hcl Other (See Comments)     Pt feels very drugged, \"cloudy\" if used more than one day. Nausea also     Cardura [Doxazosin Mesylate] Other (See Comments)     fainted     " "Hydrochlorothiazide Other (See Comments)      lightheadedness     Naproxen Nausea     nausea       Social History    reports that he quit smoking about 54 years ago. He has never used smokeless tobacco. He reports that he does not drink alcohol and does not use drugs.      Family History   I have reviewed this patient's family history and updated it with pertinent information if needed.  Family History   Problem Relation Age of Onset     Family History Negative Brother         1 healthy brother     Diabetes Brother         d:age 66     Diabetes Mother      Cerebrovascular Disease Mother      C.A.D. Father         CABG 67     Heart Disease Father      Lipids Sister      Hypertension Sister      Lipids Sister      Hypertension Sister      Thyroid Disease Daughter         thyroid surgery, on replacement          Review of Systems   A comprehensive review of system was performed and is negative other than that noted in the HPI or here.     Physical Exam   Vital Signs with Ranges  Temp:  [98.1  F (36.7  C)-98.5  F (36.9  C)] 98.1  F (36.7  C)  Pulse:  [] 66  Resp:  [14-24] 16  BP: (107-155)/(40-81) 129/63  SpO2:  [92 %-98 %] 92 %  Wt Readings from Last 4 Encounters:   10/16/22 82.6 kg (182 lb)   11/08/21 80.7 kg (178 lb)   10/11/21 82.8 kg (182 lb 9.6 oz)   07/20/21 79.3 kg (174 lb 14.4 oz)     I/O last 3 completed shifts:  In: 932.5 [P.O.:200; I.V.:732.5]  Out: -       Vitals: /63 (BP Location: Right arm)   Pulse 66   Temp 98.1  F (36.7  C) (Oral)   Resp 16   Ht 1.702 m (5' 7\")   Wt 82.6 kg (182 lb)   SpO2 92%   BMI 28.51 kg/m      Physical Exam:   General - Alert and oriented to time place and person in no acute distress  Eyes - No scleral icterus  HEENT - Neck supple, moist mucous membranes  Cardiovascular -regular rate and rhythm with a very soft 1/6 systolic murmur at the base  Extremities - There is no lower extremity edema  Respiratory -clear to auscultation  Skin - No pallor or " cyanosis  Gastrointestinal - Non tender and non distended without rebound or guarding  Psych - Appropriate affect   Neurological - No gross motor neurological focal deficits    No lab results found in last 7 days.    Invalid input(s): TROPONINIES    Recent Labs   Lab 10/16/22  1329   WBC 9.0   HGB 14.4   MCV 85         POTASSIUM 3.4   CHLORIDE 103   CO2 25   BUN 14   CR 0.64*   GFRESTIMATED >90   ANIONGAP 7   RICARDO 8.9   *   ALBUMIN 3.5   PROTTOTAL 7.1   BILITOTAL 0.5   ALKPHOS 86   ALT 27   AST 22     Recent Labs   Lab Test 05/13/22  0940 05/20/21  0946 04/07/16  0745 02/17/15  0808   CHOL 127 140   < > 128   HDL 60 58   < > 52   LDL 50 66   < > 55   TRIG 87 81   < > 106   CHOLHDLRATIO  --   --   --  2.5    < > = values in this interval not displayed.     Recent Labs   Lab 10/16/22  1329   WBC 9.0   HGB 14.4   HCT 41.0   MCV 85        No results for input(s): PH, PHV, PO2, PO2V, SAT, PCO2, PCO2V, HCO3, HCO3V in the last 168 hours.  Recent Labs   Lab 10/16/22  1329   NTBNPI 84     Recent Labs   Lab 10/16/22  1329   DD 0.90*     No results for input(s): SED, CRP in the last 168 hours.  Recent Labs   Lab 10/16/22  1329        Recent Labs   Lab 10/16/22  1329   TSH 0.58     Recent Labs   Lab 10/16/22  1714   COLOR Straw   APPEARANCE Clear   URINEGLC Negative   URINEBILI Negative   URINEKETONE Negative   SG 1.011   UBLD Negative   URINEPH 5.5   PROTEIN Negative   NITRITE Negative   LEUKEST Negative   RBCU 1   WBCU <1       Imaging:  Recent Results (from the past 48 hour(s))   XR Chest Port 1 View    Narrative    EXAM: XR CHEST PORT 1 VIEW  LOCATION: [facility]  DATE/TIME: 10/16/2022 1:40 PM    INDICATION: Lightheadedness.  COMPARISON: 08/23/2019.      Impression    IMPRESSION: Negative chest.   CT Chest Pulmonary Embolism w Contrast    Narrative    EXAM: CT CHEST PULMONARY EMBOLISM W CONTRAST  LOCATION: [facility]  DATE/TIME: 10/16/2022  "3:06 PM    INDICATION: Tachycardia. Elevated d-dimer.  COMPARISON: None.  TECHNIQUE: CT chest pulmonary angiogram during arterial phase injection of IV contrast. Multiplanar reformats and MIP reconstructions were performed. Dose reduction techniques were used.   CONTRAST: 67 mL Isovue 370    FINDINGS:  ANGIOGRAM CHEST: Pulmonary arteries are normal caliber and negative for pulmonary emboli. The thoracic aorta is not well opacified, but there is no evidence for aneurysm. Moderate atherosclerotic calcification of the thoracic aorta.    LUNGS AND PLEURA: No pleural effusions. No lung masses or consolidations. No pneumothorax.    MEDIASTINUM/AXILLAE: No enlarged lymph nodes in the chest. No pericardial effusion.    CORONARY ARTERY CALCIFICATION: Moderate.    UPPER ABDOMEN: Small hiatal hernia.    MUSCULOSKELETAL: Degenerative changes in the thoracic spine.      Impression    IMPRESSION:  No acute abnormality in the chest. No evidence for pulmonary embolism.       Echo:  No results found for this or any previous visit (from the past 4320 hour(s)).    Clinically Significant Risk Factors Present on Admission               # Platelet Defect: home medication list includes an antiplatelet medication   # Overweight: Estimated body mass index is 28.51 kg/m  as calculated from the following:    Height as of this encounter: 1.702 m (5' 7\").    Weight as of this encounter: 82.6 kg (182 lb).      Cardiovascular: Cardiac Arrhythmia: Paroxysmal tachycardia, unspecified      Pulmonology: Pulmonary Heart Disease (Pulmonary hypertension or Cor pulmonale): Pulmonary hypertension, unspecified                  "

## 2022-10-17 NOTE — PROGRESS NOTES
Observation Goals:       Diagnostic tests and consults completed and resulted. Not Met. Orthostatics still need to be completed. Echocardiogram pending. Troponin trending ordered as well and pending.     Vital signs normal or at patient baseline. Met.     Nurse to notify provider when observation goals have been met and patient is ready for discharge.

## 2022-10-18 ENCOUNTER — CARE COORDINATION (OUTPATIENT)
Dept: CARDIOLOGY | Facility: CLINIC | Age: 82
End: 2022-10-18

## 2022-10-18 DIAGNOSIS — R09.89 BILATERAL CAROTID BRUITS: Primary | ICD-10-CM

## 2022-10-18 DIAGNOSIS — I77.9 CAROTID ARTERY DISEASE (H): ICD-10-CM

## 2022-10-18 NOTE — PROGRESS NOTES
Call out to pt, reviewed below. Pt states he's doing well following his admission and has no concerns today. Advised pt setup a carotid US in one year and have follow up with his PCP to review those results, per Dr. Kumar recommendations. Carotid US order in chart. Pt can schedule via [employer] or by calling [facility]. Rayne Burden RN on 10/18/2022 at 2:44 PM      Zach Kumar MD  P [facility] Heart Team 7  I saw this patient in the hospital for near syncope and sinus tach. I heard bilateral carotid bruits and ordered US carotids. He has moderate stenosis 50-69% bilaterally. He left before the results were available. Please let him know of the results and that I would recommend a repeat carotid US in one year. He could do that before his annual visit with Dr. Cook and get the results then. EE

## 2022-10-18 NOTE — TELEPHONE ENCOUNTER
Hospital discharge note suggesting follow-up within 7 days.  I am out of the clinic this week and schedule full first half of next week also.  Would recommend patient be seen by IM partner in clinic this week with   opening for hospital follow-up.   Close encounter after speaking with patient and scheduling appointment

## 2022-10-19 ENCOUNTER — TELEPHONE (OUTPATIENT)
Dept: INTERNAL MEDICINE | Facility: CLINIC | Age: 82
End: 2022-10-19

## 2022-10-20 NOTE — TELEPHONE ENCOUNTER
Patient outreach encounter  10/17/22 routed to me 2 days ago rather than pt being contacted by triage or other nurse to assess pt after  hospitalization. Was answered by MD and routed back to triage but encounter remains open and I am not able to remove from inbox so creating separate tele encounter so other encounter can be closed now and removed. Please address patient outreach issue through this tele encounter and then close this encounter when  Appropriate unless require additional MD input beyond response already given with that encounter (See that encounter from 10/17/22 for details)

## 2022-10-20 NOTE — TELEPHONE ENCOUNTER
See new tele encounter 10/19/22 created so that this encounter can be removed from MD inbox after routing back to triage previously

## 2022-11-01 ENCOUNTER — OFFICE VISIT (OUTPATIENT)
Dept: INTERNAL MEDICINE | Facility: CLINIC | Age: 82
End: 2022-11-01
Payer: COMMERCIAL

## 2022-11-01 VITALS
SYSTOLIC BLOOD PRESSURE: 118 MMHG | OXYGEN SATURATION: 97 % | BODY MASS INDEX: 29.32 KG/M2 | WEIGHT: 187.2 LBS | HEART RATE: 65 BPM | TEMPERATURE: 98 F | DIASTOLIC BLOOD PRESSURE: 70 MMHG

## 2022-11-01 DIAGNOSIS — G45.0 VERTEBROBASILAR ARTERY SYNDROME: ICD-10-CM

## 2022-11-01 DIAGNOSIS — I65.23 CAROTID STENOSIS, ASYMPTOMATIC, BILATERAL: ICD-10-CM

## 2022-11-01 DIAGNOSIS — I10 ESSENTIAL HYPERTENSION: ICD-10-CM

## 2022-11-01 DIAGNOSIS — E78.5 HYPERLIPIDEMIA LDL GOAL <100: ICD-10-CM

## 2022-11-01 DIAGNOSIS — R00.2 PALPITATIONS: Primary | ICD-10-CM

## 2022-11-01 PROCEDURE — 99214 OFFICE O/P EST MOD 30 MIN: CPT | Performed by: INTERNAL MEDICINE

## 2022-11-01 NOTE — PROGRESS NOTES
ASSESSMENT:    1. Palpitations  Asymptomatic currently.  Possibly related to missed dose of metoprolol.  Continue daily metoprolol XL    2. Carotid stenosis, asymptomatic, bilateral  Asymptomatic.   50-69% stenosis bilaterally.  Continue medical management as below  - clopidogrel (PLAVIX) 75 MG tablet; TAKE 1 TABLET(75 MG) BY MOUTH DAILY  Dispense: 90 tablet; Refill: 3  - atorvastatin (LIPITOR) 40 MG tablet; Take 1 tablet (40 mg) by mouth daily  Dispense: 90 tablet; Refill: 3     3. Hyperlipidemia LDL goal <100  Controlled.  Continue current medication.  Repeat labs 6 months  - atorvastatin (LIPITOR) 40 MG tablet; Take 1 tablet (40 mg) by mouth daily  Dispense: 90 tablet; Refill: 3  - Lipid panel reflex to direct LDL Fasting; Future  - Comprehensive metabolic panel; Future    4. Vertebrobasilar artery syndrome  See #2  - clopidogrel (PLAVIX) 75 MG tablet; TAKE 1 TABLET(75 MG) BY MOUTH DAILY  Dispense: 90 tablet; Refill: 3  - atorvastatin (LIPITOR) 40 MG tablet; Take 1 tablet (40 mg) by mouth daily  Dispense: 90 tablet; Refill: 3    5. Essential hypertension  Controlled.  Continue current medication.  - metoprolol succinate ER (TOPROL XL) 100 MG 24 hr tablet; Take 1 tablet (100 mg) by mouth daily  Dispense: 90 tablet; Refill: 3  - amLODIPine-benazepril (LOTREL) 5-20 MG capsule; Take 1 capsule by mouth daily  Dispense: 90 capsule; Refill: 3  - Comprehensive metabolic panel; Future      PLAN:  Continue current medications. Refills done  Repeat carotid ultrasound in 1 year  Call  [phone] or use [email or web address] to schedule a future lab appointment  fasting in 6 months.   For fasting labs, please refrain from eating for 8 hours or more.   Drink 2 glasses of water before your lab appointment. It is fine to take your  oral medications on the morning of the lab test as usual  Schedule a follow up appointment with me in clinic a few days after these future labs are drawn to review results and other medical issues as  necessary      (Chart documentation was completed, in part, with [employer] voice-recognition software. Even though reviewed, some grammatical, spelling, and word errors may remain.)    Familia Cook MD  Internal Medicine Department  [facility] BELLA Silva is a 82 year old, presenting for the following health issues:  Hospital F/U      History of Present Illness       Reason for visit:  Post hospital visit followupHe consumes 0 sweetened beverage(s) daily.He exercises with enough effort to increase his heart rate 30 to 60 minutes per day.  He exercises with enough effort to increase his heart rate 5 days per week.   He is taking medications regularly.      Discharge summary reviewed. Part of the summary as below:    [facility]  Hospitalist Discharge Summary       Date of Admission:  10/16/2022  Date of Discharge:  10/17/2022 11:39 AM  Discharging Provider: Roselia Mcgovern NP  Discharge Service: Hospitalist Service        Discharge Diagnoses     1. Tachycardia    2. Near syncope                Follow-ups Needed After Discharge            Unresulted Labs Ordered in the Past 30 Days of this Admission      No orders found for last 31 day(s).                   Discharge Disposition      Discharged to home  Condition at discharge: Good           Hospital Course        Ricardo Neely is a 82 year old male admitted on 10/16/2022. He sudden onset of lightheadedness/dizziness and tachycardia.  He is noted to be in sinus tach, right bundle branch block and other work-up was negative.  Admission requested under observation.     Lightheadedness/dizziness-resolved  Sinus tachycardia-resolved  Presents with sudden onset of dizziness/lightheadedness and palpitation.  No syncopal episode.  EKG in the ED showed sinus tachycardia, right bundle branch block.  He was normotensive.  Orthostatics were within the normal range.  He was given metoprolol 5 mg IV and  heart rate slowed down to the 80s.  Admission for observation and monitoring was requested.  Currently patient is symptom-free.  CT chest negative for PE or other acute findings.  BMP, LFT in the normal range.  N-terminal proBNP is 84.  Troponin 26.  TSH is 0.58.  UA is negative.  SARS-CoV-2 is negative. Ultrasound bilateral carotids revealed there are 50-69% stenoses in the internal carotid arteries bilaterally. Degree of stenosis measured relative to the diameter of the normal internal carotid artery per NASCET or NASCET type criteria. Cardiology did evaluate patient and cleared him for discharge today. He is to follow up if he has further episodes of concern.     Hypertension  Hyperlipidemia  -Resume prior to admission amlodipine-benazepril or formulary substitute  -Resume PTA metoprolol 100 mg daily in the evening  -Resume statin at discharge since and admitted under observation     Vertebrobasilar artery syndrome  Per chart review, MRA in 2008 showed mild to moderate vertebral stenosis.  This is medically managed and he takes aspirin and Plavix.  -Resume PTA aspirin and Plavix.  Aspirin ordered for tomorrow since he already got full dose aspirin by EMS in route     History of lumbar radiculopathy  It appears as he was previously on gabapentin.  No longer on this medication         Most recent lab results reviewed with pt.      Since hospitalization, patient has remained asymptomatic.  He denies any current tachycardia or palpitations.  No lightheadedness, chest pain, shortness of breath.  No syncopal symptoms.  Carotid ultrasound report reviewed.  Asymptomatic       Additional ROS:   Constitutional, HEENT, Cardiovascular, Pulmonary, GI and , Neuro, MSK and Psych review of systems/symptoms are otherwise negative or unchanged from previous, except as noted above.      OBJECTIVE:  /70   Pulse 65   Temp 98  F (36.7  C) (Temporal)   Wt 84.9 kg (187 lb 3.2 oz)   SpO2 97%   BMI 29.32 kg/m     Estimated  "body mass index is 29.32 kg/m  as calculated from the following:    Height as of 10/16/22: 1.702 m (5' 7\").    Weight as of this encounter: 84.9 kg (187 lb 3.2 oz).     Neck: no adenopathy. Thyroid normal to palpation. No bruits despite known  Mild stenosis of.  He was seen at.  Stable Will no limits or loosening  Pulm: Lungs clear to auscultation   CV: Regular rates and rhythm  GI: Soft, nontender, Normal active bowel sounds, No hepatosplenomegaly or masses palpable  Ext: Peripheral pulses intact. No edema.  Neuro: Normal strength and tone, sensory exam grossly normal              "

## 2022-11-02 PROBLEM — I65.23 CAROTID STENOSIS, ASYMPTOMATIC, BILATERAL: Status: ACTIVE | Noted: 2022-11-02

## 2022-11-02 PROBLEM — R00.2 PALPITATIONS: Status: ACTIVE | Noted: 2022-11-02

## 2022-11-02 RX ORDER — ATORVASTATIN CALCIUM 40 MG/1
40 TABLET, FILM COATED ORAL DAILY
Qty: 90 TABLET | Refills: 3 | Status: SHIPPED | OUTPATIENT
Start: 2022-11-02 | End: 2023-05-05

## 2022-11-02 RX ORDER — CLOPIDOGREL BISULFATE 75 MG/1
TABLET ORAL
Qty: 90 TABLET | Refills: 3 | Status: SHIPPED | OUTPATIENT
Start: 2022-11-02 | End: 2023-05-05

## 2022-11-02 RX ORDER — METOPROLOL SUCCINATE 100 MG/1
100 TABLET, EXTENDED RELEASE ORAL DAILY
Qty: 90 TABLET | Refills: 3 | Status: SHIPPED | OUTPATIENT
Start: 2022-11-02 | End: 2023-05-05 | Stop reason: DRUGHIGH

## 2022-11-02 RX ORDER — AMLODIPINE AND BENAZEPRIL HYDROCHLORIDE 5; 20 MG/1; MG/1
1 CAPSULE ORAL DAILY
Qty: 90 CAPSULE | Refills: 3 | Status: SHIPPED | OUTPATIENT
Start: 2022-11-02 | End: 2023-05-05

## 2022-11-03 NOTE — PATIENT INSTRUCTIONS
Continue current medications. Refills done  Repeat carotid ultrasound in 1 year  Call  [phone] or use [email or web address] to schedule a future lab appointment  fasting in 6 months.   For fasting labs, please refrain from eating for 8 hours or more.   Drink 2 glasses of water before your lab appointment. It is fine to take your  oral medications on the morning of the lab test as usual  Schedule a follow up appointment with me in clinic a few days after these future labs are drawn to review results and other medical issues as necessary

## 2022-12-05 ENCOUNTER — MYC MEDICAL ADVICE (OUTPATIENT)
Dept: INTERNAL MEDICINE | Facility: CLINIC | Age: 82
End: 2022-12-05

## 2022-12-06 ENCOUNTER — NURSE TRIAGE (OUTPATIENT)
Dept: INTERNAL MEDICINE | Facility: CLINIC | Age: 82
End: 2022-12-06

## 2022-12-06 NOTE — TELEPHONE ENCOUNTER
"Nurse Triage SBAR    Is this a 2nd Level Triage? NO    Situation: Patient left [facility] stating that he noticed new swelling in the left leg.     Background: Patient noticed this leg swelling yesterday, patient noticed left leg was more swollen than the right leg.     Assessment: Patient reports left leg is \"pretty swollen\" and pushing down on the leg leaves a slight indentation. Patient reports no difference in temperature when feeling the left leg compared to the right leg, no SOB, no chest pain, and no change in sensation between the two extremities.     Protocol Recommended Disposition:   Go To ED/UCC Now (Or To Office With PCP Approval)    Recommendation: Per disposition, RN advised patient to be seen in ED/UC for this new swelling. Patient agreed with plan of care and will go to the ED today. RN also advised patient if symptoms worsen to call EMS.      Patient agrees with plan of care.    Does the patient meet one of the following criteria for ADS visit consideration? 16+ years old, with an [facility] PCP     TIP  Providers, please consider if this condition is appropriate for management at one of our Acute and Diagnostic Services sites.     If patient is a good candidate, please use dotphrase <dot>triageresponse and select Refer to ADS to document.      Reason for Disposition    Thigh, calf, or ankle swelling in only one leg    Additional Information    Negative: Sounds like a life-threatening emergency to the triager    Negative: Chest pain    Negative: Small area of swelling and followed an insect bite to the area    Negative: Followed a knee injury    Negative: Ankle or foot injury    Negative: Pregnant with leg swelling or edema    Negative: Difficulty breathing at rest    Negative: Entire foot is cool or blue in comparison to other side    Negative: SEVERE swelling (e.g., swelling extends above knee, entire leg is swollen, weeping fluid)    Negative: Thigh or calf pain and only 1 side and present > 1 " hour    Protocols used: LEG SWELLING AND EDEMA-A-OH

## 2023-02-28 ENCOUNTER — TELEPHONE (OUTPATIENT)
Dept: NEUROSURGERY | Facility: OTHER | Age: 83
End: 2023-02-28
Payer: COMMERCIAL

## 2023-02-28 NOTE — TELEPHONE ENCOUNTER
Surgical patient of Dr. Perez's having back pain in the exact same area that the procedure was done. Patient wants to know if provider would be able to see patient after April 14th when patient returns back to [place].    Please call patient cell phone with next steps.

## 2023-03-02 ENCOUNTER — TELEPHONE (OUTPATIENT)
Dept: NEUROSURGERY | Facility: CLINIC | Age: 83
End: 2023-03-02
Payer: COMMERCIAL

## 2023-03-02 NOTE — TELEPHONE ENCOUNTER
Patient had L3-5 fusion 8/21/19 with Dr Perez. Call clinic reporting return of pain at surgery site, at worse 7-8/10, and worse when lying still.   Ambulating helps relieve pain.   No weakness, radiation, no n/t.     Would like to schedule clinic appt with KEILY upon return to [place], April 14th.     Routed to schedulers.

## 2023-03-02 NOTE — TELEPHONE ENCOUNTER
lmtc- need to schedule follow up KEILY appointment in [facility] after 4-14-23 -  Schedule per staffing message.

## 2023-03-03 NOTE — TELEPHONE ENCOUNTER
Left 2nd message for patient to call and schedule with Dr. Ana VILLEDA after 4-14-23. Per staff message

## 2023-04-01 ENCOUNTER — HEALTH MAINTENANCE LETTER (OUTPATIENT)
Age: 83
End: 2023-04-01

## 2023-04-04 ENCOUNTER — TELEPHONE (OUTPATIENT)
Dept: NEUROSURGERY | Facility: CLINIC | Age: 83
End: 2023-04-04
Payer: COMMERCIAL

## 2023-04-14 ENCOUNTER — MYC MEDICAL ADVICE (OUTPATIENT)
Dept: NEUROSURGERY | Facility: CLINIC | Age: 83
End: 2023-04-14
Payer: COMMERCIAL

## 2023-04-14 NOTE — TELEPHONE ENCOUNTER
4th attempt was made to reach patient via telephone regarding appointment on 4-17 that was cancelled.  A My Chart message was sent to have patient call the clinic to reschedule.

## 2023-04-18 ENCOUNTER — PRE VISIT (OUTPATIENT)
Dept: NEUROSURGERY | Facility: CLINIC | Age: 83
End: 2023-04-18
Payer: COMMERCIAL

## 2023-04-18 NOTE — TELEPHONE ENCOUNTER
NEUROSURGERY- NEW PREVISIT PLANNING       Record Status/Location     Referring Provider In chart Harrison Perez MD   Diagnosis Surgery encounter L3-5 LUMBAR TRANSFORAMINAL INTERBODY FUSION WITH STEALTH   MRI (HEAD, NECK, SPINE) PACS Lumbar 5/7/21 [facility]   CT Na No   X-ray PACS Lumbar 5/4/23 [facility]   INJECTION Na No   PHYSICAL THERAPY In chart 2021 [facility]     SURGERY Harrison Perez 8/21/2019

## 2023-04-19 ENCOUNTER — OFFICE VISIT (OUTPATIENT)
Dept: NEUROSURGERY | Facility: CLINIC | Age: 83
End: 2023-04-19
Payer: COMMERCIAL

## 2023-04-19 ENCOUNTER — ANCILLARY PROCEDURE (OUTPATIENT)
Dept: MRI IMAGING | Facility: CLINIC | Age: 83
End: 2023-04-19
Attending: NURSE PRACTITIONER
Payer: COMMERCIAL

## 2023-04-19 ENCOUNTER — ANCILLARY PROCEDURE (OUTPATIENT)
Dept: GENERAL RADIOLOGY | Facility: CLINIC | Age: 83
End: 2023-04-19
Attending: NURSE PRACTITIONER
Payer: COMMERCIAL

## 2023-04-19 VITALS — SYSTOLIC BLOOD PRESSURE: 129 MMHG | OXYGEN SATURATION: 99 % | DIASTOLIC BLOOD PRESSURE: 74 MMHG | HEART RATE: 67 BPM

## 2023-04-19 DIAGNOSIS — M54.16 LUMBAR RADICULOPATHY: ICD-10-CM

## 2023-04-19 DIAGNOSIS — Z98.1 STATUS POST LUMBAR SPINAL FUSION: ICD-10-CM

## 2023-04-19 DIAGNOSIS — M54.16 LUMBAR RADICULOPATHY: Primary | ICD-10-CM

## 2023-04-19 PROCEDURE — 72100 X-RAY EXAM L-S SPINE 2/3 VWS: CPT | Mod: TC | Performed by: RADIOLOGY

## 2023-04-19 PROCEDURE — 255N000002 HC RX 255 OP 636: Performed by: NURSE PRACTITIONER

## 2023-04-19 PROCEDURE — 72158 MRI LUMBAR SPINE W/O & W/DYE: CPT

## 2023-04-19 PROCEDURE — A9585 GADOBUTROL INJECTION: HCPCS | Performed by: NURSE PRACTITIONER

## 2023-04-19 PROCEDURE — 99213 OFFICE O/P EST LOW 20 MIN: CPT | Performed by: NURSE PRACTITIONER

## 2023-04-19 RX ORDER — GADOBUTROL 604.72 MG/ML
8.5 INJECTION INTRAVENOUS ONCE
Status: COMPLETED | OUTPATIENT
Start: 2023-04-19 | End: 2023-04-19

## 2023-04-19 RX ADMIN — GADOBUTROL 8.5 ML: 604.72 INJECTION INTRAVENOUS at 14:58

## 2023-04-19 ASSESSMENT — PAIN SCALES - GENERAL: PAINLEVEL: SEVERE PAIN (7)

## 2023-04-19 NOTE — NURSING NOTE
"Ricardo Neely is a 82 year old male who presents for:  Chief Complaint   Patient presents with     RECHECK     Bi lateral sharp low back pain, left leg has shooting pain, tingling in left foot, pain is better when sitting, worst in the morning        Initial Vitals:  /74   Pulse 67   SpO2 99%  Estimated body mass index is 29.32 kg/m  as calculated from the following:    Height as of 10/16/22: 5' 7\" (1.702 m).    Weight as of 11/1/22: 187 lb 3.2 oz (84.9 kg).. There is no height or weight on file to calculate BSA. BP completed using cuff size: regular  Severe Pain (7)    Nursing Comments:       Kristen Shaw    "

## 2023-04-19 NOTE — PROGRESS NOTES
[facility]surgery  Neurosurgery Follow Up:    HPI: 82M with a history of L3-5 TLIF with Dr. Perez on 8/21/2019 who was doing well postoperatively until 3 months ago. He denies any injury at the onset. He reports progressively worsening low back pain which radiates to the bilateral, non-specific legs. He has intermittent paresthesias in the left foot as well. No overt weakness. No bowel/bladder complaints. He has continued his home PT exercises with worsened symptoms.     Medical, surgical, family, and social history unchanged since prior exam.  Exam:  Constitutional:  Alert, well nourished, NAD.  HEENT: Normocephalic, atraumatic.   Pulm:  Without shortness of breath   CV:  No pitting edema of BLE.      Vital Signs:  /74   Pulse 67   SpO2 99%     Neurological:  Awake  Alert  Oriented x 3  Motor exam:        IP Q DF PF EHL  R   5  5   5   5    5  L   5  5   5   5    5     Able to spontaneously move L/E bilaterally  Sensation intact throughout all L/E dermatomes     Incisions:  Healed  Imaging: No new imaging to review.    A/P:   Lumbar radiculopathy  Left foot paresthesias  S/p lumbar spine fusion  Due to progressively worsening symptoms despite home therapies, would recommend lumbar XR and MRI at this time. I will contact him with the results and further recommendations. He verbalized understanding and agreement.    Patient Instructions   -Lumbar xray today. I will contact you with the results.  -Lumbar MRI ordered. They will contact you to schedule. I will contact you with the results and further recommendations.  -Activity as tolerated for now.  -Please contact our clinic with questions or concerns at [phone].      Flores Portillo, JENNIFER  [facility]  [address] [place] [id number]  Tel [phone]  Fax [phone]

## 2023-04-19 NOTE — PATIENT INSTRUCTIONS
-Lumbar xray today. I will contact you with the results.  -Lumbar MRI ordered. They will contact you to schedule. I will contact you with the results and further recommendations.  -Activity as tolerated for now.  -Please contact our clinic with questions or concerns at [phone].

## 2023-04-19 NOTE — LETTER
4/19/2023         RE: Ricardo Neely  [address]  [place] [address]        Dear Colleague,    Thank you for referring your patient, Ricardo Neely, to the [facility]. Please see a copy of my visit note below.    [facility] Neurosurgery  Neurosurgery Follow Up:    HPI: 82M with a history of L3-5 TLIF with Dr. Perez on 8/21/2019 who was doing well postoperatively until 3 months ago. He denies any injury at the onset. He reports progressively worsening low back pain which radiates to the bilateral, non-specific legs. He has intermittent paresthesias in the left foot as well. No overt weakness. No bowel/bladder complaints. He has continued his home PT exercises with worsened symptoms.     Medical, surgical, family, and social history unchanged since prior exam.  Exam:  Constitutional:  Alert, well nourished, NAD.  HEENT: Normocephalic, atraumatic.   Pulm:  Without shortness of breath   CV:  No pitting edema of BLE.      Vital Signs:  /74   Pulse 67   SpO2 99%     Neurological:  Awake  Alert  Oriented x 3  Motor exam:        IP Q DF PF EHL  R   5  5   5   5    5  L   5  5   5   5    5     Able to spontaneously move L/E bilaterally  Sensation intact throughout all L/E dermatomes     Incisions:  Healed  Imaging: No new imaging to review.    A/P:   Lumbar radiculopathy  Left foot paresthesias  S/p lumbar spine fusion  Due to progressively worsening symptoms despite home therapies, would recommend lumbar XR and MRI at this time. I will contact him with the results and further recommendations. He verbalized understanding and agreement.    Patient Instructions   -Lumbar xray today. I will contact you with the results.  -Lumbar MRI ordered. They will contact you to schedule. I will contact you with the results and further recommendations.  -Activity as tolerated for now.  -Please contact our clinic with questions or concerns at  [phone].      Flores Portillo, JENNIFER  [facility]  [address] [address]  Tel [phone]  Fax [phone]        Again, thank you for allowing me to participate in the care of your patient.        Sincerely,        Flores Portillo, NP

## 2023-04-25 ENCOUNTER — TELEPHONE (OUTPATIENT)
Dept: NEUROSURGERY | Facility: CLINIC | Age: 83
End: 2023-04-25
Payer: COMMERCIAL

## 2023-04-25 DIAGNOSIS — M54.16 LUMBAR RADICULOPATHY: Primary | ICD-10-CM

## 2023-04-25 DIAGNOSIS — Z98.1 STATUS POST LUMBAR SPINAL FUSION: ICD-10-CM

## 2023-04-25 NOTE — CONFIDENTIAL NOTE
Patient called requesting MRI results and next steps. Encounter routed to provider for review and recommendations.

## 2023-04-26 NOTE — TELEPHONE ENCOUNTER
Contacted patient to discuss lumbar XR and MRI results. Discussed options including PT, CHERI, and follow up with Dr. Perez. He would like to try CHERI at this time as his home exercises have been painful and would like to try some treatment prior to following up with Dr. Perez. Will get scheduled in [place] per patient request. He will follow up with Dr. Perez if symptoms persist or worsen.

## 2023-04-27 ENCOUNTER — LAB (OUTPATIENT)
Dept: LAB | Facility: CLINIC | Age: 83
End: 2023-04-27
Payer: COMMERCIAL

## 2023-04-27 DIAGNOSIS — I10 ESSENTIAL HYPERTENSION: ICD-10-CM

## 2023-04-27 DIAGNOSIS — E78.5 HYPERLIPIDEMIA LDL GOAL <100: ICD-10-CM

## 2023-04-27 LAB
ALBUMIN SERPL BCG-MCNC: 4.3 G/DL (ref 3.5–5.2)
ALP SERPL-CCNC: 87 U/L (ref 40–129)
ALT SERPL W P-5'-P-CCNC: 28 U/L (ref 10–50)
ANION GAP SERPL CALCULATED.3IONS-SCNC: 12 MMOL/L (ref 7–15)
AST SERPL W P-5'-P-CCNC: 23 U/L (ref 10–50)
BILIRUB SERPL-MCNC: 0.6 MG/DL
BUN SERPL-MCNC: 12.1 MG/DL (ref 8–23)
CALCIUM SERPL-MCNC: 9.3 MG/DL (ref 8.8–10.2)
CHLORIDE SERPL-SCNC: 99 MMOL/L (ref 98–107)
CHOLEST SERPL-MCNC: 123 MG/DL
CREAT SERPL-MCNC: 0.82 MG/DL (ref 0.67–1.17)
DEPRECATED HCO3 PLAS-SCNC: 25 MMOL/L (ref 22–29)
GFR SERPL CREATININE-BSD FRML MDRD: 87 ML/MIN/1.73M2
GLUCOSE SERPL-MCNC: 98 MG/DL (ref 70–99)
HDLC SERPL-MCNC: 54 MG/DL
LDLC SERPL CALC-MCNC: 55 MG/DL
NONHDLC SERPL-MCNC: 69 MG/DL
POTASSIUM SERPL-SCNC: 4.9 MMOL/L (ref 3.4–5.3)
PROT SERPL-MCNC: 6.8 G/DL (ref 6.4–8.3)
SODIUM SERPL-SCNC: 136 MMOL/L (ref 136–145)
TRIGL SERPL-MCNC: 72 MG/DL

## 2023-04-27 PROCEDURE — 36415 COLL VENOUS BLD VENIPUNCTURE: CPT

## 2023-04-27 PROCEDURE — 80061 LIPID PANEL: CPT

## 2023-04-27 PROCEDURE — 80053 COMPREHEN METABOLIC PANEL: CPT

## 2023-05-05 ENCOUNTER — OFFICE VISIT (OUTPATIENT)
Dept: INTERNAL MEDICINE | Facility: CLINIC | Age: 83
End: 2023-05-05
Payer: COMMERCIAL

## 2023-05-05 VITALS
BODY MASS INDEX: 30.56 KG/M2 | WEIGHT: 194.7 LBS | HEART RATE: 72 BPM | SYSTOLIC BLOOD PRESSURE: 134 MMHG | OXYGEN SATURATION: 99 % | TEMPERATURE: 97.8 F | DIASTOLIC BLOOD PRESSURE: 71 MMHG | RESPIRATION RATE: 16 BRPM | HEIGHT: 67 IN

## 2023-05-05 DIAGNOSIS — G45.0 VERTEBROBASILAR ARTERY SYNDROME: ICD-10-CM

## 2023-05-05 DIAGNOSIS — H91.93 BILATERAL HEARING LOSS, UNSPECIFIED HEARING LOSS TYPE: ICD-10-CM

## 2023-05-05 DIAGNOSIS — Z00.00 MEDICARE ANNUAL WELLNESS VISIT, SUBSEQUENT: Primary | ICD-10-CM

## 2023-05-05 DIAGNOSIS — I65.23 CAROTID STENOSIS, ASYMPTOMATIC, BILATERAL: ICD-10-CM

## 2023-05-05 DIAGNOSIS — M54.16 LUMBAR RADICULOPATHY: ICD-10-CM

## 2023-05-05 DIAGNOSIS — Z23 NEED FOR COVID-19 VACCINE: ICD-10-CM

## 2023-05-05 DIAGNOSIS — E78.5 HYPERLIPIDEMIA LDL GOAL <100: ICD-10-CM

## 2023-05-05 DIAGNOSIS — H61.21 EXCESSIVE CERUMEN IN EAR CANAL, RIGHT: ICD-10-CM

## 2023-05-05 DIAGNOSIS — I10 ESSENTIAL HYPERTENSION: ICD-10-CM

## 2023-05-05 PROCEDURE — 91312 COVID-19 BIVALENT 12+ (PFIZER): CPT | Performed by: INTERNAL MEDICINE

## 2023-05-05 PROCEDURE — 99214 OFFICE O/P EST MOD 30 MIN: CPT | Mod: 25 | Performed by: INTERNAL MEDICINE

## 2023-05-05 PROCEDURE — G0439 PPPS, SUBSEQ VISIT: HCPCS | Performed by: INTERNAL MEDICINE

## 2023-05-05 PROCEDURE — 0124A COVID-19 BIVALENT 12+ (PFIZER): CPT | Performed by: INTERNAL MEDICINE

## 2023-05-05 RX ORDER — CLOPIDOGREL BISULFATE 75 MG/1
TABLET ORAL
Qty: 90 TABLET | Refills: 3 | Status: SHIPPED | OUTPATIENT
Start: 2023-05-05 | End: 2024-05-06

## 2023-05-05 RX ORDER — METOPROLOL SUCCINATE 50 MG/1
50 TABLET, EXTENDED RELEASE ORAL DAILY
Qty: 90 TABLET | Refills: 3 | Status: SHIPPED | OUTPATIENT
Start: 2023-05-05 | End: 2024-05-06

## 2023-05-05 RX ORDER — ATORVASTATIN CALCIUM 40 MG/1
40 TABLET, FILM COATED ORAL DAILY
Qty: 90 TABLET | Refills: 3 | Status: SHIPPED | OUTPATIENT
Start: 2023-05-05 | End: 2024-05-06

## 2023-05-05 RX ORDER — AMLODIPINE AND BENAZEPRIL HYDROCHLORIDE 5; 20 MG/1; MG/1
1 CAPSULE ORAL DAILY
Qty: 90 CAPSULE | Refills: 3 | Status: SHIPPED | OUTPATIENT
Start: 2023-05-05 | End: 2024-05-06

## 2023-05-05 ASSESSMENT — ENCOUNTER SYMPTOMS
MYALGIAS: 0
HEMATOCHEZIA: 0
EYE PAIN: 0
PALPITATIONS: 0
DYSURIA: 0
SORE THROAT: 0
HEARTBURN: 0
JOINT SWELLING: 0
HEADACHES: 0
PARESTHESIAS: 0
CONSTIPATION: 0
FEVER: 0
DIARRHEA: 0
COUGH: 0
NERVOUS/ANXIOUS: 0
FREQUENCY: 0
HEMATURIA: 0
DIZZINESS: 0
CHILLS: 0
WEAKNESS: 0
ARTHRALGIAS: 1
ABDOMINAL PAIN: 0
NAUSEA: 0

## 2023-05-05 ASSESSMENT — ACTIVITIES OF DAILY LIVING (ADL): CURRENT_FUNCTION: NO ASSISTANCE NEEDED

## 2023-05-05 NOTE — PATIENT INSTRUCTIONS
Will call continue current medications  Prescriptions refilled on file    Call  [phone] or use [employer] to schedule a future lab appointment  fasting in 1 year.   For fasting labs, please refrain from eating for 8 hours or more.   Drink 2 glasses of water before your lab appointment. It is fine to take your  oral medications on the morning of the lab test as usual  Schedule a follow up appointment with me in clinic a few days after these future labs are drawn to review results and other medical issues as necessary  Reduce calorie/carbohydrate (sugar, bread, potato, pasta, rice, alcohol etc)  intake in diet.  Increase color on your plate with vegetables. Increase  frequency of walking or other aerobic exercise as able   [place] ultrasound in mid  October. If don't hear from schedulesr by early October, then call [phone]  Back injection 5/17/23. Hold Clopidogrel for 7 days prior to the injection and then restart afterwards as previous  Debrox OTC 4 drop in right ear and let sit for 2-3 minutes and then irrigate  with lukewarm water  daily for 4 days to remove wax and hopefully help itching.  Possible future hearing aids  Vaccinations: Pfizer booster covid vaccine   I would recommend a  tetanus (TD) vaccination in September/October. You may have it done at any pharmacy. If you wish to have it done at a [place] pharmacy, then go to [email or web address] to schedule a vaccination appointment  Pt was informed regarding extra E&M billing for management of new or established medical issues not related to today's wellness/screening visit  Patient Education   Personalized Prevention Plan  You are due for the preventive services outlined below.  Your care team is available to assist you in scheduling these services.  If you have already completed any of these items, please share that information with your care team to update in your medical record.  Health Maintenance Due   Topic Date Due    Annual Wellness Visit   11/08/2022       Signs of Hearing Loss  Hearing loss is a problem shared by many people. In fact, it's one of the most common health problems, particularly as people age. Most people aged 65 and older have some hearing loss. By age 80, almost everyone does. Hearing loss often occurs slowly over the years. So, you may not realize your hearing has gotten worse.   When sudden hearing loss occurs, it's important to contact your healthcare provider right away. Your provider will do a medical exam and a hearing exam as soon as possible. This is to help find the cause and type of your sudden hearing loss. Based on your diagnosis, your healthcare provider will discuss possible treatments.      Hearing much better with one ear can be a sign of hearing loss.     Have your hearing checked  Call your healthcare provider if you:   Have to strain to hear normal conversation  Have to watch other people s faces very carefully to follow what they re saying  Need to ask people to repeat what they ve said  Often misunderstand what people are saying  Turn the volume of the television or radio up so high that others complain  Feel that people are mumbling when they re talking to you  Find that the effort to hear leaves you feeling tired and irritated  Notice, when using the phone, that you hear better with one ear than the other  Narr8 last reviewed this educational content on 6/1/2022 2000-2022 [employer]. All rights reserved. This information is not intended as a substitute for professional medical care. Always follow your healthcare professional's instructions.

## 2023-05-05 NOTE — PROGRESS NOTES
"SUBJECTIVE:   Ricardo is a 82 year old who presents for Preventive Visit and follow-up regarding hypertension, hyperlipidemia, carotid stenosis/vertebrobasilar syndrome and L2-3 lumbar radiculopathy LLE    Patient has been advised of split billing requirements and indicates understanding: Yes     Are you in the first 12 months of your Medicare coverage?  No    Healthy Habits:     In general, how would you rate your overall health?  Good    Frequency of exercise:  6-7 days/week    Duration of exercise:  45-60 minutes    Do you usually eat at least 4 servings of fruit and vegetables a day, include whole grains    & fiber and avoid regularly eating high fat or \"junk\" foods?  Yes    Taking medications regularly:  Yes    Medication side effects:  Other    Ability to successfully perform activities of daily living:  No assistance needed    Home Safety:  No safety concerns identified    Hearing Impairment:  Difficulty following a conversation in a noisy restaurant or crowded room and difficulty understanding soft or whispered speech    In the past 6 months, have you been bothered by leaking of urine?  No    In general, how would you rate your overall mental or emotional health?  Good      PHQ-2 Total Score: 0    Additional concerns today:  No      Have you ever done Advance Care Planning? (For example, a Health Directive, POLST, or a discussion with a medical provider or your loved ones about your wishes): Yes, advance care planning is on file.       Fall risk  Fallen 2 or more times in the past year?: No  Any fall with injury in the past year?: No    Cognitive Screening   1) Repeat 3 items (Leader, Season, Table)    2) Clock draw: NORMAL  3) 3 item recall: Recalls 2 objects   Results: NORMAL clock, 1-2 items recalled: COGNITIVE IMPAIRMENT LESS LIKELY    Mini-CogTM Copyright KALI Velásquez. Licensed by the author for use in [facility]; reprinted with permission (leandro@.[facility]). All rights reserved.      Do you have " sleep apnea, excessive snoring or daytime drowsiness?: no    Reviewed and updated as needed this visit by clinical staff   Tobacco  Allergies  Meds              Reviewed and updated as needed this visit by Provider                 Social History     Tobacco Use     Smoking status: Former     Types: Cigarettes     Quit date: 1968     Years since quittin.3     Smokeless tobacco: Never   Vaping Use     Vaping status: Never Used   Substance Use Topics     Alcohol use: No     Alcohol/week: 0.0 standard drinks of alcohol             2023     6:57 AM   Alcohol Use   Prescreen: >3 drinks/day or >7 drinks/week? Not Applicable     Do you have a current opioid prescription? No  Do you use any other controlled substances or medications that are not prescribed by a provider? None              Current providers sharing in care for this patient include:   Patient Care Team:  Familia Cook MD as PCP - General (Internal Medicine)  Familia Cook MD as Assigned PCP  Flores Portillo NP as Assigned Neuroscience Provider    The following health maintenance items are reviewed in Epic and correct as of today:  Health Maintenance   Topic Date Due     MEDICARE ANNUAL WELLNESS VISIT  2022     DTAP/TDAP/TD IMMUNIZATION (2 - Td or Tdap) 2023     ANNUAL REVIEW OF HM ORDERS  2023     ALT  2024     BMP  2024     LIPID  2024     FALL RISK ASSESSMENT  2024     ADVANCE CARE PLANNING  2028     PHQ-2 (once per calendar year)  Completed     INFLUENZA VACCINE  Completed     Pneumococcal Vaccine: 65+ Years  Completed     ZOSTER IMMUNIZATION  Completed     COVID-19 Vaccine  Completed     IPV IMMUNIZATION  Aged Out     MENINGITIS IMMUNIZATION  Aged Out     Labs reviewed in EPIC    Component      Latest Ref Rng 2022  9:40 AM 2023  8:55 AM   Sodium      136 - 145 mmol/L 134  136    Potassium      3.4 - 5.3 mmol/L 4.0     Chloride      94 - 109 mmol/L 100     Carbon Dioxide       20 - 32 mmol/L 27     Anion Gap      7 - 15 mmol/L 7  12    Urea Nitrogen      7 - 30 mg/dL 14     Creatinine      0.67 - 1.17 mg/dL 0.73  0.82    Calcium      8.8 - 10.2 mg/dL 9.0  9.3    Glucose      70 - 99 mg/dL 99     Alkaline Phosphatase      40 - 129 U/L 96  87    AST      10 - 50 U/L 11  23    ALT      10 - 50 U/L 24  28    Protein Total      6.4 - 8.3 g/dL 7.1  6.8    Albumin      3.4 - 5.0 g/dL 3.9     Bilirubin Total      <=1.2 mg/dL 0.7  0.6    GFR Estimate      >60 mL/min/1.73m2 >90  87    Potassium      3.4 - 5.3 mmol/L  4.9    Chloride      98 - 107 mmol/L  99    Carbon Dioxide (CO2)      22 - 29 mmol/L  25    Urea Nitrogen      8.0 - 23.0 mg/dL  12.1    Glucose      70 - 99 mg/dL  98    Albumin      3.5 - 5.2 g/dL  4.3    Cholesterol      <200 mg/dL 127  123    Triglycerides      <150 mg/dL 87  72    HDL Cholesterol      >=40 mg/dL 60  54    LDL Cholesterol Calculated      <=100 mg/dL 50  55    Non HDL Cholesterol      <130 mg/dL 67  69    Patient Fasting? Yes                 Review of Systems   Constitutional: Negative for chills and fever.   HENT: Positive for ear pain and hearing loss. Negative for congestion and sore throat.    Eyes: Negative for pain and visual disturbance.   Respiratory: Negative for cough.    Cardiovascular: Positive for peripheral edema. Negative for chest pain and palpitations.   Gastrointestinal: Negative for abdominal pain, constipation, diarrhea, heartburn, hematochezia and nausea.   Genitourinary: Positive for impotence and urgency. Negative for dysuria, frequency, genital sores, hematuria and penile discharge.   Musculoskeletal: Positive for arthralgias. Negative for joint swelling and myalgias.   Skin: Negative for rash.   Neurological: Negative for dizziness, weakness, headaches and paresthesias.   Psychiatric/Behavioral: Negative for mood changes. The patient is not nervous/anxious.       Has some ear itching right. Has tried  Steroid topical without much change.  "Declines hearing aids at this time. Has seen ENT before. Hearing loss bilateral  Had lower heart rate in [place].  Saw nurse practitioner and metoprolol dose was reduced from 100 mg down to 50 mg daily.  Heart rate now normal.   Has Baker's cyst left knee. Had cortisone injection in [place] and much better. No orothopnea, PND   Has  some urine urgency. Has coffee 8-10 cups/day   HAS BLE radiculopathy (L>R). Will be having LESI later this month and possible surgery if not improving, Seeing neurosurgery. Notes reviewed   BPs at home 114/70s    OBJECTIVE:   /71   Pulse 72   Temp 97.8  F (36.6  C) (Temporal)   Resp 16   Ht 1.702 m (5' 7\")   Wt 88.3 kg (194 lb 11.2 oz)   SpO2 99%   BMI 30.49 kg/m   Estimated body mass index is 29.32 kg/m  as calculated from the following:    Height as of 10/16/22: 1.702 m (5' 7\").    Weight as of 11/1/22: 84.9 kg (187 lb 3.2 oz).  Physical Exam    General appearance -   alert, no distress  Skin - No rashes or lesions.  Head - normocephalic, atraumatic  Eyes - JOSHUA, EOMI, fundi exam with nondilated pupils negative.  Ears - External ears normal. Canal clear.  Moderate cerumen right ear canal.  Attempted to remove with ring tipped probe but due to multiple hairs in the ear canal, procedure was too uncomfortable and discontinued.  TM's normal.  Nose/Sinuses - Nares normal. Septum midline. Mucosa normal. No drainage or sinus tenderness.  Oropharynx - No erythema, no adenopathy, no exudates.  Dentures  Neck - Supple without adenopathy or thyromegaly.  Minimal bruit auscultated bilaterally.  Strong carotid pulses palpable  Lungs - Clear to auscultation without wheezes/rhonchi.  Heart - Regular rate and rhythm without murmurs, clicks, or gallops.  Nodes - No supraclavicular, axillary, or inguinal adenopathy palpable.  Abdomen - Abdomen soft, non-tender. BS normal. No masses or hepatosplenomegaly palpable. No bruits.  Extremities -No cyanosis, clubbing. Trace LLE ankle nonpitting edema.  "   Musculoskeletal -   Muscular strength intact.  DIP and PIP joints of hands with mild osteoarthritis thickening without erythema.  Tenderness to palpation paralumbar musculature  Peripheral pulses - radial=4/4, femoral=4/4, posterior tibial=4/4, dorsalis pedis=4/4,  Neuro - Gait minimally antalgic but stable.  Reflexes normal and symmetric. Sensation grossly WNL.  Genital - Normal-appearing male external genitalia. No scrotal masses or inguinal hernia palpable.   Rectal -  deferred        ASSESSMENT / PLAN:    1. Medicare annual wellness visit, subsequent  COVID booster as well.  We will get tetanus vaccination later this Fall at pharmacy.  Other healthcare maintenance up-to-date for age  - PRIMARY CARE FOLLOW-UP SCHEDULING; Future    2. Essential hypertension  Controlled.  Continue current medication.   - metoprolol succinate ER (TOPROL XL) 50 MG 24 hr tablet; Take 1 tablet (50 mg) by mouth daily  Dispense: 90 tablet; Refill: 3  - amLODIPine-benazepril (LOTREL) 5-20 MG capsule; Take 1 capsule by mouth daily  Dispense: 90 capsule; Refill: 3  - Comprehensive metabolic panel; Future  - CBC with platelets; Future    3. Hyperlipidemia LDL goal <100  Controlled.  Continue current medication.  Repeat lab 1 year  - atorvastatin (LIPITOR) 40 MG tablet; Take 1 tablet (40 mg) by mouth daily  Dispense: 90 tablet; Refill: 3  - Lipid panel reflex to direct LDL Fasting; Future  - Comprehensive metabolic panel; Future    4. Carotid stenosis, asymptomatic, bilateral  Asymptomatic.  Has carotid ultrasound scheduled for October 2023.  Continue medical management  - atorvastatin (LIPITOR) 40 MG tablet; Take 1 tablet (40 mg) by mouth daily  Dispense: 90 tablet; Refill: 3  - clopidogrel (PLAVIX) 75 MG tablet; TAKE 1 TABLET(75 MG) BY MOUTH DAILY  Dispense: 90 tablet; Refill: 3    5. Vertebrobasilar artery syndrome  Denies dizziness/vertigo symptoms.  Continue medical management.  Future carotid ultrasound as above  - atorvastatin  "(LIPITOR) 40 MG tablet; Take 1 tablet (40 mg) by mouth daily  Dispense: 90 tablet; Refill: 3  - clopidogrel (PLAVIX) 75 MG tablet; TAKE 1 TABLET(75 MG) BY MOUTH DAILY  Dispense: 90 tablet; Refill: 3    6. Lumbar radiculopathy  Worsening low back pain recently with left greater than right radiculopathy.  Being managed by neurosurgery and has LESI scheduled for later this month.  Will hold clopidogrel 7 days prior    7. Bilateral hearing loss, unspecified hearing loss type  Has seen ENT previously.  Symptoms not bothersome enough that patient wishes to consider hearing aids at this time    8. Excessive cerumen in ear canal, right  Nonobstructive cerumen right ear canal.  Will use over-the-counter Debrox    9. Need for COVID-19 vaccine  Negative for COVID booster vaccination  - COVID-19 BIVALENT 12+ (PFIZER)        Patient has been advised of split billing requirements and indicates understanding: Yes      COUNSELING:  Reviewed preventive health counseling, as reflected in patient instructions      BMI:   Estimated body mass index is 29.32 kg/m  as calculated from the following:    Height as of 10/16/22: 1.702 m (5' 7\").    Weight as of 11/1/22: 84.9 kg (187 lb 3.2 oz).         He reports that he quit smoking about 55 years ago. His smoking use included cigarettes. He has never used smokeless tobacco.      Appropriate preventive services were discussed with this patient, including applicable screening as appropriate for cardiovascular disease, diabetes, osteopenia/osteoporosis, and glaucoma.  As appropriate for age/gender, discussed screening for colorectal cancer, prostate cancer, breast cancer, and cervical cancer. Checklist reviewing preventive services available has been given to the patient.    Reviewed patients plan of care and provided an AVS. The Basic Care Plan (routine screening as documented in Health Maintenance) for Ricardo meets the Care Plan requirement. This Care Plan has been established and reviewed " with the Patient.     PLAN:  Continue current medications  Prescriptions refilled on file    Call  [phone] or use [facility] to schedule a future lab appointment  fasting in 1 year.   For fasting labs, please refrain from eating for 8 hours or more.   Drink 2 glasses of water before your lab appointment. It is fine to take your  oral medications on the morning of the lab test as usual  Schedule a follow up appointment with me in clinic a few days after these future labs are drawn to review results and other medical issues as necessary  Reduce calorie/carbohydrate (sugar, bread, potato, pasta, rice, alcohol etc)  intake in diet.  Increase color on your plate with vegetables. Increase  frequency of walking or other aerobic exercise as able   Caroid ultrasound in mid  October. If don't hear from schedulesr by early October, then call [phone]  Back injection 5/17/23. Hold Clopidogrel for 7 days prior to the injection and then restart afterwards as previous  Debrox OTC 4 drop in right ear and let sit for 2-3 minutes and then irrigate  with lukewarm water  daily for 4 days to remove wax and hopefully help itching.  Possible future hearing aids  Vaccinations: Pfizer booster covid vaccine   I would recommend a  tetanus (TD) vaccination in September/October. You may have it done at any pharmacy. If you wish to have it done at a [place] pharmacy, then go to [email or web address] to schedule a vaccination appointment  Pt was informed regarding extra E&M billing for management of new or established medical issues not related to today's wellness/screening visit      Familia Cook MD  [facility]    Identified Health Risks:    I have reviewed Opioid Use Disorder and Substance Use Disorder risk factors and  NA      The patient was provided with written information regarding signs of hearing loss.

## 2023-05-17 ENCOUNTER — RADIOLOGY INJECTION OFFICE VISIT (OUTPATIENT)
Dept: GENERAL RADIOLOGY | Facility: CLINIC | Age: 83
End: 2023-05-17
Attending: NURSE PRACTITIONER
Payer: COMMERCIAL

## 2023-05-17 VITALS — HEART RATE: 63 BPM | OXYGEN SATURATION: 97 % | DIASTOLIC BLOOD PRESSURE: 66 MMHG | SYSTOLIC BLOOD PRESSURE: 165 MMHG

## 2023-05-17 DIAGNOSIS — Z98.1 STATUS POST LUMBAR SPINAL FUSION: ICD-10-CM

## 2023-05-17 DIAGNOSIS — M54.16 LUMBAR RADICULOPATHY: ICD-10-CM

## 2023-05-17 PROCEDURE — 250N000011 HC RX IP 250 OP 636: Performed by: PAIN MEDICINE

## 2023-05-17 PROCEDURE — 62323 NJX INTERLAMINAR LMBR/SAC: CPT

## 2023-05-17 PROCEDURE — 99207 PR NO CHARGE LOS: CPT | Performed by: PAIN MEDICINE

## 2023-05-17 PROCEDURE — 255N000002 HC RX 255 OP 636: Performed by: PAIN MEDICINE

## 2023-05-17 PROCEDURE — 62323 NJX INTERLAMINAR LMBR/SAC: CPT | Performed by: PAIN MEDICINE

## 2023-05-17 RX ORDER — TRIAMCINOLONE ACETONIDE 40 MG/ML
40 INJECTION, SUSPENSION INTRA-ARTICULAR; INTRAMUSCULAR ONCE
Status: COMPLETED | OUTPATIENT
Start: 2023-05-17 | End: 2023-05-17

## 2023-05-17 RX ORDER — IOPAMIDOL 408 MG/ML
10 INJECTION, SOLUTION INTRATHECAL ONCE
Status: COMPLETED | OUTPATIENT
Start: 2023-05-17 | End: 2023-05-17

## 2023-05-17 RX ADMIN — IOPAMIDOL 1 ML: 408 INJECTION, SOLUTION INTRATHECAL at 15:08

## 2023-05-17 RX ADMIN — TRIAMCINOLONE ACETONIDE 40 MG: 40 INJECTION, SUSPENSION INTRA-ARTICULAR; INTRAMUSCULAR at 15:43

## 2023-05-17 ASSESSMENT — PAIN SCALES - GENERAL
PAINLEVEL: MODERATE PAIN (5)
PAINLEVEL: MODERATE PAIN (5)

## 2023-05-17 NOTE — PROGRESS NOTES
Pre procedure Diagnosis: Lumbar radiculopathy,   Post procedure Diagnosis: Same  Procedure performed: caudal epidural steroid injection  Anesthesia: none  Complications: none  Operators: Marc Gay MD     Indications:   Ricardo Neely is a 82 year old male.  They have a history of lbp radiating to his le.  Exam shows + slump and they have tried conservative treatment including meds /pt.    Mr reviewed   Options/alternatives, benefits and risks were discussed with the patient including bleeding, infection, tissue trauma, numbness, weakness, paralysis, spinal cord injury, radiation exposure, headache, reaction to medications including seizure, and effects on the bladder from local anesthetic.  Questions were answered to his satisfaction and he agrees to proceed. Voluntary informed consent was obtained and signed.     Vitals were reviewed: Yes  There were no vitals taken for this visit.  Allergies were reviewed:  Yes   Medications were reviewed:  Yes   Pre-procedure pain score: 5/10  Post-procedure pain score 5/10    Procedure:  After obtaining signed informed consent, patient was brought into the procedure suite and was placed in a prone position on the procedure table.   A Pause for the Cause was performed.  Patient was prepped and draped in the usual sterile fashion.     The sacral hiatus and cornua were palpated.  Under lateral fluoroscopic guidance sacral hiatus was identified.  3 ml of lidocaine 1% was then injected at the needle entry point to anesthetize the skin. Then a 17 gauge 3.5 inch Tuohy needle was inserted into sacral hiatus utilizing fluoroscopic guidance.  Aspiration was negative for blood or CSF.  Needle placement was verified by injecting Omnipaque 300 1 ml utilizing real time fluoroscopy that showed good needle placement and adequate spread in the lower sacral epidural space without intravascular or intrathecal uptake.  Then, an epidural catheter was advanced through the Tuohy needle into  the epidural space without resistance.  Aspiration was negative.  Catheter placement was verified by injecting Omnipaque 300 1 ml under real time fluoroscopyThen, after repeated negative aspiration, a combination of Kenalog 40 mg, 2 ml of  0.25% Bupivicaine, diluted with 3 ml of normal saline for a total injectate volume of 6 ml was injected in a slow and incremental fashion and the needle was withdrawn. Omnipaque wasted 9.  The injection site was cleaned and sterile dressing applied.     The patient tolerated the procedure well without complications and was taken to the recovery area for continued observation.  They were subsequently discharged to home in good condition after meeting discharge criteria.      Images were saved to PACS.    Post-procedure pain score: 5/10  Follow-up includes:   -f/u phone call in one week  -f/u with referring provider    Marc Gay MD  [facility]

## 2023-05-17 NOTE — NURSING NOTE
Discharge Information    IV Discontiued Time:  NA    Amount of Fluid Infused:  NA    Discharge Criteria = When patient returns to baseline or as per MD order    Consciousness:  Pt is fully awake    Circulation:  BP +/- 20% of pre-procedure level    Respiration:  Patient is able to breathe deeply    O2 Sat:  Patient is able to maintain O2 Sat >92% on room air    Activity:  Moves 4 extremities on command    Ambulation:  Patient is able to stand and walk or stand and pivot into wheelchair    Dressing:  Clean/dry or No Dressing    Notes:   Discharge instructions and AVS given to patient    Patient meets criteria for discharge?  YES    Admitted to PCU?  No    Responsible adult present to accompany patient home?  Yes    Signature/Title:    Shakila Agulia RN  RN Care Coordinator  [facility]

## 2023-05-17 NOTE — NURSING NOTE
Pre-procedure Intake  If YES to any questions or NO to having a   Please complete laminated checklist and leave on the computer keyboard for Provider, verbally inform provider if able.    For SCS Trial, RFA's or any sedation procedure:  Have you been fasting? NA    If yes, for how long?     Are you taking any any blood thinners such as Coumadin, Warfarin, Jantoven, Pradaxa Xarelto, Eliquis, Edoxaban, Enoxaparin, Lovenox, Heparin, Arixtra, Fondaparinux, or Fragmin? OR Antiplatelet medication such as Plavix, Brilinta, or Effient?   Yes  clopidogrel (PLAVIX) 75 MG tablet    If yes, when did you take your last dose? Patient reports last taking his clopidogrel (PLAVIX) 75 MG tablet on 05/09/2023    Do you take aspirin?  Yes -   ASA Patient reports last taking on 05/09/2023    If cervical procedure, have you held aspirin for 6 days?   NA    Do you have any allergies to contrast dye, iodine, steroid and/or numbing medications?  NO    Are you currently taking antibiotics or have an active infection?  No    Have you had a fever/elevated temperature within the past week? NO    Are you currently taking oral steroids? NO    Do you have a ? Yes    Are you pregnant or breastfeeding?  NO    Have you received the COVID-19 vaccine? Yes    If yes, was it your 1st, 2nd or only dose needed? 2nd dose and booster    Date of most recent vaccine: 05/05/2023    Notify provider and RNs if systolic BP >170, diastolic BP >100, P >100 or O2 sats < 90%    Gloria Ruffin MA  [facility] [place]

## 2023-05-17 NOTE — PATIENT INSTRUCTIONS
[facility]   Procedure Discharge Instructions    Today you saw: Dr. Marc Gay    You had an:  Epidural steroid injection           If you were holding your blood thinning medication, please restart taking it:   Be cautious when walking. Numbness and/or weakness in the lower extremities may occur for up to 6-8 hours after the procedure due to effect of the local anesthetic  Do not drive for 6 hours. The effect of the local anesthetic could slow your reflexes.   You may resume your regular activities after 24 hours  Avoid strenuous activity for the first 24 hours  You may shower, however avoid swimming, tub baths or hot tubs for 24 hours following your procedure  You may have a mild to moderate increase in pain for several days following the injection.  It may take up to 14 days for the steroid medication to start working although you may feel the effect as early as a few days after the procedure.     You may use ice packs for 10-15 minutes, 3 to 4 times a day at the injection site for comfort  Do not use heat to painful areas for 6 to 8 hours. This will give the local anesthetic time to wear off and prevent you from accidentally burning your skin.   Unless you have been directed to avoid the use of anti-inflammatory medications (NSAIDS), you may use medications such as ibuprofen, Aleve or Tylenol for pain control if needed.   If you were fasting, you may resume your normal diet and medications after the procedure  If you have diabetes, check your blood sugar more frequently than usual as your blood sugar may be higher than normal for 10-14 days following a steroid injection. Contact your doctor who manages your diabetes if your blood sugar is higher than usual  Possible side effects of steroids that you may experience include flushing, elevated blood pressure, increased appetite, mild headaches and restlessness.  All of these symptoms will get better with time.  If you experience  any of the following, call the Pain Clinic during work hours (Mon-Friday 8-4:30 pm) at [phone] or the Provider Line after hours at [phone]:  -Fever over 100 degree F  -Swelling, bleeding, redness, drainage, warmth at the injection site  -Progressive weakness or numbness in your legs or arms  -Loss of bowel or bladder function  -Unusual headache that is not relieved by Tylenol or other pain reliever  -Unusual new onset of pain that is not improving

## 2023-06-01 ENCOUNTER — OFFICE VISIT (OUTPATIENT)
Dept: URGENT CARE | Facility: URGENT CARE | Age: 83
End: 2023-06-01
Payer: COMMERCIAL

## 2023-06-01 VITALS
OXYGEN SATURATION: 96 % | HEART RATE: 76 BPM | SYSTOLIC BLOOD PRESSURE: 118 MMHG | TEMPERATURE: 98.9 F | DIASTOLIC BLOOD PRESSURE: 62 MMHG

## 2023-06-01 DIAGNOSIS — J02.9 VIRAL PHARYNGITIS: Primary | ICD-10-CM

## 2023-06-01 DIAGNOSIS — I10 ESSENTIAL HYPERTENSION: ICD-10-CM

## 2023-06-01 DIAGNOSIS — R07.0 THROAT PAIN: ICD-10-CM

## 2023-06-01 LAB — DEPRECATED S PYO AG THROAT QL EIA: NEGATIVE

## 2023-06-01 PROCEDURE — 87651 STREP A DNA AMP PROBE: CPT | Performed by: NURSE PRACTITIONER

## 2023-06-01 PROCEDURE — 99214 OFFICE O/P EST MOD 30 MIN: CPT | Performed by: NURSE PRACTITIONER

## 2023-06-02 LAB — GROUP A STREP BY PCR: NOT DETECTED

## 2023-06-02 NOTE — PROGRESS NOTES
Chief Complaint   Patient presents with     Pharyngitis     Started this morning seeing some white in the back          ICD-10-CM    1. Viral pharyngitis  J02.9       2. Throat pain  R07.0 Streptococcus A Rapid Screen w/Reflex to PCR - Clinic Collect     Group A Streptococcus PCR Throat Swab      3. Essential hypertension  I10         Tylenol, salt water gargles, recheck if symptoms remain in 7 to 10 days.      Patient's initial blood pressure reading is elevated.  He is taking his medications as prescribed.  He typically takes 1 in the morning and 1 about this time of night.  Encouraged him to continue on medications and monitor his blood pressure at home.  He will follow-up with primary care provider as previously scheduled for management of his chronic issues.    Results for orders placed or performed in visit on 06/01/23 (from the past 24 hour(s))   Streptococcus A Rapid Screen w/Reflex to PCR - Clinic Collect    Specimen: Throat; Swab   Result Value Ref Range    Group A Strep antigen Negative Negative       Subjective     Ricardo Neely is an 82 year old male who presents to clinic today for ST since this morning.  He has not been exposed to strep that he is aware of.  He feels otherwise well.      ROS: 10 point ROS neg other than the symptoms noted above in the HPI.       Objective    /62   Pulse 76   Temp 98.9  F (37.2  C) (Oral)   SpO2 96%   Nurses notes and VS have been reviewed.    Physical Exam       GENERAL APPEARANCE: healthy appearing, alert     EYES: PERRL, EOMI, sclera non-icteric     HENT: ear canals and TM's normal, nose and mouth without ulcers or lesions and mild erythema of pharynx     NECK: no adenopathy or asymmetry, thyroid normal to palpation     RESP: lungs clear to auscultation - no rales, rhonchi or wheezes     CV: regular rates and rhythm, no murmurs, rubs, or gallop     MS: extremities normal- no gross deformities noted; normal muscle tone.     SKIN: no suspicious lesions or  CHERI Kent, [facility] Provider    The use of Dragon/Searchdaimon dictation services may have been used to construct the content in this note; any grammatical or spelling errors are non-intentional. Please contact the author of this note directly if you are in need of any clarification.

## 2023-06-02 NOTE — PATIENT INSTRUCTIONS
Patient Education     Self-Care for Sore Throats  Sore throats happen for many reasons, such as colds, allergies, cigarette smoke, air pollution, and infections caused by viruses or bacteria. In any case, your throat becomes red and sore. Your goal for self-care is to ease your discomfort while giving your throat a chance to heal.   Moisten and soothe your throat    Tips include the following:  Try a sip of water first thing after waking up.  Keep your throat moist by drinking 6 or more glasses of clear liquids every day.  Run a cool-air humidifier in your room overnight.  Stay away from cigarette smoke.   Check the air quality index, if air pollution gives you a sore throat. On high pollution days, try to limit outdoor time.  Suck on throat lozenges, cough drops, hard candy, ice chips, or frozen fruit-juice bars. Use the sugar-free versions if your diet or medical condition needs them.  Gargle to ease irritation  Gargling every hour or 2 can ease irritation. Try gargling with 1 of these solutions:   1/4 teaspoon of salt in 1/2 cup of warm water  An over-the-counter anesthetic gargle  Use medicine for more relief  Over-the-counter medicine can reduce sore throat symptoms. Ask your pharmacist if you have questions about which medicine to use. To prevent possible medicine interactions, let the pharmacist know what medicines you take. To decrease symptoms:   Ease pain with anesthetic sprays. Remember, never give aspirin to anyone 18 or younger to prevent a serious condition called Reye syndrome. Don't take aspirin if you are already taking blood thinners.   For sore throats caused by allergies, try antihistamines to block the allergic reaction.  Unless a sore throat is caused by a bacterial infection, antibiotics won t help you.   Prevent future sore throats  Prevention tips include:  Stop smoking or reduce contact with secondhand smoke. Smoke irritates the tender throat lining.  Limit contact with pets and with  "allergy-causing substances, such as pollen and mold.  Wash your hands often when you re around someone with a sore throat or cold. This will keep viruses or bacteria from spreading.  Limit outdoor time when air pollution is bad.  Don t strain your vocal cords.  When to call your healthcare provider  Contact your healthcare provider if any of the following occur:   Fever of 100.4 F (38.0 C) or higher, or as directed by your healthcare provider  White spots on the throat  Difficult or painful swallowing  A skin rash  Recent exposure to someone else with strep bacteria  Severe hoarseness and swollen glands in the neck or jaw  Call 911  Call 911 if any of the following occur:   Trouble breathing or catching your breath  Drooling and problems swallowing  Wheezing  Unable to talk  Feeling dizzy or faint  Feeling of doom    [employer] last reviewed this educational content on 3/1/2022    6378-2853 [employer]. All rights reserved. This information is not intended as a substitute for professional medical care. Always follow your healthcare professional's instructions.               High Blood Pressure (Essential Hypertension)   What is essential hypertension?   Hypertension is the term for blood pressure that is consistently higher than normal. Hypertension is called essential or primary when no cause for the high blood pressure can be found. (When the cause of hypertension is known, such as kidney disease and tumors, it is called secondary hypertension.) About 95% of all people with high blood pressure have essential hypertension.   Normal blood pressure ranges up to 120/80 (\"120 over 80\") but blood pressure can rise and fall with exercise, rest, or emotions. The pressures are measured in millimeters of mercury. The upper number (120) is the pressure when the heart pushes blood out to the rest of the body (systolic pressure). The bottom number (80) is the pressure when the heart rests between beats (diastolic " pressure).     Healthy blood pressure is less than 120/80.   Pre-high blood pressure (prehypertension) is from 120/80 to 139/90.   Stage I high blood pressure ranges from 140/90 to 159/99.   Stage II high blood pressure is over 160/100.     If repeated checks of your blood pressure show that it is higher than 140/90, you have hypertension.   Why is high blood pressure a problem?   When your blood pressure is high, your heart has to work harder just to pump a normal amount of blood through your body. The higher pressure in your arteries may cause them to weaken and bleed, resulting in a stroke. Over time, blood vessels may become hardened. This often occurs as people age. High blood pressure speeds this process. Blood vessel damage is bad because hardened or narrowed arteries may be unable to supply the amount of blood the body's organs need. The higher artery pressure may lead to atherosclerosis, in which deposits of cholesterol, fatty substances, and blood cells clog up an artery. Atherosclerosis is the leading cause of heart attacks. It can also cause strokes.   The added workload on the heart causes thickening of the heart muscle. Over time, the thickening damages the heart muscle so that it can no longer pump normally. This can lead to a disease called heart failure. Your kidneys or eyes may also be damaged. The longer you have high blood pressure and the higher it is, the more likely it is you will develop problems.   How does it occur?  There are no clear causes of essential hypertension. However, many different factors can increase blood pressure, such:  being overweight   smoking   eating a diet high in salt   drinking a lot of alcohol.   Other important factors include:   Race.  Americans are more likely to develop high blood pressure.   Gender. Males have a greater chance of developing high blood pressure than women until age 55. However, after the age of 75, women are more likely to develop high  blood pressure than men.   Heredity. If your parents had high blood pressure, you are more at risk.   Age. The older you get, the more likely you are to develop high blood pressure.   Some medicines increase blood pressure. Stress and drinking caffeine can make blood pressure go up for a while, but the long-term effects aren't yet clear.   What are the symptoms?   One of the sneaky things about high blood pressure is that you can have it for a long time without symptoms. That's why it is important for you have your blood pressure checked at least once a year.   If you do have symptoms, they may be:   Headaches, getting tired easily, dizziness, nosebleeds, chest pain, shortness of breath.   Although it happens rarely, the first symptom may be a stroke.   How is it diagnosed?   Because it is such a common problem, blood pressure is checked at most health care visits. High blood pressure is usually discovered during one of these visits. If your blood pressure is high, you will be asked to return for follow-up checks. If your pressure stays high for 3 visits, you probably have hypertension.   Your health care provider will ask about your life situation, what you eat and drink, and if high blood pressure runs in your family. You may have urine and blood tests. Your provider may order a chest x-ray and an electrocardiogram (ECG). You may be asked to use a portable blood-pressure measuring device, which will take your pressure at different times during day and night. All of this testing is done to look for a possible cause of your high blood pressure.   How is it treated?   If your blood pressure is above normal (prehypertension), you may be able to bring it down to a normal level without medicine. Weight loss, changes in your diet, and exercise may be the only treatment you need. If you also have diabetes, you may need additional treatment.   If these lifestyle changes do not lower your blood pressure enough, your health  care provider may prescribe medicine. Some of the types of medicines that can help are diuretics, beta blockers, ACE inhibitors, calcium channel blockers, and vasodilators. These medicines work in different ways. Many people need to take two or more medicines to bring their blood pressure down to a healthy level.     When you start taking medicine, it is important to:   Take the medicine regularly, exactly as prescribed.   Tell your health care provider about any side effects right away.   Have regular follow-up visits with your health care provider.   It may not be possible to know at first which drug or mix of drugs will work best for you. It may take several weeks or months to find the best treatment for you.   How long will the effects last?   You may need treatment for high blood pressure for the rest of your life. However, proper treatment can control your blood pressure and help prevent or delay problems. If you already have some complications, lowering your blood pressure may make their effects less severe.   How can I take care of myself?   Your treatment will be much more effective if you follow these guidelines:   Always follow your health care provider's instructions for taking medicines. Don't take less medicine or stop taking medicine without talking to your provider first. It can be dangerous to suddenly stop taking blood pressure medicine. Also, do not increase your dosage of any medicine without first talking with your provider.   Check your blood pressure (or have it checked) as often as your health care provider advises. Keep a chart of the readings.   Do not smoke.   Follow the DASH diet. This diet is low in fat, cholesterol, red meat, and sweets. It emphasizes fruits, vegetables, and low-fat dairy foods. The DASH diet also includes whole-grain products, fish, poultry, and nuts.   Use less salt. Check the levels of sodium listed on food labels. Avoid canned and prepared foods unless the label  says no salt is added.   Get regular exercise, according to your health care provider's advice. For example, you might walk, bike, or swim at least 30 minutes 3 to 5 times a week.   Limit the amount of alcohol you drink. If you are a man, drink no more than two 1-ounce drinks of hard liquor, two beers, or two 6-ounce glasses of wine a day. Women should have no more than 1 ounce of liquor, one beer, or one glass of wine a day.   Limit the amount of caffeine you drink.   Try to reduce the stress in your life or learn how to deal better with situations that make you feel anxious.   Ask your health care provider or pharmacist for information about the drugs you are taking.   Lose weight if you need to.   Tell your health care provider about any side effects you have from your medicines.   Developed by Ean Barahona MD; Yolette Brewer RN, [place]; and [employer].                   Last modified: 2004-05-24      This content is reviewed periodically and is subject to change as new health information becomes available. The information is intended to inform and educate and is not a replacement for medical evaluation, advice, diagnosis or treatment by a healthcare professional.   Copyright   2004 [employer] and/or one of its subsidiaries. All Rights Reserved.

## 2023-06-14 ENCOUNTER — OFFICE VISIT (OUTPATIENT)
Dept: PODIATRY | Facility: CLINIC | Age: 83
End: 2023-06-14
Payer: COMMERCIAL

## 2023-06-14 VITALS
WEIGHT: 194.71 LBS | DIASTOLIC BLOOD PRESSURE: 74 MMHG | SYSTOLIC BLOOD PRESSURE: 132 MMHG | HEIGHT: 67 IN | BODY MASS INDEX: 30.56 KG/M2

## 2023-06-14 DIAGNOSIS — L60.0 INGROWING RIGHT GREAT TOENAIL: Primary | ICD-10-CM

## 2023-06-14 DIAGNOSIS — M79.674 GREAT TOE PAIN, RIGHT: ICD-10-CM

## 2023-06-14 PROCEDURE — 99203 OFFICE O/P NEW LOW 30 MIN: CPT | Mod: 25 | Performed by: PODIATRIST

## 2023-06-14 PROCEDURE — 11720 DEBRIDE NAIL 1-5: CPT | Performed by: PODIATRIST

## 2023-06-14 NOTE — LETTER
2023         RE: Ricardo Neely  [address]  [facility] [address]        Dear Colleague,    Thank you for referring your patient, Ricardo Neely, to the [facility]. Please see a copy of my visit note below.    ASSESSMENT:  Encounter Diagnoses   Name Primary?     Ingrowing right great toenail, medial edge Yes     Great toe pain, right      MEDICAL DECISION MAKING:    Due to his pain, and per his request, I used a nail anvil to cut the distal medial nail plate back at a slant.  Due to the nail being incurvated and mildly thickened, this is considered debridement.  There was no bleeding.  Bacitracin Band-Aid applied.    This is helped relieve his pain in the past.  If the problem persists or worsens, I did explain what a partial nail avulsion entails.    He is to monitor for developing redness, swelling and increasing pain.    Disclaimer: This note consists of symbols derived from keyboarding, dictation and/or voice recognition software. As a result, there may be errors in the script that have gone undetected. Please consider this when interpreting information found in this chart.    Raymond Man DPM, FACFAS, MS    [place] [facility] & Ankle [facility]      ____________________________________________________________________    HPI:        Chief Complaint: ingrown toenail, right great toe  Onset of problem: months  Pain/ discomfort is described as:  burning  Pain Ratin/10   Frequency:  daily    The pain is exacerbated by bumping it  Previous treatment: Epsom Saltwater soaking  He reports that a podiatrist in [place] trims the corner of the nail and uses a Dremel tool.  The location of pain is the distal medial aspect of the right hallux nail unit.  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     Impaired fasting glucose      Low Back Pain      Obesity, unspecified      Other and unspecified hyperlipidemia      Personal history of urinary calculi 1960's     Pneumonia, organism unspecified(486) 1992     Squamous cell carcinoma of skin, unspecified      TRANSIENT CEREBRAL ISCHEMIA 12/07   *  *  Past Surgical History:   Procedure Laterality Date     COLONOSCOPY       DISCECTOMY LUMBAR POSTERIOR MICROSCOPIC TWO LEVELS  8/28/2012    DISCECTOMY LUMBAR POSTERIOR MICROSCOPIC TWO LEVELS;  RIGHT L3-L4 REDO EXTENDED HEMILAMINECTOMY AND MICRO FORAMINOTOMY, LEFT L4-L5 EXTENDED HEMILAMINECTOMY AND MICRODISCECTOMY (PRONE) (SONYA MCCALLUM, C-ARM, METRIX II);  Surgeon: Bertram Mirza MD;  Location:  OR     OPTICAL TRACKING SYSTEM FUSION SPINE POSTERIOR LUMBAR TWO LEVELS N/A 8/21/2019    Procedure: L3-5 LUMBAR TRANSFORAMINAL INTERBODY FUSION WITH STEALTH;  Surgeon: Harrison Perez MD;  Location:  OR     renal calculii removal 40 years ago  1965     [facility] NONSPECIFIC PROCEDURE  1959    pilonidal cystectomy     [facility] NONSPECIFIC PROCEDURE  1966    kidney stone     [facility] NONSPECIFIC PROCEDURE  approx 1999    R knee arthroscopy     Dr. Guzman     [facility] NONSPECIFIC PROCEDURE  2005    L3-4 microdiskectomy   Dr. Brantley     [facility] TOTAL KNEE ARTHROPLASTY  July 2010    Minimally invasive R TKA   Dr. Nichols   *  *  Current Outpatient Medications   Medication Sig Dispense Refill     amLODIPine-benazepril (LOTREL) 5-20 MG capsule Take 1 capsule by mouth daily 90 capsule 3     aspirin (ASA) 81 MG tablet Take 1 tablet (81 mg) by mouth At Bedtime       atorvastatin (LIPITOR) 40 MG tablet Take 1 tablet (40 mg) by mouth daily 90 tablet 3     clopidogrel (PLAVIX) 75 MG tablet TAKE 1 TABLET(75 MG) BY MOUTH DAILY 90 tablet 3     metoprolol succinate ER (TOPROL XL) 50 MG 24 hr tablet Take 1 tablet (50 mg) by mouth daily 90 tablet 3         EXAM:  "   Vitals: /74   Ht 1.702 m (5' 7\")   Wt 88.3 kg (194 lb 11.4 oz)   BMI 30.50 kg/m    BMI: Body mass index is 30.5 kg/m .    Constitutional:  Ricardo Neely is in no apparent distress, appears well-nourished.  Cooperative with history and physical exam.    Vascular:  Pedal pulses are palpable for both the DP and PT arteries.  CFT < 3 sec.  No edema.      Neuro: Light touch sensation is intact to the L4, L5, S1 distributions  No evidence of weakness, spasticity, or contracture in the lower extremities.     Derm: The right hallux nail plate is incurvated.  Pain on palpation to the distal medial skin fold.  No erythema or edema.      Again, thank you for allowing me to participate in the care of your patient.        Sincerely,        Raymond Man, YAZMIN    "

## 2023-06-14 NOTE — PROGRESS NOTES
ASSESSMENT:  Encounter Diagnoses   Name Primary?     Ingrowing right great toenail, medial edge Yes     Great toe pain, right      MEDICAL DECISION MAKING:    Due to his pain, and per his request, I used a nail anvil to cut the distal medial nail plate back at a slant.  Due to the nail being incurvated and mildly thickened, this is considered debridement.  There was no bleeding.  Bacitracin Band-Aid applied.    This is helped relieve his pain in the past.  If the problem persists or worsens, I did explain what a partial nail avulsion entails.    He is to monitor for developing redness, swelling and increasing pain.    Disclaimer: This note consists of symbols derived from keyboarding, dictation and/or voice recognition software. As a result, there may be errors in the script that have gone undetected. Please consider this when interpreting information found in this chart.    Raymnod Man DPM, FACFAS, MS    [place] [facility] Pod[facility]/Foot & Ankle Surgery      ____________________________________________________________________    HPI:        Chief Complaint: ingrown toenail, right great toe  Onset of problem: months  Pain/ discomfort is described as:  burning  Pain Ratin/10   Frequency:  daily    The pain is exacerbated by bumping it  Previous treatment: Epsom Saltwater soaking  He reports that a podiatrist in [place] trims the corner of the nail and uses a Dremel tool.  The location of pain is the distal medial aspect of the right hallux nail unit.  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   *  *  Past Surgical History:   Procedure Laterality Date     COLONOSCOPY       DISCECTOMY LUMBAR POSTERIOR MICROSCOPIC TWO LEVELS  8/28/2012    DISCECTOMY LUMBAR POSTERIOR MICROSCOPIC TWO LEVELS;  RIGHT L3-L4 REDO EXTENDED HEMILAMINECTOMY AND MICRO FORAMINOTOMY, LEFT L4-L5 EXTENDED HEMILAMINECTOMY AND MICRODISCECTOMY (PRONE) (SONYA MCCALLUM, C-ARM, METRIX II);  Surgeon: Bertram Mirza MD;  Location:  OR     OPTICAL TRACKING SYSTEM FUSION SPINE POSTERIOR LUMBAR TWO LEVELS N/A 8/21/2019    Procedure: L3-5 LUMBAR TRANSFORAMINAL INTERBODY FUSION WITH STEALTH;  Surgeon: Harrison Perez MD;  Location:  OR     renal calculii removal 40 years ago  1965     [facility] NONSPECIFIC PROCEDURE  1959    pilonidal cystectomy     [facility] NONSPECIFIC PROCEDURE  1966    kidney stone     [facility] NONSPECIFIC PROCEDURE  approx 1999    R knee arthroscopy     Dr. Guzman     [facility] NONSPECIFIC PROCEDURE  2005    L3-4 microdiskectomy   Dr. Brantley     [facility] TOTAL KNEE ARTHROPLASTY  July 2010    Minimally invasive R TKA   Dr. Nichols   *  *  Current Outpatient Medications   Medication Sig Dispense Refill     amLODIPine-benazepril (LOTREL) 5-20 MG capsule Take 1 capsule by mouth daily 90 capsule 3     aspirin (ASA) 81 MG tablet Take 1 tablet (81 mg) by mouth At Bedtime       atorvastatin (LIPITOR) 40 MG tablet Take 1 tablet (40 mg) by mouth daily 90 tablet 3     clopidogrel (PLAVIX) 75 MG tablet TAKE 1 TABLET(75 MG) BY MOUTH DAILY 90 tablet 3     metoprolol succinate ER (TOPROL XL) 50 MG 24 hr tablet Take 1 tablet (50 mg) by mouth daily 90 tablet 3         EXAM:    Vitals: /74   Ht 1.702 m (5' 7\")   Wt 88.3 kg (194 lb 11.4 oz)   BMI 30.50 kg/m    BMI: Body mass index is 30.5 kg/m .    Constitutional:  Ricardo Neely is in no apparent distress, appears well-nourished.  Cooperative with history and physical exam.    Vascular:  " Pedal pulses are palpable for both the DP and PT arteries.  CFT < 3 sec.  No edema.      Neuro: Light touch sensation is intact to the L4, L5, S1 distributions  No evidence of weakness, spasticity, or contracture in the lower extremities.     Derm: The right hallux nail plate is incurvated.  Pain on palpation to the distal medial skin fold.  No erythema or edema.

## 2023-06-14 NOTE — PATIENT INSTRUCTIONS
Thank you for choosing [facility]!    DR. SALVADOR'S CLINIC LOCATIONS:     [facility] TRIAGE LINE: [phone]   [address] APPOINTMENTS: [phone]   [place] [address] RADIOLOGY: [phone]   (Every other Tues - Wed - Fri PM) SET UP SURGERY: [phone]    PHYSICAL THERAPY: [phone]   [place] SPECIALTY BILLING QUESTIONS: [phone] [address] FAX: [phone]   [place] [address]    (Thurs & Fri AM)       To soften nails:  Tea tree oil  Vicks  Kerasol

## 2023-07-05 ENCOUNTER — OFFICE VISIT (OUTPATIENT)
Dept: INTERNAL MEDICINE | Facility: CLINIC | Age: 83
End: 2023-07-05
Payer: COMMERCIAL

## 2023-07-05 VITALS
DIASTOLIC BLOOD PRESSURE: 78 MMHG | BODY MASS INDEX: 30.64 KG/M2 | OXYGEN SATURATION: 96 % | HEART RATE: 82 BPM | RESPIRATION RATE: 17 BRPM | HEIGHT: 67 IN | WEIGHT: 195.2 LBS | SYSTOLIC BLOOD PRESSURE: 146 MMHG | TEMPERATURE: 97.5 F

## 2023-07-05 DIAGNOSIS — F41.9 ANXIETY: Primary | ICD-10-CM

## 2023-07-05 PROCEDURE — 99213 OFFICE O/P EST LOW 20 MIN: CPT | Performed by: INTERNAL MEDICINE

## 2023-07-05 ASSESSMENT — ANXIETY QUESTIONNAIRES
3. WORRYING TOO MUCH ABOUT DIFFERENT THINGS: SEVERAL DAYS
5. BEING SO RESTLESS THAT IT IS HARD TO SIT STILL: NOT AT ALL
GAD7 TOTAL SCORE: 12
1. FEELING NERVOUS, ANXIOUS, OR ON EDGE: NEARLY EVERY DAY
7. FEELING AFRAID AS IF SOMETHING AWFUL MIGHT HAPPEN: NEARLY EVERY DAY
6. BECOMING EASILY ANNOYED OR IRRITABLE: MORE THAN HALF THE DAYS
GAD7 TOTAL SCORE: 12
2. NOT BEING ABLE TO STOP OR CONTROL WORRYING: NEARLY EVERY DAY
IF YOU CHECKED OFF ANY PROBLEMS ON THIS QUESTIONNAIRE, HOW DIFFICULT HAVE THESE PROBLEMS MADE IT FOR YOU TO DO YOUR WORK, TAKE CARE OF THINGS AT HOME, OR GET ALONG WITH OTHER PEOPLE: SOMEWHAT DIFFICULT
4. TROUBLE RELAXING: NOT AT ALL

## 2023-07-05 ASSESSMENT — PAIN SCALES - GENERAL: PAINLEVEL: NO PAIN (0)

## 2023-07-05 NOTE — PROGRESS NOTES
Assessment & Plan   Anxiety  Worsened by recent family/life stressors. He declined therapy referral today, but did state he was open to it in the future. Also discussed starting selective serotonin reuptake inhibitor therapy (his main reason for the visit today), will start sertraline. Discussed possible side effects such as dry mouth, urinary retention, and mood irritability. Discussed that these medications need to be taken every day to work and that they can take 4-6 weeks to fully kick in. F/u in 6 weeks with PCP per patient request for check-in.  - sertraline (ZOLOFT) 50 MG tablet; Take 0.5 tablets (25 mg) by mouth daily for 6 days, THEN 1 tablet (50 mg) daily for 84 days.    F/u with regular PCP at regular interval or sooner SATYA Herr MD  [facility]    Virginia Silva is a 83 year old who presents for an acute care visit with chief concern of:  Anxiety  This is the first time I have met Ricardo.    History of Present Illness       Mental Health Follow-up:  Patient presents to follow-up on Anxiety.    Patient's anxiety since last visit has been:  Medium  The patient is having other symptoms associated with anxiety.  Any significant life events: relationship concerns  Patient is feeling anxious or having panic attacks.  Patient has no concerns about alcohol or drug use.    He eats 2-3 servings of fruits and vegetables daily.He consumes 0 sweetened beverage(s) daily.He exercises with enough effort to increase his heart rate 30 to 60 minutes per day.  He exercises with enough effort to increase his heart rate 7 days per week. He is missing 1 dose(s) of medications per week.  He is not taking prescribed medications regularly due to remembering to take.  Today's YOEL-7 Score: 12     Lots of family stressors. Wife was diagnosed with cancer last week. He reports he has a son that is struggling with alcohol addiction. He has felt anxious for years but would like to start  "medication for it at this point.    Review of Systems   Constitutional, psych systems are negative, except as otherwise noted.      Objective    BP (!) 146/78   Pulse 82   Temp 97.5  F (36.4  C) (Oral)   Resp 17   Ht 1.702 m (5' 7\")   Wt 88.5 kg (195 lb 3.2 oz)   SpO2 96%   BMI 30.57 kg/m    Body mass index is 30.57 kg/m .     Physical Exam   GENERAL: alert and in no distress.  EYES: conjunctivae/corneas clear. EOMs grossly intact  HENT: Facies symmetric.  RESP: No iWOB.  MSK: Moves all four extremities freely  SKIN: No significant ulcers, lesions, or rashes on the visualized portions of the skin  NEURO: CN II-XII grossly intact.  "

## 2023-07-12 ENCOUNTER — TRANSFERRED RECORDS (OUTPATIENT)
Dept: HEALTH INFORMATION MANAGEMENT | Facility: CLINIC | Age: 83
End: 2023-07-12

## 2023-07-13 ENCOUNTER — TELEPHONE (OUTPATIENT)
Dept: INTERNAL MEDICINE | Facility: CLINIC | Age: 83
End: 2023-07-13
Payer: COMMERCIAL

## 2023-07-13 ENCOUNTER — NURSE TRIAGE (OUTPATIENT)
Dept: INTERNAL MEDICINE | Facility: CLINIC | Age: 83
End: 2023-07-13

## 2023-07-13 NOTE — LETTER
2023    Regarding:  Ricardo Neely  [address]   [facility] [address]   38      To Whom It May Concern,     I am writing this note in regards to my patient Ricardo Neely. 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 He has informed me that he will be having a future tooth extraction scheduled. I have instructed him that he may stop the Clopidogrel 7 days prior to the extraction and then restart the medication again the day after the extraction. If you have further questions regarding this matter, please feel free to contact me at [phone].    Sincerely,        Familia Cook MD           How Severe Are Your Spot(S)?: mild What Type Of Note Output Would You Prefer (Optional)?: Bullet Format What Is The Reason For Today's Visit?: Full Body Skin Examination What Is The Reason For Today's Visit? (Being Monitored For X): concerning skin lesions on an annual basis

## 2023-07-13 NOTE — TELEPHONE ENCOUNTER
Patient is having upcoming tooth extraction done on 8/24/23.   Dental provider is requiring a signed letter from PCP stating patient is okay to be off Plavix for 'X' Days prior to procedure.   Per patient, Dental provider is requiring the letter for patient's file.           Can we leave a detailed message on this number? YES  Phone number patient can be reached at: Cell number on file:    Telephone Information:   Mobile [phone]       Jeana Alanis RN  [facility] Triage

## 2023-07-13 NOTE — TELEPHONE ENCOUNTER
Nurse Triage SBAR    Is this a 2nd Level Triage? YES, LICENSED PRACTITIONER REVIEW IS REQUIRED    Situation:   Pt calling c/o ongoing irregular HR.  Pt stating that pt can feel that his heart is skipping every third and sometimes 5th beat. Pt denies any SOB, CP, dizziness, sweating, n/v, changes in vision. HR on phone was 80bpm. Pt states home bp readings are under 140/70's, with pulse averaging 70-75. Pt states he has noticed this has been ongoing for last several months since metoprolol was decreased to 50mg but it is not improving and wondering what PCP recommends.     Background:   Hx of palpations, anxiety    Assessment: See below    Protocol Recommended Disposition:   See in Office Today    Recommendation:   See in office today for skipped beats that occur 4 or mor times a minute. Pt declined OV appt, pt would prefer to seek advisement from Dr Cook before scheduling appt.  Please advise    OK to leave detailed VM on callback.     Routed to provider    Does the patient meet one of the following criteria for ADS visit consideration? 16+ years old, with an [facility] PCP     TIP  Providers, please consider if this condition is appropriate for management at one of our Acute and Diagnostic Services sites.     If patient is a good candidate, please use dotphrase <dot>triageresponse and select Refer to ADS to document.    Reason for Disposition    Skipped or extra beat(s) and occurs 4 or more times per minute    Additional Information    Negative: Passed out (i.e., lost consciousness, collapsed and was not responding)    Negative: Shock suspected (e.g., cold/pale/clammy skin, too weak to stand, low BP, rapid pulse)    Negative: Difficult to awaken or acting confused (e.g., disoriented, slurred speech)    Negative: Visible sweat on face or sweat dripping down face    Negative: Unable to walk, or can only walk with assistance (e.g., requires support)    Negative: Received SHOCK from implantable cardiac defibrillator and  "has persisting symptoms (i.e., palpitations, lightheadedness)    Negative: Dizziness, lightheadedness, or weakness and heart beating very rapidly (e.g., > 140 / minute)    Negative: Dizziness, lightheadedness, or weakness and heart beating very slowly (e.g., < 50 / minute)    Negative: Sounds like a life-threatening emergency to the triager    Negative: Chest pain    Negative: Difficulty breathing    Negative: Dizziness, lightheadedness, or weakness    Negative: Heart beating very rapidly (e.g., > 140 / minute) and present now  (Exception: During exercise.)    Negative: Heart beating very slowly (e.g., < 50 / minute)  (Exception: Athlete and heart rate normal for caller.)    Negative: New or worsened shortness of breath with activity (dyspnea on exertion)    Negative: Patient sounds very sick or weak to the triager    Negative: Wearing a 'Holter monitor' or 'cardiac event monitor'    Negative: Received SHOCK from implantable cardiac defibrillator (and now feels well)    Negative: Heart beating very rapidly (e.g., > 140 / minute) and not present now  (Exception: During exercise.)    Negative: Skipped or extra beat(s) and increases with exercise or exertion    Answer Assessment - Initial Assessment Questions  1. DESCRIPTION: \"Please describe your heart rate or heartbeat that you are having\" (e.g., fast/slow, regular/irregular, skipped or extra beats, \"palpitations\")      Feels like every 3rd beat is skipped, sometimes every 5th beat, then sometimes irregular     2. ONSET: \"When did it start?\" (Minutes, hours or days)       A few months ago, starting to bother me more lately    3. DURATION: \"How long does it last\" (e.g., seconds, minutes, hours)      Goes throughout the day     4. PATTERN \"Does it come and go, or has it been constant since it started?\"  \"Does it get worse with exertion?\"   \"Are you feeling it now?\"      No     5. TAP: \"Using your hand, can you tap out what you are feeling on a chair or table in front " "of you, so that I can hear?\" (Note: not all patients can do this)            6. HEART RATE: \"Can you tell me your heart rate?\" \"How many beats in 15 seconds?\"  (Note: not all patients can do this)          20/15 sec 80 bpm    7. RECURRENT SYMPTOM: \"Have you ever had this before?\" If Yes, ask: \"When was the last time?\" and \"What happened that time?\"       Not sure    8. CAUSE: \"What do you think is causing the palpitations?\"      Unsure    9. CARDIAC HISTORY: \"Do you have any history of heart disease?\" (e.g., heart attack, angina, bypass surgery, angioplasty, arrhythmia)       Yes     10. OTHER SYMPTOMS: \"Do you have any other symptoms?\" (e.g., dizziness, chest pain, sweating, difficulty breathing)       No     11. PREGNANCY: \"Is there any chance you are pregnant?\" \"When was your last menstrual period?\"        No    Protocols used: HEART RATE AND HEARTBEAT [id number]    SEE IN OFFICE TODAY:   * You need to be examined today. Let me give you an appointment.  * IF NO AVAILABLE APPOINTMENTS: You need to be seen in the Urgent Care Center. Go to the one at ____________. Go there today. A nearby Urgent Care Center is often a good source of care. Another choice is to go to the Emergency Department.      REASSURANCE AND EDUCATION - EXTRA HEARTBEATS:  * Occasional extra heartbeats are experienced by most everyone.  * Lack of sleep, stress, and caffeinated beverages can aggravate this condition.  * Here is some care advice that should help.      AVOID CAFFEINE:  * Avoid caffeine-containing beverages. Reason: Caffeine is a stimulant and can aggravate palpitations.  * Examples include coffee, tea, doc, Mountain Dew, Red Bull, and some 'energy drinks'.      LIMIT ALCOHOL:   * Limit your alcohol consumption to no more than 2 drinks a day.   * Ideally, eliminate alcohol entirely for the next 2 weeks.      CALL BACK IF:   * Chest pain, lightheadedness, or difficulty breathing occurs  * Heart beating more than 140 beats / " minute  * More than 3 extra or skipped beats / minute  * You become worse        Patient/Caregiver understands and will follow care advice? No, wishes to speak to PCP         Samantha RYDER RN  [facility] Triage Team

## 2023-08-01 ENCOUNTER — TELEPHONE (OUTPATIENT)
Dept: INTERNAL MEDICINE | Facility: CLINIC | Age: 83
End: 2023-08-01
Payer: COMMERCIAL

## 2023-08-01 NOTE — TELEPHONE ENCOUNTER
Patient calling for update on letter status, which was called in two weeks ago. Patient states that [facility] would call back and they have not done so. Please update patient when letter is completed. Needs letter ASAP because dentist asking for it.     ROXI Sr  [facility]

## 2023-08-07 NOTE — TELEPHONE ENCOUNTER
Pt called requesting status of letter for dentist. Pt wants to schedule appt for tooth extraction and needs to be off Plavix for a week. He needs a call back today. Please advice if pt need an appt to discuss letter.

## 2023-08-08 NOTE — TELEPHONE ENCOUNTER
Letter and written and signed.  Spoke with pt and wants to PU at IM  tomorrow. Letter placed in [place] basket to  put at

## 2023-08-29 ENCOUNTER — HOSPITAL ENCOUNTER (OUTPATIENT)
Dept: ULTRASOUND IMAGING | Facility: CLINIC | Age: 83
Discharge: HOME | End: 2023-08-29
Attending: INTERNAL MEDICINE | Admitting: INTERNAL MEDICINE
Payer: COMMERCIAL

## 2023-08-29 DIAGNOSIS — I77.9 CAROTID ARTERY DISEASE (H): ICD-10-CM

## 2023-08-29 DIAGNOSIS — R09.89 BILATERAL CAROTID BRUITS: ICD-10-CM

## 2023-08-29 PROCEDURE — 93880 EXTRACRANIAL BILAT STUDY: CPT

## 2023-08-29 PROCEDURE — 93880 EXTRACRANIAL BILAT STUDY: CPT | Mod: 26 | Performed by: INTERNAL MEDICINE

## 2023-08-30 ENCOUNTER — TELEPHONE (OUTPATIENT)
Dept: CARDIOLOGY | Facility: CLINIC | Age: 83
End: 2023-08-30
Payer: COMMERCIAL

## 2023-08-30 NOTE — TELEPHONE ENCOUNTER
8/30/23 Carotid US results from DR Kumar inbox forwarded to him to review.   Hospital Consult with Dr Kumar 10/17/2022 to assess Near syncope, resolved, associated with palpitations identified as sinus tachycardia by ECG, and elevated blood pressure. Carotid US done in hospital at that time.  Not seen at heart clinic.  Forwarded to Dr Kumar.  Roselia Gutierrez RN 08/30/23 9:48 AM     Study Result    Narrative & Impression   Exam: Bilateral carotid duplex Doppler ultrasound dated 8/29/2023 1:31  PM     Clinical history: Bilateral carotid bruits; Carotid artery disease (H)     Comparison Study: 10/17/2022     Ordering provider: SHERYL KUMAR     Technique: Grayscale (B-mode) and duplex and spectral Doppler  ultrasound of the extracranial internal carotid, external carotid,  vertebral artery origins, right brachiocephalic/subclavian and left  subclavian arteries. Velocity measurements obtained with angle  correction at or less than 60 degrees.     Findings:     Right side:      Plaque Morphology: calcified, irregular plaque at the CCA and ICA         Proximal CCA: 102/16 cm/sec     Mid-distal CCA: 133/24 cm/sec     External CA: 247/0 cm/sec     Proximal ICA: 167/36 cm/sec     Mid ICA: 83/21 cm/sec     Distal ICA: 97/30 cm/sec     Vertebral artery: 38/7 cm/sec, antegrade       ICA/CCA ratio: 1.54     Left side:      Plaque Morphology: Calcified, irregular plaque at the CCA and proximal  internal carotid artery        Proximal CCA: 135/23 cm/sec     Mid-distal CCA: 133/24 cm/sec     External CA: 247 cm/sec     Proximal ICA: 167/30 cm/sec     Mid ICA: 83/21 cm/sec     Distal ICA: 97/30 cm/sec     Vertebral artery: 38/7 cm/sec, antegrade     ICA/CCA ratio: 1.26                                                                      Impression:     1. Right side:         Degree of stenosis of the internal carotid artery: By velocity  criteria there is less than 50% diameter stenosis of the internal  carotid artery (  cm/sec, EDV 26 cm/sec, Vr 1.5). Previous study  noted 50-69% stenosis which may reflect interstudy variability in  velocity sampling due to acoustic shadowing.     2. Left side:          Degree of stenosis of the internal carotid artery: By limited  velocity criteria there is less than 50-69% diameter stenosis of the  internal carotid artery ( cm/sec). EDV is 30 cm/sec and Vr is  1.26. Velocity sampling may be limited in the setting of acoustic  shadowing from calcified plaque and some velocities may be  underestimated. No change from prior study.     3. Normal antegrade vertebral artery flow bilaterally.     DEISI ARZATE MD

## 2023-08-31 NOTE — TELEPHONE ENCOUNTER
Additional background on Carotid US orders noted. Per Dr. Kumar order was placed with intention pt would follow up with his PCP Dr. Cook to discuss results and for further review. Pt sees Dr. Cook regularly, looks like pt canceled his 8/15/23 appt w/him however. Last wellness exam 5/5/23 Dr. Cook notes pt to have Carotid US due 10/2023. Forward to Dr. Cook and Cc'd Dr. Kumar. Would like Dr. Kumar to advise whether pt will need any Cardiology follow up or not. Rayne Burden RN on 8/31/2023 at 1:11 PM

## 2023-09-18 NOTE — TELEPHONE ENCOUNTER
I am not sure where we stand with this one.  I reviewed the results of the carotid ultrasound done this year.  It looks unchanged from last year.  No more than moderate bilateral carotid stenosis.  No additional action necessary with this now except to ensure adequate treatment of cardiac risk factors such as hypertension, dyslipidemia, diabetes etc.  He should follow-up with Dr. Cook to discuss these risk factors and additional carotid evaluations going forward.  Zach Kumar MD

## 2023-09-19 NOTE — TELEPHONE ENCOUNTER
I called pt to discuss carotid US results and follow up recommendations from . Pt said he would prefer to keep the visit in November with  as he has occasional skipped beats and palpitations that he would like to discuss. Clive DIANE September 19, 2023, 11:47 AM

## 2023-09-29 ENCOUNTER — TELEPHONE (OUTPATIENT)
Dept: NEUROSURGERY | Facility: CLINIC | Age: 83
End: 2023-09-29
Payer: COMMERCIAL

## 2023-09-29 DIAGNOSIS — M54.16 LUMBAR RADICULOPATHY: Primary | ICD-10-CM

## 2023-09-29 NOTE — TELEPHONE ENCOUNTER
Flores Portillo NP approved repeat injection. Order placed. Called patient to update. Provided patient with phone number for scheduling. Patient voiced agreement and understanding.

## 2023-09-29 NOTE — TELEPHONE ENCOUNTER
Patient called clinic requesting to repeat injection.    Type of injection: Caudal CHERI    Most recent injection date: 5/17/2023    Most recent MRI: 4/19/2023    How long did injection provide symptomatic relief: 3+ months    Current symptoms: Pain is returning to low back. Same as prior to injection.     Number of injections in last 12 months AND what dates they were done: 1 on 5/17/2023    Plan: Will route to provider for review and recommendations.    Patient voiced understanding and agreement with plan.     Advised patient to call back with any questions or concerns.     Patient would like to return to Dr. Gay if repeat injection approved.

## 2023-09-29 NOTE — TELEPHONE ENCOUNTER
Patient requesting to place an order for his next round of injections. Patient also stated that he feels much better after his last injection and would like to do second round with Dr. Gay.

## 2023-10-03 ENCOUNTER — TELEPHONE (OUTPATIENT)
Dept: PALLIATIVE MEDICINE | Facility: CLINIC | Age: 83
End: 2023-10-03
Payer: COMMERCIAL

## 2023-10-03 NOTE — TELEPHONE ENCOUNTER
Patient is requesting to schedule an injection with the [facility].     This would require the patient to hold:                 Clopidogrel (Plavix)       Hold 7 days prior to procedure, restart 12 hours after procedure    We are requesting your approval to hold the medication for this time frame.    Please keep call open and route back to the PAIN NURSE [[id number]] pool, Pt will be scheduled by Pain clinic after hold approved.    PROVIDER REMINDER:  For Coumadin, please also route to Pt's INR clinic     If hold approved, we will contact the patient for scheduling.    Thank you.    Routed to prescribing provider.

## 2023-10-03 NOTE — TELEPHONE ENCOUNTER
Screening Questions for Radiology Injections:     Injection to be done at which interventional clinic site? [facility]      Procedure ordered by      Procedure ordered?Caudal CHERI with Dr. Gay   Transforaminal Cervical CHERI - Send to [facility] - No [facility] Site providers perform this procedure     What insurance would patient like us to bill for this procedure? [facility]  IF SCHEDULING IN [place] [facility] OR SPINE PLEASE SCHEDULE AT LEAST 7-10 BUSINESS DAYS OUT SO A PA CAN BE OBTAINED  Worker's comp or MVA (motor vehicle accident) -Any injection DO NOT SCHEDULE and route to Julia Gunn.     [employer] insurance - For SI joint injections, DO NOT SCHEDULE and route to Gabriella Vaca.      ALL BCBS, [employer] and [employer] - DO NOT SCHEDULE and route to Gabriella Vaca  [employer]- facet joint injections, route to Gabriella Vaca     Is patient scheduled at [facility]? no             If YES, route every encounter to [facility]INE [facility] CARE NAVIGATION POOL [[phone]]     Is an  needed? No     Patient has a  home? (Review Grid) YES: ok     Any chance of pregnancy? NO   If YES, do NOT schedule and route to RN pool  - Dr. Cano route to Gloria Dunbar and PM&R Nurse  [[id number]]       Is patient actively being treated for cancer or immunocompromised? No  If YES, do NOT schedule and route to RN pool/ Dr. Cano's Team     Does the patient have a bleeding or clotting disorder? No   If YES, okay to schedule AND route to RN nurse pool/ Dr. Cano's Team   (For any patients with platelet count <100, RN must forward to provider)     Is patient taking any Blood Thinners OR Antiplatelet medication?  Yes - Plavix   If hold needed, do NOT schedule, route to RN pool/ Dr. Cano's Team  Examples:   Blood Thinners: (Coumadin, Warfarin, Jantoven, Pradaxa, Xarelto, Eliquis, Edoxaban, Enoxaparin, Lovenox, Heparin, Arixtra, Fondaparinux or Fragmin)  Antiplatelet Medications: (Plavix, Brilinta or Effient)     Is patient taking  any aspirin products (includes Excedrin and Fiorinal)? Yes - Pt takes 81mg daily; instructed to hold 0 day(s) prior to procedure.    If more than 325mg/day, OK to schedule; Instruct Pt to decrease to less than 325 mg for 7 days AND route to RN pool/ Dr. Cano's Team   For CERVICAL procedures, hold all aspirin products for 6 days.   Tell Pt that if aspirin product is not held for 6 days, the procedure WILL BE cancelled.      Any allergies to contrast dye, iodine, shellfish, or numbing and steroid medications? No  If YES, schedule and add allergy information to appointment notes AND route to the RN pool/ Dr. Cano's Team  If CHERI and Contrast Dye / Iodine Allergy? DO NOT SCHEDULE, route to RN pool/ Dr. Cano's Team  Allergies: Meclizine, Tramadol hcl, Cardura [doxazosin mesylate], Hydrochlorothiazide, and Naproxen      Does patient have an active infection or treated for one within the past week? No  Is patient currently taking any antibiotics or steroid medications?  No   For patients on chronic, preventative, or prophylactic antibiotics, procedures may be scheduled.   For patients on antibiotics for active or recent infection, schedule 4 days after completed.  For patients on steroid medications, schedule 4 days after completed.     Has the patient had a flu shot or any other vaccinations within the past 7 days? No  If yes, explain that for the vaccine to work best they need to:     wait 1 week before and 1 week after getting any Vaccine  wait 1 week before and 2 weeks after getting Covid Vaccine #2 or BOOSTER  If patient has concerns about the timing, send to RN pool/ Dr. Cano's Team     Does patient have an MRI/CT?  YES: MRI Include Date and Check Procedure Scheduling Grid to see if required.  Was the MRI/CT done within the last 3 years?  Yes   If no route to RN Pool/ Dr. Cano's Team  If yes, where was the MRI/CT done? [facility]   Refer to PACS Transmissions list for approved external locations and route  to RN Pool High Priority/ Dr. Cano's Team  If MRI was not done at approved external location do NOT schedule and route to RN pool/ Dr. Cano's Team    If patient has an imaging disc, the injection MAY be scheduled but patient must bring disc to appt or appt will be cancelled.     Is patient able to transfer to a procedure table with minimal or no assistance? Yes   If no, do NOT schedule and route to RN Pool/ Dr. Cano's Team     Procedure Specific Instructions:  If celiac plexus block, informed patient NPO for 6 hours and that it is okay to take medications with sips of water, especially blood pressure medications Not Applicable        If this is for a cervical procedure, informed patient that aspirin needs to be held for 6 days.   Not Applicable     Sedation, If Sedation is ordered for any procedure, patient must be NPO for 6 hours prior to procedure Not Applicable     If IV needed:  Do not schedule procedures requiring IV placement in the first appointment of the day or first appointment after lunch. Do NOT schedule at 0745, 0815 or 1245. ok  Instructed patient to arrive 30 minutes early for IV start if required. (Check Procedure Scheduling Grid)  Not Applicable     Reminders:  If you are started on any steroids or antibiotics between now and your appointment, you must contact us because the procedure may need to be cancelled.  Yes     As a reminder, receiving steroids can decrease your body's ability to fight infection.   Would you still like to move forward with scheduling the injection?  Yes     IV Sedation is not provided for procedures. If oral anti-anxiety medication is needed, the patient should request this from their referring provider.     Instruct patient to arrive as directed prior to the scheduled appointment time:  If IV needed 30 minutes before appointment time      For patients 85 or older we recommend having an adult stay w/ them for the remainder of the day.      If the patient is Diabetic,  remind them to bring their glucometer.        Does the patient have any questions?  NO

## 2023-10-06 NOTE — TELEPHONE ENCOUNTER
No recent stent intervention.   OK to hold Plavix/Clopidogrel for 7 days prior to procedure asa requested

## 2023-10-09 NOTE — TELEPHONE ENCOUNTER
Called pt.     Injection scheduled for:  10/18/23  Plavix hold starts on:  10/11/23  Injection intake questions reviewed?  YES  Infection/antibiotic information reviewed?  YES  Location confirmed: :[facility] [place]  Is pt arriving early?:No  Vaccine protocol received?:  Yes   Pt declined written instructions.      Kimberly RN-BSN  [facility]   [phone]

## 2023-10-17 ENCOUNTER — TELEPHONE (OUTPATIENT)
Dept: PALLIATIVE MEDICINE | Facility: CLINIC | Age: 83
End: 2023-10-17
Payer: COMMERCIAL

## 2023-10-17 NOTE — TELEPHONE ENCOUNTER
Spoke to patient, reminded patient of date, time and location of appointment.     Asked patient if they have received anti-biotics or vaccines in the past 7 days?     Reminded patient to eat and drink as usual.    Remained to hold any blood thinners or pain medications that they were asked to hold per nurse.     Reminded patient they will need a  for this appointment.          Cece Mills MA  [facility]

## 2023-10-18 ENCOUNTER — RADIOLOGY INJECTION OFFICE VISIT (OUTPATIENT)
Dept: GENERAL RADIOLOGY | Facility: CLINIC | Age: 83
End: 2023-10-18
Attending: NURSE PRACTITIONER
Payer: COMMERCIAL

## 2023-10-18 VITALS — SYSTOLIC BLOOD PRESSURE: 134 MMHG | OXYGEN SATURATION: 95 % | DIASTOLIC BLOOD PRESSURE: 58 MMHG | HEART RATE: 74 BPM

## 2023-10-18 DIAGNOSIS — M54.16 LUMBAR RADICULOPATHY: ICD-10-CM

## 2023-10-18 PROCEDURE — 62323 NJX INTERLAMINAR LMBR/SAC: CPT

## 2023-10-18 PROCEDURE — 255N000002 HC RX 255 OP 636: Mod: JZ | Performed by: PAIN MEDICINE

## 2023-10-18 PROCEDURE — 62323 NJX INTERLAMINAR LMBR/SAC: CPT | Performed by: PAIN MEDICINE

## 2023-10-18 PROCEDURE — 250N000011 HC RX IP 250 OP 636: Mod: JZ | Performed by: PAIN MEDICINE

## 2023-10-18 RX ORDER — TRIAMCINOLONE ACETONIDE 40 MG/ML
40 INJECTION, SUSPENSION INTRA-ARTICULAR; INTRAMUSCULAR ONCE
Status: COMPLETED | OUTPATIENT
Start: 2023-10-18 | End: 2023-10-18

## 2023-10-18 RX ORDER — IOPAMIDOL 408 MG/ML
10 INJECTION, SOLUTION INTRATHECAL ONCE
Status: COMPLETED | OUTPATIENT
Start: 2023-10-18 | End: 2023-10-18

## 2023-10-18 RX ADMIN — IOPAMIDOL 1 ML: 408 INJECTION, SOLUTION INTRATHECAL at 14:28

## 2023-10-18 RX ADMIN — TRIAMCINOLONE ACETONIDE 40 MG: 40 INJECTION, SUSPENSION INTRA-ARTICULAR; INTRAMUSCULAR at 14:37

## 2023-10-18 ASSESSMENT — PAIN SCALES - GENERAL
PAINLEVEL: MODERATE PAIN (5)
PAINLEVEL: NO PAIN (0)

## 2023-10-18 NOTE — NURSING NOTE
Discharge Information    IV Discontiued Time:  NA    Amount of Fluid Infused:  NA    Discharge Criteria = When patient returns to baseline or as per MD order    Consciousness:  Pt is fully awake    Circulation:  BP +/- 20% of pre-procedure level    Respiration:  Patient is able to breathe deeply    O2 Sat:  Patient is able to maintain O2 Sat >92% on room air    Activity:  Moves 4 extremities on command    Ambulation:  Patient is able to stand and walk or stand and pivot into wheelchair    Dressing:  Clean/dry or No Dressing    Notes:   Discharge instructions and AVS given to patient    Patient meets criteria for discharge?  YES    Admitted to PCU?  No    Responsible adult present to accompany patient home?  Yes    Signature/Title:    Shkaila Aguila RN  RN Care Coordinator  [facility]

## 2023-10-18 NOTE — PROGRESS NOTES
Pre procedure Diagnosis: Lumbar radiculopathy,   Post procedure Diagnosis: Same  Procedure performed: caudal epidural steroid injection  Anesthesia: none  Complications: none  Operators: Marc Gay MD     Indications:   Ricardo Neely is a 83 year old male.  They have a history of lbp radiating to his le.  Exam shows + slump and they have tried conservative treatment including meds /pt.    Mr reviewed   Options/alternatives, benefits and risks were discussed with the patient including bleeding, infection, tissue trauma, numbness, weakness, paralysis, spinal cord injury, radiation exposure, headache, reaction to medications including seizure, and effects on the bladder from local anesthetic.  Questions were answered to his satisfaction and he agrees to proceed. Voluntary informed consent was obtained and signed.     Vitals were reviewed: Yes  There were no vitals taken for this visit.  Allergies were reviewed:  Yes   Medications were reviewed:  Yes   Pre-procedure pain score: 5/10  Post-procedure pain score 0/10    Procedure:  After obtaining signed informed consent, patient was brought into the procedure suite and was placed in a prone position on the procedure table.   A Pause for the Cause was performed.  Patient was prepped and draped in the usual sterile fashion.     The sacral hiatus and cornua were palpated.  Under lateral fluoroscopic guidance sacral hiatus was identified.  3 ml of lidocaine 1% was then injected at the needle entry point to anesthetize the skin. Then a 17 gauge 3.5 inch Tuohy needle was inserted into sacral hiatus utilizing fluoroscopic guidance.  Aspiration was negative for blood or CSF.  Needle placement was verified by injecting Omnipaque 300 1 ml utilizing real time fluoroscopy that showed good needle placement and adequate spread in the lower sacral epidural space without intravascular or intrathecal uptake.  Then, an epidural catheter was advanced through the Tuohy needle into  the epidural space without resistance.  Aspiration was negative.  Catheter placement was verified by injecting Omnipaque 300 1 ml under real time fluoroscopyThen, after repeated negative aspiration, a combination of Kenalog 40 mg, 2 ml of  0.25% Bupivicaine, diluted with 3 ml of normal saline for a total injectate volume of 6 ml was injected in a slow and incremental fashion and the needle was withdrawn. Omnipaque wasted 9.  The injection site was cleaned and sterile dressing applied.     The patient tolerated the procedure well without complications and was taken to the recovery area for continued observation.  They were subsequently discharged to home in good condition after meeting discharge criteria.      Images were saved to PACS.    Post-procedure pain score: 0/10  Follow-up includes:   -f/u with referring provider    Marc Gay MD  [facility] [employer]

## 2023-10-18 NOTE — NURSING NOTE
Pre-procedure Intake  If YES to any questions or NO to having a   Please complete laminated checklist and leave on the computer keyboard for Provider, verbally inform provider if able.    For SCS Trial, RFA's or any sedation procedure:  Have you been fasting? NA  If yes, for how long?     Are you taking any any blood thinners such as Coumadin, Warfarin, Jantoven, Pradaxa Xarelto, Eliquis, Edoxaban, Enoxaparin, Lovenox, Heparin, Arixtra, Fondaparinux, or Fragmin? OR Antiplatelet medication such as Plavix, Brilinta, or Effient?   Yes -   Plavix   If yes, when did you take your last dose? 10/10/23    Do you take aspirin?  No  If cervical procedure, have you held aspirin for 6 days?   NA    Do you have any allergies to contrast dye, iodine, steroid and/or numbing medications?  NO    Are you currently taking antibiotics or have an active infection?  NO    Have you had a fever/elevated temperature within the past week? NO    Are you currently taking oral steroids? NO    Do you have a ? Yes    Are you pregnant or breastfeeding?  Not Applicable    Have you received the COVID-19 vaccine? Yes  If yes, was it your 1st, 2nd or only dose needed? 6  Date of most recent vaccine: 05/05/23    Vitals: B/P 146/74    Notify provider and RNs if systolic BP >170, diastolic BP >100, P >100 or O2 sats < 90%      Cece Mills MA  [facility]

## 2023-10-18 NOTE — PATIENT INSTRUCTIONS
[facility]   Procedure Discharge Instructions    Today you saw:   Dr. Marc Gay,        You had an:  Epidural steroid injection        If you were holding your blood thinning medication, please restart taking it: 24 hours  Be cautious when walking. Numbness and/or weakness in the lower extremities may occur for up to 6-8 hours after the procedure due to effect of the local anesthetic  Do not drive for 6 hours. The effect of the local anesthetic could slow your reflexes.   You may resume your regular activities after 24 hours  Avoid strenuous activity for the first 24 hours  You may shower, however avoid swimming, tub baths or hot tubs for 24 hours following your procedure  You may have a mild to moderate increase in pain for several days following the injection.  It may take up to 14 days for the steroid medication to start working although you may feel the effect as early as a few days after the procedure.     You may use ice packs for 10-15 minutes, 3 to 4 times a day at the injection site for comfort  Do not use heat to painful areas for 6 to 8 hours. This will give the local anesthetic time to wear off and prevent you from accidentally burning your skin.   Unless you have been directed to avoid the use of anti-inflammatory medications (NSAIDS), you may use medications such as ibuprofen, Aleve or Tylenol for pain control if needed.   If you were fasting, you may resume your normal diet and medications after the procedure  If you have diabetes, check your blood sugar more frequently than usual as your blood sugar may be higher than normal for 10-14 days following a steroid injection. Contact your doctor who manages your diabetes if your blood sugar is higher than usual  Possible side effects of steroids that you may experience include flushing, elevated blood pressure, increased appetite, mild headaches and restlessness.  All of these symptoms will get better with time.  If you  experience any of the following, call the Pain Clinic during work hours (Mon-Friday 8-4:30 pm) at [phone] or the Provider Line after hours at [phone]:  -Fever over 100 degree F  -Swelling, bleeding, redness, drainage, warmth at the injection site  -Progressive weakness or numbness in your legs or arms  -Loss of bowel or bladder function  -Unusual headache that is not relieved by Tylenol or other pain reliever  -Unusual new onset of pain that is not improving

## 2023-11-09 ENCOUNTER — OFFICE VISIT (OUTPATIENT)
Dept: ORTHOPEDICS | Facility: CLINIC | Age: 83
End: 2023-11-09
Payer: COMMERCIAL

## 2023-11-09 DIAGNOSIS — M17.12 PRIMARY OSTEOARTHRITIS OF LEFT KNEE: Primary | ICD-10-CM

## 2023-11-09 PROCEDURE — 20610 DRAIN/INJ JOINT/BURSA W/O US: CPT | Mod: LT | Performed by: FAMILY MEDICINE

## 2023-11-09 PROCEDURE — 99204 OFFICE O/P NEW MOD 45 MIN: CPT | Mod: 25 | Performed by: FAMILY MEDICINE

## 2023-11-09 RX ORDER — LIDOCAINE HYDROCHLORIDE 10 MG/ML
4 INJECTION, SOLUTION INFILTRATION; PERINEURAL
Status: DISCONTINUED | OUTPATIENT
Start: 2023-11-09 | End: 2024-09-13

## 2023-11-09 RX ORDER — TRIAMCINOLONE ACETONIDE 40 MG/ML
40 INJECTION, SUSPENSION INTRA-ARTICULAR; INTRAMUSCULAR
Status: DISCONTINUED | OUTPATIENT
Start: 2023-11-09 | End: 2024-09-13

## 2023-11-09 RX ADMIN — TRIAMCINOLONE ACETONIDE 40 MG: 40 INJECTION, SUSPENSION INTRA-ARTICULAR; INTRAMUSCULAR at 14:31

## 2023-11-09 RX ADMIN — LIDOCAINE HYDROCHLORIDE 4 ML: 10 INJECTION, SOLUTION INFILTRATION; PERINEURAL at 14:31

## 2023-11-09 NOTE — LETTER
11/9/2023         RE: Ricardo Neely  [address]  [place] [address]        Dear Colleague,    Thank you for referring your patient, Ricardo Neely, to the [facility] [place]. Please see a copy of my visit note below.    CHIEF COMPLAINT:  Pain of the Left Knee     HISTORY OF PRESENT ILLNESS  Mr. Neely is a pleasant 83 year old year old male who presents to clinic today with L knee pain.  Ricardo explains that he has intermittent L knee pain when he walks. He has been seen by Dr. Villasenor of [facility] in the past and has success with CSI twice a year. He also sees a surgeon in [place] when he is there. His [place] says leave the [place] knee while the [place] surgeon is pushing for a L knee TKA.     Imaging done at [facility] that have been pushed to PACS.     Onset: unchanged  Location: left knee  Quality:  aching and dull  Duration: 5 years   Severity: 7/10 at worst  Timing:intermittent episodes   Modifying factors:  resting and non-use makes it better, movement and use makes it worse  Associated signs & symptoms: pain  Previous similar pain: Yes, and CSI have helped  Treatments to date:CSI-last injection in May 2023 which does last about 5 to 6 months.    Additional history: as documented    Review of Systems:  Have you recently had a a fever, chills, weight loss? No  Do you have any vision problems? No  Do you have any chest pain or edema? No  Do you have any shortness of breath or wheezing?  No  Do you have stomach problems? No  Do you have any numbness or focal weakness? No  Do you have diabetes? No  Do you have problems with bleeding or clotting? Yes, is on blood thinners  Do you have an rashes or other skin lesions? No    MEDICAL HISTORY  Patient Active Problem List   Diagnosis     HISTORY OF URINARY CALCULI     Essential hypertension     Hyperlipidemia LDL goal <100     GERD     Diaphragmatic hernia     Hypertrophy of prostate without urinary obstruction     Advance Care  "Planning     Anxiety     Contracture of palmar fascia     Hx of transient ischemic attack (TIA)     Carotid stenosis, asymptomatic, bilateral     Palpitations       Current Outpatient Medications   Medication Sig Dispense Refill     amLODIPine-benazepril (LOTREL) 5-20 MG capsule Take 1 capsule by mouth daily 90 capsule 3     aspirin (ASA) 81 MG tablet Take 1 tablet (81 mg) by mouth At Bedtime       atorvastatin (LIPITOR) 40 MG tablet Take 1 tablet (40 mg) by mouth daily 90 tablet 3     clopidogrel (PLAVIX) 75 MG tablet TAKE 1 TABLET(75 MG) BY MOUTH DAILY 90 tablet 3     metoprolol succinate ER (TOPROL XL) 50 MG 24 hr tablet Take 1 tablet (50 mg) by mouth daily 90 tablet 3     sertraline (ZOLOFT) 50 MG tablet Take 0.5 tablets (25 mg) by mouth daily for 6 days, THEN 1 tablet (50 mg) daily for 84 days. 87 tablet 0       Allergies   Allergen Reactions     Meclizine Other (See Comments)     Over sedation, concentration problem     Tramadol Hcl Other (See Comments)     Pt feels very drugged, \"cloudy\" if used more than one day. Nausea also     Cardura [Doxazosin Mesylate] Other (See Comments)     fainted     Hydrochlorothiazide Other (See Comments)      lightheadedness     Naproxen Nausea     nausea       Family History   Problem Relation Age of Onset     Family History Negative Brother         1 healthy brother     Diabetes Brother         d:age 66     Diabetes Mother      Cerebrovascular Disease Mother      C.A.D. Father         CABG 67     Heart Disease Father      Lipids Sister      Hypertension Sister      Lipids Sister      Hypertension Sister      Thyroid Disease Daughter         thyroid surgery, on replacement       Additional medical/Social/Surgical histories reviewed in [facility] and updated as appropriate.       PHYSICAL EXAM  There were no vitals taken for this visit.    General  - normal appearance, in no obvious distress  Musculoskeletal - left knee  - stance: mildly antalgic gait, mild genu varum  - inspection: " No effusion noted  - palpation: medial joint line tenderness  - ROM: 120 degrees flexion, 0 degrees extension, painful active ROM  - strength: 5/5 in flexion, 5/5 in extension  - special tests:  (-) Sukhwinder  (-) varus at 0 and 30 degrees flexion  (-) valgus at 0 and 30 degrees flexion  Neuro  - no sensory or motor deficit, grossly normal coordination, normal muscle tone       IMAGING : Although we attempted to contact [facility], they have yet to populate images to PACS.     ASSESSMENT & PLAN  Mr. 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    Although we do not have updated imaging of his left knee, it has been requested from [facility] orthopedics.      Diagnosis: Primary osteoarthritis of left knee    Discussed nonsurgical and surgical measures.  Ricardo is not interested in pursuing surgical intervention to his left knee.  We discussed conservative treatment options that have provided at least 5 to 6 months relief which I do believe is very reasonable.  Agreed to pursue repeat corticosteroid injection today.  Discussed hyaluronic acid injections are best reserved for mild to moderate osteoarthritis.  I would like to look at his x-rays to determine whether he would be a candidate for HA injections.  Otherwise we can continue with corticosteroid injections biannually without any reservations.  If it does seem to wane in its effects and providing less than 3 months relief, I would then be more apt to recommend knee arthroplasty.    PROCEDURE    Left Knee Injection - Intraarticular  The patient was informed of the risks and the benefits of the procedure and a written consent was signed.  The patient s left knee was prepped with chlorhexidine in sterile fashion.   40 mg of triamcinolone suspension was drawn up into a 5 mL syringe with 4 mL of 1% lidocaine.  Injection was performed using substerile  technique.  A 1.5-inch 22-gauge needle was used to enter the lateral aspect of the left knee.  Injection performed successfully without difficulty.  There were no complications. The patient tolerated the procedure well. There was negligible bleeding.   The patient was instructed to ice the knee upon leaving clinic and refrain from overuse over the next 3 days.   The patient was instructed to call or go to the emergency room with any unusual pain, swelling, redness, or if otherwise concerned.  A follow up appointment will be scheduled to evaluate response to the injection, and to assess range of motion and pain.      Large Joint Injection/Arthocentesis: L knee joint    Date/Time: 11/9/2023 2:31 PM    Performed by: Cliff Cash DO  Authorized by: Cliff Cash DO    Needle Size:  22 G  Guidance: landmark guided    Location:  Knee      Medications:  40 mg triamcinolone 40 MG/ML; 4 mL lidocaine 1 %  Outcome:  Tolerated well, no immediate complications  Procedure discussed: discussed risks, benefits, and alternatives    Consent Given by:  Patient  Timeout: timeout called immediately prior to procedure    Prep: patient was prepped and draped in usual sterile fashion      32 minutes on date of the encounter performing chart review, history and examination, independent imaging review, documentation, and additional activities noted above explaining options.  In addition to encounter time, separate time of 11 minutes utilized for procedure.  See procedure note for details.    It was a pleasure seeing Ricardo today.    Cliff Cash DO, [facility]  Primary Care Sports Medicine       Again, thank you for allowing me to participate in the care of your patient.        Sincerely,        Cliff Cash DO

## 2023-11-09 NOTE — PROGRESS NOTES
CHIEF COMPLAINT:  Pain of the Left Knee     HISTORY OF PRESENT ILLNESS  Mr. Neely is a pleasant 83 year old year old male who presents to clinic today with L knee pain.  Ricardo explains that he has intermittent L knee pain when he walks. He has been seen by Dr. Villasenor of [facility] in the past and has success with CSI twice a year. He also sees a surgeon in [place] when he is there. [place] says leave the [place] knee while the [place] surgeon is pushing for a L knee TKA.     Imaging done at [facility] that have been pushed to PACS.     Onset: unchanged  Location: left knee  Quality:  aching and dull  Duration: 5 years   Severity: 7/10 at worst  Timing:intermittent episodes   Modifying factors:  resting and non-use makes it better, movement and use makes it worse  Associated signs & symptoms: pain  Previous similar pain: Yes, and CSI have helped  Treatments to date:CSI-last injection in May 2023 which does last about 5 to 6 months.    Additional history: as documented    Review of Systems:  Have you recently had a a fever, chills, weight loss? No  Do you have any vision problems? No  Do you have any chest pain or edema? No  Do you have any shortness of breath or wheezing?  No  Do you have stomach problems? No  Do you have any numbness or focal weakness? No  Do you have diabetes? No  Do you have problems with bleeding or clotting? Yes, is on blood thinners  Do you have an rashes or other skin lesions? No    MEDICAL HISTORY  Patient Active Problem List   Diagnosis    HISTORY OF URINARY CALCULI    Essential hypertension    Hyperlipidemia LDL goal <100    GERD    Diaphragmatic hernia    Hypertrophy of prostate without urinary obstruction    Advance Care Planning    Anxiety    Contracture of palmar fascia    Hx of transient ischemic attack (TIA)    Carotid stenosis, asymptomatic, bilateral    Palpitations       Current Outpatient Medications   Medication Sig Dispense Refill    amLODIPine-benazepril (LOTREL) 5-20 MG capsule Take 1 capsule  "by mouth daily 90 capsule 3    aspirin (ASA) 81 MG tablet Take 1 tablet (81 mg) by mouth At Bedtime      atorvastatin (LIPITOR) 40 MG tablet Take 1 tablet (40 mg) by mouth daily 90 tablet 3    clopidogrel (PLAVIX) 75 MG tablet TAKE 1 TABLET(75 MG) BY MOUTH DAILY 90 tablet 3    metoprolol succinate ER (TOPROL XL) 50 MG 24 hr tablet Take 1 tablet (50 mg) by mouth daily 90 tablet 3    sertraline (ZOLOFT) 50 MG tablet Take 0.5 tablets (25 mg) by mouth daily for 6 days, THEN 1 tablet (50 mg) daily for 84 days. 87 tablet 0       Allergies   Allergen Reactions    Meclizine Other (See Comments)     Over sedation, concentration problem    Tramadol Hcl Other (See Comments)     Pt feels very drugged, \"cloudy\" if used more than one day. Nausea also    Cardura [Doxazosin Mesylate] Other (See Comments)     fainted    Hydrochlorothiazide Other (See Comments)      lightheadedness    Naproxen Nausea     nausea       Family History   Problem Relation Age of Onset    Family History Negative Brother         1 healthy brother    Diabetes Brother         d:age 66    Diabetes Mother     Cerebrovascular Disease Mother     C.A.D. Father         CABG 67    Heart Disease Father     Lipids Sister     Hypertension Sister     Lipids Sister     Hypertension Sister     Thyroid Disease Daughter         thyroid surgery, on replacement       Additional medical/Social/Surgical histories reviewed in [facility] and updated as appropriate.       PHYSICAL EXAM  There were no vitals taken for this visit.    General  - normal appearance, in no obvious distress  Musculoskeletal - left knee  - stance: mildly antalgic gait, mild genu varum  - inspection: No effusion noted  - palpation: medial joint line tenderness  - ROM: 120 degrees flexion, 0 degrees extension, painful active ROM  - strength: 5/5 in flexion, 5/5 in extension  - special tests:  (-) Sukhwinder  (-) varus at 0 and 30 degrees flexion  (-) valgus at 0 and 30 degrees flexion  Neuro  - no sensory or motor " deficit, grossly normal coordination, normal muscle tone       IMAGING : Although we attempted to contact [facility], they have yet to populate images to PACS.     ASSESSMENT & PLAN  Mr. 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    Although we do not have updated imaging of his left knee, it has been requested from [facility] orthopedics.      Diagnosis: Primary osteoarthritis of left knee    Discussed nonsurgical and surgical measures.  Ricardo is not interested in pursuing surgical intervention to his left knee.  We discussed conservative treatment options that have provided at least 5 to 6 months relief which I do believe is very reasonable.  Agreed to pursue repeat corticosteroid injection today.  Discussed hyaluronic acid injections are best reserved for mild to moderate osteoarthritis.  I would like to look at his x-rays to determine whether he would be a candidate for HA injections.  Otherwise we can continue with corticosteroid injections biannually without any reservations.  If it does seem to wane in its effects and providing less than 3 months relief, I would then be more apt to recommend knee arthroplasty.    PROCEDURE    Left Knee Injection - Intraarticular  The patient was informed of the risks and the benefits of the procedure and a written consent was signed.  The patient s left knee was prepped with chlorhexidine in sterile fashion.   40 mg of triamcinolone suspension was drawn up into a 5 mL syringe with 4 mL of 1% lidocaine.  Injection was performed using substerile technique.  A 1.5-inch 22-gauge needle was used to enter the lateral aspect of the left knee.  Injection performed successfully without difficulty.  There were no complications. The patient tolerated the procedure well. There was negligible bleeding.   The patient was instructed to ice the knee upon leaving clinic and  refrain from overuse over the next 3 days.   The patient was instructed to call or go to the emergency room with any unusual pain, swelling, redness, or if otherwise concerned.  A follow up appointment will be scheduled to evaluate response to the injection, and to assess range of motion and pain.      Large Joint Injection/Arthocentesis: L knee joint    Date/Time: 11/9/2023 2:31 PM    Performed by: Cliff Cash DO  Authorized by: Cliff Cash DO    Needle Size:  22 G  Guidance: landmark guided    Location:  Knee      Medications:  40 mg triamcinolone 40 MG/ML; 4 mL lidocaine 1 %  Outcome:  Tolerated well, no immediate complications  Procedure discussed: discussed risks, benefits, and alternatives    Consent Given by:  Patient  Timeout: timeout called immediately prior to procedure    Prep: patient was prepped and draped in usual sterile fashion      32 minutes on date of the encounter performing chart review, history and examination, independent imaging review, documentation, and additional activities noted above explaining options.  In addition to encounter time, separate time of 11 minutes utilized for procedure.  See procedure note for details.    It was a pleasure seeing Ricardo today.    Cliff Cash DO, [facility]  Primary Care Sports Medicine

## 2023-11-10 ENCOUNTER — TELEPHONE (OUTPATIENT)
Dept: ORTHOPEDICS | Facility: CLINIC | Age: 83
End: 2023-11-10

## 2023-11-10 ENCOUNTER — OFFICE VISIT (OUTPATIENT)
Dept: CARDIOLOGY | Facility: CLINIC | Age: 83
End: 2023-11-10
Payer: COMMERCIAL

## 2023-11-10 VITALS
BODY MASS INDEX: 30.92 KG/M2 | HEART RATE: 80 BPM | DIASTOLIC BLOOD PRESSURE: 68 MMHG | HEIGHT: 67 IN | SYSTOLIC BLOOD PRESSURE: 122 MMHG | WEIGHT: 197 LBS

## 2023-11-10 DIAGNOSIS — R00.2 PALPITATIONS: Primary | ICD-10-CM

## 2023-11-10 DIAGNOSIS — I65.23 BILATERAL CAROTID ARTERY STENOSIS: ICD-10-CM

## 2023-11-10 DIAGNOSIS — E78.5 HYPERLIPIDEMIA LDL GOAL <100: ICD-10-CM

## 2023-11-10 DIAGNOSIS — I10 ESSENTIAL HYPERTENSION: ICD-10-CM

## 2023-11-10 PROCEDURE — 93000 ELECTROCARDIOGRAM COMPLETE: CPT | Performed by: INTERNAL MEDICINE

## 2023-11-10 PROCEDURE — 99214 OFFICE O/P EST MOD 30 MIN: CPT | Performed by: INTERNAL MEDICINE

## 2023-11-10 NOTE — LETTER
11/10/2023    Familia Cook MD  [address] [facility] [address]    RE: Ricardo Neely       Dear Colleague,     I had the pleasure of seeing Ricardo Neely in the [facility].    General Cardiology Clinic Progress Note  Ricardo Neely MRN# [id number]   YOB: 1940 Age: 83 year old       Reason for visit: Palpitations    History of presenting illness:    I had the opportunity to see Ricardo Neely at [facility] today for discussion of palpitations primarily.  I met him when he was hospitalized at [facility] on 10/17/2022 with an episode of lightheadedness.  He had an EKG, telemetry monitoring, and echocardiogram, all of which looked normal.  He ruled out for myocardial infarction.  No cause for his lightheadedness event could be identified.    I suggested that he recheck his carotid ultrasound which had shown some moderate disease in the past and follow-up with Dr. Cook.  He had a carotid ultrasound done on 8/29/2023, which again showed moderate bilateral carotid stenosis, unchanged from his previous carotid ultrasound of October 2022.  His vascular risk factors include hypertension and dyslipidemia which are under excellent control with medical therapy.    His primary concern today is regarding symptoms of palpitations.  He feels irregular heartbeats in his chest and hears them in his ear from time to time.  These are bothersome to him but also concerning.  He is not sure whether these are indicative of an underlying problem.  He describes these as momentary irregular heartbeats.  They were quite frequent about 6 weeks ago but then stopped and he has not had any for the last 4 to 6 weeks.              Assessment and Plan:     ASSESSMENT:    Mr. Ricardo Neely is an 83-year-old gentleman with hypertension, dyslipidemia, and moderate nonocclusive coronary artery disease with a history of TIA.  He remains on excellent therapy for his cholesterol and blood  "pressure.  He is on aspirin and Plavix as well.  His echocardiogram was normal.    He describes some symptoms of palpitations.  His description is consistent with isolated PACs and PVCs and not consistent with atrial fibrillation.  His heart rates are consistently in the 60s and 70s.  He checks it almost daily.  I reassured him that these arrhythmias are annoying but not concerning.  There is no associated adverse effects with these types of atrial and ventricular ectopy.  I indicated that he should seek help immediately if he had recurrent lightheadedness episodes or if he had persistent irregular heart beating.    I made no medication changes today.  I will have him follow-up with Dr. Cook in the future.    Zach Kumar MD           Orders this Visit:  Orders Placed This Encounter   Procedures    EKG 12-lead complete w/read - Clinics (performed today)     No orders of the defined types were placed in this encounter.    There are no discontinued medications.    Today's clinic visit entailed:    25 minutes spent by me on the date of the encounter doing chart review, history and exam, documentation and further activities per the note  Provider  Link to [facility] Help Grid     The level of medical decision making during this visit was of moderate complexity.           Review of Systems:     Review of Systems:  Skin:  Positive for bruising   Eyes:  Positive for glasses;cataracts  ENT:  Positive for tinnitus  Respiratory:  Negative    Cardiovascular:    edema;Positive for  Gastroenterology: Negative    Genitourinary:  Negative    Musculoskeletal:  Positive for back pain  Neurologic:  Negative    Psychiatric:  Positive for anxiety  Heme/Lymph/Imm:  Negative    Endocrine:  Negative              Physical Exam:     Vitals: /68   Pulse 80   Ht 1.702 m (5' 7\")   Wt 89.4 kg (197 lb)   BMI 30.85 kg/m    Constitutional: Well nourished and in no apparent distress.  Eyes: Pupils equal, round. Sclerae anicteric.   HEENT: " Normocephalic, atraumatic.   Neck: Supple. JVD   Respiratory: Breathing non-labored. Lungs clear to auscultation bilaterally. No crackles, wheezes, rhonchi, or rales.  Cardiovascular:  Regular rate and rhythm, normal S1 and S2. No murmur, rub, or gallop.  Skin: Warm, dry. No rashes, cyanosis, or xanthelasma.  Extremities: No edema.  Neurologic: No gross motor deficits. Alert, awake, and oriented to person, place and time.  Psychiatric: Affect appropriate.             Medications:     Current Outpatient Medications   Medication Sig Dispense Refill    amLODIPine-benazepril (LOTREL) 5-20 MG capsule Take 1 capsule by mouth daily 90 capsule 3    aspirin (ASA) 81 MG tablet Take 1 tablet (81 mg) by mouth At Bedtime      atorvastatin (LIPITOR) 40 MG tablet Take 1 tablet (40 mg) by mouth daily 90 tablet 3    clopidogrel (PLAVIX) 75 MG tablet TAKE 1 TABLET(75 MG) BY MOUTH DAILY 90 tablet 3    metoprolol succinate ER (TOPROL XL) 50 MG 24 hr tablet Take 1 tablet (50 mg) by mouth daily 90 tablet 3    sertraline (ZOLOFT) 50 MG tablet Take 0.5 tablets (25 mg) by mouth daily for 6 days, THEN 1 tablet (50 mg) daily for 84 days. 87 tablet 0       Family History   Problem Relation Age of Onset    Family History Negative Brother         1 healthy brother    Diabetes Brother         d:age 66    Diabetes Mother     Cerebrovascular Disease Mother     C.A.D. Father         CABG 67    Heart Disease Father     Lipids Sister     Hypertension Sister     Lipids Sister     Hypertension Sister     Thyroid Disease Daughter         thyroid surgery, on replacement       Social History     Socioeconomic History    Marital status:      Spouse name: Not on file    Number of children: Not on file    Years of education: Not on file    Highest education level: Not on file   Occupational History     Employer: RETIRED   Tobacco Use    Smoking status: Former     Types: Cigarettes     Quit date: 1968     Years since quittin.8    Smokeless  tobacco: Never   Vaping Use    Vaping Use: Never used   Substance and Sexual Activity    Alcohol use: No     Alcohol/week: 0.0 standard drinks of alcohol    Drug use: No    Sexual activity: Yes     Partners: Female   Other Topics Concern     Service Not Asked    Blood Transfusions Not Asked    Caffeine Concern No    Occupational Exposure Not Asked    Hobby Hazards Not Asked    Sleep Concern Not Asked    Stress Concern Not Asked    Weight Concern Not Asked    Special Diet Not Asked    Back Care Not Asked    Exercise Not Asked    Bike Helmet Not Asked    Seat Belt Not Asked    Self-Exams Not Asked    Parent/sibling w/ CABG, MI or angioplasty before 65F 55M? Not Asked   Social History Narrative    Not on file     Social Determinants of Health     Financial Resource Strain: Low Risk  (11/8/2021)    Overall Financial Resource Strain (CARDIA)     Difficulty of Paying Living Expenses: Not hard at all   Food Insecurity: No Food Insecurity (11/8/2021)    Hunger Vital Sign     Worried About Running Out of Food in the Last Year: Never true     Ran Out of Food in the Last Year: Never true   Transportation Needs: No Transportation Needs (11/8/2021)    PRAPARE - Transportation     Lack of Transportation (Medical): No     Lack of Transportation (Non-Medical): No   Physical Activity: Sufficiently Active (11/8/2021)    Exercise Vital Sign     Days of Exercise per Week: 6 days     Minutes of Exercise per Session: 40 min   Stress: No Stress Concern Present (11/8/2021)    [employer] [place] [employer] - Occupational Stress Questionnaire     Feeling of Stress : Not at all   Social Connections: Socially Isolated (11/8/2021)    Social Connection and Isolation Panel [NHANES]     Frequency of Communication with Friends and Family: Once a week     Frequency of Social Gatherings with Friends and Family: Once a week     Attends [employer] Services: Never     Active Member of Clubs or Organizations: No     Attends Club or  Organization Meetings: Not on file     Marital Status:    Interpersonal Safety: Not on file   Housing Stability: Low Risk  (11/8/2021)    Housing Stability Vital Sign     Unable to Pay for Housing in the Last Year: No     Number of Places Lived in the Last Year: 0     Unstable Housing in the Last Year: No            Past Medical History:     Past Medical History:   Diagnosis Date    Anxiety 5/26/2011    Basal cell carcinoma     Benign Prostatic Hypertrophy     CEREBROVASC DISEASE, small vessel 12/07    Essential hypertension, benign     GERD     HIATAL HERNIA     HYPERPLASTIC POLYPS COLON 5/93, 3/06    Impaired fasting glucose     Low Back Pain     Obesity, unspecified     Other and unspecified hyperlipidemia     Personal history of urinary calculi 1960's    Pneumonia, organism unspecified(486) 1992    Squamous cell carcinoma of skin, unspecified     TRANSIENT CEREBRAL ISCHEMIA 12/07              Past Surgical History:     Past Surgical History:   Procedure Laterality Date    COLONOSCOPY      DISCECTOMY LUMBAR POSTERIOR MICROSCOPIC TWO LEVELS  8/28/2012    DISCECTOMY LUMBAR POSTERIOR MICROSCOPIC TWO LEVELS;  RIGHT L3-L4 REDO EXTENDED HEMILAMINECTOMY AND MICRO FORAMINOTOMY, LEFT L4-L5 EXTENDED HEMILAMINECTOMY AND MICRODISCECTOMY (PRONE) (SONYA MCCALLUM, C-ARM, METRIX II);  Surgeon: Bertram Mirza MD;  Location:  OR    OPTICAL TRACKING SYSTEM FUSION SPINE POSTERIOR LUMBAR TWO LEVELS N/A 8/21/2019    Procedure: L3-5 LUMBAR TRANSFORAMINAL INTERBODY FUSION WITH STEALTH;  Surgeon: Harrison Perez MD;  Location:  OR    renal calculii removal 40 years ago  1965    [facility] NONSPECIFIC PROCEDURE  1959    pilonidal cystectomy    [facility] NONSPECIFIC PROCEDURE  1966    kidney stone    [facility] NONSPECIFIC PROCEDURE  approx 1999    R knee arthroscopy     Dr. Guzman    [facility] NONSPECIFIC PROCEDURE  2005    L3-4 microdiskectomy   Dr. Brantley    [facility] TOTAL KNEE ARTHROPLASTY  July 2010    Minimally invasive R TKA   Dr. Nichols               Allergies:   Meclizine, Tramadol hcl, Cardura [doxazosin mesylate], Hydrochlorothiazide, and Naproxen       Data:   All laboratory data reviewed:    Recent Labs   Lab Test 04/27/23  0855 10/16/22  1329 05/13/22  0940 07/20/21  0919 05/20/21  0946 05/18/20  0000 10/16/19  0837 08/23/19  2248   LDL 55  --  50  --  66   < >  --   --    HDL 54  --  60  --  58   < >  --   --    NHDL 69  --  67  --  82  --   --   --    CHOL 123  --  127  --  140   < >  --   --    TRIG 72  --  87  --  81   < >  --   --    TSH  --  0.58  --   --   --   --  0.86 0.32*   IRON  --   --   --  126  --   --   --   --    FEB  --   --   --  368  --   --   --   --    IRONSAT  --   --   --  34  --   --   --   --     < > = values in this interval not displayed.       Lab Results   Component Value Date    WBC 9.0 10/16/2022    WBC 11.0 08/24/2019    RBC 4.81 10/16/2022    RBC 3.40 (L) 08/24/2019    HGB 14.4 10/16/2022    HGB 10.1 (L) 08/24/2019    HCT 41.0 10/16/2022    HCT 28.8 (L) 08/24/2019    MCV 85 10/16/2022    MCV 85 08/24/2019    MCH 29.9 10/16/2022    MCH 29.7 08/24/2019    MCHC 35.1 10/16/2022    MCHC 35.1 08/24/2019    RDW 13.3 10/16/2022    RDW 13.0 08/24/2019     10/16/2022     08/24/2019       Lab Results   Component Value Date     04/27/2023     05/20/2021    POTASSIUM 4.9 04/27/2023    POTASSIUM 3.4 10/16/2022    POTASSIUM 4.6 05/20/2021    CHLORIDE 99 04/27/2023    CHLORIDE 103 10/16/2022    CHLORIDE 103 05/20/2021    CO2 25 04/27/2023    CO2 25 10/16/2022    CO2 30 05/20/2021    ANIONGAP 12 04/27/2023    ANIONGAP 7 10/16/2022    ANIONGAP 2 (L) 05/20/2021    GLC 98 04/27/2023     (H) 10/16/2022    GLC 92 05/20/2021    BUN 12.1 04/27/2023    BUN 14 10/16/2022    BUN 15 05/20/2021    CR 0.82 04/27/2023    CR 0.88 05/20/2021    GFRESTIMATED 87 04/27/2023    GFRESTIMATED 80 05/20/2021    GFRESTBLACK >90 05/20/2021    RICARDO 9.3 04/27/2023    RICARDO 9.2 05/20/2021      Lab Results   Component Value  Date    AST 23 04/27/2023    AST 21 05/20/2021    ALT 28 04/27/2023    ALT 30 05/20/2021       Lab Results   Component Value Date    A1C 5.5 08/28/2012       Lab Results   Component Value Date    INR 1.93 (H) 07/15/2010    INR 1.87 (H) 07/14/2010         SHERYL VILLANUEVA MD  [facility]    Thank you for allowing me to participate in the care of your patient.      Sincerely,     SHERYL VILLANUEVA MD     [facility]  cc:   Referred Self,

## 2023-11-10 NOTE — PATIENT INSTRUCTIONS
It was a pleasure seeing you today and thank you for allowing me to be a part of your health care team.  Should you have any questions regarding your visit or future needs please feel free to reach out to my care team for assistance.      Thank you, Dr. Zach Kumar        **Nursing: [phone]       **Scheduling: [phone]

## 2023-11-10 NOTE — TELEPHONE ENCOUNTER
Patient was seen yesterday in [place]. Patient was called to clarify when his last images were done at [facility]. His 2020 and 2021 imaging have been pushed to PACS and will be reviewed by Dr. Cash.     Jenn Hoenig, MA, LAT, ATC, CEAS

## 2023-11-10 NOTE — PROGRESS NOTES
General Cardiology Clinic Progress Note  Ricardo Neely MRN# [id number]   YOB: 1940 Age: 83 year old       Reason for visit: Palpitations    History of presenting illness:    I had the opportunity to see Ricardo Neely at [facility] today for discussion of palpitations primarily.  I met him when he was hospitalized at [facility] on 10/17/2022 with an episode of lightheadedness.  He had an EKG, telemetry monitoring, and echocardiogram, all of which looked normal.  He ruled out for myocardial infarction.  No cause for his lightheadedness event could be identified.    I suggested that he recheck his carotid ultrasound which had shown some moderate disease in the past and follow-up with Dr. Cook.  He had a carotid ultrasound done on 8/29/2023, which again showed moderate bilateral carotid stenosis, unchanged from his previous carotid ultrasound of October 2022.  His vascular risk factors include hypertension and dyslipidemia which are under excellent control with medical therapy.    His primary concern today is regarding symptoms of palpitations.  He feels irregular heartbeats in his chest and hears them in his ear from time to time.  These are bothersome to him but also concerning.  He is not sure whether these are indicative of an underlying problem.  He describes these as momentary irregular heartbeats.  They were quite frequent about 6 weeks ago but then stopped and he has not had any for the last 4 to 6 weeks.              Assessment and Plan:     ASSESSMENT:    Mr. Ricardo Neely is an 83-year-old gentleman with hypertension, dyslipidemia, and moderate nonocclusive coronary artery disease with a history of TIA.  He remains on excellent therapy for his cholesterol and blood pressure.  He is on aspirin and Plavix as well.  His echocardiogram was normal.    He describes some symptoms of palpitations.  His description is consistent with isolated PACs and PVCs and not consistent  "with atrial fibrillation.  His heart rates are consistently in the 60s and 70s.  He checks it almost daily.  I reassured him that these arrhythmias are annoying but not concerning.  There is no associated adverse effects with these types of atrial and ventricular ectopy.  I indicated that he should seek help immediately if he had recurrent lightheadedness episodes or if he had persistent irregular heart beating.    I made no medication changes today.  I will have him follow-up with Dr. Cook in the future.    Zach Kumar MD           Orders this Visit:  Orders Placed This Encounter   Procedures    EKG 12-lead complete w/read - Clinics (performed today)     No orders of the defined types were placed in this encounter.    There are no discontinued medications.    Today's clinic visit entailed:    25 minutes spent by me on the date of the encounter doing chart review, history and exam, documentation and further activities per the note  Provider  Link to [facility] Help Grid     The level of medical decision making during this visit was of moderate complexity.           Review of Systems:     Review of Systems:  Skin:  Positive for bruising   Eyes:  Positive for glasses;cataracts  ENT:  Positive for tinnitus  Respiratory:  Negative    Cardiovascular:    edema;Positive for  Gastroenterology: Negative    Genitourinary:  Negative    Musculoskeletal:  Positive for back pain  Neurologic:  Negative    Psychiatric:  Positive for anxiety  Heme/Lymph/Imm:  Negative    Endocrine:  Negative              Physical Exam:     Vitals: /68   Pulse 80   Ht 1.702 m (5' 7\")   Wt 89.4 kg (197 lb)   BMI 30.85 kg/m    Constitutional: Well nourished and in no apparent distress.  Eyes: Pupils equal, round. Sclerae anicteric.   HEENT: Normocephalic, atraumatic.   Neck: Supple. JVD   Respiratory: Breathing non-labored. Lungs clear to auscultation bilaterally. No crackles, wheezes, rhonchi, or rales.  Cardiovascular:  Regular rate and rhythm, " normal S1 and S2. No murmur, rub, or gallop.  Skin: Warm, dry. No rashes, cyanosis, or xanthelasma.  Extremities: No edema.  Neurologic: No gross motor deficits. Alert, awake, and oriented to person, place and time.  Psychiatric: Affect appropriate.             Medications:     Current Outpatient Medications   Medication Sig Dispense Refill    amLODIPine-benazepril (LOTREL) 5-20 MG capsule Take 1 capsule by mouth daily 90 capsule 3    aspirin (ASA) 81 MG tablet Take 1 tablet (81 mg) by mouth At Bedtime      atorvastatin (LIPITOR) 40 MG tablet Take 1 tablet (40 mg) by mouth daily 90 tablet 3    clopidogrel (PLAVIX) 75 MG tablet TAKE 1 TABLET(75 MG) BY MOUTH DAILY 90 tablet 3    metoprolol succinate ER (TOPROL XL) 50 MG 24 hr tablet Take 1 tablet (50 mg) by mouth daily 90 tablet 3    sertraline (ZOLOFT) 50 MG tablet Take 0.5 tablets (25 mg) by mouth daily for 6 days, THEN 1 tablet (50 mg) daily for 84 days. 87 tablet 0       Family History   Problem Relation Age of Onset    Family History Negative Brother         1 healthy brother    Diabetes Brother         d:age 66    Diabetes Mother     Cerebrovascular Disease Mother     C.A.D. Father         CABG 67    Heart Disease Father     Lipids Sister     Hypertension Sister     Lipids Sister     Hypertension Sister     Thyroid Disease Daughter         thyroid surgery, on replacement       Social History     Socioeconomic History    Marital status:      Spouse name: Not on file    Number of children: Not on file    Years of education: Not on file    Highest education level: Not on file   Occupational History     Employer: RETIRED   Tobacco Use    Smoking status: Former     Types: Cigarettes     Quit date: 1968     Years since quittin.8    Smokeless tobacco: Never   Vaping Use    Vaping Use: Never used   Substance and Sexual Activity    Alcohol use: No     Alcohol/week: 0.0 standard drinks of alcohol    Drug use: No    Sexual activity: Yes     Partners: Female    Other Topics Concern     Service Not Asked    Blood Transfusions Not Asked    Caffeine Concern No    Occupational Exposure Not Asked    Hobby Hazards Not Asked    Sleep Concern Not Asked    Stress Concern Not Asked    Weight Concern Not Asked    Special Diet Not Asked    Back Care Not Asked    Exercise Not Asked    Bike Helmet Not Asked    Seat Belt Not Asked    Self-Exams Not Asked    Parent/sibling w/ CABG, MI or angioplasty before 65F 55M? Not Asked   Social History Narrative    Not on file     Social Determinants of Health     Financial Resource Strain: Low Risk  (11/8/2021)    Overall Financial Resource Strain (CARDIA)     Difficulty of Paying Living Expenses: Not hard at all   Food Insecurity: No Food Insecurity (11/8/2021)    Hunger Vital Sign     Worried About Running Out of Food in the Last Year: Never true     Ran Out of Food in the Last Year: Never true   Transportation Needs: No Transportation Needs (11/8/2021)    PRAPARE - Transportation     Lack of Transportation (Medical): No     Lack of Transportation (Non-Medical): No   Physical Activity: Sufficiently Active (11/8/2021)    Exercise Vital Sign     Days of Exercise per Week: 6 days     Minutes of Exercise per Session: 40 min   Stress: No Stress Concern Present (11/8/2021)    [employer] - Occupational Stress Questionnaire     Feeling of Stress : Not at all   Social Connections: Socially Isolated (11/8/2021)    Social Connection and Isolation Panel [NHANES]     Frequency of Communication with Friends and Family: Once a week     Frequency of Social Gatherings with Friends and Family: Once a week     Attends Zoroastrian Services: Never     Active Member of Clubs or Organizations: No     Attends Club or Organization Meetings: Not on file     Marital Status:    Interpersonal Safety: Not on file   Housing Stability: Low Risk  (11/8/2021)    Housing Stability Vital Sign     Unable to Pay for Housing in the Last  Year: No     Number of Places Lived in the Last Year: 0     Unstable Housing in the Last Year: No            Past Medical History:     Past Medical History:   Diagnosis Date    Anxiety 5/26/2011    Basal cell carcinoma     Benign Prostatic Hypertrophy     CEREBROVASC DISEASE, small vessel 12/07    Essential hypertension, benign     GERD     HIATAL HERNIA     HYPERPLASTIC POLYPS COLON 5/93, 3/06    Impaired fasting glucose     Low Back Pain     Obesity, unspecified     Other and unspecified hyperlipidemia     Personal history of urinary calculi 1960's    Pneumonia, organism unspecified(486) 1992    Squamous cell carcinoma of skin, unspecified     TRANSIENT CEREBRAL ISCHEMIA 12/07              Past Surgical History:     Past Surgical History:   Procedure Laterality Date    COLONOSCOPY      DISCECTOMY LUMBAR POSTERIOR MICROSCOPIC TWO LEVELS  8/28/2012    DISCECTOMY LUMBAR POSTERIOR MICROSCOPIC TWO LEVELS;  RIGHT L3-L4 REDO EXTENDED HEMILAMINECTOMY AND MICRO FORAMINOTOMY, LEFT L4-L5 EXTENDED HEMILAMINECTOMY AND MICRODISCECTOMY (PRONE) (SONYA MCCALLUM, C-ARM, METRIX II);  Surgeon: Bertram Mirza MD;  Location:  OR    OPTICAL TRACKING SYSTEM FUSION SPINE POSTERIOR LUMBAR TWO LEVELS N/A 8/21/2019    Procedure: L3-5 LUMBAR TRANSFORAMINAL INTERBODY FUSION WITH STEALTH;  Surgeon: Harrison Perez MD;  Location:  OR    renal calculii removal 40 years ago  1965    [facility] NONSPECIFIC PROCEDURE  1959    pilonidal cystectomy    [facility] NONSPECIFIC PROCEDURE  1966    kidney stone    [facility] NONSPECIFIC PROCEDURE  approx 1999    R knee arthroscopy     Dr. Guzman    [facility] NONSPECIFIC PROCEDURE  2005    L3-4 microdiskectomy   Dr. Brantley    [facility] TOTAL KNEE ARTHROPLASTY  July 2010    Minimally invasive R TKA   Dr. Nichols              Allergies:   Meclizine, Tramadol hcl, Cardura [doxazosin mesylate], Hydrochlorothiazide, and Naproxen       Data:   All laboratory data reviewed:    Recent Labs   Lab Test 04/27/23  0855 10/16/22  0663  05/13/22  0940 07/20/21  0919 05/20/21  0946 05/18/20  0000 10/16/19  0837 08/23/19  2248   LDL 55  --  50  --  66   < >  --   --    HDL 54  --  60  --  58   < >  --   --    NHDL 69  --  67  --  82  --   --   --    CHOL 123  --  127  --  140   < >  --   --    TRIG 72  --  87  --  81   < >  --   --    TSH  --  0.58  --   --   --   --  0.86 0.32*   IRON  --   --   --  126  --   --   --   --    FEB  --   --   --  368  --   --   --   --    IRONSAT  --   --   --  34  --   --   --   --     < > = values in this interval not displayed.       Lab Results   Component Value Date    WBC 9.0 10/16/2022    WBC 11.0 08/24/2019    RBC 4.81 10/16/2022    RBC 3.40 (L) 08/24/2019    HGB 14.4 10/16/2022    HGB 10.1 (L) 08/24/2019    HCT 41.0 10/16/2022    HCT 28.8 (L) 08/24/2019    MCV 85 10/16/2022    MCV 85 08/24/2019    MCH 29.9 10/16/2022    MCH 29.7 08/24/2019    MCHC 35.1 10/16/2022    MCHC 35.1 08/24/2019    RDW 13.3 10/16/2022    RDW 13.0 08/24/2019     10/16/2022     08/24/2019       Lab Results   Component Value Date     04/27/2023     05/20/2021    POTASSIUM 4.9 04/27/2023    POTASSIUM 3.4 10/16/2022    POTASSIUM 4.6 05/20/2021    CHLORIDE 99 04/27/2023    CHLORIDE 103 10/16/2022    CHLORIDE 103 05/20/2021    CO2 25 04/27/2023    CO2 25 10/16/2022    CO2 30 05/20/2021    ANIONGAP 12 04/27/2023    ANIONGAP 7 10/16/2022    ANIONGAP 2 (L) 05/20/2021    GLC 98 04/27/2023     (H) 10/16/2022    GLC 92 05/20/2021    BUN 12.1 04/27/2023    BUN 14 10/16/2022    BUN 15 05/20/2021    CR 0.82 04/27/2023    CR 0.88 05/20/2021    GFRESTIMATED 87 04/27/2023    GFRESTIMATED 80 05/20/2021    GFRESTBLACK >90 05/20/2021    RICARDO 9.3 04/27/2023    RICARDO 9.2 05/20/2021      Lab Results   Component Value Date    AST 23 04/27/2023    AST 21 05/20/2021    ALT 28 04/27/2023    ALT 30 05/20/2021       Lab Results   Component Value Date    A1C 5.5 08/28/2012       Lab Results   Component Value Date    INR 1.93 (H)  07/15/2010    INR 1.87 (H) 07/14/2010         SHERYL VILLANUEVA MD  [facility]

## 2024-01-02 NOTE — TELEPHONE ENCOUNTER
Pt previously instructed to schedule appt with any IM provider as PCP's scheduled was full; but appt not made at that time. See subsequent appt with cardiology in chart Nov 2023 with same sx and assurance provided to pt by Dr Kumar

## 2024-02-28 ENCOUNTER — MYC MEDICAL ADVICE (OUTPATIENT)
Dept: INTERNAL MEDICINE | Facility: CLINIC | Age: 84
End: 2024-02-28

## 2024-02-29 NOTE — TELEPHONE ENCOUNTER
Dr. Cook,    Please see [facility] message and advise. Pt is requesting orthopedic surgeon referral for left knee pain, but [place]. Unable to triage.   Can referral be placed or does pt need to make an appt with you to discuss when he returns to [place]?    Thank you,  Dimple Stephen RN

## 2024-04-10 ENCOUNTER — OFFICE VISIT (OUTPATIENT)
Dept: ORTHOPEDICS | Facility: CLINIC | Age: 84
End: 2024-04-10
Payer: COMMERCIAL

## 2024-04-10 ENCOUNTER — ANCILLARY PROCEDURE (OUTPATIENT)
Dept: GENERAL RADIOLOGY | Facility: CLINIC | Age: 84
End: 2024-04-10
Attending: FAMILY MEDICINE
Payer: COMMERCIAL

## 2024-04-10 VITALS — WEIGHT: 197 LBS | BODY MASS INDEX: 30.85 KG/M2

## 2024-04-10 DIAGNOSIS — M17.12 PRIMARY OSTEOARTHRITIS OF LEFT KNEE: Primary | ICD-10-CM

## 2024-04-10 DIAGNOSIS — M17.12 PRIMARY OSTEOARTHRITIS OF LEFT KNEE: ICD-10-CM

## 2024-04-10 PROCEDURE — 99213 OFFICE O/P EST LOW 20 MIN: CPT | Performed by: FAMILY MEDICINE

## 2024-04-10 PROCEDURE — 73562 X-RAY EXAM OF KNEE 3: CPT | Mod: TC | Performed by: RADIOLOGY

## 2024-04-10 NOTE — PROGRESS NOTES
ESTABLISHED PATIENT FOLLOW-UP:  No chief complaint on file.       HISTORY OF PRESENT ILLNESS  Mr. Neely is a pleasant 83 year old year old male who presents to clinic today for follow-up of left knee pain.  Last corticosteroid provided prior to his winter trip to [place] was unhelpful.  Pain is still present.  He was seen by an orthopedist in [place] and had an additional corticosteroid shot without benefit.     Date of injury: chronic, 5+ years  Date last seen: 11/9/2023, corticosteroid injection given to patient.  Following Therapeutic Plan: yes  Pain: 8/10 with walking, 0/10 at rest  Function: patient able to walk with cane  Interval History: patient had Corticosteroid injection on 2/7/2024.    History of right TKA 13 years ago.  He notes he would be interested in starting the motions toward a left TKA.  He feels he is not achieving his activity goals, life goals with current status of his knee.    Additional medical/Social/Surgical histories reviewed in [facility] and updated as appropriate.    REVIEW OF SYSTEMS (4/10/2024)  CONSTITUTIONAL: Denies fever and weight loss  GASTROINTESTINAL: Denies abdominal pain, nausea, vomiting  MUSCULOSKELETAL: See HPI  SKIN: Denies any recent rash or lesion  NEUROLOGICAL: Denies numbness or focal weakness     PHYSICAL EXAM  There were no vitals taken for this visit.    General  - normal appearance, in no obvious distress  Musculoskeletal - left knee  - stance: mildly antalgic gait, mild genu varum  - inspection: trace effusion  - palpation: medial joint line tenderness  - ROM: 110 degrees flexion, 0 degrees extension, painful active ROM  - strength: 5/5 in flexion, 5/5 in extension  - special tests:  (-) Sukhwinder  (-) varus at 0 and 30 degrees flexion  (-) valgus at 0 and 30 degrees flexion  Neuro  - no sensory or motor deficit, grossly normal coordination, normal muscle tone     IMAGING : Left knee xray 3 views. Final results and radiologist's interpretation, available in the [facility]  record. Images were reviewed with the patient/family members in the office today. My personal interpretation of the performed imaging is end-stage osteoarthritis of medial compartment of left knee as well as least moderate patellofemoral osteoarthritic changes.     ASSESSMENT & PLAN  Mr. Neely is a 83 year old year old male who presents to clinic today with acute on chronic left knee pain secondary to end-stage osteoarthritis.    Diagnosis: End-stage osteoarthritis of left knee    Functional goals were discussed in detail.  We reviewed prior to injections which provided no relief.  At this time I have recommended total knee arthroplasty as his next viable option.  I provided him with a referral to see one of my colleagues in [place].  I think based on his goals of activity and current status, combined with his history of previous total knee arthroplasty he will do well in his recovery and I feel that he is ready to pursue consultation.    Ricardo can follow-up with me as needed.    It was a pleasure seeing Ricardo.    Cliff Cash DO, [facility]  Primary Care Sports Medicine

## 2024-04-10 NOTE — LETTER
4/10/2024         RE: Ricardo Neely  [address]  [place] [id number]        Dear Colleague,    Thank you for referring your patient, Ricardo Neely, to the [facility] [place]. Please see a copy of my visit note below.    ESTABLISHED PATIENT FOLLOW-UP:  No chief complaint on file.       HISTORY OF PRESENT ILLNESS  Mr. Neely is a pleasant 83 year old year old male who presents to clinic today for follow-up of left knee pain.  Last corticosteroid provided prior to his winter trip to [place] was unhelpful.  Pain is still present.  He was seen by an orthopedist in [place] and had an additional corticosteroid shot without benefit.     Date of injury: chronic, 5+ years  Date last seen: 11/9/2023, corticosteroid injection given to patient.  Following Therapeutic Plan: yes  Pain: 8/10 with walking, 0/10 at rest  Function: patient able to walk with cane  Interval History: patient had Corticosteroid injection on 2/7/2024.    History of right TKA 13 years ago.  He notes he would be interested in starting the motions toward a left TKA.  He feels he is not achieving his activity goals, life goals with current status of his knee.    Additional medical/Social/Surgical histories reviewed in [facility] and updated as appropriate.    REVIEW OF SYSTEMS (4/10/2024)  CONSTITUTIONAL: Denies fever and weight loss  GASTROINTESTINAL: Denies abdominal pain, nausea, vomiting  MUSCULOSKELETAL: See HPI  SKIN: Denies any recent rash or lesion  NEUROLOGICAL: Denies numbness or focal weakness     PHYSICAL EXAM  There were no vitals taken for this visit.    General  - normal appearance, in no obvious distress  Musculoskeletal - left knee  - stance: mildly antalgic gait, mild genu varum  - inspection: trace effusion  - palpation: medial joint line tenderness  - ROM: 110 degrees flexion, 0 degrees extension, painful active ROM  - strength: 5/5 in flexion, 5/5 in extension  - special tests:  (-) Sukhwinder  (-) varus  at 0 and 30 degrees flexion  (-) valgus at 0 and 30 degrees flexion  Neuro  - no sensory or motor deficit, grossly normal coordination, normal muscle tone     IMAGING : Left knee xray 3 views. Final results and radiologist's interpretation, available in the [facility] health record. Images were reviewed with the patient/family members in the office today. My personal interpretation of the performed imaging is end-stage osteoarthritis of medial compartment of left knee as well as least moderate patellofemoral osteoarthritic changes.     ASSESSMENT & PLAN  Mr. Neely is a 83 year old year old male who presents to clinic today with acute on chronic left knee pain secondary to end-stage osteoarthritis.    Diagnosis: End-stage osteoarthritis of left knee    Functional goals were discussed in detail.  We reviewed prior to injections which provided no relief.  At this time I have recommended total knee arthroplasty as his next viable option.  I provided him with a referral to see one of my colleagues in [place].  I think based on his goals of activity and current status, combined with his history of previous total knee arthroplasty he will do well in his recovery and I feel that he is ready to pursue consultation.    Ricardo can follow-up with me as needed.    It was a pleasure seeing Ricardo.    Cliff Cash DO, [facility]  Primary Care Sports Medicine         Again, thank you for allowing me to participate in the care of your patient.        Sincerely,        Cliff Cash DO

## 2024-04-25 ASSESSMENT — KOOS JR
KOOS JR SCORING: 54.84
GOING UP OR DOWN STAIRS: SEVERE
TWISING OR PIVOTING ON KNEE: EXTREME
STANDING UPRIGHT: MILD
BENDING TO THE FLOOR TO PICK UP OBJECT: SEVERE
RISING FROM SITTING: MILD
HOW SEVERE IS YOUR KNEE STIFFNESS AFTER FIRST WAKING IN MORNING: MILD

## 2024-04-29 ASSESSMENT — SOCIAL DETERMINANTS OF HEALTH (SDOH): HOW OFTEN DO YOU GET TOGETHER WITH FRIENDS OR RELATIVES?: THREE TIMES A WEEK

## 2024-04-30 ENCOUNTER — LAB (OUTPATIENT)
Dept: LAB | Facility: CLINIC | Age: 84
End: 2024-04-30
Payer: COMMERCIAL

## 2024-04-30 ENCOUNTER — TELEPHONE (OUTPATIENT)
Dept: ORTHOPEDICS | Facility: CLINIC | Age: 84
End: 2024-04-30

## 2024-04-30 DIAGNOSIS — I10 ESSENTIAL HYPERTENSION: ICD-10-CM

## 2024-04-30 DIAGNOSIS — E78.5 HYPERLIPIDEMIA LDL GOAL <100: ICD-10-CM

## 2024-04-30 LAB
ALBUMIN SERPL BCG-MCNC: 4.2 G/DL (ref 3.5–5.2)
ALP SERPL-CCNC: 101 U/L (ref 40–150)
ALT SERPL W P-5'-P-CCNC: 25 U/L (ref 0–70)
ANION GAP SERPL CALCULATED.3IONS-SCNC: 8 MMOL/L (ref 7–15)
AST SERPL W P-5'-P-CCNC: 22 U/L (ref 0–45)
BILIRUB SERPL-MCNC: 0.6 MG/DL
BUN SERPL-MCNC: 10 MG/DL (ref 8–23)
CALCIUM SERPL-MCNC: 8.9 MG/DL (ref 8.8–10.2)
CHLORIDE SERPL-SCNC: 97 MMOL/L (ref 98–107)
CHOLEST SERPL-MCNC: 131 MG/DL
CREAT SERPL-MCNC: 0.77 MG/DL (ref 0.67–1.17)
DEPRECATED HCO3 PLAS-SCNC: 27 MMOL/L (ref 22–29)
EGFRCR SERPLBLD CKD-EPI 2021: 88 ML/MIN/1.73M2
ERYTHROCYTE [DISTWIDTH] IN BLOOD BY AUTOMATED COUNT: 12.6 % (ref 10–15)
FASTING STATUS PATIENT QL REPORTED: YES
GLUCOSE SERPL-MCNC: 97 MG/DL (ref 70–99)
HCT VFR BLD AUTO: 43.8 % (ref 40–53)
HDLC SERPL-MCNC: 51 MG/DL
HGB BLD-MCNC: 14.9 G/DL (ref 13.3–17.7)
LDLC SERPL CALC-MCNC: 62 MG/DL
MCH RBC QN AUTO: 29.5 PG (ref 26.5–33)
MCHC RBC AUTO-ENTMCNC: 34 G/DL (ref 31.5–36.5)
MCV RBC AUTO: 87 FL (ref 78–100)
NONHDLC SERPL-MCNC: 80 MG/DL
PLATELET # BLD AUTO: 269 10E3/UL (ref 150–450)
POTASSIUM SERPL-SCNC: 4 MMOL/L (ref 3.4–5.3)
PROT SERPL-MCNC: 6.9 G/DL (ref 6.4–8.3)
RBC # BLD AUTO: 5.05 10E6/UL (ref 4.4–5.9)
SODIUM SERPL-SCNC: 132 MMOL/L (ref 135–145)
TRIGL SERPL-MCNC: 92 MG/DL
WBC # BLD AUTO: 9.9 10E3/UL (ref 4–11)

## 2024-04-30 PROCEDURE — 85027 COMPLETE CBC AUTOMATED: CPT

## 2024-04-30 PROCEDURE — 36415 COLL VENOUS BLD VENIPUNCTURE: CPT

## 2024-04-30 PROCEDURE — 80053 COMPREHEN METABOLIC PANEL: CPT

## 2024-04-30 PROCEDURE — 80061 LIPID PANEL: CPT

## 2024-04-30 NOTE — TELEPHONE ENCOUNTER
Provider had surgery come up for tomorrow so patient's appointment time needs adjusting.     Writer called patient and his wife, as they have back to back appointments, and adjusted appointment time.     Nicolette Marie MSA, ATC  Certified Athletic Trainer

## 2024-04-30 NOTE — PROGRESS NOTES
[facility]  Orthopaedic Surgery Consultation by Madi Henley M.D.    Ricardo Neely MRN# [id number]   Age: 83 year old YOB: 1940     Requesting physician: Familia Myers     Background history:  DX:  Diaphragmatic hernia  GERD  Hyperlipidemia  Hypertension  Carotid stenosis with TIA on aspirin 81 mg and Plavix 75 mg    TREATMENTS:  2010, right total knee arthroplasty ( Biomet Vanguard knee size 67.5 PS femoral component; size 79 tibial tray, fixed I-beam post: posterior stabilized 12 mm polyethylene insert; 34 mm all poly patellar button 3 post. )  2019, L3 L5 lumbar fusion           History of Present Illness:   83 year old male who presents her clinic for the evaluation of chronic left knee pain.  This pain has been present for multiple years and is getting worse over time.  No antecedent trauma.  The pain is felt mostly on the medial side and gets increasingly bad during prolonged walking, standing, pivoting, twisting and turning.  He can no longer ambulate longer distances than 100 feet. He avoids fully extending knee when walking as this increases pain. He has episodes of instability.  Patient reports effusion and crepitus.  He does not report significant night pain he does report initiation stiffness and soreness.    To mitigate the pain he is had multiple injections.  The most recent CSI was done by orthopedic provider in [place] on 2/7/24. This provided no relief in symptoms. CSI have become ineffective as before they did provide relief. He braces and takes Tylenol.  He has tried physical therapy in the past without significant success.  Patient is here today because his current complaints are greatly affecting his quality of life and activity level.    He would like to discuss left total knee replacement surgery.    Social:   Occupation: retired  Living situation: lives with spouse, splits time between [place] and [place]  Hobbies / Sports: walking (limited due to  "pain)    Smoking: No  Alcohol: No  Illicit drug use: No         Physical Exam:     EXAMINATION pertinent findings:   PSYCH: Pleasant, healthy-appearing, alert, oriented x3, cooperative. Normal mood and affect.  VITAL SIGNS: Blood pressure (!) 143/69, height 1.702 m (5' 7\"), weight 89.4 kg (197 lb).  Reviewed nursing intake notes.   Body mass index is 30.85 kg/m .  RESP: non labored breathing   ABD: benign, soft, non-tender, no acute peritoneal findings  SKIN: grossly normal   LYMPHATIC: grossly normal, no adenopathy, no extremity edema  NEURO: grossly normal , no motor deficits  VASCULAR: satisfactory perfusion of all extremities   MUSCULOSKELETAL:   Alignment: Varus alignment of left lower extremity.  Gait: Slight antalgic gait over left lower extremity.    The left hip exhibits a full range of motion.  No pain upon rotations.  Lasegue's test is negative.    L knee: -0-0 .  Deep flexion and full extension is recognizably painful.  Straight leg raise +. No redness, warmth or skin changes present. Effusion  minimal. Ligamentously stable in both ML and AP direction.  +1 laxity in ML direction most likely due to substance loss.  Normal PF tracking without crepitus. Apprehension -.  Rabot is negative.  Meniscal provocation tests are negative.  There is recognizable tenderness to palpation over the medial joint line.     Left LE:   Thigh and leg compartments soft and compressible   +Quad/TA/GSC/FHL/EHL   SILT DP/SP/Bhavna/Saph/Tib nerve distributions   Palpable dorsalis pedis pulse          Data:   All laboratory data reviewed  All imaging studies reviewed by me personally.    XR knee left 4/10/2024:  My interpretation: End-stage osteoarthritic changes of the left knee with near complete obliteration of joint space, presence of marginal osteophytes, sclerosis and subchondral cyst in medial compartment.  Significant osteophytic change of patellofemoral joint.  Relatively well-preserved lateral compartment.         " Assessment and Plan:   Assessment:  83-year-old male presenting with chronic left knee pain due to end-stage osteoarthritic changes most notably in medial and patellofemoral compartments.  Insufficiently responding to nonsurgical treatment options.     Plan:  I had a long discussion with the patient regarding etiology and ongoing management options.  Reviewed surgical and nonsurgical treatments.  The non-surgical options include activity modification, pain medication, PT, bracing and injection therapy.  Given the severity of his osteoarthritic changes and the fact that it did not provide significant relief in the past I do not anticipate that a renewed cycle of physical therapy will provide any significant or long-term relief.  As for surgery we discussed the option of total knee replacement surgery. We reviewed total knee replacement in detail including the procedure, the implants, the recovery process, and long-term outcomes.  We reviewed that the risks of the surgery include but are not limited to infection, wound problems, stiffness, persistent pain, swelling, clicking, loosening, revision surgery.  We also reviewed less common risks such as neurovascular injury fracture, and other implant-related issues.  We reviewed other medical complications such as a blood clot.  We discussed that the vast majority of cases have a highly successful outcome.  However there is a small subset of patients that do experience complications or problems following the knee replacement and these problems can be very debilitating and painful and sometimes do not improve.   Based on a discussion of the risks and benefits, we believe that the benefits far outweigh the risks at this point and the patient would like to proceed with left total knee replacement surgery.  We will work on scheduling surgery at a time that works well for patient in the next few months.  Patient will contact us if there are any questions or concerns leading up  to surgery. Before surgery the patient will attend a joint replacement class and will be seen by the PCP/anesthesiologist and dentist.    Before surgery alignment films will be obtained.    For additional information and frequently asked questions regarding joint replacements, scan the QR code image below on your phone camera or go to: [email or web address]           Thank you for your referral.    Madi Henley MD, PhD     Adult Reconstruction  [facility] Department of Orthopaedic Surgery    This note was created using dictation software and may contain errors.  Please contact the creator for any clarifications that are needed.    DATA for DOCUMENTATION:         Past Medical History:     Patient Active Problem List   Diagnosis    HISTORY OF URINARY CALCULI    Essential hypertension    Hyperlipidemia LDL goal <100    GERD    Diaphragmatic hernia    Hypertrophy of prostate without urinary obstruction    Advance Care Planning    Anxiety    Contracture of palmar fascia    Hx of transient ischemic attack (TIA)    Carotid stenosis, asymptomatic, bilateral    Palpitations     Past Medical History:   Diagnosis Date    Anxiety 5/26/2011    Basal cell carcinoma     Benign Prostatic Hypertrophy     CEREBROVASC DISEASE, small vessel 12/07    Essential hypertension, benign     GERD     HIATAL HERNIA     HYPERPLASTIC POLYPS COLON 5/93, 3/06    Impaired fasting glucose     Low Back Pain     Obesity, unspecified     Other and unspecified hyperlipidemia     Personal history of urinary calculi 1960's    Pneumonia, organism unspecified(486) 1992    Squamous cell carcinoma of skin, unspecified     TRANSIENT CEREBRAL ISCHEMIA 12/07       Also see scanned health assessment forms.       Past Surgical History:     Past Surgical History:   Procedure Laterality Date    COLONOSCOPY      DISCECTOMY LUMBAR POSTERIOR MICROSCOPIC TWO LEVELS  8/28/2012    DISCECTOMY  LUMBAR POSTERIOR MICROSCOPIC TWO LEVELS;  RIGHT L3-L4 REDO EXTENDED HEMILAMINECTOMY AND MICRO FORAMINOTOMY, LEFT L4-L5 EXTENDED HEMILAMINECTOMY AND MICRODISCECTOMY (PRONE) (SONYA MCCALLUM, C-ARM, METRIX II);  Surgeon: Bertram Mirza MD;  Location:  OR    OPTICAL TRACKING SYSTEM FUSION SPINE POSTERIOR LUMBAR TWO LEVELS N/A 2019    Procedure: L3-5 LUMBAR TRANSFORAMINAL INTERBODY FUSION WITH STEALTH;  Surgeon: Harrison Perez MD;  Location:  OR    renal calculii removal 40 years ago      [facility] NONSPECIFIC PROCEDURE      pilonidal cystectomy    [facility] NONSPECIFIC PROCEDURE      kidney stone    [facility] NONSPECIFIC PROCEDURE  1999    R knee arthroscopy     Dr. Guzman    [facility] NONSPECIFIC PROCEDURE      L3-4 microdiskectomy   Dr. Brantley    [facility] TOTAL KNEE ARTHROPLASTY  2010    Minimally invasive R TKA   Dr. Nichols            Social History:     Social History     Socioeconomic History    Marital status:      Spouse name: Not on file    Number of children: Not on file    Years of education: Not on file    Highest education level: Not on file   Occupational History     Employer: RETIRED   Tobacco Use    Smoking status: Former     Current packs/day: 0.00     Types: Cigarettes     Quit date: 1968     Years since quittin.3    Smokeless tobacco: Never   Vaping Use    Vaping status: Never Used   Substance and Sexual Activity    Alcohol use: No     Alcohol/week: 0.0 standard drinks of alcohol    Drug use: No    Sexual activity: Yes     Partners: Female   Other Topics Concern     Service Not Asked    Blood Transfusions Not Asked    Caffeine Concern No    Occupational Exposure Not Asked    Hobby Hazards Not Asked    Sleep Concern Not Asked    Stress Concern Not Asked    Weight Concern Not Asked    Special Diet Not Asked    Back Care Not Asked    Exercise Not Asked    Bike Helmet Not Asked    Seat Belt Not Asked    Self-Exams Not Asked    Parent/sibling w/ CABG, MI or  angioplasty before 65F 55M? Not Asked   Social History Narrative    Not on file     Social Determinants of Health     Financial Resource Strain: Low Risk  (4/29/2024)    Financial Resource Strain     Within the past 12 months, have you or your family members you live with been unable to get utilities (heat, electricity) when it was really needed?: No   Food Insecurity: Low Risk  (4/29/2024)    Food Insecurity     Within the past 12 months, did you worry that your food would run out before you got money to buy more?: No     Within the past 12 months, did the food you bought just not last and you didn t have money to get more?: No   Transportation Needs: Low Risk  (4/29/2024)    Transportation Needs     Within the past 12 months, has lack of transportation kept you from medical appointments, getting your medicines, non-medical meetings or appointments, work, or from getting things that you need?: No   Physical Activity: Sufficiently Active (11/8/2021)    Exercise Vital Sign     Days of Exercise per Week: 6 days     Minutes of Exercise per Session: 40 min   Stress: No Stress Concern Present (4/29/2024)    [employer] [place] [employer] - Occupational Stress Questionnaire     Feeling of Stress : Only a little   Social Connections: Unknown (4/29/2024)    Social Connection and Isolation Panel [NHANES]     Frequency of Communication with Friends and Family: Not on file     Frequency of Social Gatherings with Friends and Family: Three times a week     Attends [employer] Services: Not on file     Active Member of Clubs or Organizations: Not on file     Attends Club or Organization Meetings: Not on file     Marital Status: Not on file   Interpersonal Safety: Not on file   Housing Stability: Low Risk  (4/29/2024)    Housing Stability     Do you have housing? : Yes     Are you worried about losing your housing?: No            Family History:       Family History   Problem Relation Age of Onset    Family History  Negative Brother         1 healthy brother    Diabetes Brother         d:age 66    Diabetes Mother     Cerebrovascular Disease Mother     C.A.D. Father         CABG 67    Heart Disease Father     Lipids Sister     Hypertension Sister     Lipids Sister     Hypertension Sister     Thyroid Disease Daughter         thyroid surgery, on replacement            Medications:     Current Outpatient Medications   Medication Sig Dispense Refill    amLODIPine-benazepril (LOTREL) 5-20 MG capsule Take 1 capsule by mouth daily 90 capsule 3    aspirin (ASA) 81 MG tablet Take 1 tablet (81 mg) by mouth At Bedtime      atorvastatin (LIPITOR) 40 MG tablet Take 1 tablet (40 mg) by mouth daily 90 tablet 3    clopidogrel (PLAVIX) 75 MG tablet TAKE 1 TABLET(75 MG) BY MOUTH DAILY 90 tablet 3    metoprolol succinate ER (TOPROL XL) 50 MG 24 hr tablet Take 1 tablet (50 mg) by mouth daily 90 tablet 3    sertraline (ZOLOFT) 50 MG tablet Take 0.5 tablets (25 mg) by mouth daily for 6 days, THEN 1 tablet (50 mg) daily for 84 days. 87 tablet 0     Current Facility-Administered Medications   Medication Dose Route Frequency Provider Last Rate Last Admin    lidocaine 1 % injection 4 mL  4 mL   Cliff Cash DO   4 mL at 11/09/23 1431    triamcinolone (KENALOG-40) injection 40 mg  40 mg   Cliff Cash DO   40 mg at 11/09/23 1431              Review of Systems:   A comprehensive 10 point review of systems (constitutional, ENT, cardiac, peripheral vascular, lymphatic, respiratory, GI, , Musculoskeletal, skin, Neurological) was performed and found to be negative except as described in this note.     See intake form completed by patient

## 2024-05-01 ENCOUNTER — ANCILLARY PROCEDURE (OUTPATIENT)
Dept: GENERAL RADIOLOGY | Facility: CLINIC | Age: 84
End: 2024-05-01
Attending: STUDENT IN AN ORGANIZED HEALTH CARE EDUCATION/TRAINING PROGRAM
Payer: COMMERCIAL

## 2024-05-01 ENCOUNTER — OFFICE VISIT (OUTPATIENT)
Dept: ORTHOPEDICS | Facility: CLINIC | Age: 84
End: 2024-05-01
Attending: FAMILY MEDICINE
Payer: COMMERCIAL

## 2024-05-01 VITALS
BODY MASS INDEX: 30.92 KG/M2 | DIASTOLIC BLOOD PRESSURE: 69 MMHG | HEIGHT: 67 IN | SYSTOLIC BLOOD PRESSURE: 143 MMHG | WEIGHT: 197 LBS

## 2024-05-01 DIAGNOSIS — M17.12 PRIMARY OSTEOARTHRITIS OF LEFT KNEE: ICD-10-CM

## 2024-05-01 PROCEDURE — 77073 BONE LENGTH STUDIES: CPT | Mod: TC | Performed by: RADIOLOGY

## 2024-05-01 PROCEDURE — 99204 OFFICE O/P NEW MOD 45 MIN: CPT | Performed by: STUDENT IN AN ORGANIZED HEALTH CARE EDUCATION/TRAINING PROGRAM

## 2024-05-01 RX ORDER — TRANEXAMIC ACID 650 MG/1
1950 TABLET ORAL ONCE
OUTPATIENT
Start: 2024-05-01 | End: 2024-05-01

## 2024-05-01 NOTE — PATIENT INSTRUCTIONS
1. Primary osteoarthritis of left knee       Schedule left total knee arthroplasty surgery.   Surgery Scheduler will contact you to assist with scheduling surgery.   You can contact her directly at [phone].   Prior to your scheduled surgery we advise scheduling with your dentist to obtain clearance for surgery, and to complete any recommended dental work prior.     Pre-operative Physical needed within 30 days of scheduled proceedure  Physical Therapy will be scheduled to start post op day 3-5.    For mor information regarding total joint surgery please visit our website:   [email or web address]    FORMS:   If you are needing any forms completed relating to your upcoming procedure, please send them to our office with a completed Release of Information.   Forms will be completed AFTER your procedure. A letter can be sent to your employer prior to surgery to inform them of your anticipated time off.    Please notify our staff if you would like a letter to do so.   Forms can be faxed directly to our clinic at [phone].     DO NOT BRING FORMS ON THE DATE OF SURGERY.     MEDICATION REFILL:   Please allow 3 business days for completion of medication refills for any surgery related prescription.   Medication refills submitted on Friday, may not be addressed until the following Monday.  You may request a refill via [employer], or by calling our Nurse Triage at [phone].      Call my office with any questions or concerns, [phone].

## 2024-05-01 NOTE — LETTER
5/1/2024         RE: Ricardo Neely  [address]  [facility] [address]        Dear Colleague,    Thank you for referring your patient, Ricardo Neely, to the [facility] [place]. Please see a copy of my visit note below.        [facility]  Orthopaedic Surgery Consultation by Madi Henley M.D.    Ricardo Neely MRN# [id number]   Age: 83 year old YOB: 1940     Requesting physician: Familia Myers     Background history:  DX:  Diaphragmatic hernia  GERD  Hyperlipidemia  Hypertension  Carotid stenosis with TIA on aspirin 81 mg and Plavix 75 mg    TREATMENTS:  2010, right total knee arthroplasty ( Biomet Vanguard knee size 67.5 PS femoral component; size 79 tibial tray, fixed I-beam post: posterior stabilized 12 mm polyethylene insert; 34 mm all poly patellar button 3 post. )  2019, L3 L5 lumbar fusion           History of Present Illness:   83 year old male who presents her clinic for the evaluation of chronic left knee pain.  This pain has been present for multiple years and is getting worse over time.  No antecedent trauma.  The pain is felt mostly on the medial side and gets increasingly bad during prolonged walking, standing, pivoting, twisting and turning.  He can no longer ambulate longer distances than 100 feet. He avoids fully extending knee when walking as this increases pain. He has episodes of instability.  Patient reports effusion and crepitus.  He does not report significant night pain he does report initiation stiffness and soreness.    To mitigate the pain he is had multiple injections.  The most recent CSI was done by orthopedic provider in [place] on 2/7/24. This provided no relief in symptoms. CSI have become ineffective as before they did provide relief. He braces and takes Tylenol.  He has tried physical therapy in the past without significant success.  Patient is here today because his current complaints are greatly  "affecting his quality of life and activity level.    He would like to discuss left total knee replacement surgery.    Social:   Occupation: retired  Living situation: lives with spouse, splits time between [place] and [place]  Hobbies / Sports: walking (limited due to pain)    Smoking: No  Alcohol: No  Illicit drug use: No         Physical Exam:     EXAMINATION pertinent findings:   PSYCH: Pleasant, healthy-appearing, alert, oriented x3, cooperative. Normal mood and affect.  VITAL SIGNS: Blood pressure (!) 143/69, height 1.702 m (5' 7\"), weight 89.4 kg (197 lb).  Reviewed nursing intake notes.   Body mass index is 30.85 kg/m .  RESP: non labored breathing   ABD: benign, soft, non-tender, no acute peritoneal findings  SKIN: grossly normal   LYMPHATIC: grossly normal, no adenopathy, no extremity edema  NEURO: grossly normal , no motor deficits  VASCULAR: satisfactory perfusion of all extremities   MUSCULOSKELETAL:   Alignment: Varus alignment of left lower extremity.  Gait: Slight antalgic gait over left lower extremity.    The left hip exhibits a full range of motion.  No pain upon rotations.  Lasegue's test is negative.    L knee: -0-0 .  Deep flexion and full extension is recognizably painful.  Straight leg raise +. No redness, warmth or skin changes present. Effusion  minimal. Ligamentously stable in both ML and AP direction.  +1 laxity in ML direction most likely due to substance loss.  Normal PF tracking without crepitus. Apprehension -.  Rabot is negative.  Meniscal provocation tests are negative.  There is recognizable tenderness to palpation over the medial joint line.     Left LE:   Thigh and leg compartments soft and compressible   +Quad/TA/GSC/FHL/EHL   SILT DP/SP/Bhavna/Saph/Tib nerve distributions   Palpable dorsalis pedis pulse          Data:   All laboratory data reviewed  All imaging studies reviewed by me personally.    XR knee left 4/10/2024:  My interpretation: End-stage osteoarthritic changes of " the left knee with near complete obliteration of joint space, presence of marginal osteophytes, sclerosis and subchondral cyst in medial compartment.  Significant osteophytic change of patellofemoral joint.  Relatively well-preserved lateral compartment.         Assessment and Plan:   Assessment:  83-year-old male presenting with chronic left knee pain due to end-stage osteoarthritic changes most notably in medial and patellofemoral compartments.  Insufficiently responding to nonsurgical treatment options.     Plan:  I had a long discussion with the patient regarding etiology and ongoing management options.  Reviewed surgical and nonsurgical treatments.  The non-surgical options include activity modification, pain medication, PT, bracing and injection therapy.  Given the severity of his osteoarthritic changes and the fact that it did not provide significant relief in the past I do not anticipate that a renewed cycle of physical therapy will provide any significant or long-term relief.  As for surgery we discussed the option of total knee replacement surgery. We reviewed total knee replacement in detail including the procedure, the implants, the recovery process, and long-term outcomes.  We reviewed that the risks of the surgery include but are not limited to infection, wound problems, stiffness, persistent pain, swelling, clicking, loosening, revision surgery.  We also reviewed less common risks such as neurovascular injury fracture, and other implant-related issues.  We reviewed other medical complications such as a blood clot.  We discussed that the vast majority of cases have a highly successful outcome.  However there is a small subset of patients that do experience complications or problems following the knee replacement and these problems can be very debilitating and painful and sometimes do not improve.   Based on a discussion of the risks and benefits, we believe that the benefits far outweigh the risks at  this point and the patient would like to proceed with left total knee replacement surgery.  We will work on scheduling surgery at a time that works well for patient in the next few months.  Patient will contact us if there are any questions or concerns leading up to surgery. Before surgery the patient will attend a joint replacement class and will be seen by the PCP/anesthesiologist and dentist.    Before surgery alignment films will be obtained.    For additional information and frequently asked questions regarding joint replacements, scan the QR code image below on your phone camera or go to: [email or web address]           Thank you for your referral.    Madi Henley MD, PhD     Adult Reconstruction  [facility] Department of Orthopaedic Surgery    This note was created using dictation software and may contain errors.  Please contact the creator for any clarifications that are needed.    DATA for DOCUMENTATION:         Past Medical History:     Patient Active Problem List   Diagnosis     HISTORY OF URINARY CALCULI     Essential hypertension     Hyperlipidemia LDL goal <100     GERD     Diaphragmatic hernia     Hypertrophy of prostate without urinary obstruction     Advance Care Planning     Anxiety     Contracture of palmar fascia     Hx of transient ischemic attack (TIA)     Carotid stenosis, asymptomatic, bilateral     Palpitations     Past Medical History:   Diagnosis Date     Anxiety 5/26/2011     Basal cell carcinoma      Benign Prostatic Hypertrophy      CEREBROVASC DISEASE, small vessel 12/07     Essential hypertension, benign      GERD      HIATAL HERNIA      HYPERPLASTIC POLYPS COLON 5/93, 3/06     Impaired fasting glucose      Low Back Pain      Obesity, unspecified      Other and unspecified hyperlipidemia      Personal history of urinary calculi 1960's     Pneumonia, organism unspecified([phone] 1992     Squamous cell  carcinoma of skin, unspecified      TRANSIENT CEREBRAL ISCHEMIA        Also see scanned health assessment forms.       Past Surgical History:     Past Surgical History:   Procedure Laterality Date     COLONOSCOPY       DISCECTOMY LUMBAR POSTERIOR MICROSCOPIC TWO LEVELS  2012    DISCECTOMY LUMBAR POSTERIOR MICROSCOPIC TWO LEVELS;  RIGHT L3-L4 REDO EXTENDED HEMILAMINECTOMY AND MICRO FORAMINOTOMY, LEFT L4-L5 EXTENDED HEMILAMINECTOMY AND MICRODISCECTOMY (PRONE) (SONYA MCCALLUM, C-ARM, METRIX II);  Surgeon: Bertram Mirza MD;  Location:  OR     OPTICAL TRACKING SYSTEM FUSION SPINE POSTERIOR LUMBAR TWO LEVELS N/A 2019    Procedure: L3-5 LUMBAR TRANSFORAMINAL INTERBODY FUSION WITH STEALTH;  Surgeon: Harrison Perez MD;  Location:  OR     renal calculii removal 40 years ago       [facility] NONSPECIFIC PROCEDURE      pilonidal cystectomy     [facility] NONSPECIFIC PROCEDURE      kidney stone     [facility] NONSPECIFIC PROCEDURE  1999    R knee arthroscopy     Dr. Guzman     [facility] NONSPECIFIC PROCEDURE      L3-4 microdiskectomy   Dr. Brantley     [facility] TOTAL KNEE ARTHROPLASTY  2010    Minimally invasive R TKA   Dr. Nichols            Social History:     Social History     Socioeconomic History     Marital status:      Spouse name: Not on file     Number of children: Not on file     Years of education: Not on file     Highest education level: Not on file   Occupational History     Employer: RETIRED   Tobacco Use     Smoking status: Former     Current packs/day: 0.00     Types: Cigarettes     Quit date: 1968     Years since quittin.3     Smokeless tobacco: Never   Vaping Use     Vaping status: Never Used   Substance and Sexual Activity     Alcohol use: No     Alcohol/week: 0.0 standard drinks of alcohol     Drug use: No     Sexual activity: Yes     Partners: Female   Other Topics Concern      Service Not Asked     Blood Transfusions Not Asked     Caffeine Concern No      Occupational Exposure Not Asked     Hobby Hazards Not Asked     Sleep Concern Not Asked     Stress Concern Not Asked     Weight Concern Not Asked     Special Diet Not Asked     Back Care Not Asked     Exercise Not Asked     Bike Helmet Not Asked     Seat Belt Not Asked     Self-Exams Not Asked     Parent/sibling w/ CABG, MI or angioplasty before 65F 55M? Not Asked   Social History Narrative     Not on file     Social Determinants of Health     Financial Resource Strain: Low Risk  (4/29/2024)    Financial Resource Strain      Within the past 12 months, have you or your family members you live with been unable to get utilities (heat, electricity) when it was really needed?: No   Food Insecurity: Low Risk  (4/29/2024)    Food Insecurity      Within the past 12 months, did you worry that your food would run out before you got money to buy more?: No      Within the past 12 months, did the food you bought just not last and you didn t have money to get more?: No   Transportation Needs: Low Risk  (4/29/2024)    Transportation Needs      Within the past 12 months, has lack of transportation kept you from medical appointments, getting your medicines, non-medical meetings or appointments, work, or from getting things that you need?: No   Physical Activity: Sufficiently Active (11/8/2021)    Exercise Vital Sign      Days of Exercise per Week: 6 days      Minutes of Exercise per Session: 40 min   Stress: No Stress Concern Present (4/29/2024)    [place] [employer] - Occupational Stress Questionnaire      Feeling of Stress : Only a little   Social Connections: Unknown (4/29/2024)    Social Connection and Isolation Panel [NHANES]      Frequency of Communication with Friends and Family: Not on file      Frequency of Social Gatherings with Friends and Family: Three times a week      Attends Alevism Services: Not on file      Active Member of Clubs or Organizations: Not on file      Attends Club or Organization  Meetings: Not on file      Marital Status: Not on file   Interpersonal Safety: Not on file   Housing Stability: Low Risk  (4/29/2024)    Housing Stability      Do you have housing? : Yes      Are you worried about losing your housing?: No            Family History:       Family History   Problem Relation Age of Onset     Family History Negative Brother         1 healthy brother     Diabetes Brother         d:age 66     Diabetes Mother      Cerebrovascular Disease Mother      C.A.D. Father         CABG 67     Heart Disease Father      Lipids Sister      Hypertension Sister      Lipids Sister      Hypertension Sister      Thyroid Disease Daughter         thyroid surgery, on replacement            Medications:     Current Outpatient Medications   Medication Sig Dispense Refill     amLODIPine-benazepril (LOTREL) 5-20 MG capsule Take 1 capsule by mouth daily 90 capsule 3     aspirin (ASA) 81 MG tablet Take 1 tablet (81 mg) by mouth At Bedtime       atorvastatin (LIPITOR) 40 MG tablet Take 1 tablet (40 mg) by mouth daily 90 tablet 3     clopidogrel (PLAVIX) 75 MG tablet TAKE 1 TABLET(75 MG) BY MOUTH DAILY 90 tablet 3     metoprolol succinate ER (TOPROL XL) 50 MG 24 hr tablet Take 1 tablet (50 mg) by mouth daily 90 tablet 3     sertraline (ZOLOFT) 50 MG tablet Take 0.5 tablets (25 mg) by mouth daily for 6 days, THEN 1 tablet (50 mg) daily for 84 days. 87 tablet 0     Current Facility-Administered Medications   Medication Dose Route Frequency Provider Last Rate Last Admin     lidocaine 1 % injection 4 mL  4 mL   Cliff Cash DO   4 mL at 11/09/23 1431     triamcinolone (KENALOG-40) injection 40 mg  40 mg   Cliff Cash DO   40 mg at 11/09/23 1431              Review of Systems:   A comprehensive 10 point review of systems (constitutional, ENT, cardiac, peripheral vascular, lymphatic, respiratory, GI, , Musculoskeletal, skin, Neurological) was performed and found to be negative except as described in this note.      See intake form completed by patient         Again, thank you for allowing me to participate in the care of your patient.        Sincerely,        Madi Henley MD

## 2024-05-02 ENCOUNTER — TELEPHONE (OUTPATIENT)
Dept: ORTHOPEDICS | Facility: CLINIC | Age: 84
End: 2024-05-02

## 2024-05-02 NOTE — TELEPHONE ENCOUNTER
Patient has been scheduled for surgery. Details are below.    Date of Surgery: 07/16/24    Approximate Arrival Time: SURGERY CENTER WILL CALL 3/4 DAYS PRIOR TO CONFIRM A TIME   Surgeon: Dr. Madi Henley    Procedure: ARTHROPLASTY, KNEE, TOTAL - Left   Location: [facility], [address] [place] [address]  Surgery Consult: 06/18/24   PreOp Physical: 06/21/24  PostOp: 08/01 & 09/13/24  Packet Mailed/Mytopiahart Sent: YES  Added to [place]: YES    Spoke to: MARIANELA

## 2024-05-06 ENCOUNTER — OFFICE VISIT (OUTPATIENT)
Dept: INTERNAL MEDICINE | Facility: CLINIC | Age: 84
End: 2024-05-06
Attending: INTERNAL MEDICINE
Payer: COMMERCIAL

## 2024-05-06 VITALS
RESPIRATION RATE: 16 BRPM | HEIGHT: 67 IN | HEART RATE: 86 BPM | OXYGEN SATURATION: 96 % | SYSTOLIC BLOOD PRESSURE: 128 MMHG | TEMPERATURE: 97.2 F | BODY MASS INDEX: 31.88 KG/M2 | WEIGHT: 203.1 LBS | DIASTOLIC BLOOD PRESSURE: 62 MMHG

## 2024-05-06 DIAGNOSIS — H26.9 CATARACT OF BOTH EYES, UNSPECIFIED CATARACT TYPE: ICD-10-CM

## 2024-05-06 DIAGNOSIS — N40.1 BENIGN PROSTATIC HYPERPLASIA WITH URINARY RETENTION: ICD-10-CM

## 2024-05-06 DIAGNOSIS — Z00.00 MEDICARE ANNUAL WELLNESS VISIT, SUBSEQUENT: ICD-10-CM

## 2024-05-06 DIAGNOSIS — M54.16 LUMBAR RADICULOPATHY: ICD-10-CM

## 2024-05-06 DIAGNOSIS — M25.562 CHRONIC PAIN OF LEFT KNEE: ICD-10-CM

## 2024-05-06 DIAGNOSIS — G45.0 VERTEBROBASILAR ARTERY SYNDROME: ICD-10-CM

## 2024-05-06 DIAGNOSIS — I10 ESSENTIAL HYPERTENSION: ICD-10-CM

## 2024-05-06 DIAGNOSIS — G89.29 CHRONIC PAIN OF LEFT KNEE: ICD-10-CM

## 2024-05-06 DIAGNOSIS — I65.23 CAROTID STENOSIS, ASYMPTOMATIC, BILATERAL: ICD-10-CM

## 2024-05-06 DIAGNOSIS — E78.5 HYPERLIPIDEMIA LDL GOAL <100: ICD-10-CM

## 2024-05-06 DIAGNOSIS — Z23 NEED FOR COVID-19 VACCINE: ICD-10-CM

## 2024-05-06 DIAGNOSIS — R33.8 BENIGN PROSTATIC HYPERPLASIA WITH URINARY RETENTION: ICD-10-CM

## 2024-05-06 PROCEDURE — 90480 ADMN SARSCOV2 VAC 1/ONLY CMP: CPT | Performed by: INTERNAL MEDICINE

## 2024-05-06 PROCEDURE — G0439 PPPS, SUBSEQ VISIT: HCPCS | Performed by: INTERNAL MEDICINE

## 2024-05-06 PROCEDURE — 91320 SARSCV2 VAC 30MCG TRS-SUC IM: CPT | Performed by: INTERNAL MEDICINE

## 2024-05-06 PROCEDURE — 99214 OFFICE O/P EST MOD 30 MIN: CPT | Mod: 25 | Performed by: INTERNAL MEDICINE

## 2024-05-06 RX ORDER — METOPROLOL SUCCINATE 50 MG/1
50 TABLET, EXTENDED RELEASE ORAL DAILY
Qty: 90 TABLET | Refills: 3 | Status: SHIPPED | OUTPATIENT
Start: 2024-05-06

## 2024-05-06 RX ORDER — TAMSULOSIN HYDROCHLORIDE 0.4 MG/1
0.4 CAPSULE ORAL DAILY
Qty: 30 CAPSULE | Refills: 11 | Status: SHIPPED | OUTPATIENT
Start: 2024-05-06

## 2024-05-06 RX ORDER — ATORVASTATIN CALCIUM 40 MG/1
40 TABLET, FILM COATED ORAL DAILY
Qty: 90 TABLET | Refills: 3 | Status: SHIPPED | OUTPATIENT
Start: 2024-05-06

## 2024-05-06 RX ORDER — CLOPIDOGREL BISULFATE 75 MG/1
TABLET ORAL
Qty: 90 TABLET | Refills: 3 | Status: ON HOLD | OUTPATIENT
Start: 2024-05-06 | End: 2024-09-12

## 2024-05-06 RX ORDER — AMLODIPINE AND BENAZEPRIL HYDROCHLORIDE 5; 20 MG/1; MG/1
1 CAPSULE ORAL DAILY
Qty: 90 CAPSULE | Refills: 3 | Status: SHIPPED | OUTPATIENT
Start: 2024-05-06

## 2024-05-06 NOTE — PROGRESS NOTES
Preventive Care Visit  [facility]  Familia Cook MD, Internal Medicine  May 6, 2024      ASSESSMENT:    1. Medicare annual wellness visit, subsequent  See plan discussion below regarding healthcare maintenance recommendations.  Future screening labs as ordered counseled regarding carbohydrate reduction for weight loss.  Hearing status not to the point where patient wishes to use hearing aids  - PRIMARY CARE FOLLOW-UP SCHEDULING  - Comprehensive metabolic panel; Future    2. Essential hypertension  Controlled.  Continue current medication  - amLODIPine-benazepril (LOTREL) 5-20 MG capsule; Take 1 capsule by mouth daily  Dispense: 90 capsule; Refill: 3  - metoprolol succinate ER (TOPROL XL) 50 MG 24 hr tablet; Take 1 tablet (50 mg) by mouth daily  Dispense: 90 tablet; Refill: 3  - Comprehensive metabolic panel; Future    3. Hyperlipidemia LDL goal <100  Controlled.  Continue current medication.  Future labs ordered  - atorvastatin (LIPITOR) 40 MG tablet; Take 1 tablet (40 mg) by mouth daily  Dispense: 90 tablet; Refill: 3  - Comprehensive metabolic panel; Future  - Lipid panel reflex to direct LDL Fasting; Future    4. Carotid stenosis, asymptomatic, bilateral  Stable carotid stenosis last summer.  First noted in 2022.  Needs repeat carotid ultrasound in September.  Continue medical management pending results  - atorvastatin (LIPITOR) 40 MG tablet; Take 1 tablet (40 mg) by mouth daily  Dispense: 90 tablet; Refill: 3  - clopidogrel (PLAVIX) 75 MG tablet; TAKE 1 TABLET(75 MG) BY MOUTH DAILY  Dispense: 90 tablet; Refill: 3  - US Carotid Bilateral; Future    5. Vertebrobasilar artery syndrome  Patient denies recent vertigo symptoms.  Continue medical management  - atorvastatin (LIPITOR) 40 MG tablet; Take 1 tablet (40 mg) by mouth daily  Dispense: 90 tablet; Refill: 3  - clopidogrel (PLAVIX) 75 MG tablet; TAKE 1 TABLET(75 MG) BY MOUTH DAILY  Dispense: 90 tablet; Refill: 3    6. Benign prostatic  hyperplasia with urinary retention  Urinary hesitancy with some nocturia.  Counseled regarding caffeine reduction.  Will start tamsulosin in addition  - tamsulosin (FLOMAX) 0.4 MG capsule; Take 1 capsule (0.4 mg) by mouth daily  Dispense: 30 capsule; Refill: 11    7. Cataract of both eyes, unspecified cataract type  Will need future surgery but patient concentrating first on upcoming   Knee replacement and back issues.  Future cataract surgery when patient ready. Sx L>R    8. Lumbar radiculopathy  Previous lumbar surgery as below.  Now having radicular symptoms bilaterally.  Prior MRI a year ago showed some stenosis at L2-3.  Has follow-up with Dr. Perez scheduled later this month    9. Chronic pain of left knee  Scheduled for future LTKA and preop currently scheduled in addition.  Handicap parking permit application completed    10. Need for COVID-19 vaccine  Candidate for vaccination  - COVID-19 12+ (2023-24) (PFIZER)      PLAN:  Continue current medications  Prescriptions refilled.    Tamsulosin 0.4mg capsule, 1 capsule daily in the  PM for slow urine stream and nighttime urination  Reduce caffeine intake to also lessen urine frequency  Reduce calorie/carbohydrate (sugar, bread, potato, pasta, rice, alcohol etc)  intake in diet.  Increase color on your plate with vegetables.    Follow-up with Dr Perez as scheduled re: back  See me for future preop before knee surgery as scheduled  Call  [phone] or use [employer] to schedule a future lab appointment  fasting in 1 year.   For fasting labs, please refrain from eating for 8 hours or more.   Drink 2 glasses of water before your lab appointment. It is fine to take your  oral medications on the morning of the lab test as usual  Vaccinations: Pfizer covid vaccine  I would recommend an updated covid and influenza/flu vaccinations in the Fall 2024. May get through a pharmacy   Carotid ultrasound in September to follow-up on carotid stenosis  Future cataract surgery when  ready.   Talk with eye doctor and get preop within  30 days of surgery  Handicap parking permit application completed.  Bring form to DMV  Pt was informed regarding extra E&M billing for management of new or established medical issues not related to today's wellness/screening visit           Virginia Silva is a 83 year old, presenting for the following:  Medicare Visit      Health Care Directive  Patient has a Health Care Directive on file  Advance care planning document is on file and is current.    HPI         4/29/2024   General Health   How would you rate your overall physical health? Good   Feel stress (tense, anxious, or unable to sleep) Only a little   (!) STRESS CONCERN      4/29/2024   Nutrition   Diet: Low salt         5/5/2023   Exercise   Frequency of exercise: 6-7 days/week         4/29/2024   Social Factors   Frequency of gathering with friends or relatives Three times a week   Worry food won't last until get money to buy more No   Food not last or not have enough money for food? No   Do you have housing?  Yes   Are you worried about losing your housing? No   Lack of transportation? No   Unable to get utilities (heat,electricity)? No         5/6/2024   Fall Risk   Fallen 2 or more times in the past year? No   Trouble with walking or balance? Yes   Gait Speed Test Interpretation Less than or equal to 5.00 seconds - PASS         4/29/2024   Activities of Daily Living- Home Safety   Needs help with the following daily activites None of the above   Safety concerns in the home None of the above         4/29/2024   Dental   Dentist two times every year? (!) NO         4/29/2024   Hearing Screening   Hearing concerns? (!) I FEEL THAT PEOPLE ARE MUMBLING OR NOT SPEAKING CLEARLY.         4/29/2024   Driving Risk Screening   Patient/family members have concerns about driving No         4/29/2024   General Alertness/Fatigue Screening   Have you been more tired than usual lately? No         4/29/2024    Urinary Incontinence Screening   Bothered by leaking urine in past 6 months Yes         2024   TB Screening   Were you born outside of the [place]? No         Today's PHQ-2 Score:       2024     7:05 AM   PHQ-2 (  [employer])   Q1: Little interest or pleasure in doing things 0   Q2: Feeling down, depressed or hopeless 0   PHQ-2 Score 0   Q1: Little interest or pleasure in doing things Not at all   Q2: Feeling down, depressed or hopeless Not at all   PHQ-2 Score 0           2024   Substance Use   Alcohol more than 3/day or more than 7/wk Not Applicable   Do you have a current opioid prescription? No   How severe/bad is pain from 1 to 10? 10   Do you use any other substances recreationally? No     Social History     Tobacco Use    Smoking status: Former     Current packs/day: 0.00     Types: Cigarettes     Quit date: 1968     Years since quittin.3    Smokeless tobacco: Never   Vaping Use    Vaping status: Never Used   Substance Use Topics    Alcohol use: No     Alcohol/week: 0.0 standard drinks of alcohol    Drug use: No        Sexual health reviewed and updated as needed this visit by Provider                        Current providers sharing in care for this patient include:  Patient Care Team:  Familia Cook MD as PCP - General (Internal Medicine)  Familia Cook MD as Assigned PCP  Flores Portillo NP as Assigned Neuroscience Provider  Raymond Man DPM as Assigned Surgical Provider  Zach Kumar MD as MD (Cardiovascular Disease)  Cliff Cash DO as Assigned Musculoskeletal Provider  Zach Kumar MD as Assigned Heart and Vascular Provider  Harrison Perez MD as MD (Neurological Surgery)    The following health maintenance items are reviewed in Epic and correct as of today:  Health Maintenance   Topic Date Due    ANNUAL REVIEW OF HM ORDERS  2023    COVID-19 Vaccine ( season) 2024    MEDICARE ANNUAL WELLNESS VISIT  2024    ALT  2025  "   BMP  04/30/2025    LIPID  04/30/2025    FALL RISK ASSESSMENT  05/06/2025    ADVANCE CARE PLANNING  05/05/2028    DTAP/TDAP/TD IMMUNIZATION (3 - Td or Tdap) 11/27/2033    PHQ-2 (once per calendar year)  Completed    INFLUENZA VACCINE  Completed    Pneumococcal Vaccine: 65+ Years  Completed    ZOSTER IMMUNIZATION  Completed    RSV VACCINE (Pregnancy & 60+)  Completed    IPV IMMUNIZATION  Aged Out    HPV IMMUNIZATION  Aged Out    MENINGITIS IMMUNIZATION  Aged Out    RSV MONOCLONAL ANTIBODY  Aged Out         Review of Systems  CONSTITUTIONAL: NEGATIVE for fever, chills. Weight up 9 pounds with  less exercise   INTEGUMENTARY/SKIN: NEGATIVE for worrisome rashes, moles or lesions  EYES: NEGATIVE for  irritation. Has bilateral cataracts (L>R) per eye exam Nov 2023. Will need future surgery  ENT/MOUTH: NEGATIVE for ear, mouth and throat problems  RESP: NEGATIVE for significant cough or SOB  CV: NEGATIVE for chest pain, palpitations or peripheral edema. Hx carotis stenosis stable 2023 compared to 2022 and needs imaging later this Fall  GI: NEGATIVE for nausea, abdominal pain, heartburn, or change in bowel habits  : NEGATIVE for frequency, dysuria, or hematuria. Nocturia 4x/week with slower stream  MUSCULOSKELETAL:  POSITIVE for left knee pain with DJD and having LTKA in mid July. Also recently back flare  with BLE radicular  and has appt with Dr Perez from [facility] 5/13/24  NEURO: NEGATIVE for weakness, dizziness. Has BLE radicular sx. MRI 1 year ago  showed  \"1.  L3-L5 anterior posterior fusion changes. No hardware complications. No fractures. 2.  Progression of degenerative changes at the L2-L3 transitional level above the fusion mass. Increasing central canal, lateral recess and foraminal stenosis.\"  ENDOCRINE: NEGATIVE for temperature intolerance, skin/hair changes  HEME: NEGATIVE for bleeding problems. Has mild bruising with  Plavix  PSYCHIATRIC: NEGATIVE for changes in mood or affect     Objective    Exam  /62   " "Pulse 86   Temp 97.2  F (36.2  C) (Temporal)   Resp 16   Ht 1.702 m (5' 7\")   Wt 92.1 kg (203 lb 1.6 oz)   SpO2 96%   BMI 31.81 kg/m     Estimated body mass index is 31.81 kg/m  as calculated from the following:    Height as of this encounter: 1.702 m (5' 7\").    Weight as of this encounter: 92.1 kg (203 lb 1.6 oz).    Physical Exam  General appearance -   alert, no distress  Skin - No rashes or lesions.  Head - normocephalic, atraumatic  Eyes - JOSHUA, EOMI, fundi exam with nondilated pupils negative.  Ears - External ears normal. Canals with cerumen bilaterally.  Was mostly removed using ring tipped probe without complication.  TM's normal afterwards  Nose/Sinuses - Nares normal. Septum midline. Mucosa normal. No drainage or sinus tenderness.  Oropharynx - No erythema, no adenopathy, no exudates.  Neck - Supple without adenopathy or thyromegaly.  Faint left bruit.  Carotid pulses palpable  Lungs - Clear to auscultation without wheezes/rhonchi.  Heart - Regular rate and rhythm without murmurs, clicks, or gallops.  Nodes - No supraclavicular, axillary, or inguinal adenopathy palpable.  Abdomen - Abdomen obese, soft, non-tender. BS normal. No masses or hepatosplenomegaly palpable. No bruits.  Extremities -No cyanosis, clubbing. Mild BLE edema.    Musculoskeletal -   Muscular strength intact.  Tenderness palpation left knee joint line.  Ligament exam grossly intact tenderness to palpation bilateral paralumbar musculature  Peripheral pulses - radial=4/4, femoral=4/4, posterior tibial=4/4, dorsalis pedis=4/4,  Neuro - Gait mildly antalgic but stable with use of a cane. Reflexes normal and symmetric. Sensation grossly WNL.  Genital - Normal-appearing male external genitalia. No scrotal masses or inguinal hernia palpable.   Rectal - Guaic negative stool. Normal tone. Prostate 1 plus in size to palpation. No rectal masses or prostate nodularity palpable          5/6/2024   Mini Cog   Clock Draw Score 2 Normal   3 Item " Recall 3 objects recalled   Mini Cog Total Score 5             Signed Electronically by: Familia Cook MD

## 2024-05-06 NOTE — PATIENT INSTRUCTIONS
Continue current medications  Prescriptions refilled.    Tamsulosin 0.4mg capsule, 1 capsule daily in the  PM for slow urine stream and nighttime urination  Reduce caffeine intake to also lessen urine frequency  Reduce calorie/carbohydrate (sugar, bread, potato, pasta, rice, alcohol etc)  intake in diet.  Increase color on your plate with vegetables.    Follow-up with Dr Perez as scheduled re: back  See me for future preop before knee surgery as scheduled  Call  [phone] or use [employer] to schedule a future lab appointment  fasting in 1 year.   For fasting labs, please refrain from eating for 8 hours or more.   Drink 2 glasses of water before your lab appointment. It is fine to take your  oral medications on the morning of the lab test as usual  Vaccinations: Pfizer covid vaccine  I would recommend an updated covid and influenza/flu vaccinations in the Fall 2024. May get through a pharmacy   Carotid ultrasound in September to follow-up on carotid stenosis  Future cataract surgery when ready.   Talk with eye doctor and get preop within  30 days of surgery  Handicap parking permit application completed.  Bring form to DMV  Pt was informed regarding extra E&M billing for management of new or established medical issues not related to today's wellness/screening visit

## 2024-05-10 ENCOUNTER — TELEPHONE (OUTPATIENT)
Dept: NEUROSURGERY | Facility: CLINIC | Age: 84
End: 2024-05-10
Payer: COMMERCIAL

## 2024-05-10 NOTE — TELEPHONE ENCOUNTER
Left appointment reminder for patient. Instructed to call [phone] if this appointment needs to be changed or rescheduled BH

## 2024-05-10 NOTE — TELEPHONE ENCOUNTER
Date: May 10, 2024  (Provide the date when patient was called)  Provider: MD     Provider/Other: Dr. Perez  (with whom  should patient janet with)  Reason for out-going call: Other  (Reason patient was contacted)    Detailed message: ldvm- reminder call patient has upcoming appt with Dr. Perez May 13th 2024 @ 915 AM in [facility]

## 2024-05-13 ENCOUNTER — OFFICE VISIT (OUTPATIENT)
Dept: NEUROSURGERY | Facility: CLINIC | Age: 84
End: 2024-05-13
Payer: COMMERCIAL

## 2024-05-13 VITALS — DIASTOLIC BLOOD PRESSURE: 71 MMHG | HEART RATE: 74 BPM | OXYGEN SATURATION: 98 % | SYSTOLIC BLOOD PRESSURE: 130 MMHG

## 2024-05-13 DIAGNOSIS — M51.369 ADJACENT SEGMENT DISEASE OF LUMBAR SPINE WITH HISTORY OF FUSION PROCEDURE: Primary | ICD-10-CM

## 2024-05-13 DIAGNOSIS — M54.16 LUMBAR RADICULOPATHY: Primary | ICD-10-CM

## 2024-05-13 DIAGNOSIS — Z98.1 ADJACENT SEGMENT DISEASE OF LUMBAR SPINE WITH HISTORY OF FUSION PROCEDURE: Primary | ICD-10-CM

## 2024-05-13 PROCEDURE — 99215 OFFICE O/P EST HI 40 MIN: CPT | Performed by: NEUROLOGICAL SURGERY

## 2024-05-13 ASSESSMENT — PAIN SCALES - GENERAL: PAINLEVEL: EXTREME PAIN (8)

## 2024-05-13 NOTE — NURSING NOTE
Reviewed pre- and post-operative instructions as outlined in the After Visit Summary/Patient Instructions with patient.   Surgery folder was given to patient    Pt will call to schedule, likely late summer/early fall    Patient Education Topic: Procedure with Dr. Perez     Learner(s): Patient    Knowledge Level: Basic    Readiness to Learn: Ready    Method:  Verbal explanation and Written material     Outcome: Able to verbalize instructions    Barriers to Learning: No barrier    Covid Testing: NA    NDI/PARMINDER: Confirmation of completion within last 6 months    Smoking Status: Never    Smoking Cessation handout given to patient: No.    if patient smokes and having fusion , RN to order Nicotine test to be done 3 weeks prior to surgery. Patient will need to be nicotine free at least 1 week prior to the nicotine blood test. Place the Nicotine blood test with an expected date 2 weeks out from clinic visit)    Pain Clinic: N/A    STD/FMLA: No  Job Description: Not applicable   Time Off: Not applicable      Patient had the opportunity for questions to be answered. Advised Patient to call clinic with any questions/concerns. Gave patient antibacterial soap for pre-surgery skin preparation.

## 2024-05-13 NOTE — NURSING NOTE
Pt on Plavix, once he calls to schedule surgery we will need to verify with Dr. Perez how long to hold this preop.

## 2024-05-13 NOTE — PROGRESS NOTES
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  Describes over a year of markedly progressive symptoms.  Deep aching back pain, radiating to the thighs and calves.  Worse when he stands and walks, and needs a cane.  Cannot walk more than a block.  MR Lumbar, personally reviewed, shows fusion changes from L3-L5 with severe stenosis and degeneration at L2-3.       Past Medical History:   Diagnosis Date    Anxiety 5/26/2011    Basal cell carcinoma     Benign Prostatic Hypertrophy     CEREBROVASC DISEASE, small vessel 12/07    Essential hypertension, benign     GERD     HIATAL HERNIA     HYPERPLASTIC POLYPS COLON 5/93, 3/06    Impaired fasting glucose     Low Back Pain     Obesity, unspecified     Other and unspecified hyperlipidemia     Personal history of urinary calculi 1960's    Pneumonia, organism unspecified(486) 1992    Squamous cell carcinoma of skin, unspecified     TRANSIENT CEREBRAL ISCHEMIA 12/07     Past Surgical History:   Procedure Laterality Date    COLONOSCOPY      DISCECTOMY LUMBAR POSTERIOR MICROSCOPIC TWO LEVELS  8/28/2012    DISCECTOMY LUMBAR POSTERIOR MICROSCOPIC TWO LEVELS;  RIGHT L3-L4 REDO EXTENDED HEMILAMINECTOMY AND MICRO FORAMINOTOMY, LEFT L4-L5 EXTENDED HEMILAMINECTOMY AND MICRODISCECTOMY (PRONE) (SONYA ON XENA, C-ARM, METRIX II);  Surgeon: Bertram Mirza MD;  Location:  OR    OPTICAL TRACKING SYSTEM FUSION SPINE POSTERIOR LUMBAR TWO LEVELS N/A 8/21/2019    Procedure: L3-5 LUMBAR TRANSFORAMINAL INTERBODY FUSION WITH STEALTH;  Surgeon: Harrison Perez MD;  Location:  OR    renal calculii removal 40 years ago  1965    Z NONSPECIFIC PROCEDURE  1959    pilonidal cystectomy    Z NONSPECIFIC PROCEDURE  1966    kidney stone    Z NONSPECIFIC PROCEDURE  approx 1999    R knee arthroscopy     Dr. Guzman    [facility] NONSPECIFIC PROCEDURE  2005    L3-4 microdiskectomy     Fercho    ZZC TOTAL KNEE ARTHROPLASTY  2010    Minimally invasive R TKA   Dr. Nichols     Social History     Socioeconomic History    Marital status:      Spouse name: Not on file    Number of children: Not on file    Years of education: Not on file    Highest education level: Not on file   Occupational History     Employer: RETIRED   Tobacco Use    Smoking status: Former     Current packs/day: 0.00     Types: Cigarettes     Quit date: 1968     Years since quittin.4    Smokeless tobacco: Never   Vaping Use    Vaping status: Never Used   Substance and Sexual Activity    Alcohol use: No     Alcohol/week: 0.0 standard drinks of alcohol    Drug use: No    Sexual activity: Yes     Partners: Female   Other Topics Concern     Service Not Asked    Blood Transfusions Not Asked    Caffeine Concern No    Occupational Exposure Not Asked    Hobby Hazards Not Asked    Sleep Concern Not Asked    Stress Concern Not Asked    Weight Concern Not Asked    Special Diet Not Asked    Back Care Not Asked    Exercise Not Asked    Bike Helmet Not Asked    Seat Belt Not Asked    Self-Exams Not Asked    Parent/sibling w/ CABG, MI or angioplasty before 65F 55M? Not Asked   Social History Narrative    Not on file     Social Determinants of Health     Financial Resource Strain: Low Risk  (2024)    Financial Resource Strain     Within the past 12 months, have you or your family members you live with been unable to get utilities (heat, electricity) when it was really needed?: No   Food Insecurity: Low Risk  (2024)    Food Insecurity     Within the past 12 months, did you worry that your food would run out before you got money to buy more?: No     Within the past 12 months, did the food you bought just not last and you didn t have money to get more?: No   Transportation Needs: Low Risk  (2024)    Transportation Needs     Within the past 12 months, has lack of transportation kept you from medical  appointments, getting your medicines, non-medical meetings or appointments, work, or from getting things that you need?: No   Physical Activity: Sufficiently Active (11/8/2021)    Exercise Vital Sign     Days of Exercise per Week: 6 days     Minutes of Exercise per Session: 40 min   Stress: No Stress Concern Present (4/29/2024)    [employer] - Occupational Stress Questionnaire     Feeling of Stress : Only a little   Social Connections: Unknown (4/29/2024)    Social Connection and Isolation Panel [NHANES]     Frequency of Communication with Friends and Family: Not on file     Frequency of Social Gatherings with Friends and Family: Three times a week     Attends Worship Services: Not on file     Active Member of Clubs or Organizations: Not on file     Attends Club or Organization Meetings: Not on file     Marital Status: Not on file   Interpersonal Safety: Low Risk  (5/6/2024)    Interpersonal Safety     Do you feel physically and emotionally safe where you currently live?: Yes     Within the past 12 months, have you been hit, slapped, kicked or otherwise physically hurt by someone?: No     Within the past 12 months, have you been humiliated or emotionally abused in other ways by your partner or ex-partner?: No   Housing Stability: Low Risk  (4/29/2024)    Housing Stability     Do you have housing? : Yes     Are you worried about losing your housing?: No     Family History   Problem Relation Age of Onset    Family History Negative Brother         1 healthy brother    Diabetes Brother         d:age 66    Diabetes Mother     Cerebrovascular Disease Mother     C.A.D. Father         CABG 67    Heart Disease Father     Lipids Sister     Hypertension Sister     Lipids Sister     Hypertension Sister     Thyroid Disease Daughter         thyroid surgery, on replacement        ROS: 10 point ROS neg other than the symptoms noted above in the HPI.    Physical Exam  /71   Pulse 74   SpO2 98%    HEENT:  Normocephalic, atraumatic.  PERRLA.  EOM s intact.  Visual fields full to gross exam  Neck:  Supple, non-tender, without lymphadenopathy.  Heart:  No peripheral edema  Lungs:  No SOB  Abdomen:  Non-distended.   Skin:  Warm and dry.  Extremities:  No edema, cyanosis or clubbing.  Psychiatric:  No apparent distress  Musculoskeletal:  Normal bulk and tone    NEUROLOGICAL EXAMINATION:     Mental status:  Alert and Oriented x 3, speech is fluent.  Cranial nerves:  II-XII intact.   Motor:    Shoulder Abduction:  Right:  5/5   Left:  5/5  Biceps:                      Right:  5/5   Left:  5/5  Triceps:                     Right:  5/5   Left:  5/5  Wrist Extensors:       Right:  5/5   Left:  5/5  Wrist Flexors:           Right:  5/5   Left:  5/5  interosseus :            Right:  5/5   Left:  5/5  Hip Flexion:                Right: 5/5  Left:  5/5  Quadriceps:             Right:  5/5  Left:  5/5  Hamstrings:             Right:  5/5  Left:  5/5  Gastroc Soleus:        Right:  5/5  Left:  5/5  Tib/Ant:                      Right:  5/5  Left:  5/5  EHL:                     Right:  5/5  Left:  5/5  Sensation:  Intact  Forward pitched gait with cane    A/P:  83 year old male s/p L3-5 TLIFs in 2019, returns with back and leg pain, neurogenic claudications, 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    I had a discussion with the patient, reviewing the history, symptoms, and imaging  Did PT and 2 injections last year without improvement; medical management including NSAIDs without improvement  Discussed option of surgery given severe impact on quality of life by symptoms and failure to improve with non-surgical care  Discussed need for extension of fusion due to wide decompression and iatrogenic instability  Will plan for L2-3 extension of fusion with decompression  Risks and benefits discussed including infection, hematoma, CSF leak, nerve root injury, pseudoarthrosis, and need for further surgery

## 2024-05-13 NOTE — LETTER
5/13/2024         RE: Ricardo Neely  [address]  [facility] [address]        Dear Colleague,    Thank you for referring your patient, Ricardo Neely, to the [facility]. Please see a copy of my visit note below.    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  Describes over a year of markedly progressive symptoms.  Deep aching back pain, radiating to the thighs and calves.  Worse when he stands and walks, and needs a cane.  Cannot walk more than a block.  MR Lumbar, personally reviewed, shows fusion changes from L3-L5 with severe stenosis and degeneration at L2-3.       Past Medical History:   Diagnosis Date     Anxiety 5/26/2011     Basal cell carcinoma      Benign Prostatic Hypertrophy      CEREBROVASC DISEASE, small vessel 12/07     Essential hypertension, benign      GERD      HIATAL HERNIA      HYPERPLASTIC POLYPS COLON 5/93, 3/06     Impaired fasting glucose      Low Back Pain      Obesity, unspecified      Other and unspecified hyperlipidemia      Personal history of urinary calculi 1960's     Pneumonia, organism unspecified(486) 1992     Squamous cell carcinoma of skin, unspecified      TRANSIENT CEREBRAL ISCHEMIA 12/07     Past Surgical History:   Procedure Laterality Date     COLONOSCOPY       DISCECTOMY LUMBAR POSTERIOR MICROSCOPIC TWO LEVELS  8/28/2012    DISCECTOMY LUMBAR POSTERIOR MICROSCOPIC TWO LEVELS;  RIGHT L3-L4 REDO EXTENDED HEMILAMINECTOMY AND MICRO FORAMINOTOMY, LEFT L4-L5 EXTENDED HEMILAMINECTOMY AND MICRODISCECTOMY (PRONE) (SONYA MCCALLUM, C-ARM, METRIX II);  Surgeon: Bertram Mirza MD;  Location:  OR     OPTICAL TRACKING SYSTEM FUSION SPINE POSTERIOR LUMBAR TWO LEVELS N/A 8/21/2019    Procedure: L3-5 LUMBAR TRANSFORAMINAL INTERBODY FUSION WITH STEALTH;  Surgeon: Harrison Perez MD;  Location:  OR      renal calculii removal 40 years ago       [facility] NONSPECIFIC PROCEDURE      pilonidal cystectomy     [facility] NONSPECIFIC PROCEDURE  1966    kidney stone     [facility] NONSPECIFIC PROCEDURE  approx     R knee arthroscopy     Dr. Guzman     [facility] NONSPECIFIC PROCEDURE      L3-4 microdiskectomy   Dr. Brantley     [facility] TOTAL KNEE ARTHROPLASTY  2010    Minimally invasive R TKA   Dr. Nichols     Social History     Socioeconomic History     Marital status:      Spouse name: Not on file     Number of children: Not on file     Years of education: Not on file     Highest education level: Not on file   Occupational History     Employer: RETIRED   Tobacco Use     Smoking status: Former     Current packs/day: 0.00     Types: Cigarettes     Quit date: 1968     Years since quittin.4     Smokeless tobacco: Never   Vaping Use     Vaping status: Never Used   Substance and Sexual Activity     Alcohol use: No     Alcohol/week: 0.0 standard drinks of alcohol     Drug use: No     Sexual activity: Yes     Partners: Female   Other Topics Concern      Service Not Asked     Blood Transfusions Not Asked     Caffeine Concern No     Occupational Exposure Not Asked     Hobby Hazards Not Asked     Sleep Concern Not Asked     Stress Concern Not Asked     Weight Concern Not Asked     Special Diet Not Asked     Back Care Not Asked     Exercise Not Asked     Bike Helmet Not Asked     Seat Belt Not Asked     Self-Exams Not Asked     Parent/sibling w/ CABG, MI or angioplasty before 65F 55M? Not Asked   Social History Narrative     Not on file     Social Determinants of Health     Financial Resource Strain: Low Risk  (2024)    Financial Resource Strain      Within the past 12 months, have you or your family members you live with been unable to get utilities (heat, electricity) when it was really needed?: No   Food Insecurity: Low Risk  (2024)    Food Insecurity      Within the past 12 months, did you worry that your  food would run out before you got money to buy more?: No      Within the past 12 months, did the food you bought just not last and you didn t have money to get more?: No   Transportation Needs: Low Risk  (4/29/2024)    Transportation Needs      Within the past 12 months, has lack of transportation kept you from medical appointments, getting your medicines, non-medical meetings or appointments, work, or from getting things that you need?: No   Physical Activity: Sufficiently Active (11/8/2021)    Exercise Vital Sign      Days of Exercise per Week: 6 days      Minutes of Exercise per Session: 40 min   Stress: No Stress Concern Present (4/29/2024)    [employer] [place] [employer] - Occupational Stress Questionnaire      Feeling of Stress : Only a little   Social Connections: Unknown (4/29/2024)    Social Connection and Isolation Panel [NHANES]      Frequency of Communication with Friends and Family: Not on file      Frequency of Social Gatherings with Friends and Family: Three times a week      Attends Zoroastrian Services: Not on file      Active Member of Clubs or Organizations: Not on file      Attends Club or Organization Meetings: Not on file      Marital Status: Not on file   Interpersonal Safety: Low Risk  (5/6/2024)    Interpersonal Safety      Do you feel physically and emotionally safe where you currently live?: Yes      Within the past 12 months, have you been hit, slapped, kicked or otherwise physically hurt by someone?: No      Within the past 12 months, have you been humiliated or emotionally abused in other ways by your partner or ex-partner?: No   Housing Stability: Low Risk  (4/29/2024)    Housing Stability      Do you have housing? : Yes      Are you worried about losing your housing?: No     Family History   Problem Relation Age of Onset     Family History Negative Brother         1 healthy brother     Diabetes Brother         d:age 66     Diabetes Mother      Cerebrovascular Disease Mother       ANA MARÍAASEAMUS. Father         CABG 67     Heart Disease Father      Lipids Sister      Hypertension Sister      Lipids Sister      Hypertension Sister      Thyroid Disease Daughter         thyroid surgery, on replacement        ROS: 10 point ROS neg other than the symptoms noted above in the HPI.    Physical Exam  /71   Pulse 74   SpO2 98%   HEENT:  Normocephalic, atraumatic.  PERRLA.  EOM s intact.  Visual fields full to gross exam  Neck:  Supple, non-tender, without lymphadenopathy.  Heart:  No peripheral edema  Lungs:  No SOB  Abdomen:  Non-distended.   Skin:  Warm and dry.  Extremities:  No edema, cyanosis or clubbing.  Psychiatric:  No apparent distress  Musculoskeletal:  Normal bulk and tone    NEUROLOGICAL EXAMINATION:     Mental status:  Alert and Oriented x 3, speech is fluent.  Cranial nerves:  II-XII intact.   Motor:    Shoulder Abduction:  Right:  5/5   Left:  5/5  Biceps:                      Right:  5/5   Left:  5/5  Triceps:                     Right:  5/5   Left:  5/5  Wrist Extensors:       Right:  5/5   Left:  5/5  Wrist Flexors:           Right:  5/5   Left:  5/5  interosseus :            Right:  5/5   Left:  5/5  Hip Flexion:                Right: 5/5  Left:  5/5  Quadriceps:             Right:  5/5  Left:  5/5  Hamstrings:             Right:  5/5  Left:  5/5  Gastroc Soleus:        Right:  5/5  Left:  5/5  Tib/Ant:                      Right:  5/5  Left:  5/5  EHL:                     Right:  5/5  Left:  5/5  Sensation:  Intact  Forward pitched gait with cane    A/P:  83 year old male s/p L3-5 TLIFs in 2019, returns with back and leg pain, neurogenic claudications, 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    I had a discussion with the patient, reviewing the history, symptoms, and imaging  Did PT and 2 injections last year without improvement; medical management including NSAIDs without improvement  Discussed option of surgery given severe impact on quality of life by  symptoms and failure to improve with non-surgical care  Discussed need for extension of fusion due to wide decompression and iatrogenic instability  Will plan for L2-3 extension of fusion with decompression  Risks and benefits discussed including infection, hematoma, CSF leak, nerve root injury, pseudoarthrosis, and need for further surgery           Again, thank you for allowing me to participate in the care of your patient.        Sincerely,        Harrison Perez MD

## 2024-05-13 NOTE — PATIENT INSTRUCTIONS
Patient Next Steps:      Order placed for CT imaging. You can schedule at our  today([place] location only), or Central Scheduling will call you to make the appointment. If you do not hear from them within 1-2 business days you can call the numbers below.   [phone]     [facility]   Phone: [phone]  Fax: [phone]         Patient Instructions    Call to schedule surgery at [facility] for L2-3 extension of Fusion with Dr. Perez     Pre-Operative    Surgical risks: blood clots in the leg or lung, problems urinating, nerve damage, drainage from the incision, infection, stiffness    You will need a Pre-operative physical with primary care physician within 30 days prior to your surgical date.     You will spend 2-4 nights in the hospital.    Smoking Cessation  You are advised to quit smoking immediately through recovery to help with healing and reduce risk of complications. Printout given.   You will need a nicotine test 3 weeks prior to surgery. You will need to be nicotine free at 2 weeks prior to the nicotine blood test. Nicotine free means you may not use or come in contact with nicotine products. Please call your local [facility] to schedule the lab test.    Shower procedure  You will need to shower the night before and morning of surgery using the antimicrobial soap (chlorhexidine) given to you. Please refer to showering instruction sheet in your surgery education folder.    Eating/Drinking  Stop all solid foods 8 hours before surgery.  Keep drinking clear liquids until 4 hours before surgery  Clear liquids include water, clear juice, black coffee, or clear tea without milk, Gatorade, clear soda.     Medications  Discontinue Aspirin & NSAIDs (Advil/Ibuprofen, Indocin, Naproxen,Nuprin,Relafen/Nabumetone, Diclofenac,Meloxicam, Aleve, Celebrex) 7 days prior to surgical date. After surgery, do not begin taking these medications until given  clearance as it may cause bleeding and interfere with healing.  Hold methotrexate, Xeljanz for 1-2 weeks prior to surgery and continue to hold 2 weeks post surgery.   It is ok to take Tylenol (Acetaminophen) for pain within the 7 days prior to surgery. Example: you could take 1000 mg 3 times per day. Do not exceed 3,000 mg per day.   If you are on chronic pain medication (oxycodone, Percocet, hydrocodone, Vicodin, Norco, Dilaudid, morphine, MS Contin, naltrexone, Suboxone, etc) or have a pain contract we will reach out to your pain clinic to gather your most recent records and recommendations for pain management post-op.  Please ask your provider who manages your chronic pain if they require you to schedule an office visit prior to surgery. Continue obtaining your pain medications from your current provider until surgery. Our team will manage your acute post-op pain in the hospital and during the recovery period. Your pain team will continue to manage your chronic pain.   Any other medications prescribed, please discuss with your primary care provider at your pre-operative physical        Post-Operative    Incision Care  Look at your incision site every day. You  may need a mirror or family member to help you.   Watch for signs of infection  Redness, swelling, warmth, drainage (Green or yellow drainage (pus) from your incision or increased bloody drainage), and fever of 101 degrees or higher  [facility] [phone]  Remove dressing as instructed upon discharge  Do not apply lotions or ointments to incision  It is okay to shower, just pat the incision dry   No submerging incision in water such as pools, hot tubs, or baths for at least 8 weeks and until the incision is healed    Pain Management  Dealing with pain  As your body heals, you might feel a stabbing, burning, or aching pain. You may also have some numbness.  Everyone feels pain differently, we may ask you to rate your pain using a pain scale. This will  let us know how much pain you feel.   Keep in mind that medicine won't take away all of your pain. It helps to try other ways to relax and ease pain.   Things to help with pain  After surgery, we will give you medicine for your pain. These medications work well, but they can make you drowsy, itchy, or sick to your stomach. If we give you narcotics for pain, try to take the pills with food.   For mild to moderate pain, you can take medication such as Tylenol. These can be used with narcotics, but make sure that your narcotic does not contain Tylenol.   Do NOT drive while taking narcotic pain medication  Do NOT drink alcohol while using any pain medication  You can utilize ice as needed (no longer than 20 minutes at one time)  Refills of pain medication: please call the neurosurgery clinic to request 2-3 days before you run out  We will continue to refill your pain medications for up to 12 weeks after surgery. If you are still needing refills around the 8 week pema, please schedule your Primary Care or Pain Provider before the 12 week post-op pema.  Aspirin & NSAIDS (ex. Ibuprofen, aleve, naproxen): Don't take NSAIDs until 6 weeks after surgery to reduce risk of bleeding and interference with bone healing     Bowel Care  Many people have constipation (hard stools) after surgery. The narcotic pain medication we often prescribed can contribute to constipation. To help prevent constipation: Drink plenty of fluid (8-10 glasses/day); Eat more fiber, such as whole grain bread, bran cereal, and fruits and vegetables; Stay active by walking; Over the counter stool softener may also help.      Activity Restrictions  For the first 6-8 weeks, no lifting > 10 pounds, no bending, twisting, or overhead reaching.  Take stairs in moderation   Walking is the best way to start exercise after surgery. Take short frequent walks. You may gradually increase the distance as tolerated. If you feel pain, decrease your activity, but DO NOT  stop walking. Walking will help you gain strength and prevent muscle soreness and spasms.   Avoid bed rest and prolonged sitting for longer than 30 minutes (change positions frequently while awake)  No contact sports or high impact activities such as; running/jogging, snowmobile or 4 waldron riding or any other recreational vehicles until after given clearance at one of your follow up visits  If a brace is required per Dr. Perez, Orthotics will fit you for the brace in the hospital. Brace type and length of time to wear it will be determined by Dr. Perez.     Contact clinic right away or go to the Emergency Department if you develop:   Infection (incisional redness, swelling, warmth, drainage, or fever (temp > 101 F))  New injury  Bladder or bowel changes or loss of control    Signs of blood clot:  Swelling and/or warmth in one or both legs  Pain or tenderness in your leg, ankle, foot, or arm   Red or discolored skin     Go to the Emergency Department   If sudden onset of severe headache, weakness, confusion, change in level of consciousness, pain, or loss of movement.  Chest pain  Trouble breathing     Post-Op Follow Up Appointments  2 week incision check & staple/suture removal with Neurosurgery Nurse  6 week post op with x-ray prior with our Physician Assistant or Nurse Practitioner  3 months post op with x-ray prior with our Physician Assistant or Nurse Practitioner  6 months post op with x-ray prior with our Physician Assistant or Nurse Practitioner  1 year post op with x-ray prior with our Physician Assistant or Nurse Practitioner  Please call to schedule follow up and xray appointments at [phone]    Resources  If you are currently employed, you will need to be off work for 4-6 weeks for recovery and healing.  Please fax any FMLA/short term disability paperwork to [phone] prior to surgery  You may call our clinic when you'd like to return to work and we can provide a work letter  To allow staff  adequate time to complete paperwork, please send as soon as possible     [facility]  [facility]  [address] [place] [id number]  Telephone:  [phone]        Fax:  [phone]

## 2024-05-13 NOTE — NURSING NOTE
"Ricardo Neely is a 83 year old male who presents for:  Chief Complaint   Patient presents with    RECHECK     F/U -- looking to search advice on back pain, lower back pain both side, goes between dull and sharp pain radiates into both legs        Initial Vitals:  /71   Pulse 74   SpO2 98%  Estimated body mass index is 31.81 kg/m  as calculated from the following:    Height as of 5/6/24: 5' 7\" (1.702 m).    Weight as of 5/6/24: 203 lb 1.6 oz (92.1 kg).. There is no height or weight on file to calculate BSA. BP completed using cuff size: regular  Extreme Pain (8)    Nursing Comments:       Laura Starr    "

## 2024-05-20 ENCOUNTER — TELEPHONE (OUTPATIENT)
Dept: NEUROSURGERY | Facility: CLINIC | Age: 84
End: 2024-05-20
Payer: COMMERCIAL

## 2024-05-20 NOTE — TELEPHONE ENCOUNTER
Left detailed voicemail with patient on 5/13. Asking him to return call when ready to schedule surgery. I gave him my direct line.

## 2024-05-29 ENCOUNTER — ANCILLARY PROCEDURE (OUTPATIENT)
Dept: CT IMAGING | Facility: CLINIC | Age: 84
End: 2024-05-29
Attending: NEUROLOGICAL SURGERY
Payer: COMMERCIAL

## 2024-05-29 ENCOUNTER — MYC MEDICAL ADVICE (OUTPATIENT)
Dept: NEUROSURGERY | Facility: CLINIC | Age: 84
End: 2024-05-29
Payer: COMMERCIAL

## 2024-05-29 DIAGNOSIS — M54.16 LUMBAR RADICULOPATHY: ICD-10-CM

## 2024-05-29 DIAGNOSIS — M54.16 LUMBAR RADICULOPATHY: Primary | ICD-10-CM

## 2024-05-29 PROCEDURE — 72131 CT LUMBAR SPINE W/O DYE: CPT

## 2024-05-29 NOTE — TELEPHONE ENCOUNTER
Dr. Perez reviewed patient's lumbar CT. He recommends patient get a new MRI to eval the new disc herniation.     Order placed. [employer] message sent to patient.

## 2024-06-05 ENCOUNTER — MEDICAL CORRESPONDENCE (OUTPATIENT)
Dept: HEALTH INFORMATION MANAGEMENT | Facility: CLINIC | Age: 84
End: 2024-06-05

## 2024-06-05 ENCOUNTER — TRANSFERRED RECORDS (OUTPATIENT)
Dept: HEALTH INFORMATION MANAGEMENT | Facility: CLINIC | Age: 84
End: 2024-06-05

## 2024-06-16 ENCOUNTER — HOSPITAL ENCOUNTER (OUTPATIENT)
Dept: MRI IMAGING | Facility: CLINIC | Age: 84
Discharge: HOME | End: 2024-06-16
Attending: NEUROLOGICAL SURGERY | Admitting: NEUROLOGICAL SURGERY
Payer: COMMERCIAL

## 2024-06-16 DIAGNOSIS — M54.16 LUMBAR RADICULOPATHY: ICD-10-CM

## 2024-06-16 PROCEDURE — A9585 GADOBUTROL INJECTION: HCPCS | Performed by: NEUROLOGICAL SURGERY

## 2024-06-16 PROCEDURE — 72158 MRI LUMBAR SPINE W/O & W/DYE: CPT

## 2024-06-16 PROCEDURE — 255N000002 HC RX 255 OP 636: Performed by: NEUROLOGICAL SURGERY

## 2024-06-16 RX ORDER — GADOBUTROL 604.72 MG/ML
9 INJECTION INTRAVENOUS ONCE
Status: COMPLETED | OUTPATIENT
Start: 2024-06-16 | End: 2024-06-16

## 2024-06-16 RX ADMIN — GADOBUTROL 9 ML: 604.72 INJECTION INTRAVENOUS at 09:05

## 2024-06-17 ENCOUNTER — DOCUMENTATION ONLY (OUTPATIENT)
Dept: NEUROSURGERY | Facility: CLINIC | Age: 84
End: 2024-06-17
Payer: COMMERCIAL

## 2024-06-17 NOTE — PROGRESS NOTES
Dr. Perez would like to see patient in clinic to review lumbar MRI results and next steps, message sent to scheduling team to coordinate.     Updated patient via [employer]

## 2024-06-24 ENCOUNTER — OFFICE VISIT (OUTPATIENT)
Dept: NEUROSURGERY | Facility: CLINIC | Age: 84
End: 2024-06-24
Payer: COMMERCIAL

## 2024-06-24 VITALS
HEART RATE: 73 BPM | DIASTOLIC BLOOD PRESSURE: 71 MMHG | SYSTOLIC BLOOD PRESSURE: 129 MMHG | BODY MASS INDEX: 31.86 KG/M2 | OXYGEN SATURATION: 98 % | HEIGHT: 67 IN | WEIGHT: 203 LBS

## 2024-06-24 DIAGNOSIS — M54.16 LUMBAR RADICULOPATHY: Primary | ICD-10-CM

## 2024-06-24 PROCEDURE — 99213 OFFICE O/P EST LOW 20 MIN: CPT | Performed by: NEUROLOGICAL SURGERY

## 2024-06-24 ASSESSMENT — PAIN SCALES - GENERAL: PAINLEVEL: EXTREME PAIN (8)

## 2024-06-24 NOTE — PROGRESS NOTES
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  Describes over a year of markedly progressive symptoms.  Deep aching back pain, radiating to the thighs and calves.  Worse when he stands and walks, and needs a cane.  Cannot walk more than a block.  MR Lumbar, personally reviewed, shows fusion changes from L3-L5 with severe stenosis and degeneration at L2-3.    Returns for follow up.  CT Lumbar had been obtained, which showed prior fusion changes and possible right L5-S1 disc herniation.  Subsequent MR Lumbar, personally reviewed, shows small right L5-S1 disc herniation.  Patient noted a couple days of right leg pain and numbness, but largely resolved now.  Worst symptoms again are back pain and neurogenic claudications.       Past Medical History:   Diagnosis Date    Anxiety 5/26/2011    Basal cell carcinoma     Benign Prostatic Hypertrophy     CEREBROVASC DISEASE, small vessel 12/07    Essential hypertension, benign     GERD     HIATAL HERNIA     HYPERPLASTIC POLYPS COLON 5/93, 3/06    Impaired fasting glucose     Low Back Pain     Obesity, unspecified     Other and unspecified hyperlipidemia     Personal history of urinary calculi 1960's    Pneumonia, organism unspecified(486) 1992    Squamous cell carcinoma of skin, unspecified     TRANSIENT CEREBRAL ISCHEMIA 12/07     Past Surgical History:   Procedure Laterality Date    COLONOSCOPY      DISCECTOMY LUMBAR POSTERIOR MICROSCOPIC TWO LEVELS  8/28/2012    DISCECTOMY LUMBAR POSTERIOR MICROSCOPIC TWO LEVELS;  RIGHT L3-L4 REDO EXTENDED HEMILAMINECTOMY AND MICRO FORAMINOTOMY, LEFT L4-L5 EXTENDED HEMILAMINECTOMY AND MICRODISCECTOMY (PRONE) (SONYA MCCALLUM, C-ARM, METRIX II);  Surgeon: Bertram Mirza MD;  Location:  OR    [facility] SYSTEM FUSION SPINE POSTERIOR LUMBAR TWO LEVELS N/A 8/21/2019    Procedure: L3-5 LUMBAR TRANSFORAMINAL  INTERBODY FUSION WITH STEALTH;  Surgeon: Harrison Perez MD;  Location: [facility] OR    renal calculii removal 40 years ago      [facility] NONSPECIFIC PROCEDURE      pilonidal cystectomy    [facility] NONSPECIFIC PROCEDURE      kidney stone    [facility] NONSPECIFIC PROCEDURE  1999    R knee arthroscopy     Dr. Guzman    [facility] NONSPECIFIC PROCEDURE      L3-4 microdiskectomy   Dr. Brantley    [facility] TOTAL KNEE ARTHROPLASTY  2010    Minimally invasive R TKA   Dr. Nichols     Social History     Socioeconomic History    Marital status:      Spouse name: Not on file    Number of children: Not on file    Years of education: Not on file    Highest education level: Not on file   Occupational History     Employer: RETIRED   Tobacco Use    Smoking status: Former     Current packs/day: 0.00     Types: Cigarettes     Quit date: 1968     Years since quittin.5    Smokeless tobacco: Never   Vaping Use    Vaping status: Never Used   Substance and Sexual Activity    Alcohol use: No     Alcohol/week: 0.0 standard drinks of alcohol    Drug use: No    Sexual activity: Yes     Partners: Female   Other Topics Concern     Service Not Asked    Blood Transfusions Not Asked    Caffeine Concern No    Occupational Exposure Not Asked    Hobby Hazards Not Asked    Sleep Concern Not Asked    Stress Concern Not Asked    Weight Concern Not Asked    Special Diet Not Asked    Back Care Not Asked    Exercise Not Asked    Bike Helmet Not Asked    Seat Belt Not Asked    Self-Exams Not Asked    Parent/sibling w/ CABG, MI or angioplasty before 65F 55M? Not Asked   Social History Narrative    Not on file     Social Determinants of Health     Financial Resource Strain: Low Risk  (2024)    Financial Resource Strain     Within the past 12 months, have you or your family members you live with been unable to get utilities (heat, electricity) when it was really needed?: No   Food Insecurity: Low Risk  (2024)    Food Insecurity      Within the past 12 months, did you worry that your food would run out before you got money to buy more?: No     Within the past 12 months, did the food you bought just not last and you didn t have money to get more?: No   Transportation Needs: Low Risk  (4/29/2024)    Transportation Needs     Within the past 12 months, has lack of transportation kept you from medical appointments, getting your medicines, non-medical meetings or appointments, work, or from getting things that you need?: No   Physical Activity: Sufficiently Active (11/8/2021)    Exercise Vital Sign     Days of Exercise per Week: 6 days     Minutes of Exercise per Session: 40 min   Stress: No Stress Concern Present (4/29/2024)    [employer] - Occupational Stress Questionnaire     Feeling of Stress : Only a little   Social Connections: Unknown (4/29/2024)    Social Connection and Isolation Panel [NHANES]     Frequency of Communication with Friends and Family: Not on file     Frequency of Social Gatherings with Friends and Family: Three times a week     Attends Protestant Services: Not on file     Active Member of Clubs or Organizations: Not on file     Attends Club or Organization Meetings: Not on file     Marital Status: Not on file   Interpersonal Safety: Low Risk  (5/6/2024)    Interpersonal Safety     Do you feel physically and emotionally safe where you currently live?: Yes     Within the past 12 months, have you been hit, slapped, kicked or otherwise physically hurt by someone?: No     Within the past 12 months, have you been humiliated or emotionally abused in other ways by your partner or ex-partner?: No   Housing Stability: Low Risk  (4/29/2024)    Housing Stability     Do you have housing? : Yes     Are you worried about losing your housing?: No     Family History   Problem Relation Age of Onset    Family History Negative Brother         1 healthy brother    Diabetes Brother         d:age 66    Diabetes Mother      "Cerebrovascular Disease Mother     C.A.D. Father         CABG 67    Heart Disease Father     Lipids Sister     Hypertension Sister     Lipids Sister     Hypertension Sister     Thyroid Disease Daughter         thyroid surgery, on replacement        ROS: 10 point ROS neg other than the symptoms noted above in the HPI.    Physical Exam  /71   Pulse 73   Ht 1.702 m (5' 7\")   Wt 92.1 kg (203 lb)   SpO2 98%   BMI 31.79 kg/m    HEENT:  Normocephalic, atraumatic.  PERRLA.  EOM s intact.  Visual fields full to gross exam  Neck:  Supple, non-tender, without lymphadenopathy.  Heart:  No peripheral edema  Lungs:  No SOB  Abdomen:  Non-distended.   Skin:  Warm and dry.  Extremities:  No edema, cyanosis or clubbing.  Psychiatric:  No apparent distress  Musculoskeletal:  Normal bulk and tone    NEUROLOGICAL EXAMINATION:     Mental status:  Alert and Oriented x 3, speech is fluent.  Cranial nerves:  II-XII intact.   Motor:    Shoulder Abduction:  Right:  5/5   Left:  5/5  Biceps:                      Right:  5/5   Left:  5/5  Triceps:                     Right:  5/5   Left:  5/5  Wrist Extensors:       Right:  5/5   Left:  5/5  Wrist Flexors:           Right:  5/5   Left:  5/5  interosseus :            Right:  5/5   Left:  5/5  Hip Flexion:                Right: 5/5  Left:  5/5  Quadriceps:             Right:  5/5  Left:  5/5  Hamstrings:             Right:  5/5  Left:  5/5  Gastroc Soleus:        Right:  5/5  Left:  5/5  Tib/Ant:                      Right:  5/5  Left:  5/5  EHL:                     Right:  5/5  Left:  5/5  Sensation:  Intact  Forward pitched gait with cane    A/P:  83 year old male s/p L3-5 TLIFs in 2019, returns with back and leg pain, neurogenic claudications, 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

## 2024-06-24 NOTE — NURSING NOTE
"Ricardo Neely is a 84 year old male who presents for:  Chief Complaint   Patient presents with    RECHECK     MRI results        Initial Vitals:  /71   Pulse 73   Ht 5' 7\" (1.702 m)   Wt 203 lb (92.1 kg)   SpO2 98%   BMI 31.79 kg/m   Estimated body mass index is 31.79 kg/m  as calculated from the following:    Height as of this encounter: 5' 7\" (1.702 m).    Weight as of this encounter: 203 lb (92.1 kg).. Body surface area is 2.09 meters squared. BP completed using cuff size: regular  Extreme Pain (8)        Rebekah Zhang   "

## 2024-06-24 NOTE — PATIENT INSTRUCTIONS
Patient Next Steps:    Order placed for Caudal epidural steroid injection  The steroid can take 10-14 days to reach max effect  Please call our clinic if symptoms persist after this timeframe.  You can call [facility] to schedule your injection: [phone]        Please call us if you have any further questions or concerns.    [facility]   Phone: [phone]  Fax: [phone]

## 2024-06-24 NOTE — LETTER
6/24/2024      Ricardo Neely  [address]  [place] [address]      Dear Colleague,    Thank you for referring your patient, Ricardo Neely, to the [facility]. Please see a copy of my visit note below.    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  Describes over a year of markedly progressive symptoms.  Deep aching back pain, radiating to the thighs and calves.  Worse when he stands and walks, and needs a cane.  Cannot walk more than a block.  MR Lumbar, personally reviewed, shows fusion changes from L3-L5 with severe stenosis and degeneration at L2-3.    Returns for follow up.  CT Lumbar had been obtained, which showed prior fusion changes and possible right L5-S1 disc herniation.  Subsequent MR Lumbar, personally reviewed, shows small right L5-S1 disc herniation.  Patient noted a couple days of right leg pain and numbness, but largely resolved now.  Worst symptoms again are back pain and neurogenic claudications.       Past Medical History:   Diagnosis Date     Anxiety 5/26/2011     Basal cell carcinoma      Benign Prostatic Hypertrophy      CEREBROVASC DISEASE, small vessel 12/07     Essential hypertension, benign      GERD      HIATAL HERNIA      HYPERPLASTIC POLYPS COLON 5/93, 3/06     Impaired fasting glucose      Low Back Pain      Obesity, unspecified      Other and unspecified hyperlipidemia      Personal history of urinary calculi 1960's     Pneumonia, organism unspecified(486) 1992     Squamous cell carcinoma of skin, unspecified      TRANSIENT CEREBRAL ISCHEMIA 12/07     Past Surgical History:   Procedure Laterality Date     COLONOSCOPY       DISCECTOMY LUMBAR POSTERIOR MICROSCOPIC TWO LEVELS  8/28/2012    DISCECTOMY LUMBAR POSTERIOR MICROSCOPIC TWO LEVELS;  RIGHT L3-L4 REDO EXTENDED HEMILAMINECTOMY AND MICRO FORAMINOTOMY, LEFT L4-L5  EXTENDED HEMILAMINECTOMY AND MICRODISCECTOMY (PRONE) (SONYA MCCALLUM, C-ARM, METRIX II);  Surgeon: Bertram Mirza MD;  Location:  OR     [employer] SYSTEM FUSION SPINE POSTERIOR LUMBAR TWO LEVELS N/A 2019    Procedure: L3-5 LUMBAR TRANSFORAMINAL INTERBODY FUSION WITH STEALTH;  Surgeon: Harrison Perez MD;  Location:  OR     renal calculii removal 40 years ago       [facility] NONSPECIFIC PROCEDURE      pilonidal cystectomy     [facility] NONSPECIFIC PROCEDURE      kidney stone     [facility] NONSPECIFIC PROCEDURE  approx     R knee arthroscopy     Dr. Guzman     [facility] NONSPECIFIC PROCEDURE      L3-4 microdiskectomy   Dr. Brantley     [facility] TOTAL KNEE ARTHROPLASTY  2010    Minimally invasive R TKA   Dr. Nichols     Social History     Socioeconomic History     Marital status:      Spouse name: Not on file     Number of children: Not on file     Years of education: Not on file     Highest education level: Not on file   Occupational History     Employer: RETIRED   Tobacco Use     Smoking status: Former     Current packs/day: 0.00     Types: Cigarettes     Quit date: 1968     Years since quittin.5     Smokeless tobacco: Never   Vaping Use     Vaping status: Never Used   Substance and Sexual Activity     Alcohol use: No     Alcohol/week: 0.0 standard drinks of alcohol     Drug use: No     Sexual activity: Yes     Partners: Female   Other Topics Concern      Service Not Asked     Blood Transfusions Not Asked     Caffeine Concern No     Occupational Exposure Not Asked     Hobby Hazards Not Asked     Sleep Concern Not Asked     Stress Concern Not Asked     Weight Concern Not Asked     Special Diet Not Asked     Back Care Not Asked     Exercise Not Asked     Bike Helmet Not Asked     Seat Belt Not Asked     Self-Exams Not Asked     Parent/sibling w/ CABG, MI or angioplasty before 65F 55M? Not Asked   Social History Narrative     Not on file     Social Determinants of Health      Financial Resource Strain: Low Risk  (4/29/2024)    Financial Resource Strain      Within the past 12 months, have you or your family members you live with been unable to get utilities (heat, electricity) when it was really needed?: No   Food Insecurity: Low Risk  (4/29/2024)    Food Insecurity      Within the past 12 months, did you worry that your food would run out before you got money to buy more?: No      Within the past 12 months, did the food you bought just not last and you didn t have money to get more?: No   Transportation Needs: Low Risk  (4/29/2024)    Transportation Needs      Within the past 12 months, has lack of transportation kept you from medical appointments, getting your medicines, non-medical meetings or appointments, work, or from getting things that you need?: No   Physical Activity: Sufficiently Active (11/8/2021)    Exercise Vital Sign      Days of Exercise per Week: 6 days      Minutes of Exercise per Session: 40 min   Stress: No Stress Concern Present (4/29/2024)    [employer] - Occupational Stress Questionnaire      Feeling of Stress : Only a little   Social Connections: Unknown (4/29/2024)    Social Connection and Isolation Panel [NHANES]      Frequency of Communication with Friends and Family: Not on file      Frequency of Social Gatherings with Friends and Family: Three times a week      Attends Yarsanism Services: Not on file      Active Member of Clubs or Organizations: Not on file      Attends Club or Organization Meetings: Not on file      Marital Status: Not on file   Interpersonal Safety: Low Risk  (5/6/2024)    Interpersonal Safety      Do you feel physically and emotionally safe where you currently live?: Yes      Within the past 12 months, have you been hit, slapped, kicked or otherwise physically hurt by someone?: No      Within the past 12 months, have you been humiliated or emotionally abused in other ways by your partner or ex-partner?: No  "  Housing Stability: Low Risk  (4/29/2024)    Housing Stability      Do you have housing? : Yes      Are you worried about losing your housing?: No     Family History   Problem Relation Age of Onset     Family History Negative Brother         1 healthy brother     Diabetes Brother         d:age 66     Diabetes Mother      Cerebrovascular Disease Mother      C.A.D. Father         CABG 67     Heart Disease Father      Lipids Sister      Hypertension Sister      Lipids Sister      Hypertension Sister      Thyroid Disease Daughter         thyroid surgery, on replacement        ROS: 10 point ROS neg other than the symptoms noted above in the HPI.    Physical Exam  /71   Pulse 73   Ht 1.702 m (5' 7\")   Wt 92.1 kg (203 lb)   SpO2 98%   BMI 31.79 kg/m    HEENT:  Normocephalic, atraumatic.  PERRLA.  EOM s intact.  Visual fields full to gross exam  Neck:  Supple, non-tender, without lymphadenopathy.  Heart:  No peripheral edema  Lungs:  No SOB  Abdomen:  Non-distended.   Skin:  Warm and dry.  Extremities:  No edema, cyanosis or clubbing.  Psychiatric:  No apparent distress  Musculoskeletal:  Normal bulk and tone    NEUROLOGICAL EXAMINATION:     Mental status:  Alert and Oriented x 3, speech is fluent.  Cranial nerves:  II-XII intact.   Motor:    Shoulder Abduction:  Right:  5/5   Left:  5/5  Biceps:                      Right:  5/5   Left:  5/5  Triceps:                     Right:  5/5   Left:  5/5  Wrist Extensors:       Right:  5/5   Left:  5/5  Wrist Flexors:           Right:  5/5   Left:  5/5  interosseus :            Right:  5/5   Left:  5/5  Hip Flexion:                Right: 5/5  Left:  5/5  Quadriceps:             Right:  5/5  Left:  5/5  Hamstrings:             Right:  5/5  Left:  5/5  Gastroc Soleus:        Right:  5/5  Left:  5/5  Tib/Ant:                      Right:  5/5  Left:  5/5  EHL:                     Right:  5/5  Left:  5/5  Sensation:  Intact  Forward pitched gait with cane    A/P:  83 year old " male s/p L3-5 TLIFs in 2019, returns with back and leg pain, neurogenic claudications, 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        Sincerely,        Harrison Perez MD

## 2024-06-25 ENCOUNTER — TELEPHONE (OUTPATIENT)
Dept: PALLIATIVE MEDICINE | Facility: CLINIC | Age: 84
End: 2024-06-25
Payer: COMMERCIAL

## 2024-06-25 DIAGNOSIS — M54.16 LUMBAR RADICULOPATHY: Primary | ICD-10-CM

## 2024-06-25 NOTE — TELEPHONE ENCOUNTER
"Screening Questions for Radiology Injections:    Injection to be done at which interventional clinic site? [facility]    If choosing [facility] for location, please inform patient:  \"[facility] is a Hospital based clinic. Before your visit, you should check with your insurance about how it covers the charges for facility services in a hospital-based clinic.     Procedure ordered by Ana    Procedure ordered? Caudal CHERI   Transforaminal Cervical CHERI - [place] [facility] - No [facility] Site providers perform this procedure    What insurance would patient like us to bill for this procedure? [facility]  IF SCHEDULING IN [place] PAIN OR SPINE PLEASE SCHEDULE AT LEAST 7-10 BUSINESS DAYS OUT SO A PA CAN BE OBTAINED  Worker's comp or MVA (motor vehicle accident) -Any injection DO NOT SCHEDULE and route to Julia Gunn.    [employer] insurance - For SI joint injections, DO NOT SCHEDULE and route to Gabriella Vaca.     ALL BCBS, [employer] and [employer] - DO NOT SCHEDULE and route to Gabriella Vaca  MEDICA- ALL INJECTIONS- route to Gabriella Vaca    Is patient scheduled at [facility]? no   If YES, route every encounter to [facility] SPINE CENTER CARE NAVIGATION POOL [[phone]]    Is an  needed? No     Patient has a  home? (Review Grid) YES: ok    Any chance of pregnancy? NO   If YES, do NOT schedule and route to RN pool  - Dr. Cano route to Gloria Dunbar and PM&R Nurse  [[id number]]      Is patient actively being treated for cancer or immunocompromised? No  If YES, do NOT schedule and route to RN pool/ Dr. Cano's Team    Does the patient have a bleeding or clotting disorder? No   If YES, okay to schedule AND route to RN nurse / Dr. Cano's Team   (For any patients with platelet count <100, RN must forward to provider)    Is patient taking any Blood Thinners OR Antiplatelet medication?  Yes - Plavix   If hold needed, do NOT schedule, route to RN pool/ Dr. Cano's Team  Examples: "   Blood Thinners: (Coumadin, Warfarin, Jantoven, Pradaxa, Xarelto, Eliquis, Edoxaban, Enoxaparin, Lovenox, Heparin, Arixtra, Fondaparinux or Fragmin)  Antiplatelet Medications: (Plavix, Brilinta or Effient)     Is patient taking any aspirin products (includes Excedrin and Fiorinal)? 81mg    If yes route to RN pool/ Dr. Cano's Team - Do not schedule      Any allergies to contrast dye, iodine, shellfish, or numbing and steroid medications? No  If YES, schedule and add allergy information to appointment notes AND route to the RN pool/ Dr. Cano's Team  If CHERI and Contrast Dye / Iodine Allergy? DO NOT SCHEDULE, route to RN pool/ Dr. Cano's Team  Allergies: Meclizine, Tramadol hcl, Cardura [doxazosin mesylate], Hydrochlorothiazide, and Naproxen     Does patient have an active infection or treated for one within the past week? No  Is patient currently taking any antibiotics or steroid medications?  No   For patients on chronic, preventative, or prophylactic antibiotics, procedures may be scheduled.   For patients on antibiotics for active or recent infection, schedule 4 days after completed.  For patients on steroid medications, schedule 4 days after completed.     Has the patient had a flu shot or any other vaccinations within the past 7 days? No  If yes, explain that for the vaccine to work best they need to:     wait 1 week before and 1 week after getting any Vaccine  wait 1 week before and 2 weeks after getting any Covid Vaccine   If patient has concerns about the timing, send to RN pool/ Dr. Cano's Team    Does patient have an MRI/CT?  YES: MRI Include Date and Check Procedure Scheduling Grid to see if required.  Was the MRI/CT done within the last 3 years?  Yes   If no route to RN Pool/ Dr. Cano's Team  If yes, where was the MRI/CT done? [facility]   Refer to PACS Transmissions list for approved external locations and route to RN Pool High Priority/ Dr. Cano's Team  If MRI was not done at approved  external location do NOT schedule and route to RN pool/ Dr. Cano's Team    If patient has an imaging disc, the injection MAY be scheduled but patient must bring disc to appt or appt will be cancelled.    Is patient able to transfer to a procedure table with minimal or no assistance? Yes   If no, do NOT schedule and route to RN Pool/ Dr. Cano's Team    Procedure Specific Instructions:  If celiac plexus block, informed patient NPO for 6 hours and that it is okay to take medications with sips of water, especially blood pressure medications Not Applicable       If this is for a cervical procedure, informed patient that aspirin needs to be held for 6 days.   Not Applicable    Sedation, If Sedation is ordered for any procedure, patient must be NPO for 6 hours prior to procedure Not Applicable    If IV needed:  Do not schedule procedures requiring IV placement in the first appointment of the day or first appointment after lunch. Do NOT schedule at 0745, 0815 or 1245. ok  Instructed patient to arrive 30 minutes early for IV start if required. (Check Procedure Scheduling Grid)  Not Applicable    Reminders:  If you are started on any steroids or antibiotics between now and your appointment, you must contact us because the procedure may need to be cancelled.  Yes    As a reminder, receiving steroids can decrease your body's ability to fight infection.   Would you still like to move forward with scheduling the injection?  Yes    IV Sedation is not provided for procedures. If oral anti-anxiety medication is needed, the patient should request this from their referring provider.    Instruct patient to arrive as directed prior to the scheduled appointment time:  If IV needed 30 minutes before appointment time     For patients 85 or older we recommend having an adult stay w/ them for the remainder of the day.     If the patient is Diabetic, remind them to bring their glucometer.      Does the patient have any questions?   SLADE Gunn  [facility]

## 2024-06-25 NOTE — TELEPHONE ENCOUNTER
Patient is requesting to schedule an injection with the [facility].     This would require the patient to hold:                 Clopidogrel (Plavix)       Hold 7 days prior to procedure, restart 12 hours after procedure    We are requesting your approval to hold the medication for this time frame.    Please keep call open and route back to the PAIN NURSE [[id number]] pool, Pt will be scheduled by Pain clinic after hold approved.    PROVIDER REMINDER:  For Coumadin, please also route to Pt's INR clinic     If hold approved, we will contact the patient for scheduling.    Thank you.    Routed to PCP

## 2024-06-25 NOTE — TELEPHONE ENCOUNTER
No vascular procedure performed in the past 6 mos.  OK to hold Plavix/Clopidogrel 7 days prior to procedure  and then restart 12 hours later as requested below

## 2024-06-26 NOTE — TELEPHONE ENCOUNTER
Called pt.     He spoke w/ his surgeon, Dr. Villasenor from [facility] and he/she was ok w/ pt having a steroid injection before his 7/29/24 knee replacement surgery.    Injection scheduled for:  7/3/24  Plavix hold starts on:  6/26/24  Injection intake questions reviewed?  YES  Infection/antibiotic information reviewed?  YES  Location confirmed: :Yes [facility]. Pt has been to this location.   Is pt arriving early?:Yes 0800  Vaccine protocol received?:  Yes   Instructions were sent to pt via [employer].    Kimberly RN-BSN  [facility]   [phone]

## 2024-06-26 NOTE — TELEPHONE ENCOUNTER
Patient states that he does have a knee replacement on 7/29 with [facility].  Writer notes that patient should review if surgeon is agreeable to proceed with this if patient has steroid injection scheduled and then contact clinic with surgeon's response.  Patient verbalizes understanding.     Gladis Garcia RN

## 2024-06-26 NOTE — TELEPHONE ENCOUNTER
Pt returning call to nursing.          Julia Gunn  Complex   [facility]  Pain Management

## 2024-07-02 NOTE — PROGRESS NOTES
[facility] - Procedure Note    Date of Service: 7/3/2024    Procedure performed: Caudal epidural steroid injection with fluoroscopic guidance  Diagnosis: Lumbar spondylosis; Lumbar radiculitis/radiculopathy  : Richa Alexandre MD   Anesthesia: none    Indications: Ricardo Neely is a 84 year old male who is seen at the request of Dr. Perez for a caudal epidural steroid injection. The patient describes low back pain with radiation down the posterior right thigh and calf. The patient has been exhibiting symptoms consistent with lumbar intraspinal inflammation and radiculopathy. Symptoms have been persistent, disabling, and intermittently severe. The patient reports minimal improvement with conservative treatment, including previous injections, previous spinal fusion with Dr. Perez, medications and planned revision surgery this summer.    This is a repeat injection.  Previous CAUDAL CHERI done by Dr. Gay on 10/18/2023 & 5/17/2023 provided good pain relief for a period of 3-6 months.     LUMBAR MRI was done on 6/16/2024 which showed:                                                                  IMPRESSION:  1.  Redemonstration of postoperative changes of anterior/posterior instrumentation and fusion L3-L5 with dorsal decompressive laminectomies and bone grafting. No hardware complications are suggested on today's MR.     2.  No pathologic enhancement is noted. No infectious/inflammatory or neoplastic enhancement. Nothing for arachnoiditis. No postoperative fluid collections.     3.  Satisfactory height. Stable alignment from prior study with low-grade anterior position L3 relative to L4 and L4 relative to L5. No progressive or high-grade subluxations.     4.  Since previous evaluation there is redemonstration of stable severe spinal canal narrowing at L3-L4 best seen series 801 image 21.     5.  No additional severe spinal stenosis or severe foraminal narrowing is evident.     6.   "New from previous MR evaluation 5/7/2021, there is a new focal right paracentral disc extrusion at L5-S1 which contacts the medial aspect of the traversing right S1 nerve root seen series 801 image 39 and could correlate to right S1 radiculopathy.   This contributes to mild narrowing of the right S1 lateral recess without evidence for spinal canal narrowing.    Allergies:      Allergies   Allergen Reactions    Meclizine Other (See Comments)     Over sedation, concentration problem    Tramadol Hcl Other (See Comments)     Pt feels very drugged, \"cloudy\" if used more than one day. Nausea also    Cardura [Doxazosin Mesylate] Other (See Comments)     fainted    Hydrochlorothiazide Other (See Comments)      lightheadedness    Naproxen Nausea     nausea        Vitals:  /62   Pulse 75   SpO2 96%     Review of Systems: The patient denies recent fever, chills, illness, use of antibiotics or anticoagulants. All other 10-point review of systems negative.       Procedure: The procedure and risks were explained, and informed written consent was obtained from the patient. Risks include but are not limited to: infection, bleeding, increased pain, and damage to soft tissue, nerve, muscle, and vasculature structures. After getting informed consent, patient was brought into the procedure suite and was placed in a prone position on the procedure table. A Pause for the Cause was performed. Patient was prepped and draped in sterile fashion.     The sacral hiatus and cornua were palpated.  Under lateral fluoroscopic guidance sacral hiatus was identified.  A total of 4.5 mL of Lidocaine 1% with 0.5 mL 8.4% sodium bicarbonate was used to anesthetize the skin and the needle track at a skin entry site.  A 22 gauge 5 inch spinal needle was advanced through the sacral hiatus using intermittent fluoroscopy. The needle angle was decreased to allow entrance into the sacral canal and epidural space.  This was verified in both the AP and " lateral view for correct alignment.       The position was then inspected from anteroposterior and lateral views, and the needle adjusted appropriately.  After negative aspiration for heme and CSF, a total of 1 mL of Omnipaque-300 was injected using static and continuous fluoroscopy confirming appropriate position, into the epidural space, with no intravascular or intrathecal uptake. 0 mL of Omnipaque-300 was wasted.    2 mL of 1% lidocaine, 3 mL of preservative free saline, with 80 mg of triamcinolone was injected.  The needle was removed. Hemostasis was achieved, the area was cleaned, and bandaids were placed when appropriate. Images were saved to PACS.    The patient tolerated the procedure well, and was taken to the recovery room, and there was no evidence of procedural complications. No new sensory or motor deficits were noted following the procedure. The patient was stable and able to ambulate on discharge home. Post-procedure instructions were provided.     Pre-procedure pain score: 6/10 in the back, 6/10 in the leg  Post-procedure pain score: 4/10 in the back, 4/10 in the leg    Assessment/Plan: Ricardo Neely is a 84 year old male s/p Caudal epidural steroid injection today for lumbar spondylosis, radiculitis/radiculopathy.     1. Following today's procedure, the patient was advised to contact the Pain Management Center for any of the following:   Fever, chills, or night sweats   New onset of pain, numbness, or weakness   Any questions/concerns regarding the procedure  If unable to contact the Pain Center, the patient was instructed to go to a local Emergency Room for any complications.   2. The patient should follow-up with the referring provider in 2 weeks for post-procedure evaluation.    ELIOT HAND MD   Pain Management

## 2024-07-03 ENCOUNTER — RADIOLOGY INJECTION OFFICE VISIT (OUTPATIENT)
Dept: PALLIATIVE MEDICINE | Facility: CLINIC | Age: 84
End: 2024-07-03
Payer: COMMERCIAL

## 2024-07-03 VITALS — SYSTOLIC BLOOD PRESSURE: 131 MMHG | HEART RATE: 74 BPM | OXYGEN SATURATION: 97 % | DIASTOLIC BLOOD PRESSURE: 63 MMHG

## 2024-07-03 DIAGNOSIS — M54.16 LUMBAR RADICULOPATHY: Primary | ICD-10-CM

## 2024-07-03 PROCEDURE — 62323 NJX INTERLAMINAR LMBR/SAC: CPT | Performed by: ANESTHESIOLOGY

## 2024-07-03 RX ORDER — TRIAMCINOLONE ACETONIDE 40 MG/ML
40 INJECTION, SUSPENSION INTRA-ARTICULAR; INTRAMUSCULAR ONCE
Status: COMPLETED | OUTPATIENT
Start: 2024-07-03 | End: 2024-07-03

## 2024-07-03 RX ADMIN — TRIAMCINOLONE ACETONIDE 40 MG: 40 INJECTION, SUSPENSION INTRA-ARTICULAR; INTRAMUSCULAR at 08:38

## 2024-07-03 NOTE — NURSING NOTE
Discharge Information    IV Discontiued Time:  NA    Amount of Fluid Infused:  NA    Discharge Criteria = When patient returns to baseline or as per MD order    Consciousness:  Pt is fully awake    Circulation:  BP +/- 20% of pre-procedure level    Respiration:  Patient is able to breathe deeply    O2 Sat:  Patient is able to maintain O2 Sat >92% on room air    Activity:  Moves 4 extremities on command    Ambulation:  Patient is able to stand and walk or stand and pivot into wheelchair    Dressing:  Clean/dry or No Dressing    Notes:   Discharge instructions and AVS given to patient    Patient meets criteria for discharge?  YES    Admitted to PCU?  No    Responsible adult present to accompany patient home?  Yes    Signature/Title:    Aminata Silva RN  RN Care Coordinator  [facility]

## 2024-07-03 NOTE — NURSING NOTE
Pre-procedure Intake  If YES to any questions or NO to having a   Please complete laminated checklist and leave on the computer keyboard for Provider, verbally inform provider if able.    For SCS Trial, RFA's or any sedation procedure:  Have you been fasting? N/A   If yes, for how long?     Are you taking any any blood thinners such as Coumadin, Warfarin, Jantoven, Pradaxa Xarelto, Eliquis, Edoxaban, Enoxaparin, Lovenox, Heparin, Arixtra, Fondaparinux, or Fragmin? OR Antiplatelet medication such as Plavix, Brilinta, or Effient?   Yes Plavix  If yes, when did you take your last dose? 6/25    Do you take aspirin?  YES 81 MG  If cervical procedure, have you held aspirin for 6 days?   N/A     Is the Pt taking any GLP-1 Antagonist (hold needed for sedation patients only)  (semaglutide (Ozempic, Wegovy), dulaglutide (Trulicity), exenatide ER (Bydureon), tirzepatide (Mounjaro), Liraglutide (Saxenda, Victoza), semaglutide (Rybelsus)       NO    If yes, when did you take your last dose?     Do you have any allergies to contrast dye, iodine, steroid and/or numbing medications?  NO     Are you currently taking antibiotics or have an active infection?  NO     Have you had a fever/elevated temperature within the past week? NO     Are you currently taking oral steroids? NO     Do you have a ?  Yes     Are you pregnant or breastfeeding? N/A     Have you received any vaccinations in the last week? NO     Notify provider and RNs if systolic BP >170, diastolic BP >100, P >100 or O2 sats < 90%

## 2024-07-03 NOTE — PATIENT INSTRUCTIONS
[facility] Procedure Discharge Instructions    Today you saw:   Dr. Richa Alexandre     Your procedure:  Epidural steroid injection       Medications used:  Lidocaine (anesthetic)  Kenalog (steroid)  Omnipaque (contrast)   Saline    If you were holding your blood thinning medication, Plavix. Please restart taking it, in 12 hours, anytime after 7/3/24 8:30pm        Be cautious when walking as numbness and/or weakness in the legs may occur up to 6-8 hours after the procedure due to effect of the local anesthetic  Do not drive for 6 hours. The effect of the local anesthetic could slow your reflexes.   Avoid strenuous activity for the first 24 hours. You may resume your regular activities after that.   You may shower, however avoid swimming, tub baths or hot tubs for 24 hours following your procedure  You may have a mild to moderate increase in pain for several days following the injection.    You may use ice packs for 10-15 minutes, 3 to 4 times a day at the injection site for comfort  Do not use heat to painful areas for 6 to 8 hours. This will give the local anesthetic time to wear off and prevent you from accidentally burning your skin.  Unless you have been directed to avoid the use of anti-inflammatory medications (NSAIDS-ibuprofen, Aleve, Motrin), you may use these medications or Tylenol for pain control if needed.  Possible side effects of steroids that you may experience include flushing, elevated blood pressure, increased appetite, mild headaches and restlessness.  All of these symptoms will get better with time.  It may take up to 14 days for the steroid medication to start working although you may feel the effect as early as a few days after the procedure.   Follow up with your referring provider (Dr Perez) in 2-3 weeks    If you experience any of the following, call the pain center line during work hours at [phone] or on-call physician after hours at [phone]:  -Fever over 100 degree  F  -Swelling, bleeding, redness, drainage, warmth at the injection site  -Progressive weakness or numbness in your legs or arms  -Loss of bowel or bladder function  -Unusual headache that is not relieved by Tylenol or your regular headache medication  -Unusual new onset of pain that is not improving

## 2024-08-23 ENCOUNTER — OFFICE VISIT (OUTPATIENT)
Dept: INTERNAL MEDICINE | Facility: CLINIC | Age: 84
End: 2024-08-23
Payer: COMMERCIAL

## 2024-08-23 VITALS
WEIGHT: 200.8 LBS | OXYGEN SATURATION: 97 % | HEART RATE: 78 BPM | HEIGHT: 67 IN | DIASTOLIC BLOOD PRESSURE: 70 MMHG | BODY MASS INDEX: 31.52 KG/M2 | TEMPERATURE: 98 F | SYSTOLIC BLOOD PRESSURE: 132 MMHG

## 2024-08-23 DIAGNOSIS — G89.29 CHRONIC PAIN OF LEFT KNEE: ICD-10-CM

## 2024-08-23 DIAGNOSIS — Z01.818 PREOP GENERAL PHYSICAL EXAM: Primary | ICD-10-CM

## 2024-08-23 DIAGNOSIS — M54.16 LUMBAR RADICULOPATHY: ICD-10-CM

## 2024-08-23 DIAGNOSIS — I45.2 RBBB (RIGHT BUNDLE BRANCH BLOCK WITH LEFT ANTERIOR FASCICULAR BLOCK): ICD-10-CM

## 2024-08-23 DIAGNOSIS — I65.23 CAROTID STENOSIS, ASYMPTOMATIC, BILATERAL: ICD-10-CM

## 2024-08-23 DIAGNOSIS — E78.5 HYPERLIPIDEMIA LDL GOAL <70: ICD-10-CM

## 2024-08-23 DIAGNOSIS — E66.811 CLASS 1 OBESITY WITH SERIOUS COMORBIDITY AND BODY MASS INDEX (BMI) OF 31.0 TO 31.9 IN ADULT, UNSPECIFIED OBESITY TYPE: ICD-10-CM

## 2024-08-23 DIAGNOSIS — E87.1 HYPONATREMIA: ICD-10-CM

## 2024-08-23 DIAGNOSIS — N40.0 HYPERTROPHY OF PROSTATE WITHOUT URINARY OBSTRUCTION: ICD-10-CM

## 2024-08-23 DIAGNOSIS — Z86.73 HX OF TRANSIENT ISCHEMIC ATTACK (TIA): ICD-10-CM

## 2024-08-23 DIAGNOSIS — I10 ESSENTIAL HYPERTENSION: ICD-10-CM

## 2024-08-23 DIAGNOSIS — M25.562 CHRONIC PAIN OF LEFT KNEE: ICD-10-CM

## 2024-08-23 LAB
ANION GAP SERPL CALCULATED.3IONS-SCNC: 10 MMOL/L (ref 7–15)
BUN SERPL-MCNC: 9.1 MG/DL (ref 8–23)
CALCIUM SERPL-MCNC: 9.2 MG/DL (ref 8.8–10.4)
CHLORIDE SERPL-SCNC: 98 MMOL/L (ref 98–107)
CREAT SERPL-MCNC: 0.72 MG/DL (ref 0.67–1.17)
EGFRCR SERPLBLD CKD-EPI 2021: 90 ML/MIN/1.73M2
ERYTHROCYTE [DISTWIDTH] IN BLOOD BY AUTOMATED COUNT: 12.8 % (ref 10–15)
GLUCOSE SERPL-MCNC: 105 MG/DL (ref 70–99)
HCO3 SERPL-SCNC: 28 MMOL/L (ref 22–29)
HCT VFR BLD AUTO: 42.3 % (ref 40–53)
HGB BLD-MCNC: 14.4 G/DL (ref 13.3–17.7)
MCH RBC QN AUTO: 29.7 PG (ref 26.5–33)
MCHC RBC AUTO-ENTMCNC: 34 G/DL (ref 31.5–36.5)
MCV RBC AUTO: 87 FL (ref 78–100)
PLATELET # BLD AUTO: 281 10E3/UL (ref 150–450)
POTASSIUM SERPL-SCNC: 4.1 MMOL/L (ref 3.4–5.3)
RBC # BLD AUTO: 4.85 10E6/UL (ref 4.4–5.9)
SODIUM SERPL-SCNC: 136 MMOL/L (ref 135–145)
WBC # BLD AUTO: 8.2 10E3/UL (ref 4–11)

## 2024-08-23 PROCEDURE — 99214 OFFICE O/P EST MOD 30 MIN: CPT | Performed by: INTERNAL MEDICINE

## 2024-08-23 PROCEDURE — 36415 COLL VENOUS BLD VENIPUNCTURE: CPT | Performed by: INTERNAL MEDICINE

## 2024-08-23 PROCEDURE — 85027 COMPLETE CBC AUTOMATED: CPT | Performed by: INTERNAL MEDICINE

## 2024-08-23 PROCEDURE — 93000 ELECTROCARDIOGRAM COMPLETE: CPT | Performed by: INTERNAL MEDICINE

## 2024-08-23 PROCEDURE — 80048 BASIC METABOLIC PNL TOTAL CA: CPT | Performed by: INTERNAL MEDICINE

## 2024-08-23 NOTE — PROGRESS NOTES
Preoperative Evaluation  [address] [address]  Phone: [phone]  Primary Provider: Familia Cook MD  Pre-op Performing Provider: Familia Cook MD  Aug 23, 2024            8/18/2024   Surgical Information   What procedure is being done? Left knee replacement   Facility or Hospital where procedure/surgery will be performed: [facility]   Who is doing the procedure / surgery? Dr Nichols   Date of surgery / procedure: Sept 12   Time of surgery / procedure: 1:00   Where do you plan to recover after surgery? at home with family        Fax number for surgical facility: Note does not need to be faxed, will be available electronically in Epic.    Assessment & Plan     The proposed surgical procedure is considered INTERMEDIATE risk.    Preop general physical exam  Patient appears medically stable to proceed with surgery as scheduled  - EKG 12-lead complete w/read - Clinics  - Basic metabolic panel; Future  - CBC with platelets; Future  - Basic metabolic panel  - CBC with platelets    Chronic pain of left knee  Chronic knee pain.  Has failed conservative treatment    Hyponatremia  Previous sodium 132.  Sodium level today normal  - Basic metabolic panel; Future  - Basic metabolic panel    Essential hypertension  Controlled.  Will hold generic Lotrel the morning of surgery with general anesthesia planned.  Patient to take metoprolol  - Basic metabolic panel; Future  - CBC with platelets; Future  - Basic metabolic panel  - CBC with platelets    Carotid stenosis, asymptomatic, bilateral  Updated carotid ultrasound shows less than 50% stenosis bilateral carotid arteries and antegrade flow vertebral artery.  Has tolerated previous major surgeries being off of both aspirin and Plavix prior to surgery to prevent bleeding complications and will use this same plan    Hx of transient ischemic attack (TIA)   No residual sx. See carotid discussion above. Has tolerated  previous major surgeries being off of both aspirin and Plavix prior to surgery to prevent bleeding complications and will use this same plan    Lumbar radiculopathy   Hx RLE radicular sx. Pt s/p LESI early July 2024 with improvement. Leg strength and bowel/bladder function OK    Hyperlipidemia LDL goal <70  Controlled.  On statin therapy    Hypertrophy of prostate without urinary obstruction  Controlled.  On tamsulosin daily in PM    Class 1 obesity with serious comorbidity and body mass index (BMI) of 31.0 to 31.9 in adult, unspecified obesity type  Weight down 3 pounds recently after previous weight gain.  Contributing to hypertension, back/knee pain.  Counseled regarding continued calorie/carbohydrate reduction      RBBB (right bundle branch block with left anterior fascicular block)  Chronic, previously seen on older EKGs      Risks and Recommendations  The patient has the following additional risks and recommendations for perioperative complications:   - No identified additional risk factors other than previously addressed    Patient instructions   Approval given to proceed with proposed procedure, without further diagnostic evaluation..  No solid food after midnight prior to your  procedure. May have clear liquids up to 2 hours prior to the  procedure   Stop  Aspirin and Clopidogrel 7 days prior to your  procedure to reduce risk of bleeding.     May use Tylenol for pain control  in the 5 days prior to your surgery  Take  Metoprolol with   water  when you first get up the  AM of the  procedure   Hold other prescription and over the counter medications/supplements the day of the  procedure. May resume usual medications/supplements after the procedure unless instructed otherwise by your surgeon    Post-op DVT prophylaxis orders per surgeon    Recommendation  Approval given to proceed with proposed procedure, without further diagnostic evaluation.    Virginia Silva is a 84 year old, presenting for the  following:  Pre-Op Exam        HPI related to upcoming procedure:   Chronic left knee pain.  Has failed conservative treatment.  Presents for clearance to undergo surgical treatment with left knee replacement.  Regarding current exercise tolerance, despite the knee pain, patient has still been able to walk 2.5 miles per day without chest pain or shortness of breath          8/18/2024   Pre-Op Questionnaire   Have you ever had a heart attack or stroke? No   Have you ever had surgery on your heart or blood vessels, such as a stent placement, a coronary artery bypass, or surgery on an artery in your head, neck, heart, or legs? No   Do you have chest pain with activity? No   Do you have a history of heart failure? No   Do you currently have a cold, bronchitis or symptoms of other infection? No   Do you have a cough, shortness of breath, or wheezing? No   Do you or anyone in your family have previous history of blood clots? No   Do you or does anyone in your family have a serious bleeding problem such as prolonged bleeding following surgeries or cuts? No   Have you ever had problems with anemia or been told to take iron pills? No   Have you had any abnormal blood loss such as black, tarry or bloody stools? No   Have you ever had a blood transfusion? No   Are you willing to have a blood transfusion if it is medically needed before, during, or after your surgery? Yes   Have you or any of your relatives ever had problems with anesthesia? No   Do you have sleep apnea, excessive snoring or daytime drowsiness? No   Do you have any artifical heart valves or other implanted medical devices like a pacemaker, defibrillator, or continuous glucose monitor? No   Do you have artificial joints? (!) YES right knee and rods in back    Are you allergic to latex? No        Health Care Directive  Patient has a Health Care Directive on file      Preoperative Review of    reviewed - no record of controlled substances  prescribed.      Status of Chronic Conditions:  See assessment/plan section above for active medical problems.  Problems all longstanding and stable, except as noted/documented.  See ROS in addition for pertinent symptoms related to these conditions.      Patient Active Problem List    Diagnosis Date Noted    Hyperlipidemia LDL goal <70 09/04/2024     Priority: Medium    Lumbar radiculopathy 09/04/2024     Priority: Medium    Class 1 obesity with serious comorbidity and body mass index (BMI) of 31.0 to 31.9 in adult, unspecified obesity type 09/04/2024     Priority: Medium    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     Priority: Medium    Carotid stenosis, asymptomatic, bilateral 11/02/2022     Priority: Medium    Palpitations 11/02/2022     Priority: Medium    Hx of transient ischemic attack (TIA) 08/01/2021     Priority: Medium    Contracture of palmar fascia 10/16/2020     Priority: Medium    Anxiety 05/26/2011     Priority: Medium    Hypertrophy of prostate without urinary obstruction      Priority: Medium    Diaphragmatic hernia 09/23/2002     Priority: Medium     Problem list name updated by automated process. Provider to review      HISTORY OF URINARY CALCULI 08/19/2002     Priority: Medium    Essential hypertension 08/19/2002     Priority: Medium    GERD 08/19/2002     Priority: Medium      Past Medical History:   Diagnosis Date    Anxiety 05/26/2011    Arthritis     Basal cell carcinoma     Benign Prostatic Hypertrophy     Carotid stenosis, asymptomatic, bilateral 11/02/2022    CEREBROVASC DISEASE, small vessel 12/2007    Class 1 obesity with serious comorbidity and body mass index (BMI) of 31.0 to 31.9 in adult, unspecified obesity type 09/04/2024    Contracture of palmar fascia 10/16/2020    Diaphragmatic hernia 09/23/2002    Problem list name updated by automated process. Provider to review      Essential hypertension 08/19/2002    Essential hypertension, benign      GERD     HIATAL HERNIA     Hx of transient ischemic attack (TIA) 08/01/2021    Hyperlipidemia LDL goal <70 09/04/2024    HYPERPLASTIC POLYPS COLON 5/93, 3/06    Hypertrophy of prostate without urinary obstruction     Impaired fasting glucose     Low Back Pain     Lumbar radiculopathy 09/04/2024    Obesity, unspecified     Other and unspecified hyperlipidemia     Palpitations 11/02/2022    Personal history of urinary calculi 1960's    Pneumonia, organism unspecified(486) 1992    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    Squamous cell carcinoma of skin, unspecified     TRANSIENT CEREBRAL ISCHEMIA 12/2007     Past Surgical History:   Procedure Laterality Date    BIOPSY  2017    COLONOSCOPY      DISCECTOMY LUMBAR POSTERIOR MICROSCOPIC TWO LEVELS  08/28/2012    DISCECTOMY LUMBAR POSTERIOR MICROSCOPIC TWO LEVELS;  RIGHT L3-L4 REDO EXTENDED HEMILAMINECTOMY AND MICRO FORAMINOTOMY, LEFT L4-L5 EXTENDED HEMILAMINECTOMY AND MICRODISCECTOMY (PRONE) (SONYA MCCALLUM, C-ARM, METRIX II);  Surgeon: Bertram Mirza MD;  Location:  OR    ENT SURGERY  2014    Cancer spot on right ear    EYE SURGERY  2018    OPTICAL TRACKING SYSTEM FUSION SPINE POSTERIOR LUMBAR TWO LEVELS N/A 08/21/2019    Procedure: L3-5 LUMBAR TRANSFORAMINAL INTERBODY FUSION WITH STEALTH;  Surgeon: Harrison Perez MD;  Location:  OR    renal calculii removal 40 years ago  01/01/1965    [facility] NONSPECIFIC PROCEDURE  01/01/1959    pilonidal cystectomy    [facility] NONSPECIFIC PROCEDURE  01/01/1966    kidney stone    [facility] NONSPECIFIC PROCEDURE  approx 1999    R knee arthroscopy     Dr. Guzman    [facility] NONSPECIFIC PROCEDURE  01/01/2005    L3-4 microdiskectomy   Dr. Brantley    [facility] TOTAL KNEE ARTHROPLASTY  07/01/2010    Minimally invasive R TKA   Dr. Nichols     Current Outpatient Medications   Medication Sig Dispense Refill    amLODIPine-benazepril (LOTREL) 5-20 MG capsule Take 1 capsule by mouth daily 90 capsule 3    aspirin  "(ASA) 81 MG tablet Take 1 tablet (81 mg) by mouth At Bedtime      atorvastatin (LIPITOR) 40 MG tablet Take 1 tablet (40 mg) by mouth daily 90 tablet 3    clopidogrel (PLAVIX) 75 MG tablet TAKE 1 TABLET(75 MG) BY MOUTH DAILY 90 tablet 3    metoprolol succinate ER (TOPROL XL) 50 MG 24 hr tablet Take 1 tablet (50 mg) by mouth daily 90 tablet 3    tamsulosin (FLOMAX) 0.4 MG capsule Take 1 capsule (0.4 mg) by mouth daily 30 capsule 11       Allergies   Allergen Reactions    Meclizine Other (See Comments)     Over sedation, concentration problem    Tramadol Hcl Other (See Comments)     Pt feels very drugged, \"cloudy\" if used more than one day. Nausea also    Cardura [Doxazosin Mesylate] Other (See Comments)     fainted    Hydrochlorothiazide Other (See Comments)      lightheadedness    Naproxen Nausea     nausea        Social History     Tobacco Use    Smoking status: Former     Current packs/day: 0.00     Average packs/day: 0.5 packs/day for 11.0 years (5.5 ttl pk-yrs)     Types: Cigarettes     Start date: 1957     Quit date: 1968     Years since quittin.6    Smokeless tobacco: Never   Substance Use Topics    Alcohol use: No     Family History   Problem Relation Age of Onset    Family History Negative Brother         1 healthy brother    Diabetes Brother         d:age 66    Diabetes Mother     Cerebrovascular Disease Mother     C.A.D. Father         CABG 67    Heart Disease Father     Lipids Sister     Diabetes Sister     Hypertension Sister     Hypertension Sister     Lipids Sister     Hypertension Sister     Colon Cancer Sister     Thyroid Disease Daughter         thyroid surgery, on replacement     History   Drug Use No             Review of Systems  CONSTITUTIONAL: NEGATIVE for fever, chills. Weight down 3  pounds  INTEGUMENTARY/SKIN: NEGATIVE for worrisome rashes, moles or lesions  EYES: NEGATIVE for vision   irritation. Reduced acuity bilaterally with cataracts  ENT/MOUTH: NEGATIVE for ear, mouth and " "throat problems  RESP: NEGATIVE for significant cough or SOB  CV: NEGATIVE for chest pain, palpitations or peripheral edema. Hx carotid stenosis  GI: NEGATIVE for nausea, abdominal pain, heartburn, or change in bowel habits  : NEGATIVE for frequency, dysuria, or hematuria. Nocturia x1-2  MUSCULOSKELETAL: See HPI. Also has  chronic LBP with occ lumbar radiculopathy. No B/B incontinence. Leg strength still OK  NEURO: NEGATIVE for weakness, dizziness.See above  ENDOCRINE: NEGATIVE for temperature intolerance   HEME: NEGATIVE for bleeding problems. Mild bruising with Pavix  PSYCHIATRIC: NEGATIVE for changes in mood or affect    Objective    /70   Pulse 78   Temp 98  F (36.7  C) (Temporal)   Ht 1.702 m (5' 7\")   Wt 91.1 kg (200 lb 12.8 oz)   SpO2 97%   BMI 31.45 kg/m     Estimated body mass index is 31.45 kg/m  as calculated from the following:    Height as of this encounter: 1.702 m (5' 7\").    Weight as of this encounter: 91.1 kg (200 lb 12.8 oz).  Physical Exam  Eye: PERRL, EOMI  HENT: ear canals and TM's normal and nose and mouth without ulcers or lesions. Dentures  Neck: no adenopathy. Thyroid normal to palpation. Faint bruit left side.  Palpable  carotid pulses  Pulm: Lungs clear to auscultation   CV: Regular rates and rhythm  GI: Soft, obese, nontender, Normal active bowel sounds, No hepatosplenomegaly or masses palpable  Ext: Peripheral pulses intact. 1 plus chronic BLE edema.Tenderness to palpation left knee joint line  Neuro: Normal strength and tone, sensory exam grossly normal      Recent Labs   Lab Test 04/30/24  0924   HGB 14.9      *   POTASSIUM 4.0   CR 0.77        Diagnostics  Component      Latest Ref Rng 8/23/2024  3:11 PM   Sodium      135 - 145 mmol/L 136    Potassium      3.4 - 5.3 mmol/L 4.1    Chloride      98 - 107 mmol/L 98    Carbon Dioxide (CO2)      22 - 29 mmol/L 28    Anion Gap      7 - 15 mmol/L 10    Urea Nitrogen      8.0 - 23.0 mg/dL 9.1    Creatinine      " 0.67 - 1.17 mg/dL 0.72    GFR Estimate      >60 mL/min/1.73m2 90    Calcium      8.8 - 10.4 mg/dL 9.2    Glucose      70 - 99 mg/dL 105 (H)    WBC      4.0 - 11.0 10e3/uL 8.2    RBC Count      4.40 - 5.90 10e6/uL 4.85    Hemoglobin      13.3 - 17.7 g/dL 14.4    Hematocrit      40.0 - 53.0 % 42.3    MCV      78 - 100 fL 87    MCH      26.5 - 33.0 pg 29.7    MCHC      31.5 - 36.5 g/dL 34.0    RDW      10.0 - 15.0 % 12.8    Platelet Count      150 - 450 10e3/uL 281          Narrative & Impression   EXAM: US CAROTID BILATERAL  LOCATION: [facility]  DATE: 8/28/2024     INDICATION: follow up on carotid stenosis. Last imaging Aug 2023  COMPARISON: 8/29/2023  TECHNIQUE: Duplex exam performed utilizing 2D gray-scale imaging, Doppler interrogation with color-flow and spectral waveform analysis. The percent diameter stenosis is determined using Updated Recommendations for Carotid Stenosis Interpretation Criteria   from [employer] [facility].     FINDINGS:     RIGHT: Mild calcified plaque at the bifurcation. The peak systolic velocity in the ICA is less than 180 cm/sec, consistent with less than 50% stenosis. Normal velocities in the ECA. Antegrade flow within the vertebral artery.      LEFT: Moderate calcified plaque at the bifurcation. The peak systolic velocity in the ICA is 180-230 cm/sec, consistent with 50-69% stenosis. Elevated velocities in the ECA consistent with a stenosis. Antegrade flow within the vertebral artery.     VELOCITY CHART:  CCA   Right: 84/13 cm/s   Left: 135/24 cm/s  ICA   Right: 109/26 cm/s   Left: 222/29 cm/s  ECA   Right: 120/5 cm/s   Left: 293/22 cm/s  ICA/CCA PSV Ratio   Right: 1.3   Left: 1.4                                                                      IMPRESSION:  1.  Mild plaque formation, velocities consistent with less than 50% stenosis in the right internal carotid artery.  2.  Mild plaque formation, velocities consistent with less than 50% stenosis in the  left internal carotid artery.  3.  Flow within the vertebral arteries is antegrade.         EKG: Normal sinus rhythm with rate 74.  Right bundle branch block with left anterior fascicular block.  No acute ST/T changes        Revised Cardiac Risk Index (RCRI)  The patient has the following serious cardiovascular risks for perioperative complications:   - Cerebrovascular Disease (TIA or CVA) = 1 point  (hx TIA in distant past. No residual sx)    RCRI Interpretation: 1 point: Class II (low risk - 0.9% complication rate)         Signed Electronically by: Familia Cook MD  A copy of this evaluation report is provided to the requesting physician.

## 2024-08-28 ENCOUNTER — ANCILLARY PROCEDURE (OUTPATIENT)
Dept: ULTRASOUND IMAGING | Facility: CLINIC | Age: 84
End: 2024-08-28
Attending: INTERNAL MEDICINE
Payer: COMMERCIAL

## 2024-08-28 DIAGNOSIS — I65.23 CAROTID STENOSIS, ASYMPTOMATIC, BILATERAL: ICD-10-CM

## 2024-08-28 PROCEDURE — 93880 EXTRACRANIAL BILAT STUDY: CPT

## 2024-09-03 ENCOUNTER — TELEPHONE (OUTPATIENT)
Dept: INTERNAL MEDICINE | Facility: CLINIC | Age: 84
End: 2024-09-03
Payer: COMMERCIAL

## 2024-09-03 NOTE — TELEPHONE ENCOUNTER
Pt calling stating that PCP was supposed to call him last Thursday regarding what to do prior to surgery and pt never received a phone call.     Medication questions regarding Plavix and aspirin stay on or hold for procedure?     Please advise on next steps for patient or call pt when you are able. Surgery is 9/12/24.    Gloria Hooks RN

## 2024-09-03 NOTE — TELEPHONE ENCOUNTER
Spoke with pt. Will hold both ASA and Clopidogrel starting 7 days prior to surgery and only take Metoprolol the AM of surgery when first gets up.  No solid food  after MN prior to surgery but may have clear liquids up to 11:00am  before 1pm surgery

## 2024-09-04 PROBLEM — E78.5 HYPERLIPIDEMIA LDL GOAL <70: Status: ACTIVE | Noted: 2024-09-04

## 2024-09-04 PROBLEM — M54.16 LUMBAR RADICULOPATHY: Status: ACTIVE | Noted: 2024-09-04

## 2024-09-04 PROBLEM — E66.811 CLASS 1 OBESITY WITH SERIOUS COMORBIDITY AND BODY MASS INDEX (BMI) OF 31.0 TO 31.9 IN ADULT, UNSPECIFIED OBESITY TYPE: Status: ACTIVE | Noted: 2024-09-04

## 2024-09-04 PROBLEM — I45.2 RBBB (RIGHT BUNDLE BRANCH BLOCK WITH LEFT ANTERIOR FASCICULAR BLOCK): Status: ACTIVE | Noted: 2024-09-04

## 2024-09-04 NOTE — PATIENT INSTRUCTIONS
Approval given to proceed with proposed procedure, without further diagnostic evaluation..  No solid food after midnight prior to your  procedure. May have clear liquids up to 2 hours prior to the  procedure   Stop  Aspirin and Clopidogrel 7 days prior to your  procedure to reduce risk of bleeding.     May use Tylenol for pain control  in the 5 days prior to your surgery  Take  Metoprolol with   water  when you first get up the  AM of the  procedure   Hold other prescription and over the counter medications/supplements the day of the  procedure. May resume usual medications/supplements after the procedure unless instructed otherwise by your surgeon    Post-op DVT prophylaxis orders per surgeon

## 2024-09-09 RX ORDER — FEXOFENADINE HCL 180 MG/1
180 TABLET ORAL DAILY
COMMUNITY

## 2024-09-09 RX ORDER — ACETAMINOPHEN 500 MG
500-1000 TABLET ORAL EVERY 6 HOURS PRN
Status: ON HOLD | COMMUNITY
End: 2024-09-13

## 2024-09-09 NOTE — PROGRESS NOTES
PTA medications updated by Medication Scribe prior to surgery via phone call with patient (last doses completed by Nurse)     Medication history sources: Patient, Surescripts, and H&P  In the past week, patient estimated taking medication this percent of the time: Greater than 90%      Significant changes made to the medication list:  None      Additional medication history information:   None    Medication reconciliation completed by provider prior to medication history? No    Time spent in this activity: 25 minutes    The information provided in this note is only as accurate as the sources available at the time of update(s)      Prior to Admission medications    Medication Sig Last Dose Taking? Auth Provider Long Term End Date   acetaminophen (TYLENOL) 500 MG tablet Take 500-1,000 mg by mouth every 6 hours as needed for mild pain. Unknown at prn Yes Reported, Patient     amLODIPine-benazepril (LOTREL) 5-20 MG capsule Take 1 capsule by mouth daily  at pm Yes Familia Cook MD Yes    aspirin (ASA) 81 MG tablet Take 1 tablet (81 mg) by mouth At Bedtime 9/4/2024 at pm Yes Flores Portillo NP     atorvastatin (LIPITOR) 40 MG tablet Take 1 tablet (40 mg) by mouth daily  at pm Yes Familia Cook MD Yes    clopidogrel (PLAVIX) 75 MG tablet TAKE 1 TABLET(75 MG) BY MOUTH DAILY 9/4/2024 at am Yes Familia Cook MD Yes    fexofenadine (ALLEGRA) 180 MG tablet Take 180 mg by mouth daily.  at pm Yes Reported, Patient     metoprolol succinate ER (TOPROL XL) 50 MG 24 hr tablet Take 1 tablet (50 mg) by mouth daily  at am Yes Familia Cook MD Yes    tamsulosin (FLOMAX) 0.4 MG capsule Take 1 capsule (0.4 mg) by mouth daily  at pm Yes Familia Cook MD         Medication history completed by: Xochilt Mariscal LPN

## 2024-09-12 ENCOUNTER — APPOINTMENT (OUTPATIENT)
Dept: GENERAL RADIOLOGY | Facility: CLINIC | Age: 84
End: 2024-09-12
Attending: ORTHOPAEDIC SURGERY
Payer: COMMERCIAL

## 2024-09-12 ENCOUNTER — ANESTHESIA (OUTPATIENT)
Dept: SURGERY | Facility: CLINIC | Age: 84
End: 2024-09-12
Payer: COMMERCIAL

## 2024-09-12 ENCOUNTER — ANESTHESIA EVENT (OUTPATIENT)
Dept: SURGERY | Facility: CLINIC | Age: 84
End: 2024-09-12
Payer: COMMERCIAL

## 2024-09-12 ENCOUNTER — APPOINTMENT (OUTPATIENT)
Dept: PHYSICAL THERAPY | Facility: CLINIC | Age: 84
End: 2024-09-12
Attending: ORTHOPAEDIC SURGERY
Payer: COMMERCIAL

## 2024-09-12 ENCOUNTER — HOSPITAL ENCOUNTER (OUTPATIENT)
Facility: CLINIC | Age: 84
Discharge: HOME | End: 2024-09-13
Attending: ORTHOPAEDIC SURGERY | Admitting: ORTHOPAEDIC SURGERY
Payer: COMMERCIAL

## 2024-09-12 DIAGNOSIS — I65.23 CAROTID STENOSIS, ASYMPTOMATIC, BILATERAL: ICD-10-CM

## 2024-09-12 DIAGNOSIS — G45.0 VERTEBROBASILAR ARTERY SYNDROME: ICD-10-CM

## 2024-09-12 DIAGNOSIS — Z96.652 TOTAL KNEE REPLACEMENT STATUS, LEFT: Primary | ICD-10-CM

## 2024-09-12 DIAGNOSIS — Z96.652 HX OF TOTAL KNEE REPLACEMENT, LEFT: ICD-10-CM

## 2024-09-12 LAB
ANION GAP SERPL CALCULATED.3IONS-SCNC: 12 MMOL/L (ref 7–15)
BUN SERPL-MCNC: 12 MG/DL (ref 8–23)
CALCIUM SERPL-MCNC: 8.7 MG/DL (ref 8.8–10.4)
CHLORIDE SERPL-SCNC: 98 MMOL/L (ref 98–107)
CREAT SERPL-MCNC: 0.75 MG/DL (ref 0.67–1.17)
EGFRCR SERPLBLD CKD-EPI 2021: 88 ML/MIN/1.73M2
FASTING STATUS PATIENT QL REPORTED: YES
GLUCOSE BLDC GLUCOMTR-MCNC: 263 MG/DL (ref 70–99)
GLUCOSE SERPL-MCNC: 107 MG/DL (ref 70–99)
GLUCOSE SERPL-MCNC: 192 MG/DL (ref 70–99)
HCO3 SERPL-SCNC: 24 MMOL/L (ref 22–29)
POTASSIUM SERPL-SCNC: 4 MMOL/L (ref 3.4–5.3)
POTASSIUM SERPL-SCNC: 4.2 MMOL/L (ref 3.4–5.3)
SODIUM SERPL-SCNC: 134 MMOL/L (ref 135–145)

## 2024-09-12 PROCEDURE — 250N000013 HC RX MED GY IP 250 OP 250 PS 637: Performed by: ORTHOPAEDIC SURGERY

## 2024-09-12 PROCEDURE — 272N000001 HC OR GENERAL SUPPLY STERILE: Performed by: ORTHOPAEDIC SURGERY

## 2024-09-12 PROCEDURE — 36415 COLL VENOUS BLD VENIPUNCTURE: CPT | Performed by: ANESTHESIOLOGY

## 2024-09-12 PROCEDURE — 250N000011 HC RX IP 250 OP 636: Performed by: ORTHOPAEDIC SURGERY

## 2024-09-12 PROCEDURE — 370N000017 HC ANESTHESIA TECHNICAL FEE, PER MIN: Performed by: ORTHOPAEDIC SURGERY

## 2024-09-12 PROCEDURE — 80048 BASIC METABOLIC PNL TOTAL CA: CPT | Performed by: ANESTHESIOLOGY

## 2024-09-12 PROCEDURE — 27447 TOTAL KNEE ARTHROPLASTY: CPT | Performed by: SURGERY

## 2024-09-12 PROCEDURE — C1776 JOINT DEVICE (IMPLANTABLE): HCPCS | Performed by: ORTHOPAEDIC SURGERY

## 2024-09-12 PROCEDURE — 250N000009 HC RX 250: Performed by: NURSE ANESTHETIST, CERTIFIED REGISTERED

## 2024-09-12 PROCEDURE — 250N000009 HC RX 250: Performed by: ORTHOPAEDIC SURGERY

## 2024-09-12 PROCEDURE — 82947 ASSAY GLUCOSE BLOOD QUANT: CPT | Performed by: ANESTHESIOLOGY

## 2024-09-12 PROCEDURE — 250N000011 HC RX IP 250 OP 636: Performed by: NURSE ANESTHETIST, CERTIFIED REGISTERED

## 2024-09-12 PROCEDURE — 258N000003 HC RX IP 258 OP 636: Performed by: ANESTHESIOLOGY

## 2024-09-12 PROCEDURE — 258N000003 HC RX IP 258 OP 636: Performed by: NURSE ANESTHETIST, CERTIFIED REGISTERED

## 2024-09-12 PROCEDURE — 27447 TOTAL KNEE ARTHROPLASTY: CPT | Performed by: NURSE ANESTHETIST, CERTIFIED REGISTERED

## 2024-09-12 PROCEDURE — 36415 COLL VENOUS BLD VENIPUNCTURE: CPT | Performed by: ORTHOPAEDIC SURGERY

## 2024-09-12 PROCEDURE — 250N000011 HC RX IP 250 OP 636: Performed by: SURGERY

## 2024-09-12 PROCEDURE — 97161 PT EVAL LOW COMPLEX 20 MIN: CPT | Mod: GP

## 2024-09-12 PROCEDURE — 97530 THERAPEUTIC ACTIVITIES: CPT | Mod: GP

## 2024-09-12 PROCEDURE — 258N000003 HC RX IP 258 OP 636: Performed by: ORTHOPAEDIC SURGERY

## 2024-09-12 PROCEDURE — 99100 ANES PT EXTEME AGE<1 YR&>70: CPT | Performed by: NURSE ANESTHETIST, CERTIFIED REGISTERED

## 2024-09-12 PROCEDURE — 999N000141 HC STATISTIC PRE-PROCEDURE NURSING ASSESSMENT: Performed by: ORTHOPAEDIC SURGERY

## 2024-09-12 PROCEDURE — C1713 ANCHOR/SCREW BN/BN,TIS/BN: HCPCS | Performed by: ORTHOPAEDIC SURGERY

## 2024-09-12 PROCEDURE — 360N000077 HC SURGERY LEVEL 4, PER MIN: Performed by: ORTHOPAEDIC SURGERY

## 2024-09-12 PROCEDURE — 258N000001 HC RX 258: Performed by: ORTHOPAEDIC SURGERY

## 2024-09-12 PROCEDURE — 999N000065 XR KNEE PORT LEFT 1/2 VIEWS: Mod: LT

## 2024-09-12 PROCEDURE — 710N000009 HC RECOVERY PHASE 1, LEVEL 1, PER MIN: Performed by: ORTHOPAEDIC SURGERY

## 2024-09-12 PROCEDURE — 82962 GLUCOSE BLOOD TEST: CPT

## 2024-09-12 PROCEDURE — 99204 OFFICE O/P NEW MOD 45 MIN: CPT | Performed by: PHYSICIAN ASSISTANT

## 2024-09-12 PROCEDURE — 82947 ASSAY GLUCOSE BLOOD QUANT: CPT | Performed by: ORTHOPAEDIC SURGERY

## 2024-09-12 DEVICE — BONE CEMENT RADIOPAQUE SIMPLEX HV FULL DOSE 6194-1-001: Type: IMPLANTABLE DEVICE | Site: KNEE | Status: FUNCTIONAL

## 2024-09-12 DEVICE — IMP COMP PATELLA BIOM 3 PEG 34MM STD 184766: Type: IMPLANTABLE DEVICE | Site: KNEE | Status: FUNCTIONAL

## 2024-09-12 DEVICE — IMP COMP FEMORAL VANGARD BIOM FIXATION 183099: Type: IMPLANTABLE DEVICE | Site: KNEE | Status: FUNCTIONAL

## 2024-09-12 DEVICE — IMP COMP FEMORAL VANGARD BIOM PS LT 70MM 183132: Type: IMPLANTABLE DEVICE | Site: KNEE | Status: FUNCTIONAL

## 2024-09-12 DEVICE — IMP INSERT TIBIAL BIOM PS PLUS BEARING 14X79/83MM 183764: Type: IMPLANTABLE DEVICE | Site: KNEE | Status: FUNCTIONAL

## 2024-09-12 DEVICE — IMP COMP TIBIAL KNEE ASCENT 79MM 141225: Type: IMPLANTABLE DEVICE | Site: KNEE | Status: FUNCTIONAL

## 2024-09-12 RX ORDER — OXYCODONE HYDROCHLORIDE 5 MG/1
10 TABLET ORAL EVERY 4 HOURS PRN
Status: DISCONTINUED | OUTPATIENT
Start: 2024-09-12 | End: 2024-09-13 | Stop reason: HOSPADM

## 2024-09-12 RX ORDER — SODIUM CHLORIDE, SODIUM LACTATE, POTASSIUM CHLORIDE, CALCIUM CHLORIDE 600; 310; 30; 20 MG/100ML; MG/100ML; MG/100ML; MG/100ML
INJECTION, SOLUTION INTRAVENOUS CONTINUOUS
Status: DISCONTINUED | OUTPATIENT
Start: 2024-09-12 | End: 2024-09-12 | Stop reason: HOSPADM

## 2024-09-12 RX ORDER — LABETALOL HYDROCHLORIDE 5 MG/ML
10 INJECTION, SOLUTION INTRAVENOUS
Status: DISCONTINUED | OUTPATIENT
Start: 2024-09-12 | End: 2024-09-12 | Stop reason: HOSPADM

## 2024-09-12 RX ORDER — HYDROXYZINE HYDROCHLORIDE 10 MG/1
10 TABLET, FILM COATED ORAL EVERY 6 HOURS PRN
Status: DISCONTINUED | OUTPATIENT
Start: 2024-09-12 | End: 2024-09-13 | Stop reason: HOSPADM

## 2024-09-12 RX ORDER — HYDROXYZINE HYDROCHLORIDE 10 MG/1
10 TABLET, FILM COATED ORAL EVERY 6 HOURS PRN
Qty: 30 TABLET | Refills: 0 | Status: SHIPPED | OUTPATIENT
Start: 2024-09-12 | End: 2024-10-04

## 2024-09-12 RX ORDER — ATORVASTATIN CALCIUM 40 MG/1
40 TABLET, FILM COATED ORAL DAILY
Status: DISCONTINUED | OUTPATIENT
Start: 2024-09-12 | End: 2024-09-13 | Stop reason: HOSPADM

## 2024-09-12 RX ORDER — SODIUM CHLORIDE, SODIUM LACTATE, POTASSIUM CHLORIDE, CALCIUM CHLORIDE 600; 310; 30; 20 MG/100ML; MG/100ML; MG/100ML; MG/100ML
INJECTION, SOLUTION INTRAVENOUS CONTINUOUS
Status: DISCONTINUED | OUTPATIENT
Start: 2024-09-12 | End: 2024-09-13 | Stop reason: HOSPADM

## 2024-09-12 RX ORDER — DEXAMETHASONE SODIUM PHOSPHATE 4 MG/ML
4 INJECTION, SOLUTION INTRA-ARTICULAR; INTRALESIONAL; INTRAMUSCULAR; INTRAVENOUS; SOFT TISSUE
Status: DISCONTINUED | OUTPATIENT
Start: 2024-09-12 | End: 2024-09-12 | Stop reason: HOSPADM

## 2024-09-12 RX ORDER — AMOXICILLIN 250 MG
1 CAPSULE ORAL 2 TIMES DAILY
Status: DISCONTINUED | OUTPATIENT
Start: 2024-09-12 | End: 2024-09-13 | Stop reason: HOSPADM

## 2024-09-12 RX ORDER — HYDROMORPHONE HCL IN WATER/PF 6 MG/30 ML
0.2 PATIENT CONTROLLED ANALGESIA SYRINGE INTRAVENOUS EVERY 5 MIN PRN
Status: DISCONTINUED | OUTPATIENT
Start: 2024-09-12 | End: 2024-09-12 | Stop reason: HOSPADM

## 2024-09-12 RX ORDER — ACETAMINOPHEN 325 MG/1
650 TABLET ORAL EVERY 4 HOURS PRN
Qty: 100 TABLET | Refills: 0 | Status: SHIPPED | OUTPATIENT
Start: 2024-09-12

## 2024-09-12 RX ORDER — ONDANSETRON 4 MG/1
4 TABLET, ORALLY DISINTEGRATING ORAL EVERY 6 HOURS PRN
Status: DISCONTINUED | OUTPATIENT
Start: 2024-09-12 | End: 2024-09-13 | Stop reason: HOSPADM

## 2024-09-12 RX ORDER — NALOXONE HYDROCHLORIDE 0.4 MG/ML
0.2 INJECTION, SOLUTION INTRAMUSCULAR; INTRAVENOUS; SUBCUTANEOUS
Status: DISCONTINUED | OUTPATIENT
Start: 2024-09-12 | End: 2024-09-13 | Stop reason: HOSPADM

## 2024-09-12 RX ORDER — TRANEXAMIC ACID 10 MG/ML
1 INJECTION, SOLUTION INTRAVENOUS ONCE
Status: COMPLETED | OUTPATIENT
Start: 2024-09-12 | End: 2024-09-12

## 2024-09-12 RX ORDER — ONDANSETRON 2 MG/ML
4 INJECTION INTRAMUSCULAR; INTRAVENOUS EVERY 30 MIN PRN
Status: DISCONTINUED | OUTPATIENT
Start: 2024-09-12 | End: 2024-09-12 | Stop reason: HOSPADM

## 2024-09-12 RX ORDER — LIDOCAINE 40 MG/G
CREAM TOPICAL
Status: DISCONTINUED | OUTPATIENT
Start: 2024-09-12 | End: 2024-09-13 | Stop reason: HOSPADM

## 2024-09-12 RX ORDER — ACETAMINOPHEN 325 MG/1
975 TABLET ORAL EVERY 8 HOURS
Status: DISCONTINUED | OUTPATIENT
Start: 2024-09-12 | End: 2024-09-13 | Stop reason: HOSPADM

## 2024-09-12 RX ORDER — ONDANSETRON 2 MG/ML
4 INJECTION INTRAMUSCULAR; INTRAVENOUS EVERY 6 HOURS PRN
Status: DISCONTINUED | OUTPATIENT
Start: 2024-09-12 | End: 2024-09-13 | Stop reason: HOSPADM

## 2024-09-12 RX ORDER — ACETAMINOPHEN 500 MG
500 TABLET ORAL EVERY 6 HOURS PRN
Status: DISCONTINUED | OUTPATIENT
Start: 2024-09-12 | End: 2024-09-13 | Stop reason: HOSPADM

## 2024-09-12 RX ORDER — FENTANYL CITRATE 0.05 MG/ML
25 INJECTION, SOLUTION INTRAMUSCULAR; INTRAVENOUS EVERY 5 MIN PRN
Status: DISCONTINUED | OUTPATIENT
Start: 2024-09-12 | End: 2024-09-12 | Stop reason: HOSPADM

## 2024-09-12 RX ORDER — PROPOFOL 10 MG/ML
INJECTION, EMULSION INTRAVENOUS CONTINUOUS PRN
Status: DISCONTINUED | OUTPATIENT
Start: 2024-09-12 | End: 2024-09-12

## 2024-09-12 RX ORDER — NALOXONE HYDROCHLORIDE 0.4 MG/ML
0.4 INJECTION, SOLUTION INTRAMUSCULAR; INTRAVENOUS; SUBCUTANEOUS
Status: DISCONTINUED | OUTPATIENT
Start: 2024-09-12 | End: 2024-09-13 | Stop reason: HOSPADM

## 2024-09-12 RX ORDER — POLYETHYLENE GLYCOL 3350 17 G/17G
17 POWDER, FOR SOLUTION ORAL DAILY
Status: DISCONTINUED | OUTPATIENT
Start: 2024-09-13 | End: 2024-09-13 | Stop reason: HOSPADM

## 2024-09-12 RX ORDER — OXYCODONE HYDROCHLORIDE 5 MG/1
5 TABLET ORAL EVERY 4 HOURS PRN
Status: DISCONTINUED | OUTPATIENT
Start: 2024-09-12 | End: 2024-09-13 | Stop reason: HOSPADM

## 2024-09-12 RX ORDER — AMOXICILLIN 250 MG
1-2 CAPSULE ORAL 2 TIMES DAILY
Qty: 30 TABLET | Refills: 0 | Status: SHIPPED | OUTPATIENT
Start: 2024-09-12 | End: 2024-10-04

## 2024-09-12 RX ORDER — MAGNESIUM HYDROXIDE 1200 MG/15ML
LIQUID ORAL PRN
Status: DISCONTINUED | OUTPATIENT
Start: 2024-09-12 | End: 2024-09-12 | Stop reason: HOSPADM

## 2024-09-12 RX ORDER — HYDRALAZINE HYDROCHLORIDE 20 MG/ML
2.5-5 INJECTION INTRAMUSCULAR; INTRAVENOUS EVERY 10 MIN PRN
Status: DISCONTINUED | OUTPATIENT
Start: 2024-09-12 | End: 2024-09-12 | Stop reason: HOSPADM

## 2024-09-12 RX ORDER — HYDROXYZINE HYDROCHLORIDE 10 MG/1
10 TABLET, FILM COATED ORAL EVERY 6 HOURS PRN
Status: DISCONTINUED | OUTPATIENT
Start: 2024-09-12 | End: 2024-09-12 | Stop reason: HOSPADM

## 2024-09-12 RX ORDER — DEXAMETHASONE SODIUM PHOSPHATE 4 MG/ML
INJECTION, SOLUTION INTRA-ARTICULAR; INTRALESIONAL; INTRAMUSCULAR; INTRAVENOUS; SOFT TISSUE PRN
Status: DISCONTINUED | OUTPATIENT
Start: 2024-09-12 | End: 2024-09-12

## 2024-09-12 RX ORDER — ONDANSETRON 2 MG/ML
INJECTION INTRAMUSCULAR; INTRAVENOUS PRN
Status: DISCONTINUED | OUTPATIENT
Start: 2024-09-12 | End: 2024-09-12

## 2024-09-12 RX ORDER — ASPIRIN 81 MG/1
81 TABLET ORAL 2 TIMES DAILY
Status: DISCONTINUED | OUTPATIENT
Start: 2024-09-12 | End: 2024-09-13 | Stop reason: HOSPADM

## 2024-09-12 RX ORDER — DEXMEDETOMIDINE HYDROCHLORIDE 4 UG/ML
INJECTION, SOLUTION INTRAVENOUS PRN
Status: DISCONTINUED | OUTPATIENT
Start: 2024-09-12 | End: 2024-09-12

## 2024-09-12 RX ORDER — BISACODYL 10 MG
10 SUPPOSITORY, RECTAL RECTAL DAILY PRN
Status: DISCONTINUED | OUTPATIENT
Start: 2024-09-15 | End: 2024-09-13 | Stop reason: HOSPADM

## 2024-09-12 RX ORDER — PROCHLORPERAZINE MALEATE 5 MG
5 TABLET ORAL EVERY 6 HOURS PRN
Status: DISCONTINUED | OUTPATIENT
Start: 2024-09-12 | End: 2024-09-13 | Stop reason: HOSPADM

## 2024-09-12 RX ORDER — PROPOFOL 10 MG/ML
INJECTION, EMULSION INTRAVENOUS PRN
Status: DISCONTINUED | OUTPATIENT
Start: 2024-09-12 | End: 2024-09-12

## 2024-09-12 RX ORDER — HYDROMORPHONE HCL IN WATER/PF 6 MG/30 ML
0.2 PATIENT CONTROLLED ANALGESIA SYRINGE INTRAVENOUS
Status: DISCONTINUED | OUTPATIENT
Start: 2024-09-12 | End: 2024-09-13 | Stop reason: HOSPADM

## 2024-09-12 RX ORDER — CEFAZOLIN SODIUM 2 G/100ML
2 INJECTION, SOLUTION INTRAVENOUS EVERY 8 HOURS
Status: COMPLETED | OUTPATIENT
Start: 2024-09-12 | End: 2024-09-13

## 2024-09-12 RX ORDER — HYDROMORPHONE HCL IN WATER/PF 6 MG/30 ML
0.4 PATIENT CONTROLLED ANALGESIA SYRINGE INTRAVENOUS
Status: DISCONTINUED | OUTPATIENT
Start: 2024-09-12 | End: 2024-09-13 | Stop reason: HOSPADM

## 2024-09-12 RX ORDER — ACETAMINOPHEN 325 MG/1
650 TABLET ORAL EVERY 4 HOURS PRN
Status: DISCONTINUED | OUTPATIENT
Start: 2024-09-15 | End: 2024-09-12

## 2024-09-12 RX ORDER — FENTANYL CITRATE 0.05 MG/ML
50 INJECTION, SOLUTION INTRAMUSCULAR; INTRAVENOUS EVERY 5 MIN PRN
Status: DISCONTINUED | OUTPATIENT
Start: 2024-09-12 | End: 2024-09-12 | Stop reason: HOSPADM

## 2024-09-12 RX ORDER — NALOXONE HYDROCHLORIDE 0.4 MG/ML
0.1 INJECTION, SOLUTION INTRAMUSCULAR; INTRAVENOUS; SUBCUTANEOUS
Status: DISCONTINUED | OUTPATIENT
Start: 2024-09-12 | End: 2024-09-12 | Stop reason: HOSPADM

## 2024-09-12 RX ORDER — ONDANSETRON 4 MG/1
4 TABLET, ORALLY DISINTEGRATING ORAL EVERY 30 MIN PRN
Status: DISCONTINUED | OUTPATIENT
Start: 2024-09-12 | End: 2024-09-12 | Stop reason: HOSPADM

## 2024-09-12 RX ORDER — TAMSULOSIN HYDROCHLORIDE 0.4 MG/1
0.4 CAPSULE ORAL DAILY
Status: DISCONTINUED | OUTPATIENT
Start: 2024-09-12 | End: 2024-09-13 | Stop reason: HOSPADM

## 2024-09-12 RX ORDER — CEFAZOLIN SODIUM/WATER 2 G/20 ML
2 SYRINGE (ML) INTRAVENOUS
Status: COMPLETED | OUTPATIENT
Start: 2024-09-12 | End: 2024-09-12

## 2024-09-12 RX ORDER — BUPIVACAINE HYDROCHLORIDE 7.5 MG/ML
INJECTION, SOLUTION INTRASPINAL
Status: DISCONTINUED | OUTPATIENT
Start: 2024-09-12 | End: 2024-09-12

## 2024-09-12 RX ORDER — OXYCODONE HYDROCHLORIDE 5 MG/1
5-10 TABLET ORAL EVERY 4 HOURS PRN
Qty: 30 TABLET | Refills: 0 | Status: SHIPPED | OUTPATIENT
Start: 2024-09-12 | End: 2024-10-04

## 2024-09-12 RX ORDER — LIDOCAINE HYDROCHLORIDE 20 MG/ML
INJECTION, SOLUTION INFILTRATION; PERINEURAL PRN
Status: DISCONTINUED | OUTPATIENT
Start: 2024-09-12 | End: 2024-09-12

## 2024-09-12 RX ORDER — HYDROMORPHONE HCL IN WATER/PF 6 MG/30 ML
0.4 PATIENT CONTROLLED ANALGESIA SYRINGE INTRAVENOUS EVERY 5 MIN PRN
Status: DISCONTINUED | OUTPATIENT
Start: 2024-09-12 | End: 2024-09-12 | Stop reason: HOSPADM

## 2024-09-12 RX ORDER — CLOPIDOGREL BISULFATE 75 MG/1
TABLET ORAL
Qty: 90 TABLET | Refills: 3 | Status: SHIPPED | OUTPATIENT
Start: 2024-09-18

## 2024-09-12 RX ORDER — METOPROLOL SUCCINATE 50 MG/1
50 TABLET, EXTENDED RELEASE ORAL DAILY
Status: DISCONTINUED | OUTPATIENT
Start: 2024-09-13 | End: 2024-09-13 | Stop reason: HOSPADM

## 2024-09-12 RX ORDER — CEFAZOLIN SODIUM/WATER 2 G/20 ML
2 SYRINGE (ML) INTRAVENOUS SEE ADMIN INSTRUCTIONS
Status: DISCONTINUED | OUTPATIENT
Start: 2024-09-12 | End: 2024-09-12

## 2024-09-12 RX ORDER — VANCOMYCIN HYDROCHLORIDE 1 G/20ML
INJECTION, POWDER, LYOPHILIZED, FOR SOLUTION INTRAVENOUS PRN
Status: DISCONTINUED | OUTPATIENT
Start: 2024-09-12 | End: 2024-09-12 | Stop reason: HOSPADM

## 2024-09-12 RX ADMIN — PROPOFOL 30 MG: 10 INJECTION, EMULSION INTRAVENOUS at 12:02

## 2024-09-12 RX ADMIN — ONDANSETRON 4 MG: 2 INJECTION INTRAMUSCULAR; INTRAVENOUS at 12:21

## 2024-09-12 RX ADMIN — ACETAMINOPHEN 975 MG: 325 TABLET ORAL at 15:49

## 2024-09-12 RX ADMIN — BUPIVACAINE HYDROCHLORIDE IN DEXTROSE 1.6 ML: 7.5 INJECTION, SOLUTION SUBARACHNOID at 11:02

## 2024-09-12 RX ADMIN — ATORVASTATIN CALCIUM 40 MG: 40 TABLET, FILM COATED ORAL at 20:28

## 2024-09-12 RX ADMIN — PHENYLEPHRINE HYDROCHLORIDE 200 MCG: 10 INJECTION INTRAVENOUS at 11:13

## 2024-09-12 RX ADMIN — ASPIRIN 81 MG: 81 TABLET, COATED ORAL at 20:28

## 2024-09-12 RX ADMIN — PROPOFOL 150 MCG/KG/MIN: 10 INJECTION, EMULSION INTRAVENOUS at 11:04

## 2024-09-12 RX ADMIN — PROPOFOL 30 MG: 10 INJECTION, EMULSION INTRAVENOUS at 11:06

## 2024-09-12 RX ADMIN — DEXAMETHASONE SODIUM PHOSPHATE 10 MG: 4 INJECTION, SOLUTION INTRA-ARTICULAR; INTRALESIONAL; INTRAMUSCULAR; INTRAVENOUS; SOFT TISSUE at 11:03

## 2024-09-12 RX ADMIN — CEFAZOLIN SODIUM 2 G: 2 INJECTION, SOLUTION INTRAVENOUS at 18:28

## 2024-09-12 RX ADMIN — TRANEXAMIC ACID 1 G: 10 INJECTION, SOLUTION INTRAVENOUS at 11:00

## 2024-09-12 RX ADMIN — MIDAZOLAM 1 MG: 1 INJECTION INTRAMUSCULAR; INTRAVENOUS at 10:25

## 2024-09-12 RX ADMIN — DEXMEDETOMIDINE HYDROCHLORIDE 12 MCG: 200 INJECTION INTRAVENOUS at 12:40

## 2024-09-12 RX ADMIN — PHENYLEPHRINE HYDROCHLORIDE 200 MCG: 10 INJECTION INTRAVENOUS at 13:04

## 2024-09-12 RX ADMIN — TRANEXAMIC ACID 1 G: 10 INJECTION, SOLUTION INTRAVENOUS at 12:21

## 2024-09-12 RX ADMIN — DEXMEDETOMIDINE HYDROCHLORIDE 8 MCG: 200 INJECTION INTRAVENOUS at 12:36

## 2024-09-12 RX ADMIN — SODIUM CHLORIDE, POTASSIUM CHLORIDE, SODIUM LACTATE AND CALCIUM CHLORIDE: 600; 310; 30; 20 INJECTION, SOLUTION INTRAVENOUS at 09:22

## 2024-09-12 RX ADMIN — PHENYLEPHRINE HYDROCHLORIDE 100 MCG: 10 INJECTION INTRAVENOUS at 11:07

## 2024-09-12 RX ADMIN — PHENYLEPHRINE HYDROCHLORIDE 200 MCG: 10 INJECTION INTRAVENOUS at 11:19

## 2024-09-12 RX ADMIN — PHENYLEPHRINE HYDROCHLORIDE 0.4 MCG/KG/MIN: 10 INJECTION INTRAVENOUS at 11:19

## 2024-09-12 RX ADMIN — Medication 2 G: at 10:52

## 2024-09-12 RX ADMIN — PROPOFOL 40 MG: 10 INJECTION, EMULSION INTRAVENOUS at 11:03

## 2024-09-12 RX ADMIN — LIDOCAINE HYDROCHLORIDE 60 MG: 20 INJECTION, SOLUTION INFILTRATION; PERINEURAL at 11:03

## 2024-09-12 RX ADMIN — PHENYLEPHRINE HYDROCHLORIDE 200 MCG: 10 INJECTION INTRAVENOUS at 11:28

## 2024-09-12 RX ADMIN — SODIUM CHLORIDE, POTASSIUM CHLORIDE, SODIUM LACTATE AND CALCIUM CHLORIDE: 600; 310; 30; 20 INJECTION, SOLUTION INTRAVENOUS at 21:27

## 2024-09-12 RX ADMIN — TAMSULOSIN HYDROCHLORIDE 0.4 MG: 0.4 CAPSULE ORAL at 20:28

## 2024-09-12 RX ADMIN — SENNOSIDES AND DOCUSATE SODIUM 1 TABLET: 50; 8.6 TABLET ORAL at 20:28

## 2024-09-12 RX ADMIN — HYDROMORPHONE HYDROCHLORIDE 0.2 MG: 0.2 INJECTION, SOLUTION INTRAMUSCULAR; INTRAVENOUS; SUBCUTANEOUS at 15:50

## 2024-09-12 ASSESSMENT — ACTIVITIES OF DAILY LIVING (ADL)
ADLS_ACUITY_SCORE: 23
ADLS_ACUITY_SCORE: 22
ADLS_ACUITY_SCORE: 23
ADLS_ACUITY_SCORE: 22
ADLS_ACUITY_SCORE: 37
ADLS_ACUITY_SCORE: 22
ADLS_ACUITY_SCORE: 22
ADLS_ACUITY_SCORE: 23
ADLS_ACUITY_SCORE: 22
ADLS_ACUITY_SCORE: 22

## 2024-09-12 ASSESSMENT — ENCOUNTER SYMPTOMS: DYSRHYTHMIAS: 1

## 2024-09-12 NOTE — ANESTHESIA PREPROCEDURE EVALUATION
Anesthesia Pre-Procedure Evaluation    Patient: Ricardo Neely   MRN: [id number] : 1940        Procedure : Procedure(s):  left total knee arthroplasty          Past Medical History:   Diagnosis Date    Anxiety 2011    Arthritis     Basal cell carcinoma     Benign Prostatic Hypertrophy     Carotid stenosis, asymptomatic, bilateral 2022    CEREBROVASC DISEASE, small vessel 2007    Class 1 obesity with serious comorbidity and body mass index (BMI) of 31.0 to 31.9 in adult, unspecified obesity type 2024    Contracture of palmar fascia 10/16/2020    Diaphragmatic hernia 2002    Problem list name updated by automated process. Provider to review      Essential hypertension 2002    Essential hypertension, benign     GERD     HIATAL HERNIA     Hx of transient ischemic attack (TIA) 2021    Hyperlipidemia LDL goal <70 2024    HYPERPLASTIC POLYPS COLON , 3/06    Hypertrophy of prostate without urinary obstruction     Impaired fasting glucose     Low Back Pain     Lumbar radiculopathy 2024    Obesity, unspecified     Other and unspecified hyperlipidemia     Palpitations 2022    Personal history of urinary calculi     Pneumonia, organism unspecified(486)     RBBB (right bundle branch block with left anterior fascicular block) 2024    Squamous cell carcinoma of skin, unspecified     TRANSIENT CEREBRAL ISCHEMIA 2007      Past Surgical History:   Procedure Laterality Date    BIOPSY  2017    COLONOSCOPY      DISCECTOMY LUMBAR POSTERIOR MICROSCOPIC TWO LEVELS  2012    DISCECTOMY LUMBAR POSTERIOR MICROSCOPIC TWO LEVELS;  RIGHT L3-L4 REDO EXTENDED HEMILAMINECTOMY AND MICRO FORAMINOTOMY, LEFT L4-L5 EXTENDED HEMILAMINECTOMY AND MICRODISCECTOMY (PRONE) (SONYA MCCALLUM, C-ARM, METRIX II);  Surgeon: Bertram Mirza MD;  Location: [facility] OR    ENT SURGERY      Cancer spot on right ear    EYE SURGERY  2018    OPTICAL TRACKING SYSTEM FUSION  "SPINE POSTERIOR LUMBAR TWO LEVELS N/A 2019    Procedure: L3-5 LUMBAR TRANSFORAMINAL INTERBODY FUSION WITH STEALTH;  Surgeon: Harrison Perez MD;  Location: [facility] OR    renal calculii removal 40 years ago  1965    [facility] NONSPECIFIC PROCEDURE  1959    pilonidal cystectomy    [facility] NONSPECIFIC PROCEDURE  1966    kidney stone    [facility] NONSPECIFIC PROCEDURE  approx     R knee arthroscopy     Dr. Guzman    [facility] NONSPECIFIC PROCEDURE  2005    L3-4 microdiskectomy   Dr. Brantley    [facility] TOTAL KNEE ARTHROPLASTY  2010    Minimally invasive R TKA   Dr. Nichols      Allergies   Allergen Reactions    Meclizine Other (See Comments)     Over sedation, concentration problem    Tramadol Hcl Other (See Comments)     Pt feels very drugged, \"cloudy\" if used more than one day. Nausea also    Cardura [Doxazosin Mesylate] Other (See Comments)     fainted    Hydrochlorothiazide Other (See Comments)      lightheadedness    Naproxen Nausea     nausea      Social History     Tobacco Use    Smoking status: Former     Current packs/day: 0.00     Average packs/day: 0.5 packs/day for 11.0 years (5.5 ttl pk-yrs)     Types: Cigarettes     Start date: 1957     Quit date: 1968     Years since quittin.7    Smokeless tobacco: Never   Substance Use Topics    Alcohol use: No      Wt Readings from Last 1 Encounters:   24 89.7 kg (197 lb 11.2 oz)        Anesthesia Evaluation            ROS/MED HX  ENT/Pulmonary:  - neg pulmonary ROS     Neurologic:     (+)              TIA,                  Cardiovascular: Comment: H/o palpitations   RBBB    (+)  hypertension- Peripheral Vascular Disease-- Carotid Stenosis.   -  - -                        dysrhythmias, Other, Irregular Heartbeat/Palpitations,         (-) CHF   METS/Exercise Tolerance: 4 - Raking leaves, gardening    Hematologic:       Musculoskeletal:   (+)  arthritis,             GI/Hepatic:     (+) GERD, Asymptomatic on medication,    hiatal hernia,           "    Renal/Genitourinary:     (+)        BPH,      Endo:     (+)               Obesity,       Psychiatric/Substance Use:     (+) psychiatric history anxiety       Infectious Disease:       Malignancy:   (+) Malignancy, History of Skin.    Other:            Physical Exam    Airway        Mallampati: II   TM distance: > 3 FB   Neck ROM: full   Mouth opening: > 3 cm    Respiratory Devices and Support         Dental       (+) Completely normal teeth      Cardiovascular   cardiovascular exam normal          Pulmonary   pulmonary exam normal                OUTSIDE LABS:  CBC:   Lab Results   Component Value Date    WBC 8.2 08/23/2024    WBC 9.9 04/30/2024    HGB 14.4 08/23/2024    HGB 14.9 04/30/2024    HCT 42.3 08/23/2024    HCT 43.8 04/30/2024     08/23/2024     04/30/2024     BMP:   Lab Results   Component Value Date     08/23/2024     (L) 04/30/2024    POTASSIUM 4.0 09/12/2024    POTASSIUM 4.1 08/23/2024    CHLORIDE 98 08/23/2024    CHLORIDE 97 (L) 04/30/2024    CO2 28 08/23/2024    CO2 27 04/30/2024    BUN 9.1 08/23/2024    BUN 10.0 04/30/2024    CR 0.72 08/23/2024    CR 0.77 04/30/2024     (H) 09/12/2024     (H) 08/23/2024     COAGS:   Lab Results   Component Value Date    PTT 26 07/12/2010    INR 1.93 (H) 07/15/2010     POC:   Lab Results   Component Value Date     (H) 08/24/2019     HEPATIC:   Lab Results   Component Value Date    ALBUMIN 4.2 04/30/2024    PROTTOTAL 6.9 04/30/2024    ALT 25 04/30/2024    AST 22 04/30/2024    ALKPHOS 101 04/30/2024    BILITOTAL 0.6 04/30/2024     OTHER:   Lab Results   Component Value Date    LACT 1.3 08/23/2019    A1C 5.5 08/28/2012    RICARDO 9.2 08/23/2024    AMYLASE 81 09/13/2013    TSH 0.58 10/16/2022    T4 0.88 10/16/2019    SED 7 11/15/2017       Anesthesia Plan    ASA Status:  2    NPO Status:  NPO Appropriate    Anesthesia Type: Spinal.      Maintenance: Balanced.        Consents    Anesthesia Plan(s) and associated risks, benefits,  "and realistic alternatives discussed. Questions answered and patient/representative(s) expressed understanding.     - Discussed:     - Discussed with:  Patient            Postoperative Care    Pain management: IV analgesics, Multi-modal analgesia.   PONV prophylaxis: Ondansetron (or other 5HT-3), Dexamethasone or Solumedrol, Background Propofol Infusion     Comments:               Mejia Muse MD    I have reviewed the pertinent notes and labs in the chart from the past 30 days and (re)examined the patient.  Any updates or changes from those notes are reflected in this note.             # Drug Induced Platelet Defect: home medication list includes an antiplatelet medication  # Obesity: Estimated body mass index is 30.96 kg/m  as calculated from the following:    Height as of this encounter: 1.702 m (5' 7\").    Weight as of this encounter: 89.7 kg (197 lb 11.2 oz).      "

## 2024-09-12 NOTE — ANESTHESIA POSTPROCEDURE EVALUATION
Patient: Ricardo Neely    Procedure: Procedure(s):  left total knee arthroplasty       Anesthesia Type:  Spinal    Note:  Disposition: Inpatient   Postop Pain Control: Uneventful            Sign Out: Well controlled pain   PONV: No   Neuro/Psych: Uneventful            Sign Out: Acceptable/Baseline neuro status   Airway/Respiratory: Uneventful            Sign Out: Acceptable/Baseline resp. status   CV/Hemodynamics: Uneventful            Sign Out: Acceptable CV status   Other NRE: NONE   DID A NON-ROUTINE EVENT OCCUR? No    Event details/Postop Comments:  Recovery for SAB/RA not complete but expected to resolve within 48 hours.           Last vitals:  Vitals Value Taken Time   /61 09/12/24 1415   Temp 36.2  C (97.1  F) 09/12/24 1415   Pulse 75 09/12/24 1426   Resp 39 09/12/24 1426   SpO2 97 % 09/12/24 1426   Vitals shown include unfiled device data.    Electronically Signed By: Mejia Muse MD  September 12, 2024  4:27 PM

## 2024-09-12 NOTE — CONSULTS
[facility]  Consult Note - Hospitalist Service  Date of Admission:  9/12/2024  Consult Requested by: Dr. Nichols  Reason for Consult: Post-op medical co-management     Assessment & Plan   Ricardo Neely is a 84 year old male with below PMHx admitted on 9/12/2024. He presented for elective L TKA. Surgery performed by Dr. Nichols. Hospitalist service consulted for post-op medical co-management.     L TKA: Surgery per Dr. Nichols on 9/12/24. Spinal anesthesia with adductor canal block.   -- Defer routine post-operative cares, IVF, DVT prophylaxis and pain control to primary service   -- Treated with periop Ancef   -- Analgesic regimen with tylenol 975 mg q8 hrs, oxycodone 5-10 mg q4 hrs PRN, IV dilaudid 0.2-0.4 mg q2 hrs PRN  -- VTE prophylaxis with ASA 81 mg BID     -- Encourage pulmonary toilet; incentive spirometer at bedside   -- Bowel regimen in place while on narcotics   -- PT and OT in the AM   -- Hgb and BMP in AM      Essential hypertension  - Hold PTA amlodipine-benazepril 5-20 mg po every day   - Continue Toprol XL 50 mg po every day      Carotid stenosis, asymptomatic, bilateral: Carotid ultrasound 8/28/24 shows less than 50% stenosis bilateral carotid arteries and antegrade flow vertebral artery.    - Resume ASA and Plavix as appropriate per Ortho, per patient will start Plavix on 9/16.      Hx of transient ischemic attack (TIA): No residual sx.   - Defer resumption of Plavix to Ortho      Chronic, stable medical conditions   Lumbar radiculopathy: Hx RLE radicular sx. Pt s/p LESI early July 2024 with improvement. 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      The patient's care was discussed with the Attending Physician, Dr. Cisneros,  "Bedside Nurse, and Patient.    Clinically Significant Risk Factors Present on Admission                # Drug Induced Platelet Defect: home medication list includes an antiplatelet medication   # Hypertension: Noted on problem list   # Circulatory Shock: required vasopressors within past 24 hours          # Obesity: Estimated body mass index is 30.96 kg/m  as calculated from the following:    Height as of this encounter: 1.702 m (5' 7\").    Weight as of this encounter: 89.7 kg (197 lb 11.2 oz).           Monie Tipton PA-C  Hospitalist Service  Securely message with [employer] (more info)  Text page via [facility] Paging/Directory   ______________________________________________________________________    Chief Complaint   Knee pain     History is obtained from the patient    History of Present Illness   Ricardo Neely is a 84 year old male with below PMHx admitted on 9/12/2024. He presented for elective L TKA. Surgery performed by Dr. Nichols. Hospitalist service consulted for post-op medical co-management.     Seen post-op on the floor. No acute concerns. Reviewed pre-op H&P. Block starting to wear off. No nausea. Hungry. No acute concerns. Denies CP, SOB, dizziness, lightheadedness. No recent illness. Hopeful to get back to walking 5 miles/day again now that knee has been addressed.      Past Medical History    Past Medical History:   Diagnosis Date    Anxiety 05/26/2011    Arthritis     Basal cell carcinoma     Benign Prostatic Hypertrophy     Carotid stenosis, asymptomatic, bilateral 11/02/2022    CEREBROVASC DISEASE, small vessel 12/2007    Class 1 obesity with serious comorbidity and body mass index (BMI) of 31.0 to 31.9 in adult, unspecified obesity type 09/04/2024    Contracture of palmar fascia 10/16/2020    Diaphragmatic hernia 09/23/2002    Problem list name updated by automated process. Provider to review      Essential hypertension 08/19/2002    Essential hypertension, benign     GERD     " HIATAL HERNIA     Hx of transient ischemic attack (TIA) 08/01/2021    Hyperlipidemia LDL goal <70 09/04/2024    HYPERPLASTIC POLYPS COLON 5/93, 3/06    Hypertrophy of prostate without urinary obstruction     Impaired fasting glucose     Low Back Pain     Lumbar radiculopathy 09/04/2024    Obesity, unspecified     Other and unspecified hyperlipidemia     Palpitations 11/02/2022    Personal history of urinary calculi 1960's    Pneumonia, organism unspecified(486) 1992    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    Squamous cell carcinoma of skin, unspecified     TRANSIENT CEREBRAL ISCHEMIA 12/2007       Past Surgical History   Past Surgical History:   Procedure Laterality Date    BIOPSY  2017    COLONOSCOPY      DISCECTOMY LUMBAR POSTERIOR MICROSCOPIC TWO LEVELS  08/28/2012    DISCECTOMY LUMBAR POSTERIOR MICROSCOPIC TWO LEVELS;  RIGHT L3-L4 REDO EXTENDED HEMILAMINECTOMY AND MICRO FORAMINOTOMY, LEFT L4-L5 EXTENDED HEMILAMINECTOMY AND MICRODISCECTOMY (PRONE) (SONYA JACOBO MCCALLUM, C-ARM, METRIX II);  Surgeon: Bertram Mirza MD;  Location:  OR    ENT SURGERY  2014    Cancer spot on right ear    EYE SURGERY  2018    OPTICAL TRACKING SYSTEM FUSION SPINE POSTERIOR LUMBAR TWO LEVELS N/A 08/21/2019    Procedure: L3-5 LUMBAR TRANSFORAMINAL INTERBODY FUSION WITH STEALTH;  Surgeon: Harrison Perez MD;  Location:  OR    renal calculii removal 40 years ago  01/01/1965    [facility] NONSPECIFIC PROCEDURE  01/01/1959    pilonidal cystectomy    [facility] NONSPECIFIC PROCEDURE  01/01/1966    kidney stone    [facility] NONSPECIFIC PROCEDURE  approx 1999    R knee arthroscopy     Dr. Guzman    [facility] NONSPECIFIC PROCEDURE  01/01/2005    L3-4 microdiskectomy   Dr. Brantley    [facility] TOTAL KNEE ARTHROPLASTY  07/01/2010    Minimally invasive R TKA   Dr. Nichols       Medications   Facility-Administered Medications Prior to Admission   Medication Dose Route Frequency Provider Last Rate Last Admin    lidocaine 1 %  injection 4 mL  4 mL   Cliff Cash DO   4 mL at 23 1431    triamcinolone (KENALOG-40) injection 40 mg  40 mg   Cliff Cash DO   40 mg at 23 1431     Medications Prior to Admission   Medication Sig Dispense Refill Last Dose    acetaminophen (TYLENOL) 500 MG tablet Take 500-1,000 mg by mouth every 6 hours as needed for mild pain.   Past Week at prn    amLODIPine-benazepril (LOTREL) 5-20 MG capsule Take 1 capsule by mouth daily 90 capsule 3 2024 at pm    aspirin (ASA) 81 MG tablet Take 1 tablet (81 mg) by mouth At Bedtime   2024 at pm    atorvastatin (LIPITOR) 40 MG tablet Take 1 tablet (40 mg) by mouth daily 90 tablet 3 2024 at pm    fexofenadine (ALLEGRA) 180 MG tablet Take 180 mg by mouth daily.   2024 at pm    metoprolol succinate ER (TOPROL XL) 50 MG 24 hr tablet Take 1 tablet (50 mg) by mouth daily 90 tablet 3 2024 at am    tamsulosin (FLOMAX) 0.4 MG capsule Take 1 capsule (0.4 mg) by mouth daily 30 capsule 11 2024 at pm    [DISCONTINUED] clopidogrel (PLAVIX) 75 MG tablet TAKE 1 TABLET(75 MG) BY MOUTH DAILY 90 tablet 3 2024 at am          Review of Systems    The 10 point Review of Systems is negative other than noted in the HPI.    Social History   I have reviewed this patient's social history and updated it with pertinent information if needed.  Social History     Tobacco Use    Smoking status: Former     Current packs/day: 0.00     Average packs/day: 0.5 packs/day for 11.0 years (5.5 ttl pk-yrs)     Types: Cigarettes     Start date: 1957     Quit date: 1968     Years since quittin.7    Smokeless tobacco: Never   Vaping Use    Vaping status: Never Used   Substance Use Topics    Alcohol use: No    Drug use: No         Family History   I have reviewed this patient's family history and updated it with pertinent information if needed.  Family History   Problem Relation Age of Onset    Family History Negative Brother         1 healthy brother     "Diabetes Brother         d:age 66    Diabetes Mother     Cerebrovascular Disease Mother     C.A.D. Father         CABG 67    Heart Disease Father     Lipids Sister     Diabetes Sister     Hypertension Sister     Hypertension Sister     Lipids Sister     Hypertension Sister     Colon Cancer Sister     Thyroid Disease Daughter         thyroid surgery, on replacement         Allergies   Allergies   Allergen Reactions    Meclizine Other (See Comments)     Over sedation, concentration problem    Tramadol Hcl Other (See Comments)     Pt feels very drugged, \"cloudy\" if used more than one day. Nausea also    Cardura [Doxazosin Mesylate] Other (See Comments)     fainted    Hydrochlorothiazide Other (See Comments)      lightheadedness    Naproxen Nausea     nausea        Physical Exam   Vital Signs: Temp: 97.5  F (36.4  C) Temp src: Oral BP: 134/65 Pulse: 81   Resp: 18 SpO2: 92 % O2 Device: Nasal cannula Oxygen Delivery: 2 LPM  Weight: 197 lbs 11.2 oz    CONSTITUTIONAL: Pt laying in bed, dressed in hospital garb. Appears comfortable. Cooperative with interview.   HEENT: Normocephalic, atraumatic.   CARDIOVASCULAR: RRR, no murmurs, rubs, or extra heart sounds appreciated. Pulses +2/4 and regular in upper and lower extremities, bilaterally.   RESPIRATORY: No increased work of breathing.  CTA, bilat; no wheezes, rales, or rhonchi appreciated.  GASTROINTESTINAL:  Abdomen soft, non-distended. BS auscultated in all four quadrants. Negative for tenderness to palpation.    MUSCULOSKELETAL: No gross deformities noted. Normal muscle tone.   HEMATOLOGIC/LYMPHATIC/IMMUNOLOGIC: Negative for lower extremity edema, bilaterally.  NEUROLOGIC: Alert and oriented to person, place, and time. No focal neuro deficits.   SKIN: Warm, dry, intact.     Medical Decision Making       45 MINUTES SPENT BY ME on the date of service doing chart review, history, exam, documentation & further activities per the note.      Data     I have personally reviewed " the following data over the past 24 hrs:    N/A  \   N/A   / N/A     134 (L) 98 12.0 /  192 (H)   4.2 24 0.75 \       Imaging results reviewed over the past 24 hrs:   Recent Results (from the past 24 hour(s))   XR Knee Port Left 1/2 Views    Narrative    KNEE ONE-TWO VIEWS LEFT  9/12/2024 1:25 PM     HISTORY: Post-Op Total Knee  COMPARISON: 4/10/2024      Impression    IMPRESSION: Status post recent left total knee arthroplasty. No  immediate hardware complication. Expected postsurgical soft tissue  swelling, subcutaneous emphysema, and surgical drain. No acute  fracture or malalignment. Osteopenia. Atherosclerosis.     HENRIQUE HECK MD         SYSTEM ID:  [id number]

## 2024-09-12 NOTE — ANESTHESIA CARE TRANSFER NOTE
Patient: Ricardo Neely    Procedure: Procedure(s):  left total knee arthroplasty       Diagnosis: Osteoarthritis of left knee [M17.12]  Diagnosis Additional Information: No value filed.    Anesthesia Type:   Spinal     Note:    Oropharynx: oropharynx clear of all foreign objects and spontaneously breathing  Level of Consciousness: awake  Oxygen Supplementation: face mask  Level of Supplemental Oxygen (L/min / FiO2): 6  Independent Airway: airway patency satisfactory and stable  Dentition: dentition unchanged  Vital Signs Stable: post-procedure vital signs reviewed and stable  Report to RN Given: handoff report given  Patient transferred to: PACU  Comments: At end of procedure, spontaneous respirations, patient alert to voice, able to follow commands. Oxygen via facemask at 6 liters per minute to PACU. Oxygen tubing connected to wall O2 in PACU, SpO2, NiBP, and EKG monitors and alarms on and functioning, report on patient's clinical status given to PACU RN, RN questions answered.      Handoff Report: Identifed the Patient, Identified the Reponsible Provider, Reviewed the pertinent medical history, Discussed the surgical course, Reviewed Intra-OP anesthesia mangement and issues during anesthesia, Set expectations for post-procedure period and Allowed opportunity for questions and acknowledgement of understanding      Vitals:  Vitals Value Taken Time   BP 90/51 09/12/24 1300   Temp     Pulse 73 09/12/24 1302   Resp 16 09/12/24 1302   SpO2 97 % 09/12/24 1302   Vitals shown include unfiled device data.    Electronically Signed By: CHERI Vega CRNA  September 12, 2024  1:04 PM

## 2024-09-12 NOTE — ANESTHESIA PROCEDURE NOTES
"Intrathecal Procedure Note    Pre-Procedure   Staff -        Anesthesiologist:  Mejia Muse MD       Performed By: anesthesiologist       Location: OR       Pre-Anesthestic Checklist: patient identified, IV checked, risks and benefits discussed, informed consent, monitors and equipment checked, pre-op evaluation and at physician/surgeon's request  Timeout:       Correct Patient: Yes        Correct Procedure: Yes        Correct Site: Yes        Correct Position: Yes   Procedure Documentation  Procedure: intrathecal       Patient Position: sitting       Patient Prep/Sterile Barriers: sterile gloves, mask, patient draped       Skin prep: Betadine       Insertion Site: L2-3. (midline approach).       Needle Gauge: 24.        Needle Length (Inches): 3.5        Spinal Needle Type: Alison-Alcon       Introducer used       Introducer: 20 G       # of attempts: 1 and  # of redirects:          : 0.    Assessment/Narrative         Paresthesias: No.       CSF fluid: clear.       Opening pressure was cmH2O while  Sitting.      Medication(s) Administered   0.75% Hyperbaric Bupivacaine (Intrathecal) - Intrathecal   1.6 mL - 9/12/2024 11:02:00 AM   Comments:  Patient tolerated the procedure well and without complications.  Patient laid flat immediately.      FOR [facility] ([facility] ONLY:   Pain Team Contact information: please page the Pain Team Via [employer]. Search \"Pain\". During daytime hours, please page the attending first. At night please page the resident first.      "

## 2024-09-12 NOTE — OP NOTE
.                                                                                                                                                                                                                                                                                                                                                    operative summary        Patient's name-   Ricardo Neely    Medical record #[id number]    [facility] number-[id number]    Pre-Op diagnoses end-stage arthrosis of the left knee postop diagnosis same    Procedure left total knee arthroplasty    Surgeon Mono Nichols,first Assistant-DEMETRIA Zhang O PA-C    Anesthesia spinal plus adductor canal block    Implant [employer] V[employer]ard knee size 70 femur 79 tibial tray all posterior stabilized inserts and a 34 mm round all poly patellar button of the insert for the joint was size 14 teen PS plus.    Medications 2 g Ancef preoperatively.  24 hours dosing postop as planned.   Tranexamic acid given 1 g preop 1 g postop appropriately.    Other medications-vancomycin powder in the joint 1 g 100 cc of the [facility] pain cocktail periarticular injection Toradol saline bupivacaine I believe it was        Procedure appropriate timeout was taken to members agreed prepped and draping him and tourniquet inflated to 250 after exsanguination and after all team members agreed.  Documented a brief was done in the deep and the debrief.    Tourniquet plated 250.  Made a midline incision followed by    Medial parapatellar arthrotomy.  Remove the fat pad exposed opening.  There is grade 4 changes of the patellofemoral joint and the entire medial femoral condyle.  Intramedullary guiding system was used with the exposed and cut back the cutting guides for the femur 5 degree valgus cut was done and used the epicondylar axis.  Posterior opening box opening placed.  Things cleaned up the nicely.  Trial was placed and fit well.  Size  70 posterior stabilized.  Currently back down to both ACL PCL debrided away meniscectomies performed complete eburnated bone in the entire medial joint line.  Following that using extra medullary guiding system produced cut graft referencing from her preoperative templating.  Fixed I-beam post was placed in the drill cement anchor anchoring holes.  Withdrawal that patella is cut back to the level ligament produced cutting guide for 34 drill holes irrigated copiously    Once that was done begin injecting the local anesthetic in the posterior capsule under direct vision with 1 to 2 mm increments constant constantly aspirating back.  Put 40 cc to begin with the posterior capsular.  Followed by anterior posterior chamfers box opening in the center.  Subcu muscle tendon junctions etc.  Go to the point trials were acceptable think in the tibia is cut back to size 79.  Use preoperative templating we referenced this over the top anterior aspect of the talus fit felt felt we had a good fit.  Withdrew all the trials and cemented all 3 components and simultaneously after generalizing and drying.  When it was curing to the more Betadine soak.  4 minutes continue washing it out.  Another 500 cc of saline to wash that out after.  Then went through final set of trials elected to stay with 14 posterior stabilized plus size stem.  Locked in place double check for cement debris and the bone spurs bone chips etc.  Another 500 cc of saline was washed through after that.  Pressure prep irrigated copiously then the knee slightly flexed interrupted 0 Ethibond was used to close the deep retinaculum followed by running 1 strata fix.  Drain was brought out subcu was closed with 0 Vicryl 2-0 Vicryl and 3-0 Monocryl followed by Exofin fusion.    Drain was brought out put on suction pressure applied from toes to groin patient transferred awake alert recovering plan routine PT OT his home care set up early through our office.  Dr. Worthy dictating  thank you

## 2024-09-12 NOTE — PLAN OF CARE
Admitted from PACU at 1440. A&O x4. Vss on RA. Denies pain. Dressing CDI. Hemo vac in place. IV infusing. Resting comfortably.

## 2024-09-13 ENCOUNTER — APPOINTMENT (OUTPATIENT)
Dept: OCCUPATIONAL THERAPY | Facility: CLINIC | Age: 84
End: 2024-09-13
Attending: ORTHOPAEDIC SURGERY
Payer: COMMERCIAL

## 2024-09-13 ENCOUNTER — APPOINTMENT (OUTPATIENT)
Dept: PHYSICAL THERAPY | Facility: CLINIC | Age: 84
End: 2024-09-13
Attending: ORTHOPAEDIC SURGERY
Payer: COMMERCIAL

## 2024-09-13 VITALS
DIASTOLIC BLOOD PRESSURE: 62 MMHG | HEIGHT: 67 IN | WEIGHT: 197.7 LBS | OXYGEN SATURATION: 96 % | RESPIRATION RATE: 18 BRPM | TEMPERATURE: 97.9 F | HEART RATE: 93 BPM | SYSTOLIC BLOOD PRESSURE: 128 MMHG | BODY MASS INDEX: 31.03 KG/M2

## 2024-09-13 LAB
GLUCOSE BLDC GLUCOMTR-MCNC: 131 MG/DL (ref 70–99)
HGB BLD-MCNC: 13.7 G/DL (ref 13.3–17.7)

## 2024-09-13 PROCEDURE — 36415 COLL VENOUS BLD VENIPUNCTURE: CPT | Performed by: ORTHOPAEDIC SURGERY

## 2024-09-13 PROCEDURE — 97110 THERAPEUTIC EXERCISES: CPT | Mod: GP | Performed by: PHYSICAL THERAPIST

## 2024-09-13 PROCEDURE — 82962 GLUCOSE BLOOD TEST: CPT

## 2024-09-13 PROCEDURE — 97535 SELF CARE MNGMENT TRAINING: CPT | Mod: GO

## 2024-09-13 PROCEDURE — 99207 PR NO BILLABLE SERVICE THIS VISIT: CPT | Performed by: INTERNAL MEDICINE

## 2024-09-13 PROCEDURE — 250N000013 HC RX MED GY IP 250 OP 250 PS 637: Performed by: ORTHOPAEDIC SURGERY

## 2024-09-13 PROCEDURE — 250N000011 HC RX IP 250 OP 636: Mod: JZ | Performed by: ORTHOPAEDIC SURGERY

## 2024-09-13 PROCEDURE — 97165 OT EVAL LOW COMPLEX 30 MIN: CPT | Mod: GO

## 2024-09-13 PROCEDURE — 85018 HEMOGLOBIN: CPT | Performed by: ORTHOPAEDIC SURGERY

## 2024-09-13 PROCEDURE — 250N000013 HC RX MED GY IP 250 OP 250 PS 637: Performed by: PHYSICIAN ASSISTANT

## 2024-09-13 PROCEDURE — 97530 THERAPEUTIC ACTIVITIES: CPT | Mod: GP | Performed by: PHYSICAL THERAPIST

## 2024-09-13 RX ORDER — HYDROXYZINE HYDROCHLORIDE 10 MG/1
10 TABLET, FILM COATED ORAL EVERY 6 HOURS PRN
Qty: 40 TABLET | Refills: 0 | Status: SHIPPED | OUTPATIENT
Start: 2024-09-13 | End: 2024-10-04

## 2024-09-13 RX ORDER — CEPHALEXIN 500 MG/1
500 CAPSULE ORAL 3 TIMES DAILY
Qty: 15 CAPSULE | Refills: 0 | Status: SHIPPED | OUTPATIENT
Start: 2024-09-13 | End: 2024-10-04

## 2024-09-13 RX ADMIN — SENNOSIDES AND DOCUSATE SODIUM 1 TABLET: 50; 8.6 TABLET ORAL at 08:19

## 2024-09-13 RX ADMIN — ASPIRIN 81 MG: 81 TABLET, COATED ORAL at 08:20

## 2024-09-13 RX ADMIN — HYDROXYZINE HYDROCHLORIDE 10 MG: 10 TABLET ORAL at 05:41

## 2024-09-13 RX ADMIN — OXYCODONE HYDROCHLORIDE 10 MG: 5 TABLET ORAL at 12:23

## 2024-09-13 RX ADMIN — ACETAMINOPHEN 975 MG: 325 TABLET ORAL at 08:19

## 2024-09-13 RX ADMIN — ACETAMINOPHEN 975 MG: 325 TABLET ORAL at 00:48

## 2024-09-13 RX ADMIN — POLYETHYLENE GLYCOL 3350 17 G: 17 POWDER, FOR SOLUTION ORAL at 08:20

## 2024-09-13 RX ADMIN — CEFAZOLIN SODIUM 2 G: 2 INJECTION, SOLUTION INTRAVENOUS at 02:21

## 2024-09-13 RX ADMIN — HYDROXYZINE HYDROCHLORIDE 10 MG: 10 TABLET ORAL at 12:23

## 2024-09-13 RX ADMIN — METOPROLOL SUCCINATE 50 MG: 50 TABLET, EXTENDED RELEASE ORAL at 08:19

## 2024-09-13 RX ADMIN — OXYCODONE HYDROCHLORIDE 10 MG: 5 TABLET ORAL at 05:41

## 2024-09-13 ASSESSMENT — ACTIVITIES OF DAILY LIVING (ADL)
ADLS_ACUITY_SCORE: 25
ADLS_ACUITY_SCORE: 23
ADLS_ACUITY_SCORE: 25

## 2024-09-13 NOTE — PROGRESS NOTES
Patient vital signs are at baseline: Yes  Patient able to ambulate as they were prior to admission or with assist devices provided by therapies during their stay:  Yes  Patient MUST void prior to discharge:  Yes  Patient able to tolerate oral intake:  Yes  Pain has adequate pain control using Oral analgesics:  Yes  Does patient have an identified :  Yes  Has goal D/C date and time been discussed with patient:  Yes       A and O x 4. VSS on RA. Dressing CDI. CMS intact. A1 with GB and walker for transfer. Voids adequately in the urinal and bathroom. PIV SL on the L hand. Managed pain with scheduled Tylenol, PRN Oxycodone and Atarax. Closed suction drain on the L knee draining 75 ml of bright red blood.

## 2024-09-13 NOTE — PROGRESS NOTES
09/12/24 1900   Appointment Info   Signing Clinician's Name / Credentials (PT) Pepe Ramirez DPT   Rehab Comments (PT) WBAT, ROM as [place]   Quick Adds   Quick Adds Certification   Living Environment   People in Home spouse   Current Living Arrangements independent living facility   Home Accessibility no concerns   Transportation Anticipated family or friend will provide   Living Environment Comments Pt lives with spouse who can assist, will have son/daughter  at discharge and will have someone stay with pt as needed   Self-Care   Usual Activity Tolerance good   Current Activity Tolerance moderate   Equipment Currently Used at Home cane, straight   Fall history within last six months no   Activity/Exercise/Self-Care Comment Pt is mod-I using SPC at baseline, IND with all ADL's, has FWW   General Information   Onset of Illness/Injury or Date of Surgery 09/12/24   Referring Physician Mono Nichols MD   Patient/Family Therapy Goals Statement (PT) go home   Pertinent History of Current Problem (include personal factors and/or comorbidities that impact the POC) 84 y.o. male s/p L TKA on 9/12/2024, POD#0   Existing Precautions/Restrictions fall;weight bearing   Weight-Bearing Status - LLE weight-bearing as tolerated   Weight-Bearing Status - RLE full weight-bearing   Cognition   Affect/Mental Status (Cognition) WNL   Orientation Status (Cognition) oriented x 4   Follows Commands (Cognition) WNL   Pain Assessment   Patient Currently in Pain Yes, see Vital Sign flowsheet  (7/10 after ambulation)   Integumentary/Edema   Integumentary/Edema no deficits were identifed   Posture    Posture Not impaired   Range of Motion (ROM)   Range of Motion ROM deficits secondary to surgical procedure;ROM deficits secondary to pain;ROM deficits secondary to weakness   ROM Comment Deficits with L knee post op with AROM ~5-65 limited by pain   Strength (Manual Muscle Testing)   Strength (Manual Muscle Testing) Able to perform R  SLR;Able to perform L SLR;Deficits observed during functional mobility   Strength Comments Not formally assessed, deficits with L knee but can demo fair quad contraction and able to perform weak SLR   Bed Mobility   Comment, (Bed Mobility) SBA   Transfers   Comment, (Transfers) CGA with FWW   Gait/Stairs (Locomotion)   Alexander Level (Gait) contact guard   Assistive Device (Gait) walker, front-wheeled   Distance in Feet (Gait) 10'   Pattern (Gait) step-through   Deviations/Abnormal Patterns (Gait) antalgic;odilon decreased;gait speed decreased;stride length decreased;weight shifting decreased   Maintains Weight-bearing Status (Gait) able to maintain   Balance   Balance Comments impaire dynamic balance but able to amb with FWW   Sensory Examination   Sensory Perception patient reports no sensory changes   Clinical Impression   Criteria for Skilled Therapeutic Intervention Yes, treatment indicated   PT Diagnosis (PT) impaired gait   Influenced by the following impairments pain, deficits with ROM, strengthm, balance   Functional limitations due to impairments decreased ind with functional mobility   Clinical Presentation (PT Evaluation Complexity) stable   Clinical Presentation Rationale PMH and clinical judgement   Clinical Decision Making (Complexity) low complexity   Planned Therapy Interventions (PT) balance training;bed mobility training;cryotherapy;gait training;home exercise program;patient/family education;ROM (range of motion);stair training;strengthening;stretching;transfer training;progressive activity/exercise   Risk & Benefits of therapy have been explained evaluation/treatment results reviewed;care plan/treatment goals reviewed;current/potential barriers reviewed;risks/benefits reviewed;participants voiced agreement with care plan;participants included;patient   PT Total Evaluation Time   PT Eval, Low Complexity Minutes (07019) 8   Therapy Certification   Start of care date 09/12/24   Certification  date from 09/12/24   Certification date to 09/19/24   Medical Diagnosis L TKA   Physical Therapy Goals   PT Frequency 2x/day   PT Predicted Duration/Target Date for Goal Attainment 09/13/24   PT Goals Bed Mobility;Transfers;Gait;Stairs   PT: Bed Mobility Modified independent;Supine to/from sit   PT: Transfers Supervision/stand-by assist;Sit to/from stand;Assistive device   PT: Gait Supervision/stand-by assist;Rolling walker;100 feet   Interventions   Interventions Quick Adds Gait Training;Therapeutic Activity;Therapeutic Procedure   Therapeutic Procedure/Exercise   Ther. Procedure: strength, endurance, ROM, flexibillity Minutes (77598) 4   Symptoms Noted During/After Treatment increased pain   Treatment Detail/Skilled Intervention Educated on post surgical benefits of TE on circulation, functional ROM, and strength. Left pt with TE handout. With pt in supine cued for AP's x 20, on LLE: quad sets x 10, heel slides x 10. Pt tolerated all TE well   Therapeutic Activity   Therapeutic Activities: dynamic activities to improve functional performance Minutes (64741) 12   Symptoms Noted During/After Treatment Increased pain   Treatment Detail/Skilled Intervention Greeted pt supine in bed. Eval completed. Educated on orders for WBAT, ROM to flexion tolerance, and importance of keeping knee straight when resting and demonstrated how to do so. With HOB elevated supine>sit SBA cues for sequencing provided and effortful for pt to perform. With HOB flat sit>supine SBA. Pt able to reposition in bed IND. Sit<>stand x 2 with FWW CGA, cues for safe walker and hand placement and pt able to demo back well. Pt amb to BR and voided standing over toilet with FWW, SBA from therapist, pt had good static standing balance. Increased time for line management and room set up   Gait Training   Gait Training Minutes (27691) 4   Symptoms Noted During/After Treatment (Gait Training) increased pain   Treatment Detail/Skilled Intervention Pt amb in  room ~40' with FWW and CGA, pt was limtied due to increase in pain to 8/10. Pt used slow step-through pattern, fairly even step lengths, antalgic gait but appeared steady with no LOB. Cues for walker placement and upright posture.   Distance in Feet 40'   PT Discharge Planning   PT Plan review/progress HEP, progress gait with FWW, progress transfers and bed mobility   PT Discharge Recommendation (DC Rec)   (defer to ortho)   PT Rationale for DC Rec Pt below baseline, was able to amb in room with CGA and FWW but limited due to increase in pain level. Anticipate pt will progress and by discharge be at/near SBA bed mobility, SBA transfers with FWW, SBA amb with FWW. Pt will have good 24/7 support at home from spouse and son/daughter who will be staying as long as needed   PT Brief overview of current status CGA with FWW - Goals of therapy will be to address safe mobility and make recs for d/c to next level of care. Pt and RN will continue to follow all falls risk precautions as documented by RN staff while hospitalized   Total Session Time   Timed Code Treatment Minutes 20   Total Session Time (sum of timed and untimed services) 28       [facility]  OUTPATIENT PHYSICAL THERAPY EVALUATION  PLAN OF TREATMENT FOR OUTPATIENT REHABILITATION  (COMPLETE FOR INITIAL CLAIMS ONLY)  Patient's Last Name, First Name, M.I.  YOB: 1940  Ricardo Neely                        Provider's Name  [facility] Medical Record No.  [id number]                             Onset Date:  09/12/24   Start of Care Date:  09/12/24   Type:     _X_PT   ___OT   ___SLP Medical Diagnosis:  L TKA              PT Diagnosis:  impaired gait Visits from SOC:  1     See note for plan of treatment, functional goals and certification details    I CERTIFY THE NEED FOR THESE SERVICES FURNISHED UNDER        THIS PLAN OF TREATMENT AND WHILE UNDER MY CARE     (Physician co-signature of this  document indicates review and certification of the therapy plan).

## 2024-09-13 NOTE — PROGRESS NOTES
Ricardo CORTEZ Malcolmoptevin  2024  POD #1    Doing well.  Tolerating physical therapy and rehabilitation well.  Mooderate  to severe pain  this am---not surprising.  Temperatures:  Current - Temp: 97.6  F (36.4  C); Max - Temp  Av.4  F (36.3  C)  Min: 97.1  F (36.2  C)  Max: 97.7  F (36.5  C)  Pulse range: Pulse  Av.8  Min: 68  Max: 101  Blood pressure range: Systolic (24hrs), Av , Min:86 , Max:147   ; Diastolic (24hrs), Av, Min:44, Max:89    CMS: intact  Labs:   Results for orders placed or performed during the hospital encounter of 24 (from the past 24 hour(s))   Potassium   Result Value Ref Range    Potassium 4.0 3.4 - 5.3 mmol/L   Glucose   Result Value Ref Range    Glucose 107 (H) 70 - 99 mg/dL    Patient Fasting > 8hrs? Yes    XR Knee Port Left 1/2 Views    Narrative    KNEE ONE-TWO VIEWS LEFT  2024 1:25 PM     HISTORY: Post-Op Total Knee  COMPARISON: 4/10/2024      Impression    IMPRESSION: Status post recent left total knee arthroplasty. No  immediate hardware complication. Expected postsurgical soft tissue  swelling, subcutaneous emphysema, and surgical drain. No acute  fracture or malalignment. Osteopenia. Atherosclerosis.     HENRIQUE HECK MD         SYSTEM ID:  [id number]   Basic metabolic panel   Result Value Ref Range    Sodium 134 (L) 135 - 145 mmol/L    Potassium 4.2 3.4 - 5.3 mmol/L    Chloride 98 98 - 107 mmol/L    Carbon Dioxide (CO2) 24 22 - 29 mmol/L    Anion Gap 12 7 - 15 mmol/L    Urea Nitrogen 12.0 8.0 - 23.0 mg/dL    Creatinine 0.75 0.67 - 1.17 mg/dL    GFR Estimate 88 >60 mL/min/1.73m2    Calcium 8.7 (L) 8.8 - 10.4 mg/dL    Glucose 192 (H) 70 - 99 mg/dL   Glucose by meter   Result Value Ref Range    GLUCOSE BY METER POCT 263 (H) 70 - 99 mg/dL   Glucose by meter   Result Value Ref Range    GLUCOSE BY METER POCT 131 (H) 70 - 99 mg/dL   Patient was examined today 2024.  Discussed transfer to home with home therapy.  He is not safe to get out of the house by  himself at his age.  He can barely move his of stairs despite 2 weeks of home therapy.    Wounds healing nicely.  Homans' sign is negative reviewed the discharge summary with him discharge plans.    Home therapy is arranged through our office.  They will contact us if there is any problems.  Diagnosis total knee arthroplasty.  Complicated by the fact he is 84 years old and has significant limitations for safety.  Thank you    He is cleared for discharge to home with home therapy thank you    PLAN:    Discharge home   has  home  pt  set up  beginning  Saturday.    Leave  drain in till midday    Discharge  after  pt  times  2   today

## 2024-09-13 NOTE — PLAN OF CARE
Patient vital signs are at baseline: Yes  Patient able to ambulate as they were prior to admission or with assist devices provided by therapies during their stay:  Yes  Patient MUST void prior to discharge:  Yes  Patient able to tolerate oral intake:  Yes  Pain has adequate pain control using Oral analgesics:  Yes  Does patient have an identified :  Yes  Has goal D/C date and time been discussed with patient:  No,  Reason:  Pending.      Pt is A&OX4, Up with Assist of 1 GB/W, Voiding Urinal with no difficulty. pain managed with Dilaudid x1, VSS on room air, Left knee dressing CDI, Hemo vac intact DCI.

## 2024-09-13 NOTE — PROGRESS NOTES
09/13/24 0948   Appointment Info   Signing Clinician's Name / Credentials (OT) Judith Young, OTR/L   Quick Adds   Quick Adds Certification   Living Environment   People in Home spouse   Current Living Arrangements independent living facility   Home Accessibility no concerns   Transportation Anticipated family or friend will provide   Living Environment Comments Pt lives with spouse who can assist, will have son/daughter  at discharge and will have someone stay with pt as needed. walk in shower w/ shower chair and grab bars, RTS.   Self-Care   Usual Activity Tolerance good   Current Activity Tolerance moderate   Equipment Currently Used at Home cane, straight   Fall history within last six months no   Activity/Exercise/Self-Care Comment Pt is mod-I using SPC at baseline, IND with ADL tasks.   Instrumental Activities of Daily Living (IADL)   IADL Comments Pt typically independent, drives and shares home mgmt w/ spouse   General Information   Onset of Illness/Injury or Date of Surgery 09/12/24   Referring Physician Mono Nichols MD    Patient/Family Therapy Goal Statement (OT) go home   Additional Occupational Profile Info/Pertinent History of Current Problem 85yo female with PMHx of hypertension, hyperlipidemia, chronic RBBB, asymptomatic bilateral carotid stenosis, hx of TIA on Plavix, BPH, obesity, lumbar radiculopathy and OA. POD #1 L TKA per Dr. Nichols.   Existing Precautions/Restrictions fall   Left Lower Extremity (Weight-bearing Status) weight-bearing as tolerated (WBAT)   Cognitive Status Examination   Orientation Status orientation to person, place and time   Visual Perception   Visual Impairment/Limitations WFL;corrective lenses full-time   Pain Assessment   Patient Currently in Pain Yes, see Vital Sign flowsheet  (moderate-high knee pain)   Range of Motion Comprehensive   Comment, General Range of Motion BUE WFL   Strength Comprehensive (MMT)   Comment, General Manual Muscle Testing (MMT)  Assessment BUE WFL   Transfers   Transfers toilet transfer;sit-stand transfer   Sit-Stand Transfer   Sit-Stand [place] (Transfers) supervision;verbal cues   Toilet Transfer   Type (Toilet Transfer) stand-sit;sit-stand   [place] Level (Toilet Transfer) supervision;verbal cues   Assistive Device (Toilet Transfer) walker, standard;grab bars/safety frame;raised toilet seat   Balance   Balance Comments SBA w/ FWW   Activities of Daily Living   BADL Assessment/Intervention bathing;upper body dressing;lower body dressing;grooming;toileting   Bathing Assessment/Intervention   [place] Level (Bathing) supervision   Comment, (Bathing) per clinical judgement   Upper Body Dressing Assessment/Training   [place] Level (Upper Body Dressing) independent   Lower Body Dressing Assessment/Training   [place] Level (Lower Body Dressing) supervision;verbal cues   Grooming Assessment/Training   [place] Level (Grooming) supervision   Comment, (Grooming) per clinical judgement   Toileting   Comment, (Toileting) per clinical judgement   [place] Level (Toileting) supervision   Clinical Impression   Criteria for Skilled Therapeutic Interventions Met (OT) Yes, treatment indicated   OT Diagnosis decreased I/ADL independence   OT Problem List-Impairments impacting ADL problems related to;activity tolerance impaired;mobility;pain   Assessment of Occupational Performance 3-5 Performance Deficits   Identified Performance Deficits decreased independence w/ functional mobility, home mgmt, bathing   Planned Therapy Interventions (OT) ADL retraining;transfer training;home program guidelines;progressive activity/exercise;risk factor education   Clinical Decision Making Complexity (OT) problem focused assessment/low complexity   Risk & Benefits of therapy have been explained evaluation/treatment results reviewed;care plan/treatment goals reviewed;risks/benefits reviewed;participants voiced agreement with care  plan;participants included;patient   OT Total Evaluation Time   OT Eval, Low Complexity Minutes (51837) 5   Therapy Certification   Medical Diagnosis s/p TKA   Start of Care Date 09/13/24   Certification date from 09/13/24   Certification date to 09/13/24   OT Goals   Therapy Frequency (OT) One time eval and treatment   OT Predicted Duration/Target Date for Goal Attainment 09/13/24   OT Goals Lower Body Dressing;Transfers;Toilet Transfer/Toileting;OT Goal 1   OT: Lower Body Dressing Supervision/stand-by assist;Goal Met   OT: Transfer Supervision/stand-by assist;with assistive device;Goal Met   OT: Toilet Transfer/Toileting Supervision/stand-by assist;toilet transfer;cleaning and garment management;using adaptive equipment;Goal Met   OT: Goal 1 Pt will verbalize understanding of safe technique for shower and car transfer to facilitate safety at d/c  (goal met)   Interventions   Interventions Quick Adds Self-Care/Home Management   Self-Care/Home Management   Self-Care/Home Mgmt/ADL, Compensatory, Meal Prep Minutes (17693) 15   Symptoms Noted During/After Treatment (Meal Preparation/Planning Training) fatigue;increased pain   Treatment Detail/Skilled Intervention Pt in chair upon arrival, agrees to OT. Reviewed WBAT and how to safely perform I/ADL tasks after surgery, pt receptive. Ongoing pain near incisional site. Pt ind w/ UB dressing. She was able to don underwear and pants w/ SBA, VC for technique. Inc pain but doesn't need physical assist. Pt stood from chair w/ SBA and FWW. Pt then walked into bathroom w/ SBA. SBA for toilet transfer using grab bars and RTS per home set up. Inc pain w/ standing/mobility and pt declines further activity. Pt walked back to chair. Demo'd safe car transfer technique and pt w/ good understanding. Educated pt on edema mgmt strategies and proper positioning of LLE after surgery. Pt w/ no further questions, left up in chair w/ alarm on and all needs met, RN updated.   OT Discharge  Planning   OT Plan d/c OT   OT Discharge Recommendation (DC Rec)   (defer to ortho)   OT Rationale for DC Rec Pt below baseline after surgery, limited by decreased activity tolerance and high pain. Despite pain, pt moving well and able to complete ADL tasks w/ SBA. Pt has good support from family memeber and necessary equipment to facilitate safe d/c home. Recommend return home w/ assist for bathing, heavy IADL tasks and driving.   OT Brief overview of current status Goals of therapy will be to address safe mobility and make recs for d/c to next level of care. Pt and RN will continue to follow all falls risk precautions as documented by RN staff while hospitalized. SBA w/ FWW, high pain but able to manage ADL tasks today   Total Session Time   Timed Code Treatment Minutes 15   Total Session Time (sum of timed and untimed services) 20                                                                                   [facility]      OUTPATIENT OCCUPATIONAL THERAPY  EVALUATION  PLAN OF TREATMENT FOR OUTPATIENT REHABILITATION  (COMPLETE FOR INITIAL CLAIMS ONLY)  Patient's Last Name, First Name, M.I.  YOB: 1940  Ricardo Neely                          Provider's Name  [facility] Medical Record No.  [id number]                               Onset Date:  09/12/24   Start of Care Date:  (P) 09/13/24     Type:     ___PT   _X_OT   ___SLP Medical Diagnosis:  (P) s/p TKA                        OT Diagnosis:  decreased I/ADL independence   Visits from SOC:  1   _________________________________________________________________________________  Plan of Treatment/Functional Goals    Planned Interventions: ADL retraining, transfer training, home program guidelines, progressive activity/exercise, risk factor education   Goals: See Occupational Therapy Goals on Care Plan in [facility] electronic health record.    Therapy Frequency: One time eval and  treatment  Predicted Duration of Therapy Intervention: 09/13/24  _________________________________________________________________________________    I CERTIFY THE NEED FOR THESE SERVICES FURNISHED UNDER        THIS PLAN OF TREATMENT AND WHILE UNDER MY CARE .             Physician Signature               Date    X_____________________________________________________                  Certification date from: (P) 09/13/24, Certification date to: (P) 09/13/24    Referring Physician: Ricardo Neely            Initial Assessment        See Occupational Therapy evaluation dated (P) 09/13/24 in Epic electronic health record.

## 2024-09-13 NOTE — PROGRESS NOTES
Hospitalist Chart Check    83yo female with PMHx of hypertension, hyperlipidemia, chronic RBBB, asymptomatic bilateral carotid stenosis, hx of TIA on Plavix, BPH, obesity, lumbar radiculopathy and OA. POD #1 L TKA per Dr. Nichols. Hospitalist following for assistance with postop medical issues.     Chart reviewed. Medical conditions stable. Ortho planning for discharge home today with home PT. No new concerns from hospitalist standpoint.  Okay to resume home meds as per prior to admission. Resume Plavix when okay per ortho.     Follow up with PCP as schedule or as needed.     Hospitalist service will sign off. Please reach out if questions/concerns arise prior to discharge.     Ailyn Gong,   Internal Medicine - Hospitalist  9/13/2024  9:08 AM

## 2024-09-17 ENCOUNTER — APPOINTMENT (OUTPATIENT)
Dept: ULTRASOUND IMAGING | Facility: CLINIC | Age: 84
End: 2024-09-17
Attending: EMERGENCY MEDICINE
Payer: COMMERCIAL

## 2024-09-17 ENCOUNTER — HOSPITAL ENCOUNTER (EMERGENCY)
Facility: CLINIC | Age: 84
Discharge: HOME | End: 2024-09-17
Attending: EMERGENCY MEDICINE | Admitting: EMERGENCY MEDICINE
Payer: COMMERCIAL

## 2024-09-17 ENCOUNTER — APPOINTMENT (OUTPATIENT)
Dept: CT IMAGING | Facility: CLINIC | Age: 84
End: 2024-09-17
Attending: EMERGENCY MEDICINE
Payer: COMMERCIAL

## 2024-09-17 VITALS
DIASTOLIC BLOOD PRESSURE: 77 MMHG | HEART RATE: 100 BPM | RESPIRATION RATE: 27 BRPM | OXYGEN SATURATION: 94 % | SYSTOLIC BLOOD PRESSURE: 148 MMHG | TEMPERATURE: 98.9 F

## 2024-09-17 DIAGNOSIS — R00.0 SINUS TACHYCARDIA: ICD-10-CM

## 2024-09-17 DIAGNOSIS — E86.0 DEHYDRATION: ICD-10-CM

## 2024-09-17 DIAGNOSIS — R60.9 POSTOPERATIVE EDEMA: ICD-10-CM

## 2024-09-17 DIAGNOSIS — E04.1 THYROID NODULE: ICD-10-CM

## 2024-09-17 LAB
ALBUMIN UR-MCNC: NEGATIVE MG/DL
ANION GAP SERPL CALCULATED.3IONS-SCNC: 14 MMOL/L (ref 7–15)
APPEARANCE UR: CLEAR
BASOPHILS # BLD MANUAL: 0.2 10E3/UL (ref 0–0.2)
BASOPHILS NFR BLD MANUAL: 2 %
BILIRUB UR QL STRIP: NEGATIVE
BUN SERPL-MCNC: 10.2 MG/DL (ref 8–23)
CALCIUM SERPL-MCNC: 9.1 MG/DL (ref 8.8–10.4)
CHLORIDE SERPL-SCNC: 98 MMOL/L (ref 98–107)
COLOR UR AUTO: ABNORMAL
CREAT SERPL-MCNC: 0.71 MG/DL (ref 0.67–1.17)
CRP SERPL-MCNC: 137.16 MG/L
D DIMER PPP FEU-MCNC: 2.6 UG/ML FEU (ref 0–0.5)
EGFRCR SERPLBLD CKD-EPI 2021: 90 ML/MIN/1.73M2
EOSINOPHIL # BLD MANUAL: 0.1 10E3/UL (ref 0–0.7)
EOSINOPHIL NFR BLD MANUAL: 1 %
ERYTHROCYTE [DISTWIDTH] IN BLOOD BY AUTOMATED COUNT: 13 % (ref 10–15)
FRAGMENTS BLD QL SMEAR: ABNORMAL
GLUCOSE SERPL-MCNC: 122 MG/DL (ref 70–99)
GLUCOSE UR STRIP-MCNC: NEGATIVE MG/DL
HCO3 SERPL-SCNC: 23 MMOL/L (ref 22–29)
HCT VFR BLD AUTO: 37.3 % (ref 40–53)
HGB BLD-MCNC: 12.7 G/DL (ref 13.3–17.7)
HGB UR QL STRIP: NEGATIVE
HOLD SPECIMEN: NORMAL
KETONES UR STRIP-MCNC: 10 MG/DL
LEUKOCYTE ESTERASE UR QL STRIP: ABNORMAL
LYMPHOCYTES # BLD MANUAL: 1.5 10E3/UL (ref 0.8–5.3)
LYMPHOCYTES NFR BLD MANUAL: 15 %
MCH RBC QN AUTO: 29.5 PG (ref 26.5–33)
MCHC RBC AUTO-ENTMCNC: 34 G/DL (ref 31.5–36.5)
MCV RBC AUTO: 87 FL (ref 78–100)
MONOCYTES # BLD MANUAL: 1.1 10E3/UL (ref 0–1.3)
MONOCYTES NFR BLD MANUAL: 11 %
MUCOUS THREADS #/AREA URNS LPF: PRESENT /LPF
NEUTROPHILS # BLD MANUAL: 7 10E3/UL (ref 1.6–8.3)
NEUTROPHILS NFR BLD MANUAL: 71 %
NITRATE UR QL: NEGATIVE
NRBC # BLD AUTO: 0 10E3/UL
NRBC BLD AUTO-RTO: 0 /100
PH UR STRIP: 6.5 [PH] (ref 5–7)
PLAT MORPH BLD: ABNORMAL
PLATELET # BLD AUTO: 327 10E3/UL (ref 150–450)
POTASSIUM SERPL-SCNC: 3.6 MMOL/L (ref 3.4–5.3)
RBC # BLD AUTO: 4.3 10E6/UL (ref 4.4–5.9)
RBC MORPH BLD: ABNORMAL
RBC URINE: 1 /HPF
SODIUM SERPL-SCNC: 135 MMOL/L (ref 135–145)
SP GR UR STRIP: 1.01 (ref 1–1.03)
TSH SERPL DL<=0.005 MIU/L-ACNC: 0.37 UIU/ML (ref 0.3–4.2)
UROBILINOGEN UR STRIP-MCNC: NORMAL MG/DL
WBC # BLD AUTO: 9.8 10E3/UL (ref 4–11)
WBC URINE: 1 /HPF

## 2024-09-17 PROCEDURE — 86140 C-REACTIVE PROTEIN: CPT | Performed by: EMERGENCY MEDICINE

## 2024-09-17 PROCEDURE — 36415 COLL VENOUS BLD VENIPUNCTURE: CPT | Performed by: EMERGENCY MEDICINE

## 2024-09-17 PROCEDURE — 82374 ASSAY BLOOD CARBON DIOXIDE: CPT | Performed by: EMERGENCY MEDICINE

## 2024-09-17 PROCEDURE — 93005 ELECTROCARDIOGRAM TRACING: CPT

## 2024-09-17 PROCEDURE — 84443 ASSAY THYROID STIM HORMONE: CPT | Performed by: EMERGENCY MEDICINE

## 2024-09-17 PROCEDURE — 82565 ASSAY OF CREATININE: CPT | Performed by: EMERGENCY MEDICINE

## 2024-09-17 PROCEDURE — 96365 THER/PROPH/DIAG IV INF INIT: CPT | Mod: 59

## 2024-09-17 PROCEDURE — 258N000003 HC RX IP 258 OP 636: Performed by: EMERGENCY MEDICINE

## 2024-09-17 PROCEDURE — 99285 EMERGENCY DEPT VISIT HI MDM: CPT | Mod: 25

## 2024-09-17 PROCEDURE — 250N000009 HC RX 250: Performed by: EMERGENCY MEDICINE

## 2024-09-17 PROCEDURE — 81001 URINALYSIS AUTO W/SCOPE: CPT | Performed by: EMERGENCY MEDICINE

## 2024-09-17 PROCEDURE — 96361 HYDRATE IV INFUSION ADD-ON: CPT

## 2024-09-17 PROCEDURE — 93971 EXTREMITY STUDY: CPT | Mod: LT

## 2024-09-17 PROCEDURE — 250N000011 HC RX IP 250 OP 636: Performed by: EMERGENCY MEDICINE

## 2024-09-17 PROCEDURE — 71275 CT ANGIOGRAPHY CHEST: CPT

## 2024-09-17 PROCEDURE — 85007 BL SMEAR W/DIFF WBC COUNT: CPT | Performed by: EMERGENCY MEDICINE

## 2024-09-17 PROCEDURE — 85379 FIBRIN DEGRADATION QUANT: CPT | Performed by: EMERGENCY MEDICINE

## 2024-09-17 PROCEDURE — 85027 COMPLETE CBC AUTOMATED: CPT | Performed by: EMERGENCY MEDICINE

## 2024-09-17 RX ORDER — IOPAMIDOL 755 MG/ML
500 INJECTION, SOLUTION INTRAVASCULAR ONCE
Status: COMPLETED | OUTPATIENT
Start: 2024-09-17 | End: 2024-09-17

## 2024-09-17 RX ADMIN — SODIUM CHLORIDE 85 ML: 9 INJECTION, SOLUTION INTRAVENOUS at 16:44

## 2024-09-17 RX ADMIN — SODIUM CHLORIDE 1000 ML: 9 INJECTION, SOLUTION INTRAVENOUS at 14:59

## 2024-09-17 RX ADMIN — IOPAMIDOL 66 ML: 755 INJECTION, SOLUTION INTRAVENOUS at 16:44

## 2024-09-17 ASSESSMENT — ACTIVITIES OF DAILY LIVING (ADL)
ADLS_ACUITY_SCORE: 38

## 2024-09-17 NOTE — ED PROVIDER NOTES
Emergency Department Note      History of Present Illness     Chief Complaint   Tachycardia    HPI   Ricardo Neely is a 84 year old male with history of hypertension and hyperlipidemia who presents to the ED with his son for evaluation of tachycardia. The patient reports that he had his left knee replaced last Thursday, and when the physical therapist came to the patient's home today they noticed his heart rate was high. He states that the therapist called the patient's doctor, who recommended the patient present to the ED. He notes that he did feel tachycardic yesterday, but he didn't do anything about it. Patient endorses a little weakness, but believes this also might be related to his surgery. He has had a decrease in appetite and oral intake since the surgery, and his son adds that they picked up a nausea medication this morning. Denies chest pain, shortness of breath, dizziness, fever, cough, headache, abdominal pain, diarrhea, hematochezia, numbness or weakness in extremities, or urinary symptoms. Patient has had an episode of tachycardia in the past, but is unsure if today is similar. Ricardo includes that he stopped his Plavix for his surgery and hasn't started it back up yet, but he did not stop any of his other medications. Son also mentions that the patient did fall the morning after his surgery. Patient has not had his follow-up with with his surgeon yet. Denies history of stroke, heart disease, or heart attack.    Independent Historian   Son as detailed above.    Review of External Notes   Reviewed the preop office visit on 8/23/2024 which reviewed the patient's medical problems.  Reviewed discharge summary from 10/17/2022 when patient was admitted for near syncope and tachycardia.  Cardiology saw the patient and thought that the tachycardia may be due to beta-blocker withdrawal.    Past Medical History     Medical History and Problem List   Anxiety  Arthritis  Basal cell carcinoma  Benign prostatic  hyperplasia  Carotid stenosis, asymptomatic, bilateral  Cerebrovascular disease, small vessel  Obesity  Diaphragmatic hernia  Hypertension  GERD  Hiatal hernia  TIA  Hyperlipidemia  Hyperplastic colon polyps  Lumbar radiculopathy  Urinary calculi   Pneumonia  Right bundle branch block  Squamous cell carcinoma    Medications   Lotrel  Aspirin 81 mg  Lipitor  Keflex   Atarax  Toprol  Roxicodone  Senokot  Flomax    Surgical History   Left total knee arthroplasty  Biopsy  Colonoscopy  Right L3-L4 redo extended hemilaminectomy and micro foraminotomy, left L4-L5 extended hemilaminectomy and microdiscectomy  ENT surgery, right ear  Eye surgery  L3-5 lumbar transforaminal interbody fusion with stealth  Renal calculi removal   Pilonidal cystectomy  Right knee arthroscopy  L3-4 microdiscectomy  Minimally invasive right total knee arthroplasty    Physical Exam     Patient Vitals for the past 24 hrs:   BP Temp Temp src Pulse Resp SpO2   09/17/24 1803 (!) 148/77 -- -- 100 27 94 %   09/17/24 1802 -- -- -- 98 10 96 %   09/17/24 1732 -- -- -- 101 17 95 %   09/17/24 1617 -- -- -- 96 16 96 %   09/17/24 1408 (!) 167/74 -- -- -- -- --   09/17/24 1406 -- 98.9  F (37.2  C) Temporal (!) 122 20 100 %     Physical Exam  Vitals and nursing note reviewed.   Constitutional:       General: He is not in acute distress.     Appearance: He is not ill-appearing.   HENT:      Head: Normocephalic and atraumatic.      Right Ear: External ear normal.      Left Ear: External ear normal.      Nose: Nose normal.   Eyes:      Conjunctiva/sclera: Conjunctivae normal.   Cardiovascular:      Rate and Rhythm: Regular rhythm. Tachycardia present.      Heart sounds: No murmur heard.     Comments: Borderline tachycardia  Pulmonary:      Effort: Pulmonary effort is normal. No respiratory distress.      Breath sounds: No wheezing, rhonchi or rales.   Abdominal:      General: Abdomen is flat. There is no distension.      Palpations: Abdomen is soft.      Tenderness:  There is no abdominal tenderness. There is no guarding or rebound.   Musculoskeletal:         General: Swelling (LLE edema and discoloration; surgical dressing to L knee wound slightly soild with bloody drainage) present. No deformity.      Cervical back: Normal range of motion and neck supple.   Skin:     General: Skin is warm and dry.      Findings: No rash.   Neurological:      Mental Status: He is alert and oriented to person, place, and time.   Psychiatric:         Behavior: Behavior normal.           Diagnostics     Lab Results   Labs Ordered and Resulted from Time of ED Arrival to Time of ED Departure   BASIC METABOLIC PANEL - Abnormal       Result Value    Sodium 135      Potassium 3.6      Chloride 98      Carbon Dioxide (CO2) 23      Anion Gap 14      Urea Nitrogen 10.2      Creatinine 0.71      GFR Estimate 90      Calcium 9.1      Glucose 122 (*)    CRP INFLAMMATION - Abnormal    CRP Inflammation 137.16 (*)    D DIMER QUANTITATIVE - Abnormal    D-Dimer Quantitative 2.60 (*)    ROUTINE UA WITH MICROSCOPIC REFLEX TO CULTURE - Abnormal    Color Urine Light Yellow      Appearance Urine Clear      Glucose Urine Negative      Bilirubin Urine Negative      Ketones Urine 10 (*)     Specific Gravity Urine 1.006      Blood Urine Negative      pH Urine 6.5      Protein Albumin Urine Negative      Urobilinogen Urine Normal      Nitrite Urine Negative      Leukocyte Esterase Urine Small (*)     Mucus Urine Present (*)     RBC Urine 1      WBC Urine 1     CBC WITH PLATELETS AND DIFFERENTIAL - Abnormal    WBC Count 9.8      RBC Count 4.30 (*)     Hemoglobin 12.7 (*)     Hematocrit 37.3 (*)     MCV 87      MCH 29.5      MCHC 34.0      RDW 13.0      Platelet Count 327      NRBCs per 100 WBC 0      Absolute NRBCs 0.0     MANUAL DIFFERENTIAL - Abnormal    % Neutrophils 71      % Lymphocytes 15      % Monocytes 11      % Eosinophils 1      % Basophils 2      Absolute Neutrophils 7.0      Absolute Lymphocytes 1.5       Absolute Monocytes 1.1      Absolute Eosinophils 0.1      Absolute Basophils 0.2      RBC Morphology Confirmed RBC Indices      Platelet Assessment        Value: Automated Count Confirmed. Platelet morphology is normal.    RBC Fragments Rare (*)    TSH WITH FREE T4 REFLEX - Normal    TSH 0.37       Imaging   US Lower Extremity Venous Duplex Left   Final Result   IMPRESSION:   1.  No deep venous thrombosis in the left lower extremity.   2.  Complex small left popliteal fluid collection, which may represent hematoma in setting of reported recent knee replacement, versus ruptured Baker's cyst.      CT Chest Pulmonary Embolism w Contrast   Final Result   IMPRESSION:   1.  Possible trace pulmonary edema and findings suggestive of small airways disease.   2.  No pulmonary embolus.   3.  Incidental likely thyroid nodules, largest on the right measuring up to 2.4 cm, could consider further evaluation with ultrasound on a nonemergent/outpatient basis if not previously performed.        EKG   ECG taken at 1400, ECG read at 1406  Sinus tachycardia with fusion complexes  Left axis deviation  Right bundle branch block   No significant change as compared to prior, dated 8/23/24.  Rate 114 bpm. MN interval 162 ms. QRS duration 150 ms. QT/QTc 338/465 ms. P-R-T axes 69 -65 64.    Independent Interpretation   None    ED Course      Medications Administered   Medications   sodium chloride 0.9% BOLUS 1,000 mL (0 mLs Intravenous Stopped 9/17/24 1654)   iopamidol (ISOVUE-370) solution 500 mL (66 mLs Intravenous $Given 9/17/24 1644)   CT scan flush (85 mLs Intravenous $Given 9/17/24 1644)     Procedures   None      Discussion of Management   Orthopedics, Dr. Jacobsen    ED Course   ED Course as of 09/17/24 2220   Tue Sep 17, 2024   1439 I obtained history and examined the patient as noted above.    1753 I rechecked and updated the patient.    1814 I consulted with Dr. Jacobsen from orthopedics. Discussed patient's history, presentation, and  plan of care. Recommended discharge.   1821 I rechecked and updated the patient.      Additional Documentation  None    Medical Decision Making / Diagnosis     CMS Diagnoses: None    MIPS    CT for PE was ordered because the patient had an abnormal d-dimer.    PAU Neely is a 84 year old male who presents with relatively asymptomatic tachycardia.  He appears to be in sinus tachycardia on his EKG and telemetry.  This may be due to dehydration as he admits poor oral intake recently.  However his left lower extremity looks significantly swollen and discolored, and this could be due to a DVT or possible postop infection.  We will check labs and he will likely need CT imaging of his chest and lower extremity Doppler to evaluate for blood clot.  We will give IV fluids in the meantime.    1821 I rechecked the patient and updated him on results.  His CT of his chest did not show any signs of a PE.  He did have incidental thyroid nodules which I updated them on and recommended primary care follow-up.  Left lower extremity Doppler also did not show any signs of DVT.  He did have a Baker's cyst on the ultrasound which he is previously aware of.  His lab work was mostly unremarkable other than a significantly elevated CRP and the elevated D-dimer which prompted the CT of his chest.   I was considering starting the patient on oral antibiotics given the elevated CRP and the appearance of the leg.  However the patient has not had a fever and his white count is normal so the appearance of the leg could just be normal postoperative swelling and discoloration as well.  I discussed with Dr. Jacobsen from orthopedics and he agrees with holding off on antibiotics at this time and recommend the patient follow-up next week with his surgeon.  I discussed return precautions with the patient and his son.  His heart rate had improved to the upper 90s after an IV fluid bolus.    Disposition   The patient was discharged.     Diagnosis      ICD-10-CM    1. Sinus tachycardia  R00.0       2. Dehydration  E86.0       3. Postoperative edema  R60.9       4. Thyroid nodule  E04.1          Discharge Medications   Discharge Medication List as of 9/17/2024  6:23 PM        Scribe Disclosure:  STEPHANIA, GIDEON SHANKAR, am serving as a scribe at 2:16 PM on 9/17/2024 to document services personally performed by Laith Padilla MD based on my observations and the provider's statements to me.      Laith Padilla MD  09/17/24 6768

## 2024-09-17 NOTE — ED TRIAGE NOTES
Patient reports feeling lightheaded for the last day. Patient found his hr to be 120. Normally taking Eliquis, but stopped due to his knee surgery. Denies other symptoms.

## 2024-09-18 ENCOUNTER — PATIENT OUTREACH (OUTPATIENT)
Dept: INTERNAL MEDICINE | Facility: CLINIC | Age: 84
End: 2024-09-18
Payer: COMMERCIAL

## 2024-09-18 ENCOUNTER — TRANSCRIBE ORDERS (OUTPATIENT)
Dept: GENERAL RADIOLOGY | Facility: CLINIC | Age: 84
End: 2024-09-18
Payer: COMMERCIAL

## 2024-09-18 DIAGNOSIS — G45.0 VERTEBROBASILAR ARTERY SYNDROME: ICD-10-CM

## 2024-09-18 DIAGNOSIS — I65.23 CAROTID STENOSIS, ASYMPTOMATIC, BILATERAL: ICD-10-CM

## 2024-09-18 DIAGNOSIS — Z96.652 S/P TOTAL KNEE ARTHROPLASTY, LEFT: Primary | ICD-10-CM

## 2024-09-18 LAB
ATRIAL RATE - MUSE: 114 BPM
DIASTOLIC BLOOD PRESSURE - MUSE: NORMAL MMHG
INTERPRETATION ECG - MUSE: NORMAL
P AXIS - MUSE: 69 DEGREES
PR INTERVAL - MUSE: 162 MS
QRS DURATION - MUSE: 150 MS
QT - MUSE: 338 MS
QTC - MUSE: 465 MS
R AXIS - MUSE: -65 DEGREES
SYSTOLIC BLOOD PRESSURE - MUSE: NORMAL MMHG
T AXIS - MUSE: 64 DEGREES
VENTRICULAR RATE- MUSE: 114 BPM

## 2024-09-18 NOTE — TELEPHONE ENCOUNTER
"ED / Discharge Outreach Protocol    Patient Contact    Attempt # 1    Was call answered?  Yes.  \"May I please speak with Ricardo\"  Is patient available?   No. Left message with spouse  for patient to call me back.  '  "

## 2024-09-19 RX ORDER — TRAMADOL HYDROCHLORIDE 50 MG/1
50 TABLET ORAL EVERY 6 HOURS PRN
COMMUNITY
End: 2024-10-04

## 2024-09-19 RX ORDER — ONDANSETRON 4 MG/1
4 TABLET, ORALLY DISINTEGRATING ORAL EVERY 8 HOURS PRN
COMMUNITY
End: 2024-10-04

## 2024-09-19 NOTE — TELEPHONE ENCOUNTER
ED / Discharge Outreach Protocol    Patient Contact    Attempt # 1    Was call answered?  Yes.  Pt verified name and date of birth.      Transitions of Care Outreach  Chief Complaint   Patient presents with    Hospital F/U       Most Recent Admission Date: 9/17/2024   Most Recent Admission Diagnosis:      Most Recent Discharge Date: 9/17/2024   Most Recent Discharge Diagnosis: Sinus tachycardia - R00.0  Dehydration - E86.0  Postoperative edema - R60.9  Thyroid nodule - E04.1     Transitions of Care Assessment    Discharge Assessment  How are you doing now that you are home?: good  How are your symptoms? (Red Flag symptoms escalate to triage hotline per guidelines): Improved  Do you know how to contact your clinic care team if you have future questions or changes to your health status? : Yes  Does the patient have their discharge instructions? : Yes  Does the patient have questions regarding their discharge instructions? : No  Were you started on any new medications or were there changes to any of your previous medications? : Yes  Does the patient have all of their medications?: Yes  Do you have questions regarding any of your medications? : No  Do you have all of your needed medical supplies or equipment (DME)?  (i.e. oxygen tank, CPAP, cane, etc.): Yes    Follow up Plan     Discharge Follow-Up  Discharge follow up appointment scheduled in alignment with recommended follow up timeframe or Transitions of Risk Category? (Low = within 30 days; Moderate= within 14 days; High= within 7 days): No  Patient's follow up appointment not scheduled: Patient accepted scheduling support. Appt scheduled/requested per protocol.    Future Appointments   Date Time Provider Department Center   9/30/2024 10:00 AM Clif Hampton, [facility]   10/4/2024  9:00 AM Familia Cook MD [facility]   10/7/2024 10:00 AM Clif Hampton, [facility]   10/14/2024 11:20 AM Clif Hampton PT [facility]        Outpatient Plan as outlined on AVS reviewed with patient.    For any urgent concerns, please contact our 24 hour nurse triage line: [phone] ([phone])       Kenna Harvey RN   h

## 2024-09-22 ENCOUNTER — OFFICE VISIT (OUTPATIENT)
Dept: URGENT CARE | Facility: URGENT CARE | Age: 84
End: 2024-09-22
Payer: COMMERCIAL

## 2024-09-22 VITALS
TEMPERATURE: 98.1 F | BODY MASS INDEX: 31.17 KG/M2 | SYSTOLIC BLOOD PRESSURE: 151 MMHG | HEART RATE: 96 BPM | WEIGHT: 199 LBS | DIASTOLIC BLOOD PRESSURE: 71 MMHG | RESPIRATION RATE: 18 BRPM | OXYGEN SATURATION: 96 %

## 2024-09-22 DIAGNOSIS — L03.116 LEFT LEG CELLULITIS: Primary | ICD-10-CM

## 2024-09-22 DIAGNOSIS — K59.01 SLOW TRANSIT CONSTIPATION: ICD-10-CM

## 2024-09-22 DIAGNOSIS — R11.0 NAUSEA: ICD-10-CM

## 2024-09-22 DIAGNOSIS — R30.0 DYSURIA: ICD-10-CM

## 2024-09-22 DIAGNOSIS — I10 ESSENTIAL HYPERTENSION: ICD-10-CM

## 2024-09-22 LAB
ALBUMIN UR-MCNC: NEGATIVE MG/DL
APPEARANCE UR: CLEAR
BACTERIA #/AREA URNS HPF: NORMAL /HPF
BILIRUB UR QL STRIP: NEGATIVE
COLOR UR AUTO: YELLOW
GLUCOSE UR STRIP-MCNC: NEGATIVE MG/DL
HGB UR QL STRIP: ABNORMAL
KETONES UR STRIP-MCNC: NEGATIVE MG/DL
LEUKOCYTE ESTERASE UR QL STRIP: NEGATIVE
NITRATE UR QL: NEGATIVE
PH UR STRIP: 7 [PH] (ref 5–7)
RBC #/AREA URNS AUTO: NORMAL /HPF
SP GR UR STRIP: 1.01 (ref 1–1.03)
UROBILINOGEN UR STRIP-ACNC: 2 E.U./DL
WBC #/AREA URNS AUTO: NORMAL /HPF

## 2024-09-22 PROCEDURE — 99214 OFFICE O/P EST MOD 30 MIN: CPT | Performed by: NURSE PRACTITIONER

## 2024-09-22 PROCEDURE — 81001 URINALYSIS AUTO W/SCOPE: CPT | Performed by: NURSE PRACTITIONER

## 2024-09-22 RX ORDER — CEPHALEXIN 500 MG/1
500 CAPSULE ORAL 3 TIMES DAILY
Qty: 21 CAPSULE | Refills: 0 | Status: SHIPPED | OUTPATIENT
Start: 2024-09-22 | End: 2024-10-04

## 2024-09-22 RX ORDER — ONDANSETRON 4 MG/1
4-8 TABLET, ORALLY DISINTEGRATING ORAL EVERY 8 HOURS PRN
Qty: 40 TABLET | Refills: 0 | Status: SHIPPED | OUTPATIENT
Start: 2024-09-22 | End: 2024-10-02

## 2024-09-22 NOTE — PROGRESS NOTES
Chief Complaint   Patient presents with    UTI     Urinary frequency, constipation and dysuria for the last few days. Patient is  10x days post op totally left knee replacement.          ICD-10-CM    1. Left leg cellulitis  L03.116 cephALEXin (KEFLEX) 500 MG capsule      2. Dysuria  R30.0 UA with Microscopic reflex to Culture - Clinic Collect     UA Microscopic with Reflex to Culture      3. Nausea  R11.0 ondansetron (ZOFRAN ODT) 4 MG ODT tab      4. Essential hypertension  I10       5. Slow transit constipation  K59.01       No evidence of urinary tract infection in urine.  Patient continues to have nausea postoperatively from a total knee replacement.  Will refill his ondansetron.  Will also start him on cephalexin and have him follow-up with surgeon as scheduled this coming week.  If he continues to have fevers he is instructed to go to the emergency room.    He has been taking senna and then added MiraLAX yesterday and this did get his bowels moving.  Encouraged him to use these medications daily until he is consistently moving his bowels and feeling cleared out.    Blood pressure is elevated at today's visit.  He does have a physical therapist coming in daily and he does check the blood pressure.  If blood pressure remains elevated they will discuss with primary care.  He is taking all medications as prescribed.  Encouraged him to continue to do so.    Red flag warning signs and when to go to the emergency room discussed.  Reviewed potential adverse reactions to medications.      Results for orders placed or performed in visit on 09/22/24 (from the past 24 hour(s))   UA with Microscopic reflex to Culture - Clinic Collect    Specimen: Urine, Midstream   Result Value Ref Range    Color Urine Yellow Colorless, Straw, Light Yellow, Yellow    Appearance Urine Clear Clear    Glucose Urine Negative Negative mg/dL    Bilirubin Urine Negative Negative    Ketones Urine Negative Negative mg/dL    Specific Gravity Urine  1.010 1.003 - 1.035    Blood Urine Trace (A) Negative    pH Urine 7.0 5.0 - 7.0    Protein Albumin Urine Negative Negative mg/dL    Urobilinogen Urine 2.0 (A) 0.2, 1.0 E.U./dL    Nitrite Urine Negative Negative    Leukocyte Esterase Urine Negative Negative   UA Microscopic with Reflex to Culture   Result Value Ref Range    Bacteria Urine None Seen None Seen /HPF    RBC Urine 0-2 0-2 /HPF /HPF    WBC Urine 0-5 0-5 /HPF /HPF    Narrative    Urine Culture not indicated       Subjective     Ricardo Neely is an 84 year old male who presents to clinic today for urinary frequency and slight dysuria for a couple of days.  He is also 10 days post left total knee replacement.  He spiked a fever of 100.4 yesterday but no fever this morning.  He has been struggling with nausea which ondansetron has been helping but he is almost out of this medication.  He was seen in the emergency room 5 days ago for tachycardia postoperatively given fluids and sent home.    ROS: 10 point ROS neg other than the symptoms noted above in the HPI.       Objective    BP (!) 151/71 (BP Location: Left arm, Patient Position: Sitting, Cuff Size: Adult Regular)   Pulse 96   Temp 98.1  F (36.7  C) (Oral)   Resp 18   Wt 90.3 kg (199 lb)   SpO2 96%   BMI 31.17 kg/m    Nurses notes and VS have been reviewed.    Physical Exam       GENERAL APPEARANCE: healthy appearing, alert     NECK: no adenopathy or asymmetry, thyroid normal to palpation     RESP: lungs clear to auscultation - no rales, rhonchi or wheezes     CV: regular rates and rhythm, no murmurs, rubs, or gallop     ABDOMEN:  soft, nontender, no HSM or masses and bowel sounds normal     MS: extremities normal- no gross deformities noted; normal muscle tone.     SKIN: Left leg is wrapped with Ace wrap, this is removed to assess surgical site.  Leg is quite edematous and there is redness around the knee, initial surgical dressing is still in place and appears dry and intact, knee is quite hot to  the touch compared to the other side     NEURO: Normal strength and tone, mentation intact and speech normal     PSYCH: normal thought process; no significant mood disturbance      CHERI Gleason, CNP  [facility] Provider    The use of Dragon/[employer] dictation services may have been used to construct the content in this note; any grammatical or spelling errors are non-intentional. Please contact the author of this note directly if you are in need of any clarification.

## 2024-09-23 ENCOUNTER — TELEPHONE (OUTPATIENT)
Dept: URGENT CARE | Facility: URGENT CARE | Age: 84
End: 2024-09-23

## 2024-09-23 NOTE — TELEPHONE ENCOUNTER
[facility] Prior Authorization Team   Phone: [phone]    PRIOR AUTHORIZATION DENIED    Medication: ONDANSETRON 4 MG PO TBDP  Insurance Company: Ja ([facility]) - Phone [phone] Fax [phone]  Denial Date: 9/23/2024  Denial Reason(s): REQUIRES A MEDICARE PART D MEDICALLY ACCEPTED INDICATION      Appeal Information: IF THE PROVIDER WOULD LIKE TO APPEAL THIS DECISION PLEASE PROVIDE THE PA TEAM WITH A LETTER OF MEDICAL NECESSITY      Patient Notified: NO

## 2024-09-26 ENCOUNTER — TRANSFERRED RECORDS (OUTPATIENT)
Dept: HEALTH INFORMATION MANAGEMENT | Facility: CLINIC | Age: 84
End: 2024-09-26
Payer: COMMERCIAL

## 2024-09-30 ENCOUNTER — THERAPY VISIT (OUTPATIENT)
Dept: PHYSICAL THERAPY | Facility: CLINIC | Age: 84
End: 2024-09-30
Payer: COMMERCIAL

## 2024-09-30 DIAGNOSIS — Z47.1 AFTERCARE FOLLOWING LEFT KNEE JOINT REPLACEMENT SURGERY: Primary | ICD-10-CM

## 2024-09-30 DIAGNOSIS — Z96.652 AFTERCARE FOLLOWING LEFT KNEE JOINT REPLACEMENT SURGERY: Primary | ICD-10-CM

## 2024-09-30 PROBLEM — Z96.651 AFTERCARE FOLLOWING RIGHT KNEE JOINT REPLACEMENT SURGERY: Status: ACTIVE | Noted: 2024-09-30

## 2024-09-30 PROCEDURE — 97110 THERAPEUTIC EXERCISES: CPT | Mod: GP | Performed by: PHYSICAL THERAPIST

## 2024-09-30 PROCEDURE — 97161 PT EVAL LOW COMPLEX 20 MIN: CPT | Mod: GP | Performed by: PHYSICAL THERAPIST

## 2024-09-30 ASSESSMENT — ACTIVITIES OF DAILY LIVING (ADL)
PAIN: THE SYMPTOM AFFECTS MY ACTIVITY MODERATELY
HOW_WOULD_YOU_RATE_THE_OVERALL_FUNCTION_OF_YOUR_KNEE_DURING_YOUR_USUAL_DAILY_ACTIVITIES?: NEARLY NORMAL
PLEASE_INDICATE_YOR_PRIMARY_REASON_FOR_REFERRAL_TO_THERAPY:: KNEE
PAIN: THE SYMPTOM AFFECTS MY ACTIVITY MODERATELY
HOW_WOULD_YOU_RATE_THE_OVERALL_FUNCTION_OF_YOUR_KNEE_DURING_YOUR_USUAL_DAILY_ACTIVITIES?: NEARLY NORMAL
KNEE_ACTIVITY_OF_DAILY_LIVING_SUM: 2

## 2024-09-30 NOTE — PROGRESS NOTES
PHYSICAL THERAPY EVALUATION  Type of Visit: Evaluation       Fall Risk Screen:  Fall screen completed by: PT  Have you fallen 2 or more times in the past year?: No  Have you fallen and had an injury in the past year?: No  Is patient a fall risk?: No    Subjective  Pt. complains of L knee pain that has been present since TKA 9/12/24.  Mechanism/History of injury/symptoms: surgery on 9/12/24.   Patient s chief complaints: right knee pain.  Symptoms are exacerbated by walking standing and bending knee.  Symptoms are relieved by  rest and meds.   Current function restrictions:  walking and standing.  Previous functional status as reported by patient: unlimited.        Presenting condition or subjective complaint: knee replacement  Date of onset: 09/12/24    Relevant medical history:     Dates & types of surgery: knee replacement    Prior diagnostic imaging/testing results: MRI; X-ray     Prior therapy history for the same diagnosis, illness or injury:        Prior Level of Function  Transfers: Independent  Ambulation: Independent  ADL: Independent  IADL:     Living Environment  Social support: With a significant other or spouse   Type of home: [facility]; 1 level   Stairs to enter the home:         Ramp: Yes   Stairs inside the home: No       Help at home: None  Equipment owned: Straight Cane; Walker; Walker with wheels; Crutches; Standard wheelchair; Grab bars; Lift chair     Employment: No    Hobbies/Interests:      Patient goals for therapy: walk normally with no device    Pain assessment: Pain present 2 out of 10     Objective   Left knee range of motion: 0 - 3 - 110  Fair left quad set  Extensor leg noted with left straight leg raise  Incision healing well  Gait: Patient using for point wheeled walker due to surgeons recommendation however he reports using only a cane at home      Assessment & Plan   CLINICAL IMPRESSIONS  Medical Diagnosis: s/p R TKA    Treatment Diagnosis: s/p R TKA   Impression/Assessment: Patient  is a 84 year old male with right knee complaints.  The following significant findings have been identified: Pain, Decreased ROM/flexibility, Decreased strength, Impaired balance, Impaired gait, Impaired muscle performance, and Decreased activity tolerance. These impairments interfere with their ability to perform self care tasks, work tasks, recreational activities, household chores, household mobility, and community mobility as compared to previous level of function.     Clinical Decision Making (Complexity):  Clinical Presentation: Stable/Uncomplicated  Clinical Presentation Rationale: based on medical and personal factors listed in PT evaluation  Clinical Decision Making (Complexity): Low complexity    PLAN OF CARE  Treatment Interventions:  Modalities: Cryotherapy  Interventions: Gait Training, Manual Therapy, Neuromuscular Re-education, Therapeutic Activity, Therapeutic Exercise    Long Term Goals     PT Goal 1  Goal Description: Pt will be able to tolerate walking for 10 minutes  Rationale: to maximize safety and independence with performance of ADLs and functional tasks;to maximize safety and independence within the home;to maximize safety and independence within the community  Target Date: 11/11/24  PT Goal 2  Goal Description: Pt will be able to tolerate walking for 30 minutes  Rationale: to maximize safety and independence with performance of ADLs and functional tasks;to maximize safety and independence within the home;to maximize safety and independence within the community  Target Date: 12/23/24      Frequency of Treatment: 2 x week, tapering to 1 x week  Duration of Treatment: 8 week    Recommended Referrals to Other Professionals:   Education Assessment:   Learner/Method: Patient;Listening;Demonstration;Pictures/Video  Education Comments: Pt educated on plan of care    Risks and benefits of evaluation/treatment have been explained.   Patient/Family/caregiver agrees with Plan of Care.     Evaluation  Time:             Signing Clinician: Clif Hampton, PT        [facility]                                                                                   OUTPATIENT PHYSICAL THERAPY      PLAN OF TREATMENT FOR OUTPATIENT REHABILITATION   Patient's Last Name, First Name, Ricardo Salas YOB: 1940   Provider's Name   [facility]   Medical Record No.  [id number]     Onset Date: 09/12/24  Start of Care Date: 09/30/24     Medical Diagnosis:  s/p R TKA      PT Treatment Diagnosis:  s/p R TKA Plan of Treatment  Frequency/Duration: 2 x week, tapering to 1 x week/ 8 week    Certification date from 09/30/24 to 12/23/24         See note for plan of treatment details and functional goals     Clif Hampton, PT                         I CERTIFY THE NEED FOR THESE SERVICES FURNISHED UNDER        THIS PLAN OF TREATMENT AND WHILE UNDER MY CARE     (Physician attestation of this document indicates review and certification of the therapy plan).              Referring Provider:  Mono Nichols    Initial Assessment  See Epic Evaluation- Start of Care Date: 09/30/24                 48.5

## 2024-10-03 NOTE — PROGRESS NOTES
"  ASSESSMENT:   1. S/P total knee arthroplasty, left  Doing well.  Only on Tylenol for pain control.  Able to bend the knee to 110 degrees flexion.  Continue management per Ortho    2. Thyroid nodule  2.4 cm nodule noted right side on recent chest CT.  Thyroid function normal.  Needs ultrasound evaluation  - US Thyroid; Future    3. Anemia, unspecified type  Secondary to blood loss with recent surgery.  Last hemoglobin 12.7.  Needs lab follow-up.  Denies seeing blood in stools  - Iron & Iron Binding Capacity; Future  - CBC with platelets; Future    4. Need for COVID-19 vaccine  Candidate for vaccination  - COVID-19 12+ (PFIZER)    5. Flu vaccine need  Candidate for vaccination  - INFLUENZA HIGH DOSE, TRIVALENT, PF (FLUZONE)    6. Chronic midline low back pain without sciatica  Chronic but stable back pain.  Denies bowel or bladder incontinence.  Some proximal leg pain.  Per neurosurgery note, \"s/p L3-5 TLIFs in 2019, returns with back and leg pain, neurogenic claudications, 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...Will plan for L2-3 extension of fusion with decompression \".  Patient planning on surgical management in November.  Discussed need for preop within 30 days of future surgery.    7. Edema, unspecified type  Left lower extremity secondary to TKA.  Denies calf pain.  Recent left lower extremity venous Doppler study negative for DVT.  Encourage patient to use compression stockings.  No indication for diuretic therapy at this time in absence of shortness of breath, orthopnea, PND        PLAN:   Covid and flu vaccines today   Lab  re: anemia  Thyroid ultrasound. [facility] will call you to schedule. If you have not heard from their schedulers within 2 business days, then call [phone] (radiology scheduling)  Continue therapy for the knee  Continue current medications including Tylenol as needed for pain  Try using the compression  stocking on  the left leg  (on  AM and off later " PM)  until swelling better.     Moisturizer for dry skin of the feet such as Vanicream, Lubriderm, Vaseline, Cerave, etc. May get over-the-counter   Future surgical management plan per neurosurgery. If surgery scheduled, will need preop within 30 days pf the future back surgery           Virginia Silva is a 84 year old, presenting for the following health issues:  ER F/U    HPI       ED/UC Followup:    Facility:  [facility]  Date of visit: 9/17  Reason for visit: Tachycardia  Current Status: Pt states their pulse is still staying higher than normal. Pt reports they are feeling mostly good otherwise. Pt reports having a decreased appetite and sometimes is a little nauseous      Most recent lab results reviewed with pt.      OP note from LTKA 9/12/24 and  ER note from 9/17/24 reviewed. Part of the note as below:    PAU Neely is a 84 year old male who presents with relatively asymptomatic tachycardia.  He appears to be in sinus tachycardia on his EKG and telemetry.  This may be due to dehydration as he admits poor oral intake recently.  However his left lower extremity looks significantly swollen and discolored, and this could be due to a DVT or possible postop infection.  We will check labs and he will likely need CT imaging of his chest and lower extremity Doppler to evaluate for blood clot.  We will give IV fluids in the meantime.     1821    I rechecked the patient and updated him on results.  His CT of his chest did not show any signs of a PE.  He did have incidental thyroid nodules which I updated them on and recommended primary care follow-up.  Left lower extremity Doppler also did not show any signs of DVT.  He did have a Baker's cyst on the ultrasound which he is previously aware of.  His lab work was mostly unremarkable other than a significantly elevated CRP and the elevated D-dimer which prompted the CT of his chest.   I was considering starting the patient on oral antibiotics given the  elevated CRP and the appearance of the leg.  However the patient has not had a fever and his white count is normal so the appearance of the leg could just be normal postoperative swelling and discoloration as well.  I discussed with Dr. Jacobsen from orthopedics and he agrees with holding off on antibiotics at this time and recommend the patient follow-up next week with his surgeon.  I discussed return precautions with the patient and his son.  His heart rate had improved to the upper 90s after an IV fluid bolus.     Disposition   The patient was discharged.      Diagnosis       ICD-10-CM     1. Sinus tachycardia  R00.0         2. Dehydration  E86.0         3. Postoperative edema  R60.9         4. Thyroid nodule  E04.1         Component      Latest Ref Rng 9/17/2024  2:17 PM   WBC      4.0 - 11.0 10e3/uL 9.8    RBC Count      4.40 - 5.90 10e6/uL 4.30 (L)    Hemoglobin      13.3 - 17.7 g/dL 12.7 (L)    Hematocrit      40.0 - 53.0 % 37.3 (L)    MCV      78 - 100 fL 87    MCH      26.5 - 33.0 pg 29.5    MCHC      31.5 - 36.5 g/dL 34.0    RDW      10.0 - 15.0 % 13.0    Platelet Count      150 - 450 10e3/uL 327    NRBCs per 100 WBC      <1 /100 0    Absolute NRBCs      10e3/uL 0.0    Sodium      135 - 145 mmol/L 135    Potassium      3.4 - 5.3 mmol/L 3.6    Chloride      98 - 107 mmol/L 98    Carbon Dioxide (CO2)      22 - 29 mmol/L 23    Anion Gap      7 - 15 mmol/L 14    Urea Nitrogen      8.0 - 23.0 mg/dL 10.2    Creatinine      0.67 - 1.17 mg/dL 0.71    GFR Estimate      >60 mL/min/1.73m2 90    Calcium      8.8 - 10.4 mg/dL 9.1    Glucose      70 - 99 mg/dL 122 (H)    TSH      0.30 - 4.20 uIU/mL 0.37       Legend:  (L) Low  (H) High       Since ER visit, has not had further tachycardia symptoms.  Denies chest pain, shortness of breath, abdominal pain.  Recovering well from LTKA.  Able to flex his knee to 110 degrees and only requiring Tylenol now for pain control.  Has some left lower extremity edema since surgery.  No  "orthopnea or PND.  No DVT seen on ultrasound as above.  Has compression stockings but not using.  Chronic low back pain with some radiation into proximal lower extremities.  Noted to have severe spinal stenosis L2-3 and planning on future surgery probably in November with neurosurgery.  Mild anemia to hemoglobin 12.7 after surgery and needs lab follow-up.  Denies seeing blood in stools.  Normal bowel movements  Noted to have incidental right thyroid nodule on CT chest.  Thyroid function normal.  Eating and drinking normally    Additional ROS:   Constitutional, HEENT, Cardiovascular, Pulmonary, GI and , Neuro, MSK and Psych review of systems/symptoms are otherwise negative or unchanged from previous, except as noted above.      OBJECTIVE:  /69   Pulse 87   Temp 97.4  F (36.3  C) (Temporal)   Ht 1.702 m (5' 7\")   Wt 88.4 kg (194 lb 14.4 oz)   SpO2 99%   BMI 30.53 kg/m     Estimated body mass index is 30.53 kg/m  as calculated from the following:    Height as of this encounter: 1.702 m (5' 7\").    Weight as of this encounter: 88.4 kg (194 lb 14.4 oz).     Neck: no adenopathy.  Approximate nontender 2-3cm nodule right thyroid.  No bruits  Pulm: Lungs clear to auscultation   CV: Regular rates and rhythm  GI: Soft, mildly obese, nontender, Normal active bowel sounds, No hepatosplenomegaly or masses palpable  Ext: Peripheral pulses intact. 1 plus LLE edema.  Minimal increased warmth without erythema left knee  Neuro: Normal strength and tone, sensory exam grossly normal.  Slow but stable gait with walker  Back: Tenderness to palpation bilateral upper paralumbar musculature.       The longitudinal plan of care for the diagnosis(es)/condition(s) as documented were addressed during this visit. Due to the added complexity in care, I will continue to support Ricardo in the subsequent management and with ongoing continuity of care.    (Chart documentation was completed, in part, with Dragon voice-recognition " software. Even though reviewed, some grammatical, spelling, and word errors may remain.)    Familia Cook MD  Internal Medicine Department  [facility]

## 2024-10-04 ENCOUNTER — THERAPY VISIT (OUTPATIENT)
Dept: PHYSICAL THERAPY | Facility: CLINIC | Age: 84
End: 2024-10-04
Payer: COMMERCIAL

## 2024-10-04 ENCOUNTER — OFFICE VISIT (OUTPATIENT)
Dept: INTERNAL MEDICINE | Facility: CLINIC | Age: 84
End: 2024-10-04
Payer: COMMERCIAL

## 2024-10-04 VITALS
BODY MASS INDEX: 30.59 KG/M2 | SYSTOLIC BLOOD PRESSURE: 113 MMHG | OXYGEN SATURATION: 99 % | WEIGHT: 194.9 LBS | TEMPERATURE: 97.4 F | HEART RATE: 87 BPM | DIASTOLIC BLOOD PRESSURE: 69 MMHG | HEIGHT: 67 IN

## 2024-10-04 DIAGNOSIS — Z47.1 AFTERCARE FOLLOWING LEFT KNEE JOINT REPLACEMENT SURGERY: Primary | ICD-10-CM

## 2024-10-04 DIAGNOSIS — M54.50 CHRONIC MIDLINE LOW BACK PAIN WITHOUT SCIATICA: ICD-10-CM

## 2024-10-04 DIAGNOSIS — Z96.652 AFTERCARE FOLLOWING LEFT KNEE JOINT REPLACEMENT SURGERY: Primary | ICD-10-CM

## 2024-10-04 DIAGNOSIS — D64.9 ANEMIA, UNSPECIFIED TYPE: ICD-10-CM

## 2024-10-04 DIAGNOSIS — Z96.652 S/P TOTAL KNEE ARTHROPLASTY, LEFT: ICD-10-CM

## 2024-10-04 DIAGNOSIS — Z23 NEED FOR COVID-19 VACCINE: ICD-10-CM

## 2024-10-04 DIAGNOSIS — Z23 FLU VACCINE NEED: ICD-10-CM

## 2024-10-04 DIAGNOSIS — R60.9 EDEMA, UNSPECIFIED TYPE: ICD-10-CM

## 2024-10-04 DIAGNOSIS — G89.29 CHRONIC MIDLINE LOW BACK PAIN WITHOUT SCIATICA: ICD-10-CM

## 2024-10-04 DIAGNOSIS — E04.1 THYROID NODULE: ICD-10-CM

## 2024-10-04 LAB
ERYTHROCYTE [DISTWIDTH] IN BLOOD BY AUTOMATED COUNT: 12.8 % (ref 10–15)
HCT VFR BLD AUTO: 38.5 % (ref 40–53)
HGB BLD-MCNC: 12.9 G/DL (ref 13.3–17.7)
IRON BINDING CAPACITY (ROCHE): 313 UG/DL (ref 240–430)
IRON SATN MFR SERPL: 14 % (ref 15–46)
IRON SERPL-MCNC: 45 UG/DL (ref 61–157)
MCH RBC QN AUTO: 29.3 PG (ref 26.5–33)
MCHC RBC AUTO-ENTMCNC: 33.5 G/DL (ref 31.5–36.5)
MCV RBC AUTO: 88 FL (ref 78–100)
PLATELET # BLD AUTO: 404 10E3/UL (ref 150–450)
RBC # BLD AUTO: 4.4 10E6/UL (ref 4.4–5.9)
WBC # BLD AUTO: 8.5 10E3/UL (ref 4–11)

## 2024-10-04 PROCEDURE — 83550 IRON BINDING TEST: CPT | Performed by: INTERNAL MEDICINE

## 2024-10-04 PROCEDURE — G0008 ADMIN INFLUENZA VIRUS VAC: HCPCS | Performed by: INTERNAL MEDICINE

## 2024-10-04 PROCEDURE — 85027 COMPLETE CBC AUTOMATED: CPT | Performed by: INTERNAL MEDICINE

## 2024-10-04 PROCEDURE — 97112 NEUROMUSCULAR REEDUCATION: CPT | Mod: GP | Performed by: PHYSICAL THERAPIST

## 2024-10-04 PROCEDURE — 90480 ADMN SARSCOV2 VAC 1/ONLY CMP: CPT | Performed by: INTERNAL MEDICINE

## 2024-10-04 PROCEDURE — 83540 ASSAY OF IRON: CPT | Performed by: INTERNAL MEDICINE

## 2024-10-04 PROCEDURE — 99214 OFFICE O/P EST MOD 30 MIN: CPT | Mod: 25 | Performed by: INTERNAL MEDICINE

## 2024-10-04 PROCEDURE — 91320 SARSCV2 VAC 30MCG TRS-SUC IM: CPT | Performed by: INTERNAL MEDICINE

## 2024-10-04 PROCEDURE — 90662 IIV NO PRSV INCREASED AG IM: CPT | Performed by: INTERNAL MEDICINE

## 2024-10-04 PROCEDURE — 36415 COLL VENOUS BLD VENIPUNCTURE: CPT | Performed by: INTERNAL MEDICINE

## 2024-10-04 PROCEDURE — 97110 THERAPEUTIC EXERCISES: CPT | Mod: GP | Performed by: PHYSICAL THERAPIST

## 2024-10-04 NOTE — PATIENT INSTRUCTIONS
Covid and flu vaccines today   Lab  re: anemia  [facility]. If you have not heard from their schedulers within 2 business days, then call [phone] (radiology scheduling)  Continue therapy for the knee  Continue current medications including Tylenol as needed for pain  Try using the compression  stocking on  the left leg  (on  AM and off later PM)  until swelling better.     Moisturizer for dry skin of the feet such as Vanicream, Lubriderm, Vaseline, Cerave, etc. May get over-the-counter   Future surgical management plan per neurosurgery. If surgery scheduled, will need preop within 30 days pf the future back surgery

## 2024-10-07 ENCOUNTER — THERAPY VISIT (OUTPATIENT)
Dept: PHYSICAL THERAPY | Facility: CLINIC | Age: 84
End: 2024-10-07
Payer: COMMERCIAL

## 2024-10-07 DIAGNOSIS — Z96.652 AFTERCARE FOLLOWING LEFT KNEE JOINT REPLACEMENT SURGERY: Primary | ICD-10-CM

## 2024-10-07 DIAGNOSIS — Z47.1 AFTERCARE FOLLOWING LEFT KNEE JOINT REPLACEMENT SURGERY: Primary | ICD-10-CM

## 2024-10-07 PROCEDURE — 97110 THERAPEUTIC EXERCISES: CPT | Mod: GP | Performed by: PHYSICAL THERAPIST

## 2024-10-07 PROCEDURE — 97112 NEUROMUSCULAR REEDUCATION: CPT | Mod: GP | Performed by: PHYSICAL THERAPIST

## 2024-10-14 ENCOUNTER — THERAPY VISIT (OUTPATIENT)
Dept: PHYSICAL THERAPY | Facility: CLINIC | Age: 84
End: 2024-10-14
Payer: COMMERCIAL

## 2024-10-14 DIAGNOSIS — Z96.652 AFTERCARE FOLLOWING LEFT KNEE JOINT REPLACEMENT SURGERY: Primary | ICD-10-CM

## 2024-10-14 DIAGNOSIS — Z47.1 AFTERCARE FOLLOWING LEFT KNEE JOINT REPLACEMENT SURGERY: Primary | ICD-10-CM

## 2024-10-14 PROCEDURE — 97110 THERAPEUTIC EXERCISES: CPT | Mod: GP | Performed by: PHYSICAL THERAPIST

## 2024-10-14 PROCEDURE — 97112 NEUROMUSCULAR REEDUCATION: CPT | Mod: GP | Performed by: PHYSICAL THERAPIST

## 2024-10-16 ENCOUNTER — ANCILLARY PROCEDURE (OUTPATIENT)
Dept: ULTRASOUND IMAGING | Facility: CLINIC | Age: 84
End: 2024-10-16
Attending: INTERNAL MEDICINE
Payer: COMMERCIAL

## 2024-10-16 DIAGNOSIS — E04.1 THYROID NODULE: ICD-10-CM

## 2024-10-16 PROCEDURE — 76536 US EXAM OF HEAD AND NECK: CPT

## 2024-10-21 ENCOUNTER — TELEPHONE (OUTPATIENT)
Dept: NEUROSURGERY | Facility: CLINIC | Age: 84
End: 2024-10-21

## 2024-10-21 NOTE — TELEPHONE ENCOUNTER
Patient Instructions    Surgery scheduled at [facility] for L2-3 extension of fusion with decompression with Dr. Perez.    Pre-Operative    PARMINDER questionnaire sent to patient via [email or web address] to be completed prior to surgery.     Surgical risks: blood clots in the leg or lung, problems urinating, nerve damage, drainage from the incision, infection, stiffness    You will need a Pre-operative physical with primary care physician within 30 days prior to your surgical date. Please schedule this more than 7 days prior to your surgical date to allow time for follow up relating to surgical clearance.    You will spend approximately 2 nights in the hospital.    Smoking Cessation  Have you used any Nicotine products within the last year? If so, you will need to follow these instructions.  You are advised to quit smoking (all nicotine) immediately through recovery to help with healing and reduce risk of complications. Printout given.   You will need to do a nicotine blood lab. You need to be nicotine free for 2 weeks prior to the test. Nicotine free means you may not use or come in contact with nicotine products. Please call your local [facility] to schedule the lab test. Surgery will be scheduled at least 3 weeks after your lab test.    Shower procedure  You will need to shower the night before and morning of surgery using the antimicrobial soap (chlorhexidine) given to you. Please refer to showering instruction sheet in your surgery education folder.    Eating/Drinking  Stop all solid foods 8 hours before surgery.  Keep drinking clear liquids until 4 hours before surgery  Clear liquids include water, clear juice, black coffee, or clear tea without milk, Gatorade, clear soda.     Medications  Hold clopidogrel (PLAVIX) 7 days prior to surgery.  Discontinue Aspirin & NSAIDs (Advil/Ibuprofen, Indocin, Naproxen,Nuprin,Relafen/Nabumetone, Diclofenac,Meloxicam, Aleve, Celebrex) 7 days prior to surgical  date. After surgery, do not begin taking these medications until given clearance as it may cause bleeding and interfere with healing.  It is ok to take Tylenol (Acetaminophen) for pain within the 7 days prior to surgery. Example: you could take 1000 mg 3 times per day. Do not exceed 3,000 mg per day.   If you are on chronic pain medication (oxycodone, Percocet, hydrocodone, Vicodin, Norco, Dilaudid, morphine, MS Contin, naltrexone, Suboxone, etc) or have a pain contract we will reach out to your pain clinic to gather your most recent records and recommendations for pain management post-op.  Please ask your provider who manages your chronic pain if they require you to schedule an office visit prior to surgery. Continue obtaining your pain medications from your current provider until surgery. Our team will manage your acute post-op pain in the hospital and during the recovery period. Your pain team will continue to manage your chronic pain.   Any other medications prescribed, please discuss with your primary care provider at your pre-operative physical      Post-Operative    Incision Care  Look at your incision site every day. You  may need a mirror or family member to help you.   Watch for signs of infection  Redness, swelling, warmth, drainage (Green or yellow drainage (pus) from your incision or increased bloody drainage), and fever of 101 degrees or higher  Notify clinic [phone]  Remove dressing as instructed upon discharge  Do not apply lotions or ointments to incision  It is okay to shower, just pat the incision dry   No submerging incision in water such as pools, hot tubs, or baths for at least 8 weeks and until the incision is healed    Pain Management  Dealing with pain  As your body heals, you might feel a stabbing, burning, or aching pain. You may also have some numbness.  Everyone feels pain differently, we may ask you to rate your pain using a pain scale. This will let us know how much pain you feel.    Keep in mind that medicine won't take away all of your pain. It helps to try other ways to relax and ease pain.   Things to help with pain  After surgery, we will give you medicine for your pain. These medications work well, but they can make you drowsy, itchy, or sick to your stomach. If we give you narcotics for pain, try to take the pills with food.   For mild to moderate pain, you can take medication such as Tylenol. These can be used with narcotics, but make sure that your narcotic does not contain Tylenol.   Do NOT drive while taking narcotic pain medication  Do NOT drink alcohol while using any pain medication  You can utilize ice as needed (no longer than 20 minutes at one time)  Refills of pain medication: please call the neurosurgery clinic to request 2-3 days before you run out. We will continue to refill your pain medications for up to 12 weeks after surgery. If you are still needing refills around the 8 week pema, please schedule your Primary Care or Pain Provider before the 12 week post-op pema.  Aspirin & NSAIDS (ex. Ibuprofen, aleve, naproxen): Don't take NSAIDs until 6 weeks after surgery to reduce risk of bleeding and interference with bone healing     Bowel Care  Many people have constipation (hard stools) after surgery. The narcotic pain medication we often prescribed can contribute to constipation. To help prevent constipation: Drink plenty of fluid (8-10 glasses/day); Eat more fiber, such as whole grain bread, bran cereal, and fruits and vegetables; Stay active by walking; Over the counter stool softener may also help.      Activity Restrictions  For the first 2 weeks, no lifting > 10 pounds, no bending, twisting, or overhead reaching.  For weeks 2 through your 6 week post-op appointment, no lifting > 25 pounds, limited bending, twisting, or overhead reaching.  You will be re-evaluated at your 6 week post-op visit. You will have some level of restrictions through 3 months post-op.  Take  stairs in moderation   Walking is the best way to start exercise after surgery. Take short frequent walks. You may gradually increase the distance as tolerated. If you feel pain, decrease your activity, but DO NOT stop walking. Walking will help you gain strength and prevent muscle soreness and spasms.   Avoid bed rest and prolonged sitting for longer than 30 minutes (change positions frequently while awake)  No contact sports or high impact activities such as; snow removal, lawn mowing, running/jogging, snowmobile or 4 waldron riding or any other recreational vehicles until after given clearance at your 3 month follow up visit  If a brace is required per Dr. Perez, Orthotics will fit you for the brace in the hospital. Brace type and length of time to wear it will be determined by Dr. Perez.     Contact clinic right away or go to the Emergency Department if you develop:   Infection (incisional redness, swelling, warmth, drainage, or fever (temp > 101 F))  New injury  Bladder or bowel changes or loss of control    Signs of blood clot:  Swelling and/or warmth in one or both legs  Pain or tenderness in your leg, ankle, foot, or arm   Red or discolored skin     Go to the Emergency Department   If sudden onset of severe headache, weakness, confusion, change in level of consciousness, pain, or loss of movement.  Chest pain  Trouble breathing     Post-Op Follow Up Appointments  2 week incision check & staple/suture removal with Neurosurgery Nurse  6 week post op with x-ray prior with our Physician Assistant or Nurse Practitioner  3 months post op with x-ray prior with our Physician Assistant or Nurse Practitioner  6 months post op with x-ray prior with our Physician Assistant or Nurse Practitioner  1 year post op with x-ray prior with our Physician Assistant or Nurse Practitioner  Please call to schedule follow up and xray appointments at [phone]    Resources  If you are currently employed, you will need to be off work  for 4-6 weeks for recovery and healing.  You will have activity restrictions through 3 months post-op.  Please fax any FMLA/short term disability paperwork to [phone] prior to surgery  You may call our clinic when you'd like to return to work and we can provide a work letter  To allow staff adequate time to complete paperwork, please send as soon as possible     [facility]  [facility] - [address] [address]  Telephone:  [phone]        Fax:  [phone]

## 2024-10-21 NOTE — TELEPHONE ENCOUNTER
Attempted to call patient to review surgery education. Patient was unavailable per spouse. Spouse will have patient call clinic to discuss when available. Provided clinic phone number.    Education sent via [employer].

## 2024-10-29 NOTE — TELEPHONE ENCOUNTER
PRE-SURGERY EDUCATION  Reviewed pre- and post-operative instructions as outlined in the After Visit Summary/Patient Instructions with patient.   Surgery folder was mailed to patient     Patient Education Topic: Procedure with Dr. Perez   Learner(s): Patient  Knowledge Level: Basic  Readiness to Learn: Ready  Method:  Verbal explanation  Outcome: Able to verbalize instructions  Barriers to Learning: No barrier    STD/FMLA: No  Job Description: Not applicable   Time Off: Not applicable      Patient had the opportunity for questions to be answered. Advised Patient to call clinic with any questions/concerns. Patient has antibacterial soap for pre-surgery skin preparation.     SPINE PRE-SURGICAL CHECKLIST   Intervention/Diagnostic Meets Criteria Details   NDI/PARMINDER  Completed within the last 6 months Yes Confirmation of completion within last 6 months   Nicotine Status  Former smoker. Quit:  1968        Yes Fusions  - Used nicotine within the last year? No     Physical Therapy   Completed within 6 months   Completed on the corresponding body part  Completed at least 4 weeks (unless provider documented that patient unable to tolerate PT) Yes  Per provider note.        Injection  Completed within last 1 years  Completed on the corresponding body part Yes  Internal.   Imaging  MRI or CT completed within 6 months Yes Records verified and located Internal   Chronic Pain or Pain contract  No Yes

## 2024-11-04 ENCOUNTER — OFFICE VISIT (OUTPATIENT)
Dept: INTERNAL MEDICINE | Facility: CLINIC | Age: 84
End: 2024-11-04
Payer: COMMERCIAL

## 2024-11-04 DIAGNOSIS — E78.5 HYPERLIPIDEMIA LDL GOAL <70: ICD-10-CM

## 2024-11-04 DIAGNOSIS — N40.1 BENIGN PROSTATIC HYPERPLASIA WITH URINARY RETENTION: ICD-10-CM

## 2024-11-04 DIAGNOSIS — E66.811 CLASS 1 OBESITY WITH SERIOUS COMORBIDITY AND BODY MASS INDEX (BMI) OF 31.0 TO 31.9 IN ADULT, UNSPECIFIED OBESITY TYPE: ICD-10-CM

## 2024-11-04 DIAGNOSIS — G89.29 CHRONIC BILATERAL LOW BACK PAIN WITHOUT SCIATICA: ICD-10-CM

## 2024-11-04 DIAGNOSIS — R33.8 BENIGN PROSTATIC HYPERPLASIA WITH URINARY RETENTION: ICD-10-CM

## 2024-11-04 DIAGNOSIS — Z86.73 HX OF TRANSIENT ISCHEMIC ATTACK (TIA): ICD-10-CM

## 2024-11-04 DIAGNOSIS — D50.9 IRON DEFICIENCY ANEMIA, UNSPECIFIED IRON DEFICIENCY ANEMIA TYPE: ICD-10-CM

## 2024-11-04 DIAGNOSIS — I65.23 CAROTID STENOSIS, ASYMPTOMATIC, BILATERAL: ICD-10-CM

## 2024-11-04 DIAGNOSIS — M54.50 CHRONIC BILATERAL LOW BACK PAIN WITHOUT SCIATICA: ICD-10-CM

## 2024-11-04 DIAGNOSIS — E04.1 THYROID NODULE: ICD-10-CM

## 2024-11-04 DIAGNOSIS — Z01.818 PREOPERATIVE EXAMINATION: Primary | ICD-10-CM

## 2024-11-04 DIAGNOSIS — I10 ESSENTIAL HYPERTENSION: ICD-10-CM

## 2024-11-04 LAB
ERYTHROCYTE [DISTWIDTH] IN BLOOD BY AUTOMATED COUNT: 13 % (ref 10–15)
FERRITIN SERPL-MCNC: 435 NG/ML (ref 31–409)
HCT VFR BLD AUTO: 40 % (ref 40–53)
HGB BLD-MCNC: 13.4 G/DL (ref 13.3–17.7)
MCH RBC QN AUTO: 28.9 PG (ref 26.5–33)
MCHC RBC AUTO-ENTMCNC: 33.5 G/DL (ref 31.5–36.5)
MCV RBC AUTO: 86 FL (ref 78–100)
PLATELET # BLD AUTO: 276 10E3/UL (ref 150–450)
RBC # BLD AUTO: 4.63 10E6/UL (ref 4.4–5.9)
WBC # BLD AUTO: 6.9 10E3/UL (ref 4–11)

## 2024-11-04 PROCEDURE — 85027 COMPLETE CBC AUTOMATED: CPT | Performed by: INTERNAL MEDICINE

## 2024-11-04 PROCEDURE — 36415 COLL VENOUS BLD VENIPUNCTURE: CPT | Performed by: INTERNAL MEDICINE

## 2024-11-04 PROCEDURE — G2211 COMPLEX E/M VISIT ADD ON: HCPCS | Performed by: INTERNAL MEDICINE

## 2024-11-04 PROCEDURE — 99214 OFFICE O/P EST MOD 30 MIN: CPT | Performed by: INTERNAL MEDICINE

## 2024-11-04 PROCEDURE — 82728 ASSAY OF FERRITIN: CPT | Performed by: INTERNAL MEDICINE

## 2024-11-04 RX ORDER — TAMSULOSIN HYDROCHLORIDE 0.4 MG/1
0.4 CAPSULE ORAL DAILY
Qty: 90 CAPSULE | Refills: 3 | Status: SHIPPED | OUTPATIENT
Start: 2024-11-04

## 2024-11-04 NOTE — PATIENT INSTRUCTIONS
Approval given to proceed with proposed procedure, without further diagnostic evaluation.  No solid food after midnight prior to your  procedure. May have clear liquids up to 2 hours prior to the  procedure (7:50am)   Stop  Aspirin and Clopidogrel 7 days prior to your  procedure to reduce risk of bleeding.     May use Tylenol for pain control  in the 5 days prior to your surgery  Take  Metoprolol with   water  when you first get up the  AM of the  procedure   Hold other prescription  the day of the  procedure. May resume usual medications/supplements after the procedure unless instructed otherwise by your surgeon    Post-op DVT prophylaxis orders per surgeon  Once recovered from  back surgery, then send me a note in Mychart and will arrange for a thyroid nodule biopsy  Reduce calorie/carbohydrate (sugar, bread, potato, pasta, rice, alcohol etc)  intake in diet.  Increase color on your plate with vegetables.

## 2024-11-04 NOTE — PROGRESS NOTES
Preoperative Evaluation  [address] [address]  Phone: [phone]  Primary Provider: Familia Cook MD  Pre-op Performing Provider: Familia Cook MD  Nov 4, 2024            10/29/2024   Surgical Information   What procedure is being done? L2-3 fusion   Facility or Hospital where procedure/surgery will be performed: [facility]    Who is doing the procedure / surgery? Dr Perez    Date of surgery / procedure: 11 26 2024    Time of surgery / procedure: 9:50    Where do you plan to recover after surgery? at home with family        Patient-reported     Fax number for surgical facility: Note does not need to be faxed, will be available electronically in Epic.    Assessment & Plan     The proposed surgical procedure is considered INTERMEDIATE risk.      1. Preoperative examination (Primary)  Appears medically stable to proceed with surgery as scheduled    2. Chronic bilateral low back pain without sciatica  Chronic low back pain.  Denies radiculopathy.  Has failed conservative treatment.  Presents for surgical management    3. Benign prostatic hyperplasia with urinary retention  Controlled.  Continue current medication  - tamsulosin (FLOMAX) 0.4 MG capsule; Take 1 capsule (0.4 mg) by mouth daily.  Dispense: 90 capsule; Refill: 3    4. Iron deficiency anemia, unspecified iron deficiency anemia type  Secondary to previous blood loss with surgery  for TKA. Resolved with iron supplementation.  Hemoglobin level currently 13.4.  Denies seeing blood in stools  - CBC with platelets; Future  - Ferritin; Future  - CBC with platelets  - Ferritin    5. Hyperlipidemia LDL goal <70  Controlled.  Continue statin therapy    6. Hx of transient ischemic attack (TIA)  Asymptomatic.  On aspirin, clopidogrel.  Okay to hold both prior to surgery    7. Essential hypertension  Controlled with Lotrel and metoprolol.  Will hold Lotrel the morning of surgery given general  anesthesia use    8. Carotid stenosis, asymptomatic, bilateral  Ultrasound August 2024 showed only mild plaque formation, velocities consistent with less than 50% stenosis in the right and left internal carotid arteries     9. Thyroid nodule  Requires biopsy based on TI-RADS criteria.  Recent thyroid function normal.  Will address ultrasound-guided biopsy once patient recovered from back surgery    10. Class 1 obesity with serious comorbidity and body mass index (BMI) of 31.0 to 31.9 in adult, unspecified obesity type  Weight similar to May 2024.  Patient denies known sleep apnea.  Recent thyroid function normal.  Counseled regarding calorie/carbohydrate reduction.  Continue exercise    Risks and Recommendations  The patient has the following additional risks and recommendations for perioperative complications:   - No identified additional risk factors other than previously addressed    Patient instructions   Approval given to proceed with proposed procedure, without further diagnostic evaluation.  No solid food after midnight prior to your  procedure. May have clear liquids up to 2 hours prior to the  procedure (7:50am)   Stop  Aspirin and Clopidogrel 7 days prior to your  procedure to reduce risk of bleeding.     May use Tylenol for pain control  in the 5 days prior to your surgery  Take  Metoprolol with   water  when you first get up the  AM of the  procedure   Hold other prescription  the day of the  procedure. May resume usual medications/supplements after the procedure unless instructed otherwise by your surgeon    Post-op DVT prophylaxis orders per surgeon  Once recovered from  back surgery, then send me a note in Mychart and will arrange for a thyroid nodule biopsy  Reduce calorie/carbohydrate (sugar, bread, potato, pasta, rice, alcohol etc)  intake in diet.  Increase color on your plate with vegetables.       Recommendation  Approval given to proceed with proposed procedure, without further diagnostic  evaluation.    Virginia Silva is a 84 year old, presenting for the following:  Pre-Op Exam         HPI related to upcoming procedure:   Chronic low back pain.  Denies current radicular symptoms.  Has failed conservative treatment.  Presents for clearance to undergo L2-3 fusion surgery.  Regarding current exercise tolerance, patient rides a stationary bike 40 minutes/day and walks 1 mile a few times a week without chest pain or shortness of breath          10/29/2024   Pre-Op Questionnaire   Have you ever had a heart attack or stroke? No    Have you ever had surgery on your heart or blood vessels, such as a stent placement, a coronary artery bypass, or surgery on an artery in your head, neck, heart, or legs? No    Do you have chest pain with activity? No    Do you have a history of heart failure? No    Do you currently have a cold, bronchitis or symptoms of other infection? No    Do you have a cough, shortness of breath, or wheezing? No    Do you or anyone in your family have previous history of blood clots? No    Do you or does anyone in your family have a serious bleeding problem such as prolonged bleeding following surgeries or cuts? No    Have you ever had problems with anemia or been told to take iron pills? No    Have you had any abnormal blood loss such as black, tarry or bloody stools? No    Have you ever had a blood transfusion? No    Are you willing to have a blood transfusion if it is medically needed before, during, or after your surgery? Yes    Have you or any of your relatives ever had problems with anesthesia? No    Do you have sleep apnea, excessive snoring or daytime drowsiness? No    Do you have any artifical heart valves or other implanted medical devices like a pacemaker, defibrillator, or continuous glucose monitor? No    Do you have artificial joints? (!) YES  - Bilateral TKA   Are you allergic to latex? No        Patient-reported     Health Care Directive  Patient has a Health Care  Directive on file      Preoperative Review of    reviewed - Had Tramadol  and Oxycodone in Sept 2024 after knee replacement surgery.  Stopped them after 10 days. Prsecribed by ortho       Status of Chronic Conditions:  See assessment/plan section above for active medical problems.  Problems all longstanding and stable, except as noted/documented.  See ROS in addition for pertinent symptoms related to these conditions.      Patient Active Problem List    Diagnosis Date Noted    Aftercare following left knee joint replacement surgery 09/30/2024     Priority: Medium    Hx of total knee replacement, left 09/12/2024     Priority: Medium    Hyperlipidemia LDL goal <70 09/04/2024     Priority: Medium    Lumbar radiculopathy 09/04/2024     Priority: Medium    Class 1 obesity with serious comorbidity and body mass index (BMI) of 31.0 to 31.9 in adult, unspecified obesity type 09/04/2024     Priority: Medium    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     Priority: Medium    Carotid stenosis, asymptomatic, bilateral 11/02/2022     Priority: Medium    Palpitations 11/02/2022     Priority: Medium    Hx of transient ischemic attack (TIA) 08/01/2021     Priority: Medium    Contracture of palmar fascia 10/16/2020     Priority: Medium    Anxiety 05/26/2011     Priority: Medium    Hypertrophy of prostate without urinary obstruction      Priority: Medium    Diaphragmatic hernia 09/23/2002     Priority: Medium     Problem list name updated by automated process. Provider to review      HISTORY OF URINARY CALCULI 08/19/2002     Priority: Medium    Essential hypertension 08/19/2002     Priority: Medium    GERD 08/19/2002     Priority: Medium      Past Medical History:   Diagnosis Date    Anxiety 05/26/2011    Arthritis     Basal cell carcinoma     Benign Prostatic Hypertrophy     Carotid stenosis, asymptomatic, bilateral 11/02/2022    CEREBROVASC DISEASE, small vessel 12/2007    Class 1  obesity with serious comorbidity and body mass index (BMI) of 31.0 to 31.9 in adult, unspecified obesity type 09/04/2024    Contracture of palmar fascia 10/16/2020    Diaphragmatic hernia 09/23/2002    Problem list name updated by automated process. Provider to review      Essential hypertension 08/19/2002    Essential hypertension, benign     GERD     HIATAL HERNIA     Hx of transient ischemic attack (TIA) 08/01/2021    Hyperlipidemia LDL goal <70 09/04/2024    HYPERPLASTIC POLYPS COLON 5/93, 3/06    Hypertrophy of prostate without urinary obstruction     Impaired fasting glucose     Low Back Pain     Lumbar radiculopathy 09/04/2024    Obesity, unspecified     Other and unspecified hyperlipidemia     Palpitations 11/02/2022    Personal history of urinary calculi 1960's    Pneumonia, organism unspecified(486) 1992    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    Squamous cell carcinoma of skin, unspecified     TRANSIENT CEREBRAL ISCHEMIA 12/2007     Past Surgical History:   Procedure Laterality Date    ARTHROPLASTY KNEE Left 9/12/2024    Procedure: left total knee arthroplasty;  Surgeon: Mono Nichols MD;  Location:  OR    BIOPSY  2017    COLONOSCOPY      DISCECTOMY LUMBAR POSTERIOR MICROSCOPIC TWO LEVELS  08/28/2012    DISCECTOMY LUMBAR POSTERIOR MICROSCOPIC TWO LEVELS;  RIGHT L3-L4 REDO EXTENDED HEMILAMINECTOMY AND MICRO FORAMINOTOMY, LEFT L4-L5 EXTENDED HEMILAMINECTOMY AND MICRODISCECTOMY (PRONE) (SONYA MCCALLUM, C-ARM, METRIX II);  Surgeon: Bertram Mirza MD;  Location:  OR    ENT SURGERY  2014    Cancer spot on right ear    EYE SURGERY  2018    OPTICAL TRACKING SYSTEM FUSION SPINE POSTERIOR LUMBAR TWO LEVELS N/A 08/21/2019    Procedure: L3-5 LUMBAR TRANSFORAMINAL INTERBODY FUSION WITH STEALTH;  Surgeon: Harrison Perez MD;  Location:  OR    renal calculii removal 40 years ago  01/01/1965    ZZC NONSPECIFIC PROCEDURE  01/01/1959    pilonidal  "cystectomy    [facility] NONSPECIFIC PROCEDURE  1966    kidney stone    [facility] NONSPECIFIC PROCEDURE  approx     R knee arthroscopy     Dr. Guzman    [facility] NONSPECIFIC PROCEDURE  2005    L3-4 microdiskectomy   Dr. Brantley    [facility] TOTAL KNEE ARTHROPLASTY  2010    Minimally invasive R TKA   Dr. Nichols     Current Outpatient Medications   Medication Sig Dispense Refill    acetaminophen (TYLENOL) 325 MG tablet Take 2 tablets (650 mg) by mouth every 4 hours as needed for other (mild pain). 100 tablet 0    amLODIPine-benazepril (LOTREL) 5-20 MG capsule Take 1 capsule by mouth daily 90 capsule 3    aspirin (ASA) 81 MG tablet Take 1 tablet (81 mg) by mouth At Bedtime      atorvastatin (LIPITOR) 40 MG tablet Take 1 tablet (40 mg) by mouth daily 90 tablet 3    clopidogrel (PLAVIX) 75 MG tablet TAKE 1 TABLET(75 MG) BY MOUTH DAILY 90 tablet 3    fexofenadine (ALLEGRA) 180 MG tablet Take 180 mg by mouth daily.      metoprolol succinate ER (TOPROL XL) 50 MG 24 hr tablet Take 1 tablet (50 mg) by mouth daily 90 tablet 3    tamsulosin (FLOMAX) 0.4 MG capsule Take 1 capsule (0.4 mg) by mouth daily 30 capsule 11       Allergies   Allergen Reactions    Meclizine Other (See Comments)     Over sedation, concentration problem    Tramadol Hcl Other (See Comments)     Pt feels very drugged, \"cloudy\" if used more than one day. Nausea also    Cardura [Doxazosin Mesylate] Other (See Comments)     fainted    Hydrochlorothiazide Other (See Comments)      lightheadedness    Naproxen Nausea     nausea        Social History     Tobacco Use    Smoking status: Former     Current packs/day: 0.00     Average packs/day: 0.5 packs/day for 11.0 years (5.5 ttl pk-yrs)     Types: Cigarettes     Start date: 1957     Quit date: 1968     Years since quittin.8    Smokeless tobacco: Never   Substance Use Topics    Alcohol use: No     Family History   Problem Relation Age of Onset    Family History Negative Brother         1 healthy brother    " "Diabetes Brother         d:age 66    Diabetes Mother     Cerebrovascular Disease Mother     C.A.D. Father         CABG 67    Heart Disease Father     Lipids Sister     Diabetes Sister     Hypertension Sister     Hypertension Sister     Lipids Sister     Hypertension Sister     Colon Cancer Sister     Thyroid Disease Daughter         thyroid surgery, on replacement     History   Drug Use No             Review of Systems  CONSTITUTIONAL: NEGATIVE for fever, chills.  Weight up  6 pounds in 1 year  INTEGUMENTARY/SKIN: NEGATIVE for worrisome rashes, moles or lesions  EYES: NEGATIVE for irritation. Has mild acuity reduction bilaterally with cataracts.  Considering future surgery  ENT/MOUTH: NEGATIVE for ear, mouth and throat problems. Rare nasal  congestion with  allergies. No  meds used now  RESP: NEGATIVE for significant cough or SOB  CV: NEGATIVE for chest pain, palpitations or peripheral edema  GI: NEGATIVE for nausea, abdominal pain, heartburn, or change in bowel habits  : NEGATIVE for frequency, dysuria, or hematuria  MUSCULOSKELETAL:  POSITIVE for LBP without radiation  Worsens with prolonged standing. No radicular sx  NEURO: NEGATIVE for weakness, dizziness or paresthesias  ENDOCRINE: NEGATIVE for temperature intolerance, skin/hair changes  HEME: NEGATIVE for bleeding problems  PSYCHIATRIC: NEGATIVE for changes in mood or affect    Objective    /73   Pulse 92   Temp 98.2  F (36.8  C) (Temporal)   Ht 1.702 m (5' 7\")   Wt 92.3 kg (203 lb 8 oz)   SpO2 97%   BMI 31.87 kg/m     Estimated body mass index is 31.87 kg/m  as calculated from the following:    Height as of this encounter: 1.702 m (5' 7\").    Weight as of this encounter: 92.3 kg (203 lb 8 oz).  Physical Exam  Eye: PERRL, EOMI  HENT: ear canals and TM's normal and nose and mouth without ulcers or lesions.  Dentures upper and lower  Neck: no adenopathy. Thyroid nodule palpable right lower lobe 3cm) and left lower lobe (1cm). Faint bruit left side. "  Palpable  carotid pulses   Pulm: Lungs clear to auscultation   CV: Regular rates and rhythm  GI: Soft,   mildly obese, nontender, Normal active bowel sounds, No hepatosplenomegaly or masses palpable  Ext: Peripheral pulses intact. Mild left knee and ankle edema.  Left knee slightly warm without erythema (recent TKA)  Neuro: Normal strength and tone, sensory exam grossly normal.  Slight antalgic appearing but otherwise stable gait without assistance  Back: Tenderness to palpation bilateral paralumbar musculature.         Recent Labs   Lab Test 10/04/24  1036 09/17/24  1417 09/13/24  0744 09/12/24  1513   HGB 12.9* 12.7*   < >  --     327  --   --    NA  --  135  --  134*   POTASSIUM  --  3.6  --  4.2   CR  --  0.71  --  0.75    < > = values in this interval not displayed.        Diagnostics  Component      Latest Ref Rng 11/4/2024  3:08 PM   WBC      4.0 - 11.0 10e3/uL 6.9    RBC Count      4.40 - 5.90 10e6/uL 4.63    Hemoglobin      13.3 - 17.7 g/dL 13.4    Hematocrit      40.0 - 53.0 % 40.0    MCV      78 - 100 fL 86    MCH      26.5 - 33.0 pg 28.9    MCHC      31.5 - 36.5 g/dL 33.5    RDW      10.0 - 15.0 % 13.0    Platelet Count      150 - 450 10e3/uL 276    Ferritin      31 - 409 ng/mL 435 (H)       Legend:  (H) High     EKG 9/17/2024 showed Sinus tachycardia  with rate 114 with Fusion complexes,  left axis deviation,  RBBB.  LAD and RBBB old.   Tachycardia with this EKG related to dehydration with ER visit that day. Given current good exercise tolerance and resolution of dehydration and tachycardia based on today's exam and otherwise stability of other chronic findings,  decision made to defer repeating EKG again today       Revised Cardiac Risk Index (RCRI)  The patient has the following serious cardiovascular risks for perioperative complications:   - Cerebrovascular Disease (TIA or CVA) = 1 point     RCRI Interpretation: 1 point: Class II (low risk - 0.9% complication rate)         Signed  Electronically by: Familia Cook MD  A copy of this evaluation report is provided to the requesting physician.

## 2024-11-05 VITALS
HEART RATE: 92 BPM | HEIGHT: 67 IN | OXYGEN SATURATION: 97 % | WEIGHT: 203.5 LBS | TEMPERATURE: 98.2 F | BODY MASS INDEX: 31.94 KG/M2 | DIASTOLIC BLOOD PRESSURE: 73 MMHG | SYSTOLIC BLOOD PRESSURE: 131 MMHG

## 2024-11-05 PROBLEM — D50.9 IRON DEFICIENCY ANEMIA, UNSPECIFIED IRON DEFICIENCY ANEMIA TYPE: Status: RESOLVED | Noted: 2024-11-05 | Resolved: 2024-11-05

## 2024-11-05 PROBLEM — R33.8 BENIGN PROSTATIC HYPERPLASIA WITH URINARY RETENTION: Status: ACTIVE | Noted: 2024-11-05

## 2024-11-05 PROBLEM — M54.50 CHRONIC BILATERAL LOW BACK PAIN WITHOUT SCIATICA: Status: ACTIVE | Noted: 2024-11-05

## 2024-11-05 PROBLEM — D50.9 IRON DEFICIENCY ANEMIA, UNSPECIFIED IRON DEFICIENCY ANEMIA TYPE: Status: ACTIVE | Noted: 2024-11-05

## 2024-11-05 PROBLEM — G89.29 CHRONIC BILATERAL LOW BACK PAIN WITHOUT SCIATICA: Status: ACTIVE | Noted: 2024-11-05

## 2024-11-05 PROBLEM — E04.1 THYROID NODULE: Status: ACTIVE | Noted: 2024-11-05

## 2024-11-05 PROBLEM — N40.1 BENIGN PROSTATIC HYPERPLASIA WITH URINARY RETENTION: Status: ACTIVE | Noted: 2024-11-05

## 2024-11-06 ENCOUNTER — TRANSFERRED RECORDS (OUTPATIENT)
Dept: HEALTH INFORMATION MANAGEMENT | Facility: CLINIC | Age: 84
End: 2024-11-06

## 2024-11-11 DIAGNOSIS — G45.0 VERTEBROBASILAR ARTERY SYNDROME: ICD-10-CM

## 2024-11-11 DIAGNOSIS — I65.23 CAROTID STENOSIS, ASYMPTOMATIC, BILATERAL: ICD-10-CM

## 2024-11-11 RX ORDER — CLOPIDOGREL BISULFATE 75 MG/1
TABLET ORAL
Qty: 90 TABLET | Refills: 3 | Status: SHIPPED | OUTPATIENT
Start: 2024-11-11

## 2024-11-20 RX ORDER — ACETAMINOPHEN 500 MG
500-1000 TABLET ORAL EVERY 6 HOURS PRN
Status: ON HOLD | COMMUNITY
End: 2024-11-29

## 2024-11-20 NOTE — PROGRESS NOTES
PTA medications updated by Medication Scribe prior to surgery via phone call with patient (last doses completed by Nurse)     Medication history sources: Patient, Surescripts, and H&P  In the past week, patient estimated taking medication this percent of the time: Greater than 90%      Significant changes made to the medication list:  Patient taking meds differently than prescribed; See PTA entries for: 500mg tylenol      Additional medication history information:   None    Medication reconciliation completed by provider prior to medication history? No    Time spent in this activity: 45 minutes    The information provided in this note is only as accurate as the sources available at the time of update(s)      Prior to Admission medications    Medication Sig Last Dose Taking? Auth Provider Long Term End Date   acetaminophen (TYLENOL) 500 MG tablet Take 500-1,000 mg by mouth every 6 hours as needed for mild pain.  Yes Reported, Patient     amLODIPine-benazepril (LOTREL) 5-20 MG capsule Take 1 capsule by mouth daily  Yes Familia Cook MD Yes    aspirin (ASA) 81 MG tablet Take 1 tablet (81 mg) by mouth At Bedtime 11/18/2024 Evening Yes Flores Portillo NP     atorvastatin (LIPITOR) 40 MG tablet Take 1 tablet (40 mg) by mouth daily  Yes Familia Cook MD Yes    clopidogrel (PLAVIX) 75 MG tablet TAKE 1 TABLET(75 MG) BY MOUTH DAILY  Yes Familia Cook MD Yes    fexofenadine (ALLEGRA) 180 MG tablet Take 180 mg by mouth daily.  Yes Reported, Patient     metoprolol succinate ER (TOPROL XL) 50 MG 24 hr tablet Take 1 tablet (50 mg) by mouth daily  Yes Familia Cook MD Yes    tamsulosin (FLOMAX) 0.4 MG capsule Take 1 capsule (0.4 mg) by mouth daily.  Yes Familia Cook MD         Medication history completed by: Amanda Stringer

## 2024-11-25 ENCOUNTER — ANESTHESIA EVENT (OUTPATIENT)
Dept: SURGERY | Facility: CLINIC | Age: 84
End: 2024-11-25
Payer: COMMERCIAL

## 2024-11-25 NOTE — ANESTHESIA PREPROCEDURE EVALUATION
Anesthesia Pre-Procedure Evaluation    Patient: Ricardo Neely   MRN: [id number] : 1940        Procedure : Procedure(s):  Lumbar 2 to Lumbar 3 extension of previous Lumbar 3 to Lumbar 5 fusion with stealth navigation and Lumbar 2 to Lumbar 3 decompression          Past Medical History:   Diagnosis Date    Anxiety 2011    Arthritis     Basal cell carcinoma     Benign prostatic hyperplasia with urinary retention 2024    Benign Prostatic Hypertrophy     Carotid stenosis, asymptomatic, bilateral 2022    CEREBROVASC DISEASE, small vessel 2007    Chronic bilateral low back pain without sciatica 2024    Class 1 obesity with serious comorbidity and body mass index (BMI) of 31.0 to 31.9 in adult, unspecified obesity type 2024    Contracture of palmar fascia 10/16/2020    Diaphragmatic hernia 2002    Problem list name updated by automated process. Provider to review      Essential hypertension 2002    Essential hypertension, benign     GERD     HIATAL HERNIA     Hx of total knee replacement, left 2024    Hx of transient ischemic attack (TIA) 2021    Hyperlipidemia LDL goal <70 2024    HYPERPLASTIC POLYPS COLON , 3/06    Hypertrophy of prostate without urinary obstruction     Impaired fasting glucose     Low Back Pain     Lumbar radiculopathy 2024    Obesity, unspecified     Other and unspecified hyperlipidemia     Palpitations 2022    Personal history of urinary calculi     Pneumonia, organism unspecified(486)     RBBB (right bundle branch block with left anterior fascicular block) 2024    Squamous cell carcinoma of skin, unspecified     Thyroid nodule 2024    TRANSIENT CEREBRAL ISCHEMIA 2007      Past Surgical History:   Procedure Laterality Date    ARTHROPLASTY KNEE Left 2024    Procedure: left total knee arthroplasty;  Surgeon: Mono Nichols MD;  Location: [facility] OR    BIOPSY  2017    COLONOSCOPY       "DISCECTOMY LUMBAR POSTERIOR MICROSCOPIC TWO LEVELS  2012    DISCECTOMY LUMBAR POSTERIOR MICROSCOPIC TWO LEVELS;  RIGHT L3-L4 REDO EXTENDED HEMILAMINECTOMY AND MICRO FORAMINOTOMY, LEFT L4-L5 EXTENDED HEMILAMINECTOMY AND MICRODISCECTOMY (PRONE) (SONYA MCCALLUM, C-ARM, METRIX II);  Surgeon: Bertram Mirza MD;  Location:  OR    ENT SURGERY      Cancer spot on right ear    EYE SURGERY  2018    OPTICAL TRACKING SYSTEM FUSION SPINE POSTERIOR LUMBAR TWO LEVELS N/A 2019    Procedure: L3-5 LUMBAR TRANSFORAMINAL INTERBODY FUSION WITH STEALTH;  Surgeon: Harrison Perez MD;  Location:  OR    renal calculii removal 40 years ago  1965    [facility] NONSPECIFIC PROCEDURE  1959    pilonidal cystectomy    [facility] NONSPECIFIC PROCEDURE  1966    kidney stone    [facility] NONSPECIFIC PROCEDURE  approx     R knee arthroscopy     Dr. Guzman    [facility] NONSPECIFIC PROCEDURE  2005    L3-4 microdiskectomy   Dr. Brantley    [facility] TOTAL KNEE ARTHROPLASTY  2010    Minimally invasive R TKA   Dr. Nichols      Allergies   Allergen Reactions    Meclizine Other (See Comments)     Over sedation, concentration problem    Tramadol Hcl Other (See Comments)     Pt feels very drugged, \"cloudy\" if used more than one day. Nausea also    Cardura [Doxazosin Mesylate] Other (See Comments)     fainted    Hydrochlorothiazide Other (See Comments)      lightheadedness    Naproxen Nausea     nausea      Social History     Tobacco Use    Smoking status: Former     Current packs/day: 0.00     Average packs/day: 0.5 packs/day for 11.0 years (5.5 ttl pk-yrs)     Types: Cigarettes     Start date: 1957     Quit date: 1968     Years since quittin.9    Smokeless tobacco: Never   Substance Use Topics    Alcohol use: No      Wt Readings from Last 1 Encounters:   24 92.3 kg (203 lb 8 oz)        Anesthesia Evaluation   Pt has had prior anesthetic.     No history of anesthetic complications       ROS/MED " HX  ENT/Pulmonary:    (-) sleep apnea   Neurologic:     (+)              TIA (on plavix),                  Cardiovascular:     (+)  hypertension- Peripheral Vascular Disease (mild per 2024 ultrasound)-- Carotid Stenosis.   -  - -   Taking blood thinners (Plavix stopped >1week ago)                                   METS/Exercise Tolerance:     Hematologic:       Musculoskeletal: Comment: Chronic LBP, history of L3-5 fusion      GI/Hepatic:     (+) GERD, Asymptomatic on medication,                  Renal/Genitourinary:     (+)        BPH,      Endo:     (+)               Obesity (BMI 32),       Psychiatric/Substance Use:     (+) psychiatric history anxiety       Infectious Disease:       Malignancy:       Other:            Physical Exam    Airway        Mallampati: III   TM distance: > 3 FB   Neck ROM: full   Mouth opening: > 3 cm    Respiratory Devices and Support         Dental       (+) Edentulous      Cardiovascular   cardiovascular exam normal          Pulmonary   pulmonary exam normal                OUTSIDE LABS:  CBC:   Lab Results   Component Value Date    WBC 6.9 11/04/2024    WBC 8.5 10/04/2024    HGB 13.4 11/04/2024    HGB 12.9 (L) 10/04/2024    HCT 40.0 11/04/2024    HCT 38.5 (L) 10/04/2024     11/04/2024     10/04/2024     BMP:   Lab Results   Component Value Date     09/17/2024     (L) 09/12/2024    POTASSIUM 3.6 09/17/2024    POTASSIUM 4.2 09/12/2024    CHLORIDE 98 09/17/2024    CHLORIDE 98 09/12/2024    CO2 23 09/17/2024    CO2 24 09/12/2024    BUN 10.2 09/17/2024    BUN 12.0 09/12/2024    CR 0.71 09/17/2024    CR 0.75 09/12/2024     (H) 09/17/2024     (H) 09/13/2024     COAGS:   Lab Results   Component Value Date    PTT 26 07/12/2010    INR 1.93 (H) 07/15/2010     POC:   Lab Results   Component Value Date     (H) 08/24/2019     HEPATIC:   Lab Results   Component Value Date    ALBUMIN 4.2 04/30/2024    PROTTOTAL 6.9 04/30/2024    ALT 25 04/30/2024    AST  "22 04/30/2024    ALKPHOS 101 04/30/2024    BILITOTAL 0.6 04/30/2024     OTHER:   Lab Results   Component Value Date    LACT 1.3 08/23/2019    A1C 5.5 08/28/2012    RICARDO 9.1 09/17/2024    AMYLASE 81 09/13/2013    TSH 0.37 09/17/2024    T4 0.88 10/16/2019    SED 7 11/15/2017       Anesthesia Plan    ASA Status:  3    NPO Status:  NPO Appropriate    Anesthesia Type: General.     - Airway: ETT   Induction: Intravenous.   Maintenance: Balanced.   Techniques and Equipment:     - Airway: Video-Laryngoscope       Consents    Anesthesia Plan(s) and associated risks, benefits, and realistic alternatives discussed. Questions answered and patient/representative(s) expressed understanding.     - Discussed:     - Discussed with:  Patient            Postoperative Care    Pain management: IV analgesics, Multi-modal analgesia.   PONV prophylaxis: Ondansetron (or other 5HT-3), Dexamethasone or Solumedrol     Comments:               Basilio Desai MD    I have reviewed the pertinent notes and labs in the chart from the past 30 days and (re)examined the patient.  Any updates or changes from those notes are reflected in this note.               # Hypertension: Noted on problem list           # Obesity: Estimated body mass index is 31.87 kg/m  as calculated from the following:    Height as of 11/4/24: 1.702 m (5' 7\").    Weight as of 11/4/24: 92.3 kg (203 lb 8 oz).             "

## 2024-11-26 ENCOUNTER — HOSPITAL ENCOUNTER (INPATIENT)
Facility: CLINIC | Age: 84
DRG: 402 | End: 2024-11-26
Attending: NEUROLOGICAL SURGERY | Admitting: NEUROLOGICAL SURGERY
Payer: COMMERCIAL

## 2024-11-26 ENCOUNTER — APPOINTMENT (OUTPATIENT)
Dept: GENERAL RADIOLOGY | Facility: CLINIC | Age: 84
DRG: 402 | End: 2024-11-26
Attending: NEUROLOGICAL SURGERY
Payer: COMMERCIAL

## 2024-11-26 ENCOUNTER — ANESTHESIA (OUTPATIENT)
Dept: SURGERY | Facility: CLINIC | Age: 84
End: 2024-11-26
Payer: COMMERCIAL

## 2024-11-26 DIAGNOSIS — I48.0 PAROXYSMAL ATRIAL FIBRILLATION (H): ICD-10-CM

## 2024-11-26 DIAGNOSIS — R05.9 COUGH, UNSPECIFIED TYPE: ICD-10-CM

## 2024-11-26 DIAGNOSIS — I10 ESSENTIAL HYPERTENSION: ICD-10-CM

## 2024-11-26 DIAGNOSIS — Z98.1 S/P LUMBAR FUSION: Primary | ICD-10-CM

## 2024-11-26 LAB
ABO + RH BLD: NORMAL
BLD GP AB SCN SERPL QL: NEGATIVE
CREAT SERPL-MCNC: 0.74 MG/DL (ref 0.67–1.17)
EGFRCR SERPLBLD CKD-EPI 2021: 89 ML/MIN/1.73M2
GLUCOSE SERPL-MCNC: 106 MG/DL (ref 70–99)
POTASSIUM SERPL-SCNC: 4.4 MMOL/L (ref 3.4–5.3)
SPECIMEN EXP DATE BLD: NORMAL

## 2024-11-26 PROCEDURE — 250N000013 HC RX MED GY IP 250 OP 250 PS 637: Performed by: INTERNAL MEDICINE

## 2024-11-26 PROCEDURE — 99223 1ST HOSP IP/OBS HIGH 75: CPT | Performed by: INTERNAL MEDICINE

## 2024-11-26 PROCEDURE — 22633 ARTHRD CMBN 1NTRSPC LUMBAR: CPT | Mod: 79 | Performed by: NEUROLOGICAL SURGERY

## 2024-11-26 PROCEDURE — C1713 ANCHOR/SCREW BN/BN,TIS/BN: HCPCS | Performed by: NEUROLOGICAL SURGERY

## 2024-11-26 PROCEDURE — 258N000003 HC RX IP 258 OP 636

## 2024-11-26 PROCEDURE — 63052 LAM FACETC/FRMT ARTHRD LUM 1: CPT | Mod: AS

## 2024-11-26 PROCEDURE — 250N000011 HC RX IP 250 OP 636: Performed by: NEUROLOGICAL SURGERY

## 2024-11-26 PROCEDURE — 36415 COLL VENOUS BLD VENIPUNCTURE: CPT | Performed by: ANESTHESIOLOGY

## 2024-11-26 PROCEDURE — 86900 BLOOD TYPING SEROLOGIC ABO: CPT | Performed by: ANESTHESIOLOGY

## 2024-11-26 PROCEDURE — 370N000017 HC ANESTHESIA TECHNICAL FEE, PER MIN: Performed by: NEUROLOGICAL SURGERY

## 2024-11-26 PROCEDURE — 20936 SP BONE AGRFT LOCAL ADD-ON: CPT | Performed by: NEUROLOGICAL SURGERY

## 2024-11-26 PROCEDURE — 0ST20ZZ RESECTION OF LUMBAR VERTEBRAL DISC, OPEN APPROACH: ICD-10-PCS | Performed by: NEUROLOGICAL SURGERY

## 2024-11-26 PROCEDURE — 22853 INSJ BIOMECHANICAL DEVICE: CPT | Performed by: NEUROLOGICAL SURGERY

## 2024-11-26 PROCEDURE — 82947 ASSAY GLUCOSE BLOOD QUANT: CPT | Performed by: ANESTHESIOLOGY

## 2024-11-26 PROCEDURE — 8E0WXBF COMPUTER ASSISTED PROCEDURE OF TRUNK REGION, WITH FLUOROSCOPY: ICD-10-PCS | Performed by: NEUROLOGICAL SURGERY

## 2024-11-26 PROCEDURE — 82565 ASSAY OF CREATININE: CPT | Performed by: ANESTHESIOLOGY

## 2024-11-26 PROCEDURE — 0SG0371 FUSION OF LUMBAR VERTEBRAL JOINT WITH AUTOLOGOUS TISSUE SUBSTITUTE, POSTERIOR APPROACH, POSTERIOR COLUMN, PERCUTANEOUS APPROACH: ICD-10-PCS | Performed by: NEUROLOGICAL SURGERY

## 2024-11-26 PROCEDURE — 84132 ASSAY OF SERUM POTASSIUM: CPT | Performed by: ANESTHESIOLOGY

## 2024-11-26 PROCEDURE — 999N000141 HC STATISTIC PRE-PROCEDURE NURSING ASSESSMENT: Performed by: NEUROLOGICAL SURGERY

## 2024-11-26 PROCEDURE — 22842 INSERT SPINE FIXATION DEVICE: CPT | Performed by: NEUROLOGICAL SURGERY

## 2024-11-26 PROCEDURE — 250N000011 HC RX IP 250 OP 636: Performed by: NURSE ANESTHETIST, CERTIFIED REGISTERED

## 2024-11-26 PROCEDURE — 999N000179 XR SURGERY CARM FLUORO LESS THAN 5 MIN W STILLS

## 2024-11-26 PROCEDURE — 20930 SP BONE ALGRFT MORSEL ADD-ON: CPT | Performed by: NEUROLOGICAL SURGERY

## 2024-11-26 PROCEDURE — 250N000011 HC RX IP 250 OP 636

## 2024-11-26 PROCEDURE — 0SG00AJ FUSION OF LUMBAR VERTEBRAL JOINT WITH INTERBODY FUSION DEVICE, POSTERIOR APPROACH, ANTERIOR COLUMN, OPEN APPROACH: ICD-10-PCS | Performed by: NEUROLOGICAL SURGERY

## 2024-11-26 PROCEDURE — 00QT0ZZ REPAIR SPINAL MENINGES, OPEN APPROACH: ICD-10-PCS | Performed by: NEUROLOGICAL SURGERY

## 2024-11-26 PROCEDURE — 999N000182 XR SURGERY OARM

## 2024-11-26 PROCEDURE — 01NB0ZZ RELEASE LUMBAR NERVE, OPEN APPROACH: ICD-10-PCS | Performed by: NEUROLOGICAL SURGERY

## 2024-11-26 PROCEDURE — 22842 INSERT SPINE FIXATION DEVICE: CPT | Mod: AS

## 2024-11-26 PROCEDURE — 120N000001 HC R&B MED SURG/OB

## 2024-11-26 PROCEDURE — 22633 ARTHRD CMBN 1NTRSPC LUMBAR: CPT | Mod: AS

## 2024-11-26 PROCEDURE — 63052 LAM FACETC/FRMT ARTHRD LUM 1: CPT | Performed by: NEUROLOGICAL SURGERY

## 2024-11-26 PROCEDURE — 250N000009 HC RX 250: Performed by: NURSE ANESTHETIST, CERTIFIED REGISTERED

## 2024-11-26 PROCEDURE — 61783 SCAN PROC SPINAL: CPT | Mod: AS

## 2024-11-26 PROCEDURE — P9045 ALBUMIN (HUMAN), 5%, 250 ML: HCPCS | Performed by: NURSE ANESTHETIST, CERTIFIED REGISTERED

## 2024-11-26 PROCEDURE — 258N000003 HC RX IP 258 OP 636: Performed by: NURSE ANESTHETIST, CERTIFIED REGISTERED

## 2024-11-26 PROCEDURE — C1762 CONN TISS, HUMAN(INC FASCIA): HCPCS | Performed by: NEUROLOGICAL SURGERY

## 2024-11-26 PROCEDURE — 272N000001 HC OR GENERAL SUPPLY STERILE: Performed by: NEUROLOGICAL SURGERY

## 2024-11-26 PROCEDURE — 22853 INSJ BIOMECHANICAL DEVICE: CPT | Mod: AS

## 2024-11-26 PROCEDURE — 360N000086 HC SURGERY LEVEL 6 W/ FLUORO, PER MIN: Performed by: NEUROLOGICAL SURGERY

## 2024-11-26 PROCEDURE — 0QP004Z REMOVAL OF INTERNAL FIXATION DEVICE FROM LUMBAR VERTEBRA, OPEN APPROACH: ICD-10-PCS | Performed by: NEUROLOGICAL SURGERY

## 2024-11-26 PROCEDURE — 710N000009 HC RECOVERY PHASE 1, LEVEL 1, PER MIN: Performed by: NEUROLOGICAL SURGERY

## 2024-11-26 PROCEDURE — 250N000011 HC RX IP 250 OP 636: Performed by: ANESTHESIOLOGY

## 2024-11-26 PROCEDURE — 250N000013 HC RX MED GY IP 250 OP 250 PS 637

## 2024-11-26 PROCEDURE — 250N000009 HC RX 250: Performed by: NEUROLOGICAL SURGERY

## 2024-11-26 PROCEDURE — 250N000025 HC SEVOFLURANE, PER MIN: Performed by: NEUROLOGICAL SURGERY

## 2024-11-26 PROCEDURE — 61783 SCAN PROC SPINAL: CPT | Mod: 59 | Performed by: NEUROLOGICAL SURGERY

## 2024-11-26 DEVICE — KIT BNGF 6CC DMNR CORT FBR ACCELERATE GRFTN CNN T50206: Type: IMPLANTABLE DEVICE | Site: SPINE LUMBAR | Status: FUNCTIONAL

## 2024-11-26 DEVICE — IMPLANTABLE DEVICE: Type: IMPLANTABLE DEVICE | Site: SPINE LUMBAR | Status: FUNCTIONAL

## 2024-11-26 DEVICE — BONE CHIP CANCELLOUS 15CC 100015: Type: IMPLANTABLE DEVICE | Site: SPINE LUMBAR | Status: FUNCTIONAL

## 2024-11-26 DEVICE — POSTERIOR LUMBAR CAGE
Type: IMPLANTABLE DEVICE | Site: SPINE LUMBAR | Status: FUNCTIONAL
Brand: TRITANIUM PL

## 2024-11-26 RX ORDER — OXYCODONE HYDROCHLORIDE 5 MG/1
5 TABLET ORAL EVERY 4 HOURS PRN
Status: DISCONTINUED | OUTPATIENT
Start: 2024-11-26 | End: 2024-12-02 | Stop reason: HOSPADM

## 2024-11-26 RX ORDER — ONDANSETRON 2 MG/ML
INJECTION INTRAMUSCULAR; INTRAVENOUS PRN
Status: DISCONTINUED | OUTPATIENT
Start: 2024-11-26 | End: 2024-11-26

## 2024-11-26 RX ORDER — HYDROMORPHONE HCL IN WATER/PF 6 MG/30 ML
0.2 PATIENT CONTROLLED ANALGESIA SYRINGE INTRAVENOUS
Status: DISCONTINUED | OUTPATIENT
Start: 2024-11-26 | End: 2024-12-02 | Stop reason: HOSPADM

## 2024-11-26 RX ORDER — SODIUM CHLORIDE 9 MG/ML
INJECTION, SOLUTION INTRAVENOUS CONTINUOUS
Status: DISCONTINUED | OUTPATIENT
Start: 2024-11-26 | End: 2024-11-27

## 2024-11-26 RX ORDER — NALOXONE HYDROCHLORIDE 0.4 MG/ML
0.4 INJECTION, SOLUTION INTRAMUSCULAR; INTRAVENOUS; SUBCUTANEOUS
Status: DISCONTINUED | OUTPATIENT
Start: 2024-11-26 | End: 2024-12-02 | Stop reason: HOSPADM

## 2024-11-26 RX ORDER — EPHEDRINE SULFATE 50 MG/ML
INJECTION, SOLUTION INTRAMUSCULAR; INTRAVENOUS; SUBCUTANEOUS PRN
Status: DISCONTINUED | OUTPATIENT
Start: 2024-11-26 | End: 2024-11-26

## 2024-11-26 RX ORDER — DEXMEDETOMIDINE HYDROCHLORIDE 4 UG/ML
INJECTION, SOLUTION INTRAVENOUS PRN
Status: DISCONTINUED | OUTPATIENT
Start: 2024-11-26 | End: 2024-11-26

## 2024-11-26 RX ORDER — POLYETHYLENE GLYCOL 3350 17 G/17G
17 POWDER, FOR SOLUTION ORAL DAILY
Status: DISCONTINUED | OUTPATIENT
Start: 2024-11-27 | End: 2024-11-27

## 2024-11-26 RX ORDER — HYDROXYZINE HYDROCHLORIDE 10 MG/1
10 TABLET, FILM COATED ORAL EVERY 6 HOURS PRN
Status: DISCONTINUED | OUTPATIENT
Start: 2024-11-26 | End: 2024-12-02 | Stop reason: HOSPADM

## 2024-11-26 RX ORDER — SODIUM CHLORIDE, SODIUM LACTATE, POTASSIUM CHLORIDE, CALCIUM CHLORIDE 600; 310; 30; 20 MG/100ML; MG/100ML; MG/100ML; MG/100ML
INJECTION, SOLUTION INTRAVENOUS CONTINUOUS
Status: DISCONTINUED | OUTPATIENT
Start: 2024-11-26 | End: 2024-11-26 | Stop reason: HOSPADM

## 2024-11-26 RX ORDER — FENTANYL CITRATE 50 UG/ML
50 INJECTION, SOLUTION INTRAMUSCULAR; INTRAVENOUS EVERY 5 MIN PRN
Status: DISCONTINUED | OUTPATIENT
Start: 2024-11-26 | End: 2024-11-26 | Stop reason: HOSPADM

## 2024-11-26 RX ORDER — FEXOFENADINE HCL 180 MG/1
180 TABLET ORAL EVERY EVENING
Status: DISCONTINUED | OUTPATIENT
Start: 2024-11-26 | End: 2024-12-02 | Stop reason: HOSPADM

## 2024-11-26 RX ORDER — FENTANYL CITRATE 50 UG/ML
INJECTION, SOLUTION INTRAMUSCULAR; INTRAVENOUS PRN
Status: DISCONTINUED | OUTPATIENT
Start: 2024-11-26 | End: 2024-11-26

## 2024-11-26 RX ORDER — CALCIUM CARBONATE 500 MG/1
500 TABLET, CHEWABLE ORAL 4 TIMES DAILY PRN
Status: DISCONTINUED | OUTPATIENT
Start: 2024-11-26 | End: 2024-11-26

## 2024-11-26 RX ORDER — NALOXONE HYDROCHLORIDE 0.4 MG/ML
0.1 INJECTION, SOLUTION INTRAMUSCULAR; INTRAVENOUS; SUBCUTANEOUS
Status: DISCONTINUED | OUTPATIENT
Start: 2024-11-26 | End: 2024-11-26 | Stop reason: HOSPADM

## 2024-11-26 RX ORDER — ONDANSETRON 2 MG/ML
4 INJECTION INTRAMUSCULAR; INTRAVENOUS EVERY 6 HOURS PRN
Status: DISCONTINUED | OUTPATIENT
Start: 2024-11-26 | End: 2024-12-02 | Stop reason: HOSPADM

## 2024-11-26 RX ORDER — ONDANSETRON 4 MG/1
4 TABLET, ORALLY DISINTEGRATING ORAL EVERY 6 HOURS PRN
Status: DISCONTINUED | OUTPATIENT
Start: 2024-11-26 | End: 2024-12-02 | Stop reason: HOSPADM

## 2024-11-26 RX ORDER — FENTANYL CITRATE 50 UG/ML
25 INJECTION, SOLUTION INTRAMUSCULAR; INTRAVENOUS EVERY 5 MIN PRN
Status: DISCONTINUED | OUTPATIENT
Start: 2024-11-26 | End: 2024-11-26 | Stop reason: HOSPADM

## 2024-11-26 RX ORDER — HYDROMORPHONE HCL IN WATER/PF 6 MG/30 ML
0.4 PATIENT CONTROLLED ANALGESIA SYRINGE INTRAVENOUS EVERY 5 MIN PRN
Status: DISCONTINUED | OUTPATIENT
Start: 2024-11-26 | End: 2024-11-26 | Stop reason: HOSPADM

## 2024-11-26 RX ORDER — BISACODYL 10 MG
10 SUPPOSITORY, RECTAL RECTAL DAILY PRN
Status: DISCONTINUED | OUTPATIENT
Start: 2024-11-29 | End: 2024-12-02 | Stop reason: HOSPADM

## 2024-11-26 RX ORDER — AMLODIPINE AND BENAZEPRIL HYDROCHLORIDE 5; 20 MG/1; MG/1
1 CAPSULE ORAL DAILY
Status: DISCONTINUED | OUTPATIENT
Start: 2024-11-26 | End: 2024-11-26

## 2024-11-26 RX ORDER — CALCIUM CARBONATE 500 MG/1
500 TABLET, CHEWABLE ORAL 4 TIMES DAILY PRN
Status: DISCONTINUED | OUTPATIENT
Start: 2024-11-26 | End: 2024-12-02 | Stop reason: HOSPADM

## 2024-11-26 RX ORDER — METOPROLOL SUCCINATE 50 MG/1
50 TABLET, EXTENDED RELEASE ORAL DAILY
Status: DISCONTINUED | OUTPATIENT
Start: 2024-11-27 | End: 2024-12-01

## 2024-11-26 RX ORDER — DEXAMETHASONE SODIUM PHOSPHATE 4 MG/ML
4 INJECTION, SOLUTION INTRA-ARTICULAR; INTRALESIONAL; INTRAMUSCULAR; INTRAVENOUS; SOFT TISSUE
Status: DISCONTINUED | OUTPATIENT
Start: 2024-11-26 | End: 2024-11-26 | Stop reason: HOSPADM

## 2024-11-26 RX ORDER — CEFAZOLIN SODIUM/WATER 2 G/20 ML
2 SYRINGE (ML) INTRAVENOUS SEE ADMIN INSTRUCTIONS
Status: DISCONTINUED | OUTPATIENT
Start: 2024-11-26 | End: 2024-11-26 | Stop reason: HOSPADM

## 2024-11-26 RX ORDER — METHOCARBAMOL 500 MG/1
500 TABLET, FILM COATED ORAL EVERY 6 HOURS PRN
Status: DISCONTINUED | OUTPATIENT
Start: 2024-11-26 | End: 2024-11-28

## 2024-11-26 RX ORDER — PROPOFOL 10 MG/ML
INJECTION, EMULSION INTRAVENOUS CONTINUOUS PRN
Status: DISCONTINUED | OUTPATIENT
Start: 2024-11-26 | End: 2024-11-26

## 2024-11-26 RX ORDER — NALOXONE HYDROCHLORIDE 0.4 MG/ML
0.2 INJECTION, SOLUTION INTRAMUSCULAR; INTRAVENOUS; SUBCUTANEOUS
Status: DISCONTINUED | OUTPATIENT
Start: 2024-11-26 | End: 2024-12-02 | Stop reason: HOSPADM

## 2024-11-26 RX ORDER — ONDANSETRON 2 MG/ML
4 INJECTION INTRAMUSCULAR; INTRAVENOUS EVERY 30 MIN PRN
Status: DISCONTINUED | OUTPATIENT
Start: 2024-11-26 | End: 2024-11-26 | Stop reason: HOSPADM

## 2024-11-26 RX ORDER — ACETAMINOPHEN 325 MG/1
650 TABLET ORAL EVERY 4 HOURS PRN
Status: DISCONTINUED | OUTPATIENT
Start: 2024-11-29 | End: 2024-12-02 | Stop reason: HOSPADM

## 2024-11-26 RX ORDER — HYDROMORPHONE HCL IN WATER/PF 6 MG/30 ML
0.4 PATIENT CONTROLLED ANALGESIA SYRINGE INTRAVENOUS
Status: DISCONTINUED | OUTPATIENT
Start: 2024-11-26 | End: 2024-12-02 | Stop reason: HOSPADM

## 2024-11-26 RX ORDER — LISINOPRIL 10 MG/1
20 TABLET ORAL EVERY EVENING
Status: DISCONTINUED | OUTPATIENT
Start: 2024-11-27 | End: 2024-12-02 | Stop reason: HOSPADM

## 2024-11-26 RX ORDER — AMLODIPINE BESYLATE 5 MG/1
5 TABLET ORAL EVERY EVENING
Status: DISCONTINUED | OUTPATIENT
Start: 2024-11-26 | End: 2024-12-02

## 2024-11-26 RX ORDER — ACETAMINOPHEN 325 MG/1
975 TABLET ORAL EVERY 8 HOURS
Status: COMPLETED | OUTPATIENT
Start: 2024-11-26 | End: 2024-11-29

## 2024-11-26 RX ORDER — ONDANSETRON 4 MG/1
4 TABLET, ORALLY DISINTEGRATING ORAL EVERY 30 MIN PRN
Status: DISCONTINUED | OUTPATIENT
Start: 2024-11-26 | End: 2024-11-26 | Stop reason: HOSPADM

## 2024-11-26 RX ORDER — DEXAMETHASONE SODIUM PHOSPHATE 4 MG/ML
INJECTION, SOLUTION INTRA-ARTICULAR; INTRALESIONAL; INTRAMUSCULAR; INTRAVENOUS; SOFT TISSUE PRN
Status: DISCONTINUED | OUTPATIENT
Start: 2024-11-26 | End: 2024-11-26

## 2024-11-26 RX ORDER — LIDOCAINE 40 MG/G
CREAM TOPICAL
Status: DISCONTINUED | OUTPATIENT
Start: 2024-11-26 | End: 2024-12-02 | Stop reason: HOSPADM

## 2024-11-26 RX ORDER — LISINOPRIL 10 MG/1
20 TABLET ORAL EVERY EVENING
Status: DISCONTINUED | OUTPATIENT
Start: 2024-11-26 | End: 2024-11-26

## 2024-11-26 RX ORDER — PROCHLORPERAZINE MALEATE 5 MG/1
5 TABLET ORAL EVERY 6 HOURS PRN
Status: DISCONTINUED | OUTPATIENT
Start: 2024-11-26 | End: 2024-12-02 | Stop reason: HOSPADM

## 2024-11-26 RX ORDER — TAMSULOSIN HYDROCHLORIDE 0.4 MG/1
0.4 CAPSULE ORAL EVERY EVENING
Status: DISCONTINUED | OUTPATIENT
Start: 2024-11-26 | End: 2024-12-02 | Stop reason: HOSPADM

## 2024-11-26 RX ORDER — AMOXICILLIN 250 MG
1 CAPSULE ORAL 2 TIMES DAILY
Status: DISCONTINUED | OUTPATIENT
Start: 2024-11-26 | End: 2024-11-27

## 2024-11-26 RX ORDER — GABAPENTIN 100 MG/1
100 CAPSULE ORAL
Status: COMPLETED | OUTPATIENT
Start: 2024-11-26 | End: 2024-11-26

## 2024-11-26 RX ORDER — ATORVASTATIN CALCIUM 40 MG/1
40 TABLET, FILM COATED ORAL EVERY EVENING
Status: DISCONTINUED | OUTPATIENT
Start: 2024-11-26 | End: 2024-12-02 | Stop reason: HOSPADM

## 2024-11-26 RX ORDER — CEFAZOLIN SODIUM 2 G/100ML
2 INJECTION, SOLUTION INTRAVENOUS EVERY 8 HOURS
Status: DISCONTINUED | OUTPATIENT
Start: 2024-11-26 | End: 2024-12-01

## 2024-11-26 RX ORDER — SODIUM CHLORIDE, SODIUM LACTATE, POTASSIUM CHLORIDE, CALCIUM CHLORIDE 600; 310; 30; 20 MG/100ML; MG/100ML; MG/100ML; MG/100ML
INJECTION, SOLUTION INTRAVENOUS CONTINUOUS PRN
Status: DISCONTINUED | OUTPATIENT
Start: 2024-11-26 | End: 2024-11-26

## 2024-11-26 RX ORDER — HYDROMORPHONE HCL IN WATER/PF 6 MG/30 ML
0.2 PATIENT CONTROLLED ANALGESIA SYRINGE INTRAVENOUS EVERY 5 MIN PRN
Status: DISCONTINUED | OUTPATIENT
Start: 2024-11-26 | End: 2024-11-26 | Stop reason: HOSPADM

## 2024-11-26 RX ORDER — OXYCODONE HYDROCHLORIDE 5 MG/1
10 TABLET ORAL EVERY 4 HOURS PRN
Status: DISCONTINUED | OUTPATIENT
Start: 2024-11-26 | End: 2024-12-02 | Stop reason: HOSPADM

## 2024-11-26 RX ORDER — CEFAZOLIN SODIUM/WATER 2 G/20 ML
2 SYRINGE (ML) INTRAVENOUS
Status: COMPLETED | OUTPATIENT
Start: 2024-11-26 | End: 2024-11-26

## 2024-11-26 RX ORDER — BUPIVACAINE HYDROCHLORIDE AND EPINEPHRINE 5; 5 MG/ML; UG/ML
INJECTION, SOLUTION PERINEURAL PRN
Status: DISCONTINUED | OUTPATIENT
Start: 2024-11-26 | End: 2024-11-26 | Stop reason: HOSPADM

## 2024-11-26 RX ORDER — LIDOCAINE HYDROCHLORIDE 20 MG/ML
INJECTION, SOLUTION INFILTRATION; PERINEURAL PRN
Status: DISCONTINUED | OUTPATIENT
Start: 2024-11-26 | End: 2024-11-26

## 2024-11-26 RX ADMIN — HYDROMORPHONE HYDROCHLORIDE 0.4 MG: 0.2 INJECTION, SOLUTION INTRAMUSCULAR; INTRAVENOUS; SUBCUTANEOUS at 20:17

## 2024-11-26 RX ADMIN — DEXMEDETOMIDINE HYDROCHLORIDE 4 MCG: 200 INJECTION INTRAVENOUS at 13:16

## 2024-11-26 RX ADMIN — PHENYLEPHRINE HYDROCHLORIDE 100 MCG: 10 INJECTION INTRAVENOUS at 10:25

## 2024-11-26 RX ADMIN — PHENYLEPHRINE HYDROCHLORIDE 100 MCG: 10 INJECTION INTRAVENOUS at 10:28

## 2024-11-26 RX ADMIN — FENTANYL CITRATE 25 MCG: 50 INJECTION, SOLUTION INTRAMUSCULAR; INTRAVENOUS at 13:58

## 2024-11-26 RX ADMIN — ROCURONIUM BROMIDE 20 MG: 50 INJECTION, SOLUTION INTRAVENOUS at 11:40

## 2024-11-26 RX ADMIN — SENNOSIDES AND DOCUSATE SODIUM 1 TABLET: 50; 8.6 TABLET ORAL at 20:21

## 2024-11-26 RX ADMIN — Medication 5 MG: at 22:32

## 2024-11-26 RX ADMIN — Medication 5 MG: at 10:52

## 2024-11-26 RX ADMIN — DEXAMETHASONE SODIUM PHOSPHATE 4 MG: 4 INJECTION, SOLUTION INTRA-ARTICULAR; INTRALESIONAL; INTRAMUSCULAR; INTRAVENOUS; SOFT TISSUE at 10:09

## 2024-11-26 RX ADMIN — SODIUM CHLORIDE, POTASSIUM CHLORIDE, SODIUM LACTATE AND CALCIUM CHLORIDE: 600; 310; 30; 20 INJECTION, SOLUTION INTRAVENOUS at 12:13

## 2024-11-26 RX ADMIN — Medication 200 MG: at 13:16

## 2024-11-26 RX ADMIN — FENTANYL CITRATE 25 MCG: 50 INJECTION INTRAMUSCULAR; INTRAVENOUS at 12:08

## 2024-11-26 RX ADMIN — DEXMEDETOMIDINE HYDROCHLORIDE 8 MCG: 200 INJECTION INTRAVENOUS at 13:18

## 2024-11-26 RX ADMIN — TAMSULOSIN HYDROCHLORIDE 0.4 MG: 0.4 CAPSULE ORAL at 20:22

## 2024-11-26 RX ADMIN — Medication 5 MG: at 11:24

## 2024-11-26 RX ADMIN — CEFAZOLIN SODIUM 2 G: 2 INJECTION, SOLUTION INTRAVENOUS at 18:44

## 2024-11-26 RX ADMIN — AMLODIPINE BESYLATE 5 MG: 5 TABLET ORAL at 20:19

## 2024-11-26 RX ADMIN — ACETAMINOPHEN 975 MG: 325 TABLET, FILM COATED ORAL at 22:31

## 2024-11-26 RX ADMIN — LIDOCAINE HYDROCHLORIDE 100 MG: 20 INJECTION, SOLUTION INFILTRATION; PERINEURAL at 10:03

## 2024-11-26 RX ADMIN — SODIUM CHLORIDE, POTASSIUM CHLORIDE, SODIUM LACTATE AND CALCIUM CHLORIDE: 600; 310; 30; 20 INJECTION, SOLUTION INTRAVENOUS at 09:56

## 2024-11-26 RX ADMIN — ATORVASTATIN CALCIUM 40 MG: 40 TABLET, FILM COATED ORAL at 20:22

## 2024-11-26 RX ADMIN — PROPOFOL 100 MG: 10 INJECTION, EMULSION INTRAVENOUS at 10:03

## 2024-11-26 RX ADMIN — PHENYLEPHRINE HYDROCHLORIDE 100 MCG: 10 INJECTION INTRAVENOUS at 11:24

## 2024-11-26 RX ADMIN — GABAPENTIN 100 MG: 100 CAPSULE ORAL at 08:48

## 2024-11-26 RX ADMIN — Medication 5 MG: at 10:47

## 2024-11-26 RX ADMIN — PROPOFOL 20 MCG/KG/MIN: 10 INJECTION, EMULSION INTRAVENOUS at 10:32

## 2024-11-26 RX ADMIN — ROCURONIUM BROMIDE 50 MG: 50 INJECTION, SOLUTION INTRAVENOUS at 10:04

## 2024-11-26 RX ADMIN — ONDANSETRON 4 MG: 2 INJECTION INTRAMUSCULAR; INTRAVENOUS at 12:44

## 2024-11-26 RX ADMIN — DEXMEDETOMIDINE HYDROCHLORIDE 4 MCG: 200 INJECTION INTRAVENOUS at 12:59

## 2024-11-26 RX ADMIN — PHENYLEPHRINE HYDROCHLORIDE 0.5 MCG/KG/MIN: 10 INJECTION INTRAVENOUS at 10:23

## 2024-11-26 RX ADMIN — HYDROMORPHONE HYDROCHLORIDE 0.2 MG: 0.2 INJECTION, SOLUTION INTRAMUSCULAR; INTRAVENOUS; SUBCUTANEOUS at 15:24

## 2024-11-26 RX ADMIN — ALBUMIN (HUMAN): 12.5 SOLUTION INTRAVENOUS at 11:18

## 2024-11-26 RX ADMIN — PROPOFOL 50 MG: 10 INJECTION, EMULSION INTRAVENOUS at 10:04

## 2024-11-26 RX ADMIN — DEXMEDETOMIDINE HYDROCHLORIDE 4 MCG: 200 INJECTION INTRAVENOUS at 13:04

## 2024-11-26 RX ADMIN — FENTANYL CITRATE 25 MCG: 50 INJECTION INTRAMUSCULAR; INTRAVENOUS at 10:30

## 2024-11-26 RX ADMIN — PHENYLEPHRINE HYDROCHLORIDE 100 MCG: 10 INJECTION INTRAVENOUS at 11:11

## 2024-11-26 RX ADMIN — PROPOFOL 30 MCG/KG/MIN: 10 INJECTION, EMULSION INTRAVENOUS at 10:16

## 2024-11-26 RX ADMIN — ROCURONIUM BROMIDE 30 MG: 50 INJECTION, SOLUTION INTRAVENOUS at 10:27

## 2024-11-26 RX ADMIN — PHENYLEPHRINE HYDROCHLORIDE 100 MCG: 10 INJECTION INTRAVENOUS at 11:07

## 2024-11-26 RX ADMIN — PHENYLEPHRINE HYDROCHLORIDE 100 MCG: 10 INJECTION INTRAVENOUS at 10:29

## 2024-11-26 RX ADMIN — Medication 2 G: at 10:22

## 2024-11-26 RX ADMIN — SODIUM CHLORIDE: 9 INJECTION, SOLUTION INTRAVENOUS at 18:30

## 2024-11-26 RX ADMIN — FEXOFENADINE HCL 180 MG: 180 TABLET ORAL at 20:21

## 2024-11-26 RX ADMIN — ALBUMIN (HUMAN): 12.5 SOLUTION INTRAVENOUS at 10:37

## 2024-11-26 RX ADMIN — FENTANYL CITRATE 50 MCG: 50 INJECTION INTRAMUSCULAR; INTRAVENOUS at 10:03

## 2024-11-26 RX ADMIN — FENTANYL CITRATE 25 MCG: 50 INJECTION, SOLUTION INTRAMUSCULAR; INTRAVENOUS at 13:53

## 2024-11-26 ASSESSMENT — ACTIVITIES OF DAILY LIVING (ADL)
ADLS_ACUITY_SCORE: 57
ADLS_ACUITY_SCORE: 57
ADLS_ACUITY_SCORE: 61
ADLS_ACUITY_SCORE: 59
ADLS_ACUITY_SCORE: 57
ADLS_ACUITY_SCORE: 59
ADLS_ACUITY_SCORE: 59
ADLS_ACUITY_SCORE: 61
ADLS_ACUITY_SCORE: 61
ADLS_ACUITY_SCORE: 57
ADLS_ACUITY_SCORE: 57
ADLS_ACUITY_SCORE: 59
ADLS_ACUITY_SCORE: 57

## 2024-11-26 NOTE — ANESTHESIA PROCEDURE NOTES
Airway       Patient location during procedure: OR       Procedure Start/Stop Times: 11/26/2024 10:05 AM  Staff -        CRNA: Gloria Aguila APRN CRNA       Performed By: CRNA  Consent for Airway        Urgency: elective  Indications and Patient Condition       Indications for airway management: wally-procedural       Induction type:intravenous       Mask difficulty assessment: 2 - vent by mask + OA or adjuvant +/- NMBA    Final Airway Details       Final airway type: endotracheal airway       Successful airway: ETT - single  Endotracheal Airway Details        ETT size (mm): 8.0       Cuffed: yes       Successful intubation technique: video laryngoscopy       VL Blade Size: Glidescope 3       Grade View of Cords: 1       Adjucts: stylet       Position: Right       Measured from: lips       Secured at (cm): 22       Bite block used: Soft    Post intubation assessment        Placement verified by: capnometry, equal breath sounds and chest rise        Number of attempts at approach: 1       Secured with: tape       Ease of procedure: easy       Dentition: Intact and Unchanged    Medication(s) Administered   Medication Administration Time: 11/26/2024 10:05 AM

## 2024-11-26 NOTE — ANESTHESIA POSTPROCEDURE EVALUATION
Patient: Ricardo Neely    Procedure: Procedure(s):  Lumbar 2 to Lumbar 3 extension of previous Lumbar 3 to Lumbar 5 fusion with stealth navigation and Lumbar 2 to Lumbar 3 decompression       Anesthesia Type:  General    Note:     Postop Pain Control: Uneventful            Sign Out: Well controlled pain   PONV: No   Neuro/Psych: Uneventful            Sign Out: Acceptable/Baseline neuro status   Airway/Respiratory: Uneventful            Sign Out: Acceptable/Baseline resp. status   CV/Hemodynamics: Uneventful            Sign Out: Acceptable CV status; No obvious hypovolemia; No obvious fluid overload   Other NRE: NONE   DID A NON-ROUTINE EVENT OCCUR? No           Last vitals:  Vitals Value Taken Time   /60 11/26/24 1400   Temp 36.7  C (98  F) 11/26/24 1330   Pulse 78 11/26/24 1401   Resp 19 11/26/24 1401   SpO2 88 % 11/26/24 1401   Vitals shown include unfiled device data.    Electronically Signed By: Basilio Desai MD  November 26, 2024  2:03 PM

## 2024-11-26 NOTE — ANESTHESIA CARE TRANSFER NOTE
Patient: Ricardo Neely    Procedure: Procedure(s):  Lumbar 2 to Lumbar 3 extension of previous Lumbar 3 to Lumbar 5 fusion with stealth navigation and Lumbar 2 to Lumbar 3 decompression       Diagnosis: Adjacent segment disease of lumbar spine with history of fusion procedure [M51.369, Z98.1]  Diagnosis Additional Information: No value filed.    Anesthesia Type:   General     Note:    Oropharynx: oropharynx clear of all foreign objects and spontaneously breathing  Level of Consciousness: drowsy  Oxygen Supplementation: face mask  Level of Supplemental Oxygen (L/min / FiO2): 4  Independent Airway: airway patency satisfactory and stable  Dentition: dentition unchanged  Vital Signs Stable: post-procedure vital signs reviewed and stable  Report to RN Given: handoff report given  Patient transferred to: PACU    Handoff Report: Identifed the Patient, Identified the Reponsible Provider, Reviewed the pertinent medical history, Discussed the surgical course, Reviewed Intra-OP anesthesia mangement and issues during anesthesia, Set expectations for post-procedure period and Allowed opportunity for questions and acknowledgement of understanding      Vitals:  Vitals Value Taken Time   /53 11/26/24 1330   Temp     Pulse 81 11/26/24 1334   Resp 15 11/26/24 1334   SpO2 98 % 11/26/24 1334   Vitals shown include unfiled device data.    Electronically Signed By: Mingo Castro APRN CRNA  November 26, 2024  1:35 PM

## 2024-11-26 NOTE — OP NOTE
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  Removal of L3-5 bilateral set screws and rods  2.  Exploration of prior L3-5 fusion  3.  L2-5 posterior segmental instrumentation with insertion of bilateral pedicle screws and rods  4.  L2-3 bilateral laminectomies for decompression of stenosis  5.  Left L2-3 complete facetectomy, transforaminal discectomy, and interbody arthrodesis  6.  L2-3 insertion of [place] Tritanium intervertebral graft with allograft  7.  L2-3 posterolateral arthrodesis and fusion with autograft and allograft  8.  Use of intraoperative microscope and fluoroscopy  9.  [employer] O-arm intraoperative CT scan  10.  Use of [employer] Stealth navigation      EBL: 400 mL      Indications: 84 year old male with history of L3-5 fusion, presented with L2-3 severe stenosis and severe back and leg pain.  Underwent extensive non-operative management with failure to improve.  Risks, benefits, indications, and alternatives were discussed with the patient and family in detail, and they wished to proceed.      Description of surgery: The patient was positioned prone.  Sterile prepping and draping procedures were performed.  Antibiotics were administered and timeout was performed.  A midline lumbar incision was performed.  The monopolar was used to expose the spinous processes, lamina, facets, and transverse processes from L2-L5.  The previous hardware was visualized and identified.  Using removal instrumentation, bilateral set screws and rods were removed from L3-5.  The  previous fusion was explored and no overt instability was identified.  An O-arm intraoperative CT scan was performed, which identified the hardware to be in appropriate position.  [employer] Stealth navigation was registered.  Under navigation, bilateral L2 screws were inserted.  The [place] rongeurs were then used to remove the spinous process at L2-3.  The high speed drill was then used to perform bilateral laminectomies a L2-3.  The microscope was brought into the field, and under microscopy, the [place] rongeurs were then used to remove the ligamentum flavum with decompression of the nerve roots.  A durotomy was repaired in water-tight fashion with 6-0 gore-phillip.  The drill was used to perform a complete left facetectomy at L2-3.  The 15 blade was used to perform an annulotomy in the disk space at L2-3.  A complete discectomy was performed with joseph, the pituitary rongeurs, and curets.  Interbody arthrodesis was performed with joseph and curets.   A [place] Tritanium intervertebral graft was inserted into the disk space at L2-3 with allograft.  Rods and set screws were inserted.  Antibiotic irrigation was performed.  Hemostasis was achieved.  The bilateral transverse processes at L2-3 were arthrodesed, and autograft and allograft were packed for posterolateral fusion.  Fluoroscopy demonstrated excellent positioning of the hardware.  The fascia was closed with 0-Vicryl sutures, and the dermal layer was closed with 2-0 vicryl sutures.  The skin was closed with staples.

## 2024-11-26 NOTE — PROGRESS NOTES
NSGY Progress note    POD 0: Lumbar 2 to Lumbar 3 extension of previous Lumbar 3 to Lumbar 5 fusion with stealth navigation and Lumbar 2 to Lumbar 3 decompression, N/A - Spine   Dr. Perez    Plan:  - Admit  - Drain x 1, monitor and record output even if zero  - FLAT until morning, okay to sit up to 10 degrees to eat, Okay to log roll for incision checks  - ABX while drain in place  - Hold PT/OT until tomorrow after activity restrictions lifts  - Advance diet as tolerated  - Hospitalist consulted  - Bull   - Routine wound care  - HOLD ASA 81mg until POD 5 (okay to resume 12/1/24)  - HOLD plavix until POD 10 (okay to resume 10/6/24)    Maeve Hernández PA-C  [facility]  [address] [place] [id number]

## 2024-11-26 NOTE — CONSULTS
[facility]  Consult Note - Hospitalist Service  Date of Admission:  11/26/2024  Consult Requested by:   Reason for Consult: Postoperative management of essential hypertension, BPH and hyperlipidemia    Assessment & Plan     Ricardo Neely is a 84 year old male with past medical history significant for BPH, essential hypertension, history of TIA, hyperlipidemia, iron deficiency anemia and chronic bilateral lower back pain.  Patient has a known history of previous L3-L5 fusion who presented with L2-L3 severe stenosis along with severe back and leg pain and underwent L2-L3 extension of previous L3-L5 fusion with a Stealth navigated and L2-L3 decompression.  Internal medicine service was consulted for postoperative management of his medical comorbidities.    L2-L3 severe stenosis with severe back and leg pain  S/p L2-L3 extension of previous L3-L5 fusion with decompression.  History of L3-L5 fusion in the past    --Postoperative management as per primary service including pain management and DVT prophylaxis.  --Aspirin and Plavix on hold.  --Continue gentle IV fluids till patient is able to take oral  --Physical and Occupational Therapy and further activity advancement per primary team  --Drain management per NS.    Essential hypertension  Hyperlipidemia    --Continue amlodipine 5 mg, starting this evening with holding parameters  --Plan to start lisinopril 20 mg from tomorrow 11/27/2024, holding parameters are placed  --Amlodipine and lisinopril are substituted for PTA Lotrel  --Continue PTA metoprolol succinate 50 mg daily, holding parameters are in place  --Continue atorvastatin 40 mg every evening.  -- Check BMP in a.m.    History of BPH with retention: Currently Ubll in place postoperatively    -- Continue Flomax 0.4 mg every evening  -- Bull catheter removal as per primary team per protocol  -- Will recommend monitoring patient closely for signs and symptoms of retention once  "catheter is removed.    Prior history of TIA:  Asymptomatic, noncritical bilateral carotid artery stenosis:  Ultrasound completed and 2024 showed mild plaque formation velocities consistent with less than 50% stenosis in bilateral internal carotid arteries.    -- Hold PTA baby aspirin and Plavix till cleared by neurosurgery    History of iron deficiency anemia:  Last hemoglobin was 13.4 on 11/0/24  --Check CBC in a.m.    Thyroid nodule:  -- PCP is planning to order ultrasound-guided biopsy of thyroid nodule once patient has recovered from back surgery    Class I obesity with serious comorbidity and body mass index of 31.0-31.9 in adult:  No known history of sleep apnea  -- PCP following closely, continue outpatient follow-up       Clinically Significant Risk Factors Present on Admission                 # Drug Induced Platelet Defect: home medication list includes an antiplatelet medication   # Hypertension: Noted on problem list           # Obesity: Estimated body mass index is 31.62 kg/m  as calculated from the following:    Height as of this encounter: 1.702 m (5' 7\").    Weight as of this encounter: 91.6 kg (201 lb 14.4 oz).              Rashida Vallejo MD  Hospitalist Service  Securely message with [employer] (more info)  Text page via [facility] Paging/Directory   ______________________________________________________________________    Chief Complaint     Postoperative medical co management     History is obtained from the patient    History of Present Illness     Ricardo Neely is a 84 year old male with past medical history significant for BPH, essential hypertension, history of TIA, hyperlipidemia, iron deficiency anemia and chronic bilateral lower back pain without sciatica who underwent removal of L3-5 bilateral facet screws and rods, exploration of prior L3-L5 fusion, L2-L5 posterior segmental instrumentation with insertion of bilateral pedicle screws and rods, L2-L3 bilateral laminectomies for decompression of his " stenosis, left L2-L3 complete facetectomy, transforaminal discectomy and interbody arthrodesis, L2-L3 fusion by Dr. Perez on 11/26/2024.  Internal medicine service was consulted for postoperative medical management.    Patient has a known history of previous L3-L5 fusion who presented with L2-L3 severe stenosis along with severe back and leg pain.  Patient underwent extensive nonoperative management with failure to improve and underwent L2-L3 extension of previous L3-L5 fusion with Stealth navigator and L2-L3 decompression    Patient also has a known history of essential hypertension which is usually well-controlled with Lotrel and metoprolol.  Patient held his Lotrel this morning although took his beta-blocker.  Patient also takes a statin for his known history of hyperlipidemia.  Patient does have a history of BPH with urinary retention his symptoms are currently controlled with Flomax.  For his previous history of TIA patient takes aspirin and Plavix which has been held in anticipation of surgery.  Patient also has a known history of bilateral carotid stenosis which is asymptomatic as seen on ultrasound completed in 2024 which showed only mild plaque formation velocities consistent with less than 50% stenosis in the right left internal carotid arteries.      Past Medical History    Past Medical History:   Diagnosis Date    Anxiety 05/26/2011    Arthritis     Basal cell carcinoma     Benign prostatic hyperplasia with urinary retention 11/05/2024    Benign Prostatic Hypertrophy     Carotid stenosis, asymptomatic, bilateral 11/02/2022    CEREBROVASC DISEASE, small vessel 12/2007    Chronic bilateral low back pain without sciatica 11/05/2024    Class 1 obesity with serious comorbidity and body mass index (BMI) of 31.0 to 31.9 in adult, unspecified obesity type 09/04/2024    Contracture of palmar fascia 10/16/2020    Diaphragmatic hernia 09/23/2002    Problem list name updated by automated process. Provider to review       Essential hypertension 08/19/2002    Essential hypertension, benign     GERD     HIATAL HERNIA     Hx of total knee replacement, left 09/12/2024    Hx of transient ischemic attack (TIA) 08/01/2021    Hyperlipidemia LDL goal <70 09/04/2024    HYPERPLASTIC POLYPS COLON 5/93, 3/06    Hypertrophy of prostate without urinary obstruction     Impaired fasting glucose     Low Back Pain     Lumbar radiculopathy 09/04/2024    Obesity, unspecified     Other and unspecified hyperlipidemia     Palpitations 11/02/2022    Personal history of urinary calculi 1960's    Pneumonia, organism unspecified(486) 1992    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    Squamous cell carcinoma of skin, unspecified     Thyroid nodule 11/05/2024    TRANSIENT CEREBRAL ISCHEMIA 12/2007       Past Surgical History   Past Surgical History:   Procedure Laterality Date    ARTHROPLASTY KNEE Left 9/12/2024    Procedure: left total knee arthroplasty;  Surgeon: Mono Nichols MD;  Location:  OR    BIOPSY  2017    COLONOSCOPY      DISCECTOMY LUMBAR POSTERIOR MICROSCOPIC TWO LEVELS  08/28/2012    DISCECTOMY LUMBAR POSTERIOR MICROSCOPIC TWO LEVELS;  RIGHT L3-L4 REDO EXTENDED HEMILAMINECTOMY AND MICRO FORAMINOTOMY, LEFT L4-L5 EXTENDED HEMILAMINECTOMY AND MICRODISCECTOMY (PRONE) (SONYA MCCALLUM, C-ARM, METRIX II);  Surgeon: Bertram Mirza MD;  Location:  OR    ENT SURGERY  2014    Cancer spot on right ear    EYE SURGERY  2018    OPTICAL TRACKING SYSTEM FUSION SPINE POSTERIOR LUMBAR TWO LEVELS N/A 08/21/2019    Procedure: L3-5 LUMBAR TRANSFORAMINAL INTERBODY FUSION WITH STEALTH;  Surgeon: Harrison Perez MD;  Location:  OR    renal calculii removal 40 years ago  01/01/1965    Z NONSPECIFIC PROCEDURE  01/01/1959    pilonidal cystectomy    Z NONSPECIFIC PROCEDURE  01/01/1966    kidney stone    [facility] NONSPECIFIC PROCEDURE  approx 1999    R knee arthroscopy     Dr. Guzman    [facility] NONSPECIFIC PROCEDURE   2005    L3-4 microdiskectomy   Dr. Brantley    [facility] TOTAL KNEE ARTHROPLASTY  2010    Minimally invasive R TKA   Dr. Nichols       Medications   Medications Prior to Admission   Medication Sig Dispense Refill Last Dose/Taking    acetaminophen (TYLENOL) 500 MG tablet Take 500-1,000 mg by mouth every 6 hours as needed for mild pain.   Past Week Evening    amLODIPine-benazepril (LOTREL) 5-20 MG capsule Take 1 capsule by mouth daily 90 capsule 3 2024 Evening    aspirin (ASA) 81 MG tablet Take 1 tablet (81 mg) by mouth At Bedtime   2024 Evening    atorvastatin (LIPITOR) 40 MG tablet Take 1 tablet (40 mg) by mouth daily 90 tablet 3 2024 Evening    clopidogrel (PLAVIX) 75 MG tablet TAKE 1 TABLET(75 MG) BY MOUTH DAILY 90 tablet 3 2024 Evening    fexofenadine (ALLEGRA) 180 MG tablet Take 180 mg by mouth daily.   2024 Evening    metoprolol succinate ER (TOPROL XL) 50 MG 24 hr tablet Take 1 tablet (50 mg) by mouth daily 90 tablet 3 2024 Morning    tamsulosin (FLOMAX) 0.4 MG capsule Take 1 capsule (0.4 mg) by mouth daily. 90 capsule 3 2024 Evening          Social History   I have reviewed this patient's social history and updated it with pertinent information if needed.  Social History     Tobacco Use    Smoking status: Former     Current packs/day: 0.00     Average packs/day: 0.5 packs/day for 11.0 years (5.5 ttl pk-yrs)     Types: Cigarettes     Start date: 1957     Quit date: 1968     Years since quittin.9    Smokeless tobacco: Never   Vaping Use    Vaping status: Never Used   Substance Use Topics    Alcohol use: No    Drug use: No         Family History   I have reviewed this patient's family history and updated it with pertinent information if needed.  Family History   Problem Relation Age of Onset    Family History Negative Brother         1 healthy brother    Diabetes Brother         d:age 66    Diabetes Mother     Cerebrovascular Disease Mother     KWADWO  "Father         CABG 67    Heart Disease Father     Lipids Sister     Diabetes Sister     Hypertension Sister     Hypertension Sister     Lipids Sister     Hypertension Sister     Colon Cancer Sister     Thyroid Disease Daughter         thyroid surgery, on replacement         Allergies   Allergies   Allergen Reactions    Meclizine Other (See Comments)     Over sedation, concentration problem    Tramadol Hcl Other (See Comments)     Pt feels very drugged, \"cloudy\" if used more than one day. Nausea also    Cardura [Doxazosin Mesylate] Other (See Comments)     fainted    Hydrochlorothiazide Other (See Comments)      lightheadedness    Naproxen Nausea     nausea        Physical Exam   Vital Signs: Temp: 97.8  F (36.6  C) Temp src: Oral BP: 114/54 Pulse: 80   Resp: 22 SpO2: 96 % O2 Device: Nasal cannula Oxygen Delivery: 3 LPM  Weight: 201 lbs 14.4 oz    Physical Exam  Vitals and nursing note reviewed.   Constitutional:       Appearance: He is well-developed.   HENT:      Head: Normocephalic and atraumatic.   Eyes:      Pupils: Pupils are equal, round, and reactive to light.   Neck:      Thyroid: No thyromegaly.   Cardiovascular:      Rate and Rhythm: Normal rate and regular rhythm.      Heart sounds: Normal heart sounds.   Pulmonary:      Effort: Pulmonary effort is normal. No respiratory distress.      Breath sounds: Normal breath sounds.   Abdominal:      General: Bowel sounds are normal. There is no distension.      Palpations: Abdomen is soft.   Musculoskeletal:         General: No tenderness. Normal range of motion.      Cervical back: Normal range of motion and neck supple.   Skin:     General: Skin is warm and dry.   Neurological:      Mental Status: He is alert and oriented to person, place, and time.   Psychiatric:         Behavior: Behavior normal.       Medical Decision Making       75 MINUTES SPENT BY ME on the date of service doing chart review, history, exam, documentation & further activities per the note.  "     Data     I have personally reviewed the following data over the past 24 hrs:    N/A  \   N/A   / N/A     N/A N/A N/A /  106 (H)   4.4 N/A 0.74 \       Imaging results reviewed over the past 24 hrs:   Recent Results (from the past 24 hours)   XR Surgery IMELDA Fluoro Less Than 5 Min w Stills    Narrative    SURGERY C-ARM FLUORO LESS THAN 5 MIN W STILLS November 26, 2024 12:45  PM     HISTORY: L2-L3 extension of fusion L3-L5, C-arm #2, fluoro time 0.3  minutes, dose 11.72 mGy, two films.    NUMBER OF IMAGES ACQUIRED: Two.    VIEWS: Two.    FLUOROSCOPY TIME: .3 minute(s)      Impression    IMPRESSION: Two intraoperative fluoroscopic images of the lumbar spine  during extension of posterior fusion.    ROHIT MEADOWS MD         SYSTEM ID:  [id number]     Recent Labs   Lab 11/26/24  0819   POTASSIUM 4.4   CR 0.74   *

## 2024-11-26 NOTE — BRIEF OP NOTE
[facility]    Brief Operative Note    Pre-operative diagnosis: Adjacent segment disease of lumbar spine with history of fusion procedure [M51.369, Z98.1]  Post-operative diagnosis Same as pre-operative diagnosis    Procedure: Lumbar 2 to Lumbar 3 extension of previous Lumbar 3 to Lumbar 5 fusion with stealth navigation and Lumbar 2 to Lumbar 3 decompression, N/A - Spine    Surgeon: Surgeons and Role:     * Harrison Perez MD - Primary     * Maeve Hernández PA-C - Assisting  Anesthesia: General   Estimated Blood Loss: 400 mL from 11/26/2024  9:56 AM to 11/26/2024  1:29 PM      Drains: Delta-Antoine    Findings:   See Op Note .  Complications:   Implants:   Implant Name Type Inv. Item Serial No.  Lot No. LRB No. Used Action   KIT BNGF 6CC DMNR MARIELY FBR ACCELERATE GRFTN [facility] [id number] - [id number] Bone/Tissue/Biologic KIT BNGF 6CC DMNR MARIELY FBR ACCELERATE GRFTN [facility] [id number] [employer]  N/A 1 Implanted   BONE CHIP CANCELLOUS 15CC [id number] - [id number] Bone/Tissue/Biologic BONE CHIP CANCELLOUS 15CC [id number] [employer]  N/A 1 Implanted   CAGE SPINAL 11MMW X 28MML X 11MMH TRITANIUM POST LUMBAR - [id number] Metallic Hardware/Freedom CAGE SPINAL 11MMW X 28MML X 11MMH TRITANIUM POST LUMBAR  [id number] N/A 1 Implanted   rosales screw Metallic Hardware/Freedom   JEANNIE 41 04 [id number] N/A 2 Implanted   IMP KAMRYN STRK TIARA 3 6X90MM TI [id number] - [id number] Metallic Hardware/Freedom IMP KAMRYN STRK TIARA 3 6X90MM TI [id number]  JEANNIE SP 41 02 [id number] N/A 2 Implanted   IMP SCR STRK TIARA BLOCKER SET TI [id number] - [id number] Metallic Hardware/Freedom IMP SCR STRK TIARA BLOCKER SET TI [id number]  JEANNIE SP 41 04 [id number] N/A 8 Implanted       Maeve Hernández PA-C  [facility]  [address] [place] [address]

## 2024-11-27 ENCOUNTER — APPOINTMENT (OUTPATIENT)
Dept: PHYSICAL THERAPY | Facility: CLINIC | Age: 84
DRG: 402 | End: 2024-11-27
Attending: NEUROLOGICAL SURGERY
Payer: COMMERCIAL

## 2024-11-27 LAB
ALBUMIN SERPL BCG-MCNC: 3.5 G/DL (ref 3.5–5.2)
ALP SERPL-CCNC: 92 U/L (ref 40–150)
ALT SERPL W P-5'-P-CCNC: 11 U/L (ref 0–70)
ANION GAP SERPL CALCULATED.3IONS-SCNC: 9 MMOL/L (ref 7–15)
AST SERPL W P-5'-P-CCNC: 22 U/L (ref 0–45)
BILIRUB DIRECT SERPL-MCNC: <0.2 MG/DL (ref 0–0.3)
BILIRUB SERPL-MCNC: 0.4 MG/DL
BUN SERPL-MCNC: 14 MG/DL (ref 8–23)
CALCIUM SERPL-MCNC: 8.2 MG/DL (ref 8.8–10.4)
CHLORIDE SERPL-SCNC: 98 MMOL/L (ref 98–107)
CREAT SERPL-MCNC: 0.67 MG/DL (ref 0.67–1.17)
EGFRCR SERPLBLD CKD-EPI 2021: >90 ML/MIN/1.73M2
ERYTHROCYTE [DISTWIDTH] IN BLOOD BY AUTOMATED COUNT: 12.7 % (ref 10–15)
GLUCOSE BLDC GLUCOMTR-MCNC: 146 MG/DL (ref 70–99)
GLUCOSE SERPL-MCNC: 126 MG/DL (ref 70–99)
HCO3 SERPL-SCNC: 24 MMOL/L (ref 22–29)
HCT VFR BLD AUTO: 32 % (ref 40–53)
HGB BLD-MCNC: 11 G/DL (ref 13.3–17.7)
MCH RBC QN AUTO: 29 PG (ref 26.5–33)
MCHC RBC AUTO-ENTMCNC: 34.4 G/DL (ref 31.5–36.5)
MCV RBC AUTO: 84 FL (ref 78–100)
PLATELET # BLD AUTO: 241 10E3/UL (ref 150–450)
POTASSIUM SERPL-SCNC: 4.2 MMOL/L (ref 3.4–5.3)
PROT SERPL-MCNC: 5.9 G/DL (ref 6.4–8.3)
RBC # BLD AUTO: 3.79 10E6/UL (ref 4.4–5.9)
SODIUM SERPL-SCNC: 131 MMOL/L (ref 135–145)
WBC # BLD AUTO: 13.5 10E3/UL (ref 4–11)

## 2024-11-27 PROCEDURE — 97116 GAIT TRAINING THERAPY: CPT | Mod: GP | Performed by: PHYSICAL THERAPIST

## 2024-11-27 PROCEDURE — 82247 BILIRUBIN TOTAL: CPT | Performed by: HOSPITALIST

## 2024-11-27 PROCEDURE — 80053 COMPREHEN METABOLIC PANEL: CPT | Performed by: INTERNAL MEDICINE

## 2024-11-27 PROCEDURE — 82248 BILIRUBIN DIRECT: CPT | Performed by: HOSPITALIST

## 2024-11-27 PROCEDURE — 99232 SBSQ HOSP IP/OBS MODERATE 35: CPT | Performed by: HOSPITALIST

## 2024-11-27 PROCEDURE — 120N000001 HC R&B MED SURG/OB

## 2024-11-27 PROCEDURE — 36415 COLL VENOUS BLD VENIPUNCTURE: CPT | Performed by: INTERNAL MEDICINE

## 2024-11-27 PROCEDURE — 97161 PT EVAL LOW COMPLEX 20 MIN: CPT | Mod: GP | Performed by: PHYSICAL THERAPIST

## 2024-11-27 PROCEDURE — 85041 AUTOMATED RBC COUNT: CPT | Performed by: INTERNAL MEDICINE

## 2024-11-27 PROCEDURE — 250N000013 HC RX MED GY IP 250 OP 250 PS 637

## 2024-11-27 PROCEDURE — 80048 BASIC METABOLIC PNL TOTAL CA: CPT | Performed by: INTERNAL MEDICINE

## 2024-11-27 PROCEDURE — 250N000013 HC RX MED GY IP 250 OP 250 PS 637: Performed by: INTERNAL MEDICINE

## 2024-11-27 PROCEDURE — 250N000011 HC RX IP 250 OP 636: Mod: JZ

## 2024-11-27 PROCEDURE — 97530 THERAPEUTIC ACTIVITIES: CPT | Mod: GP | Performed by: PHYSICAL THERAPIST

## 2024-11-27 RX ORDER — POLYETHYLENE GLYCOL 3350 17 G/17G
17 POWDER, FOR SOLUTION ORAL 2 TIMES DAILY
Status: DISCONTINUED | OUTPATIENT
Start: 2024-11-27 | End: 2024-12-02 | Stop reason: HOSPADM

## 2024-11-27 RX ORDER — AMOXICILLIN 250 MG
2 CAPSULE ORAL 2 TIMES DAILY
Status: DISCONTINUED | OUTPATIENT
Start: 2024-11-27 | End: 2024-12-02 | Stop reason: HOSPADM

## 2024-11-27 RX ADMIN — ACETAMINOPHEN 975 MG: 325 TABLET, FILM COATED ORAL at 06:09

## 2024-11-27 RX ADMIN — CEFAZOLIN SODIUM 2 G: 2 INJECTION, SOLUTION INTRAVENOUS at 10:03

## 2024-11-27 RX ADMIN — METHOCARBAMOL 500 MG: 500 TABLET ORAL at 16:27

## 2024-11-27 RX ADMIN — ATORVASTATIN CALCIUM 40 MG: 40 TABLET, FILM COATED ORAL at 20:40

## 2024-11-27 RX ADMIN — POLYETHYLENE GLYCOL 3350 17 G: 17 POWDER, FOR SOLUTION ORAL at 16:29

## 2024-11-27 RX ADMIN — SENNOSIDES AND DOCUSATE SODIUM 2 TABLET: 50; 8.6 TABLET ORAL at 20:40

## 2024-11-27 RX ADMIN — ACETAMINOPHEN 975 MG: 325 TABLET, FILM COATED ORAL at 13:54

## 2024-11-27 RX ADMIN — METHOCARBAMOL 500 MG: 500 TABLET ORAL at 10:03

## 2024-11-27 RX ADMIN — TAMSULOSIN HYDROCHLORIDE 0.4 MG: 0.4 CAPSULE ORAL at 20:40

## 2024-11-27 RX ADMIN — OXYCODONE HYDROCHLORIDE 10 MG: 5 TABLET ORAL at 21:26

## 2024-11-27 RX ADMIN — SENNOSIDES AND DOCUSATE SODIUM 1 TABLET: 50; 8.6 TABLET ORAL at 10:03

## 2024-11-27 RX ADMIN — AMLODIPINE BESYLATE 5 MG: 5 TABLET ORAL at 20:40

## 2024-11-27 RX ADMIN — OXYCODONE HYDROCHLORIDE 10 MG: 5 TABLET ORAL at 04:58

## 2024-11-27 RX ADMIN — CEFAZOLIN SODIUM 2 G: 2 INJECTION, SOLUTION INTRAVENOUS at 02:47

## 2024-11-27 RX ADMIN — OXYCODONE HYDROCHLORIDE 5 MG: 5 TABLET ORAL at 16:27

## 2024-11-27 RX ADMIN — POLYETHYLENE GLYCOL 3350 17 G: 17 POWDER, FOR SOLUTION ORAL at 10:03

## 2024-11-27 RX ADMIN — METOPROLOL SUCCINATE 50 MG: 50 TABLET, EXTENDED RELEASE ORAL at 10:03

## 2024-11-27 RX ADMIN — LISINOPRIL 20 MG: 10 TABLET ORAL at 20:40

## 2024-11-27 RX ADMIN — HYDROMORPHONE HYDROCHLORIDE 0.4 MG: 0.2 INJECTION, SOLUTION INTRAMUSCULAR; INTRAVENOUS; SUBCUTANEOUS at 06:36

## 2024-11-27 RX ADMIN — ACETAMINOPHEN 975 MG: 325 TABLET, FILM COATED ORAL at 21:22

## 2024-11-27 RX ADMIN — FEXOFENADINE HCL 180 MG: 180 TABLET ORAL at 20:39

## 2024-11-27 RX ADMIN — CEFAZOLIN SODIUM 2 G: 2 INJECTION, SOLUTION INTRAVENOUS at 18:16

## 2024-11-27 RX ADMIN — POLYETHYLENE GLYCOL 3350 17 G: 17 POWDER, FOR SOLUTION ORAL at 20:40

## 2024-11-27 ASSESSMENT — ACTIVITIES OF DAILY LIVING (ADL)
ADLS_ACUITY_SCORE: 61

## 2024-11-27 NOTE — PLAN OF CARE
Goal Outcome Evaluation:  Patient vital signs are at baseline: yes  Patient able to ambulate as they were prior to admission or with assist devices provided by therapies during their stay:  no, bed flat.  Patient MUST void prior to discharge:  no, edwards catheter in placed.   Patient able to tolerate oral intake:  yes  Pain has adequate pain control using Oral analgesics:  yes  Does patient have an identified :  yes  Has goal D/C date and time been discussed with patient:  SILVIA

## 2024-11-27 NOTE — PROGRESS NOTES
[facility]    Neurosurgery  Daily Note    Assessment & Plan   Procedure(s):  Lumbar 2 to Lumbar 3 extension of previous Lumbar 3 to Lumbar 5 fusion with stealth navigation and Lumbar 2 to Lumbar 3 decompression  Explore spine, remove hardware,   -1 Day Post-Op    Doing well.  Clean wound without signs of infection.  No immediate surgical complications identified.  No excessive bleeding  Pain well-controlled.    Patient has a edwards catheter, is tolerating an oral diet without nausea and vomiting,  hemodynamically stable, pain is under control with oral medications, states his radicualr pain prior to surgery has drastically improved.  Plans on mobilizing with PT/OT, and is on room air.    Drains in place and output over the past 24 hours is 175 mL.      Plan:  -Ambulate  -Advance activity as tolerated- off bedrest  -Monitor for positional headaches  -Monitor and document drain output  -Start physical therapy  -Continue pain control measures  -Advance diet as tolerated  -Discontinue edwards catheter upon ambulation  -Hold ASA 81 mg until POD 5, hold Plavix until POD 10.      Jr Perez, DNP, APRN, CNP  [facility]  Tel [phone]      Interval History   Doing well.  Continues to improve.  Pain is well-controlled.  No fevers.      Physical Exam   Temp: 97.9  F (36.6  C) Temp src: Oral BP: (!) 146/70 Pulse: 89   Resp: 18 SpO2: 98 % O2 Device: Nasal cannula Oxygen Delivery: 2 LPM  Vitals:    11/26/24 0800   Weight: 91.6 kg (201 lb 14.4 oz)     Vital Signs with Ranges  Temp:  [97.5  F (36.4  C)-98  F (36.7  C)] 97.9  F (36.6  C)  Pulse:  [74-89] 89  Resp:  [14-22] 18  BP: (106-146)/(53-70) 146/70  SpO2:  [94 %-98 %] 98 %  I/O last 3 completed shifts:  In: 1802.15 [I.V.:1302.15]  Out: 1545 [Urine:750; Drains:395; Blood:400]    Dressing dry and intact.      CBC RESULTS:   Recent Labs   Lab Test 11/27/24  0907   WBC 13.5*   RBC 3.79*   HGB 11.0*   HCT 32.0*   MCV 84   MCH 29.0    MCHC 34.4   RDW 12.7        Basic Metabolic Panel:  Lab Results   Component Value Date     11/27/2024     05/20/2021      Lab Results   Component Value Date    POTASSIUM 4.2 11/27/2024    POTASSIUM 3.4 10/16/2022    POTASSIUM 4.6 05/20/2021     Lab Results   Component Value Date    CHLORIDE 98 11/27/2024    CHLORIDE 103 10/16/2022    CHLORIDE 103 05/20/2021     Lab Results   Component Value Date    RICARDO 8.2 11/27/2024    RICARDO 9.2 05/20/2021     Lab Results   Component Value Date    CO2 24 11/27/2024    CO2 25 10/16/2022    CO2 30 05/20/2021     Lab Results   Component Value Date    BUN 14.0 11/27/2024    BUN 14 10/16/2022    BUN 15 05/20/2021     Lab Results   Component Value Date    CR 0.67 11/27/2024    CR 0.88 05/20/2021     Lab Results   Component Value Date     11/27/2024     11/27/2024     10/16/2022    GLC 92 05/20/2021     INR:  Lab Results   Component Value Date    INR 1.93 07/15/2010    INR 1.87 07/14/2010    INR 1.13 07/13/2010    INR 0.87 07/12/2010    INR 0.87 12/04/2007           Medications   Current Facility-Administered Medications   Medication Dose Route Frequency Provider Last Rate Last Admin      Current Facility-Administered Medications   Medication Dose Route Frequency Provider Last Rate Last Admin    acetaminophen (TYLENOL) tablet 975 mg  975 mg Oral Q8H Maeve Hernández PA-C   975 mg at 11/27/24 0609    amLODIPine (NORVASC) tablet 5 mg  5 mg Oral QPM Rashida Vallejo MD   5 mg at 11/26/24 2019    atorvastatin (LIPITOR) tablet 40 mg  40 mg Oral QPM Maeve Hernández PA-C   40 mg at 11/26/24 2022    ceFAZolin (ANCEF) 2 g in 100 mL D5W intermittent infusion  2 g Intravenous Q8H Maeve Hernández PA-C 200 mL/hr at 11/27/24 1003 2 g at 11/27/24 1003    fexofenadine (ALLEGRA) tablet 180 mg  180 mg Oral QPM Maeve Hernández PA-C   180 mg at 11/26/24 2021    lisinopril (ZESTRIL) tablet 20 mg  20 mg Oral QPM Rashida Vallejo MD        metoprolol  succinate ER (TOPROL XL) 24 hr tablet 50 mg  50 mg Oral Daily Rashida Vallejo MD   50 mg at 11/27/24 1003    polyethylene glycol (MIRALAX) Packet 17 g  17 g Oral BID Jr Perez APRN CNP        senna-docusate (SENOKOT-S/PERICOLACE) 8.6-50 MG per tablet 2 tablet  2 tablet Oral BID Jr Perez APRN CNP        sodium chloride (PF) 0.9% PF flush 3 mL  3 mL Intracatheter Q8H Maeve Hernández PA-C   3 mL at 11/27/24 1013    tamsulosin (FLOMAX) capsule 0.4 mg  0.4 mg Oral QPM Maeve Hernández PA-C   0.4 mg at 11/26/24 2022       Images reviewed personally    Data   Recent Results (from the past 24 hours)   XR Surgery IMELDA Fluoro Less Than 5 Min w Stills    Narrative    SURGERY C-ARM FLUORO LESS THAN 5 MIN W STILLS November 26, 2024 12:45  PM     HISTORY: L2-L3 extension of fusion L3-L5, C-arm #2, fluoro time 0.3  minutes, dose 11.72 mGy, two films.    NUMBER OF IMAGES ACQUIRED: Two.    VIEWS: Two.    FLUOROSCOPY TIME: .3 minute(s)      Impression    IMPRESSION: Two intraoperative fluoroscopic images of the lumbar spine  during extension of posterior fusion.    ROHIT MEADOWS MD         SYSTEM ID:  [id number]

## 2024-11-27 NOTE — PROGRESS NOTES
[facility]  Hospitalist Progress Note   11/27/2024          Assessment and Plan:       Ricardo Neely is a 84 year old male with history of hypertension, TIA, hyperlipidemia, iron deficiency anemia, chronic bilateral low back pain, BPH admitted on 11/26/2024 after elective back surgery.  Hospitalist team consulted for postoperative management of medical comorbidities.    Status post L2-L3 extension of previous L3-L5 fusion with a Stealth navigated and L2-L3 decompression-11/26/2024  L2-L3 severe stenosis with severe back and leg pain  History of L3-L5 fusion in the past  --On bedrest, intraoperative drain in place.  --Postoperative management as per primary service including pain management and DVT prophylaxis.  -- PTA aspirin and Plavix on hold since 7 days prior to procedure.  Resume antiplatelet therapy once okay by neurosurgery anticoagulation once okay by neurosurgery. (Neurosurgery recommends hold ASA 81 mg until POD 5, hold Plavix until POD 10. )  Minimize narcotic use as able to.  Pharmacological DVT prophylaxis postprocedure per surgical team.  Bowel meds to prevent constipation.    Postoperative leukocytosis likely reactive.  Noted bump in WBC count to 13.5.  No signs or symptoms of infection.  Defer perioperative antibiotics to surgical team.  Trend WBC count, fever curve.    Acute anemia likely from surgical blood loss, dilutional.  Preop hemoglobin 13.4, now dropped to 11.0.  No active bleeding.  Postoperative drain in place.  Monitor hemoglobin levels in a.m. or earlier if symptomatic.    Hyponatremia mild postoperative likely iatrogenic.  Sodium 131, received IV fluids.   discontinue IV fluids, monitor in AM.     Hypertension.  Hyperlipidemia  Continue PTA amlodipine, lisinopril with hold parameters.  Continue metoprolol with hold parameters.  Monitor blood pressures, optimize regimen.  Continue atorvastatin 40 mg every evening.     History of BPH with retention:   Currently Bull in  place postoperatively  -- Continue Flomax 0.4 mg every evening  -- Bull catheter removal as per primary team per protocol  -- Will recommend monitoring patient closely for signs and symptoms of retention once catheter is removed.     Prior history of TIA:  Asymptomatic, noncritical bilateral carotid artery stenosis:  Recent ultrasound showed mild plaque formation velocities consistent with less than 50% stenosis in bilateral internal carotid arteries.  -- Holding PTA aspirin and Plavix till cleared by neurosurgery     History of iron deficiency anemia:  Last hemoglobin was 13.4 on 11/0/24  Monitor hemoglobin levels closely.     Thyroid nodule:  PCP is planning to order ultrasound-guided biopsy of thyroid nodule once patient has recovered from back surgery     # Obesity: With BMI of 31.62.  Increase all-cause mortality and morbidity.  Consider lifestyle modification with diet and exercise as able to.  Consider sleep studies as outpatient    Orders Placed This Encounter      Advance Diet as Tolerated: Regular Diet Adult      DVT Prophylaxis: SCDs.  Pharmacological DVT prophylaxis per surgical team  Code Status: Full Code  Disposition: Expected discharge per neurosurgery    Discussed with patient, bedside RN  >35 minutes spent by me on the date of service doing chart review, history, exam, documentation & further activities per the note.      Milo Escoto MD        Interval History:        Patient lying in bed.  Denies any chest pain or palpitations.  Denies shortness of breath.  No nausea vomiting.  Denies tingling or numbness.  On bedrest.  Reports pain controlled.  Having Bull catheter, NELEA drain in place.  Afebrile.  PTA aspirin and Plavix on hold.         Physical Exam:        Physical Exam   Temp:  [97.5  F (36.4  C)-98  F (36.7  C)] 97.9  F (36.6  C)  Pulse:  [79-90] 90  Resp:  [14-22] 18  BP: (114-146)/(54-70) 138/64  SpO2:  [95 %-98 %] 98 %    Intake/Output Summary (Last 24 hours) at 11/27/2024  1549  Last data filed at 11/27/2024 1500  Gross per 24 hour   Intake --   Output 1730 ml   Net -1730 ml       Admission Weight: 91.6 kg (201 lb 14.4 oz)  Current Weight: 91.6 kg (201 lb 14.4 oz)    PHYSICAL EXAM  GENERAL: Patient is in no distress. Alert and oriented.  HEENT: Oropharynx pink  HEART: Regular rate and rhythm. S1S2.   LUNGS: Clear to auscultation bilaterally. No expiratory wheeze.  Respirations unlabored  NEURO: Moving extremities  EXTREMITIES: No pedal edema.   SKIN: Warm, dry. No rash   PSYCHIATRY Cooperative       Medications:        Current Facility-Administered Medications   Medication Dose Route Frequency Provider Last Rate Last Admin    acetaminophen (TYLENOL) tablet 975 mg  975 mg Oral Q8H Maeve Hernández PA-C   975 mg at 11/27/24 1354    amLODIPine (NORVASC) tablet 5 mg  5 mg Oral QPM Rashida Vallejo MD   5 mg at 11/26/24 2019    atorvastatin (LIPITOR) tablet 40 mg  40 mg Oral QPM Maeve Hernández PA-C   40 mg at 11/26/24 2022    ceFAZolin (ANCEF) 2 g in 100 mL D5W intermittent infusion  2 g Intravenous Q8H Maeve Hernández PA-C 200 mL/hr at 11/27/24 1003 2 g at 11/27/24 1003    fexofenadine (ALLEGRA) tablet 180 mg  180 mg Oral QPM Maeve Hernández PA-C   180 mg at 11/26/24 2021    lisinopril (ZESTRIL) tablet 20 mg  20 mg Oral QPM Rashida Vallejo MD        metoprolol succinate ER (TOPROL XL) 24 hr tablet 50 mg  50 mg Oral Daily Rashida Vallejo MD   50 mg at 11/27/24 1003    polyethylene glycol (MIRALAX) Packet 17 g  17 g Oral BID Jr Perez APRN CNP        senna-docusate (SENOKOT-S/PERICOLACE) 8.6-50 MG per tablet 2 tablet  2 tablet Oral BID Jr Perez APRN CNP        sodium chloride (PF) 0.9% PF flush 3 mL  3 mL Intracatheter Q8H Maeve Hernández PA-C   3 mL at 11/27/24 1013    tamsulosin (FLOMAX) capsule 0.4 mg  0.4 mg Oral QPM Maeve Hernández PA-C   0.4 mg at 11/26/24 2022     Current Facility-Administered Medications   Medication Dose Route Frequency  Provider Last Rate Last Admin    [START ON 11/29/2024] acetaminophen (TYLENOL) tablet 650 mg  650 mg Oral Q4H PRN Maeve Hernández PA-C        [START ON 11/29/2024] bisacodyl (DULCOLAX) suppository 10 mg  10 mg Rectal Daily PRN Maeve Hernández PA-C        calcium carbonate (TUMS) chewable tablet 500 mg  500 mg Oral 4x Daily PRN Maeve Hernández PA-C        HYDROmorphone (DILAUDID) injection 0.2 mg  0.2 mg Intravenous Q2H PRN Maeve Hernández PA-C        Or    HYDROmorphone (DILAUDID) injection 0.4 mg  0.4 mg Intravenous Q2H PRN Maeve Hernández PA-C   0.4 mg at 11/27/24 0636    hydrOXYzine HCl (ATARAX) tablet 10 mg  10 mg Oral Q6H PRN Maeve Hernández PA-C        lidocaine (LMX4) cream   Topical Q1H PRN Maeve Hernández PA-C        lidocaine 1 % 0.1-1 mL  0.1-1 mL Other Q1H PRN Maeve Hernández PA-C        [START ON 11/28/2024] magnesium hydroxide (MILK OF MAGNESIA) suspension 30 mL  30 mL Oral Daily PRN Maeve Hernández PA-C        melatonin tablet 5 mg  5 mg Oral At Bedtime PRN Audelia Foss MD   5 mg at 11/26/24 2232    methocarbamol (ROBAXIN) tablet 500 mg  500 mg Oral Q6H PRN Maeve Hernández PA-C   500 mg at 11/27/24 1003    naloxone (NARCAN) injection 0.2 mg  0.2 mg Intravenous Q2 Min PRN Harrison Perez MD        Or    naloxone (NARCAN) injection 0.4 mg  0.4 mg Intravenous Q2 Min PRN Harrison Perez MD        Or    naloxone (NARCAN) injection 0.2 mg  0.2 mg Intramuscular Q2 Min PRN Harrison Perez MD        Or    naloxone (NARCAN) injection 0.4 mg  0.4 mg Intramuscular Q2 Min PRN Harrison Perez MD        ondansetron (ZOFRAN ODT) ODT tab 4 mg  4 mg Oral Q6H PRN Maeve Hernández PA-C        Or    ondansetron (ZOFRAN) injection 4 mg  4 mg Intravenous Q6H PRN Maeve Hernández PA-C        oxyCODONE (ROXICODONE) tablet 5 mg  5 mg Oral Q4H PRN Maeve Hernández PA-C        Or    oxyCODONE (ROXICODONE) tablet 10 mg  10 mg Oral Q4H PRN Edgar  Maeve PEARCE PA-C   10 mg at 11/27/24 0458    prochlorperazine (COMPAZINE) injection 5 mg  5 mg Intravenous Q6H PRN Maeve Hernández PA-C        Or    prochlorperazine (COMPAZINE) tablet 5 mg  5 mg Oral Q6H PRN Maeve Hernández PA-C        sodium chloride (PF) 0.9% PF flush 3 mL  3 mL Intracatheter q1 min prn Maeve Hernández PA-C                Data:      All new lab and imaging data was reviewed.

## 2024-11-27 NOTE — PROGRESS NOTES
"   11/27/24 1200   Appointment Info   Signing Clinician's Name / Credentials (PT) Storm Blanchard DPT   Rehab Comments (PT) Spinal precautions       Present no   Living Environment   People in Home spouse   Current Living Arrangements independent living facility   Home Accessibility no concerns   Transportation Anticipated family or friend will provide   Living Environment Comments Pt lives in an ILF with his spouse. No stairs. Pt reports a family member will pick pt up upon discharge and provide assist as needed.   Self-Care   Usual Activity Tolerance good   Current Activity Tolerance moderate   Regular Exercise No   Equipment Currently Used at Home none   Fall history within last six months no   Activity/Exercise/Self-Care Comment Pt reports being IND at baseline with all ADLs. Pt ambulates w/o an AD at baseline but has a FWW and SEC if needed. Pt has walk-in shower w/ shower chair and grab bars. Pt drives.   General Information   Onset of Illness/Injury or Date of Surgery 11/27/24   Referring Physician Maeve Hernández, PAJohannyC   Patient/Family Therapy Goals Statement (PT) \"To get better\"   Pertinent History of Current Problem (include personal factors and/or comorbidities that impact the POC) Per Chart: s/p Lumbar 2 to Lumbar 3 extension of previous Lumbar 3 to Lumbar 5 fusion with stealth navigation and Lumbar 2 to Lumbar 3 decompression POD#1   Existing Precautions/Restrictions fall;spinal   Weight-Bearing Status - LLE full weight-bearing   Weight-Bearing Status - RLE full weight-bearing   Cognition   Orientation Status (Cognition) oriented x 4   Pain Assessment   Patient Currently in Pain Yes, see Vital Sign flowsheet  (2/10 at surgical site)   Integumentary/Edema   Integumentary/Edema Comments Incision on lumbar spine with drain in place   Posture    Posture Forward head position;Protracted shoulders   Range of Motion (ROM)   Range of Motion ROM is WFL   Strength (Manual Muscle Testing) "   Strength (Manual Muscle Testing) Able to perform R SLR;Able to perform L SLR   Bed Mobility   Comment, (Bed Mobility) Supine>sit w/ CGA   Transfers   Comment, (Transfers) Sit>stand w/ FWW and CGA   Gait/Stairs (Locomotion)   Oxford Level (Gait) contact guard   Assistive Device (Gait) walker, front-wheeled   Distance in Feet (Gait) 5'   Balance   Balance Comments Adequate static sitting balance; pt ambulates w/ a FWW   Sensory Examination   Sensory Perception patient reports no sensory changes   Clinical Impression   Criteria for Skilled Therapeutic Intervention Yes, treatment indicated   PT Diagnosis (PT) Impaired gait   Influenced by the following impairments Decreased activity tolerance; decreased balance; decreased strength   Functional limitations due to impairments Impaired functional mobility   Clinical Presentation (PT Evaluation Complexity) stable   Clinical Presentation Rationale Clinical judgement   Clinical Decision Making (Complexity) low complexity   Planned Therapy Interventions (PT) balance training;bed mobility training;cryotherapy;gait training;home exercise program;patient/family education;ROM (range of motion);stair training;strengthening;stretching;transfer training;progressive activity/exercise   Risk & Benefits of therapy have been explained evaluation/treatment results reviewed;care plan/treatment goals reviewed;current/potential barriers reviewed;risks/benefits reviewed;participants voiced agreement with care plan;participants included;patient   PT Total Evaluation Time   PT Eval, Low Complexity Minutes (64364) 10   Physical Therapy Goals   PT Frequency Daily   PT Predicted Duration/Target Date for Goal Attainment 12/04/24   PT Goals Bed Mobility;Transfers;Gait   PT: Bed Mobility Supervision/stand-by assist;Supine to/from sit;Within precautions   PT: Transfers Supervision/stand-by assist;Sit to/from stand;Assistive device;Within precautions   PT: Gait Supervision/stand-by  assist;Assistive device;Within precautions;150 feet   Interventions   Interventions Quick Adds Gait Training;Therapeutic Activity   Therapeutic Activity   Therapeutic Activities: dynamic activities to improve functional performance Minutes (52892) 14   Symptoms Noted During/After Treatment Fatigue;Increased pain   Treatment Detail/Skilled Intervention Greeted pt supine in bed, agreed to PT. VSS on RA throughout session. PT educated pt on spinal precautions, posture handout provided to pt. Pt performed supine>sit via logroll technique and CGA. Step-by-step cues for technique. Once in sitting, pt able to scoot self to EOB and sit unsupported without LOB. Pt reporting mild headache in sitting, improved in standing and in supine, RN notified. Pt performed sit>stand x 3 w/ FWW and CGA, cues for hand placement. After ambulation, pt returned to supine in bed w/ CGA, demonstrating ability to safely lift BLEs back into bed and reposition self. Pt ended session supine in bed, with all needs met and call light within reach.   Gait Training   Gait Training Minutes (29440) 11   Symptoms Noted During/After Treatment (Gait Training) increased pain;fatigue   Treatment Detail/Skilled Intervention Pt ambulated w/ FWW and CGA. Pt ambulated with decreased gait speed, downward gaze, and heavy reliance on FWW. Verbal cues for upright gaze and posture, to reduce reliance on FWW, and pacing. Good carryover with cues. Pt tolerated activity well and had no LOB.   Distance in Feet 75'   PT Discharge Planning   PT Plan Review logroll technique; progress gait w/ FWW   PT Discharge Recommendation (DC Rec) home with assist   PT Rationale for DC Rec Pt is below baseline but is moving well. Pt currently performing all functional  mobility w/ FWW and CGA. Anticipate at time of discharge pt will be able to safely return home with assist from family. Recommend pt use a FWW at discharge, which pt reports he already owns.   PT Brief overview of current  status Goals of therapy will be to address safe mobility and make recs for d/c to next level of care. Pt and RN will continue to follow all falls risk precautions as documented by RN staff while hospitalized. CGA w/ FWW   Physical Therapy Time and Intention   Timed Code Treatment Minutes 25   Total Session Time (sum of timed and untimed services) 35

## 2024-11-27 NOTE — PROGRESS NOTES
Notified provider about indwelling edwards catheter discussed removal or continued need.    Did provider choose to remove indwelling edwards catheter? Yes    Provider's edwards indication for keeping indwelling edwards catheter: Acute retention or obstruction.    Is there an order for indwelling edwards catheter? Yes    Edwards to be removed during the day.

## 2024-11-27 NOTE — PROGRESS NOTES
Patient ambulation status: bedrest. Flat for 24 hours.   Patient able to stand for X-ray:No  Standing/upright x-ray complete: No  Patient using oral analgesics:yes  Voiding spontaneously:no. Indwelling Bull catheter  Drains discontinued:no. NEELA in place.   Incision clean and dry:yes  Bowel status:due for bm

## 2024-11-27 NOTE — PROGRESS NOTES
Patient suffered headache after his first attempt to get out of bed with PT. Provider notified. Will attempt to get up in recliner (as instructed by Provider).

## 2024-11-28 ENCOUNTER — APPOINTMENT (OUTPATIENT)
Dept: OCCUPATIONAL THERAPY | Facility: CLINIC | Age: 84
DRG: 402 | End: 2024-11-28
Payer: COMMERCIAL

## 2024-11-28 ENCOUNTER — APPOINTMENT (OUTPATIENT)
Dept: PHYSICAL THERAPY | Facility: CLINIC | Age: 84
DRG: 402 | End: 2024-11-28
Attending: NEUROLOGICAL SURGERY
Payer: COMMERCIAL

## 2024-11-28 VITALS
DIASTOLIC BLOOD PRESSURE: 65 MMHG | TEMPERATURE: 98.6 F | OXYGEN SATURATION: 94 % | SYSTOLIC BLOOD PRESSURE: 135 MMHG | WEIGHT: 201.9 LBS | BODY MASS INDEX: 31.69 KG/M2 | HEART RATE: 99 BPM | RESPIRATION RATE: 18 BRPM | HEIGHT: 67 IN

## 2024-11-28 LAB
ALBUMIN UR-MCNC: 10 MG/DL
ANION GAP SERPL CALCULATED.3IONS-SCNC: 12 MMOL/L (ref 7–15)
APPEARANCE UR: CLEAR
BILIRUB UR QL STRIP: NEGATIVE
BUN SERPL-MCNC: 15.6 MG/DL (ref 8–23)
CALCIUM SERPL-MCNC: 8.2 MG/DL (ref 8.8–10.4)
CHLORIDE SERPL-SCNC: 92 MMOL/L (ref 98–107)
COLOR UR AUTO: YELLOW
CREAT SERPL-MCNC: 0.83 MG/DL (ref 0.67–1.17)
EGFRCR SERPLBLD CKD-EPI 2021: 86 ML/MIN/1.73M2
GLUCOSE BLDC GLUCOMTR-MCNC: 137 MG/DL (ref 70–99)
GLUCOSE SERPL-MCNC: 129 MG/DL (ref 70–99)
GLUCOSE UR STRIP-MCNC: NEGATIVE MG/DL
HCO3 SERPL-SCNC: 23 MMOL/L (ref 22–29)
HGB BLD-MCNC: 10.3 G/DL (ref 13.3–17.7)
HGB UR QL STRIP: NEGATIVE
KETONES UR STRIP-MCNC: ABNORMAL MG/DL
LEUKOCYTE ESTERASE UR QL STRIP: NEGATIVE
MUCOUS THREADS #/AREA URNS LPF: PRESENT /LPF
NITRATE UR QL: NEGATIVE
PH UR STRIP: 5.5 [PH] (ref 5–7)
POTASSIUM SERPL-SCNC: 4.1 MMOL/L (ref 3.4–5.3)
RBC URINE: 2 /HPF
SODIUM SERPL-SCNC: 127 MMOL/L (ref 135–145)
SODIUM UR-SCNC: 21 MMOL/L
SP GR UR STRIP: 1.02 (ref 1–1.03)
UROBILINOGEN UR STRIP-MCNC: NORMAL MG/DL
WBC # BLD AUTO: 13.5 10E3/UL (ref 4–11)
WBC URINE: 5 /HPF

## 2024-11-28 PROCEDURE — 250N000013 HC RX MED GY IP 250 OP 250 PS 637: Performed by: INTERNAL MEDICINE

## 2024-11-28 PROCEDURE — 250N000013 HC RX MED GY IP 250 OP 250 PS 637

## 2024-11-28 PROCEDURE — 97165 OT EVAL LOW COMPLEX 30 MIN: CPT | Mod: GO

## 2024-11-28 PROCEDURE — 250N000011 HC RX IP 250 OP 636

## 2024-11-28 PROCEDURE — 97535 SELF CARE MNGMENT TRAINING: CPT | Mod: GO

## 2024-11-28 PROCEDURE — 97116 GAIT TRAINING THERAPY: CPT | Mod: GP

## 2024-11-28 PROCEDURE — 120N000001 HC R&B MED SURG/OB

## 2024-11-28 PROCEDURE — 82374 ASSAY BLOOD CARBON DIOXIDE: CPT | Performed by: HOSPITALIST

## 2024-11-28 PROCEDURE — 258N000003 HC RX IP 258 OP 636: Performed by: HOSPITALIST

## 2024-11-28 PROCEDURE — 81003 URINALYSIS AUTO W/O SCOPE: CPT | Performed by: HOSPITALIST

## 2024-11-28 PROCEDURE — 80048 BASIC METABOLIC PNL TOTAL CA: CPT | Performed by: HOSPITALIST

## 2024-11-28 PROCEDURE — 85048 AUTOMATED LEUKOCYTE COUNT: CPT | Performed by: HOSPITALIST

## 2024-11-28 PROCEDURE — 99232 SBSQ HOSP IP/OBS MODERATE 35: CPT | Performed by: HOSPITALIST

## 2024-11-28 PROCEDURE — 36415 COLL VENOUS BLD VENIPUNCTURE: CPT | Performed by: HOSPITALIST

## 2024-11-28 PROCEDURE — 250N000013 HC RX MED GY IP 250 OP 250 PS 637: Performed by: PHYSICIAN ASSISTANT

## 2024-11-28 PROCEDURE — 85018 HEMOGLOBIN: CPT | Performed by: HOSPITALIST

## 2024-11-28 PROCEDURE — 97530 THERAPEUTIC ACTIVITIES: CPT | Mod: GP

## 2024-11-28 PROCEDURE — 84300 ASSAY OF URINE SODIUM: CPT | Performed by: HOSPITALIST

## 2024-11-28 RX ORDER — METHOCARBAMOL 750 MG/1
750 TABLET, FILM COATED ORAL 4 TIMES DAILY
Status: DISCONTINUED | OUTPATIENT
Start: 2024-11-28 | End: 2024-12-02 | Stop reason: HOSPADM

## 2024-11-28 RX ORDER — SODIUM CHLORIDE 9 MG/ML
INJECTION, SOLUTION INTRAVENOUS CONTINUOUS
Status: ACTIVE | OUTPATIENT
Start: 2024-11-28 | End: 2024-11-28

## 2024-11-28 RX ADMIN — CEFAZOLIN SODIUM 2 G: 2 INJECTION, SOLUTION INTRAVENOUS at 01:33

## 2024-11-28 RX ADMIN — LISINOPRIL 20 MG: 10 TABLET ORAL at 19:40

## 2024-11-28 RX ADMIN — AMLODIPINE BESYLATE 5 MG: 5 TABLET ORAL at 19:40

## 2024-11-28 RX ADMIN — HYDROXYZINE HYDROCHLORIDE 10 MG: 10 TABLET ORAL at 21:27

## 2024-11-28 RX ADMIN — HYDROMORPHONE HYDROCHLORIDE 0.4 MG: 0.2 INJECTION, SOLUTION INTRAMUSCULAR; INTRAVENOUS; SUBCUTANEOUS at 01:52

## 2024-11-28 RX ADMIN — TAMSULOSIN HYDROCHLORIDE 0.4 MG: 0.4 CAPSULE ORAL at 19:40

## 2024-11-28 RX ADMIN — METHOCARBAMOL 750 MG: 750 TABLET ORAL at 13:04

## 2024-11-28 RX ADMIN — METHOCARBAMOL 750 MG: 750 TABLET ORAL at 21:27

## 2024-11-28 RX ADMIN — POLYETHYLENE GLYCOL 3350 17 G: 17 POWDER, FOR SOLUTION ORAL at 17:22

## 2024-11-28 RX ADMIN — ACETAMINOPHEN 975 MG: 325 TABLET, FILM COATED ORAL at 06:45

## 2024-11-28 RX ADMIN — CEFAZOLIN SODIUM 2 G: 2 INJECTION, SOLUTION INTRAVENOUS at 18:23

## 2024-11-28 RX ADMIN — ACETAMINOPHEN 975 MG: 325 TABLET, FILM COATED ORAL at 13:04

## 2024-11-28 RX ADMIN — ONDANSETRON 4 MG: 4 TABLET, ORALLY DISINTEGRATING ORAL at 02:00

## 2024-11-28 RX ADMIN — POLYETHYLENE GLYCOL 3350 17 G: 17 POWDER, FOR SOLUTION ORAL at 21:28

## 2024-11-28 RX ADMIN — SENNOSIDES AND DOCUSATE SODIUM 2 TABLET: 50; 8.6 TABLET ORAL at 21:27

## 2024-11-28 RX ADMIN — HYDROXYZINE HYDROCHLORIDE 10 MG: 10 TABLET ORAL at 00:22

## 2024-11-28 RX ADMIN — SENNOSIDES AND DOCUSATE SODIUM 2 TABLET: 50; 8.6 TABLET ORAL at 08:59

## 2024-11-28 RX ADMIN — Medication 5 MG: at 21:27

## 2024-11-28 RX ADMIN — METHOCARBAMOL 750 MG: 750 TABLET ORAL at 17:22

## 2024-11-28 RX ADMIN — SODIUM CHLORIDE: 9 INJECTION, SOLUTION INTRAVENOUS at 14:17

## 2024-11-28 RX ADMIN — ACETAMINOPHEN 975 MG: 325 TABLET, FILM COATED ORAL at 21:26

## 2024-11-28 RX ADMIN — CEFAZOLIN SODIUM 2 G: 2 INJECTION, SOLUTION INTRAVENOUS at 10:06

## 2024-11-28 RX ADMIN — METHOCARBAMOL 500 MG: 500 TABLET ORAL at 00:22

## 2024-11-28 RX ADMIN — Medication 5 MG: at 00:22

## 2024-11-28 RX ADMIN — ATORVASTATIN CALCIUM 40 MG: 40 TABLET, FILM COATED ORAL at 19:40

## 2024-11-28 RX ADMIN — FEXOFENADINE HCL 180 MG: 180 TABLET ORAL at 19:40

## 2024-11-28 RX ADMIN — METOPROLOL SUCCINATE 50 MG: 50 TABLET, EXTENDED RELEASE ORAL at 08:59

## 2024-11-28 ASSESSMENT — ACTIVITIES OF DAILY LIVING (ADL)
ADLS_ACUITY_SCORE: 61
PREVIOUS_RESPONSIBILITIES: MEDICATION MANAGEMENT;DRIVING
ADLS_ACUITY_SCORE: 61

## 2024-11-28 NOTE — PROGRESS NOTES
[facility]  Hospitalist Progress Note   11/28/2024          Assessment and Plan:       Ricardo Neely is a 84 year old male with history of hypertension, TIA, hyperlipidemia, iron deficiency anemia, chronic bilateral low back pain, BPH admitted on 11/26/2024 after elective back surgery.  Hospitalist team consulted for postoperative management of medical comorbidities.    Status post L2-L3 extension of previous L3-L5 fusion with a Stealth navigated and L2-L3 decompression-11/26/2024  L2-L3 severe stenosis with severe back and leg pain  History of L3-L5 fusion in the past  -- Patient complaining of back pain, intermittent headaches with minimal ambulation. Intraoperative drain in place.  --Postoperative management as per primary service including pain management and DVT prophylaxis.  --PTA aspirin and Plavix on hold since 7 days prior to procedure.  Resume antiplatelet therapy once okay by neurosurgery anticoagulation once okay by neurosurgery. (Neurosurgery recommends hold ASA 81 mg until POD 5, hold Plavix until POD 10. )  Minimize narcotic use as able to.  Pharmacological DVT prophylaxis postprocedure per surgical team.  Bowel meds to prevent constipation.    Postoperative leukocytosis likely reactive.  Noted bump in WBC count to 13.5.  No signs or symptoms of infection.  Defer perioperative antibiotics to surgical team.  Trend WBC count, fever curve.  UA pending     Acute anemia likely from surgical blood loss, dilutional.  Preop hemoglobin 13.4, now dropped to 11.0 >10.3  No active bleeding.  Postoperative drain in place.  Monitor hemoglobin levels in a.m. or earlier if symptomatic.    Acute on chronic hyponatremia.  Chart reviewed, previous sodium in low 130s.  Postoperative sodium 131 >127 .  Chloride 92.  Reports poor intake on 11/27, headache.  NS at 100 cc/h for 5 hours.  Adequate oral intake.  Liberalize salt in diet.  Follow urine analysis, urine sodium.  BMP in AM   Tele monitor      Hypertension.  Hyperlipidemia  Continue PTA amlodipine, lisinopril with hold parameters.  Continue metoprolol with hold parameters.  Monitor blood pressures, optimize regimen.  Continue atorvastatin 40 mg every evening.   (Neurosurgery recommends hold ASA 81 mg until POD 5, hold Plavix until POD 10. )    Prior history of TIA:  Asymptomatic, noncritical bilateral carotid artery stenosis:  Recent ultrasound showed mild plaque formation velocities consistent with less than 50% stenosis in bilateral internal carotid arteries.  -- Holding PTA aspirin and Plavix. Neurosurgery recommends hold ASA 81 mg until POD 5, hold Plavix until POD 10.     History of iron deficiency anemia:  Last hemoglobin was 13.4 on 11/0/24  Monitor hemoglobin levels closely.    History of BPH with retention:   -- Continue Flomax 0.4 mg every evening     Thyroid nodule:  PCP is planning to order ultrasound-guided biopsy of thyroid nodule once patient has recovered from back surgery     Obesity: With BMI of 31.62.  Increase all-cause mortality and morbidity.  Consider lifestyle modification with diet and exercise as able to.  Consider sleep studies as outpatient    Orders Placed This Encounter      Advance Diet as Tolerated: Regular Diet Adult      DVT Prophylaxis: SCDs.  Pharmacological DVT prophylaxis per surgical team  Code Status: Full Code  Disposition: Expected discharge per neurosurgery    Discussed with patient, bedside RN  >35 minutes spent by me on the date of service doing chart review, history, exam, documentation & further activities per the note.      Milo Escoto MD        Interval History:        Patient lying in bed.  Denies any chest pain or palpitations.  Denies shortness of breath.  No nausea vomiting.  Denies tingling or numbness.  Complaining of headache.  Complaining of back pain.  Receiving Tylenol, as needed oxycodone, IV Dilaudid and Robaxin as needed.  NEELA drain in place.  Afebrile.  PTA aspirin and Plavix on  hold.         Physical Exam:        Physical Exam   Temp:  [97.9  F (36.6  C)-98.4  F (36.9  C)] 98.4  F (36.9  C)  Pulse:  [] 100  Resp:  [18] 18  BP: (120-139)/(50-64) 120/50  SpO2:  [93 %-98 %] 94 %    Intake/Output Summary (Last 24 hours) at 11/27/2024 1549  Last data filed at 11/27/2024 1500  Gross per 24 hour   Intake --   Output 1730 ml   Net -1730 ml       Admission Weight: 91.6 kg (201 lb 14.4 oz)  Current Weight: 91.6 kg (201 lb 14.4 oz)    PHYSICAL EXAM  GENERAL: Patient is in no distress. Alert and oriented.  HEART: Regular rate and rhythm. S1S2.   LUNGS: Clear to auscultation bilaterally. No expiratory wheeze.  Respirations unlabored  NEURO: Moving extremities  Musculoskeletal NEELA drain in place.  EXTREMITIES: No pedal edema.   SKIN: Warm, dry. No rash   PSYCHIATRY Cooperative       Medications:        Current Facility-Administered Medications   Medication Dose Route Frequency Provider Last Rate Last Admin    acetaminophen (TYLENOL) tablet 975 mg  975 mg Oral Q8H Maeve Hernández PA-C   975 mg at 11/28/24 0645    amLODIPine (NORVASC) tablet 5 mg  5 mg Oral QPM Rashida Vallejo MD   5 mg at 11/27/24 2040    atorvastatin (LIPITOR) tablet 40 mg  40 mg Oral QPM Maeve Hernández PA-C   40 mg at 11/27/24 2040    ceFAZolin (ANCEF) 2 g in 100 mL D5W intermittent infusion  2 g Intravenous Q8H Maeve Hernández PA-C 200 mL/hr at 11/28/24 0133 2 g at 11/28/24 0133    fexofenadine (ALLEGRA) tablet 180 mg  180 mg Oral QPM Maeve Hernández PA-C   180 mg at 11/27/24 2039    lisinopril (ZESTRIL) tablet 20 mg  20 mg Oral QPM Rashida Vallejo MD   20 mg at 11/27/24 2040    metoprolol succinate ER (TOPROL XL) 24 hr tablet 50 mg  50 mg Oral Daily Rashida Vallejo MD   50 mg at 11/27/24 1003    polyethylene glycol (MIRALAX) Packet 17 g  17 g Oral BID Jr Perez APRN CNP   17 g at 11/27/24 2040    senna-docusate (SENOKOT-S/PERICOLACE) 8.6-50 MG per tablet 2 tablet  2 tablet Oral BID Jr Perez APRN  CNP   2 tablet at 11/27/24 2040    sodium chloride (PF) 0.9% PF flush 3 mL  3 mL Intracatheter Q8H Maeve Hernández PA-C   3 mL at 11/28/24 0135    tamsulosin (FLOMAX) capsule 0.4 mg  0.4 mg Oral QPM Maeve Hernández PA-C   0.4 mg at 11/27/24 2040     Current Facility-Administered Medications   Medication Dose Route Frequency Provider Last Rate Last Admin    [START ON 11/29/2024] acetaminophen (TYLENOL) tablet 650 mg  650 mg Oral Q4H PRN Maeve Hernández PA-C        [START ON 11/29/2024] bisacodyl (DULCOLAX) suppository 10 mg  10 mg Rectal Daily PRN Maeve Hernández PA-C        calcium carbonate (TUMS) chewable tablet 500 mg  500 mg Oral 4x Daily PRN Maeve Hernández PA-C        HYDROmorphone (DILAUDID) injection 0.2 mg  0.2 mg Intravenous Q2H PRN Maeve Hernández PA-C        Or    HYDROmorphone (DILAUDID) injection 0.4 mg  0.4 mg Intravenous Q2H PRN Maeve Hernández PA-C   0.4 mg at 11/28/24 0152    hydrOXYzine HCl (ATARAX) tablet 10 mg  10 mg Oral Q6H PRN Maeve Hernández PA-C   10 mg at 11/28/24 0022    lidocaine (LMX4) cream   Topical Q1H PRN Maeve Hernández PA-C        lidocaine 1 % 0.1-1 mL  0.1-1 mL Other Q1H PRN Maeve Hernández PA-C        magnesium hydroxide (MILK OF MAGNESIA) suspension 30 mL  30 mL Oral Daily PRN Maeve Hernández PA-C        melatonin tablet 5 mg  5 mg Oral At Bedtime PRN Audelia Foss MD   5 mg at 11/28/24 0022    methocarbamol (ROBAXIN) tablet 500 mg  500 mg Oral Q6H PRN Maeve Hernández PA-C   500 mg at 11/28/24 0022    naloxone (NARCAN) injection 0.2 mg  0.2 mg Intravenous Q2 Min PRN Harrison Perez MD        Or    naloxone (NARCAN) injection 0.4 mg  0.4 mg Intravenous Q2 Min PRN Harrison Perez MD        Or    naloxone (NARCAN) injection 0.2 mg  0.2 mg Intramuscular Q2 Min PRN Harrison Perez MD        Or    naloxone (NARCAN) injection 0.4 mg  0.4 mg Intramuscular Q2 Min PRN Harrison Perez MD        ondansetron  (ZOFRAN ODT) ODT tab 4 mg  4 mg Oral Q6H PRN Maeve Hernández PA-C   4 mg at 11/28/24 0200    Or    ondansetron (ZOFRAN) injection 4 mg  4 mg Intravenous Q6H PRN Maeve Hernández PA-C        oxyCODONE (ROXICODONE) tablet 5 mg  5 mg Oral Q4H PRN Maeve Hernández PA-C   5 mg at 11/27/24 1627    Or    oxyCODONE (ROXICODONE) tablet 10 mg  10 mg Oral Q4H PRN Maeve Hernández PA-C   10 mg at 11/27/24 2126    prochlorperazine (COMPAZINE) injection 5 mg  5 mg Intravenous Q6H PRN Maeve Hernández PA-C        Or    prochlorperazine (COMPAZINE) tablet 5 mg  5 mg Oral Q6H PRN Maeve Hernández PA-C        sodium chloride (PF) 0.9% PF flush 3 mL  3 mL Intracatheter q1 min prn Maeve Hernández PA-C                Data:      All new lab and imaging data was reviewed.

## 2024-11-28 NOTE — PLAN OF CARE
Goal Outcome Evaluation:           Patient ambulation status: Ax1, walker GB  Patient able to stand for X-ray:Yes. TBD  Standing/upright x-ray complete: No  Patient using oral analgesics:yes  Voiding spontaneously:yes  Drains discontinued:no. NEELA in place with significant output.   Incision clean and dry:yes  Bowel status:no BM at this time.     On and off headaches post bedrest. Patient also experienced a one time episode of nausea. After a walk in the hallway, patient denied headaches. Tolerating intakes. Neuros remain intact, except for the intermittent headaches.

## 2024-11-28 NOTE — PROGRESS NOTES
"Neurosurgery Progress     Dx:     POD #2 s/p L2-3 fusion.    Neuro stable.     TODAY'S PLAN:     -Increased robaxin to 750 four time a day scheduled.   -Drain set to gravity.    -10   -Will leave in - anticipate removal and discharge to home tomorrow.   -Advance activity as tolerated.  -Continue supportive and symptomatic treatment.  -Continue physical therapy.  -Pain control measures.  -Advance diet as tolerated.  -Routine wound care.  -Anticipate discharge tomorrow, 11/29/2024.     In the event that patient's symptoms worsen or change we would appreciate being contacted. We did discuss signs of a worsening problem that he should seek being evaluated.     We did review the above information with the patient whom agrees with the plan and did verbalize understanding.   ________________________________________________________________     /50 (BP Location: Right arm)   Pulse 100   Temp 98.4  F (36.9  C) (Oral)   Resp 18   Ht 1.702 m (5' 7\")   Wt 91.6 kg (201 lb 14.4 oz)   SpO2 94%   BMI 31.62 kg/m       Patient examined in room 2405. He appears comfortable and in no apparent distress. He is moving all of his extremities well.   CN II-XII intact, alert and appropriate with conversation. Follows all commands.   Bilateral upper and lower extremities with appropriate strength. Deep tendon reflexes within normal limits throughout. Sensation is also intact throughout. He does have an acceptable post-operative range of motion.   His lumbar incision is absent for concerning edema, erythema or drainage.   Bandage is clean, dry and intact.   Drain intact.   Calves soft and non-tender.       All pertinent labs and updated imaging reviewed in EPIC.     Martin Herrera PA-C   Neurosurgery       "

## 2024-11-28 NOTE — PROGRESS NOTES
11/28/24 1200   Appointment Info   Signing Clinician's Name / Credentials (OT) Shannan Rivas, OTD, OTR/L   Living Environment   People in Home spouse   Current Living Arrangements independent living facility   Home Accessibility no concerns   Transportation Anticipated family or friend will provide   Living Environment Comments Pt lives in an ILF with his spouse. No stairs. Pt reports a family member will pick pt up upon discharge and provide assist as needed (daughter). BR set up: walk in shower, has shower chair, grab bars, tall toilet - RTS with arms. Pt has reacher/sock aid from prev knee sx.   Self-Care   Usual Activity Tolerance good   Current Activity Tolerance moderate   Regular Exercise No   Equipment Currently Used at Home none   Fall history within last six months no   Activity/Exercise/Self-Care Comment Pt is ind with ADLs at baseline.   Instrumental Activities of Daily Living (IADL)   Previous Responsibilities medication management;driving   IADL Comments Daughter can assist with laundry/heavy IADLs   General Information   Onset of Illness/Injury or Date of Surgery 11/26/24   Referring Physician Maeve Hernández, PAJohannyC   Patient/Family Therapy Goal Statement (OT) To get stronger/To go home   Additional Occupational Profile Info/Pertinent History of Current Problem Pt is an 83 y/o male s/p L2-3 fusion on 11/26/24, POD#2. past medical history significant for BPH, essential hypertension, history of TIA, hyperlipidemia, iron deficiency anemia and chronic bilateral lower back pain.   Existing Precautions/Restrictions fall;spinal   Cognitive Status Examination   Orientation Status orientation to person, place and time   Visual Perception   Visual Impairment/Limitations corrective lenses full-time   Sensory   Sensory Quick Adds sensation intact   Pain Assessment   Patient Currently in Pain   (5/10 headache in beginning of session, 7/10 at end of session, RN updated)   Range of Motion Comprehensive    Comment, General Range of Motion BUEs WFL   Strength Comprehensive (MMT)   Comment, General Manual Muscle Testing (MMT) Assessment BUEs WFL   Coordination   Upper Extremity Coordination No deficits were identified   Bed Mobility   Comment (Bed Mobility) NT - pt up in chair at time of OT session   Transfers   Transfers sit-stand transfer;toilet transfer   Sit-Stand Transfer   Sit/Stand Transfer Comments SBA   Toilet Transfer   Toilet Transfer Comments SBA   Balance   Balance Comments No overt LOB noted with use of FWW   Activities of Daily Living   BADL Assessment/Intervention lower body dressing   Lower Body Dressing Assessment/Training   Comment, (Lower Body Dressing) SBA   Clinical Impression   Criteria for Skilled Therapeutic Interventions Met (OT) Yes, treatment indicated   OT Diagnosis Decreased independence with I/ADLs   OT Problem List-Impairments impacting ADL problems related to;activity tolerance impaired;mobility;pain;post-surgical precautions   Assessment of Occupational Performance 3-5 Performance Deficits   Identified Performance Deficits dressing, bathing, toileting, IADLs   Planned Therapy Interventions (OT) ADL retraining;IADL retraining;transfer training   Clinical Decision Making Complexity (OT) problem focused assessment/low complexity   Risk & Benefits of therapy have been explained patient   OT Total Evaluation Time   OT Eval, Low Complexity Minutes (11639) 10   OT Goals   Therapy Frequency (OT) Daily   OT Predicted Duration/Target Date for Goal Attainment 12/04/24   OT Goals Hygiene/Grooming;Upper Body Dressing;Lower Body Dressing;Toilet Transfer/Toileting   OT: Hygiene/Grooming within precautions;modified independent  (FWW)   OT: Upper Body Dressing within precautions;Modified independent   OT: Lower Body Dressing using adaptive equipment;within precautions;Modified independent  (FWW)   OT: Toilet Transfer/Toileting toilet transfer;cleaning and garment management;Modified independent;within  precautions  (FWW)   OT: Functional/aerobic ambulation tolerance with stable cardiovascular response in order to return to home and community environment Standard walker;Within precautions;Modified independent   Self-Care/Home Management   Self-Care/Home Mgmt/ADL, Compensatory, Meal Prep Minutes (28239) 19   Symptoms Noted During/After Treatment (Meal Preparation/Planning Training) increased pain   Treatment Detail/Skilled Intervention Pt greeted in chair, reports headache , pt agreeable to OT session. Pt able to recall 3 of 3 spinal precautions. Pt educated on all and impact on self cares, pt verbalized understanding. Pt completes LB to doff/don brief with reacher with SBA and incr time, safe technique. Pt demos sit<>stand to/from chair with SBA and FWW for task. Pt reports he is familiar with how to use sock aid. Pt provided with AE handout and self care after spine sx handout - pt educated on toileting AE to maintain precautions. Pt stands from chair once more and ambulates to BR with SBA and FWW. Pt completes toilet tx with SBA and FWW, BSC overlay to simulate home set up. Pt ambulates back to chair and sits in chair with SBA and FWW. Pt at end of session with needs met, items/call light in reach, RN updated.   OT Discharge Planning   OT Plan gh sink, toileting, TB dressing, functional mobility,   OT Discharge Recommendation (DC Rec) home with assist;home with home care occupational therapy   OT Rationale for DC Rec Pt currently functioning below baseline d/t noted impairments in activity tolerance, mobility, pain, precautions impacting safety/independence within I/ADLs. Pt at baseline resides with spouse who had recent hip sx two weeks ago; has daughter who can assist with heavy IADLS. Currently, pt completes LB dressing and toilet tx with SBA and FWW. Anticipate with continued IP OT, pt will progress for discharge home with initial supervision/assist with bathing, assist with heavy IADLs and [facility] OT for home  safety evaluation, once medically ready.   OT Brief overview of current status Goals of therapy will be to address safe mobility and make recs for d/c to next level of care. Pt and RN will continue to follow all falls risk precautions as documented by RN staff while hospitalized  (NENA FWW)   Total Session Time   Timed Code Treatment Minutes 19   Total Session Time (sum of timed and untimed services) 29

## 2024-11-28 NOTE — PLAN OF CARE
Goal Outcome Evaluation:       Patient vital signs are at baseline: Yes. VSS on RA  Patient able to ambulate as they were prior to admission or with assist devices provided by therapies during their stay:  Yes  Patient MUST void prior to discharge:  Yes  Patient able to tolerate oral intake:  Yes  Pain has adequate pain control using Oral analgesics:  Yes  Does patient have an identified :  Yes  Has goal D/C date and time been discussed with patient:  No,  Reason:  Pending/TBD     POD#2 Lumbar 2 to Lumbar 3 extension of previous Lumbar 3 to Lumbar 5 fusion with stealth navigation and Lumbar 2 to Lumbar 3 decompression     A&Ox4, pain managed by scheduled tylenol PRN Oxy, IV dilaudid and robaxin. SBA, with walker. NEELA in place and IV SL. Pt complained of headache and back pain when each time ambulated to the BR.

## 2024-11-29 ENCOUNTER — APPOINTMENT (OUTPATIENT)
Dept: OCCUPATIONAL THERAPY | Facility: CLINIC | Age: 84
DRG: 402 | End: 2024-11-29
Attending: NEUROLOGICAL SURGERY
Payer: COMMERCIAL

## 2024-11-29 ENCOUNTER — APPOINTMENT (OUTPATIENT)
Dept: PHYSICAL THERAPY | Facility: CLINIC | Age: 84
DRG: 402 | End: 2024-11-29
Attending: NEUROLOGICAL SURGERY
Payer: COMMERCIAL

## 2024-11-29 LAB
ANION GAP SERPL CALCULATED.3IONS-SCNC: 10 MMOL/L (ref 7–15)
BUN SERPL-MCNC: 11.9 MG/DL (ref 8–23)
CALCIUM SERPL-MCNC: 8.1 MG/DL (ref 8.8–10.4)
CHLORIDE SERPL-SCNC: 97 MMOL/L (ref 98–107)
CREAT SERPL-MCNC: 0.75 MG/DL (ref 0.67–1.17)
EGFRCR SERPLBLD CKD-EPI 2021: 88 ML/MIN/1.73M2
GLUCOSE SERPL-MCNC: 143 MG/DL (ref 70–99)
HCO3 SERPL-SCNC: 26 MMOL/L (ref 22–29)
HGB BLD-MCNC: 10.1 G/DL (ref 13.3–17.7)
POTASSIUM SERPL-SCNC: 3.7 MMOL/L (ref 3.4–5.3)
SODIUM SERPL-SCNC: 133 MMOL/L (ref 135–145)
WBC # BLD AUTO: 9.8 10E3/UL (ref 4–11)

## 2024-11-29 PROCEDURE — 85018 HEMOGLOBIN: CPT | Performed by: HOSPITALIST

## 2024-11-29 PROCEDURE — 120N000001 HC R&B MED SURG/OB

## 2024-11-29 PROCEDURE — 80048 BASIC METABOLIC PNL TOTAL CA: CPT | Performed by: HOSPITALIST

## 2024-11-29 PROCEDURE — 85048 AUTOMATED LEUKOCYTE COUNT: CPT | Performed by: HOSPITALIST

## 2024-11-29 PROCEDURE — 250N000013 HC RX MED GY IP 250 OP 250 PS 637: Performed by: INTERNAL MEDICINE

## 2024-11-29 PROCEDURE — 250N000013 HC RX MED GY IP 250 OP 250 PS 637: Performed by: PHYSICIAN ASSISTANT

## 2024-11-29 PROCEDURE — 97116 GAIT TRAINING THERAPY: CPT | Mod: GP | Performed by: PHYSICAL THERAPY ASSISTANT

## 2024-11-29 PROCEDURE — 97535 SELF CARE MNGMENT TRAINING: CPT | Mod: GO

## 2024-11-29 PROCEDURE — 36415 COLL VENOUS BLD VENIPUNCTURE: CPT | Performed by: HOSPITALIST

## 2024-11-29 PROCEDURE — 250N000013 HC RX MED GY IP 250 OP 250 PS 637

## 2024-11-29 PROCEDURE — 99232 SBSQ HOSP IP/OBS MODERATE 35: CPT | Performed by: HOSPITALIST

## 2024-11-29 PROCEDURE — 250N000011 HC RX IP 250 OP 636: Mod: JZ

## 2024-11-29 PROCEDURE — 97530 THERAPEUTIC ACTIVITIES: CPT | Mod: GP | Performed by: PHYSICAL THERAPY ASSISTANT

## 2024-11-29 RX ORDER — ACETAMINOPHEN 325 MG/1
650 TABLET ORAL EVERY 4 HOURS PRN
COMMUNITY
Start: 2024-11-29

## 2024-11-29 RX ORDER — OXYCODONE HYDROCHLORIDE 5 MG/1
5 TABLET ORAL EVERY 6 HOURS PRN
Qty: 40 TABLET | Refills: 0 | Status: SHIPPED | OUTPATIENT
Start: 2024-11-29 | End: 2024-12-10

## 2024-11-29 RX ORDER — AMOXICILLIN 250 MG
2 CAPSULE ORAL 2 TIMES DAILY PRN
Qty: 60 TABLET | Refills: 0 | Status: SHIPPED | OUTPATIENT
Start: 2024-11-29

## 2024-11-29 RX ORDER — METHOCARBAMOL 750 MG/1
750 TABLET, FILM COATED ORAL 4 TIMES DAILY
Qty: 40 TABLET | Refills: 0 | Status: SHIPPED | OUTPATIENT
Start: 2024-11-29

## 2024-11-29 RX ADMIN — TAMSULOSIN HYDROCHLORIDE 0.4 MG: 0.4 CAPSULE ORAL at 21:20

## 2024-11-29 RX ADMIN — CEFAZOLIN SODIUM 2 G: 2 INJECTION, SOLUTION INTRAVENOUS at 18:02

## 2024-11-29 RX ADMIN — OXYCODONE HYDROCHLORIDE 10 MG: 5 TABLET ORAL at 22:26

## 2024-11-29 RX ADMIN — FEXOFENADINE HCL 180 MG: 180 TABLET ORAL at 21:20

## 2024-11-29 RX ADMIN — LISINOPRIL 20 MG: 10 TABLET ORAL at 21:20

## 2024-11-29 RX ADMIN — ACETAMINOPHEN 975 MG: 325 TABLET, FILM COATED ORAL at 13:15

## 2024-11-29 RX ADMIN — ACETAMINOPHEN 975 MG: 325 TABLET, FILM COATED ORAL at 05:06

## 2024-11-29 RX ADMIN — METHOCARBAMOL 750 MG: 750 TABLET ORAL at 13:15

## 2024-11-29 RX ADMIN — METOPROLOL SUCCINATE 50 MG: 50 TABLET, EXTENDED RELEASE ORAL at 08:11

## 2024-11-29 RX ADMIN — POLYETHYLENE GLYCOL 3350 17 G: 17 POWDER, FOR SOLUTION ORAL at 16:50

## 2024-11-29 RX ADMIN — MAGNESIUM HYDROXIDE 30 ML: 400 SUSPENSION ORAL at 13:16

## 2024-11-29 RX ADMIN — METHOCARBAMOL 750 MG: 750 TABLET ORAL at 18:01

## 2024-11-29 RX ADMIN — SENNOSIDES AND DOCUSATE SODIUM 2 TABLET: 50; 8.6 TABLET ORAL at 08:11

## 2024-11-29 RX ADMIN — ATORVASTATIN CALCIUM 40 MG: 40 TABLET, FILM COATED ORAL at 21:20

## 2024-11-29 RX ADMIN — CEFAZOLIN SODIUM 2 G: 2 INJECTION, SOLUTION INTRAVENOUS at 09:54

## 2024-11-29 RX ADMIN — AMLODIPINE BESYLATE 5 MG: 5 TABLET ORAL at 21:20

## 2024-11-29 RX ADMIN — METHOCARBAMOL 750 MG: 750 TABLET ORAL at 21:20

## 2024-11-29 RX ADMIN — CEFAZOLIN SODIUM 2 G: 2 INJECTION, SOLUTION INTRAVENOUS at 01:53

## 2024-11-29 RX ADMIN — METHOCARBAMOL 750 MG: 750 TABLET ORAL at 08:11

## 2024-11-29 RX ADMIN — OXYCODONE HYDROCHLORIDE 10 MG: 5 TABLET ORAL at 05:06

## 2024-11-29 ASSESSMENT — ACTIVITIES OF DAILY LIVING (ADL)
ADLS_ACUITY_SCORE: 61
ADLS_ACUITY_SCORE: 63
ADLS_ACUITY_SCORE: 61
ADLS_ACUITY_SCORE: 63
ADLS_ACUITY_SCORE: 61
ADLS_ACUITY_SCORE: 61
ADLS_ACUITY_SCORE: 63
ADLS_ACUITY_SCORE: 63
ADLS_ACUITY_SCORE: 61
ADLS_ACUITY_SCORE: 63
ADLS_ACUITY_SCORE: 61
ADLS_ACUITY_SCORE: 63
ADLS_ACUITY_SCORE: 61
ADLS_ACUITY_SCORE: 63
ADLS_ACUITY_SCORE: 61

## 2024-11-29 NOTE — PLAN OF CARE
Goal Outcome Evaluation: 0700-1930    Patient ambulation status:  Ax1 GB/W  Patient able to stand for X-ray:No  Standing/upright x-ray complete: No  Patient using oral analgesics:yes  Voiding spontaneously:yes  Drains discontinued:no, NEELA in place (on gravity graining)  Incision clean and dry:yes  Bowel status:  no BM @ this shift            POD#3 Lumbar 2 to Lumbar 3 extension of previous Lumbar 3 to Lumbar 5 fusion with stealth navigation and Lumbar 2 to Lumbar 3 decompression     A&Ox4, pain managed by scheduled tylenol PRN Oxy, IV dilaudid and robaxin. Ax1 GB/W  with walker.  IV SL intermittent ABX.

## 2024-11-29 NOTE — PROGRESS NOTES
[facility]  Neurosurgery Daily Progress Note    Assessment  Procedure(s):  Lumbar 2 to Lumbar 3 extension of previous Lumbar 3 to Lumbar 5 fusion with stealth navigation and Lumbar 2 to Lumbar 3 decompression  Removal of L3-5 bilateral set screws and rods   3 Days Post-Op    Plan   -Drain set to gravity, continue and monitor output even if 0.  -Advance activity as tolerated.  -Continue supportive and symptomatic treatment.  -Continue physical therapy.  -Pain control measures.  -Advance diet as tolerated.  -Routine wound care.  -Discussed dispo with hospitalist, patient will stay overnight and likely discharge tomorrow.     Sarahi Fernandes CNP  [facility]  [address] [facility]  [address] [place] [address]  Tel [phone]  Fax [phone]     Interval History   Patient was seen this morning in his chair. Neuro exam stable. Incision CDI.   Patient denies any new or worsening symptoms.    Drain with 40ml output overnight.    Physical Exam   Temp: 97.7  F (36.5  C) Temp src: Oral BP: 138/60 Pulse: 95   Resp: 16 SpO2: 93 % O2 Device: None (Room air)    Vitals:    11/26/24 0800   Weight: 91.6 kg (201 lb 14.4 oz)       Mental status:  Alert and oriented x 3, speech is fluent, following commands  Motor:  Moves all extremities with appropriate strength.   Iliopsoas  (hip flexion)               Right: 5/5  Left:  5/5  Quadriceps  (knee extension)       Right:  5/5  Left:  5/5  Hamstrings  (knee flexion)            Right:  5/5  Left:  5/5  Gastroc Soleus  (PF)                          Right:  5/5  Left:  5/5  Tibialis Ant  (DF)                          Right:  5/5  Left:  5/5  EHL                          Right:  5/5  Left:  5/5    Sensation:  Intact to light touch   Reflexes: Negative clonus  Incision: CDI   Drain: Intact with serosanguinous drainage

## 2024-11-29 NOTE — DISCHARGE SUMMARY
[facility]    Discharge Summary   [facility]    Date of Admission:  11/26/2024  Date of Discharge:  11/29/2024  Discharging Provider: CHERI Tobin CNP  Date of Service (when I saw the patient): 11/29/24    Discharge Diagnoses   Active Problems:    S/P lumbar fusion    History of Present Illness   Ricardo Neely is an 84 year old male who presented with history of L3-5 fusion, presented with L2-3 severe stenosis and severe back and leg pain.  Underwent extensive non-operative management with failure to improve. . Surgical intervention was recommended.     Hospital Course   Ricardo Neely was admitted on 11/26/2024 and underwent Lumbar 2 to Lumbar 3 extension of previous Lumbar 3 to Lumbar 5 fusion with stealth navigation and Lumbar 2 to Lumbar 3 decompression with Dr. Perez.     Assessment: Patient was seen this morning in his chair. Neuro exam stable. Incision CDI. Patient denies any new or worsening symptoms.  Plan:   - Therapies evaluated and recommend discharge to home with assist and home care occupational therapy   - Routine post op care plan  - Routine wound care and activity restrictions  - Pain medications prescribed at discharge  - Follow up with NSGY clinic as scheduled     I have discussed the following assessment and plan with Dr. Perez.    Sarahi Fernandes CNP  [facility]  [address] [address]  Tel [phone]  Fax [phone]     Code Status   Full Code    Primary Care Physician   Familia Cook    Physical Exam   Temp: 99.3  F (37.4  C) Temp src: Oral BP: 133/60 Pulse: 92   Resp: 16 SpO2: 96 % O2 Device: None (Room air)    Vitals:    11/26/24 0800   Weight: 91.6 kg (201 lb 14.4 oz)       Constitutional: Awake, alert, cooperative, no apparent distress, and appears stated age.  Eyes: Lids and lashes normal, pupils equal, round and reactive to light, extra ocular muscles intact  ENT: Normocephalic,  without obvious abnormality, atraumatic  Respiratory: No increased work of breathing  Skin: No bruising or bleeding, normal skin color, texture, turgor, no redness, warmth, or swelling.  Incision clean, dry, intact with bandage in place.   Musculoskeletal: There is no redness, warmth, or swelling of the joints. Tone is normal. Motor strength is 5 out of 5 in all extremities bilaterally.   Neurologic: Awake, alert, oriented to name, place, and time.  Cranial nerves II-XII are grossly intact.   Neuropsychiatric: Calm, normal eye contact, alert, normal affect.       Time Spent on this Encounter   I, CHERI Tobin CNP, personally saw the patient today and spent less than or equal to 30 minutes discharging this patient.    Discharge Disposition   Discharged to home  Condition at discharge: Stable    Consultations This Hospital Stay   OCCUPATIONAL THERAPY ADULT IP CONSULT  PHYSICAL THERAPY ADULT IP CONSULT  HOSPITALIST IP CONSULT    Discharge Orders      Primary Care - Care Coordination Referral      Home Care Referral      Reason for your hospital stay    Lumbar 2 to Lumbar 3 extension of previous Lumbar 3 to Lumbar 5 fusion with stealth navigation and Lumbar 2 to Lumbar 3 decompression     Follow-up and recommended labs and tests     Please follow up at [facility] as scheduled.  You may call the clinic with any scheduling questions or concerns.    [facility]  Tel [phone]     Activity    Limit lifting to 10 pounds and limit bending/twisting until follow up visit. Ok to walk as tolerated, avoid bed rest and prolonged sitting. No contact sports or high impact activities until  follow up visit.     Do not take NSAIDS (Ibuprofen, Advil, Aleve, Naproxen, etc) until follow-up visit. Do NOT drive while taking narcotic pain medication.     Wound care and dressings    Keep your incision clean and dry. Okay to remove dressing on post-op day 2 and shower on post-op day 3.  Okay for water to run over incision and gently pat dry. Do not scrub incision. No bathing, swimming, or submerging incision under water until follow-up visit. Call clinic or go to the ER with any incision drainage or swelling. Follow-up in clinic at 2 weeks post op for incision check.     Diet    Follow this diet upon discharge: Regular     Discharge Medications   Current Discharge Medication List        START taking these medications    Details   methocarbamol (ROBAXIN) 750 MG tablet Take 1 tablet (750 mg) by mouth 4 times daily.  Qty: 40 tablet, Refills: 0    Associated Diagnoses: S/P lumbar fusion      oxyCODONE (ROXICODONE) 5 MG tablet Take 1 tablet (5 mg) by mouth every 6 hours as needed for moderate pain.  Qty: 40 tablet, Refills: 0    Associated Diagnoses: S/P lumbar fusion      senna-docusate (SENOKOT-S/PERICOLACE) 8.6-50 MG tablet Take 2 tablets by mouth 2 times daily as needed for constipation (when taking oxy).  Qty: 60 tablet, Refills: 0    Associated Diagnoses: S/P lumbar fusion           CONTINUE these medications which have CHANGED    Details   acetaminophen (TYLENOL) 325 MG tablet Take 2 tablets (650 mg) by mouth every 4 hours as needed for other (For optimal non-opioid multimodal pain management to improve pain control.).    Associated Diagnoses: S/P lumbar fusion           CONTINUE these medications which have NOT CHANGED    Details   amLODIPine-benazepril (LOTREL) 5-20 MG capsule Take 1 capsule by mouth daily  Qty: 90 capsule, Refills: 3    Associated Diagnoses: Essential hypertension      atorvastatin (LIPITOR) 40 MG tablet Take 1 tablet (40 mg) by mouth daily  Qty: 90 tablet, Refills: 3    Associated Diagnoses: Hyperlipidemia LDL goal <100; Carotid stenosis, asymptomatic, bilateral; Vertebrobasilar artery syndrome      fexofenadine (ALLEGRA) 180 MG tablet Take 180 mg by mouth daily.      metoprolol succinate ER (TOPROL XL) 50 MG 24 hr tablet Take 1 tablet (50 mg) by mouth daily  Qty: 90 tablet,  "Refills: 3    Associated Diagnoses: Essential hypertension      tamsulosin (FLOMAX) 0.4 MG capsule Take 1 capsule (0.4 mg) by mouth daily.  Qty: 90 capsule, Refills: 3    Associated Diagnoses: Benign prostatic hyperplasia with urinary retention           STOP taking these medications       aspirin (ASA) 81 MG tablet Comments:   Reason for Stopping:         clopidogrel (PLAVIX) 75 MG tablet Comments:   Reason for Stopping:             Allergies   Allergies   Allergen Reactions    Meclizine Other (See Comments)     Over sedation, concentration problem    Tramadol Hcl Other (See Comments)     Pt feels very drugged, \"cloudy\" if used more than one day. Nausea also    Cardura [Doxazosin Mesylate] Other (See Comments)     fainted    Hydrochlorothiazide Other (See Comments)      lightheadedness    Naproxen Nausea     nausea              "

## 2024-11-29 NOTE — PLAN OF CARE
PT-  Pt has met IP PT goals.  Pt is hoping to discharge home later today with assist of spouse as needed.  Will discharge pt from PT at this time.

## 2024-11-29 NOTE — PLAN OF CARE
Goal Outcome Evaluation: 0700-1930      Patient ambulation status:  Up x1 GB/W  Patient able to stand for X-ray:No  Standing/upright x-ray complete: No  Patient using oral analgesics:yes  Voiding spontaneously:yes  Drains discontinued:no, NEELA in place donot santosh leav on gravity graining  Incision clean and dry:yes  Bowel status: Passing gas, no BM

## 2024-11-29 NOTE — PROGRESS NOTES
[facility]  Hospitalist Progress Note   11/29/2024          Assessment and Plan:       Ricardo Neely is a 84 year old male with history of hypertension, TIA, hyperlipidemia, iron deficiency anemia, chronic bilateral low back pain, BPH admitted on 11/26/2024 after elective back surgery.  Hospitalist team consulted for postoperative management of medical comorbidities.    Status post L2-L3 extension of previous L3-L5 fusion with a Stealth navigated and L2-L3 decompression-11/26/2024  L2-L3 severe stenosis with severe back and leg pain  History of L3-L5 fusion in the past  --Patient complaining of back pain, intermittent headaches with minimal ambulation, some improvement compared to 11/28.  Intraoperative drain in place, decreased output.  --Postoperative management as per primary service including pain management and DVT prophylaxis.  --PTA aspirin and Plavix on hold since 7 days prior to procedure.  Resume antiplatelet therapy once okay by neurosurgery anticoagulation once okay by neurosurgery. (Neurosurgery recommends hold ASA 81 mg until POD 5, hold Plavix until POD 10)  Minimize narcotic use as able to.  Pharmacological DVT prophylaxis postprocedure per surgical team.  Bowel meds to prevent constipation.    Postoperative leukocytosis likely reactive - resolved   Noted bump in WBC count to 13.5 >9.8    No signs or symptoms of infection.  Perioperative antibiotics per surgical team.  UA no concerns for infection.    Acute anemia likely from surgical blood loss, dilutional.  Preop hemoglobin 13.4, now dropped to 11.0 >10.1  No active bleeding.  Postoperative drain in place.  Monitor hemoglobin levels in a.m. or earlier if symptomatic.    Acute on chronic hyponatremia.  Chart reviewed, previous sodium in low 130s.  Postoperative sodium 131 >127 .  Chloride 92.  --Reported poor oral intake, received IV hydration on 11/28.  Sodium, chloride improving.  Adequate oral intake.  Liberalize salt in  diet.  Monitor sodium levels in a.m.    Hypertension.  Hyperlipidemia  Continue PTA amlodipine, lisinopril with hold parameters.  Continue metoprolol with hold parameters.  Monitor blood pressures, optimize regimen.  Continue atorvastatin 40 mg every evening.  PTA aspirin, Plavix on hold.  (Neurosurgery recommends hold ASA 81 mg until POD 5, hold Plavix until POD 10. )    Prior history of TIA:  Asymptomatic, noncritical bilateral carotid artery stenosis:  Recent ultrasound showed mild plaque formation velocities consistent with less than 50% stenosis in bilateral internal carotid arteries.  Holding PTA aspirin and Plavix. Neurosurgery recommends hold ASA 81 mg until POD 5, hold Plavix until POD 10.    History of BPH with retention:   Continue Flomax 0.4 mg every evening     Thyroid nodule:  PCP is planning to order ultrasound-guided biopsy of thyroid nodule once patient has recovered from back surgery     Obesity: With BMI of 31.62.  Increase all-cause mortality and morbidity.  Consider lifestyle modification with diet and exercise as able to.  Consider sleep studies as outpatient    Orders Placed This Encounter      Advance Diet as Tolerated: Regular Diet Adult      DVT Prophylaxis: SCDs.  Pharmacological DVT prophylaxis per surgical team  Code Status: Full Code  Disposition: Expected discharge -11/30 pending uneventful stay overnight.    Discussed with patient, bedside RN, neurosurgery team.  >35 minutes spent by me on the date of service doing chart review, history, exam, documentation & further activities per the note.      Milo Escoto MD        Interval History:        Patient lying in bed.  Denies any chest pain or palpitations.  Denies shortness of breath.  No nausea vomiting.  Denies tingling or numbness.  Complaining of headache, back pain.  Admits to some improvement in pain compared to yesterday, receiving Tylenol, as needed oxycodone.  Last received IV Dilaudid on 11/28.  NEELA drain in  place.  Afebrile.  PTA aspirin and Plavix on hold.         Physical Exam:        Physical Exam   Temp:  [97.7  F (36.5  C)-98.6  F (37  C)] 97.7  F (36.5  C)  Pulse:  [95-99] 95  Resp:  [16-18] 16  BP: (135-138)/(60-65) 138/60  SpO2:  [93 %-94 %] 93 %    Intake/Output Summary (Last 24 hours) at 11/27/2024 1549  Last data filed at 11/27/2024 1500  Gross per 24 hour   Intake --   Output 1730 ml   Net -1730 ml       Admission Weight: 91.6 kg (201 lb 14.4 oz)  Current Weight: 91.6 kg (201 lb 14.4 oz)    PHYSICAL EXAM  GENERAL: Patient is in no distress. Alert and oriented.  HEART: Regular rate and rhythm. S1S2.   LUNGS: Clear to auscultation bilaterally. No expiratory wheeze.  Respirations unlabored  NEURO: Moving extremities  Musculoskeletal NEELA drain in place.  EXTREMITIES: No pedal edema.   SKIN: Warm, dry. No rash   PSYCHIATRY Cooperative       Medications:        Current Facility-Administered Medications   Medication Dose Route Frequency Provider Last Rate Last Admin    acetaminophen (TYLENOL) tablet 975 mg  975 mg Oral Q8H Maeve Hernández PA-C   975 mg at 11/29/24 0506    amLODIPine (NORVASC) tablet 5 mg  5 mg Oral QPM Rashida Vallejo MD   5 mg at 11/28/24 1940    atorvastatin (LIPITOR) tablet 40 mg  40 mg Oral QPM Maeve Hernández PA-C   40 mg at 11/28/24 1940    ceFAZolin (ANCEF) 2 g in 100 mL D5W intermittent infusion  2 g Intravenous Q8H Maeve Hernández PA-C 200 mL/hr at 11/29/24 0153 2 g at 11/29/24 0153    fexofenadine (ALLEGRA) tablet 180 mg  180 mg Oral QPM Maeve Hernández PA-C   180 mg at 11/28/24 1940    lisinopril (ZESTRIL) tablet 20 mg  20 mg Oral QPM Rashida Vallejo MD   20 mg at 11/28/24 1940    methocarbamol (ROBAXIN) tablet 750 mg  750 mg Oral 4x Daily Sharon, Arie Smallwood, PA-C   750 mg at 11/29/24 0811    metoprolol succinate ER (TOPROL XL) 24 hr tablet 50 mg  50 mg Oral Daily Rashida Vallejo MD   50 mg at 11/29/24 0811    polyethylene glycol (MIRALAX) Packet 17 g  17 g Oral BID  Jr Perez APRN CNP   17 g at 11/28/24 2128    senna-docusate (SENOKOT-S/PERICOLACE) 8.6-50 MG per tablet 2 tablet  2 tablet Oral BID Jr Perez APRN CNP   2 tablet at 11/29/24 0811    sodium chloride (PF) 0.9% PF flush 3 mL  3 mL Intracatheter Q8H Maeve Hernández PA-C   3 mL at 11/29/24 0202    tamsulosin (FLOMAX) capsule 0.4 mg  0.4 mg Oral QPM Maeve Hernández PA-C   0.4 mg at 11/28/24 1940     Current Facility-Administered Medications   Medication Dose Route Frequency Provider Last Rate Last Admin    acetaminophen (TYLENOL) tablet 650 mg  650 mg Oral Q4H PRN Maeve Hernández PA-C        bisacodyl (DULCOLAX) suppository 10 mg  10 mg Rectal Daily PRN Maeve Hernández PA-C        calcium carbonate (TUMS) chewable tablet 500 mg  500 mg Oral 4x Daily PRN Maeve Hernández PA-C        HYDROmorphone (DILAUDID) injection 0.2 mg  0.2 mg Intravenous Q2H PRN Maeve Hernández PA-C        Or    HYDROmorphone (DILAUDID) injection 0.4 mg  0.4 mg Intravenous Q2H PRN Maeve Hernández PA-C   0.4 mg at 11/28/24 0152    hydrOXYzine HCl (ATARAX) tablet 10 mg  10 mg Oral Q6H PRN Maeve Hernández PA-C   10 mg at 11/28/24 2127    lidocaine (LMX4) cream   Topical Q1H PRN Maeve Hernández PA-C        lidocaine 1 % 0.1-1 mL  0.1-1 mL Other Q1H PRN Maeve Hernández PA-C        magnesium hydroxide (MILK OF MAGNESIA) suspension 30 mL  30 mL Oral Daily PRN Maeve Hernández PA-C        melatonin tablet 5 mg  5 mg Oral At Bedtime PRN Audelia Foss MD   5 mg at 11/28/24 2127    naloxone (NARCAN) injection 0.2 mg  0.2 mg Intravenous Q2 Min PRN Harrison Perez MD        Or    naloxone (NARCAN) injection 0.4 mg  0.4 mg Intravenous Q2 Min PRHarrison Ross MD        Or    naloxone (NARCAN) injection 0.2 mg  0.2 mg Intramuscular Q2 Min PRN Harrison Perez MD        Or    naloxone (NARCAN) injection 0.4 mg  0.4 mg Intramuscular Q2 Min PRHarrison Ross MD         ondansetron (ZOFRAN ODT) ODT tab 4 mg  4 mg Oral Q6H PRN Maeve Hernández PA-C   4 mg at 11/28/24 0200    Or    ondansetron (ZOFRAN) injection 4 mg  4 mg Intravenous Q6H PRN Maeve Hernández PA-C        oxyCODONE (ROXICODONE) tablet 5 mg  5 mg Oral Q4H PRN Maeve Hernández PA-C   5 mg at 11/27/24 1627    Or    oxyCODONE (ROXICODONE) tablet 10 mg  10 mg Oral Q4H PRN Maeve Hernández PA-C   10 mg at 11/29/24 0506    prochlorperazine (COMPAZINE) injection 5 mg  5 mg Intravenous Q6H PRN Maeve Hernández PA-C        Or    prochlorperazine (COMPAZINE) tablet 5 mg  5 mg Oral Q6H PRN Maeve Hernández PA-C        sodium chloride (PF) 0.9% PF flush 3 mL  3 mL Intracatheter q1 min prn Maeve Hernández PA-C                Data:      All new lab and imaging data was reviewed.

## 2024-11-29 NOTE — PLAN OF CARE
Goal Outcome Evaluation: [id number]      Patient ambulation status:  Up x1 GB/W  Patient able to stand for X-ray:No  Standing/upright x-ray complete: No  Patient using oral analgesics:yes  Voiding spontaneously:yes  Drains discontinued:no, NEELA in place donot press leav on gravity graining  Incision clean and dry:yes  Bowel status: Passing gas, no BM    Patient A&Ox4. CMS intact. Pain managed wih schedule medications and PRN. Pt c/o headache which was an on going issue,NS was notified and stated to monitor, Tylenol was given and was effective. Infusing NS at 75 ml/hr with intermittent abx. Na 127.

## 2024-11-30 ENCOUNTER — APPOINTMENT (OUTPATIENT)
Dept: GENERAL RADIOLOGY | Facility: CLINIC | Age: 84
DRG: 402 | End: 2024-11-30
Attending: NURSE PRACTITIONER
Payer: COMMERCIAL

## 2024-11-30 ENCOUNTER — APPOINTMENT (OUTPATIENT)
Dept: OCCUPATIONAL THERAPY | Facility: CLINIC | Age: 84
DRG: 402 | End: 2024-11-30
Attending: NEUROLOGICAL SURGERY
Payer: COMMERCIAL

## 2024-11-30 LAB
ALBUMIN UR-MCNC: 30 MG/DL
ANION GAP SERPL CALCULATED.3IONS-SCNC: 10 MMOL/L (ref 7–15)
ANION GAP SERPL CALCULATED.3IONS-SCNC: 16 MMOL/L (ref 7–15)
APPEARANCE UR: ABNORMAL
BILIRUB UR QL STRIP: NEGATIVE
BUN SERPL-MCNC: 14.3 MG/DL (ref 8–23)
BUN SERPL-MCNC: 15.2 MG/DL (ref 8–23)
CALCIUM SERPL-MCNC: 8.4 MG/DL (ref 8.8–10.4)
CALCIUM SERPL-MCNC: 8.4 MG/DL (ref 8.8–10.4)
CHLORIDE SERPL-SCNC: 91 MMOL/L (ref 98–107)
CHLORIDE SERPL-SCNC: 92 MMOL/L (ref 98–107)
COLOR UR AUTO: ABNORMAL
CREAT SERPL-MCNC: 0.73 MG/DL (ref 0.67–1.17)
CREAT SERPL-MCNC: 0.77 MG/DL (ref 0.67–1.17)
D DIMER PPP FEU-MCNC: 2.98 UG/ML FEU (ref 0–0.5)
EGFRCR SERPLBLD CKD-EPI 2021: 88 ML/MIN/1.73M2
EGFRCR SERPLBLD CKD-EPI 2021: 89 ML/MIN/1.73M2
ERYTHROCYTE [DISTWIDTH] IN BLOOD BY AUTOMATED COUNT: 12.7 % (ref 10–15)
FLUAV RNA SPEC QL NAA+PROBE: NEGATIVE
FLUBV RNA RESP QL NAA+PROBE: NEGATIVE
GLUCOSE BLDC GLUCOMTR-MCNC: 143 MG/DL (ref 70–99)
GLUCOSE SERPL-MCNC: 116 MG/DL (ref 70–99)
GLUCOSE SERPL-MCNC: 138 MG/DL (ref 70–99)
GLUCOSE UR STRIP-MCNC: NEGATIVE MG/DL
HCO3 SERPL-SCNC: 22 MMOL/L (ref 22–29)
HCO3 SERPL-SCNC: 24 MMOL/L (ref 22–29)
HCT VFR BLD AUTO: 30.4 % (ref 40–53)
HGB BLD-MCNC: 10.3 G/DL (ref 13.3–17.7)
HGB BLD-MCNC: 10.5 G/DL (ref 13.3–17.7)
HGB UR QL STRIP: ABNORMAL
KETONES UR STRIP-MCNC: NEGATIVE MG/DL
LACTATE SERPL-SCNC: 1.3 MMOL/L (ref 0.7–2)
LEUKOCYTE ESTERASE UR QL STRIP: ABNORMAL
MAGNESIUM SERPL-MCNC: 2 MG/DL (ref 1.7–2.3)
MCH RBC QN AUTO: 28.9 PG (ref 26.5–33)
MCHC RBC AUTO-ENTMCNC: 34.5 G/DL (ref 31.5–36.5)
MCV RBC AUTO: 84 FL (ref 78–100)
MUCOUS THREADS #/AREA URNS LPF: PRESENT /LPF
NITRATE UR QL: NEGATIVE
PH UR STRIP: 6 [PH] (ref 5–7)
PLATELET # BLD AUTO: 266 10E3/UL (ref 150–450)
POTASSIUM SERPL-SCNC: 3.9 MMOL/L (ref 3.4–5.3)
POTASSIUM SERPL-SCNC: 3.9 MMOL/L (ref 3.4–5.3)
PROCALCITONIN SERPL IA-MCNC: 0.1 NG/ML
RBC # BLD AUTO: 3.63 10E6/UL (ref 4.4–5.9)
RBC URINE: 35 /HPF
RSV RNA SPEC NAA+PROBE: NEGATIVE
SARS-COV-2 RNA RESP QL NAA+PROBE: NEGATIVE
SODIUM SERPL-SCNC: 126 MMOL/L (ref 135–145)
SODIUM SERPL-SCNC: 129 MMOL/L (ref 135–145)
SP GR UR STRIP: 1.02 (ref 1–1.03)
SQUAMOUS EPITHELIAL: <1 /HPF
TROPONIN T SERPL HS-MCNC: 26 NG/L
UROBILINOGEN UR STRIP-MCNC: NORMAL MG/DL
WBC # BLD AUTO: 11 10E3/UL (ref 4–11)
WBC URINE: 7 /HPF

## 2024-11-30 PROCEDURE — 999N000127 HC STATISTIC PERIPHERAL IV START W US GUIDANCE

## 2024-11-30 PROCEDURE — 82248 BILIRUBIN DIRECT: CPT | Performed by: HOSPITALIST

## 2024-11-30 PROCEDURE — 97530 THERAPEUTIC ACTIVITIES: CPT | Mod: GO

## 2024-11-30 PROCEDURE — 85014 HEMATOCRIT: CPT | Performed by: NURSE PRACTITIONER

## 2024-11-30 PROCEDURE — 71045 X-RAY EXAM CHEST 1 VIEW: CPT

## 2024-11-30 PROCEDURE — 80053 COMPREHEN METABOLIC PANEL: CPT | Performed by: HOSPITALIST

## 2024-11-30 PROCEDURE — 83605 ASSAY OF LACTIC ACID: CPT | Performed by: NURSE PRACTITIONER

## 2024-11-30 PROCEDURE — 250N000011 HC RX IP 250 OP 636: Mod: JZ

## 2024-11-30 PROCEDURE — 80048 BASIC METABOLIC PNL TOTAL CA: CPT | Performed by: NURSE PRACTITIONER

## 2024-11-30 PROCEDURE — 36415 COLL VENOUS BLD VENIPUNCTURE: CPT | Performed by: HOSPITALIST

## 2024-11-30 PROCEDURE — 120N000001 HC R&B MED SURG/OB

## 2024-11-30 PROCEDURE — 250N000013 HC RX MED GY IP 250 OP 250 PS 637: Performed by: INTERNAL MEDICINE

## 2024-11-30 PROCEDURE — 250N000011 HC RX IP 250 OP 636: Performed by: NURSE PRACTITIONER

## 2024-11-30 PROCEDURE — 250N000009 HC RX 250: Performed by: NURSE PRACTITIONER

## 2024-11-30 PROCEDURE — 84484 ASSAY OF TROPONIN QUANT: CPT | Performed by: NURSE PRACTITIONER

## 2024-11-30 PROCEDURE — 87637 SARSCOV2&INF A&B&RSV AMP PRB: CPT | Performed by: NURSE PRACTITIONER

## 2024-11-30 PROCEDURE — 250N000013 HC RX MED GY IP 250 OP 250 PS 637: Performed by: PHYSICIAN ASSISTANT

## 2024-11-30 PROCEDURE — 97535 SELF CARE MNGMENT TRAINING: CPT | Mod: GO

## 2024-11-30 PROCEDURE — 99232 SBSQ HOSP IP/OBS MODERATE 35: CPT | Performed by: HOSPITALIST

## 2024-11-30 PROCEDURE — 99291 CRITICAL CARE FIRST HOUR: CPT | Performed by: NURSE PRACTITIONER

## 2024-11-30 PROCEDURE — 250N000013 HC RX MED GY IP 250 OP 250 PS 637

## 2024-11-30 PROCEDURE — 93010 ELECTROCARDIOGRAM REPORT: CPT | Performed by: INTERNAL MEDICINE

## 2024-11-30 PROCEDURE — 250N000013 HC RX MED GY IP 250 OP 250 PS 637: Performed by: HOSPITALIST

## 2024-11-30 PROCEDURE — 93005 ELECTROCARDIOGRAM TRACING: CPT

## 2024-11-30 PROCEDURE — 85379 FIBRIN DEGRADATION QUANT: CPT | Performed by: NURSE PRACTITIONER

## 2024-11-30 PROCEDURE — 84295 ASSAY OF SERUM SODIUM: CPT | Performed by: HOSPITALIST

## 2024-11-30 PROCEDURE — 84145 PROCALCITONIN (PCT): CPT | Performed by: NURSE PRACTITIONER

## 2024-11-30 PROCEDURE — 83735 ASSAY OF MAGNESIUM: CPT | Performed by: NURSE PRACTITIONER

## 2024-11-30 PROCEDURE — 36415 COLL VENOUS BLD VENIPUNCTURE: CPT | Performed by: NURSE PRACTITIONER

## 2024-11-30 PROCEDURE — 87040 BLOOD CULTURE FOR BACTERIA: CPT | Performed by: NURSE PRACTITIONER

## 2024-11-30 PROCEDURE — 81001 URINALYSIS AUTO W/SCOPE: CPT | Performed by: NURSE PRACTITIONER

## 2024-11-30 PROCEDURE — 85018 HEMOGLOBIN: CPT | Performed by: HOSPITALIST

## 2024-11-30 PROCEDURE — 85041 AUTOMATED RBC COUNT: CPT | Performed by: NURSE PRACTITIONER

## 2024-11-30 PROCEDURE — 80048 BASIC METABOLIC PNL TOTAL CA: CPT | Performed by: HOSPITALIST

## 2024-11-30 RX ORDER — METOPROLOL TARTRATE 1 MG/ML
5 INJECTION, SOLUTION INTRAVENOUS EVERY 4 HOURS PRN
Status: DISCONTINUED | OUTPATIENT
Start: 2024-11-30 | End: 2024-12-02 | Stop reason: HOSPADM

## 2024-11-30 RX ORDER — SODIUM CHLORIDE 9 MG/ML
INJECTION, SOLUTION INTRAVENOUS CONTINUOUS
Status: ACTIVE | OUTPATIENT
Start: 2024-11-30 | End: 2024-11-30

## 2024-11-30 RX ORDER — DILTIAZEM HYDROCHLORIDE 5 MG/ML
0.25 INJECTION INTRAVENOUS ONCE
Status: COMPLETED | OUTPATIENT
Start: 2024-12-01 | End: 2024-12-01

## 2024-11-30 RX ORDER — GUAIFENESIN 600 MG/1
600 TABLET, EXTENDED RELEASE ORAL 2 TIMES DAILY
Status: DISCONTINUED | OUTPATIENT
Start: 2024-11-30 | End: 2024-12-02 | Stop reason: HOSPADM

## 2024-11-30 RX ORDER — METOPROLOL TARTRATE 1 MG/ML
5 INJECTION, SOLUTION INTRAVENOUS ONCE
Status: COMPLETED | OUTPATIENT
Start: 2024-11-30 | End: 2024-11-30

## 2024-11-30 RX ADMIN — TAMSULOSIN HYDROCHLORIDE 0.4 MG: 0.4 CAPSULE ORAL at 21:59

## 2024-11-30 RX ADMIN — METHOCARBAMOL 750 MG: 750 TABLET ORAL at 17:35

## 2024-11-30 RX ADMIN — ACETAMINOPHEN 650 MG: 325 TABLET, FILM COATED ORAL at 06:52

## 2024-11-30 RX ADMIN — METHOCARBAMOL 750 MG: 750 TABLET ORAL at 14:46

## 2024-11-30 RX ADMIN — GUAIFENESIN 600 MG: 600 TABLET ORAL at 21:59

## 2024-11-30 RX ADMIN — LISINOPRIL 20 MG: 10 TABLET ORAL at 21:59

## 2024-11-30 RX ADMIN — AMLODIPINE BESYLATE 5 MG: 5 TABLET ORAL at 21:59

## 2024-11-30 RX ADMIN — METOPROLOL SUCCINATE 50 MG: 50 TABLET, EXTENDED RELEASE ORAL at 10:35

## 2024-11-30 RX ADMIN — METHOCARBAMOL 750 MG: 750 TABLET ORAL at 10:36

## 2024-11-30 RX ADMIN — CEFAZOLIN SODIUM 2 G: 2 INJECTION, SOLUTION INTRAVENOUS at 02:19

## 2024-11-30 RX ADMIN — FEXOFENADINE HCL 180 MG: 180 TABLET ORAL at 21:59

## 2024-11-30 RX ADMIN — CEFAZOLIN SODIUM 2 G: 2 INJECTION, SOLUTION INTRAVENOUS at 10:39

## 2024-11-30 RX ADMIN — CEFAZOLIN SODIUM 2 G: 2 INJECTION, SOLUTION INTRAVENOUS at 17:36

## 2024-11-30 RX ADMIN — METOPROLOL TARTRATE 5 MG: 5 INJECTION INTRAVENOUS at 19:45

## 2024-11-30 RX ADMIN — DILTIAZEM HYDROCHLORIDE 22.9 MG: 5 INJECTION, SOLUTION INTRAVENOUS at 23:58

## 2024-11-30 RX ADMIN — METHOCARBAMOL 750 MG: 750 TABLET ORAL at 21:59

## 2024-11-30 RX ADMIN — POLYETHYLENE GLYCOL 3350 17 G: 17 POWDER, FOR SOLUTION ORAL at 17:36

## 2024-11-30 RX ADMIN — ACETAMINOPHEN 650 MG: 325 TABLET, FILM COATED ORAL at 17:35

## 2024-11-30 RX ADMIN — ATORVASTATIN CALCIUM 40 MG: 40 TABLET, FILM COATED ORAL at 21:59

## 2024-11-30 RX ADMIN — METOPROLOL TARTRATE 5 MG: 5 INJECTION INTRAVENOUS at 20:07

## 2024-11-30 ASSESSMENT — ACTIVITIES OF DAILY LIVING (ADL)
ADLS_ACUITY_SCORE: 63
ADLS_ACUITY_SCORE: 64
ADLS_ACUITY_SCORE: 63
ADLS_ACUITY_SCORE: 64
ADLS_ACUITY_SCORE: 64
ADLS_ACUITY_SCORE: 63
ADLS_ACUITY_SCORE: 64
ADLS_ACUITY_SCORE: 63
ADLS_ACUITY_SCORE: 64
ADLS_ACUITY_SCORE: 63

## 2024-11-30 NOTE — PLAN OF CARE
Goal Outcome Evaluation:    Shift Summary [id number]    Admitting Diagnosis: Adjacent segment disease of lumbar spine with history of fusion procedure [M51.369, Z98.1]   POD#4 Lumbar 2 to Lumbar 3 extension of previous Lumbar 3 to Lumbar 5 fusion with stealth navigation and Lumbar 2 to Lumbar 3 decompression  Removal of L3-5 bilateral set screws and rods     A/Ox4. Up with A1/GB/W. VSS on RA. Regular diet. Dressing: CDI with scant drain. NEELA drain in place. 30 ml Output for the night. Pain managed with prn scheduled robaxin and prn oxy and tylenol. CMS intact. Pt slept on the recliner during the night. 1xloose BM this shift.   Possible discharge today.

## 2024-11-30 NOTE — PROGRESS NOTES
Patient ambulation status: Up SBA, Up in multiple times on ths shift.   Patient able to stand for X-ray:No  Standing/upright x-ray complete: No  Patient using oral analgesics:yes  Voiding spontaneously:yes  Drains discontinued:no, NEELA in place  Incision clean and dry:no Dry drain  Bowel status: Passing gas and Pt did have BM     Patient A&Ox4. VSS., on RA. Minimum pain managed with schedule Tylenol and Robaxin. PIV SL with intermittent abx. Denied headache on this shift. Possible discharge tomorrow when medically ready.

## 2024-11-30 NOTE — OR NURSING
Message to provider.    Patient complains of coughing and being unable to break phlegm. States it's been going on for almost a month but gets worse when he lays down. States he wants to try Mucinex. Thank you.

## 2024-11-30 NOTE — PROGRESS NOTES
[facility]  Hospitalist Progress Note   11/30/2024          Assessment and Plan:       Ricardo eNely is a 84 year old male with history of hypertension, TIA, hyperlipidemia, iron deficiency anemia, chronic bilateral low back pain, BPH admitted on 11/26/2024 after elective back surgery.  Hospitalist team consulted for postoperative management of medical comorbidities.    Status post L2-L3 extension of previous L3-L5 fusion with a Stealth navigated and L2-L3 decompression-11/26/2024  L2-L3 severe stenosis with severe back and leg pain  History of L3-L5 fusion in the past  --Patient does admit to some improvement in back pain, headaches.  Reports has ambulated minimally.  Intraoperative drain in place, decreased output.  --Postoperative management as per primary service including pain management and DVT prophylaxis.  --PTA aspirin and Plavix on hold since 7 days prior to procedure.  Resume antiplatelet therapy once okay by neurosurgery anticoagulation once okay by neurosurgery. (Neurosurgery recommends hold ASA 81 mg until POD 5, hold Plavix until POD 10)  Minimize narcotic use as able to.  Pharmacological DVT prophylaxis postprocedure per surgical team.  Bowel meds to prevent constipation.    Postoperative leukocytosis likely reactive - resolved   Noted bump in WBC count to 13.5 >9.8    No signs or symptoms of infection.  Perioperative antibiotics per surgical team.  UA no concerns for infection.    Acute anemia likely from surgical blood loss, dilutional.  Preop hemoglobin 13.4, now dropped to 11.0 >10.3  No active bleeding.  Postoperative drain in place.  Monitor hemoglobin levels periodically.    Acute on chronic hyponatremia -likely from poor oral intake.  Mild elevated anion gap.  Chart reviewed, previous sodium in low 130s.  Postoperative sodium 131 >127 > 129.  Chloride 91, anion gap 16.  Calcium 8.4  Urine sodium 21.  --Reported poor oral intake, received IV hydration on 11/28.  Sodium again  trending down to 129, elevated anion gap.  Gentle IV hydration.  Adequate oral intake.  Liberalize salt in diet.  Monitor BMP in am     Hypertension.  Hyperlipidemia  Continue PTA amlodipine, lisinopril with hold parameters.  Continue metoprolol with hold parameters.  Monitor blood pressures, optimize regimen.  Continue atorvastatin 40 mg every evening.  PTA aspirin, Plavix on hold.  (Neurosurgery recommends hold ASA 81 mg until POD 5, hold Plavix until POD 10. )    Prior history of TIA:  Asymptomatic, noncritical bilateral carotid artery stenosis:  Recent ultrasound showed mild plaque formation velocities consistent with less than 50% stenosis in bilateral internal carotid arteries.  Holding PTA aspirin and Plavix. Neurosurgery recommends hold ASA 81 mg until POD 5, hold Plavix until POD 10.    History of BPH with retention:   Continue Flomax 0.4 mg every evening     Thyroid nodule:  PCP is planning to order ultrasound-guided biopsy of thyroid nodule once patient has recovered from back surgery     Obesity: With BMI of 31.62.  Increase all-cause mortality and morbidity.  Consider lifestyle modification with diet and exercise as able to.  Consider sleep studies as outpatient    Orders Placed This Encounter      Advance Diet as Tolerated: Regular Diet Adult      Diet      DVT Prophylaxis: SCDs.  Pharmacological DVT prophylaxis per surgical team  Code Status: Full Code  Disposition: Expected discharge -12/1 pending improvement in sodium.    Discussed with patient, bedside RN, neurosurgery team.  Updated patient's family by the bedside 11/30.  >35 minutes spent by me on the date of service doing chart review, history, exam, documentation & further activities per the note.      Milo Escoto MD        Interval History:        Patient lying in bed.  Denies any chest pain or palpitations.  Denies shortness of breath.  No nausea vomiting.  Denies tingling or numbness.  Does admit to some improvement in back pain,  headache.  Reports has ambulated minimally.  Receiving Tylenol, oxycodone.  NEELA drain in place, draining 30 cc of serosanguineous fluid.  Afebrile.  PTA aspirin and Plavix on hold.         Physical Exam:        Physical Exam   Temp:  [98.1  F (36.7  C)-99.3  F (37.4  C)] 99.3  F (37.4  C)  Pulse:  [] 105  Resp:  [16-18] 18  BP: (122-137)/(49-66) 122/66  SpO2:  [92 %-95 %] 95 %    Intake/Output Summary (Last 24 hours) at 11/27/2024 1549  Last data filed at 11/27/2024 1500  Gross per 24 hour   Intake --   Output 1730 ml   Net -1730 ml       Admission Weight: 91.6 kg (201 lb 14.4 oz)  Current Weight: 91.6 kg (201 lb 14.4 oz)    PHYSICAL EXAM  GENERAL: Patient is in no distress. Alert and oriented.  HEART: Regular rate and rhythm. S1S2.   LUNGS: Clear to auscultation bilaterally. No expiratory wheeze.  Respirations unlabored  NEURO: Moving extremities  Musculoskeletal NEELA drain in place.  EXTREMITIES: No pedal edema.   SKIN: Warm, dry. No rash   PSYCHIATRY Cooperative       Medications:        Current Facility-Administered Medications   Medication Dose Route Frequency Provider Last Rate Last Admin    amLODIPine (NORVASC) tablet 5 mg  5 mg Oral QPM Rashida Vallejo MD   5 mg at 11/29/24 2120    atorvastatin (LIPITOR) tablet 40 mg  40 mg Oral QPM Maeve Hernández PA-C   40 mg at 11/29/24 2120    ceFAZolin (ANCEF) 2 g in 100 mL D5W intermittent infusion  2 g Intravenous Q8H Maeve Hernández PA-C 200 mL/hr at 11/30/24 1039 2 g at 11/30/24 1039    fexofenadine (ALLEGRA) tablet 180 mg  180 mg Oral QPM Maeve Hernández PA-C   180 mg at 11/29/24 2120    lisinopril (ZESTRIL) tablet 20 mg  20 mg Oral QPM Rashida Vallejo MD   20 mg at 11/29/24 2120    methocarbamol (ROBAXIN) tablet 750 mg  750 mg Oral 4x Daily Arie Herrera PA-C   750 mg at 11/30/24 1446    metoprolol succinate ER (TOPROL XL) 24 hr tablet 50 mg  50 mg Oral Daily Rashida Vallejo MD   50 mg at 11/30/24 1035    polyethylene glycol (MIRALAX)  Packet 17 g  17 g Oral BID Jr Perez APRN CNP   17 g at 11/29/24 1650    senna-docusate (SENOKOT-S/PERICOLACE) 8.6-50 MG per tablet 2 tablet  2 tablet Oral BID Jr Perez APRN CNP   2 tablet at 11/29/24 0811    sodium chloride (PF) 0.9% PF flush 3 mL  3 mL Intracatheter Q8H Maeve Hernández PA-C   3 mL at 11/30/24 1035    tamsulosin (FLOMAX) capsule 0.4 mg  0.4 mg Oral QPM Maeve Hernández PA-C   0.4 mg at 11/29/24 2120     Current Facility-Administered Medications   Medication Dose Route Frequency Provider Last Rate Last Admin    acetaminophen (TYLENOL) tablet 650 mg  650 mg Oral Q4H PRN Maeve Hernández PA-C   650 mg at 11/30/24 0652    bisacodyl (DULCOLAX) suppository 10 mg  10 mg Rectal Daily PRN Maeve Hernández PA-C        calcium carbonate (TUMS) chewable tablet 500 mg  500 mg Oral 4x Daily PRN Maeve Hernández PA-C        HYDROmorphone (DILAUDID) injection 0.2 mg  0.2 mg Intravenous Q2H PRN Maeve Hernández PA-C        Or    HYDROmorphone (DILAUDID) injection 0.4 mg  0.4 mg Intravenous Q2H PRN Maeve Hernández PA-C   0.4 mg at 11/28/24 0152    hydrOXYzine HCl (ATARAX) tablet 10 mg  10 mg Oral Q6H PRN Maeve Hernández PA-C   10 mg at 11/28/24 2127    lidocaine (LMX4) cream   Topical Q1H PRN Maeve Hernández PA-C        lidocaine 1 % 0.1-1 mL  0.1-1 mL Other Q1H PRN Maeve Hernández PA-C        magnesium hydroxide (MILK OF MAGNESIA) suspension 30 mL  30 mL Oral Daily PRN Maeve Hernández PA-C   30 mL at 11/29/24 1316    melatonin tablet 5 mg  5 mg Oral At Bedtime PRN Audelia Foss MD   5 mg at 11/28/24 2127    naloxone (NARCAN) injection 0.2 mg  0.2 mg Intravenous Q2 Min PRN Harrison Perez MD        Or    naloxone (NARCAN) injection 0.4 mg  0.4 mg Intravenous Q2 Min PRN Harrison Perez MD        Or    naloxone (NARCAN) injection 0.2 mg  0.2 mg Intramuscular Q2 Min PRN Harrison Perez MD        Or    naloxone (NARCAN) injection 0.4 mg   0.4 mg Intramuscular Q2 Min PRN Harrison Perez MD        ondansetron (ZOFRAN ODT) ODT tab 4 mg  4 mg Oral Q6H PRN Maeve Hernández PA-C   4 mg at 11/28/24 0200    Or    ondansetron (ZOFRAN) injection 4 mg  4 mg Intravenous Q6H PRN Maeve Hernández PA-C        oxyCODONE (ROXICODONE) tablet 5 mg  5 mg Oral Q4H PRN Maeve Hernández PA-C   5 mg at 11/27/24 1627    Or    oxyCODONE (ROXICODONE) tablet 10 mg  10 mg Oral Q4H PRN Maeve Hernández PA-C   10 mg at 11/29/24 2226    prochlorperazine (COMPAZINE) injection 5 mg  5 mg Intravenous Q6H PRN Maeve Hernández PA-C        Or    prochlorperazine (COMPAZINE) tablet 5 mg  5 mg Oral Q6H PRN aMeve Hernández PA-C        sodium chloride (PF) 0.9% PF flush 3 mL  3 mL Intracatheter q1 min prn Maeve Hernández PA-C                Data:      All new lab and imaging data was reviewed.

## 2024-11-30 NOTE — PROGRESS NOTES
"Neurosurgery Progress     Dx:     POD #4 s/p L2-3 extension of previous L3-5 fusion with stealth navigation and L2-3 decompression.  Removal of L3-5 bilateral set screws and rods.     Neuro stable.     TODAY'S PLAN:     -Will keep drain in for now.    -Will remove tomorrow, 12/01.  -Staying the night due to Na (129).   -Advance activity as tolerated.  -Continue supportive and symptomatic treatment.  -Continue physical therapy.  -Pain control measures.  -Advance diet as tolerated.  -Routine wound care.     In the event that patient's symptoms worsen or change we would appreciate being contacted. We did discuss signs of a worsening problem that he should seek being evaluated.     We did review the above information with the patient whom agrees with the plan and did verbalize understanding.   ________________________________________________________________     Mr. Neely overall feels well and denies any significant discomfort. Tolerating regular diet without n/v. Up ambulating independently. Voiding without difficulty.     /66   Pulse 105   Temp 99.3  F (37.4  C) (Oral)   Resp 18   Ht 1.702 m (5' 7\")   Wt 91.6 kg (201 lb 14.4 oz)   SpO2 95%   BMI 31.62 kg/m       Patient examined in room 2405. He appears comfortable and in no apparent distress. He is moving all of his extremities well.   CN II-XII intact, alert and appropriate with conversation. Follows all commands.   Bilateral upper and lower extremities with appropriate strength. Deep tendon reflexes within normal limits throughout. Sensation is also intact throughout. He does have an acceptable post-operative range of motion.   His lumbar incision is absent for concerning edema, erythema or drainage.   Bandage is clean, dry and intact.   Calves soft and non-tender.       All pertinent labs and updated imaging reviewed in EPIC.      Latest Reference Range & Units 11/30/24 08:39   Sodium 135 - 145 mmol/L 129 (L)   (L): Data is abnormally low        Martin " ANAMARIA Herrera   Neurosurgery

## 2024-11-30 NOTE — PROGRESS NOTES
Occupational Therapy Discharge Summary    Reason for therapy discharge:    All goals and outcomes met, no further needs identified.    Progress towards therapy goal(s). See goals on Care Plan in [facility] electronic health record for goal details.  Goals not met.  Barriers to achieving goals:   Patient declines further ADL practice.    Therapy recommendation(s):    No further therapy is recommended.

## 2024-12-01 ENCOUNTER — APPOINTMENT (OUTPATIENT)
Dept: ULTRASOUND IMAGING | Facility: CLINIC | Age: 84
DRG: 402 | End: 2024-12-01
Attending: HOSPITALIST
Payer: COMMERCIAL

## 2024-12-01 LAB
ALBUMIN SERPL BCG-MCNC: 3.3 G/DL (ref 3.5–5.2)
ALP SERPL-CCNC: 113 U/L (ref 40–150)
ALT SERPL W P-5'-P-CCNC: 23 U/L (ref 0–70)
ANION GAP SERPL CALCULATED.3IONS-SCNC: 14 MMOL/L (ref 7–15)
AST SERPL W P-5'-P-CCNC: 53 U/L (ref 0–45)
ATRIAL RATE - MUSE: 131 BPM
BILIRUB DIRECT SERPL-MCNC: <0.2 MG/DL (ref 0–0.3)
BILIRUB SERPL-MCNC: 0.4 MG/DL
BUN SERPL-MCNC: 14.1 MG/DL (ref 8–23)
CALCIUM SERPL-MCNC: 8.3 MG/DL (ref 8.8–10.4)
CHLORIDE SERPL-SCNC: 91 MMOL/L (ref 98–107)
CREAT SERPL-MCNC: 0.72 MG/DL (ref 0.67–1.17)
DIASTOLIC BLOOD PRESSURE - MUSE: NORMAL MMHG
EGFRCR SERPLBLD CKD-EPI 2021: 90 ML/MIN/1.73M2
GLUCOSE SERPL-MCNC: 118 MG/DL (ref 70–99)
HCO3 SERPL-SCNC: 21 MMOL/L (ref 22–29)
INTERPRETATION ECG - MUSE: NORMAL
P AXIS - MUSE: NORMAL DEGREES
POTASSIUM SERPL-SCNC: 4.1 MMOL/L (ref 3.4–5.3)
PR INTERVAL - MUSE: NORMAL MS
PROT SERPL-MCNC: 6.3 G/DL (ref 6.4–8.3)
QRS DURATION - MUSE: 112 MS
QT - MUSE: 314 MS
QTC - MUSE: 451 MS
R AXIS - MUSE: -52 DEGREES
SODIUM SERPL-SCNC: 126 MMOL/L (ref 135–145)
SYSTOLIC BLOOD PRESSURE - MUSE: NORMAL MMHG
T AXIS - MUSE: 96 DEGREES
TROPONIN T SERPL HS-MCNC: 28 NG/L
VENTRICULAR RATE- MUSE: 124 BPM

## 2024-12-01 PROCEDURE — 250N000013 HC RX MED GY IP 250 OP 250 PS 637

## 2024-12-01 PROCEDURE — 250N000013 HC RX MED GY IP 250 OP 250 PS 637: Performed by: HOSPITALIST

## 2024-12-01 PROCEDURE — 250N000013 HC RX MED GY IP 250 OP 250 PS 637: Performed by: INTERNAL MEDICINE

## 2024-12-01 PROCEDURE — 99233 SBSQ HOSP IP/OBS HIGH 50: CPT | Performed by: HOSPITALIST

## 2024-12-01 PROCEDURE — 93970 EXTREMITY STUDY: CPT

## 2024-12-01 PROCEDURE — 250N000013 HC RX MED GY IP 250 OP 250 PS 637: Performed by: PHYSICIAN ASSISTANT

## 2024-12-01 PROCEDURE — 80048 BASIC METABOLIC PNL TOTAL CA: CPT | Performed by: HOSPITALIST

## 2024-12-01 PROCEDURE — 84484 ASSAY OF TROPONIN QUANT: CPT | Performed by: HOSPITALIST

## 2024-12-01 PROCEDURE — 250N000011 HC RX IP 250 OP 636: Mod: JZ

## 2024-12-01 PROCEDURE — 36415 COLL VENOUS BLD VENIPUNCTURE: CPT | Performed by: HOSPITALIST

## 2024-12-01 PROCEDURE — 99222 1ST HOSP IP/OBS MODERATE 55: CPT | Mod: 24 | Performed by: INTERNAL MEDICINE

## 2024-12-01 PROCEDURE — 120N000001 HC R&B MED SURG/OB

## 2024-12-01 RX ORDER — METOPROLOL TARTRATE 25 MG/1
25 TABLET, FILM COATED ORAL ONCE
Status: COMPLETED | OUTPATIENT
Start: 2024-12-01 | End: 2024-12-01

## 2024-12-01 RX ADMIN — ATORVASTATIN CALCIUM 40 MG: 40 TABLET, FILM COATED ORAL at 20:24

## 2024-12-01 RX ADMIN — ONDANSETRON 4 MG: 2 INJECTION, SOLUTION INTRAMUSCULAR; INTRAVENOUS at 00:07

## 2024-12-01 RX ADMIN — SENNOSIDES AND DOCUSATE SODIUM 2 TABLET: 50; 8.6 TABLET ORAL at 22:43

## 2024-12-01 RX ADMIN — METHOCARBAMOL 750 MG: 750 TABLET ORAL at 20:24

## 2024-12-01 RX ADMIN — GUAIFENESIN 600 MG: 600 TABLET ORAL at 22:43

## 2024-12-01 RX ADMIN — GUAIFENESIN 600 MG: 600 TABLET ORAL at 08:48

## 2024-12-01 RX ADMIN — POLYETHYLENE GLYCOL 3350 17 G: 17 POWDER, FOR SOLUTION ORAL at 22:43

## 2024-12-01 RX ADMIN — CEFAZOLIN SODIUM 2 G: 2 INJECTION, SOLUTION INTRAVENOUS at 12:54

## 2024-12-01 RX ADMIN — METOPROLOL TARTRATE 25 MG: 25 TABLET, FILM COATED ORAL at 14:00

## 2024-12-01 RX ADMIN — TAMSULOSIN HYDROCHLORIDE 0.4 MG: 0.4 CAPSULE ORAL at 22:43

## 2024-12-01 RX ADMIN — METHOCARBAMOL 750 MG: 750 TABLET ORAL at 08:48

## 2024-12-01 RX ADMIN — METHOCARBAMOL 750 MG: 750 TABLET ORAL at 12:59

## 2024-12-01 RX ADMIN — FEXOFENADINE HCL 180 MG: 180 TABLET ORAL at 20:24

## 2024-12-01 RX ADMIN — CEFAZOLIN SODIUM 2 G: 2 INJECTION, SOLUTION INTRAVENOUS at 02:02

## 2024-12-01 RX ADMIN — LISINOPRIL 20 MG: 10 TABLET ORAL at 20:23

## 2024-12-01 RX ADMIN — METHOCARBAMOL 750 MG: 750 TABLET ORAL at 22:42

## 2024-12-01 RX ADMIN — METOPROLOL SUCCINATE 50 MG: 50 TABLET, EXTENDED RELEASE ORAL at 08:48

## 2024-12-01 ASSESSMENT — ACTIVITIES OF DAILY LIVING (ADL)
ADLS_ACUITY_SCORE: 62
ADLS_ACUITY_SCORE: 65
ADLS_ACUITY_SCORE: 65
ADLS_ACUITY_SCORE: 62
ADLS_ACUITY_SCORE: 62
ADLS_ACUITY_SCORE: 65
ADLS_ACUITY_SCORE: 62
ADLS_ACUITY_SCORE: 65
ADLS_ACUITY_SCORE: 62
ADLS_ACUITY_SCORE: 65
ADLS_ACUITY_SCORE: 62
ADLS_ACUITY_SCORE: 65
ADLS_ACUITY_SCORE: 65
ADLS_ACUITY_SCORE: 62
ADLS_ACUITY_SCORE: 65
ADLS_ACUITY_SCORE: 62
ADLS_ACUITY_SCORE: 62

## 2024-12-01 NOTE — PROGRESS NOTES
Pt HR tachy, sustaining in the 120-130s when sitting. Provider notified. Flyer came with provider and pushed diltiazem. Pt HR came down to 90s. BP stable systolic in 110s.

## 2024-12-01 NOTE — PROGRESS NOTES
"Neurosurgery Progress     Dx:     POD #5 s/p L2-3 extension of previous L3-5 fusion with stealth navigation and L2-3 decompression. Removal of L3-5 bilateral set screws and rods.     A-fib last night.     Neuro stable.     TODAY'S PLAN:     -Remove drain today.   -May discharge when other teams feel that it is appropriate.    -Anticipate 12/02?  -Advance activity as tolerated.  -Continue supportive and symptomatic treatment.  -Continue physical therapy.  -Pain control measures.  -Advance diet as tolerated.  -Routine wound care.     In the event that patient's symptoms worsen or change we would appreciate being contacted. We did discuss signs of a worsening problem that he should seek being evaluated.     We did review the above information with the patient whom agrees with the plan and did verbalize understanding.   ________________________________________________________________     Mr. Neely overall feels well and denies any significant discomfort. Tolerating regular diet without n/v. Up ambulating . Voiding without difficulty.     /60 (BP Location: Right arm)   Pulse 99   Temp 97.9  F (36.6  C) (Oral)   Resp 20   Ht 1.702 m (5' 7\")   Wt 91.6 kg (201 lb 14.4 oz)   SpO2 94%   BMI 31.62 kg/m       Patient examined in room 2405 with his son present. He appears comfortable and in no apparent distress. He is moving all of his extremities well.   CN II-XII intact, alert and appropriate with conversation. Follows all commands.   Bilateral upper and lower extremities with appropriate strength. Deep tendon reflexes within normal limits throughout. Sensation is also intact throughout. He does have an acceptable post-operative range of motion.   His lumbar incision is absent for concerning edema, erythema or drainage.   Bandage is clean, dry and intact.   Calves soft and non-tender.       All pertinent labs and updated imaging reviewed in EPIC.      Latest Reference Range & Units 12/01/24 08:11   Sodium 135 - " 145 mmol/L 126 (L)   (L): Data is abnormally low      Martin Herrera PA-C   Neurosurgery

## 2024-12-01 NOTE — PROGRESS NOTES
[facility]  Hospitalist Progress Note   12/01/2024          Assessment and Plan:       Ricardo Neely is a 84 year old male with history of hypertension, TIA, hyperlipidemia, iron deficiency anemia, chronic bilateral low back pain, BPH admitted on 11/26/2024 after elective back surgery.  Hospitalist team consulted for postoperative management of medical comorbidities.    Status post L2-L3 extension of previous L3-L5 fusion with a Stealth navigated and L2-L3 decompression-11/26/2024  L2-L3 severe stenosis with severe back and leg pain  History of L3-L5 fusion in the past  --Patient does admit to some improvement in back pain, headaches.  Reports has ambulated minimally.  Intraoperative drain in place, decreased output.  --Postoperative management as per primary service including pain management and DVT prophylaxis.  --Procedure patient not on pharmacological DVT prophylaxis.  Noted elevated D-dimer, await neurosurgery input on DVT prophylaxis.  --PTA aspirin and Plavix on hold since 7 days prior to procedure.  Resume antiplatelet therapy once okay by neurosurgery anticoagulation once okay by neurosurgery. (Neurosurgery recommends hold ASA 81 mg until POD 5, hold Plavix until POD 10).  Note 12/1 is day 5 of surgery, await neurosurgery input on resumption of aspirin.  Minimize narcotic use as able to.  Bowel meds to prevent constipation.    New onset A-fib with RVR likely postop stress related.  Elevated troponin likely in the setting of A-fib with RVR, demand ischemia, type II MI  Hypertension.  Hyperlipidemia  --On 11/30 p.m. patient RRT for heart rate of 130s, A-fib with RVR.  Received IV metoprolol, IV Cardizem and converted to sinus rhythm.  TSH within normal limits in September 2024  --12/1 patient denies any chest pain or palpitations at this time.   Troponin 26 > 28  Will increase PTA metoprolol to 50 mg twice daily.  Hold PTA amlodipine.  Continue PTA lisinopril with hold parameters.  Continue  PTA Lipitor.    Telemetry monitoring.  Echocardiogram pending.  Cardiology consulted, await recommendation.  PTA aspirin, Plavix on hold. Await neurosurgery input on resumption of anticoagulation.  Monitor and replace electrolytes closely.    Prior history of TIA:  Asymptomatic, noncritical bilateral carotid artery stenosis:  Recent ultrasound showed mild plaque formation velocities consistent with less than 50% stenosis in bilateral internal carotid arteries.  Holding PTA aspirin and Plavix, reached out to neurosurgery for input on anticoagulation.    Elevated D-dimer.  RRT 11/30 for tachycardia, A-fib with RVR.  D-dimer 2.98.  Prior to admission aspirin, Plavix on hold.  Has not been on pharmacological DVT prophylaxis since admission.  Ultrasound bilateral lower extremity to rule out DVT.    If tachycardia persists or new shortness of breath will consider CT PE.  Await neurosurgery input on antiplatelet therapy, pharmacological DVT prophylaxis.     Postop low-grade fever 100 [11/30/2024] likely due to atelectasis  Afebrile.  No leukocytosis.  Procalcitonin 0.10  Chest x-ray 11/30 with mild hazy bandlike opacities in medial bases likely atelectasis.  UA no concerns for infection.  Influenza A, B, RSV, COVID-19 PCR 11/20 negative.  Aggressive incentive spirometry.  Perioperative antibiotics per surgical team.  Monitor closely, if spikes fever will consider antibiotics.    Acute on chronic hyponatremia   Mild elevated anion gap.  Chart reviewed, previous sodium in low 130s.  Postoperative sodium 131 >127 > 129.  Chloride 91, anion gap 16.  Calcium 8.4  Urine sodium 21.  --Reported poor oral intake, received intermittent IV hydration.  Serum sodium 126.  Liberalize salt in diet, 2000 mL fluid restriction.  BMP in a.m.    Postoperative leukocytosis likely reactive - resolved   Postop day 1 WBC count to 13.5 >9.8      Acute anemia likely from surgical blood loss, dilutional.  Preop hemoglobin 13.4, now dropped to 11.0  >10.3  No active bleeding.  Postoperative drain in place.  Monitor hemoglobin levels periodically.    History of BPH with retention:   Continue Flomax 0.4 mg every evening     Thyroid nodule:  PCP is planning to order ultrasound-guided biopsy of thyroid nodule once patient has recovered from back surgery     Obesity: With BMI of 31.62.  Increase all-cause mortality and morbidity.  Consider lifestyle modification with diet and exercise as able to.  Consider sleep studies as outpatient    Orders Placed This Encounter      Advance Diet as Tolerated: Regular Diet Adult      Diet      DVT Prophylaxis: SCDs.  Pharmacological DVT prophylaxis per surgical team  Code Status: Full Code  Disposition: Expected discharge 1 to 2 days pending clinical improvement.    Discussed with patient, bedside RN son by the bedside.  >51 minutes spent by me on the date of service doing chart review, history, exam, documentation & further activities per the note.      Milo Escoto MD        Interval History:        Patient lying in bed.  Denies any chest pain or palpitations.  Denies any shortness of breath on rest.  No nausea vomiting.  Denies tingling or numbness.  Does admit to some improvement in back pain, headache.  Reports has ambulated minimally.  Receiving Tylenol, oxycodone.  NEELA drain in place, Tmax 100.   PTA aspirin and Plavix on hold.  Denies any leg pain.    RRT last night for A-fib with RVR, low-grade fever, elevated D-dimer.  See note for details.           Physical Exam:        Physical Exam   Temp:  [97.9  F (36.6  C)-100  F (37.8  C)] 97.9  F (36.6  C)  Pulse:  [] 99  Resp:  [18-20] 20  BP: ()/(49-74) 134/60  SpO2:  [92 %-94 %] 94 %    Intake/Output Summary (Last 24 hours) at 11/27/2024 1548  Last data filed at 11/27/2024 1500  Gross per 24 hour   Intake --   Output 1730 ml   Net -1730 ml       Admission Weight: 91.6 kg (201 lb 14.4 oz)  Current Weight: 91.6 kg (201 lb 14.4 oz)    PHYSICAL EXAM  GENERAL:  Patient is in no distress. Alert and oriented.  HEART: Regular rate and rhythm. S1S2.   LUNGS: Clear to auscultation bilaterally. No expiratory wheeze.  Respirations unlabored  NEURO: Moving extremities  Musculoskeletal NEELA drain in place.  EXTREMITIES: No pedal edema.   SKIN: Warm, dry. No rash   PSYCHIATRY Cooperative       Medications:        Current Facility-Administered Medications   Medication Dose Route Frequency Provider Last Rate Last Admin    amLODIPine (NORVASC) tablet 5 mg  5 mg Oral QPM Rashida Vallejo MD   5 mg at 11/30/24 2159    atorvastatin (LIPITOR) tablet 40 mg  40 mg Oral QPM Maeve Hernández PA-C   40 mg at 11/30/24 2159    ceFAZolin (ANCEF) 2 g in 100 mL D5W intermittent infusion  2 g Intravenous Q8H Maeve Hernández PA-C 200 mL/hr at 12/01/24 0202 2 g at 12/01/24 0202    fexofenadine (ALLEGRA) tablet 180 mg  180 mg Oral QPM Maeve Hernández PA-C   180 mg at 11/30/24 2159    guaiFENesin (MUCINEX) 12 hr tablet 600 mg  600 mg Oral BID Milo Escoto MD   600 mg at 12/01/24 0848    lisinopril (ZESTRIL) tablet 20 mg  20 mg Oral QPM Rashida Vallejo MD   20 mg at 11/30/24 2159    methocarbamol (ROBAXIN) tablet 750 mg  750 mg Oral 4x Daily Arie Herrera PA-C   750 mg at 12/01/24 0848    metoprolol succinate ER (TOPROL XL) 24 hr tablet 50 mg  50 mg Oral Daily Rashida Vallejo MD   50 mg at 12/01/24 0848    polyethylene glycol (MIRALAX) Packet 17 g  17 g Oral BID Jr Perez APRN CNP   17 g at 11/30/24 1736    senna-docusate (SENOKOT-S/PERICOLACE) 8.6-50 MG per tablet 2 tablet  2 tablet Oral BID Jr Perez APRN CNP   2 tablet at 11/29/24 0811    sodium chloride (PF) 0.9% PF flush 3 mL  3 mL Intracatheter Q8H Maeve Hernández PA-C   3 mL at 12/01/24 0852    tamsulosin (FLOMAX) capsule 0.4 mg  0.4 mg Oral QPM Maeve Hernández PA-C   0.4 mg at 11/30/24 6519     Current Facility-Administered Medications   Medication Dose Route Frequency Provider Last Rate Last Admin     acetaminophen (TYLENOL) tablet 650 mg  650 mg Oral Q4H PRN Maeve Hernández PA-C   650 mg at 11/30/24 1735    bisacodyl (DULCOLAX) suppository 10 mg  10 mg Rectal Daily PRN Maeve Hernández PA-C        calcium carbonate (TUMS) chewable tablet 500 mg  500 mg Oral 4x Daily PRN Maeve Hernández PA-C        HYDROmorphone (DILAUDID) injection 0.2 mg  0.2 mg Intravenous Q2H PRN Maeve Hernández PA-C        Or    HYDROmorphone (DILAUDID) injection 0.4 mg  0.4 mg Intravenous Q2H PRN Maeve Hernández PA-C   0.4 mg at 11/28/24 0152    hydrOXYzine HCl (ATARAX) tablet 10 mg  10 mg Oral Q6H PRN Maeve Hernández PA-C   10 mg at 11/28/24 2127    lidocaine (LMX4) cream   Topical Q1H PRN Maeve Hernández PA-C        lidocaine 1 % 0.1-1 mL  0.1-1 mL Other Q1H PRN Maeve Hernández PA-C        magnesium hydroxide (MILK OF MAGNESIA) suspension 30 mL  30 mL Oral Daily PRN Maeve Hernández PA-C   30 mL at 11/29/24 1316    melatonin tablet 5 mg  5 mg Oral At Bedtime PRN Audelia Foss MD   5 mg at 11/28/24 2127    metoprolol (LOPRESSOR) injection 5 mg  5 mg Intravenous Q4H PRN Wilbert Larsen APRN CNP   5 mg at 11/30/24 1945    naloxone (NARCAN) injection 0.2 mg  0.2 mg Intravenous Q2 Min PRN Harrison Perez MD        Or    naloxone (NARCAN) injection 0.4 mg  0.4 mg Intravenous Q2 Min PRN Harrison Perez MD        Or    naloxone (NARCAN) injection 0.2 mg  0.2 mg Intramuscular Q2 Min PRN Harrison Perez MD        Or    naloxone (NARCAN) injection 0.4 mg  0.4 mg Intramuscular Q2 Min PRN Harrison Perez MD        ondansetron (ZOFRAN ODT) ODT tab 4 mg  4 mg Oral Q6H PRN Maeve Hernández PA-C   4 mg at 11/28/24 0200    Or    ondansetron (ZOFRAN) injection 4 mg  4 mg Intravenous Q6H PRN Maeve Hernández PA-C   4 mg at 12/01/24 0007    oxyCODONE (ROXICODONE) tablet 5 mg  5 mg Oral Q4H PRN Maeve Hernández PA-C   5 mg at 11/27/24 1627    Or    oxyCODONE (ROXICODONE) tablet 10 mg   10 mg Oral Q4H PRN Maeve Hernández PA-C   10 mg at 11/29/24 2226    prochlorperazine (COMPAZINE) injection 5 mg  5 mg Intravenous Q6H PRN Maeve Hernández PA-C        Or    prochlorperazine (COMPAZINE) tablet 5 mg  5 mg Oral Q6H PRN Maeve Hernández PA-C        sodium chloride (PF) 0.9% PF flush 3 mL  3 mL Intracatheter q1 min prn Maeve Hernández PA-C   3 mL at 11/30/24 1722            Data:      All new lab and imaging data was reviewed.

## 2024-12-01 NOTE — PLAN OF CARE
Goal Outcome Evaluation:    Shift Summary [id number]    Admitting Diagnosis: Adjacent segment disease of lumbar spine with history of fusion procedure [M51.369, Z98.1]   Vitals Vital signs:  Temp: 99.3  F (37.4  C) Temp src: Oral BP: 125/65 Pulse: 117   Resp: 18 SpO2: 92 % O2 Device: None (Room air)   Pain :Denies  A&Ox:4  Voiding :Continent  Mobility :A1  Tele :A-Fib  CMS :Intact  Lung Sounds Clear on 0 LPM via RA  GI BS +4  Dressing CDI    Orders Placed This Encounter      Advance Diet as Tolerated: Regular Diet Adult      Diet       Plan:

## 2024-12-01 NOTE — PLAN OF CARE
Alert and oriented x's 4.  Up with one assist and a walker to the bathroom.  Patient declines bowel meds, stating that he is having small liquid stools at times when he urinates.  Taking Robaxin for pain.  Drain removed today and dressing changed.  On telemetry.  Cardiology consult today for a-fib.  Will continue to monitor.

## 2024-12-01 NOTE — CONSULTS
[facility]    Cardiology Consultation     Date of Admission:  11/26/2024    Assessment & Plan   Ricardo Neely is a 84 year old male who was admitted on 11/26/2024.    New onset atrial fibrillation with rapid ventricular rate, chads vascular score of 3, converted to sinus  Hypertension on amlodipine and lisinopril  Benign prostate hypertrophy on tamsulosin  Lumbar degenerative spine disease, admitted for lumbar surgery on 11/28  History of TIA on Plavix    Discussion  At this time, we will continue the home metoprolol succinate but at a higher dose of 75 mg/day.  Echo pending.  Ideally, needs Eliquis but will need clearance from neurosurgery.  If Eliquis started, Plavix can be discontinued which she was taking prior to admission for previous TIA.  Further recommendation based on results of echocardiogram.    Giovanny Duron MD  Primary Care Physician   Familia Cook    Reason for Consult   Reason for consult: I was asked by hospitalist to evaluate this patient for atrial fibrillation.    History of Present Illness   Ricardo Neely is a 84 year old male who was admitted for neurosurgical procedure on 1128/24.  He underwent lumbar 2 to Lumbar 3 extension of previous Lumbar 3 to Lumbar 5 fusion with stealth navigation and Lumbar 2 to Lumbar 3 decompression as well as Removal of L3-5 bilateral set screws and rods/    Yesterday evening, he was sitting upright in recliner and was noted to have sudden onset rapid heart rate without any chest pain shortness of breath.  This was noted on telemetry and therefore RRT was called.  He was noted to be in atrial fibrillation with rapid ventricular rate.  Patient given IV metoprolol.  Subsequently IV diltiazem was given.  Patient has been on metoprolol XL 50 mg/day at home.  Of note, despite being on atrial fibrillation, he was not symptomatic and did not know about the elevated heart rate.    Patient has had mild hyponatremia and low chloride level  since admission.  Troponins 26 and 28, flat.  Hemoglobin 10.5.  EKG was reviewed by me and revealed atrial fibrillation with a left intrafascicular block.    Patient has previously seen cardiology and my partner Dr. Kumar for palpitations in 2022 in 2023.  At that time he was noted to have PACs and PVCs.  He also has history of hypertension.  His chads vascular score is 3 given his age and history of hypertension    Past Medical History   Past Medical History:   Diagnosis Date    Anxiety 05/26/2011    Arthritis     Basal cell carcinoma     Benign prostatic hyperplasia with urinary retention 11/05/2024    Benign Prostatic Hypertrophy     Carotid stenosis, asymptomatic, bilateral 11/02/2022    CEREBROVASC DISEASE, small vessel 12/2007    Chronic bilateral low back pain without sciatica 11/05/2024    Class 1 obesity with serious comorbidity and body mass index (BMI) of 31.0 to 31.9 in adult, unspecified obesity type 09/04/2024    Contracture of palmar fascia 10/16/2020    Diaphragmatic hernia 09/23/2002    Problem list name updated by automated process. Provider to review      Essential hypertension 08/19/2002    Essential hypertension, benign     GERD     HIATAL HERNIA     Hx of total knee replacement, left 09/12/2024    Hx of transient ischemic attack (TIA) 08/01/2021    Hyperlipidemia LDL goal <70 09/04/2024    HYPERPLASTIC POLYPS COLON 5/93, 3/06    Hypertrophy of prostate without urinary obstruction     Impaired fasting glucose     Low Back Pain     Lumbar radiculopathy 09/04/2024    Obesity, unspecified     Other and unspecified hyperlipidemia     Palpitations 11/02/2022    Personal history of urinary calculi 1960's    Pneumonia, organism unspecified(486) 1992    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    Squamous cell carcinoma of skin, unspecified     Thyroid nodule 11/05/2024    TRANSIENT CEREBRAL ISCHEMIA 12/2007         Past Surgical History   Past Surgical History:    Procedure Laterality Date    ARTHROPLASTY KNEE Left 9/12/2024    Procedure: left total knee arthroplasty;  Surgeon: Mono Nichols MD;  Location:  OR    BIOPSY  2017    COLONOSCOPY      DISCECTOMY LUMBAR POSTERIOR MICROSCOPIC TWO LEVELS  08/28/2012    DISCECTOMY LUMBAR POSTERIOR MICROSCOPIC TWO LEVELS;  RIGHT L3-L4 REDO EXTENDED HEMILAMINECTOMY AND MICRO FORAMINOTOMY, LEFT L4-L5 EXTENDED HEMILAMINECTOMY AND MICRODISCECTOMY (PRONE) (SONYA MCCALLUM, C-ARM, METRIX II);  Surgeon: Bertram Mirza MD;  Location:  OR    ENT SURGERY  2014    Cancer spot on right ear    EXPLORE SPINE, REMOVE HARDWARE, COMBINED N/A 11/26/2024    Procedure: Removal of L3-5 bilateral set screws and rods;  Surgeon: Harrison Perez MD;  Location:  OR    EYE SURGERY  2018    OPTICAL TRACKING SYSTEM FUSION POSTERIOR SPINE LUMBAR N/A 11/26/2024    Procedure: Lumbar 2 to Lumbar 3 extension of previous Lumbar 3 to Lumbar 5 fusion with stealth navigation and Lumbar 2 to Lumbar 3 decompression;  Surgeon: Harrison Perez MD;  Location:  OR    OPTICAL TRACKING SYSTEM FUSION SPINE POSTERIOR LUMBAR TWO LEVELS N/A 08/21/2019    Procedure: L3-5 LUMBAR TRANSFORAMINAL INTERBODY FUSION WITH STEALTH;  Surgeon: Harrison Perez MD;  Location:  OR    renal calculii removal 40 years ago  01/01/1965    [facility] NONSPECIFIC PROCEDURE  01/01/1959    pilonidal cystectomy    [facility] NONSPECIFIC PROCEDURE  01/01/1966    kidney stone    [facility] NONSPECIFIC PROCEDURE  approx 1999    R knee arthroscopy     Dr. Guzman    [facility] NONSPECIFIC PROCEDURE  01/01/2005    L3-4 microdiskectomy   Dr. Brantley    [facility] TOTAL KNEE ARTHROPLASTY  07/01/2010    Minimally invasive R TKA   Dr. Nichols       Prior to Admission Medications   Prior to Admission Medications   Prescriptions Last Dose Informant Patient Reported? Taking?   acetaminophen (TYLENOL) 500 MG tablet Past Week Evening Self Yes No   Sig: Take 500-1,000 mg by mouth every 6 hours as needed for mild pain.    amLODIPine-benazepril (LOTREL) 5-20 MG capsule 11/25/2024 Evening Self No Yes   Sig: Take 1 capsule by mouth daily   aspirin (ASA) 81 MG tablet 11/18/2024 Evening Pharmacy, Self No No   Sig: Take 1 tablet (81 mg) by mouth At Bedtime   atorvastatin (LIPITOR) 40 MG tablet 11/25/2024 Evening Self No Yes   Sig: Take 1 tablet (40 mg) by mouth daily   clopidogrel (PLAVIX) 75 MG tablet 11/18/2024 Evening Self No No   Sig: TAKE 1 TABLET(75 MG) BY MOUTH DAILY   fexofenadine (ALLEGRA) 180 MG tablet 11/25/2024 Evening Self Yes Yes   Sig: Take 180 mg by mouth daily.   metoprolol succinate ER (TOPROL XL) 50 MG 24 hr tablet 11/26/2024 Morning Self No Yes   Sig: Take 1 tablet (50 mg) by mouth daily   tamsulosin (FLOMAX) 0.4 MG capsule 11/25/2024 Evening Self No Yes   Sig: Take 1 capsule (0.4 mg) by mouth daily.      Facility-Administered Medications: None     Current Facility-Administered Medications   Medication Dose Route Frequency Provider Last Rate Last Admin    acetaminophen (TYLENOL) tablet 650 mg  650 mg Oral Q4H PRN Maeve Hernández PA-C   650 mg at 11/30/24 1735    amLODIPine (NORVASC) tablet 5 mg  5 mg Oral QPM Rashida Vallejo MD   5 mg at 11/30/24 2159    atorvastatin (LIPITOR) tablet 40 mg  40 mg Oral QPM Maeve Hernández PA-C   40 mg at 11/30/24 2159    bisacodyl (DULCOLAX) suppository 10 mg  10 mg Rectal Daily PRN Maeve Hernández PA-C        calcium carbonate (TUMS) chewable tablet 500 mg  500 mg Oral 4x Daily PRN Maeve Hernández PA-C        ceFAZolin (ANCEF) 2 g in 100 mL D5W intermittent infusion  2 g Intravenous Q8H Maeve Hernández PA-C 200 mL/hr at 12/01/24 0202 2 g at 12/01/24 0202    fexofenadine (ALLEGRA) tablet 180 mg  180 mg Oral QPM Maeve Hernández PA-C   180 mg at 11/30/24 2159    guaiFENesin (MUCINEX) 12 hr tablet 600 mg  600 mg Oral BID Milo Escoto MD   600 mg at 12/01/24 0848    HYDROmorphone (DILAUDID) injection 0.2 mg  0.2 mg Intravenous Q2H PRN Maeve Hernández,  ANAMARIA        Or    HYDROmorphone (DILAUDID) injection 0.4 mg  0.4 mg Intravenous Q2H PRN Maeve Hernández PA-C   0.4 mg at 11/28/24 0152    hydrOXYzine HCl (ATARAX) tablet 10 mg  10 mg Oral Q6H PRN Maeve Hernández PA-C   10 mg at 11/28/24 2127    lidocaine (LMX4) cream   Topical Q1H PRN Maeve Hernández PA-C        lidocaine 1 % 0.1-1 mL  0.1-1 mL Other Q1H PRN Maeve Hernández PA-C        lisinopril (ZESTRIL) tablet 20 mg  20 mg Oral QPM Rashida Vallejo MD   20 mg at 11/30/24 2159    magnesium hydroxide (MILK OF MAGNESIA) suspension 30 mL  30 mL Oral Daily PRN Maeve Hernández PA-C   30 mL at 11/29/24 1316    melatonin tablet 5 mg  5 mg Oral At Bedtime PRN Audelia Foss MD   5 mg at 11/28/24 2127    methocarbamol (ROBAXIN) tablet 750 mg  750 mg Oral 4x Daily Arie Herrera PA-C   750 mg at 12/01/24 0848    metoprolol (LOPRESSOR) injection 5 mg  5 mg Intravenous Q4H PRN Wilbert Larsen APRN CNP   5 mg at 11/30/24 1945    metoprolol succinate ER (TOPROL XL) 24 hr tablet 50 mg  50 mg Oral Daily Rashida Vallejo MD   50 mg at 12/01/24 0848    naloxone (NARCAN) injection 0.2 mg  0.2 mg Intravenous Q2 Min PRN Harrison Perez MD        Or    naloxone (NARCAN) injection 0.4 mg  0.4 mg Intravenous Q2 Min PRN Harrison Perez MD        Or    naloxone (NARCAN) injection 0.2 mg  0.2 mg Intramuscular Q2 Min PRN Harrison Perez MD        Or    naloxone (NARCAN) injection 0.4 mg  0.4 mg Intramuscular Q2 Min PRN Harrison Perez MD        ondansetron (ZOFRAN ODT) ODT tab 4 mg  4 mg Oral Q6H PRN Maeve Hernández PA-C   4 mg at 11/28/24 0200    Or    ondansetron (ZOFRAN) injection 4 mg  4 mg Intravenous Q6H PRN Maeve Hernández PA-C   4 mg at 12/01/24 0007    oxyCODONE (ROXICODONE) tablet 5 mg  5 mg Oral Q4H PRN Maeve Hernández PA-C   5 mg at 11/27/24 1627    Or    oxyCODONE (ROXICODONE) tablet 10 mg  10 mg Oral Q4H PRN Maeve Hernández PA-C   10 mg at 11/29/24 2226     polyethylene glycol (MIRALAX) Packet 17 g  17 g Oral BID Jr Perez APRN CNP   17 g at 11/30/24 1736    prochlorperazine (COMPAZINE) injection 5 mg  5 mg Intravenous Q6H PRN Maeve Hernández PA-C        Or    prochlorperazine (COMPAZINE) tablet 5 mg  5 mg Oral Q6H PRN Maeve Hernández PA-C        senna-docusate (SENOKOT-S/PERICOLACE) 8.6-50 MG per tablet 2 tablet  2 tablet Oral BID Jr Perez APRN CNP   2 tablet at 11/29/24 0811    sodium chloride (PF) 0.9% PF flush 3 mL  3 mL Intracatheter Q8H Maeve Hernández PA-C   3 mL at 12/01/24 0852    sodium chloride (PF) 0.9% PF flush 3 mL  3 mL Intracatheter q1 min prn Maeve Hernández PA-C   3 mL at 11/30/24 1722    tamsulosin (FLOMAX) capsule 0.4 mg  0.4 mg Oral QPM Maeve Hernández PA-C   0.4 mg at 11/30/24 2159     Current Facility-Administered Medications   Medication Dose Route Frequency Provider Last Rate Last Admin    acetaminophen (TYLENOL) tablet 650 mg  650 mg Oral Q4H PRN Maeve Hernández PA-C   650 mg at 11/30/24 1735    amLODIPine (NORVASC) tablet 5 mg  5 mg Oral QPM Rashida Vallejo MD   5 mg at 11/30/24 2159    atorvastatin (LIPITOR) tablet 40 mg  40 mg Oral QPM Maeve Hernández PA-C   40 mg at 11/30/24 2159    bisacodyl (DULCOLAX) suppository 10 mg  10 mg Rectal Daily PRN Maeve Hernández PA-C        calcium carbonate (TUMS) chewable tablet 500 mg  500 mg Oral 4x Daily PRN Maeve Hernández PA-C        ceFAZolin (ANCEF) 2 g in 100 mL D5W intermittent infusion  2 g Intravenous Q8H Maeve Hernández PA-C 200 mL/hr at 12/01/24 0202 2 g at 12/01/24 0202    fexofenadine (ALLEGRA) tablet 180 mg  180 mg Oral QPM Maeve Hernández PA-C   180 mg at 11/30/24 2159    guaiFENesin (MUCINEX) 12 hr tablet 600 mg  600 mg Oral BID Milo Escoto MD   600 mg at 12/01/24 0848    HYDROmorphone (DILAUDID) injection 0.2 mg  0.2 mg Intravenous Q2H PRN Maeve Hernández PA-C        Or    HYDROmorphone (DILAUDID) injection  0.4 mg  0.4 mg Intravenous Q2H PRN Maeve Hernández PA-C   0.4 mg at 11/28/24 0152    hydrOXYzine HCl (ATARAX) tablet 10 mg  10 mg Oral Q6H PRN Maeve Hernández PA-C   10 mg at 11/28/24 2127    lidocaine (LMX4) cream   Topical Q1H PRN Maeve Hernández PA-C        lidocaine 1 % 0.1-1 mL  0.1-1 mL Other Q1H PRN Maeve Hernández PA-C        lisinopril (ZESTRIL) tablet 20 mg  20 mg Oral QPM Rashida Vallejo MD   20 mg at 11/30/24 2159    magnesium hydroxide (MILK OF MAGNESIA) suspension 30 mL  30 mL Oral Daily PRN Maeve Hernández PA-C   30 mL at 11/29/24 1316    melatonin tablet 5 mg  5 mg Oral At Bedtime PRN Audelia Foss MD   5 mg at 11/28/24 2127    methocarbamol (ROBAXIN) tablet 750 mg  750 mg Oral 4x Daily Arie Herrera PA-C   750 mg at 12/01/24 0848    metoprolol (LOPRESSOR) injection 5 mg  5 mg Intravenous Q4H PRN Wilbert Larsen APRN CNP   5 mg at 11/30/24 1945    metoprolol succinate ER (TOPROL XL) 24 hr tablet 50 mg  50 mg Oral Daily Rashida Vallejo MD   50 mg at 12/01/24 0848    naloxone (NARCAN) injection 0.2 mg  0.2 mg Intravenous Q2 Min PRN Harrison Perez MD        Or    naloxone (NARCAN) injection 0.4 mg  0.4 mg Intravenous Q2 Min PRN Harrison Perez MD        Or    naloxone (NARCAN) injection 0.2 mg  0.2 mg Intramuscular Q2 Min PRN Harrison Perez MD        Or    naloxone (NARCAN) injection 0.4 mg  0.4 mg Intramuscular Q2 Min PRN Harrison Perez MD        ondansetron (ZOFRAN ODT) ODT tab 4 mg  4 mg Oral Q6H PRN Maeve Hernández PA-C   4 mg at 11/28/24 0200    Or    ondansetron (ZOFRAN) injection 4 mg  4 mg Intravenous Q6H PRN Maeve Hernández PA-C   4 mg at 12/01/24 0007    oxyCODONE (ROXICODONE) tablet 5 mg  5 mg Oral Q4H PRN Maeve Hernández PA-C   5 mg at 11/27/24 1627    Or    oxyCODONE (ROXICODONE) tablet 10 mg  10 mg Oral Q4H PRN Maeve Hernández PA-C   10 mg at 11/29/24 2226    polyethylene glycol (MIRALAX) Packet 17 g  17 g Oral BID  "Jr Perez APRN CNP   17 g at 11/30/24 1736    prochlorperazine (COMPAZINE) injection 5 mg  5 mg Intravenous Q6H PRN Maeve Hernández PA-C        Or    prochlorperazine (COMPAZINE) tablet 5 mg  5 mg Oral Q6H PRN Maeve Hernández PA-C        senna-docusate (SENOKOT-S/PERICOLACE) 8.6-50 MG per tablet 2 tablet  2 tablet Oral BID Jr Perez APRN CNP   2 tablet at 11/29/24 0811    sodium chloride (PF) 0.9% PF flush 3 mL  3 mL Intracatheter Q8H Maeve Hernández PA-C   3 mL at 12/01/24 0852    sodium chloride (PF) 0.9% PF flush 3 mL  3 mL Intracatheter q1 min prn Maeve Hernández PA-C   3 mL at 11/30/24 1722    tamsulosin (FLOMAX) capsule 0.4 mg  0.4 mg Oral QPM Maeve Hernández PA-C   0.4 mg at 11/30/24 2159     Allergies   Allergies   Allergen Reactions    Meclizine Other (See Comments)     Over sedation, concentration problem    Tramadol Hcl Other (See Comments)     Pt feels very drugged, \"cloudy\" if used more than one day. Nausea also    Cardura [Doxazosin Mesylate] Other (See Comments)     fainted    Hydrochlorothiazide Other (See Comments)      lightheadedness    Naproxen Nausea     nausea       Social History    reports that he quit smoking about 56 years ago. His smoking use included cigarettes. He started smoking about 67 years ago. He has a 5.5 pack-year smoking history. He has never used smokeless tobacco. He reports that he does not drink alcohol and does not use drugs.      Family History   I have reviewed this patient's family history and updated it with pertinent information if needed.  Family History   Problem Relation Age of Onset    Family History Negative Brother         1 healthy brother    Diabetes Brother         d:age 66    Diabetes Mother     Cerebrovascular Disease Mother     C.A.D. Father         CABG 67    Heart Disease Father     Lipids Sister     Diabetes Sister     Hypertension Sister     Hypertension Sister     Lipids Sister     Hypertension Sister     Colon " "Cancer Sister     Thyroid Disease Daughter         thyroid surgery, on replacement          Review of Systems   A comprehensive review of system was performed and is negative other than that noted in the HPI or here.     Physical Exam   Vital Signs with Ranges  Temp:  [97.9  F (36.6  C)-100  F (37.8  C)] 97.9  F (36.6  C)  Pulse:  [] 99  Resp:  [18-20] 20  BP: ()/(49-74) 134/60  SpO2:  [92 %-94 %] 94 %  Wt Readings from Last 4 Encounters:   11/26/24 91.6 kg (201 lb 14.4 oz)   11/04/24 92.3 kg (203 lb 8 oz)   10/04/24 88.4 kg (194 lb 14.4 oz)   09/22/24 90.3 kg (199 lb)     I/O last 3 completed shifts:  In: 360 [P.O.:360]  Out: 560 [Urine:450; Drains:110]      Vitals: /60 (BP Location: Right arm)   Pulse 99   Temp 97.9  F (36.6  C) (Oral)   Resp 20   Ht 1.702 m (5' 7\")   Wt 91.6 kg (201 lb 14.4 oz)   SpO2 94%   BMI 31.62 kg/m      Physical Exam:   General - Alert and oriented to time place and person in no acute distress  Eyes - No scleral icterus  HEENT - Neck supple, moist mucous membranes  Cardiovascular -irregular S1-S2  Extremities - There is no edema  Respiratory -clear to auscultation  Skin - No pallor or cyanosis  Gastrointestinal - Non tender and non distended without rebound or guarding  Psych - Appropriate affect   Neurological - No gross motor neurological focal deficits    No lab results found in last 7 days.    Invalid input(s): \"TROPONINIES\"    Recent Labs   Lab 12/01/24  0811 11/30/24  2008 11/30/24  1924 11/30/24  0839 11/29/24  0947 11/28/24  0918 11/28/24  0603 11/27/24  0907   WBC  --  11.0  --   --  9.8 13.5*  --  13.5*   HGB  --  10.5*  --  10.3* 10.1* 10.3*  --  11.0*   MCV  --  84  --   --   --   --   --  84   PLT  --  266  --   --   --   --   --  241   * 126*  --  129* 133* 127*  --  131*   POTASSIUM 4.1 3.9  --  3.9 3.7 4.1  --  4.2   CHLORIDE 91* 92*  --  91* 97* 92*  --  98   CO2 21* 24  --  22 26 23  --  24   BUN 14.1 15.2  --  14.3 11.9 15.6  --  14.0   CR " "0.72 0.73  --  0.77 0.75 0.83  --  0.67   GFRESTIMATED 90 89  --  88 88 86  --  >90   ANIONGAP 14 10  --  16* 10 12  --  9   RICARDO 8.3* 8.4*  --  8.4* 8.1* 8.2*  --  8.2*   * 138* 143* 116* 143* 129*   < > 126*   ALBUMIN  --  3.3*  --   --   --   --   --  3.5   PROTTOTAL  --  6.3*  --   --   --   --   --  5.9*   BILITOTAL  --  0.4  --   --   --   --   --  0.4   ALKPHOS  --  113  --   --   --   --   --  92   ALT  --  23  --   --   --   --   --  11   AST  --  53*  --   --   --   --   --  22    < > = values in this interval not displayed.     Recent Labs   Lab Test 04/30/24 0924 04/27/23  0855   CHOL 131 123   HDL 51 54   LDL 62 55   TRIG 92 72     Recent Labs   Lab 11/30/24 2008 11/30/24  0839 11/29/24  0947 11/28/24  0918 11/27/24  0907   WBC 11.0  --  9.8 13.5* 13.5*   HGB 10.5* 10.3* 10.1* 10.3* 11.0*   HCT 30.4*  --   --   --  32.0*   MCV 84  --   --   --  84     --   --   --  241     No results for input(s): \"PH\", \"PHV\", \"PO2\", \"PO2V\", \"SAT\", \"PCO2\", \"PCO2V\", \"HCO3\", \"HCO3V\" in the last 168 hours.  No results for input(s): \"NTBNPI\", \"NTBNP\" in the last 168 hours.  Recent Labs   Lab 11/30/24 2008   DD 2.98*     No results for input(s): \"SED\", \"CRP\" in the last 168 hours.  Recent Labs   Lab 11/30/24 2008 11/27/24  0907    241     No results for input(s): \"TSH\" in the last 168 hours.  Recent Labs   Lab 11/30/24 2000   COLOR Light Yellow   APPEARANCE Slightly Cloudy*   URINEGLC Negative   URINEBILI Negative   URINEKETONE Negative   SG 1.024   UBLD Trace*   URINEPH 6.0   PROTEIN 30*   NITRITE Negative   LEUKEST Trace*   RBCU 35*   WBCU 7*       Imaging:  Recent Results (from the past 48 hours)   XR Chest Port 1 View    Narrative    EXAM: XR CHEST PORT 1 VIEW  LOCATION: [facility]  DATE: 11/30/2024    INDICATION: Recent lumbar surgery, new fever  COMPARISON: CT chest 9/17/2024 reviewed.      Impression    IMPRESSION: Mild hazy bandlike opacities have developed in the " "medial bases suggesting some atelectasis although a component of superimposed infectious/inflammatory consolidation is not excluded. No pleural effusion or pneumothorax. Normal heart size and   pulmonary vascularity accounting for technique. Aortic calcification.       Echo:  No results found for this or any previous visit (from the past 4320 hours).    Clinically Significant Risk Factors         # Hyponatremia: Lowest Na = 126 mmol/L in last 2 days, will monitor as appropriate  # Hypochloremia: Lowest Cl = 91 mmol/L in last 2 days, will monitor as appropriate  # Hypocalcemia: Lowest Ca = 8.3 mg/dL in last 2 days, will monitor and replace as appropriate     # Hypoalbuminemia: Lowest albumin = 3.3 g/dL at 11/30/2024  8:08 PM, will monitor as appropriate     # Hypertension: Noted on problem list            # Obesity: Estimated body mass index is 31.62 kg/m  as calculated from the following:    Height as of this encounter: 1.702 m (5' 7\").    Weight as of this encounter: 91.6 kg (201 lb 14.4 oz).            Cardiac Arrhythmia: Atrial fibrillation: Paroxysmal  Ventricular premature depolarization (PVC)                  "

## 2024-12-01 NOTE — OR NURSING
Patient motivated to walk today. At the shift start PT walked him and he requested to walk for the 3rd time later. Family here and concerned of pt. Having non- reproductive cough. Pt requested Mucinex. New order for Mucinex from provider. MIRNA ambulated pt in the hallway around 1850hrs. Pt telemetry started alarming tachycardia. VS taken. RT contacted and took over care of patient. Pt seated comfortably in the recliner at this time. Denies any symptoms.

## 2024-12-01 NOTE — PLAN OF CARE
Goal Outcome Evaluation:    Patient vital signs are at baseline: Yes  Patient able to ambulate as they were prior to admission or with assist devices provided by therapies during their stay:  Yes up with assist of 1 GBW  Patient MUST void prior to discharge:  Yes  Patient able to tolerate oral intake:  Yes  Pain has adequate pain control using Oral analgesics:  Yes  Does patient have an identified :  Yes  Has goal D/C date and time been discussed with patient:  No,  Reason:  Pending discharge order        Pt A/Ox4. NEELA drain on gravity with minimal output. SL intermittent antibiotic. On tele. Pt denies SOB and chest pain. VS stable after interventions. Dressing CDI.

## 2024-12-01 NOTE — PLAN OF CARE
Alert and oriented x's 4. Up with SBA and a walker to the bathroom.  Taking scheduled Robaxin for pain .  Up in chair, tolerated well. Leave drain in place today per neurosurgery.  Discharge held today.  Will continue to monitor.

## 2024-12-01 NOTE — CODE/RAPID RESPONSE
[facility]    House KEILY RRT Note  11/30/2024   Time Called: 1920    RRT called for: Tachycardia    Assessment & Plan     New onset atrial fibrillation with rapid ventricular response in setting of recent lumbar surgery, developing fever.  Low grade 100.0 fever (trending up since admission) possibly 2/2 bibasilar atelectasis.  Elevated d-dimer suspect 2/2 recent surgery, cannot exclude PE.  Mildly elevated troponin suspect 2/2 demand ischemia in setting of tachycardia.  - Upon arrival, pt sitting upright in recliner, awake, alert, in no overt distress, benign appearing with VS noting HR 130s, SBP 140s, RR 10s, O2 sats > 92% on RA, temp 100.  Nursing notes pt's telemetry abruptly alarming tachycardia, cross covering hospitalist contacted at that time, requesting for RRT.  Pt reports no chest pain, SOB, dizziness, lightheadedness.  Pt reports tolerating adequate PO intake.  Pt notes having frequent loose stools due to bowel regimen.  Pt reports no URI symptoms.  Pt reports cough for past 1 month.  Pt denies dysuria.  Pt has lumbar surgical dressing in place, small sanguinous shadowing noted, has NEELA in place with serosanguinous drainage, no obvious erythema surrounding dressing.      Noted pt last evaluated by Dr. Kumar, cardiology, 11/10/2023, for palpitations in which work up revealed frequent PVCs, PACs at that time.    INTERVENTIONS:  - Stat EKG  - Stat CXR, UA  - Stat CBC, lactic acid, trop, BMP, Mg, blood culture x1 due to critical bottle shortage, procalcitonin, d-dimer (suspect will be elevated in setting of recent surgery; however, if negative, low suspicion for PE driving tachycardia)  - COVID/influenza/RSV PCR  - Will trial 5 mg IV metoprolol now; if ongoing tachycardia, will trial up to 3 doses.  Will have available Q4H PRN  - Will consult cardiology; appreciate recommendations  - Will defer to cardiology and hospitalist if anticoagulation deemed appropriate; if deemed necessary,  will have to clear start time with neurosurgery in setting of recent surgery  - Noted last had echocardiogram 2022; will defer to rounding hospitalist and/or cardiology if repeat deemed appropriate  - Noted recent TSH WNL 9/2024  - Will adjust VS to Q4H  - Remains on telemetry monitoring  - Noted elevated d-dimer, if develops SOB, hypoxia, worsening tachycardia, may need to consider CT PE  - Continue to encourage cough, deep breathing and use of incentive spirometry 10x every hour awake    At the end of the RRT pt remains asymptomatic, HR 110s-120s, SBP 130s    Addendum: 7106: Contacted by nursing that pt's HR sustaining in the 130s.  Will trial 0.25 mg/kg diltiazem IVP over 2 min.  If ongoing tachycardia with HR > 120, will trial 2nd IVP of 0.35 mg/kg as long as pt's SBP > 90.  Flying squad to administer now, appreciate assistance.     Addendum: 5650: Contacted by nursing that pt converted back to NSR.      Discussed with and defer further cares to nursing and hospitalist    Interval History     Ricardo Neley is a 84 year old male who was admitted on 11/26/2024 for elective L2-L3 fusion, decompression.    Medical history significant for:   Past Medical History:   Diagnosis Date    Anxiety 05/26/2011    Arthritis     Basal cell carcinoma     Benign prostatic hyperplasia with urinary retention 11/05/2024    Benign Prostatic Hypertrophy     Carotid stenosis, asymptomatic, bilateral 11/02/2022    CEREBROVASC DISEASE, small vessel 12/2007    Chronic bilateral low back pain without sciatica 11/05/2024    Class 1 obesity with serious comorbidity and body mass index (BMI) of 31.0 to 31.9 in adult, unspecified obesity type 09/04/2024    Contracture of palmar fascia 10/16/2020    Diaphragmatic hernia 09/23/2002    Problem list name updated by automated process. Provider to review      Essential hypertension 08/19/2002    Essential hypertension, benign     GERD     HIATAL HERNIA     Hx of total knee replacement, left  "09/12/2024    Hx of transient ischemic attack (TIA) 08/01/2021    Hyperlipidemia LDL goal <70 09/04/2024    HYPERPLASTIC POLYPS COLON 5/93, 3/06    Hypertrophy of prostate without urinary obstruction     Impaired fasting glucose     Low Back Pain     Lumbar radiculopathy 09/04/2024    Obesity, unspecified     Other and unspecified hyperlipidemia     Palpitations 11/02/2022    Personal history of urinary calculi 1960's    Pneumonia, organism unspecified(486) 1992    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 Nausea also    Cardura [Doxazosin Mesylate] Other (See Comments)     fainted    Hydrochlorothiazide Other (See Comments)      lightheadedness    Naproxen Nausea     nausea     Physical Exam   Vital Signs with Ranges:  Temp:  [98.9  F (37.2  C)-100  F (37.8  C)] 100  F (37.8  C)  Pulse:  [102-141] 136  Resp:  [16-18] 18  BP: (119-137)/(49-66) 119/59  SpO2:  [93 %-95 %] 94 %  I/O last 3 completed shifts:  In: 360 [P.O.:360]  Out: 640 [Urine:550; Drains:90]    Constitutional: Pt sitting upright in recliner, awake, alert, in no overt distress, benign appearing  Neck: No upper airway wheezes or stridor noted  Pulmonary: In no apparent respiratory distress, clear to auscultation bilaterally, no crackles or wheezes noted  Cardiovascular: Tachycardic, irregular rate and rhythm, normal S1S2, no murmur, rub or gallop noted  GI: Round, soft, nondistended, active bowel sounds, no obvious guarding or rebound tenderness noted  Skin/Integumen: Warm, dry, pink, lumbar surgical incision with dressing in place, shadowed sanguinous " drainage with NEELA present with serosanguinous drainage  Neuro: Awake, alert, clear speech, no obvious focal neuro deficit noted, moving all extremities  Psych:  Calm  Extremities: No peripheral edema    Data     EKG:  Interpreted by CHERI Rubin CNP  Time reviewed: 1944  Symptoms at time of EKG: None   Rhythm: atrial fibrillation - rapid  Rate: 120-130  Axis: Left Axis Deviation  Ectopy: none  Conduction: right bundle branch block (complete)  ST Segments/ T Waves: T wave inversion aVL  Q Waves: none    Clinical Impression: atrial fibrillation (new onset)    IMAGING: (X-ray/CT/MRI)   Recent Results (from the past 24 hours)   XR Chest Port 1 View    Narrative    EXAM: XR CHEST PORT 1 VIEW  LOCATION: [facility]  DATE: 11/30/2024    INDICATION: Recent lumbar surgery, new fever  COMPARISON: CT chest 9/17/2024 reviewed.      Impression    IMPRESSION: Mild hazy bandlike opacities have developed in the medial bases suggesting some atelectasis although a component of superimposed infectious/inflammatory consolidation is not excluded. No pleural effusion or pneumothorax. Normal heart size and   pulmonary vascularity accounting for technique. Aortic calcification.     D-dimer:  Recent Labs   Lab 11/30/24 2008   DD 2.98*     Lactic acid:  Recent Labs   Lab 11/30/24 2008   LACT 1.3     CBC with Diff:  Recent Labs   Lab Test 11/30/24 2008   WBC 11.0   HGB 10.5*   MCV 84        Comprehensive Metabolic Panel:  Recent Labs   Lab 11/30/24 2008 11/28/24  0603 11/27/24  0907   *   < > 131*   POTASSIUM 3.9   < > 4.2   CHLORIDE 92*   < > 98   CO2 24   < > 24   ANIONGAP 10   < > 9   *   < > 126*   BUN 15.2   < > 14.0   CR 0.73   < > 0.67   GFRESTIMATED 89   < > >90   RICARDO 8.4*   < > 8.2*   MAG 2.0  --   --    PROTTOTAL  --   --  5.9*   ALBUMIN  --   --  3.5   BILITOTAL  --   --  0.4   ALKPHOS  --   --  92   AST  --   --  22   ALT  --   --  11    < > = values in this interval not  displayed.      Latest Reference Range & Units 11/30/24 20:08   Troponin T, High Sensitivity <=22 ng/L 26 (H)   (H): Data is abnormally high     Latest Reference Range & Units 11/30/24 20:08   Procalcitonin <0.50 ng/mL 0.10     UA:  Recent Labs   Lab 11/30/24 2000   COLOR Light Yellow   APPEARANCE Slightly Cloudy*   URINEGLC Negative   URINEBILI Negative   URINEKETONE Negative   SG 1.024   UBLD Trace*   URINEPH 6.0   PROTEIN 30*   NITRITE Negative   LEUKEST Trace*   RBCU 35*   WBCU 7*     Medical Decision Making       60 MINUTES SPENT BY ME on the date of service doing chart review, history, exam, documentation & further activities per the note.       CHERI Rubin CNP  Hospitalist-House KEILY  Hospitalist Service  Securely message with [employer] (more info)  Text page via [facility] Paging/Directory

## 2024-12-01 NOTE — PROVIDER NOTIFICATION
"Walked onto unit at 1900 and noticed patients tele alarming for tachycardia 130-150s.  At 1915 after report patients tele still alarming, went into patient room to assess and patient said he could feel his heart rate was fast and he could \"hear\" every heart beat in his ears.  Paged the on call provider who said to call an RRT. Pt stated he hasn't been up for almost an hour and a half and was still sustaining high HR. See hospitalist note.      At 2300 Patient tele alarming again and HR was sustaining in 130s for ~15 mins while patient was sitting, paged hospitalist again about sustained HR.  Ordered dilt push, effective.  "

## 2024-12-02 ENCOUNTER — APPOINTMENT (OUTPATIENT)
Dept: CARDIOLOGY | Facility: CLINIC | Age: 84
DRG: 402 | End: 2024-12-02
Attending: HOSPITALIST
Payer: COMMERCIAL

## 2024-12-02 VITALS
WEIGHT: 201.9 LBS | HEART RATE: 97 BPM | DIASTOLIC BLOOD PRESSURE: 57 MMHG | HEIGHT: 67 IN | RESPIRATION RATE: 18 BRPM | BODY MASS INDEX: 31.69 KG/M2 | OXYGEN SATURATION: 94 % | TEMPERATURE: 98.5 F | SYSTOLIC BLOOD PRESSURE: 113 MMHG

## 2024-12-02 LAB
ANION GAP SERPL CALCULATED.3IONS-SCNC: 14 MMOL/L (ref 7–15)
BUN SERPL-MCNC: 10.6 MG/DL (ref 8–23)
CALCIUM SERPL-MCNC: 8.3 MG/DL (ref 8.8–10.4)
CHLORIDE SERPL-SCNC: 94 MMOL/L (ref 98–107)
CREAT SERPL-MCNC: 0.66 MG/DL (ref 0.67–1.17)
EGFRCR SERPLBLD CKD-EPI 2021: >90 ML/MIN/1.73M2
GLUCOSE SERPL-MCNC: 112 MG/DL (ref 70–99)
HCO3 SERPL-SCNC: 22 MMOL/L (ref 22–29)
LVEF ECHO: NORMAL
OSMOLALITY SERPL: 276 MMOL/KG (ref 280–301)
OSMOLALITY UR: 402 MMOL/KG (ref 100–1200)
POTASSIUM SERPL-SCNC: 3.9 MMOL/L (ref 3.4–5.3)
SODIUM SERPL-SCNC: 130 MMOL/L (ref 135–145)
SODIUM UR-SCNC: 71 MMOL/L
TSH SERPL DL<=0.005 MIU/L-ACNC: 0.73 UIU/ML (ref 0.3–4.2)

## 2024-12-02 PROCEDURE — 36415 COLL VENOUS BLD VENIPUNCTURE: CPT | Performed by: HOSPITALIST

## 2024-12-02 PROCEDURE — C8929 TTE W OR WO FOL WCON,DOPPLER: HCPCS

## 2024-12-02 PROCEDURE — 250N000013 HC RX MED GY IP 250 OP 250 PS 637: Performed by: HOSPITALIST

## 2024-12-02 PROCEDURE — 255N000002 HC RX 255 OP 636: Performed by: HOSPITALIST

## 2024-12-02 PROCEDURE — 93306 TTE W/DOPPLER COMPLETE: CPT | Mod: 26 | Performed by: INTERNAL MEDICINE

## 2024-12-02 PROCEDURE — 250N000013 HC RX MED GY IP 250 OP 250 PS 637

## 2024-12-02 PROCEDURE — 83930 ASSAY OF BLOOD OSMOLALITY: CPT | Performed by: HOSPITALIST

## 2024-12-02 PROCEDURE — 83935 ASSAY OF URINE OSMOLALITY: CPT | Performed by: HOSPITALIST

## 2024-12-02 PROCEDURE — 999N000208 ECHOCARDIOGRAM COMPLETE

## 2024-12-02 PROCEDURE — 84443 ASSAY THYROID STIM HORMONE: CPT | Performed by: HOSPITALIST

## 2024-12-02 PROCEDURE — 250N000013 HC RX MED GY IP 250 OP 250 PS 637: Performed by: PHYSICIAN ASSISTANT

## 2024-12-02 PROCEDURE — 99232 SBSQ HOSP IP/OBS MODERATE 35: CPT | Performed by: HOSPITALIST

## 2024-12-02 PROCEDURE — 80048 BASIC METABOLIC PNL TOTAL CA: CPT | Performed by: HOSPITALIST

## 2024-12-02 PROCEDURE — 99232 SBSQ HOSP IP/OBS MODERATE 35: CPT | Mod: 25

## 2024-12-02 PROCEDURE — 84300 ASSAY OF URINE SODIUM: CPT | Performed by: HOSPITALIST

## 2024-12-02 RX ORDER — METOPROLOL SUCCINATE 100 MG/1
100 TABLET, EXTENDED RELEASE ORAL DAILY
Status: DISCONTINUED | OUTPATIENT
Start: 2024-12-03 | End: 2024-12-02

## 2024-12-02 RX ORDER — GUAIFENESIN 600 MG/1
600 TABLET, EXTENDED RELEASE ORAL 2 TIMES DAILY PRN
DISCHARGE
Start: 2024-12-02

## 2024-12-02 RX ORDER — METOPROLOL TARTRATE 25 MG/1
25 TABLET, FILM COATED ORAL ONCE
Status: COMPLETED | OUTPATIENT
Start: 2024-12-02 | End: 2024-12-02

## 2024-12-02 RX ORDER — METOPROLOL SUCCINATE 100 MG/1
100 TABLET, EXTENDED RELEASE ORAL DAILY
DISCHARGE
Start: 2024-12-03 | End: 2024-12-02

## 2024-12-02 RX ORDER — BENAZEPRIL HYDROCHLORIDE 20 MG/1
20 TABLET ORAL DAILY
Qty: 30 TABLET | Refills: 0 | Status: SHIPPED | OUTPATIENT
Start: 2024-12-02 | End: 2024-12-17

## 2024-12-02 RX ORDER — METOPROLOL SUCCINATE 25 MG/1
75 TABLET, EXTENDED RELEASE ORAL 2 TIMES DAILY
Qty: 180 TABLET | Refills: 0 | Status: SHIPPED | OUTPATIENT
Start: 2024-12-02 | End: 2025-01-01

## 2024-12-02 RX ADMIN — METHOCARBAMOL 750 MG: 750 TABLET ORAL at 18:14

## 2024-12-02 RX ADMIN — METHOCARBAMOL 750 MG: 750 TABLET ORAL at 10:00

## 2024-12-02 RX ADMIN — ACETAMINOPHEN 650 MG: 325 TABLET, FILM COATED ORAL at 14:24

## 2024-12-02 RX ADMIN — OXYCODONE HYDROCHLORIDE 5 MG: 5 TABLET ORAL at 06:11

## 2024-12-02 RX ADMIN — METHOCARBAMOL 750 MG: 750 TABLET ORAL at 14:20

## 2024-12-02 RX ADMIN — METOPROLOL SUCCINATE 75 MG: 50 TABLET, EXTENDED RELEASE ORAL at 10:01

## 2024-12-02 RX ADMIN — PERFLUTREN 4 ML: 6.52 INJECTION, SUSPENSION INTRAVENOUS at 14:14

## 2024-12-02 RX ADMIN — GUAIFENESIN 600 MG: 600 TABLET ORAL at 10:00

## 2024-12-02 RX ADMIN — APIXABAN 5 MG: 5 TABLET, FILM COATED ORAL at 14:23

## 2024-12-02 RX ADMIN — METOPROLOL TARTRATE 25 MG: 25 TABLET, FILM COATED ORAL at 14:19

## 2024-12-02 ASSESSMENT — ACTIVITIES OF DAILY LIVING (ADL)
ADLS_ACUITY_SCORE: 65
ADLS_ACUITY_SCORE: 62
ADLS_ACUITY_SCORE: 65
ADLS_ACUITY_SCORE: 62
ADLS_ACUITY_SCORE: 62
ADLS_ACUITY_SCORE: 65

## 2024-12-02 NOTE — PROGRESS NOTES
[facility]    Medicine Progress Note - Hospitalist Service    Date of Admission:  11/26/2024    Assessment & Plan     iRcardo Neely is a 84 year old male with history of hypertension, TIA, hyperlipidemia, iron deficiency anemia, chronic bilateral low back pain, BPH admitted on 11/26/2024 after elective back surgery.  Hospitalist team consulted for postoperative management of medical comorbidities.     Status post L2-L3 extension of previous L3-L5 fusion with a Stealth navigated and L2-L3 decompression-11/26/2024  L2-L3 severe stenosis with severe back and leg pain  History of L3-L5 fusion in the past  --Routine postoperative management as per primary service    --see AC below  -- PT OT,   --Bowel meds to prevent constipation.     New onset A-fib with RVR likely postop stress related.  Elevated troponin likely in the setting of A-fib with RVR, demand ischemia, type II MI  Hypertension.  Hyperlipidemia  11/30 p.m. RRT for heart rate of 130s, A-fib with RVR.  Received IV metoprolol, IV Cardizem and converted to sinus rhythm.  TSH within normal limits in September 2024.  Denies any chest pain or palpitations at this time. Troponin 26 > 28  --PTA p.o. Toprol-XL increased, 100 Mg p.o. daily  --Hold PTA amlodipine to allow room to adjust due to blocker.   --Continue PTA lisinopril with hold parameters.  --Continue PTA Lipitor.  --TTE pending.  Continue to monitor on telemetry  --Cardiology consulted and following, appreciate recommendation.   --Apixaban started for anticoagulation since okayed by neurosurgery     Prior history of TIA:  Asymptomatic, noncritical bilateral carotid artery stenosis:  Recent ultrasound showed mild plaque formation velocities consistent with less than 50% stenosis in bilateral internal carotid arteries.  --Patient seems to have been on DAPT since 2007.  PTA DAPT held prior to surgery and given A-fib, starting apixaban as above.      Elevated D-dimer.  RRT 11/30 for  tachycardia, A-fib with RVR.  D-dimer 2.98.  -- Has not been on pharmacological DVT prophylaxis since admission.  -- Ultrasound negative for DVT.    -- given no chest pain, and resolution of RVR, and now plan for long term of AC, no plan for CT PE study unless hypoxic, chest pain or persistent tachycardia.      Postop low-grade fever 100 [11/30/2024] likely due to atelectasis  Afebrile.  No leukocytosis.  Procalcitonin 0.10  Chest x-ray 11/30 with mild hazy bandlike opacities in medial bases likely atelectasis.  UA no concerns for infection.  Influenza A, B, RSV, COVID-19 PCR 11/20 negative.  Aggressive incentive spirometry.  Perioperative antibiotics per surgical team.  Monitor closely, if spikes fever will consider antibiotics.     Acute on chronic hyponatremia   Mild elevated anion gap.  Chart reviewed, previous sodium in low 130s.  Postoperative sodium 131 >127 > 129>126.  Chloride 91, anion gap 16.  Calcium 8.4  --Urine sodium 21.  --Reported poor oral intake, received intermittent IV hydration.    -- Sodium improved to 130.   --Urine sodium is elevated, serum osmole low normal, likely component of SIADH, fluid restriction to 1800 cc.     Postoperative leukocytosis likely reactive - resolved   Postop day 1 WBC count to 13.5 >9.8       Acute anemia likely from surgical blood loss, dilutional.  Preop hemoglobin 13.4, now dropped to 11.0 >10.3  No active bleeding.  Postoperative drain has been removed  Monitor hemoglobin levels periodically.     History of BPH with retention:   Continue Flomax 0.4 mg every evening     Thyroid nodule:  PCP is planning to order ultrasound-guided biopsy of thyroid nodule once patient has recovered from back surgery     Obesity: With BMI of 31.62.  Increase all-cause mortality and morbidity.  Consider lifestyle modification with diet and exercise as able to.  Consider sleep studies as outpatient        Diet: Advance Diet as Tolerated: Regular Diet Adult  Diet    DVT Prophylaxis:  "DOAC  Bull Catheter: Not present  Lines: None     Cardiac Monitoring: None  Code Status: Full Code      Clinically Significant Risk Factors         # Hyponatremia: Lowest Na = 126 mmol/L in last 2 days, will monitor as appropriate  # Hypochloremia: Lowest Cl = 91 mmol/L in last 2 days, will monitor as appropriate  # Hypocalcemia: Lowest Ca = 8.3 mg/dL in last 2 days, will monitor and replace as appropriate     # Hypoalbuminemia: Lowest albumin = 3.3 g/dL at 11/30/2024  8:08 PM, will monitor as appropriate     # Hypertension: Noted on problem list            # Obesity: Estimated body mass index is 31.62 kg/m  as calculated from the following:    Height as of this encounter: 1.702 m (5' 7\").    Weight as of this encounter: 91.6 kg (201 lb 14.4 oz).             Social Drivers of Health    Tobacco Use: Medium Risk (11/4/2024)    Patient History     Smoking Tobacco Use: Former     Smokeless Tobacco Use: Never   Social Connections: Unknown (4/29/2024)    Social Connection and Isolation Panel [NHANES]     Frequency of Social Gatherings with Friends and Family: Three times a week          Disposition Plan     Medically Ready for Discharge: Anticipated Tomorrow while monitoring heart rate/adjusting med for A fib, then defer to primary             Cordelia Kingston MD  Hospitalist Service  [facility]  Securely message with [employer] (more info)  Text page via [facility] Paging/Directory   ______________________________________________________________________    Interval History     Chart reviewed, discussed with nursing staff and patient was seen this morning.  Postop back pain managed with p.o. oxycodone.  Denies nausea, leg weakness, dysuria.    Remains sinus, mildly tachycardic and hypertensive.  Patient appears anxious to go home.    Physical Exam   Vital Signs: Temp: 98.4  F (36.9  C) Temp src: Oral BP: (!) 145/69 Pulse: 109   Resp: 18 SpO2: 97 % O2 Device: None (Room air)    Weight: 201 lbs 14.4 " oz    General: AAOx3, appears comfortable.  HEENT: PERRLA EOMI. Mucosa moist.   Lungs: Bilateral equal air entry. Clear to auscultation, normal work of breathing.   CVS: S1S2 regular, no tachycardia or murmur.   Abdomen: Soft, NT, ND. BS heard.  MSK: No edema or deformities.  Neuro: AAOX3. CN 2-12 normal. Strength symmetrical.  Skin: No rash.       Medical Decision Making       46 MINUTES SPENT BY ME on the date of service doing chart review, history, exam, documentation & further activities per the note.      Data     I have personally reviewed the following data over the past 24 hrs:    N/A  \   N/A   / N/A     130 (L) 94 (L) 10.6 /  112 (H)   3.9 22 0.66 (L) \     TSH: 0.73 T4: N/A A1C: N/A       Imaging results reviewed over the past 24 hrs:   Recent Results (from the past 24 hours)   US Lower Extremity Venous Duplex Bilateral    Narrative    EXAM: US LOWER EXTREMITY VENOUS DUPLEX BILATERAL  LOCATION: [facility]  DATE: 12/1/2024    INDICATION: Rule out DVT.  PostopStatus post L2 L3 extension of previous L3 L5 fusion with a Stealth navigated and L2 L3 decompression 11 26 2024.  Elevated D dimer.  COMPARISON: None.  TECHNIQUE: Venous Duplex ultrasound of bilateral lower extremities with and without compression, augmentation and duplex. Color flow and spectral Doppler with waveform analysis performed.    FINDINGS: Exam includes the common femoral, femoral, popliteal veins as well as segmentally visualized deep calf veins and greater saphenous vein.     RIGHT: No deep vein thrombosis. No superficial thrombophlebitis. No popliteal cyst.    LEFT: No deep vein thrombosis. No superficial thrombophlebitis. No popliteal cyst.      Impression    IMPRESSION:  1.  No deep venous thrombosis in the bilateral lower extremities.

## 2024-12-02 NOTE — PLAN OF CARE
Goal Outcome Evaluation:    Shift Summary [id number]    Admitting Diagnosis: Adjacent segment disease of lumbar spine with history of fusion procedure [M51.369, Z98.1]   Vitals Vital signs:  Temp: 99.7  F (37.6  C) Temp src: Oral BP: 138/66 Pulse: 90   Resp: 18 SpO2: 92 % O2 Device: None (Room air)    Pain 2/10. Taking repositioning   A&Ox:4  Voiding :Continent  Mobility A1  Tele A-fib  CMS :Intact  Lung Sounds Clear on 0 LPM via RA  GI BS +4  Dressing CDI    Orders Placed This Encounter      Advance Diet as Tolerated: Regular Diet Adult      Diet       Plan:

## 2024-12-02 NOTE — PROGRESS NOTES
[facility]  Cardiology Progress Note  Date of Service: 12/02/2024  Primary Cardiologist: Dr. Kumar    Assessment & Plan    Ricardo Neely is a 84 year old male with past medical history significant for hypertension, TIA, hyperlipidemia, iron deficiency anemia, chronic bilateral low back pain, BPH admitted on 11/26/2024 with admitted for neurosurgical procedure on 1128/24.  He underwent lumbar 2 to Lumbar 3 extension of previous Lumbar 3 to Lumbar 5 fusion with stealth navigation and Lumbar 2 to Lumbar 3 decompression as well as Removal of L3-5 bilateral set screws and rods. Cardiology was consulted for management of his atrial fibrillation     Assessment:  New onset atrial fibrillation with rapid ventricular response  [id number] Score  (hypertension, age++, TIA ++)  Eliquis 5 mg BID started 12/2   Metoprolol succinate 100 mg for rate control, HR trends 90-100s  Echocardiogram shows LVEF 60-65% with mild aortic stenosis. Left atrium is mildly dilated  TSH 0.73  Hypertension  Lisinopril 20, amlodipine on hold for adjustment room in beta-blocker  Benign prostate hypertrophy on tamsulosin  Lumbar degenerative spine disease, admitted for lumbar surgery on 11/28  History of TIA, PTA on Plavix    Plan:   Anticoagulation cleared by neurosurgery, start Eliquis 5 mg BID   Received additional dose of metoprolol 25 mg this afternoon. Will change Toprol 100 mg daily to 75 mg twice daily.  3 day Zio monitor for assessment of heart rate control  Cardiology follow up will be arranged. Cardiology will sign-off, please call with any questions.    Richa Melton CNP  [facility]  Pager: [phone]      Interval History   No acute events overnight. No chest pain, palpitations, or shortness of breath.     Physical Exam   Temp: 98.4  F (36.9  C) Temp src: Oral BP: (!) 149/67 Pulse: 97   Resp: 18 SpO2: 97 % O2 Device: None (Room air)    Vitals:    11/26/24 0800   Weight: 91.6 kg (201 lb 14.4 oz)     Older  "adult male in no apparent distress. Heart rate and rhythm are irregular, +1-2 systolic murmur, no rub or gallop. Lung sounds are clear throughout, no rales, rhonchi, or wheezes. Skin warm and dry. Trace lower extremity edema.     Clinically Significant Risk Factors         # Hyponatremia: Lowest Na = 126 mmol/L in last 2 days, will monitor as appropriate  # Hypochloremia: Lowest Cl = 91 mmol/L in last 2 days, will monitor as appropriate  # Hypocalcemia: Lowest Ca = 8.3 mg/dL in last 2 days, will monitor and replace as appropriate     # Hypoalbuminemia: Lowest albumin = 3.3 g/dL at 11/30/2024  8:08 PM, will monitor as appropriate     # Hypertension: Noted on problem list            # Obesity: Estimated body mass index is 31.62 kg/m  as calculated from the following:    Height as of this encounter: 1.702 m (5' 7\").    Weight as of this encounter: 91.6 kg (201 lb 14.4 oz).              Medications   Current Facility-Administered Medications   Medication Dose Route Frequency Provider Last Rate Last Admin     Current Facility-Administered Medications   Medication Dose Route Frequency Provider Last Rate Last Admin    [Held by provider] amLODIPine (NORVASC) tablet 5 mg  5 mg Oral QPM Rashida Vallejo MD   5 mg at 11/30/24 2159    apixaban ANTICOAGULANT (ELIQUIS) tablet 5 mg  5 mg Oral BID Cordelia Kingston MD   5 mg at 12/02/24 1423    atorvastatin (LIPITOR) tablet 40 mg  40 mg Oral QPM Maeve Hernández PA-C   40 mg at 12/01/24 2024    fexofenadine (ALLEGRA) tablet 180 mg  180 mg Oral QPM Maeve Hernández PA-C   180 mg at 12/01/24 2024    guaiFENesin (MUCINEX) 12 hr tablet 600 mg  600 mg Oral BID Milo Escoto MD   600 mg at 12/02/24 1000    lisinopril (ZESTRIL) tablet 20 mg  20 mg Oral QPM Rashida Vallejo MD   20 mg at 12/01/24 2023    methocarbamol (ROBAXIN) tablet 750 mg  750 mg Oral 4x Daily Arie Herrera PA-C   750 mg at 12/02/24 1420    [START ON 12/3/2024] metoprolol succinate ER (TOPROL XL) 24 " hr tablet 100 mg  100 mg Oral Daily Cordelia Kingston MD        polyethylene glycol (MIRALAX) Packet 17 g  17 g Oral BID Jr Perez APRN CNP   17 g at 12/01/24 2243    senna-docusate (SENOKOT-S/PERICOLACE) 8.6-50 MG per tablet 2 tablet  2 tablet Oral BID Jr Perez APRN CNP   2 tablet at 12/01/24 2243    sodium chloride (PF) 0.9% PF flush 3 mL  3 mL Intracatheter Q8H Maeve Hernández PA-C   3 mL at 12/02/24 1004    tamsulosin (FLOMAX) capsule 0.4 mg  0.4 mg Oral QPM Maeve Hernández PA-C   0.4 mg at 12/01/24 2243

## 2024-12-02 NOTE — CONSULTS
Patient has Medicare Advantage through [employer] sponsored by an employer.    Xarelto/Eliquis:  $151/mo.    Patient may want to participate in Xarelto's mail order program that provides Xarelto for $89/mo (or $250 for 3 mo), regardless of income.  Information can be found at [employer] or by calling [phone].  (Provider would e-prescribe RX for Xarelto to [id number] [place], [place])    Jantoven: $7/mo.    Kitty Segal  Pharmacy Technician/Liaison, Discharge Pharmacy   [phone] (voice or text)  [email or web address]  Pharmacy test claims are estimates and may not reflect final costs.   Suggested alternatives aim to be cost-effective but may not be therapeutically equivalent as this consult is informational and does not constitute medical advice.   Clinical decisions should be made by qualified healthcare providers.

## 2024-12-02 NOTE — PROGRESS NOTES
[facility]    Neurosurgery  Daily Note    Assessment & Plan   Procedure(s):  Lumbar 2 to Lumbar 3 extension of previous Lumbar 3 to Lumbar 5 fusion with stealth navigation and Lumbar 2 to Lumbar 3 decompression  Removal of L3-5 bilateral set screws and rods   -6 Days Post-Op    s/p L2-3 extension of previous L3-5 fusion with stealth navigation and L2-3 decompression. Removal of L3-5 bilateral set screws and rods.       Plan:  -Can start anticoagulants such as Eliquis  -Advance activity as tolerated.  -Continue supportive and symptomatic treatment.  -Continue physical therapy.  -Pain control measures.  -Advance diet as tolerated.  -Routine wound care.    Jr Perez, DNP, APRN, CNP  [facility] Neurosurgery  Tel [phone]      Interval History   Stable.  Doing well.  Improving slowly.  Pain is reasonably controlled.  No fevers.       Physical Exam   Temp: 98.4  F (36.9  C) Temp src: Oral BP: 132/61 Pulse: 115   Resp: 18 SpO2: 97 % O2 Device: None (Room air)    Vitals:    11/26/24 0800   Weight: 201 lb 14.4 oz (91.6 kg)     Vital Signs with Ranges  Temp:  [98.4  F (36.9  C)-99.7  F (37.6  C)] 98.4  F (36.9  C)  Pulse:  [] 115  Resp:  [16-19] 18  BP: (127-168)/(45-77) 132/61  SpO2:  [92 %-97 %] 97 %  I/O last 3 completed shifts:  In: 440 [P.O.:440]  Out: 1115 [Urine:900; Drains:215]      HEENT:  Normocephalic.  PERRLA.  EOM s intact.    Skin:  Warm and dry, good capillary refill. There is an incision over the back that is clean, dry, and intact.      NEUROLOGICAL EXAMINATION:   Mental status: alert and oriented x3 speech clear and appropriate   Cranial nerves: II-XII intact  Motor strength:   Hip flexors: 5/5 right, 5/5 left   Quadriceps: 5/5 right, 5/5 left  Ankle dorsiflexion: 5/5 right, 5/5 left    Ankle plantar flexion:5/5 right, 5/5 left   Extensor hallicus longus: 5/5 right, 55/5 left     Gait:  not tested    CBC RESULTS:   Recent Labs   Lab Test 11/30/24 2008   WBC  11.0   RBC 3.63*   HGB 10.5*   HCT 30.4*   MCV 84   MCH 28.9   MCHC 34.5   RDW 12.7        Basic Metabolic Panel:  Lab Results   Component Value Date     12/02/2024     05/20/2021      Lab Results   Component Value Date    POTASSIUM 3.9 12/02/2024    POTASSIUM 3.4 10/16/2022    POTASSIUM 4.6 05/20/2021     Lab Results   Component Value Date    CHLORIDE 94 12/02/2024    CHLORIDE 103 10/16/2022    CHLORIDE 103 05/20/2021     Lab Results   Component Value Date    RICARDO 8.3 12/02/2024    RICARDO 9.2 05/20/2021     Lab Results   Component Value Date    CO2 22 12/02/2024    CO2 25 10/16/2022    CO2 30 05/20/2021     Lab Results   Component Value Date    BUN 10.6 12/02/2024    BUN 14 10/16/2022    BUN 15 05/20/2021     Lab Results   Component Value Date    CR 0.66 12/02/2024    CR 0.88 05/20/2021     Lab Results   Component Value Date     12/02/2024     11/30/2024     10/16/2022    GLC 92 05/20/2021     INR:  Lab Results   Component Value Date    INR 1.93 07/15/2010    INR 1.87 07/14/2010    INR 1.13 07/13/2010    INR 0.87 07/12/2010    INR 0.87 12/04/2007           Medications   Current Facility-Administered Medications   Medication Dose Route Frequency Provider Last Rate Last Admin      Current Facility-Administered Medications   Medication Dose Route Frequency Provider Last Rate Last Admin    [Held by provider] amLODIPine (NORVASC) tablet 5 mg  5 mg Oral QPM Rashida Vallejo MD   5 mg at 11/30/24 2159    atorvastatin (LIPITOR) tablet 40 mg  40 mg Oral QPM Maeve Hernández PA-C   40 mg at 12/01/24 2024    fexofenadine (ALLEGRA) tablet 180 mg  180 mg Oral QPM Maeve Hernández PA-C   180 mg at 12/01/24 2024    guaiFENesin (MUCINEX) 12 hr tablet 600 mg  600 mg Oral BID Milo Escoto MD   600 mg at 12/02/24 1000    lisinopril (ZESTRIL) tablet 20 mg  20 mg Oral QPM Rashida Vallejo MD   20 mg at 12/01/24 2023    methocarbamol (ROBAXIN) tablet 750 mg  750 mg Oral 4x Daily Sharon  Arie Smallwood PA-C   750 mg at 12/02/24 1000    metoprolol succinate ER (TOPROL XL) 24 hr tablet 75 mg  75 mg Oral Daily Milo Escoto MD   75 mg at 12/02/24 1001    polyethylene glycol (MIRALAX) Packet 17 g  17 g Oral BID Jr Perez APRN CNP   17 g at 12/01/24 2243    senna-docusate (SENOKOT-S/PERICOLACE) 8.6-50 MG per tablet 2 tablet  2 tablet Oral BID Jr Perez APRN CNP   2 tablet at 12/01/24 2243    sodium chloride (PF) 0.9% PF flush 3 mL  3 mL Intracatheter Q8H Maeve Hernández PA-C   3 mL at 12/02/24 1004    tamsulosin (FLOMAX) capsule 0.4 mg  0.4 mg Oral QPM Maeve Hernández PA-C   0.4 mg at 12/01/24 2243       Images reviewed personally    Data   Recent Results (from the past 24 hours)   US Lower Extremity Venous Duplex Bilateral    Narrative    EXAM: US LOWER EXTREMITY VENOUS DUPLEX BILATERAL  LOCATION: [facility]  DATE: 12/1/2024    INDICATION: Rule out DVT.  PostopStatus post L2 L3 extension of previous L3 L5 fusion with a Stealth navigated and L2 L3 decompression 11 26 2024.  Elevated D dimer.  COMPARISON: None.  TECHNIQUE: Venous Duplex ultrasound of bilateral lower extremities with and without compression, augmentation and duplex. Color flow and spectral Doppler with waveform analysis performed.    FINDINGS: Exam includes the common femoral, femoral, popliteal veins as well as segmentally visualized deep calf veins and greater saphenous vein.     RIGHT: No deep vein thrombosis. No superficial thrombophlebitis. No popliteal cyst.    LEFT: No deep vein thrombosis. No superficial thrombophlebitis. No popliteal cyst.      Impression    IMPRESSION:  1.  No deep venous thrombosis in the bilateral lower extremities.

## 2024-12-02 NOTE — PLAN OF CARE
Goal Outcome Evaluation:    Patient vital signs are at baseline: Yes  Patient able to ambulate as they were prior to admission or with assist devices provided by therapies during their stay:  Yes up with assist of 1 GBW  Patient MUST void prior to discharge:  Yes  Patient able to tolerate oral intake:  Yes  Pain has adequate pain control using Oral analgesics:  Yes  Does patient have an identified :  Yes  Has goal D/C date and time been discussed with patient:  No,  Reason:  Pending discharge order         Pt A/Ox4. SL intermittent. On tele. Pt denies SOB and chest pain. Dressing CDI.

## 2024-12-03 ENCOUNTER — ORDERS ONLY (AUTO-RELEASED) (OUTPATIENT)
Dept: MEDSURG UNIT | Facility: CLINIC | Age: 84
End: 2024-12-03
Payer: COMMERCIAL

## 2024-12-03 ENCOUNTER — TELEPHONE (OUTPATIENT)
Dept: INTERNAL MEDICINE | Facility: CLINIC | Age: 84
End: 2024-12-03
Payer: COMMERCIAL

## 2024-12-03 DIAGNOSIS — I48.0 PAROXYSMAL ATRIAL FIBRILLATION (H): ICD-10-CM

## 2024-12-03 DIAGNOSIS — R06.81 APNEA: Primary | ICD-10-CM

## 2024-12-03 DIAGNOSIS — I48.91 ATRIAL FIBRILLATION, UNSPECIFIED TYPE (H): ICD-10-CM

## 2024-12-03 NOTE — PLAN OF CARE
Goal Outcome Evaluation:         Summary:      Patient is alert and oriented X4. VSS on RA with good sat. Patient denied Sob, difficulty breathing , lightheadedness .dizziness and chest pain.  IV access removed . AVS reviewed with patient and son , all questions were answered and patient verbalized understanding. Patient advised to follow up with PCP and Surgeon for post opp . Patient education provided on Eliquis  and to call 911 in an event of  fall that lead head strike  and  patient verbalized understanding . Dressing change with sterile technique. Patient advised to call clinics for any concerns or questions. Patient educated on fall and safety precautions. Patient discharge home with belongings and medications.

## 2024-12-03 NOTE — TELEPHONE ENCOUNTER
Called and spoke with son (ok per ctc).  Patient was recently discharged from the hospital and the provider wants patient to get evaluated for sleep apnea. Son stated he could hear patient choking while sleeping and they were having trouble keeping his O2 levels up. Stated patient is heavy and has a thick neck.   Does patient need appointment?    Call son with response (ok per ctc)

## 2024-12-03 NOTE — TELEPHONE ENCOUNTER
Referral placed for sleep clinic evaluation for possible sleep apnea.  Central scheduling will call patient to schedule appointment or patient may call   [phone].  Requested referral appointment in the next 2 weeks if possible but cannot guarantee patient will be seen within that time window for initial consultation

## 2024-12-03 NOTE — TELEPHONE ENCOUNTER
Order/Referral Request    Who is requesting:     Orders being requested: PT was inpatient for spinal fusion and  suggested follow up.    Reason service is needed/diagnosis: possible sleep apnea    When are orders needed by: N/A - need referal    Has this been discussed with Provider: No    Does patient have a preference on a Group/Provider/Facility? N/A    Does patient have an appointment scheduled?: No    Where to send orders:     Could we send this information to you in [facility] or would you prefer to receive a phone call?:   Patient would prefer a phone call   Okay to leave a detailed message?: Yes at Other phone number:  [phone] - call heber rojas hard of hearing.

## 2024-12-03 NOTE — PLAN OF CARE
Alert and oriented x's 4.  Up with SBA, ambulating in the halls.  On tele, A-fib with CVR, MD aware.  Had echo today.  Will continue to monitor.

## 2024-12-04 ENCOUNTER — TELEPHONE (OUTPATIENT)
Dept: CARDIOLOGY | Facility: CLINIC | Age: 84
End: 2024-12-04
Payer: COMMERCIAL

## 2024-12-04 NOTE — TELEPHONE ENCOUNTER
Patient was admitted to [facility] on 11/26/24 who was admitted for neurosurgical procedure on 11/28/24. He underwent lumbar 2 to Lumbar 3 extension of previous Lumbar 3 to Lumbar 5 fusion with stealth navigation and Lumbar 2 to Lumbar 3 decompression as well as Removal of L3-5 bilateral set screws and rods.     11/30/24: Pt developed sudden onset A. Fib with RVR without any chest pain shortness of breath.  This was noted on telemetry and therefore RRT was called. Patient given IV Metoprolol. Subsequently IV Diltiazem was given. Cardiology consulted.    PMH: significant for hypertension, TIA, hyperlipidemia, iron deficiency anemia, chronic bilateral low back pain, BPH.    12/2/24: Echo showed EF of 60-65% with mild aortic stenosis. Left atrium is mildly dilated.    Anticoagulation cleared by neurosurgery, and pt started on Eliquis 5 mg BID.    Pt was started on Eliquis, Benzapril. PTA Metoprolol dosage was increased. ASA, Plavix and Lotrel were discontinued at time of discharge. 3 day Zio Patch monitor mailed out for PAF.    Called patient to discuss any post hospital d/c questions he may have, review medication changes, and confirm f/u appts. Pt is napping, so spoke with his wife. She denied any questions regarding new medications or changes to PTA medications. Discontinued medications as above reviewed.    She states pt has had no c/o any SOB, chest pain, or light headedness.    RN confirmed with wife that pt is scheduled for an OV on 1/2/25 at 1230 with TIM Kate Davila at our [place] Office. Dr. Kumar's Team RN phone number provided.    Advised to call clinic with any cardiac related questions or concerns prior to this tim't, and she verbalized understanding and agreed with plan. MEGA Merritt RN.

## 2024-12-05 LAB — BACTERIA BLD CULT: NO GROWTH

## 2024-12-09 ENCOUNTER — TELEPHONE (OUTPATIENT)
Dept: ORTHOPEDICS | Facility: CLINIC | Age: 84
End: 2024-12-09
Payer: COMMERCIAL

## 2024-12-09 NOTE — TELEPHONE ENCOUNTER
Attempted to call patient regarding home care inquiry.  Patient had spine surgery on 11/26, VM left recommending he reach out to his neurosurgery provider.    Flores Fraser RN on 12/9/2024 at 1:14 PM

## 2024-12-10 ENCOUNTER — ALLIED HEALTH/NURSE VISIT (OUTPATIENT)
Dept: NEUROSURGERY | Facility: CLINIC | Age: 84
End: 2024-12-10
Payer: COMMERCIAL

## 2024-12-10 DIAGNOSIS — Z98.1 STATUS POST LUMBAR SPINAL FUSION: Primary | ICD-10-CM

## 2024-12-10 DIAGNOSIS — Z98.1 S/P LUMBAR FUSION: ICD-10-CM

## 2024-12-10 PROCEDURE — 99207 PR NO CHARGE NURSE ONLY: CPT

## 2024-12-10 RX ORDER — OXYCODONE HYDROCHLORIDE 5 MG/1
5 TABLET ORAL EVERY 6 HOURS PRN
Qty: 40 TABLET | Refills: 0 | Status: SHIPPED | OUTPATIENT
Start: 2024-12-10

## 2024-12-10 NOTE — PATIENT INSTRUCTIONS
Instructions for Patient    Incision  Steri-strips were applied today, they will fall off in 7-10 days. Do not to pull them off.   Keep your incision clean and dry at all times.   It is okay to shower, just pat the incision dry   No submerging incision in water such as pools, hot tubs, or baths for at least 8 weeks and until the incision is healed  Do not apply lotions or ointments to incision    Activity  No lifting greater than 25 pounds. No bending, twisting, or overhead reaching.  Walking is the best way to start exercise after surgery. Take short frequent walks. You may gradually increase the distance as tolerated. If you feel pain, decrease your activity, but DO NOT stop walking. Walking will help you gain strength, prevent muscle soreness and spasms, and prevent blood clots.   Avoid bed rest and prolonged sitting for longer than 30 minutes (change positions frequently while awake)  No contact sports or high impact activities such as; running/jogging, snowmobile or 4 waldron riding or any other recreational vehicles until after given clearance at one of your follow up visits    Medications   Refills of pain medication:   Please call the neurosurgery clinic to request 2-3 days before you run out. A nurse will call you back to obtain a pain assessment.   Weaning from narcotic pain medications  When it is time, start weaning by extending the time between doses.   For example, if you're taking 2 tabs every 4 hours, spread it out to 2 tabs over 4.5, 5, 6 hours. At that point you can certainly cut down to 1 tab, then wean to an as needed basis until completely done with them.  Don't take more than 3000mg of Acetaminophen in 24 hours  Avoid Aspirin and NSAIDs (ex: ibuprofen/Advil) until given clearance (likely at the 6-week post-op appointment).  Encouraged icing for at least 3-4 times throughout the day for 20-30 minutes at a time. Avoid heat to the incision area.   Taking stool softeners regularly can reduce  constipation commonly caused by narcotic pain medications.    Contact clinic or Emergency Room if you develop:   Infection (redness, swelling, warmth, drainage, fever over 101 F)  New injury  Bladder or bowel changes or loss of control    Signs of blood clot:  Swelling and/or warmth in one or both legs  Pain or tenderness in your leg, ankle, foot, or arm   Red or discolored skin     Go to the Emergency Room   If sudden onset of severe headache, weakness, confusion, change in level of consciousness, pain, or loss of movement.  Chest pain  Trouble breathing     Post-operative appointments  Arrive 30 minutes before your 6 week, 3 month, 6 month, and 1 year post-op appointments to allow time for an x-ray before each    [facility]  [place] [address] [place] [id number]  Telephone:  [phone]  Fax:  [phone]

## 2024-12-10 NOTE — CONFIDENTIAL NOTE
Post-op Nurse Visit:    Patient seen today per the order of  Harrison Perez MD .   DOS: 11/26/24  Procedure: Lumbar 2 to Lumbar 3 extension of previous Lumbar 3 to Lumbar 5 fusion with stealth navigation and Lumbar 2 to Lumbar 3 decompression Removal of L3-5 bilateral set screws and rods    Pain/Neuro Assessment  3/10 across the low back.   Numbness/tingling: None   Strength: Equal strength to bilateral lower extremities. Denies weakness.     Pain Relief Measures:  Oxycodone: 1 pill BID  Tylenol: 500 mg TID  Robaxin: 1 pill BID  Ice: None     Incision  Incision inspected. Edges well-approximated. No redness, swelling, drainage, or warmth noted. Incision prepped with ChloraPrep and suture(s) removed without difficulty. Steri-strips applied.    Activity  Following restrictions   Falls: none  Patient is walking frequently, Using walker for ambulation.   Denies redness or warmth to bilateral calves. Swelling at baseline follow knee surgery.  Wearing brace? N/A    GI/  Difficulty swallowing? No  Patient's appetite is decreased.  Bowel/bladder problems? Yes - patient reports he has been getting up 8-9 x per night to go to the restroom. He used to get up 3-4x per night but this has increased since knee surgery. Patient takes flomax per PCP. Patient will see PCP on Friday. Patient also states he has irritation in gluteal cleft from wipes used in the hospital. Patient denies any open wounds. Encouraged to discuss skin and urinary concerns with PCP at upcoming appointment. Patient voiced agreement and understanding.   Taking stool softeners? Yes     Refills/x-rays/return to work  Refills given at this appointment? Yes - oxycodone pended.  Sent for x-rays after this appointment? No  Ordered future x-rays? Yes  Return to work discussed at this appointment? Retired.    1/6 appointment with Maeve Hernández PA-C with xr prior scheduled.  3/3 appointment with Dr. Perez changed to telephone as patient will be in [place]. Patient  will contact us to let us know where to send xr order to be completed prior to appt.    All of patient's questions addressed today. Patient was instructed to call with any additional questions/concerns.

## 2024-12-13 ENCOUNTER — OFFICE VISIT (OUTPATIENT)
Dept: INTERNAL MEDICINE | Facility: CLINIC | Age: 84
End: 2024-12-13
Payer: COMMERCIAL

## 2024-12-13 VITALS
DIASTOLIC BLOOD PRESSURE: 73 MMHG | BODY MASS INDEX: 30.72 KG/M2 | TEMPERATURE: 97.4 F | HEART RATE: 64 BPM | OXYGEN SATURATION: 100 % | HEIGHT: 67 IN | SYSTOLIC BLOOD PRESSURE: 146 MMHG | WEIGHT: 195.7 LBS

## 2024-12-13 DIAGNOSIS — Z98.1 S/P LUMBAR FUSION: ICD-10-CM

## 2024-12-13 DIAGNOSIS — D64.9 ANEMIA, UNSPECIFIED TYPE: ICD-10-CM

## 2024-12-13 DIAGNOSIS — R60.9 EDEMA, UNSPECIFIED TYPE: Primary | ICD-10-CM

## 2024-12-13 DIAGNOSIS — E87.1 HYPONATREMIA: ICD-10-CM

## 2024-12-13 DIAGNOSIS — Z86.79 HISTORY OF ATRIAL FIBRILLATION: ICD-10-CM

## 2024-12-13 DIAGNOSIS — E04.1 THYROID NODULE: ICD-10-CM

## 2024-12-13 DIAGNOSIS — R33.8 BENIGN PROSTATIC HYPERPLASIA WITH URINARY RETENTION: ICD-10-CM

## 2024-12-13 DIAGNOSIS — R06.83 SNORING: ICD-10-CM

## 2024-12-13 DIAGNOSIS — N40.1 BENIGN PROSTATIC HYPERPLASIA WITH URINARY RETENTION: ICD-10-CM

## 2024-12-13 DIAGNOSIS — R60.9 EDEMA, UNSPECIFIED TYPE: ICD-10-CM

## 2024-12-13 DIAGNOSIS — R35.0 URINE FREQUENCY: ICD-10-CM

## 2024-12-13 LAB
ALBUMIN SERPL BCG-MCNC: 3.9 G/DL (ref 3.5–5.2)
ALBUMIN UR-MCNC: NEGATIVE MG/DL
ALP SERPL-CCNC: 139 U/L (ref 40–150)
ALT SERPL W P-5'-P-CCNC: 28 U/L (ref 0–70)
ANION GAP SERPL CALCULATED.3IONS-SCNC: 11 MMOL/L (ref 7–15)
APPEARANCE UR: CLEAR
AST SERPL W P-5'-P-CCNC: 26 U/L (ref 0–45)
BILIRUB SERPL-MCNC: 0.3 MG/DL
BILIRUB UR QL STRIP: NEGATIVE
BUN SERPL-MCNC: 9.8 MG/DL (ref 8–23)
CALCIUM SERPL-MCNC: 9.4 MG/DL (ref 8.8–10.4)
CHLORIDE SERPL-SCNC: 99 MMOL/L (ref 98–107)
COLOR UR AUTO: YELLOW
CREAT SERPL-MCNC: 0.76 MG/DL (ref 0.67–1.17)
EGFRCR SERPLBLD CKD-EPI 2021: 88 ML/MIN/1.73M2
ERYTHROCYTE [DISTWIDTH] IN BLOOD BY AUTOMATED COUNT: 13 % (ref 10–15)
GLUCOSE SERPL-MCNC: 101 MG/DL (ref 70–99)
GLUCOSE UR STRIP-MCNC: NEGATIVE MG/DL
HCO3 SERPL-SCNC: 26 MMOL/L (ref 22–29)
HCT VFR BLD AUTO: 35.2 % (ref 40–53)
HGB BLD-MCNC: 11.6 G/DL (ref 13.3–17.7)
HGB UR QL STRIP: NEGATIVE
IRON BINDING CAPACITY (ROCHE): 311 UG/DL (ref 240–430)
IRON SATN MFR SERPL: 12 % (ref 15–46)
IRON SERPL-MCNC: 37 UG/DL (ref 61–157)
KETONES UR STRIP-MCNC: NEGATIVE MG/DL
LEUKOCYTE ESTERASE UR QL STRIP: NEGATIVE
MCH RBC QN AUTO: 27.7 PG (ref 26.5–33)
MCHC RBC AUTO-ENTMCNC: 33 G/DL (ref 31.5–36.5)
MCV RBC AUTO: 84 FL (ref 78–100)
NITRATE UR QL: NEGATIVE
PH UR STRIP: 6 [PH] (ref 5–7)
PLATELET # BLD AUTO: 436 10E3/UL (ref 150–450)
POTASSIUM SERPL-SCNC: 4.6 MMOL/L (ref 3.4–5.3)
PROT SERPL-MCNC: 6.9 G/DL (ref 6.4–8.3)
RBC # BLD AUTO: 4.19 10E6/UL (ref 4.4–5.9)
RBC #/AREA URNS AUTO: NORMAL /HPF
SODIUM SERPL-SCNC: 136 MMOL/L (ref 135–145)
SP GR UR STRIP: 1.02 (ref 1–1.03)
UROBILINOGEN UR STRIP-ACNC: 0.2 E.U./DL
WBC # BLD AUTO: 7.2 10E3/UL (ref 4–11)
WBC #/AREA URNS AUTO: NORMAL /HPF

## 2024-12-13 PROCEDURE — 85027 COMPLETE CBC AUTOMATED: CPT | Performed by: INTERNAL MEDICINE

## 2024-12-13 PROCEDURE — 81001 URINALYSIS AUTO W/SCOPE: CPT | Performed by: INTERNAL MEDICINE

## 2024-12-13 PROCEDURE — 36415 COLL VENOUS BLD VENIPUNCTURE: CPT | Performed by: INTERNAL MEDICINE

## 2024-12-13 PROCEDURE — 99495 TRANSJ CARE MGMT MOD F2F 14D: CPT | Performed by: INTERNAL MEDICINE

## 2024-12-13 PROCEDURE — 83540 ASSAY OF IRON: CPT | Performed by: INTERNAL MEDICINE

## 2024-12-13 PROCEDURE — 80053 COMPREHEN METABOLIC PANEL: CPT | Performed by: INTERNAL MEDICINE

## 2024-12-13 PROCEDURE — 83550 IRON BINDING TEST: CPT | Performed by: INTERNAL MEDICINE

## 2024-12-13 RX ORDER — TAMSULOSIN HYDROCHLORIDE 0.4 MG/1
0.8 CAPSULE ORAL DAILY
Qty: 180 CAPSULE | Refills: 3 | Status: SHIPPED | OUTPATIENT
Start: 2024-12-13

## 2024-12-13 RX ORDER — ONDANSETRON 4 MG/1
TABLET, FILM COATED ORAL
COMMUNITY
Start: 2024-09-17

## 2024-12-13 NOTE — PATIENT INSTRUCTIONS
Increase Tamsulosin/Flomax  to 2 capsules daily  for urine flow  Labs as ordered (blood and urine)  Continue walking exercise  Follow-up with cardiology,  sleep  clinic and neurosurgery as scheduled in January  When back feeling better, then  let me know and will  order thyroid biopsy   Hold off on driving for now.  If off pain medications besides Tylenol  in the future, then let me know and will reassess  Continue  reduce sodium intake. Goal 2,000mg sodium/day or less   When back better, then start compression stocking use again   Moisturizer  (Lubriderm, Vanicream) for dryness spots  buttock area

## 2024-12-13 NOTE — PROGRESS NOTES
" ASSESSMENT:    ***    PLAN:  ***          Subjective   Ricardo is a 84 year old, presenting for the following health issues:  Surgical Followup  Pt has problems with around waist and down into legs after surgery   Pt reports feeling well after surgery but had more health concerns found when in surgery   HPI         Hospital Follow-up Visit:    Hospital/Nursing Home/IP Rehab Facility: [facility]  Date of Admission: 11/26  Date of Discharge: 12/2  Reason(s) for Admission: L2-3 Fusion   Was the patient in the ICU or did the patient experience delirium during hospitalization?  No  Do you have any other stressors you would like to discuss with your provider? Health Concerns    Problems taking medications regularly:  None  Medication changes since discharge: None  Problems adhering to non-medication therapy:  None    Summary of hospitalization:  {:440071}  Diagnostic Tests/Treatments reviewed.  Follow up needed: {:216697:}  Other Healthcare Providers Involved in Patient s Care:         {those currently involved after discharge:810254::\"None\"}  Update since discharge: {:190545} {TIP  Include information from family/caregivers, SNF, Care Coordination :421926}        Plan of care communicated with {:578382}     {Reference  Coding guidelines- Moderate Complexity F2F/Video within 7 - 14 days of discharge 2533817, High Complexity F2F/Video within 7 days 8276085 or [id number] days [id number] :690667}      {additonal problems for provider to add (Optional):325278}  {additonal problems for provider to add (Optional):969065}    {ROS Picklists (Optional):892767}      Objective    BP (!) 146/73   Pulse 64   Temp 97.4  F (36.3  C) (Temporal)   Ht 1.702 m (5' 7\")   Wt 88.8 kg (195 lb 11.2 oz)   SpO2 100%   BMI 30.65 kg/m    Body mass index is 30.65 kg/m .  Physical Exam   {Exam List (Optional):031209}    {Diagnostic Test Results (Optional):617478}        Signed Electronically by: Familia Cook MD  {Email " feedback regarding this note to [email or web address]   :898267}   Care:         Neurosurgery, sleep clinic, cardiology  Update since discharge: improved.      Discharge summary reviewed. Part of the summary as below:      Patient Name: Ricardo Neely  YOB: 1940  Age: 84 year old  Medical Record Number: [id number]  Primary Physician: Familia Cook  Admission Date: 11/26/2024.  Discharge Date:12/2/2024        He will be discharged from  [facility]  to skilled nursing [facility]     PRINCIPAL DIAGNOSIS CAUSING ADMISSION: <principal problem not specified>     Discharge Diagnoses:  Active Problems:    S/P lumbar fusion        HPI: Ricardo Neely is a 84 year old male with history of L3-5 fusion, presented with L2-3 severe stenosis and severe back and leg pain.  Underwent extensive non-operative management with failure to improve.  Risks, benefits, indications, and alternatives were discussed with the patient and family in detail, and they wished to proceed.      BRIEF HOSPITAL COURSE: Ricardo Neely is a 84 year old male who was admitted on day of surgery and underwent Procedure(s):  Lumbar 2 to Lumbar 3 extension of previous Lumbar 3 to Lumbar 5 fusion with stealth navigation and Lumbar 2 to Lumbar 3 decompression  Removal of L3-5 bilateral set screws and rods.  Surgery was without complications.  Postoperatively he reported improvement of preoperative symtpoms.   On exam on day of discharge, his strength was 5/5 with no new focal deficits.  PT/OT consultations were obtained to assess function, assist with mobilization, and evaluate for discharge recommendations.  By POD # 2 he was tolerating a regular diet, ambulating, voiding without difficulty, and obtaining good pain control on oral medication.  His incision was clean, dry and intact.   He met all discharge criteria and was stable for discharge.        PROCEDURES PERFORMED DURING HOSPITALIZATION: Procedure(s):  Lumbar 2 to Lumbar 3 extension of previous Lumbar 3 to Lumbar 5 fusion with stealth navigation and  Lumbar 2 to Lumbar 3 decompression  Removal of L3-5 bilateral set screws and rods      COMPLICATIONS IN HOSPITAL:  None.     IMPORTANT PENDING TEST RESULTS: None.     PERTINENT FINDINGS/RESULTS AT DISCHARGE:  None.     CONDITION AT DISCHARGE:  stable     DISCHARGE MEDICATIONS    Current Medications   No current facility-administered medications for this encounter.     Current Outpatient Medications:     acetaminophen (TYLENOL) 325 MG tablet, Take 2 tablets (650 mg) by mouth every 4 hours as needed for other (For optimal non-opioid multimodal pain management to improve pain control.)., Disp: , Rfl:     apixaban ANTICOAGULANT (ELIQUIS) 5 MG tablet, Take 1 tablet (5 mg) by mouth 2 times daily., Disp: 60 tablet, Rfl: 0    atorvastatin (LIPITOR) 40 MG tablet, Take 1 tablet (40 mg) by mouth daily, Disp: 90 tablet, Rfl: 3    fexofenadine (ALLEGRA) 180 MG tablet, Take 180 mg by mouth daily., Disp: , Rfl:     guaiFENesin (MUCINEX) 600 MG 12 hr tablet, Take 1 tablet (600 mg) by mouth 2 times daily as needed for congestion or cough., Disp: , Rfl:     methocarbamol (ROBAXIN) 750 MG tablet, Take 1 tablet (750 mg) by mouth 4 times daily., Disp: 40 tablet, Rfl: 0    metoprolol succinate ER (TOPROL XL) 25 MG 24 hr tablet, Take 3 tablets (75 mg) by mouth 2 times daily., Disp: 180 tablet, Rfl: 0    senna-docusate (SENOKOT-S/PERICOLACE) 8.6-50 MG tablet, Take 2 tablets by mouth 2 times daily as needed for constipation (when taking oxy)., Disp: 60 tablet, Rfl: 0    benazepril (LOTENSIN) 20 MG tablet, Take 1 tablet (20 mg) by mouth daily., Disp: 90 tablet, Rfl: 3    ondansetron (ZOFRAN) 4 MG tablet, TAKE 1 TO 2 TABLETS BY MOUTH EVERY 8 HOURS AS NEEDED FOR NAUSEA OR VOMITING, Disp: , Rfl:     oxyCODONE (ROXICODONE) 5 MG tablet, Take 1 tablet (5 mg) by mouth every 6 hours as needed for moderate pain., Disp: 40 tablet, Rfl: 0    tamsulosin (FLOMAX) 0.4 MG capsule, Take 2 capsules (0.8 mg) by mouth daily., Disp: 180 capsule, Rfl: 3             AFTER DISCHARGE ORDERS AND INSTRUCTIONS:     Follow up with Neurosurgery: in 2  weeks  Follow up appointment with Primary Care Physician: Familia Cook as needed  Diet: regular diet  Activity: *No lifting, pushing or pulling greater than 5-10 pounds (this is about a gallon of milk).  *No repetitive bending, twisting, or jarring activities  *No overhead work  *No aerobic or strenuous activity  *No activities with increased risk of falls  *You may move about your home as tolerated  *You may walk up and down stairs as tolerated  *You may increase your activity slowly over the next 4-6 weeks     WALKING PROGRAM: As you can tolerate, walk daily-start with 5-10 minutes of continuous walking. This is in addition to the walking that you do as part of your daily activities. Increase the time that you walk by 5 minutes every couple of days. Do not exceed 30-45 minutes of continuous walking until seen in follow-up. Walking is the best exercise after surgery.  **Listen to your body, if you find that you are more painful or fatigued, you may need to proceed more slowly.    **Do not smoke or expose yourself to second hand smoke. Cigarette smoke can delay healing and cause complications.      DRIVING:  We recommend that you do not drive while taking medications for pain or muscle spasms. Always read and follow the advice on your prescription bottle. If you have questions, speak with your pharmacist.  We recommend that you do not drive while wearing a brace, as it could limit your range of motion.    Most recent lab results reviewed with pt.      Patient ambulating using a walker.  Walked 1.5 miles yesterday.  On oxycodone still 1 tablet in the morning and methocarbamol 2-3 times a day.  Not ready currently.  Patient states he is not to start physical therapy for 6 weeks after surgery per neurosurgery recommendation.  Had some A-fib during his hospitalization.  Converted with IV metoprolol and Cardizem.  Recent thyroid function  "normal.  Patient was started on Eliquis by cardiology.  Patient states he wore a Zio patch for 3 days and took it off yesterday.  Has follow-up with cardiology in early January.  Has some urinary retention with reduced urine flow.  No dysuria.  Has nocturia 5-6 times a night and urinating in small amounts.  Does note mild edema.  Maintaining low-sodium intake.  With some obesity, A-fib and some snoring during the hospitalization, concern was raised for possible sleep apnea.  Patient has an appointment scheduled with the sleep clinic to evaluate for ELIDA.  Was noted to have some mild skin irritation left buttock area and needs recheck.  Bowel movements normal with current medication therapy and some MiraLAX.  Appetite doing okay.  No nausea or vomiting.  Back feels much better following fusion surgery       Additional ROS:   Constitutional, HEENT, Cardiovascular, Pulmonary, GI and , Neuro, MSK and Psych review of systems/symptoms are otherwise negative or unchanged from previous, except as noted above.      OBJECTIVE:  BP (!) 146/73   Pulse 64   Temp 97.4  F (36.3  C) (Temporal)   Ht 1.702 m (5' 7\")   Wt 88.8 kg (195 lb 11.2 oz)   SpO2 100%   BMI 30.65 kg/m     Estimated body mass index is 30.65 kg/m  as calculated from the following:    Height as of this encounter: 1.702 m (5' 7\").    Weight as of this encounter: 88.8 kg (195 lb 11.2 oz).     HENT: ear canals and TM's normal and nose and mouth without ulcers or lesions.  Neuro postpharyngeal airway due to obesity  Neck: no adenopathy. Thyroid nodule approximately 3 cm palpable  right lower lobe. No bruits  Pulm: Lungs clear to auscultation   CV: Regular rates and rhythm.  No ectopy with 30 seconds auscultation  GI: Soft, mildly obese, nontender, Normal active bowel sounds, No hepatosplenomegaly or masses palpable  Ext: Peripheral pulses intact.  Minimal BLE ankle  edema.  Neuro: Normal strength and tone, sensory exam grossly normal.  Slightly antalgic but " stable gait  Back: Mild tenderness to palpation bilateral paralumbar musculature.   Skin: Mild dry patch approximately 1.5 cm left buttock area but no actual ulceration seen      (Chart documentation was completed, in part, with [employer] voice-recognition software. Even though reviewed, some grammatical, spelling, and word errors may remain.)    Familia Cook MD  Internal Medicine Department  [facility]

## 2024-12-16 ENCOUNTER — MYC MEDICAL ADVICE (OUTPATIENT)
Dept: INTERNAL MEDICINE | Facility: CLINIC | Age: 84
End: 2024-12-16
Payer: COMMERCIAL

## 2024-12-16 DIAGNOSIS — I10 ESSENTIAL HYPERTENSION: ICD-10-CM

## 2024-12-18 RX ORDER — BENAZEPRIL HYDROCHLORIDE 20 MG/1
20 TABLET ORAL DAILY
Qty: 90 TABLET | Refills: 3 | Status: SHIPPED | OUTPATIENT
Start: 2024-12-18

## 2024-12-19 LAB — CV ZIO PRELIM RESULTS: NORMAL

## 2024-12-23 NOTE — DISCHARGE SUMMARY
HOSPITAL DISCHARGE SUMMARY         Patient Name: Ricardo Neely  YOB: 1940  Age: 84 year old  Medical Record Number: [id number]  Primary Physician: Familia Cook  Admission Date: 11/26/2024.  Discharge Date:12/2/2024      He will be discharged from  [facility]  to skilled nursing [facility]    PRINCIPAL DIAGNOSIS CAUSING ADMISSION: <principal problem not specified>    Discharge Diagnoses:  Active Problems:    S/P lumbar fusion      HPI: Ricardo Neely is a 84 year old male with history of L3-5 fusion, presented with L2-3 severe stenosis and severe back and leg pain.  Underwent extensive non-operative management with failure to improve.  Risks, benefits, indications, and alternatives were discussed with the patient and family in detail, and they wished to proceed.     BRIEF HOSPITAL COURSE: Ricardo Neely is a 84 year old male who was admitted on day of surgery and underwent Procedure(s):  Lumbar 2 to Lumbar 3 extension of previous Lumbar 3 to Lumbar 5 fusion with stealth navigation and Lumbar 2 to Lumbar 3 decompression  Removal of L3-5 bilateral set screws and rods.  Surgery was without complications.  Postoperatively he reported improvement of preoperative symtpoms.   On exam on day of discharge, his strength was 5/5 with no new focal deficits.  PT/OT consultations were obtained to assess function, assist with mobilization, and evaluate for discharge recommendations.  By POD # 2 he was tolerating a regular diet, ambulating, voiding without difficulty, and obtaining good pain control on oral medication.  His incision was clean, dry and intact.   He met all discharge criteria and was stable for discharge.      PROCEDURES PERFORMED DURING HOSPITALIZATION: Procedure(s):  Lumbar 2 to Lumbar 3 extension of previous Lumbar 3 to Lumbar 5 fusion with stealth navigation and Lumbar 2 to Lumbar 3 decompression  Removal of L3-5 bilateral set screws and rods     COMPLICATIONS IN HOSPITAL:  None.    IMPORTANT  PENDING TEST RESULTS: None.    PERTINENT FINDINGS/RESULTS AT DISCHARGE:  None.    CONDITION AT DISCHARGE:  stable    DISCHARGE MEDICATIONS  No current facility-administered medications for this encounter.    Current Outpatient Medications:     acetaminophen (TYLENOL) 325 MG tablet, Take 2 tablets (650 mg) by mouth every 4 hours as needed for other (For optimal non-opioid multimodal pain management to improve pain control.)., Disp: , Rfl:     apixaban ANTICOAGULANT (ELIQUIS) 5 MG tablet, Take 1 tablet (5 mg) by mouth 2 times daily., Disp: 60 tablet, Rfl: 0    atorvastatin (LIPITOR) 40 MG tablet, Take 1 tablet (40 mg) by mouth daily, Disp: 90 tablet, Rfl: 3    fexofenadine (ALLEGRA) 180 MG tablet, Take 180 mg by mouth daily., Disp: , Rfl:     guaiFENesin (MUCINEX) 600 MG 12 hr tablet, Take 1 tablet (600 mg) by mouth 2 times daily as needed for congestion or cough., Disp: , Rfl:     methocarbamol (ROBAXIN) 750 MG tablet, Take 1 tablet (750 mg) by mouth 4 times daily., Disp: 40 tablet, Rfl: 0    metoprolol succinate ER (TOPROL XL) 25 MG 24 hr tablet, Take 3 tablets (75 mg) by mouth 2 times daily., Disp: 180 tablet, Rfl: 0    senna-docusate (SENOKOT-S/PERICOLACE) 8.6-50 MG tablet, Take 2 tablets by mouth 2 times daily as needed for constipation (when taking oxy)., Disp: 60 tablet, Rfl: 0    benazepril (LOTENSIN) 20 MG tablet, Take 1 tablet (20 mg) by mouth daily., Disp: 90 tablet, Rfl: 3    ondansetron (ZOFRAN) 4 MG tablet, TAKE 1 TO 2 TABLETS BY MOUTH EVERY 8 HOURS AS NEEDED FOR NAUSEA OR VOMITING, Disp: , Rfl:     oxyCODONE (ROXICODONE) 5 MG tablet, Take 1 tablet (5 mg) by mouth every 6 hours as needed for moderate pain., Disp: 40 tablet, Rfl: 0    tamsulosin (FLOMAX) 0.4 MG capsule, Take 2 capsules (0.8 mg) by mouth daily., Disp: 180 capsule, Rfl: 3       AFTER DISCHARGE ORDERS AND INSTRUCTIONS:    Follow up with Neurosurgery: in 2  weeks  Follow up appointment with Primary Care Physician: Familia Cook  needed  Diet: regular diet  Activity: *No lifting, pushing or pulling greater than 5-10 pounds (this is about a gallon of milk).  *No repetitive bending, twisting, or jarring activities  *No overhead work  *No aerobic or strenuous activity  *No activities with increased risk of falls  *You may move about your home as tolerated  *You may walk up and down stairs as tolerated  *You may increase your activity slowly over the next 4-6 weeks    WALKING PROGRAM: As you can tolerate, walk daily-start with 5-10 minutes of continuous walking. This is in addition to the walking that you do as part of your daily activities. Increase the time that you walk by 5 minutes every couple of days. Do not exceed 30-45 minutes of continuous walking until seen in follow-up. Walking is the best exercise after surgery.  **Listen to your body, if you find that you are more painful or fatigued, you may need to proceed more slowly.    **Do not smoke or expose yourself to second hand smoke. Cigarette smoke can delay healing and cause complications.     DRIVING:  We recommend that you do not drive while taking medications for pain or muscle spasms. Always read and follow the advice on your prescription bottle. If you have questions, speak with your pharmacist.  We recommend that you do not drive while wearing a brace, as it could limit your range of motion.    WORK: If you plan to return to work before your 4-6 week appointment, call and discuss with one of the nurses in the neurosurgery office.     USE OF ICE OR  HEAT:  *Icing can decrease post op swelling, thereby helping with post op pain control. Always protect your skin to prevent frostbite or burn.    *For the first 48 hours we recommend ice to the surgical area for 15-20 minutes at a time several times a day.      *Any longer than 15-20 minutes can cause damage to the tissues, including frostbite.     *Allow area to warm for at least 45 minutes or an hour between treatments.    *Ice as  frequently as you wish, so long as the area is warm to touch and has normal sensation before repeating.    *After 48 hours, you may use heat or ice, which ever feels best to you.  Always remember to protect your skin, and to use no greater than 15-20 minutes at a time.     *You can make your own ice bags at home by mixing 3 parts water with 1 part rubbing alcohol in a Ziplock bag and placing in the freezer.  It will not freeze solid.      BOWEL MOVEMENT:  If you did not have a bowel movement (pooped) before you were discharged from the hospital, and do not have a bowel movement within 2 days of discharge. Please call our office and request to speak to a nurse.    Your insurance may not cover medications to treat or prevent  constipation.      There are many  over the counter medications for constipation including: Colace, Senakot, Dulcolax, and Miralax. In addition to medications eat a high fiber diet and drink plenty of water.    If you are taking narcotics, you should being taking medication daily for constipation.      If you develop loose or runny stools, stop taking the medications for constipation.   Warnings: Call [phone] with the following symptoms:    *Temperature 101(fever) or greater or chills  *Redness, swelling or increased drainage from the wound  *Worsening pain not relieved by the pain prescription given  *Worsening or new onset of weakness, or numbness and tingling  *Loss or change in your ability to control bowel or bladder function  *Change in your ability to walk, talk, see or think  *If you did not have a bowel movement before leaving the hospital and your bowels have not moved within 48 hours of your discharge    !!!! IF YOU HAVE A SERIOUS OR THREATENING EMERGENCY, CALL [phone] OR COME TO THE EMERGENCY ROOM.  IF YOUR CONDITIONS ALLOWS COME TO THE HOSPITAL WHERE YOUR SURGERY WAS PERFORMED.    QUESTIONS OR CONCERNS:   OUR OFFICE HOURS ARE FROM 9:00 A.M -4:30 P.M. MONDAY-FRIDAY.  AFTER OFFICE  "HOURS, CALLS ARE ANSWERED BY THE ON-CALL PROVIDER. PLEASE CALL WITH ROUTINE QUESTIONS DURING OFFICE HOURS. THE ON-CALL PROVIDER WILL NOT REFILL PRESCRIPTIONS.   Wound care:WOUND CARE WITH STAPLES:  * Staples are usually removed in 1-3 weeks.  They are sometimes left longer.  * Keep a dressing on the wound until the staples are removed. You may use an extra large band-aid or 4x4 and tape.  Both can be purchased at your pharmacy.  * No lotions, soaps, powders, ointments, creams, or patches on incision until the wound   is well healed.  The incision does not need to be \"cleaned\" with soap and water.    * * Change the dressing at least daily, more frequently if necessary to keep the wound clean and dry.     Good handwashing can decrease the risk of infection.  Wash your hands before changing the dressing or touching the wound. If someone is helping you change the dressing, ensure that the person washes his/her hands. If you are unable to see the wound, have someone check the wound daily for redness, swelling or drainage.  A small amount of drainage is normal.       You may shower with the wound covered with a water proof dressing using plastic wrap and tape. Use a water proof dressing until the staples are removed.   Do not allow the shower water to beat directly on the wound.  After your shower, pat the wound dry, do not rub.  Be sure to apply a fresh dry dressing after you shower.      No tub baths or hot tubs until the wound is completely healed.    If you develop redness, swelling, drainage, or temp 101 or greater, please call our office at      Jr Perez DNP, APRN, CNP  [facility]  Tel [phone]        "

## 2024-12-30 DIAGNOSIS — I48.0 PAROXYSMAL ATRIAL FIBRILLATION (H): ICD-10-CM

## 2024-12-30 RX ORDER — METOPROLOL SUCCINATE 25 MG/1
75 TABLET, EXTENDED RELEASE ORAL 2 TIMES DAILY
Qty: 540 TABLET | Refills: 3 | Status: SHIPPED | OUTPATIENT
Start: 2024-12-30

## 2025-01-02 ENCOUNTER — OFFICE VISIT (OUTPATIENT)
Dept: CARDIOLOGY | Facility: CLINIC | Age: 85
End: 2025-01-02
Payer: COMMERCIAL

## 2025-01-02 VITALS
DIASTOLIC BLOOD PRESSURE: 72 MMHG | OXYGEN SATURATION: 98 % | SYSTOLIC BLOOD PRESSURE: 128 MMHG | WEIGHT: 195.6 LBS | HEART RATE: 76 BPM | HEIGHT: 68 IN | BODY MASS INDEX: 29.64 KG/M2

## 2025-01-02 DIAGNOSIS — R00.2 PALPITATIONS: ICD-10-CM

## 2025-01-02 DIAGNOSIS — I48.0 PAROXYSMAL ATRIAL FIBRILLATION (H): Primary | ICD-10-CM

## 2025-01-02 DIAGNOSIS — I10 ESSENTIAL HYPERTENSION: ICD-10-CM

## 2025-01-02 DIAGNOSIS — E78.5 HYPERLIPIDEMIA LDL GOAL <100: ICD-10-CM

## 2025-01-02 NOTE — LETTER
1/2/2025    Familia Cook MD  [address] [facility] [place] [address]    RE: Ricardo Neely       Dear Colleague,     I had the pleasure of seeing Ricardo Neely in the [facility].    Cardiology Clinic Progress Note  Ricardo Neely MRN# [id number]   YOB: 1940 Age: 84 year old   Primary Cardiologist: Dr. Kumar  Reason for visit: Posthospital follow-up            Assessment and Plan:       Paroxysmal atrial fibrillation, [id number] 5 (hypertension, age++, TIA ++)  -Anticoagulated with Eliquis for thromboembolic prophylaxis  -Metoprolol succinate 75 mg twice daily for rate control, HR trends 90-100s  -12/2024 echocardiogram shows LVEF 60-65% with mild aortic stenosis. Left atrium is mildly dilated  -12/2024 normal TSH 0.73  -12/2024 Zio patch monitor with no recurrence of atrial fibrillation and normal heart rates    Hypertension, controlled  -Benazepril 20 mg daily resumed by PCP on 12/13/2024    Benign prostate hypertrophy, on tamsulosin    Lumbar degenerative spine disease s/p lumbar surgery on 11/28    History of TIA, previously on long term plavix, discontinued following start of Eliquis    6.  Hyperlipidemia, with LDL at goal  -4/2024 LDL 62    7. Mild Aortic stenosis  -12/2024 TTE with mean gradient 14.8mmHg-> previously 11mmHg in 2022      Plan:  Continue metoprolol XL 75 mg twice per day  Continue Eliquis 5 mg twice per day  Continue benazepril 20 mg daily  Continue atorvastatin 40 mg daily  Repeat FLP/ALT this spring with PCP  Repeat TTE in 2026 for reassessment of aortic stenosis    Follow up: With Dr. Kumar in 6 months        History of Presenting Illness:    Ricardo Neely is a very pleasant 84 year old male with a history of carotid artery stenosis, palpitations, moderate nonobstructive coronary artery disease, history of TIA.    Patient was initially evaluated of course by cardiology for palpitations, Dr. Kumar, in 2022.  At that time he was noted to have PACs and PVCs on a  Holter monitor as well as a history of hypertension.    Patient was admitted in late November of last year for a neurosurgery on his lumbar spine.  Following surgery he had a sudden onset of rapid heart rate noted on telemetry and was noted to be in atrial fibrillation with rapid ventricular response.  He was given IV metoprolol and diltiazem.  He was not symptomatic during the episode.  Transthoracic echo showed mild aortic stenosis with a mean gradient of 14.8 mmHg, normal LVEF 60 to 65%, normal right ventricular size and function, mild left atrial dilation.  He was started on Eliquis following approval from neurosurgery and rate control approach was taken with dose increases in metoprolol.    Following discharge he wore a 3-day Zio patch monitor which showed a 1.5% PVC burden and no recurrence of atrial fibrillation.  Average heart rate 78 bpm.    Patient is here today for a posthospital follow-up.  He presents with his son today.  He is looking forward to leaving for [place] for the winter next month.    Patient reports feeling good.  He has been walking to miles per day without any concerning symptoms.  He has not yet started physical therapy for his back yet.    Patient denies chest pain or chest tightness. Denies dizziness, lightheadedness or other presyncopal symptoms. Denies tachycardia or palpitations.  Denies shortness of breath, orthopnea, or PND.    Most recent labs from 12/13/2024 show sodium 136 potassium 4.6, BUN 9.8, creatinine 0.76, GFR 88.     Blood pressure 128/72 and HR 76 in clinic today.          Recent Hospitalizations   As above           Social History      Social History     Socioeconomic History     Marital status:      Spouse name: Not on file     Number of children: Not on file     Years of education: Not on file     Highest education level: Not on file   Occupational History     Employer: RETIRED   Tobacco Use     Smoking status: Former     Current packs/day: 0.00     Average  packs/day: 0.5 packs/day for 11.0 years (5.5 ttl pk-yrs)     Types: Cigarettes     Start date: 1957     Quit date: 1968     Years since quittin.0     Smokeless tobacco: Never   Vaping Use     Vaping status: Never Used   Substance and Sexual Activity     Alcohol use: No     Drug use: No     Sexual activity: Yes     Partners: Female     Birth control/protection: None   Other Topics Concern      Service Not Asked     Blood Transfusions Not Asked     Caffeine Concern No     Occupational Exposure Not Asked     Hobby Hazards Not Asked     Sleep Concern Not Asked     Stress Concern Not Asked     Weight Concern Not Asked     Special Diet Not Asked     Back Care Not Asked     Exercise Not Asked     Bike Helmet Not Asked     Seat Belt Not Asked     Self-Exams Not Asked     Parent/sibling w/ CABG, MI or angioplasty before 65F 55M? No   Social History Narrative     Not on file     Social Drivers of Health     Financial Resource Strain: Low Risk  (2024)    Financial Resource Strain      Within the past 12 months, have you or your family members you live with been unable to get utilities (heat, electricity) when it was really needed?: No   Food Insecurity: Low Risk  (2024)    Food Insecurity      Within the past 12 months, did you worry that your food would run out before you got money to buy more?: No      Within the past 12 months, did the food you bought just not last and you didn t have money to get more?: No   Transportation Needs: Low Risk  (2024)    Transportation Needs      Within the past 12 months, has lack of transportation kept you from medical appointments, getting your medicines, non-medical meetings or appointments, work, or from getting things that you need?: No   Physical Activity: Sufficiently Active (2021)    Exercise Vital Sign      Days of Exercise per Week: 6 days      Minutes of Exercise per Session: 40 min   Stress: No Stress Concern Present (2024)    [place]  "[employer] [employer] - Occupational Stress Questionnaire      Feeling of Stress : Only a little   Social Connections: Unknown (4/29/2024)    Social Connection and Isolation Panel [NHANES]      Frequency of Communication with Friends and Family: Not on file      Frequency of Social Gatherings with Friends and Family: Three times a week      Attends [employer] Services: Not on file      Active Member of Clubs or Organizations: Not on file      Attends Club or Organization Meetings: Not on file      Marital Status: Not on file   Interpersonal Safety: Low Risk  (11/26/2024)    Interpersonal Safety      Do you feel physically and emotionally safe where you currently live?: Yes      Within the past 12 months, have you been hit, slapped, kicked or otherwise physically hurt by someone?: No      Within the past 12 months, have you been humiliated or emotionally abused in other ways by your partner or ex-partner?: No   Housing Stability: Low Risk  (4/29/2024)    Housing Stability      Do you have housing? : Yes      Are you worried about losing your housing?: No            Review of Systems:   Skin:        Eyes:       ENT:       Respiratory:  Negative    Cardiovascular:    Positive for, edema  Gastroenterology:      Genitourinary:       Musculoskeletal:       Neurologic:       Psychiatric:       Heme/Lymph/Imm:       Endocrine:              Physical Exam:   Vitals: /72   Pulse 76   Ht 1.727 m (5' 8\")   Wt 88.7 kg (195 lb 9.6 oz)   SpO2 98%   BMI 29.74 kg/m     Wt Readings from Last 4 Encounters:   01/02/25 88.7 kg (195 lb 9.6 oz)   12/13/24 88.8 kg (195 lb 11.2 oz)   11/26/24 91.6 kg (201 lb 14.4 oz)   11/04/24 92.3 kg (203 lb 8 oz)     GEN: well nourished, in no acute distress.  HEENT:  Pupils equal, round. Sclerae nonicteric.   NECK: Supple, no masses appreciated.  C/V:  Regular rate and rhythm, no murmur, rub or gallop.    RESP: Respirations are unlabored. Clear to auscultation bilaterally without " wheezing, rales, or rhonchi.  GI: Abdomen soft, nontender.  EXTREM: No LE edema.  NEURO: Alert and oriented, cooperative.  SKIN: Warm and dry.        Data:     LIPID RESULTS:  Lab Results   Component Value Date    CHOL 131 04/30/2024    CHOL 140 05/20/2021    HDL 51 04/30/2024    HDL 58 05/20/2021    LDL 62 04/30/2024    LDL 66 05/20/2021    TRIG 92 04/30/2024    TRIG 81 05/20/2021    CHOLHDLRATIO 2.5 02/17/2015     LIVER ENZYME RESULTS:  Lab Results   Component Value Date    AST 26 12/13/2024    AST 21 05/20/2021    ALT 28 12/13/2024    ALT 30 05/20/2021     CBC RESULTS:  Lab Results   Component Value Date    WBC 7.2 12/13/2024    WBC 11.0 08/24/2019    RBC 4.19 (L) 12/13/2024    RBC 3.40 (L) 08/24/2019    HGB 11.6 (L) 12/13/2024    HGB 10.1 (L) 08/24/2019    HCT 35.2 (L) 12/13/2024    HCT 28.8 (L) 08/24/2019    MCV 84 12/13/2024    MCV 85 08/24/2019    MCH 27.7 12/13/2024    MCH 29.7 08/24/2019    MCHC 33.0 12/13/2024    MCHC 35.1 08/24/2019    RDW 13.0 12/13/2024    RDW 13.0 08/24/2019     12/13/2024     08/24/2019     BMP RESULTS:  Lab Results   Component Value Date     12/13/2024     05/20/2021    POTASSIUM 4.6 12/13/2024    POTASSIUM 3.4 10/16/2022    POTASSIUM 4.6 05/20/2021    CHLORIDE 99 12/13/2024    CHLORIDE 103 10/16/2022    CHLORIDE 103 05/20/2021    CO2 26 12/13/2024    CO2 25 10/16/2022    CO2 30 05/20/2021    ANIONGAP 11 12/13/2024    ANIONGAP 7 10/16/2022    ANIONGAP 2 (L) 05/20/2021     (H) 12/13/2024     (H) 11/30/2024     (H) 10/16/2022    GLC 92 05/20/2021    BUN 9.8 12/13/2024    BUN 14 10/16/2022    BUN 15 05/20/2021    CR 0.76 12/13/2024    CR 0.88 05/20/2021    GFRESTIMATED 88 12/13/2024    GFRESTIMATED 80 05/20/2021    GFRESTBLACK >90 05/20/2021    RICARDO 9.4 12/13/2024    RICARDO 9.2 05/20/2021      A1C RESULTS:  Lab Results   Component Value Date    A1C 5.5 08/28/2012     INR RESULTS:  Lab Results   Component Value Date    INR 1.93 (H) 07/15/2010     INR 1.87 (H) 07/14/2010            Medications     Current Outpatient Medications   Medication Sig Dispense Refill     acetaminophen (TYLENOL) 325 MG tablet Take 2 tablets (650 mg) by mouth every 4 hours as needed for other (For optimal non-opioid multimodal pain management to improve pain control.).       apixaban ANTICOAGULANT (ELIQUIS) 5 MG tablet Take 1 tablet (5 mg) by mouth 2 times daily. 180 tablet 3     atorvastatin (LIPITOR) 40 MG tablet Take 1 tablet (40 mg) by mouth daily 90 tablet 3     benazepril (LOTENSIN) 20 MG tablet Take 1 tablet (20 mg) by mouth daily. 90 tablet 3     fexofenadine (ALLEGRA) 180 MG tablet Take 180 mg by mouth daily.       guaiFENesin (MUCINEX) 600 MG 12 hr tablet Take 1 tablet (600 mg) by mouth 2 times daily as needed for congestion or cough.       metoprolol succinate ER (TOPROL XL) 25 MG 24 hr tablet Take 3 tablets (75 mg) by mouth 2 times daily. 540 tablet 3     ondansetron (ZOFRAN) 4 MG tablet TAKE 1 TO 2 TABLETS BY MOUTH EVERY 8 HOURS AS NEEDED FOR NAUSEA OR VOMITING       senna-docusate (SENOKOT-S/PERICOLACE) 8.6-50 MG tablet Take 2 tablets by mouth 2 times daily as needed for constipation (when taking oxy). 60 tablet 0     tamsulosin (FLOMAX) 0.4 MG capsule Take 2 capsules (0.8 mg) by mouth daily. 180 capsule 3          Past Medical History     Past Medical History:   Diagnosis Date     Anxiety 05/26/2011     Arthritis      Basal cell carcinoma      Benign prostatic hyperplasia with urinary retention 11/05/2024     Benign Prostatic Hypertrophy      Carotid stenosis, asymptomatic, bilateral 11/02/2022     CEREBROVASC DISEASE, small vessel 12/2007     Chronic bilateral low back pain without sciatica 11/05/2024     Class 1 obesity with serious comorbidity and body mass index (BMI) of 31.0 to 31.9 in adult, unspecified obesity type 09/04/2024     Contracture of palmar fascia 10/16/2020     Diaphragmatic hernia 09/23/2002    Problem list name updated by automated process. Provider  to review       Essential hypertension 08/19/2002     Essential hypertension, benign      GERD      HIATAL HERNIA      Hx of total knee replacement, left 09/12/2024     Hx of transient ischemic attack (TIA) 08/01/2021     Hyperlipidemia LDL goal <70 09/04/2024     HYPERPLASTIC POLYPS COLON 5/93, 3/06     Hypertrophy of prostate without urinary obstruction      Impaired fasting glucose      Low Back Pain      Lumbar radiculopathy 09/04/2024     Obesity, unspecified      Other and unspecified hyperlipidemia      Palpitations 11/02/2022     Personal history of urinary calculi 1960's     Pneumonia, organism unspecified(486) 1992     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     Squamous cell carcinoma of skin, unspecified      Thyroid nodule 11/05/2024     TRANSIENT CEREBRAL ISCHEMIA 12/2007     Past Surgical History:   Procedure Laterality Date     ARTHROPLASTY KNEE Left 9/12/2024    Procedure: left total knee arthroplasty;  Surgeon: Mono Nichols MD;  Location:  OR     BIOPSY  2017     COLONOSCOPY       DISCECTOMY LUMBAR POSTERIOR MICROSCOPIC TWO LEVELS  08/28/2012    DISCECTOMY LUMBAR POSTERIOR MICROSCOPIC TWO LEVELS;  RIGHT L3-L4 REDO EXTENDED HEMILAMINECTOMY AND MICRO FORAMINOTOMY, LEFT L4-L5 EXTENDED HEMILAMINECTOMY AND MICRODISCECTOMY (PRONE) (SONYA MCCALLUM, C-ARM, METRIX II);  Surgeon: Bertram Mirza MD;  Location:  OR     ENT SURGERY  2014    Cancer spot on right ear     EXPLORE SPINE, REMOVE HARDWARE, COMBINED N/A 11/26/2024    Procedure: Removal of L3-5 bilateral set screws and rods;  Surgeon: Harrison Perez MD;  Location:  OR     EYE SURGERY  2018     OPTICAL TRACKING SYSTEM FUSION POSTERIOR SPINE LUMBAR N/A 11/26/2024    Procedure: Lumbar 2 to Lumbar 3 extension of previous Lumbar 3 to Lumbar 5 fusion with stealth navigation and Lumbar 2 to Lumbar 3 decompression;  Surgeon: Harrison Perez MD;  Location:  OR     OPTICAL TRACKING SYSTEM  FUSION SPINE POSTERIOR LUMBAR TWO LEVELS N/A 08/21/2019    Procedure: L3-5 LUMBAR TRANSFORAMINAL INTERBODY FUSION WITH STEALTH;  Surgeon: Harrison Perez MD;  Location: [facility] OR     renal calculii removal 40 years ago  01/01/1965     [facility] NONSPECIFIC PROCEDURE  01/01/1959    pilonidal cystectomy     [facility] NONSPECIFIC PROCEDURE  01/01/1966    kidney stone     [facility] NONSPECIFIC PROCEDURE  approx 1999    R knee arthroscopy     Dr. Guzman     [facility] NONSPECIFIC PROCEDURE  01/01/2005    L3-4 microdiskectomy   Dr. Brantley     [facility] TOTAL KNEE ARTHROPLASTY  07/01/2010    Minimally invasive R TKA   Dr. Nichols     Family History   Problem Relation Age of Onset     Family History Negative Brother         1 healthy brother     Diabetes Brother         d:age 66     Diabetes Mother      Cerebrovascular Disease Mother      C.A.D. Father         CABG 67     Heart Disease Father      Lipids Sister      Diabetes Sister      Hypertension Sister      Hypertension Sister      Lipids Sister      Hypertension Sister      Colon Cancer Sister      Thyroid Disease Daughter         thyroid surgery, on replacement            Allergies   Meclizine, Tramadol hcl, Cardura [doxazosin mesylate], Hydrochlorothiazide, and Naproxen        Sabrina Davila NP  [facility]       Thank you for allowing me to participate in the care of your patient.      Sincerely,     Sabrina Davila NP     [facility]  cc:   CHERI Armendariz CNP  [address], [id number]  [place] [address]

## 2025-01-02 NOTE — PATIENT INSTRUCTIONS
Today's Recommendations    Your heart monitor did not show any recurrence of atrial fibrillation and your heart rates were in a normal range  Watch closely for any signs of bleeding   The device we discussed is called [employer]   Continue all medications without changes.  Please follow up with Dr. Kumar in 6 months.    Please send a [employer] message or call [phone] to the RN team with questions or concerns.     Scheduling number [phone]  CHERI Fernando, CNP

## 2025-01-02 NOTE — PROGRESS NOTES
Cardiology Clinic Progress Note  Ricardo Neely MRN# [id number]   YOB: 1940 Age: 84 year old   Primary Cardiologist: Dr. Kumar  Reason for visit: Posthospital follow-up            Assessment and Plan:       Paroxysmal atrial fibrillation, [id number] 5 (hypertension, age++, TIA ++)  -Anticoagulated with Eliquis for thromboembolic prophylaxis  -Metoprolol succinate 75 mg twice daily for rate control, HR trends 90-100s  -12/2024 echocardiogram shows LVEF 60-65% with mild aortic stenosis. Left atrium is mildly dilated  -12/2024 normal TSH 0.73  -12/2024 Zio patch monitor with no recurrence of atrial fibrillation and normal heart rates    Hypertension, controlled  -Benazepril 20 mg daily resumed by PCP on 12/13/2024    Benign prostate hypertrophy, on tamsulosin    Lumbar degenerative spine disease s/p lumbar surgery on 11/28    History of TIA, previously on long term plavix, discontinued following start of Eliquis    6.  Hyperlipidemia, with LDL at goal  -4/2024 LDL 62    7. Mild Aortic stenosis  -12/2024 TTE with mean gradient 14.8mmHg-> previously 11mmHg in 2022      Plan:  Continue metoprolol XL 75 mg twice per day  Continue Eliquis 5 mg twice per day  Continue benazepril 20 mg daily  Continue atorvastatin 40 mg daily  Repeat FLP/ALT this spring with PCP  Repeat TTE in 2026 for reassessment of aortic stenosis    Follow up: With Dr. Kumar in 6 months        History of Presenting Illness:    Ricardo Neely is a very pleasant 84 year old male with a history of carotid artery stenosis, palpitations, moderate nonobstructive coronary artery disease, history of TIA.    Patient was initially evaluated of course by cardiology for palpitations, Dr. Kumar, in 2022.  At that time he was noted to have PACs and PVCs on a Holter monitor as well as a history of hypertension.    Patient was admitted in late November of last year for a neurosurgery on his lumbar spine.  Following surgery he had a sudden onset of rapid  heart rate noted on telemetry and was noted to be in atrial fibrillation with rapid ventricular response.  He was given IV metoprolol and diltiazem.  He was not symptomatic during the episode.  Transthoracic echo showed mild aortic stenosis with a mean gradient of 14.8 mmHg, normal LVEF 60 to 65%, normal right ventricular size and function, mild left atrial dilation.  He was started on Eliquis following approval from neurosurgery and rate control approach was taken with dose increases in metoprolol.    Following discharge he wore a 3-day Zio patch monitor which showed a 1.5% PVC burden and no recurrence of atrial fibrillation.  Average heart rate 78 bpm.    Patient is here today for a posthospital follow-up.  He presents with his son today.  He is looking forward to leaving for [place] for the winter next month.    Patient reports feeling good.  He has been walking to miles per day without any concerning symptoms.  He has not yet started physical therapy for his back yet.    Patient denies chest pain or chest tightness. Denies dizziness, lightheadedness or other presyncopal symptoms. Denies tachycardia or palpitations.  Denies shortness of breath, orthopnea, or PND.    Most recent labs from 2024 show sodium 136 potassium 4.6, BUN 9.8, creatinine 0.76, GFR 88.     Blood pressure 128/72 and HR 76 in clinic today.          Recent Hospitalizations   As above           Social History      Social History     Socioeconomic History    Marital status:      Spouse name: Not on file    Number of children: Not on file    Years of education: Not on file    Highest education level: Not on file   Occupational History     Employer: RETIRED   Tobacco Use    Smoking status: Former     Current packs/day: 0.00     Average packs/day: 0.5 packs/day for 11.0 years (5.5 ttl pk-yrs)     Types: Cigarettes     Start date: 1957     Quit date: 1968     Years since quittin.0    Smokeless tobacco: Never   Vaping Use     Vaping status: Never Used   Substance and Sexual Activity    Alcohol use: No    Drug use: No    Sexual activity: Yes     Partners: Female     Birth control/protection: None   Other Topics Concern     Service Not Asked    Blood Transfusions Not Asked    Caffeine Concern No    Occupational Exposure Not Asked    Hobby Hazards Not Asked    Sleep Concern Not Asked    Stress Concern Not Asked    Weight Concern Not Asked    Special Diet Not Asked    Back Care Not Asked    Exercise Not Asked    Bike Helmet Not Asked    Seat Belt Not Asked    Self-Exams Not Asked    Parent/sibling w/ CABG, MI or angioplasty before 65F 55M? No   Social History Narrative    Not on file     Social Drivers of Health     Financial Resource Strain: Low Risk  (4/29/2024)    Financial Resource Strain     Within the past 12 months, have you or your family members you live with been unable to get utilities (heat, electricity) when it was really needed?: No   Food Insecurity: Low Risk  (4/29/2024)    Food Insecurity     Within the past 12 months, did you worry that your food would run out before you got money to buy more?: No     Within the past 12 months, did the food you bought just not last and you didn t have money to get more?: No   Transportation Needs: Low Risk  (4/29/2024)    Transportation Needs     Within the past 12 months, has lack of transportation kept you from medical appointments, getting your medicines, non-medical meetings or appointments, work, or from getting things that you need?: No   Physical Activity: Sufficiently Active (11/8/2021)    Exercise Vital Sign     Days of Exercise per Week: 6 days     Minutes of Exercise per Session: 40 min   Stress: No Stress Concern Present (4/29/2024)    [place] [place] [employer] - Occupational Stress Questionnaire     Feeling of Stress : Only a little   Social Connections: Unknown (4/29/2024)    Social Connection and Isolation Panel [NHANES]     Frequency of Communication  "with Friends and Family: Not on file     Frequency of Social Gatherings with Friends and Family: Three times a week     Attends Zoroastrianism Services: Not on file     Active Member of Clubs or Organizations: Not on file     Attends Club or Organization Meetings: Not on file     Marital Status: Not on file   Interpersonal Safety: Low Risk  (11/26/2024)    Interpersonal Safety     Do you feel physically and emotionally safe where you currently live?: Yes     Within the past 12 months, have you been hit, slapped, kicked or otherwise physically hurt by someone?: No     Within the past 12 months, have you been humiliated or emotionally abused in other ways by your partner or ex-partner?: No   Housing Stability: Low Risk  (4/29/2024)    Housing Stability     Do you have housing? : Yes     Are you worried about losing your housing?: No            Review of Systems:   Skin:        Eyes:       ENT:       Respiratory:  Negative    Cardiovascular:    Positive for, edema  Gastroenterology:      Genitourinary:       Musculoskeletal:       Neurologic:       Psychiatric:       Heme/Lymph/Imm:       Endocrine:              Physical Exam:   Vitals: /72   Pulse 76   Ht 1.727 m (5' 8\")   Wt 88.7 kg (195 lb 9.6 oz)   SpO2 98%   BMI 29.74 kg/m     Wt Readings from Last 4 Encounters:   01/02/25 88.7 kg (195 lb 9.6 oz)   12/13/24 88.8 kg (195 lb 11.2 oz)   11/26/24 91.6 kg (201 lb 14.4 oz)   11/04/24 92.3 kg (203 lb 8 oz)     GEN: well nourished, in no acute distress.  HEENT:  Pupils equal, round. Sclerae nonicteric.   NECK: Supple, no masses appreciated.  C/V:  Regular rate and rhythm, no murmur, rub or gallop.    RESP: Respirations are unlabored. Clear to auscultation bilaterally without wheezing, rales, or rhonchi.  GI: Abdomen soft, nontender.  EXTREM: No LE edema.  NEURO: Alert and oriented, cooperative.  SKIN: Warm and dry.        Data:     LIPID RESULTS:  Lab Results   Component Value Date    CHOL 131 04/30/2024    CHOL 140 " 05/20/2021    HDL 51 04/30/2024    HDL 58 05/20/2021    LDL 62 04/30/2024    LDL 66 05/20/2021    TRIG 92 04/30/2024    TRIG 81 05/20/2021    CHOLHDLRATIO 2.5 02/17/2015     LIVER ENZYME RESULTS:  Lab Results   Component Value Date    AST 26 12/13/2024    AST 21 05/20/2021    ALT 28 12/13/2024    ALT 30 05/20/2021     CBC RESULTS:  Lab Results   Component Value Date    WBC 7.2 12/13/2024    WBC 11.0 08/24/2019    RBC 4.19 (L) 12/13/2024    RBC 3.40 (L) 08/24/2019    HGB 11.6 (L) 12/13/2024    HGB 10.1 (L) 08/24/2019    HCT 35.2 (L) 12/13/2024    HCT 28.8 (L) 08/24/2019    MCV 84 12/13/2024    MCV 85 08/24/2019    MCH 27.7 12/13/2024    MCH 29.7 08/24/2019    MCHC 33.0 12/13/2024    MCHC 35.1 08/24/2019    RDW 13.0 12/13/2024    RDW 13.0 08/24/2019     12/13/2024     08/24/2019     BMP RESULTS:  Lab Results   Component Value Date     12/13/2024     05/20/2021    POTASSIUM 4.6 12/13/2024    POTASSIUM 3.4 10/16/2022    POTASSIUM 4.6 05/20/2021    CHLORIDE 99 12/13/2024    CHLORIDE 103 10/16/2022    CHLORIDE 103 05/20/2021    CO2 26 12/13/2024    CO2 25 10/16/2022    CO2 30 05/20/2021    ANIONGAP 11 12/13/2024    ANIONGAP 7 10/16/2022    ANIONGAP 2 (L) 05/20/2021     (H) 12/13/2024     (H) 11/30/2024     (H) 10/16/2022    GLC 92 05/20/2021    BUN 9.8 12/13/2024    BUN 14 10/16/2022    BUN 15 05/20/2021    CR 0.76 12/13/2024    CR 0.88 05/20/2021    GFRESTIMATED 88 12/13/2024    GFRESTIMATED 80 05/20/2021    GFRESTBLACK >90 05/20/2021    RICARDO 9.4 12/13/2024    RICARDO 9.2 05/20/2021      A1C RESULTS:  Lab Results   Component Value Date    A1C 5.5 08/28/2012     INR RESULTS:  Lab Results   Component Value Date    INR 1.93 (H) 07/15/2010    INR 1.87 (H) 07/14/2010            Medications     Current Outpatient Medications   Medication Sig Dispense Refill    acetaminophen (TYLENOL) 325 MG tablet Take 2 tablets (650 mg) by mouth every 4 hours as needed for other (For optimal  non-opioid multimodal pain management to improve pain control.).      apixaban ANTICOAGULANT (ELIQUIS) 5 MG tablet Take 1 tablet (5 mg) by mouth 2 times daily. 180 tablet 3    atorvastatin (LIPITOR) 40 MG tablet Take 1 tablet (40 mg) by mouth daily 90 tablet 3    benazepril (LOTENSIN) 20 MG tablet Take 1 tablet (20 mg) by mouth daily. 90 tablet 3    fexofenadine (ALLEGRA) 180 MG tablet Take 180 mg by mouth daily.      guaiFENesin (MUCINEX) 600 MG 12 hr tablet Take 1 tablet (600 mg) by mouth 2 times daily as needed for congestion or cough.      metoprolol succinate ER (TOPROL XL) 25 MG 24 hr tablet Take 3 tablets (75 mg) by mouth 2 times daily. 540 tablet 3    ondansetron (ZOFRAN) 4 MG tablet TAKE 1 TO 2 TABLETS BY MOUTH EVERY 8 HOURS AS NEEDED FOR NAUSEA OR VOMITING      senna-docusate (SENOKOT-S/PERICOLACE) 8.6-50 MG tablet Take 2 tablets by mouth 2 times daily as needed for constipation (when taking oxy). 60 tablet 0    tamsulosin (FLOMAX) 0.4 MG capsule Take 2 capsules (0.8 mg) by mouth daily. 180 capsule 3          Past Medical History     Past Medical History:   Diagnosis Date    Anxiety 05/26/2011    Arthritis     Basal cell carcinoma     Benign prostatic hyperplasia with urinary retention 11/05/2024    Benign Prostatic Hypertrophy     Carotid stenosis, asymptomatic, bilateral 11/02/2022    CEREBROVASC DISEASE, small vessel 12/2007    Chronic bilateral low back pain without sciatica 11/05/2024    Class 1 obesity with serious comorbidity and body mass index (BMI) of 31.0 to 31.9 in adult, unspecified obesity type 09/04/2024    Contracture of palmar fascia 10/16/2020    Diaphragmatic hernia 09/23/2002    Problem list name updated by automated process. Provider to review      Essential hypertension 08/19/2002    Essential hypertension, benign     GERD     HIATAL HERNIA     Hx of total knee replacement, left 09/12/2024    Hx of transient ischemic attack (TIA) 08/01/2021    Hyperlipidemia LDL goal <70 09/04/2024     HYPERPLASTIC POLYPS COLON 5/93, 3/06    Hypertrophy of prostate without urinary obstruction     Impaired fasting glucose     Low Back Pain     Lumbar radiculopathy 09/04/2024    Obesity, unspecified     Other and unspecified hyperlipidemia     Palpitations 11/02/2022    Personal history of urinary calculi 1960's    Pneumonia, organism unspecified(486) 1992    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    Squamous cell carcinoma of skin, unspecified     Thyroid nodule 11/05/2024    TRANSIENT CEREBRAL ISCHEMIA 12/2007     Past Surgical History:   Procedure Laterality Date    ARTHROPLASTY KNEE Left 9/12/2024    Procedure: left total knee arthroplasty;  Surgeon: Mono Nichols MD;  Location:  OR    BIOPSY  2017    COLONOSCOPY      DISCECTOMY LUMBAR POSTERIOR MICROSCOPIC TWO LEVELS  08/28/2012    DISCECTOMY LUMBAR POSTERIOR MICROSCOPIC TWO LEVELS;  RIGHT L3-L4 REDO EXTENDED HEMILAMINECTOMY AND MICRO FORAMINOTOMY, LEFT L4-L5 EXTENDED HEMILAMINECTOMY AND MICRODISCECTOMY (PRONE) (SONYA MCCALLUM, C-ARM, METRIX II);  Surgeon: Bertram Mirza MD;  Location:  OR    ENT SURGERY  2014    Cancer spot on right ear    EXPLORE SPINE, REMOVE HARDWARE, COMBINED N/A 11/26/2024    Procedure: Removal of L3-5 bilateral set screws and rods;  Surgeon: Harrison Perez MD;  Location:  OR    EYE SURGERY  2018    OPTICAL TRACKING SYSTEM FUSION POSTERIOR SPINE LUMBAR N/A 11/26/2024    Procedure: Lumbar 2 to Lumbar 3 extension of previous Lumbar 3 to Lumbar 5 fusion with stealth navigation and Lumbar 2 to Lumbar 3 decompression;  Surgeon: Harrison Perez MD;  Location:  OR    OPTICAL TRACKING SYSTEM FUSION SPINE POSTERIOR LUMBAR TWO LEVELS N/A 08/21/2019    Procedure: L3-5 LUMBAR TRANSFORAMINAL INTERBODY FUSION WITH STEALTH;  Surgeon: Harrison Perez MD;  Location:  OR    renal calculii removal 40 years ago  01/01/1965    ZZ NONSPECIFIC PROCEDURE  01/01/1959    pilonidal  cystectomy    RUST NONSPECIFIC PROCEDURE  01/01/1966    kidney stone    RUST NONSPECIFIC PROCEDURE  approx 1999    R knee arthroscopy     Dr. Guzman    RUST NONSPECIFIC PROCEDURE  01/01/2005    L3-4 microdiskectomy   Dr. Brantley    RUST TOTAL KNEE ARTHROPLASTY  07/01/2010    Minimally invasive R TKA   Dr. Nichols     Family History   Problem Relation Age of Onset    Family History Negative Brother         1 healthy brother    Diabetes Brother         d:age 66    Diabetes Mother     Cerebrovascular Disease Mother     C.A.D. Father         CABG 67    Heart Disease Father     Lipids Sister     Diabetes Sister     Hypertension Sister     Hypertension Sister     Lipids Sister     Hypertension Sister     Colon Cancer Sister     Thyroid Disease Daughter         thyroid surgery, on replacement            Allergies   Meclizine, Tramadol hcl, Cardura [doxazosin mesylate], Hydrochlorothiazide, and Naproxen        Sabrina Davila NP  [facility]

## 2025-01-06 ENCOUNTER — OFFICE VISIT (OUTPATIENT)
Dept: NEUROSURGERY | Facility: CLINIC | Age: 85
End: 2025-01-06
Payer: COMMERCIAL

## 2025-01-06 ENCOUNTER — HOSPITAL ENCOUNTER (OUTPATIENT)
Dept: GENERAL RADIOLOGY | Facility: CLINIC | Age: 85
Discharge: HOME | End: 2025-01-06
Attending: NEUROLOGICAL SURGERY | Admitting: NEUROLOGICAL SURGERY
Payer: COMMERCIAL

## 2025-01-06 VITALS
SYSTOLIC BLOOD PRESSURE: 127 MMHG | BODY MASS INDEX: 29.65 KG/M2 | HEART RATE: 69 BPM | WEIGHT: 195 LBS | DIASTOLIC BLOOD PRESSURE: 73 MMHG | OXYGEN SATURATION: 96 %

## 2025-01-06 DIAGNOSIS — Z98.1 STATUS POST LUMBAR SPINAL FUSION: ICD-10-CM

## 2025-01-06 DIAGNOSIS — Z96.652 HX OF TOTAL KNEE REPLACEMENT, LEFT: ICD-10-CM

## 2025-01-06 DIAGNOSIS — Z98.1 S/P LUMBAR FUSION: Primary | ICD-10-CM

## 2025-01-06 PROCEDURE — 72100 X-RAY EXAM L-S SPINE 2/3 VWS: CPT

## 2025-01-06 PROCEDURE — 99024 POSTOP FOLLOW-UP VISIT: CPT

## 2025-01-06 ASSESSMENT — PAIN SCALES - GENERAL: PAINLEVEL_OUTOF10: NO PAIN (0)

## 2025-01-06 NOTE — PATIENT INSTRUCTIONS
- Physical therapy referral placed today.  Schedulers will contact next 1-2 business days to schedule appointment.    - Recommend alternating ice, massage, light stretching, topical pain gels (biofreeze, icy hot).    - Continue to limit repetitive bending and twisting. But okay to increase lifting to 30 pounds.     -Scheduled for 12-week postop appointment 3/3/2025 with Dr. Perez.     -Please contact neurosurgery clinic via [employer] or telephone [phone] for questions, concerns, new or worsening symptoms.

## 2025-01-06 NOTE — NURSING NOTE
"Ricardo Neely is a 84 year old male who presents for:  Chief Complaint   Patient presents with    Surgical Followup     6 week follow-up          Initial Vitals:  /73   Pulse 69   Wt 195 lb (88.5 kg)   SpO2 96%   BMI 29.65 kg/m   Estimated body mass index is 29.65 kg/m  as calculated from the following:    Height as of 1/2/25: 5' 8\" (1.727 m).    Weight as of this encounter: 195 lb (88.5 kg).. Body surface area is 2.06 meters squared. BP completed using cuff size: large  No Pain (0)        Amendo Phorn    "

## 2025-01-06 NOTE — LETTER
1/6/2025      Ricardo Neely  [address]  [facility] [address]      Dear Colleague,    Thank you for referring your patient, Ricardo Neely, to the [facility] NEUROLOGY [facility]. Please see a copy of my visit note below.    [facility] Neurosurgery  Neurosurgery Follow Up:    HPI: 6 weeks s/p L2-L3 extension of previous L3-L5 fusion with Stealth navigation and lumbar 2-L3 decompression, removal of L3-5 bilateral setscrews and rods with Dr. Perez 11/26/2024.  Per chart review, patient's preoperative symptoms consisted of back pain radiating to thighs and calves.  Preoperative MRI demonstrated fusion changes from L3-L5 with severe stenosis and degeneration at L2-L3.    Patient presents to clinic today with family.  Overall doing well and happy with surgical results.  Denies back pain, lower extremity radicular pain, paresthesias, weakness, recent fall/trauma, incisional concerns.  Not currently using any pain medication.  Is interested in starting postoperative physical therapy at this time.  Patient reports he is going to be in [place] for his 12-week follow-up, planning for telephone appointment.    Medical, surgical, family, and social history unchanged since prior exam.  Exam:  Constitutional:  Alert, well nourished, NAD.  HEENT: Normocephalic, atraumatic.   Pulm:  Without shortness of breath   CV:  No pitting edema of BLE.      Vital Signs:  /73   Pulse 69   Wt 88.5 kg (195 lb)   SpO2 96%   BMI 29.65 kg/m      Neurological:  Awake  Alert  Oriented x 3  Motor exam:        IP Q DF PF EHL  R   5  5   5   5    5  L   5  5   5   5    5     Able to spontaneously move L/E bilaterally  Sensation intact throughout all L/E dermatomes   Incision: healing well     Imaging:   Imaging Interpretation provided by radiologist.   Lumbar spine x-ray 1/6/2025:  Grossly stable surgical hardware.  Please see radiologist report for further details.    A/P:   6 weeks s/p L2-L3 extension  of previous L3-L5 fusion with Stealth navigation and lumbar 2-L3 decompression, removal of L3-5 bilateral setscrews and rods with Dr. Perez 11/26/2024.  Per chart review, patient's preoperative symptoms consisted of back pain radiating to thighs and calves.  Preoperative MRI demonstrated fusion changes from L3-L5 with severe stenosis and degeneration at L2-L3.    Patient presents to clinic today with family.  Overall doing well and happy with surgical results.  Denies back pain, lower extremity radicular pain, paresthesias, weakness, recent fall/trauma, incisional concerns.  Not currently using any pain medication.  Is interested in starting postoperative physical therapy at this time.  Patient reports he is going to be in [place] for his 12-week follow-up, planning for telephone appointment.    Plan:  -Continue to limit repetitive bending and twisting.  May gradually increase lifting to 30 pounds.  -PT referral placed  - Continue post-operative cares   - Scheduled for 12-week postop appointment 3/3/2025 with x-rays prior, needs telephone visit as will be in [place]     - Call the clinic at [phone] for increased pain or any other questions and concerns.    Patient verbalized understanding and is in agreement with the plan.       Maeve Hernández PA-C  [facility] Neurosurgery  [address] [place] [address]  [address] [place] [id number]      Again, thank you for allowing me to participate in the care of your patient.        Sincerely,        Maeve Hernández PA-C    Electronically signed

## 2025-01-06 NOTE — PROGRESS NOTES
[facility] Neurosurgery  Neurosurgery Follow Up:    HPI: 6 weeks s/p L2-L3 extension of previous L3-L5 fusion with Stealth navigation and lumbar 2-L3 decompression, removal of L3-5 bilateral setscrews and rods with Dr. Perez 11/26/2024.  Per chart review, patient's preoperative symptoms consisted of back pain radiating to thighs and calves.  Preoperative MRI demonstrated fusion changes from L3-L5 with severe stenosis and degeneration at L2-L3.    Patient presents to clinic today with family.  Overall doing well and happy with surgical results.  Denies back pain, lower extremity radicular pain, paresthesias, weakness, recent fall/trauma, incisional concerns.  Not currently using any pain medication.  Is interested in starting postoperative physical therapy at this time.  Patient reports he is going to be in [place] for his 12-week follow-up, planning for telephone appointment.    Medical, surgical, family, and social history unchanged since prior exam.  Exam:  Constitutional:  Alert, well nourished, NAD.  HEENT: Normocephalic, atraumatic.   Pulm:  Without shortness of breath   CV:  No pitting edema of BLE.      Vital Signs:  /73   Pulse 69   Wt 88.5 kg (195 lb)   SpO2 96%   BMI 29.65 kg/m      Neurological:  Awake  Alert  Oriented x 3  Motor exam:        IP Q DF PF EHL  R   5  5   5   5    5  L   5  5   5   5    5     Able to spontaneously move L/E bilaterally  Sensation intact throughout all L/E dermatomes   Incision: healing well     Imaging:   Imaging Interpretation provided by radiologist.   Lumbar spine x-ray 1/6/2025:  Grossly stable surgical hardware.  Please see radiologist report for further details.    A/P:   6 weeks s/p L2-L3 extension of previous L3-L5 fusion with Stealth navigation and lumbar 2-L3 decompression, removal of L3-5 bilateral setscrews and rods with Dr. Perez 11/26/2024.  Per chart review, patient's preoperative symptoms consisted of back pain radiating to thighs and calves.   Preoperative MRI demonstrated fusion changes from L3-L5 with severe stenosis and degeneration at L2-L3.    Patient presents to clinic today with family.  Overall doing well and happy with surgical results.  Denies back pain, lower extremity radicular pain, paresthesias, weakness, recent fall/trauma, incisional concerns.  Not currently using any pain medication.  Is interested in starting postoperative physical therapy at this time.  Patient reports he is going to be in [place] for his 12-week follow-up, planning for telephone appointment.    Plan:  -Continue to limit repetitive bending and twisting.  May gradually increase lifting to 30 pounds.  -PT referral placed  - Continue post-operative cares   - Scheduled for 12-week postop appointment 3/3/2025 with x-rays prior, needs telephone visit as will be in [place]     - Call the clinic at [phone] for increased pain or any other questions and concerns.    Patient verbalized understanding and is in agreement with the plan.       Maeve Hernández PA-C  [facility]surgery  [address] [place] [id number]

## 2025-01-10 PROBLEM — M54.50 ACUTE BILATERAL LOW BACK PAIN WITHOUT SCIATICA: Status: ACTIVE | Noted: 2024-11-05

## 2025-01-22 ENCOUNTER — THERAPY VISIT (OUTPATIENT)
Dept: PHYSICAL THERAPY | Facility: CLINIC | Age: 85
End: 2025-01-22
Payer: COMMERCIAL

## 2025-01-22 DIAGNOSIS — Z98.1 S/P LUMBAR FUSION: ICD-10-CM

## 2025-01-22 DIAGNOSIS — M54.50 ACUTE BILATERAL LOW BACK PAIN WITHOUT SCIATICA: Primary | ICD-10-CM

## 2025-01-22 PROCEDURE — 97110 THERAPEUTIC EXERCISES: CPT | Mod: GP | Performed by: PHYSICAL THERAPIST

## 2025-01-22 NOTE — PROGRESS NOTES
01/22/25 0500   Appointment Info   Signing clinician's name / credentials Pepe Hampton PT   Total/Authorized Visits 10   Visits Used 2   Medical Diagnosis s/p L2-L3 extension of previous L3-L5 fusion   PT Tx Diagnosis s/p Lumbar fusion, weakness and stiffness   Progress Note/Certification   Start of Care Date 01/10/25   Onset of illness/injury or Date of Surgery 11/26/24   Therapy Frequency 1 x week   Predicted Duration 8 week   Certification date from 01/10/25   Certification date to 03/21/25   Progress Note Completed Date 01/10/25   [facility] Authorization Information   Condition type Initial onset (within last 3 months)   Cause of current episode Post Surgical   Type of surgery 4-Spinal Fusion   Nature of treatment Rehabilitative   Documented POC (choose all that apply) Frequency of treatment visits and treatment activities to address deficit areas.;Measurable short and long term/discharge treatment goals related to physical and functional deficits.;Patient agrees to program participation including home program   How did the symptoms start surgery   General health reported by patient Good       Present No   GOALS   PT Goals 2   PT Goal 1   Goal Description Pt will be able to tolerate walking for 60 minutes   Rationale to maximize safety and independence with performance of ADLs and functional tasks;to maximize safety and independence within the home;to maximize safety and independence within the community   Goal Progress met   Target Date 03/21/25   Date Met 01/22/25   Subjective Report   Subjective Report Pt reports having no sx's in past week.  Patient reports good compliance with home exercise program.  He feels comfortable continuing independently with his home exercises.   Objective Measures   Objective Measures Objective Measure 1;Objective Measure 2   Treatment Interventions (PT)   Interventions Therapeutic Procedure/Exercise;Therapeutic Activity;Neuromuscular Re-education;Manual Therapy  "  Therapeutic Procedure/Exercise   Therapeutic Procedures: strength, endurance, ROM, flexibility minutes (87703) 30   Ther Proc 1 Education   Ther Proc 1 - Details educated regarding role of therapeutic exercise, POC, expected goal in therapy, nature of condition   PTRx Ther Proc 1 Shoulder Theraband Low Row/Pulldown   PTRx Ther Proc 1 - Details blue and green 15-30 reps   PTRx Ther Proc 2 Knee Bends   PTRx Ther Proc 2 - Details 10-30 reps 1 x day   PTRx Ther Proc 3 Bridging #1   PTRx Ther Proc 3 - Details 10-30 reps 1-2 x day   PTRx Ther Proc 4 Posterior Pelvic Tilt   PTRx Ther Proc 4 - Details 10-15 reps 1 -3 x day   Skilled Intervention VC's and MC's   Patient Response/Progress tolerated well   Ther Proc 2 nustep   Ther Proc 2 - Details 5 min, seat 7, load 4   Ther Proc 4 step ups   Ther Proc 4 - Details 4\" x 20 reps   Therapeutic Procedures Ther Proc 2;Ther Proc 3;Ther Proc 4   Neuromuscular Re-education   PTRx Neuro Re-ed 1 Balance Single Leg Stance Supported and Unsupported   PTRx Neuro Re-ed 1 - Details 2 x day  hold for 60 seconds each rep  progress to eyes closed with hand support  eventually to eyes closed without hand support   Neuromuscular re-ed of mvmt, balance, coord, kinesthetic sense, posture, proprioception minutes (10727) 3   Skilled Intervention vc's   Patient Response/Progress tolerated well   Education   Learner/Method Patient;Listening;Demonstration;Pictures/Video   Education Comments Pt educated on plan of care   Plan   Plan for next session progress HEP as tolerated   Total Session Time   Timed Code Treatment Minutes 33   Total Treatment Time (sum of timed and untimed services) 33         DISCHARGE  Reason for Discharge: Patient has met all goals.    Equipment Issued:     Discharge Plan: Patient to continue home program.    Referring Provider:  Maeve Hernández    "

## 2025-01-27 ENCOUNTER — HOSPITAL ENCOUNTER (OUTPATIENT)
Dept: ULTRASOUND IMAGING | Facility: CLINIC | Age: 85
Discharge: HOME | End: 2025-01-27
Attending: INTERNAL MEDICINE | Admitting: INTERNAL MEDICINE
Payer: COMMERCIAL

## 2025-01-27 DIAGNOSIS — E04.1 THYROID NODULE: ICD-10-CM

## 2025-01-27 PROCEDURE — 250N000009 HC RX 250: Performed by: RADIOLOGY

## 2025-01-27 PROCEDURE — 272N000431 US BIOPSY THYROID FINE NEEDLE ASPIRATION

## 2025-01-27 PROCEDURE — 88173 CYTOPATH EVAL FNA REPORT: CPT | Mod: TC | Performed by: INTERNAL MEDICINE

## 2025-01-27 PROCEDURE — 96372 THER/PROPH/DIAG INJ SC/IM: CPT | Performed by: RADIOLOGY

## 2025-01-27 RX ORDER — LIDOCAINE HYDROCHLORIDE 10 MG/ML
5 INJECTION, SOLUTION EPIDURAL; INFILTRATION; INTRACAUDAL; PERINEURAL ONCE
Status: COMPLETED | OUTPATIENT
Start: 2025-01-27 | End: 2025-01-27

## 2025-01-27 RX ADMIN — LIDOCAINE HYDROCHLORIDE 5 ML: 10 INJECTION, SOLUTION EPIDURAL; INFILTRATION; INTRACAUDAL; PERINEURAL at 14:06

## 2025-01-29 LAB
PATH REPORT.COMMENTS IMP SPEC: NORMAL
PATH REPORT.COMMENTS IMP SPEC: NORMAL
PATH REPORT.FINAL DX SPEC: NORMAL
PATH REPORT.GROSS SPEC: NORMAL
PATH REPORT.MICROSCOPIC SPEC OTHER STN: NORMAL
PATH REPORT.RELEVANT HX SPEC: NORMAL

## 2025-01-29 PROCEDURE — 88173 CYTOPATH EVAL FNA REPORT: CPT | Mod: 26 | Performed by: PATHOLOGY

## 2025-01-30 DIAGNOSIS — E04.1 THYROID NODULE: Primary | ICD-10-CM

## 2025-02-05 ENCOUNTER — TELEPHONE (OUTPATIENT)
Dept: NEUROSURGERY | Facility: CLINIC | Age: 85
End: 2025-02-05
Payer: COMMERCIAL

## 2025-02-05 NOTE — TELEPHONE ENCOUNTER
[facility] Call Center    Phone Message    May a detailed message be left on voicemail: yes     Reason for Call: Other: Patient would like his xray orders sent to [facility] please fax to [phone]. Phone number [phone]. Pleas add patient phone nuber to orders.     Action Taken: Message routed to:  Other: [facility]    Travel Screening: Not Applicable

## 2025-02-06 NOTE — TELEPHONE ENCOUNTER
Faxed  XR order  February 6, 2025 to fax number [phone]    Right Fax confirmed at 12:15  PM    Heidy Ferrell

## 2025-02-18 ENCOUNTER — TRANSFERRED RECORDS (OUTPATIENT)
Dept: HEALTH INFORMATION MANAGEMENT | Facility: CLINIC | Age: 85
End: 2025-02-18
Payer: COMMERCIAL

## 2025-02-20 ENCOUNTER — TELEPHONE (OUTPATIENT)
Dept: NEUROSURGERY | Facility: CLINIC | Age: 85
End: 2025-02-20
Payer: COMMERCIAL

## 2025-02-20 NOTE — TELEPHONE ENCOUNTER
[facility] F#[phone] P#[phone] ([place])   Action Taken Spoke with [facility], they prefer written fax request for disk. Fax request sent with the following information:    Patient completed lumbar XR on 2/18 @[facility] per orders of Dr. Perez. Please send disk with imaging to the following address:    ATTN: [facility]  [id number] [place] [address]    Please also fax imaging report to #[phone]. Please fax coversheet or call direct line with confirmation that disk has been mailed, thank you!   Status: 2/20, fax sent.

## 2025-02-20 NOTE — TELEPHONE ENCOUNTER
[facility] Call Center    Phone Message    May a detailed message be left on voicemail: yes     Reason for Call: Other: Patient had xrays done at [facility] on 2/18. He is calling to leet us know so we can retrieve the reports and images.      Action Taken: Message routed to:  Other: [facility]    Travel Screening: Not Applicable

## 2025-03-17 ENCOUNTER — VIRTUAL VISIT (OUTPATIENT)
Dept: NEUROSURGERY | Facility: CLINIC | Age: 85
End: 2025-03-17
Payer: COMMERCIAL

## 2025-03-17 DIAGNOSIS — Z98.1 S/P LUMBAR FUSION: Primary | ICD-10-CM

## 2025-03-17 PROCEDURE — 98012 SYNCH AUDIO-ONLY EST SF 10: CPT | Performed by: NEUROLOGICAL SURGERY

## 2025-03-17 PROCEDURE — 1126F AMNT PAIN NOTED NONE PRSNT: CPT | Performed by: NEUROLOGICAL SURGERY

## 2025-03-17 NOTE — PROGRESS NOTES
Telephone encounter:    Doing well 3 months postop.  He is currently in [place].  Excellent improvement in his activity level.  He does note a focal right thigh and calf throbbing pain when he walks more than half a mile.  He is exercising regularly and uses the stationary bike every day.  New x-rays, personally reviewed, with stable postop changes.       Past Medical History:   Diagnosis Date    Anxiety 05/26/2011    Arthritis     Basal cell carcinoma     Benign prostatic hyperplasia with urinary retention 11/05/2024    Benign Prostatic Hypertrophy     Carotid stenosis, asymptomatic, bilateral 11/02/2022    CEREBROVASC DISEASE, small vessel 12/2007    Chronic bilateral low back pain without sciatica 11/05/2024    Class 1 obesity with serious comorbidity and body mass index (BMI) of 31.0 to 31.9 in adult, unspecified obesity type 09/04/2024    Contracture of palmar fascia 10/16/2020    Diaphragmatic hernia 09/23/2002    Problem list name updated by automated process. Provider to review      Essential hypertension 08/19/2002    Essential hypertension, benign     GERD     HIATAL HERNIA     Hx of total knee replacement, left 09/12/2024    Hx of transient ischemic attack (TIA) 08/01/2021    Hyperlipidemia LDL goal <70 09/04/2024    HYPERPLASTIC POLYPS COLON 5/93, 3/06    Hypertrophy of prostate without urinary obstruction     Impaired fasting glucose     Low Back Pain     Lumbar radiculopathy 09/04/2024    Obesity, unspecified     Other and unspecified hyperlipidemia     Palpitations 11/02/2022    Personal history of urinary calculi 1960's    Pneumonia, organism unspecified(486) 1992    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    Squamous cell carcinoma of skin, unspecified     Thyroid nodule 11/05/2024    TRANSIENT CEREBRAL ISCHEMIA 12/2007     Past Surgical History:   Procedure Laterality Date    ARTHROPLASTY KNEE Left 9/12/2024    Procedure: left total knee arthroplasty;   Surgeon: Mono Nichols MD;  Location:  OR    BIOPSY  2017    COLONOSCOPY      DISCECTOMY LUMBAR POSTERIOR MICROSCOPIC TWO LEVELS  08/28/2012    DISCECTOMY LUMBAR POSTERIOR MICROSCOPIC TWO LEVELS;  RIGHT L3-L4 REDO EXTENDED HEMILAMINECTOMY AND MICRO FORAMINOTOMY, LEFT L4-L5 EXTENDED HEMILAMINECTOMY AND MICRODISCECTOMY (PRONE) (SONYA MCCALLUM, C-ARM, METRIX II);  Surgeon: Bertram Mirza MD;  Location:  OR    ENT SURGERY  2014    Cancer spot on right ear    EXPLORE SPINE, REMOVE HARDWARE, COMBINED N/A 11/26/2024    Procedure: Removal of L3-5 bilateral set screws and rods;  Surgeon: Harrison Perez MD;  Location:  OR    EYE SURGERY  2018    OPTICAL TRACKING SYSTEM FUSION POSTERIOR SPINE LUMBAR N/A 11/26/2024    Procedure: Lumbar 2 to Lumbar 3 extension of previous Lumbar 3 to Lumbar 5 fusion with stealth navigation and Lumbar 2 to Lumbar 3 decompression;  Surgeon: Harrison Perez MD;  Location:  OR    OPTICAL TRACKING SYSTEM FUSION SPINE POSTERIOR LUMBAR TWO LEVELS N/A 08/21/2019    Procedure: L3-5 LUMBAR TRANSFORAMINAL INTERBODY FUSION WITH STEALTH;  Surgeon: Harrison Perez MD;  Location:  OR    renal calculii removal 40 years ago  01/01/1965    [facility] NONSPECIFIC PROCEDURE  01/01/1959    pilonidal cystectomy    [facility] NONSPECIFIC PROCEDURE  01/01/1966    kidney stone    [facility] NONSPECIFIC PROCEDURE  approx 1999    R knee arthroscopy     Dr. Guzman    [facility] NONSPECIFIC PROCEDURE  01/01/2005    L3-4 microdiskectomy   Dr. Brantley    [facility] TOTAL KNEE ARTHROPLASTY  07/01/2010    Minimally invasive R TKA   Dr. Nichols     Social History     Socioeconomic History    Marital status:      Spouse name: Not on file    Number of children: Not on file    Years of education: Not on file    Highest education level: Not on file   Occupational History     Employer: RETIRED   Tobacco Use    Smoking status: Former     Current packs/day: 0.00     Average packs/day: 0.5 packs/day for 11.0 years (5.5 ttl pk-yrs)      Types: Cigarettes     Start date: 1957     Quit date: 1968     Years since quittin.2    Smokeless tobacco: Never   Vaping Use    Vaping status: Never Used   Substance and Sexual Activity    Alcohol use: No    Drug use: No    Sexual activity: Yes     Partners: Female     Birth control/protection: None   Other Topics Concern     Service Not Asked    Blood Transfusions Not Asked    Caffeine Concern No    Occupational Exposure Not Asked    Hobby Hazards Not Asked    Sleep Concern Not Asked    Stress Concern Not Asked    Weight Concern Not Asked    Special Diet Not Asked    Back Care Not Asked    Exercise Not Asked    Bike Helmet Not Asked    Seat Belt Not Asked    Self-Exams Not Asked    Parent/sibling w/ CABG, MI or angioplasty before 65F 55M? No   Social History Narrative    Not on file     Social Drivers of Health     Financial Resource Strain: Low Risk  (2024)    Financial Resource Strain     Within the past 12 months, have you or your family members you live with been unable to get utilities (heat, electricity) when it was really needed?: No   Food Insecurity: Low Risk  (2024)    Food Insecurity     Within the past 12 months, did you worry that your food would run out before you got money to buy more?: No     Within the past 12 months, did the food you bought just not last and you didn t have money to get more?: No   Transportation Needs: Low Risk  (2024)    Transportation Needs     Within the past 12 months, has lack of transportation kept you from medical appointments, getting your medicines, non-medical meetings or appointments, work, or from getting things that you need?: No   Physical Activity: Sufficiently Active (2021)    Exercise Vital Sign     Days of Exercise per Week: 6 days     Minutes of Exercise per Session: 40 min   Stress: No Stress Concern Present (2024)    [employer] [place] [employer] - Occupational Stress Questionnaire     Feeling of  Stress : Only a little   Social Connections: Unknown (4/29/2024)    Social Connection and Isolation Panel [NHANES]     Frequency of Communication with Friends and Family: Not on file     Frequency of Social Gatherings with Friends and Family: Three times a week     Attends [employer] Services: Not on file     Active Member of Clubs or Organizations: Not on file     Attends Club or Organization Meetings: Not on file     Marital Status: Not on file   Interpersonal Safety: Low Risk  (1/10/2025)    Interpersonal Safety     Do you feel physically and emotionally safe where you currently live?: Yes     Within the past 12 months, have you been hit, slapped, kicked or otherwise physically hurt by someone?: No     Within the past 12 months, have you been humiliated or emotionally abused in other ways by your partner or ex-partner?: No   Housing Stability: Low Risk  (4/29/2024)    Housing Stability     Do you have housing? : Yes     Are you worried about losing your housing?: No     Family History   Problem Relation Age of Onset    Family History Negative Brother         1 healthy brother    Diabetes Brother         d:age 66    Diabetes Mother     Cerebrovascular Disease Mother     C.A.D. Father         CABG 67    Heart Disease Father     Lipids Sister     Diabetes Sister     Hypertension Sister     Hypertension Sister     Lipids Sister     Hypertension Sister     Colon Cancer Sister     Thyroid Disease Daughter         thyroid surgery, on replacement        ROS: 10 point ROS neg other than the symptoms noted above in the HPI.    Physical Exam  There were no vitals taken for this visit.  Telephone encounter    A/P:  X-rays look good and he is recovering well  He does note right thigh and calf throbbing pain when he walks significant distances  We discussed that on his preoperative MRI he did have a right L5-S1 paracentral disc protrusion contacting the right S1 nerve  He is planning to return to [place] in several weeks  He  will notify us if he still having pain at that time, and we could order a right L5-S1 CHERI    10 minutes of telephone discussion   Provider in clinic and patient in [place]

## 2025-03-17 NOTE — LETTER
3/17/2025      Ricardo Neely  [address]  [place] [address]      Dear Colleague,    Thank you for referring your patient, Ricardo Neely, to the [facility]. Please see a copy of my visit note below.    Telephone encounter:    Doing well 3 months postop.  He is currently in [place].  Excellent improvement in his activity level.  He does note a focal right thigh and calf throbbing pain when he walks more than half a mile.  He is exercising regularly and uses the stationary bike every day.  New x-rays, personally reviewed, with stable postop changes.       Past Medical History:   Diagnosis Date     Anxiety 05/26/2011     Arthritis      Basal cell carcinoma      Benign prostatic hyperplasia with urinary retention 11/05/2024     Benign Prostatic Hypertrophy      Carotid stenosis, asymptomatic, bilateral 11/02/2022     CEREBROVASC DISEASE, small vessel 12/2007     Chronic bilateral low back pain without sciatica 11/05/2024     Class 1 obesity with serious comorbidity and body mass index (BMI) of 31.0 to 31.9 in adult, unspecified obesity type 09/04/2024     Contracture of palmar fascia 10/16/2020     Diaphragmatic hernia 09/23/2002    Problem list name updated by automated process. Provider to review       Essential hypertension 08/19/2002     Essential hypertension, benign      GERD      HIATAL HERNIA      Hx of total knee replacement, left 09/12/2024     Hx of transient ischemic attack (TIA) 08/01/2021     Hyperlipidemia LDL goal <70 09/04/2024     HYPERPLASTIC POLYPS COLON 5/93, 3/06     Hypertrophy of prostate without urinary obstruction      Impaired fasting glucose      Low Back Pain      Lumbar radiculopathy 09/04/2024     Obesity, unspecified      Other and unspecified hyperlipidemia      Palpitations 11/02/2022     Personal history of urinary calculi 1960's     Pneumonia, organism unspecified(486) 1992     RBBB (right bundle branch block with left anterior  fascicular block) 09/04/2024     Squamous cell carcinoma of skin, unspecified      Thyroid nodule 11/05/2024     TRANSIENT CEREBRAL ISCHEMIA 12/2007     Past Surgical History:   Procedure Laterality Date     ARTHROPLASTY KNEE Left 9/12/2024    Procedure: left total knee arthroplasty;  Surgeon: Mono Nichols MD;  Location:  OR     BIOPSY  2017     COLONOSCOPY       DISCECTOMY LUMBAR POSTERIOR MICROSCOPIC TWO LEVELS  08/28/2012    DISCECTOMY LUMBAR POSTERIOR MICROSCOPIC TWO LEVELS;  RIGHT L3-L4 REDO EXTENDED HEMILAMINECTOMY AND MICRO FORAMINOTOMY, LEFT L4-L5 EXTENDED HEMILAMINECTOMY AND MICRODISCECTOMY (PRONE) (SONYA MCCALLUM, C-ARM, METRIX II);  Surgeon: Bertram Mirza MD;  Location:  OR     ENT SURGERY  2014    Cancer spot on right ear     EXPLORE SPINE, REMOVE HARDWARE, COMBINED N/A 11/26/2024    Procedure: Removal of L3-5 bilateral set screws and rods;  Surgeon: Harrison Perez MD;  Location:  OR     EYE SURGERY  2018     OPTICAL TRACKING SYSTEM FUSION POSTERIOR SPINE LUMBAR N/A 11/26/2024    Procedure: Lumbar 2 to Lumbar 3 extension of previous Lumbar 3 to Lumbar 5 fusion with stealth navigation and Lumbar 2 to Lumbar 3 decompression;  Surgeon: Harrison Perez MD;  Location:  OR     OPTICAL TRACKING SYSTEM FUSION SPINE POSTERIOR LUMBAR TWO LEVELS N/A 08/21/2019    Procedure: L3-5 LUMBAR TRANSFORAMINAL INTERBODY FUSION WITH STEALTH;  Surgeon: Harrison Perez MD;  Location:  OR     renal calculii removal 40 years ago  01/01/1965     ZZ NONSPECIFIC PROCEDURE  01/01/1959    pilonidal cystectomy     Z NONSPECIFIC PROCEDURE  01/01/1966    kidney stone     [facility] NONSPECIFIC PROCEDURE  approx 1999    R knee arthroscopy     Dr. Guzman     [facility] NONSPECIFIC PROCEDURE  01/01/2005    L3-4 microdiskectomy   Dr. Brantley     [facility] TOTAL KNEE ARTHROPLASTY  07/01/2010    Minimally invasive R TKA   Dr. Nichols     Social History     Socioeconomic History     Marital status:      Spouse name: Not  on file     Number of children: Not on file     Years of education: Not on file     Highest education level: Not on file   Occupational History     Employer: RETIRED   Tobacco Use     Smoking status: Former     Current packs/day: 0.00     Average packs/day: 0.5 packs/day for 11.0 years (5.5 ttl pk-yrs)     Types: Cigarettes     Start date: 1957     Quit date: 1968     Years since quittin.2     Smokeless tobacco: Never   Vaping Use     Vaping status: Never Used   Substance and Sexual Activity     Alcohol use: No     Drug use: No     Sexual activity: Yes     Partners: Female     Birth control/protection: None   Other Topics Concern      Service Not Asked     Blood Transfusions Not Asked     Caffeine Concern No     Occupational Exposure Not Asked     Hobby Hazards Not Asked     Sleep Concern Not Asked     Stress Concern Not Asked     Weight Concern Not Asked     Special Diet Not Asked     Back Care Not Asked     Exercise Not Asked     Bike Helmet Not Asked     Seat Belt Not Asked     Self-Exams Not Asked     Parent/sibling w/ CABG, MI or angioplasty before 65F 55M? No   Social History Narrative     Not on file     Social Drivers of Health     Financial Resource Strain: Low Risk  (2024)    Financial Resource Strain      Within the past 12 months, have you or your family members you live with been unable to get utilities (heat, electricity) when it was really needed?: No   Food Insecurity: Low Risk  (2024)    Food Insecurity      Within the past 12 months, did you worry that your food would run out before you got money to buy more?: No      Within the past 12 months, did the food you bought just not last and you didn t have money to get more?: No   Transportation Needs: Low Risk  (2024)    Transportation Needs      Within the past 12 months, has lack of transportation kept you from medical appointments, getting your medicines, non-medical meetings or appointments, work, or from getting  things that you need?: No   Physical Activity: Sufficiently Active (11/8/2021)    Exercise Vital Sign      Days of Exercise per Week: 6 days      Minutes of Exercise per Session: 40 min   Stress: No Stress Concern Present (4/29/2024)    [employer] [place] [employer] - Occupational Stress Questionnaire      Feeling of Stress : Only a little   Social Connections: Unknown (4/29/2024)    Social Connection and Isolation Panel [NHANES]      Frequency of Communication with Friends and Family: Not on file      Frequency of Social Gatherings with Friends and Family: Three times a week      Attends [employer] Services: Not on file      Active Member of Clubs or Organizations: Not on file      Attends Club or Organization Meetings: Not on file      Marital Status: Not on file   Interpersonal Safety: Low Risk  (1/10/2025)    Interpersonal Safety      Do you feel physically and emotionally safe where you currently live?: Yes      Within the past 12 months, have you been hit, slapped, kicked or otherwise physically hurt by someone?: No      Within the past 12 months, have you been humiliated or emotionally abused in other ways by your partner or ex-partner?: No   Housing Stability: Low Risk  (4/29/2024)    Housing Stability      Do you have housing? : Yes      Are you worried about losing your housing?: No     Family History   Problem Relation Age of Onset     Family History Negative Brother         1 healthy brother     Diabetes Brother         d:age 66     Diabetes Mother      Cerebrovascular Disease Mother      C.A.D. Father         CABG 67     Heart Disease Father      Lipids Sister      Diabetes Sister      Hypertension Sister      Hypertension Sister      Lipids Sister      Hypertension Sister      Colon Cancer Sister      Thyroid Disease Daughter         thyroid surgery, on replacement        ROS: 10 point ROS neg other than the symptoms noted above in the HPI.    Physical Exam  There were no vitals taken for  this visit.  Telephone encounter    A/P:  X-rays look good and he is recovering well  He does note right thigh and calf throbbing pain when he walks significant distances  We discussed that on his preoperative MRI he did have a right L5-S1 paracentral disc protrusion contacting the right S1 nerve  He is planning to return to [place] in several weeks  He will notify us if he still having pain at that time, and we could order a right L5-S1 CHERI    10 minutes of telephone discussion   Provider in clinic and patient in [place]       Again, thank you for allowing me to participate in the care of your patient.        Sincerely,        Harrison Perez MD    Electronically signed

## 2025-03-17 NOTE — NURSING NOTE
Ricardo is a 84 year old who is being evaluated via a billable telephone visit.    What phone number would you like to be contacted at? [phone]  How would you like to obtain your AVS? [place]  Originating Location (pt. Location): Home  Distant Location (provider location):  On-site  Telephone visit completed due to the patient did not have access to video, while the distant provider did.

## 2025-03-19 ENCOUNTER — TELEPHONE (OUTPATIENT)
Dept: NEUROSURGERY | Facility: CLINIC | Age: 85
End: 2025-03-19
Payer: COMMERCIAL

## 2025-03-19 NOTE — TELEPHONE ENCOUNTER
OK for patient to do PT.   No need to follow up in clinic with Dr Perez once patient returns to [place]. But if patient is still having some pain, can order CHERI.     Called patient. LVM to call back.

## 2025-05-01 ENCOUNTER — LAB (OUTPATIENT)
Dept: LAB | Facility: CLINIC | Age: 85
End: 2025-05-01
Payer: COMMERCIAL

## 2025-05-01 DIAGNOSIS — Z00.00 MEDICARE ANNUAL WELLNESS VISIT, SUBSEQUENT: ICD-10-CM

## 2025-05-01 DIAGNOSIS — I10 ESSENTIAL HYPERTENSION: ICD-10-CM

## 2025-05-01 DIAGNOSIS — E78.5 HYPERLIPIDEMIA LDL GOAL <100: ICD-10-CM

## 2025-05-01 LAB
ALBUMIN SERPL BCG-MCNC: 4.1 G/DL (ref 3.5–5.2)
ALP SERPL-CCNC: 135 U/L (ref 40–150)
ALT SERPL W P-5'-P-CCNC: 18 U/L (ref 0–70)
ANION GAP SERPL CALCULATED.3IONS-SCNC: 11 MMOL/L (ref 7–15)
AST SERPL W P-5'-P-CCNC: 21 U/L (ref 0–45)
BILIRUB SERPL-MCNC: 0.6 MG/DL
BUN SERPL-MCNC: 13.1 MG/DL (ref 8–23)
CALCIUM SERPL-MCNC: 9.3 MG/DL (ref 8.8–10.4)
CHLORIDE SERPL-SCNC: 101 MMOL/L (ref 98–107)
CHOLEST SERPL-MCNC: 109 MG/DL
CREAT SERPL-MCNC: 0.77 MG/DL (ref 0.67–1.17)
EGFRCR SERPLBLD CKD-EPI 2021: 88 ML/MIN/1.73M2
FASTING STATUS PATIENT QL REPORTED: YES
FASTING STATUS PATIENT QL REPORTED: YES
GLUCOSE SERPL-MCNC: 104 MG/DL (ref 70–99)
HCO3 SERPL-SCNC: 24 MMOL/L (ref 22–29)
HDLC SERPL-MCNC: 44 MG/DL
LDLC SERPL CALC-MCNC: 52 MG/DL
NONHDLC SERPL-MCNC: 65 MG/DL
POTASSIUM SERPL-SCNC: 4.5 MMOL/L (ref 3.4–5.3)
PROT SERPL-MCNC: 7 G/DL (ref 6.4–8.3)
SODIUM SERPL-SCNC: 136 MMOL/L (ref 135–145)
TRIGL SERPL-MCNC: 67 MG/DL

## 2025-05-01 PROCEDURE — 80053 COMPREHEN METABOLIC PANEL: CPT

## 2025-05-01 PROCEDURE — 36415 COLL VENOUS BLD VENIPUNCTURE: CPT

## 2025-05-01 PROCEDURE — 80061 LIPID PANEL: CPT

## 2025-05-04 SDOH — HEALTH STABILITY: PHYSICAL HEALTH: ON AVERAGE, HOW MANY DAYS PER WEEK DO YOU ENGAGE IN MODERATE TO STRENUOUS EXERCISE (LIKE A BRISK WALK)?: 6 DAYS

## 2025-05-04 SDOH — HEALTH STABILITY: PHYSICAL HEALTH: ON AVERAGE, HOW MANY MINUTES DO YOU ENGAGE IN EXERCISE AT THIS LEVEL?: 40 MIN

## 2025-05-04 ASSESSMENT — SOCIAL DETERMINANTS OF HEALTH (SDOH): HOW OFTEN DO YOU GET TOGETHER WITH FRIENDS OR RELATIVES?: ONCE A WEEK

## 2025-05-09 ENCOUNTER — OFFICE VISIT (OUTPATIENT)
Dept: INTERNAL MEDICINE | Facility: CLINIC | Age: 85
End: 2025-05-09
Payer: COMMERCIAL

## 2025-05-09 VITALS
OXYGEN SATURATION: 97 % | SYSTOLIC BLOOD PRESSURE: 132 MMHG | WEIGHT: 200.9 LBS | TEMPERATURE: 98 F | HEIGHT: 68 IN | BODY MASS INDEX: 30.45 KG/M2 | RESPIRATION RATE: 16 BRPM | HEART RATE: 72 BPM | DIASTOLIC BLOOD PRESSURE: 60 MMHG

## 2025-05-09 DIAGNOSIS — Z00.00 MEDICARE ANNUAL WELLNESS VISIT, SUBSEQUENT: ICD-10-CM

## 2025-05-09 DIAGNOSIS — I35.0 NONRHEUMATIC AORTIC VALVE STENOSIS: ICD-10-CM

## 2025-05-09 DIAGNOSIS — I48.0 PAROXYSMAL ATRIAL FIBRILLATION (H): ICD-10-CM

## 2025-05-09 DIAGNOSIS — E04.1 THYROID NODULE: ICD-10-CM

## 2025-05-09 DIAGNOSIS — M19.90 ARTHRITIS: ICD-10-CM

## 2025-05-09 DIAGNOSIS — Z23 NEED FOR COVID-19 VACCINE: ICD-10-CM

## 2025-05-09 LAB
B BURGDOR IGG+IGM SER QL: 0.24
CRP SERPL-MCNC: 6.92 MG/L
ERYTHROCYTE [SEDIMENTATION RATE] IN BLOOD BY WESTERGREN METHOD: 13 MM/HR (ref 0–20)
RHEUMATOID FACT SERPL-ACNC: <10 IU/ML
URATE SERPL-MCNC: 6.3 MG/DL (ref 3.4–7)

## 2025-05-09 PROCEDURE — 86038 ANTINUCLEAR ANTIBODIES: CPT | Performed by: INTERNAL MEDICINE

## 2025-05-09 PROCEDURE — G0439 PPPS, SUBSEQ VISIT: HCPCS | Performed by: INTERNAL MEDICINE

## 2025-05-09 PROCEDURE — 86140 C-REACTIVE PROTEIN: CPT | Performed by: INTERNAL MEDICINE

## 2025-05-09 PROCEDURE — 91320 SARSCV2 VAC 30MCG TRS-SUC IM: CPT | Performed by: INTERNAL MEDICINE

## 2025-05-09 PROCEDURE — 86618 LYME DISEASE ANTIBODY: CPT | Performed by: INTERNAL MEDICINE

## 2025-05-09 PROCEDURE — 99214 OFFICE O/P EST MOD 30 MIN: CPT | Mod: 25 | Performed by: INTERNAL MEDICINE

## 2025-05-09 PROCEDURE — 86039 ANTINUCLEAR ANTIBODIES (ANA): CPT | Performed by: INTERNAL MEDICINE

## 2025-05-09 PROCEDURE — 86431 RHEUMATOID FACTOR QUANT: CPT | Performed by: INTERNAL MEDICINE

## 2025-05-09 PROCEDURE — 3078F DIAST BP <80 MM HG: CPT | Performed by: INTERNAL MEDICINE

## 2025-05-09 PROCEDURE — 3075F SYST BP GE 130 - 139MM HG: CPT | Performed by: INTERNAL MEDICINE

## 2025-05-09 PROCEDURE — 90480 ADMN SARSCOV2 VAC 1/ONLY CMP: CPT | Performed by: INTERNAL MEDICINE

## 2025-05-09 PROCEDURE — 85652 RBC SED RATE AUTOMATED: CPT | Performed by: INTERNAL MEDICINE

## 2025-05-09 PROCEDURE — 36415 COLL VENOUS BLD VENIPUNCTURE: CPT | Performed by: INTERNAL MEDICINE

## 2025-05-09 PROCEDURE — 84550 ASSAY OF BLOOD/URIC ACID: CPT | Performed by: INTERNAL MEDICINE

## 2025-05-09 NOTE — PROGRESS NOTES
Preventive Care Visit  [facility]  Familia Cook MD, Internal Medicine  May 9, 2025      ASSESSMENT:    ***    PLAN:  Stop Atorvastatin for 2 weeks to see if makes any difference  with arm pain. If better, then remain off and let me know. If not better, then restart it as usual  Continue other medications  Prescriptions refilled on file at your pharmacy to be filled in the future when needed  Labs today as ordered re: joint pain   Repeat ECHO heart in Fall 2026  Reduce calorie/carbohydrate (sugar, bread, potato, pasta, rice, alcohol etc)  intake in diet.  Increase color on your plate with vegetables. Continue  frequency of walking or other aerobic exercise as able    Vaccination given today: updated Pfizer covid booster vaccine  Thyroid ultrasound in October 2025. [facility] will call you to schedule. If you have not heard from their schedulers within 2 business days, then call [phone] (radiology scheduling)  I would recommend an updated covid vaccination and an influenza/flu vaccination in the Fall (mid/late October)  at the clinic or any pharmacy  Pt was informed regarding extra E&M billing for management of new or established medical issues not related to today's wellness/screening visit             Virginia Silva is a 84 year old, presenting for the following:  Wellness Visit          HPI     Advance Care Planning  {The storyboard will display whether the patient has ACP docs on file. Hover over the Code section in the storyboard to access the ACP documents. :[id number]}  Document on file is a Health Care Directive or POLST.        5/4/2025   General Health   How would you rate your overall physical health? Good   Feel stress (tense, anxious, or unable to sleep) Not at all         5/4/2025   Nutrition   Diet: Regular (no restrictions)         5/4/2025   Exercise   Days per week of moderate/strenous exercise 6 days   Average minutes spent exercising at this level 40 min          5/4/2025   Social Factors   Frequency of gathering with friends or relatives Once a week   Worry food won't last until get money to buy more No   Food not last or not have enough money for food? No   Do you have housing? (Housing is defined as stable permanent housing and does not include staying outside in a car, in a tent, in an abandoned building, in an overnight shelter, or couch-surfing.) Yes   Are you worried about losing your housing? No   Lack of transportation? No   Unable to get utilities (heat,electricity)? No         5/4/2025   Fall Risk   Fallen 2 or more times in the past year? No   Trouble with walking or balance? No          5/4/2025   Activities of Daily Living- Home Safety   Needs help with the following daily activites None of the above   Safety concerns in the home None of the above         5/4/2025   Dental   Dentist two times every year? (!) NO         5/4/2025   Hearing Screening   Hearing concerns? (!) I FEEL THAT PEOPLE ARE MUMBLING OR NOT SPEAKING CLEARLY.    (!) IT'S HARD TO FOLLOW A CONVERSATION IN A NOISY RESTAURANT OR CROWDED ROOM.    (!) TROUBLE UNDERSTANDING SOFT OR WHISPERED SPEECH.       Multiple values from one day are sorted in reverse-chronological order         5/4/2025   Driving Risk Screening   Patient/family members have concerns about driving No         5/4/2025   General Alertness/Fatigue Screening   Have you been more tired than usual lately? No         5/4/2025   Urinary Incontinence Screening   Bothered by leaking urine in past 6 months Yes         Today's PHQ-2 Score:       5/9/2025     1:06 PM   PHQ-2 ( 1999 Pfizer)   Q1: Little interest or pleasure in doing things 0   Q2: Feeling down, depressed or hopeless 0   PHQ-2 Score 0    Q1: Little interest or pleasure in doing things Not at all   Q2: Feeling down, depressed or hopeless Not at all   PHQ-2 Score 0       Patient-reported           5/4/2025   Substance Use   Alcohol more than 3/day or more than 7/wk Not Applicable    Do you have a current opioid prescription? No   How severe/bad is pain from 1 to 10? 6/10   Do you use any other substances recreationally? No     Social History     Tobacco Use    Smoking status: Former     Current packs/day: 0.00     Average packs/day: 0.5 packs/day for 11.0 years (5.5 ttl pk-yrs)     Types: Cigarettes     Start date: 1957     Quit date: 1968     Years since quittin.3    Smokeless tobacco: Never   Vaping Use    Vaping status: Never Used   Substance Use Topics    Alcohol use: No    Drug use: No      Pt's past medical history, family history, habits, medications and allergies were reviewed with the patient today.   Most recent lab results reviewed with pt. Problem list and histories reviewed & adjusted, as indicated.    Screening questionnaire responses above  regarding general health status, nutrition, exercise, social engagement, fall risk, ADL home safety, dental concerns, hearing concerns, driving concerns, alertness, incontinence concerns, mental health, substance concerns were reviewed with the patient today. Cognitive status  was also reviewed as below. Weight/BMI status reviewed.  Preventative Healthcare counseling provided to pt as applicable based on positive pt responses/concerns/results of above screening questionnaire.  See pt instructions for additional recommendations.  Otherwise reinforced continuation of good health care maintenance as above.  Also reviewed immunization guidelines,   eye screening for vision/glaucoma at last every other year, routine cancer screenings including annual skin cancer check with myself or dermatology, colon cancer screening for everyone until age 75 and then individualized to age 80, prostate cancer screening in men until age 75,    ***       Current providers sharing in care for this patient include:  Patient Care Team:  Familia Cook MD as PCP - General (Internal Medicine)  Familia Cook MD as Assigned PCP  Zach Kumar MD as MD  "(Cardiovascular Disease)  Cliff Cash DO as Assigned Musculoskeletal Provider  Harrison Perez MD as MD (Neurological Surgery)  Harrison Perez MD as Assigned Neuroscience Provider  Sabrina Davila NP as Assigned Heart and Vascular Provider    The following health maintenance items are reviewed in Epic and correct as of today:  Health Maintenance   Topic Date Due    ANNUAL REVIEW OF HM ORDERS  11/02/2023    COVID-19 Vaccine (10 - 2024-25 season) 04/04/2025    MEDICARE ANNUAL WELLNESS VISIT  05/06/2025    ALT  05/01/2026    BMP  05/01/2026    LIPID  05/01/2026    FALL RISK ASSESSMENT  05/09/2026    ADVANCE CARE PLANNING  05/06/2029    DTAP/TDAP/TD IMMUNIZATION (3 - Td or Tdap) 11/27/2033    PHQ-2 (once per calendar year)  Completed    INFLUENZA VACCINE  Completed    Pneumococcal Vaccine: 50+ Years  Completed    ZOSTER IMMUNIZATION  Completed    RSV VACCINE  Completed    HPV IMMUNIZATION  Aged Out    MENINGITIS IMMUNIZATION  Aged Out         Review of Systems  CONSTITUTIONAL: NEGATIVE for fever, chills.  Weight up 4 pounds  INTEGUMENTARY/SKIN: NEGATIVE for worrisome rashes, moles or lesions  EYES: NEGATIVE for  irritation. Has hx cataracts. Has follow-up 6/1/25 with [facility] to discuss possible future surgery. Left eye getting cloudy  ENT/MOUTH: NEGATIVE for ear, mouth and throat problems  RESP: NEGATIVE for significant cough or SOB  CV: NEGATIVE for chest pain, palpitations. Minimal ankle peripheral edema  GI: NEGATIVE for nausea, abdominal pain, heartburn, or change in bowel habits  : NEGATIVE for frequency, dysuria, or hematuria. Flow \"OK\" with Flomax  MUSCULOSKELETAL:  POSITIVE for some stiffness in hands/fingers. Also in shoulder, neck, elbows, knees, back. Sx for 2 mo and affecting playing guitar, etc. Was in [place] when started.  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   NEURO: NEGATIVE for weakness, dizziness. Had  RLE burning in [place] after having surgery last Fall. O with exercise bike unless real fast 60RPM and " "then will get flare or if prolonged walking but not walking as much recently. No sx at all today  ENDOCRINE: NEGATIVE for temperature intolerance   HEME: NEGATIVE for bleeding problems with Eliquis  PSYCHIATRIC: NEGATIVE for changes in mood or affect     Objective    Exam  /60   Pulse 72   Temp 98  F (36.7  C) (Temporal)   Resp 16   Ht 1.727 m (5' 8\")   Wt 91.1 kg (200 lb 14.4 oz)   SpO2 97%   BMI 30.55 kg/m     Estimated body mass index is 29.65 kg/m  as calculated from the following:    Height as of 1/2/25: 1.727 m (5' 8\").    Weight as of 1/6/25: 88.5 kg (195 lb).    Physical Exam  General appearance -  alert, no distress  Skin - No rashes or lesions.  Head - normocephalic, atraumatic  Eyes - JOSHUA, EOMI, fundi exam with nondilated pupils negative.  Ears - External ears normal. Canals clear. TM's normal.  Nose/Sinuses - Nares normal. Septum midline. Mucosa normal. No drainage or sinus tenderness.  Oropharynx - No erythema, no adenopathy, no exudates.  Neck - Supple without adenopathy or thyromegaly. No bruits.  Lungs - Clear to auscultation without wheezes/rhonchi.  Heart - Regular rate and rhythm without murmurs, clicks, or gallops.  Nodes - No supraclavicular, axillary, or inguinal adenopathy palpable.  Abdomen - Abdomen soft, non-tender. BS normal. No masses or hepatosplenomegaly palpable. No bruits.  Extremities -No cyanosis, clubbing or edema.    Musculoskeletal - Spine ROM normal. Muscular strength intact.   Peripheral pulses - radial=4/4, femoral=4/4, posterior tibial=4/4, dorsalis pedis=4/4,  Neuro - Gait normal. Reflexes normal and symmetric. Sensation grossly WNL.  Genital - Normal-appearing male external genitalia. No scrotal masses or inguinal hernia palpable.   Rectal - deferred           5/6/2024   Mini Cog   Clock Draw Score 2 Normal   3 Item Recall 3 objects recalled   Mini Cog Total Score 5        Signed Electronically by: Familia Cook MD     " "29.65 kg/m  as calculated from the following:    Height as of 1/2/25: 1.727 m (5' 8\").    Weight as of 1/6/25: 88.5 kg (195 lb).    Physical Exam  General appearance -  alert, no distress  Skin - No rashes or lesions.  Head - normocephalic, atraumatic  Eyes - JOSHUA, EOMI, fundi exam with nondilated pupils negative.  Ears - External ears normal. Canals clear. TM's normal.  Nose/Sinuses - Nares normal. Septum midline. Mucosa normal. No drainage or sinus tenderness  Oropharynx - No erythema, no adenopathy, no exudates.  Dentures present  Neck - Supple without adenopathy. No bruits.  Nontender thyroid nodule right lobe approx 3cm  Lungs - Clear to auscultation without wheezes/rhonchi.  Heart - Regular rate and rhythm without  clicks, or gallops. 1/6 SEM RUSB  Nodes - No supraclavicular, axillary, or inguinal adenopathy palpable.  Abdomen - Abdomen soft, non-tender. BS normal. No masses or hepatosplenomegaly palpable. No bruits.  Extremities -No cyanosis, clubbing or edema.    Musculoskeletal -  Muscular strength intact.  Minimal tenderness to palpation bilateral paralumbar musculature.  Negative straight leg raise test today.  DIP joints of hands with mild osteoarthritis thickening.  No increased warmth erythema  Peripheral pulses - radial=4/4, femoral=4/4, posterior tibial=4/4, dorsalis pedis=4/4,  Neuro - Gait slower but stable without assistance.  Sensation grossly WNL.  Genital - Normal-appearing male external genitalia. No scrotal masses or inguinal hernia palpable.   Rectal - deferred           5/6/2024   Mini Cog   Clock Draw Score 2 Normal   3 Item Recall 3 objects recalled   Mini Cog Total Score 5        Signed Electronically by: Familia Cook MD     "

## 2025-05-09 NOTE — PATIENT INSTRUCTIONS
Stop Atorvastatin for 2 weeks to see if makes any difference  with arm pain. If better, then remain off and let me know. If not better, then restart it as usual  Continue other medications  Prescriptions refilled on file at your pharmacy to be filled in the future when needed  Labs today as ordered re: joint pain   Repeat ECHO heart in Fall 2026  Reduce calorie/carbohydrate (sugar, bread, potato, pasta, rice, alcohol etc)  intake in diet.  Increase color on your plate with vegetables. Continue  frequency of walking or other aerobic exercise as able    Vaccination given today: updated Pfizer covid booster vaccine  Thyroid ultrasound in October 2025. [facility] will call you to schedule. If you have not heard from their schedulers within 2 business days, then call [phone] (radiology scheduling)  I would recommend an updated covid vaccination and an influenza/flu vaccination in the Fall (mid/late October)  at the clinic or any pharmacy  Pt was informed regarding extra E&M billing for management of new or established medical issues not related to today's wellness/screening visit

## 2025-05-10 ENCOUNTER — RESULTS FOLLOW-UP (OUTPATIENT)
Dept: INTERNAL MEDICINE | Facility: CLINIC | Age: 85
End: 2025-05-10

## 2025-05-12 LAB
ANA PAT SER IF-IMP: ABNORMAL
ANA SER QL IF: POSITIVE
ANA TITR SER IF: ABNORMAL {TITER}

## 2025-05-19 ENCOUNTER — PATIENT OUTREACH (OUTPATIENT)
Dept: CARE COORDINATION | Facility: CLINIC | Age: 85
End: 2025-05-19
Payer: COMMERCIAL

## 2025-06-08 PROBLEM — I48.0 PAROXYSMAL ATRIAL FIBRILLATION (H): Status: ACTIVE | Noted: 2025-06-08

## 2025-06-08 PROBLEM — M19.90 ARTHRITIS: Status: ACTIVE | Noted: 2025-06-08

## 2025-06-08 PROBLEM — I35.0 NONRHEUMATIC AORTIC VALVE STENOSIS: Status: ACTIVE | Noted: 2025-06-08

## 2025-06-11 ENCOUNTER — TELEPHONE (OUTPATIENT)
Dept: INTERNAL MEDICINE | Facility: CLINIC | Age: 85
End: 2025-06-11

## 2025-06-11 DIAGNOSIS — G45.0 VERTEBROBASILAR ARTERY SYNDROME: ICD-10-CM

## 2025-06-11 DIAGNOSIS — E78.5 HYPERLIPIDEMIA LDL GOAL <100: ICD-10-CM

## 2025-06-11 DIAGNOSIS — I65.23 CAROTID STENOSIS, ASYMPTOMATIC, BILATERAL: ICD-10-CM

## 2025-06-11 NOTE — TELEPHONE ENCOUNTER
"Dr. Cook,    Pt saw you on 5/9/25 and was given prednisone.  Pain did improve and prednisone stopped a week ago, but now pain is back.     Reports joint aches in all joints, and is asking for the rheumatology referral referenced in the note below.   Also would there be anything else pt should take in the meantime?   Pt's next scheduled visit here is a pre-op with Dr. Delgado on 22nd, advise if you do want that moved to you to check in on this as well.         Previous result note:      Usually takes months to get in to see a rheumatologist so suggest that for now, he try taking prednisone 10mg tab,4 tabs daily in AM for 3 days, then 3 tabs daily in AM for 3 days, then 2 tabs daily in AM for 3 days, the 1 tab daily in AM for 3 days to see if able to quiet down joint pain/soreness and see if it stays away. If better and doesn't come back, then probable viral issue that  had to be cleared and then resolved. If pain comes back off of prednisone, then he should let me know and will then refer to Rheumatologist. Prescription has been sent to [facility].\"     "

## 2025-06-12 RX ORDER — ATORVASTATIN CALCIUM 40 MG/1
40 TABLET, FILM COATED ORAL DAILY
Qty: 90 TABLET | Refills: 2 | Status: SHIPPED | OUTPATIENT
Start: 2025-06-12

## 2025-06-18 NOTE — TELEPHONE ENCOUNTER
Patient expressing frustration that his arthritis pain is terrible and he is needing a response to 6/11 request for Rheumatology Referral.     Patient reports previous request for Rheumatology was denied and he was prescribed taper prednisone instead with instructions to update PCP if pain returns or doesn't resolve.     Patient felt better with prednisone, however pain returned once he had tapered down to 1 tablet/day. He is unable to close hands into a fist due to the arthritis pain.

## 2025-06-23 ENCOUNTER — OFFICE VISIT (OUTPATIENT)
Dept: URGENT CARE | Facility: URGENT CARE | Age: 85
End: 2025-06-23
Payer: COMMERCIAL

## 2025-06-23 VITALS
WEIGHT: 203.1 LBS | BODY MASS INDEX: 30.78 KG/M2 | SYSTOLIC BLOOD PRESSURE: 136 MMHG | RESPIRATION RATE: 17 BRPM | TEMPERATURE: 97.8 F | OXYGEN SATURATION: 97 % | HEART RATE: 75 BPM | HEIGHT: 68 IN | DIASTOLIC BLOOD PRESSURE: 74 MMHG

## 2025-06-23 DIAGNOSIS — R76.8 ANTINUCLEAR ANTIBODY (ANA) POSITIVE: ICD-10-CM

## 2025-06-23 DIAGNOSIS — M25.50 MULTIPLE JOINT PAIN: Primary | ICD-10-CM

## 2025-06-23 PROCEDURE — 3078F DIAST BP <80 MM HG: CPT | Performed by: FAMILY MEDICINE

## 2025-06-23 PROCEDURE — 99213 OFFICE O/P EST LOW 20 MIN: CPT | Performed by: FAMILY MEDICINE

## 2025-06-23 PROCEDURE — 3075F SYST BP GE 130 - 139MM HG: CPT | Performed by: FAMILY MEDICINE

## 2025-06-23 NOTE — PROGRESS NOTES
Urgent Care Clinic Visit    Chief Complaint   Patient presents with    Hand Problem     Bilateral swelling in the hands and fingers for the last two months. Also complain of backache, leg pain and generalized joints pain.               6/23/2025    10:07 AM   Additional Questions   Roomed by Jose Ramon Cosme MA   Accompanied by Self

## 2025-06-23 NOTE — PROGRESS NOTES
"SUBJECTIVE: Ricardo Neely is a 85 year old male presenting with a chief complaint of multiple joint pain. Would like to see a Rheumatologist.  Onset of symptoms was week(s) ago.  Predisposing factors include positive Davon and elevated crp.    Past Medical History:   Diagnosis Date    Anxiety 05/26/2011    Arthritis     Basal cell carcinoma     Benign prostatic hyperplasia with urinary retention 11/05/2024    Benign Prostatic Hypertrophy     Carotid stenosis, asymptomatic, bilateral 11/02/2022    CEREBROVASC DISEASE, small vessel 12/2007    Chronic bilateral low back pain without sciatica 11/05/2024    Class 1 obesity with serious comorbidity and body mass index (BMI) of 31.0 to 31.9 in adult, unspecified obesity type 09/04/2024    Contracture of palmar fascia 10/16/2020    Diaphragmatic hernia 09/23/2002    Problem list name updated by automated process. Provider to review      Essential hypertension 08/19/2002    Essential hypertension, benign     GERD     HIATAL HERNIA     Hx of total knee replacement, left 09/12/2024    Hx of transient ischemic attack (TIA) 08/01/2021    Hyperlipidemia LDL goal <70 09/04/2024    HYPERPLASTIC POLYPS COLON 5/93, 3/06    Hypertrophy of prostate without urinary obstruction     Impaired fasting glucose     Low Back Pain     Lumbar radiculopathy 09/04/2024    Obesity, unspecified     Other and unspecified hyperlipidemia     Palpitations 11/02/2022    Paroxysmal atrial fibrillation (H) 06/08/2025    Personal history of urinary calculi 1960's    Pneumonia, organism unspecified(486) 1992    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 Nausea also    Cardura [Doxazosin Mesylate] Other (See Comments)     fainted    Hydrochlorothiazide Other (See Comments)      lightheadedness    Naproxen Nausea     nausea     Social History     Tobacco Use    Smoking status: Former     Current packs/day: 0.00     Average packs/day: 0.5 packs/day for 11.0 years (5.5 ttl pk-yrs)     Types: Cigarettes     Start date: 1957     Quit date: 1968     Years since quittin.5    Smokeless tobacco: Never   Substance Use Topics    Alcohol use: No       ROS:  SKIN: no rash  GI: no vomiting    OBJECTIVE:  /74 (BP Location: Right arm, Patient Position: Sitting, Cuff Size: Adult Regular)   Pulse 75   Temp 97.8  F (36.6  C) (Oral)   Resp 17   Ht 1.727 m (5' 8\")   Wt 92.1 kg (203 lb 1.6 oz)   SpO2 97%   BMI 30.88 kg/m  GENERAL APPEARANCE: healthy, alert and no distress  EYES: EOMI,  PERRL, conjunctiva clear  HENT: ear canals and TM's normal.  Nose and mouth without ulcers, erythema or lesions  RESP: lungs clear to auscultation - no rales, rhonchi or wheezes  CV: regular rates and rhythm, normal S1 S2, no murmur noted  NEURO: Normal strength and tone, sensory exam grossly normal,  normal speech and mentation  SKIN: no suspicious lesions or rashes      ICD-10-CM    1. Multiple joint pain  M25.50 Adult Rheumatology  Referral      2. Antinuclear antibody (CATHY) positive  R76.8 Adult Rheumatology  Referral          Fluids/Rest, f/u if worse/not any better    "

## 2025-06-26 ENCOUNTER — OFFICE VISIT (OUTPATIENT)
Dept: INTERNAL MEDICINE | Facility: CLINIC | Age: 85
End: 2025-06-26
Payer: COMMERCIAL

## 2025-06-26 VITALS
TEMPERATURE: 97.9 F | OXYGEN SATURATION: 100 % | BODY MASS INDEX: 30.55 KG/M2 | WEIGHT: 201.6 LBS | HEART RATE: 74 BPM | SYSTOLIC BLOOD PRESSURE: 138 MMHG | HEIGHT: 68 IN | DIASTOLIC BLOOD PRESSURE: 60 MMHG

## 2025-06-26 DIAGNOSIS — I10 ESSENTIAL HYPERTENSION: ICD-10-CM

## 2025-06-26 DIAGNOSIS — E78.5 HYPERLIPIDEMIA LDL GOAL <100: ICD-10-CM

## 2025-06-26 DIAGNOSIS — R33.8 BENIGN PROSTATIC HYPERPLASIA WITH URINARY RETENTION: ICD-10-CM

## 2025-06-26 DIAGNOSIS — M19.90 ARTHRITIS: ICD-10-CM

## 2025-06-26 DIAGNOSIS — I48.0 PAROXYSMAL ATRIAL FIBRILLATION (H): ICD-10-CM

## 2025-06-26 DIAGNOSIS — Z01.818 PRE-OP EXAM: Primary | ICD-10-CM

## 2025-06-26 DIAGNOSIS — N40.1 BENIGN PROSTATIC HYPERPLASIA WITH URINARY RETENTION: ICD-10-CM

## 2025-06-26 DIAGNOSIS — I65.23 CAROTID STENOSIS, ASYMPTOMATIC, BILATERAL: ICD-10-CM

## 2025-06-26 DIAGNOSIS — H25.9 AGE-RELATED CATARACT OF BOTH EYES, UNSPECIFIED AGE-RELATED CATARACT TYPE: ICD-10-CM

## 2025-06-26 DIAGNOSIS — G45.0 VERTEBROBASILAR ARTERY SYNDROME: ICD-10-CM

## 2025-06-26 ASSESSMENT — PAIN SCALES - GENERAL: PAINLEVEL_OUTOF10: MODERATE PAIN (6)

## 2025-06-26 NOTE — PROGRESS NOTES
Preoperative Evaluation  [address] [address]  Phone: [phone]  Primary Provider: Familia Cook MD    Pre-op Performing Provider: Rod Delgado MD    Jun 26, 2025 6/23/2025   Surgical Information   What procedure is being done? Cataract Surgery   Facility or Hospital where procedure/surgery will be performed: [facility], [place], [place]   Who is doing the procedure / surgery? [facility]   Date of surgery / procedure: 7/11 and 7/25/25   Time of surgery / procedure: Not known yet   Where do you plan to recover after surgery? at home with family     Fax number for surgical facility:   [facility]  [address]  [address]: [address]  [facility]: [address]  Clinic: [address]  [place] [address]    Phone: [phone]  Fax: [phone]      Assessment & Plan     The proposed surgical procedure is considered LOW risk.    1. Pre-op exam    2. Age-related cataract of both eyes, unspecified age-related cataract type    3. Paroxysmal atrial fibrillation (H)    4. Benign prostatic hyperplasia with urinary retention    5. Hyperlipidemia LDL goal <100    6. Carotid stenosis, asymptomatic, bilateral    7. Vertebrobasilar artery syndrome    8. Essential hypertension    9. Arthritis                Risks and Recommendations  The patient has the following additional risks and recommendations for perioperative complications:  Cardiovascular:  --cardiac status is stable.     Antiplatelet or Anticoagulation Medication Instructions   - Bleeding risk is low for this procedure (e.g. dental, skin, cataract).   - apixaban (Eliquis), edoxaban (Savaysa), rivaroxaban (Xarelto): Bleeding risk is low for this procedure, may continue Eliquis      Additional Medication Instructions  Take all scheduled medications on the day of surgery    Recommendation  Approval given to proceed with proposed procedure, without  further diagnostic evaluation.        Virginia Silva is a 85 year old, presenting for the following:  Pre-Op Exam        6/26/2025     8:11 AM   Additional Questions   Roomed by Gloria PEDRO CMA     HPI: Bilateral senile cataracts          6/23/2025   Pre-Op Questionnaire   Have you ever had a heart attack or stroke? No   Have you ever had surgery on your heart or blood vessels, such as a stent placement, a coronary artery bypass, or surgery on an artery in your head, neck, heart, or legs? No   Do you have chest pain with activity? No   Do you have a history of heart failure? No   Do you currently have a cold, bronchitis or symptoms of other infection? No   Do you have a cough, shortness of breath, or wheezing? No   Do you or anyone in your family have previous history of blood clots? No   Do you or does anyone in your family have a serious bleeding problem such as prolonged bleeding following surgeries or cuts? No   Have you ever had problems with anemia or been told to take iron pills? No   Have you had any abnormal blood loss such as black, tarry or bloody stools? No   Have you ever had a blood transfusion? No   Are you willing to have a blood transfusion if it is medically needed before, during, or after your surgery? Yes   Have you or any of your relatives ever had problems with anesthesia? No   Do you have sleep apnea, excessive snoring or daytime drowsiness? No   Do you have any artifical heart valves or other implanted medical devices like a pacemaker, defibrillator, or continuous glucose monitor? No   Do you have artificial joints? (!) YES   Are you allergic to latex? No     Advance Care Planning    Discussed advance care planning with patient; however, patient declined at this time.    Preoperative Review of    reviewed - no record of controlled substances prescribed.        *  No recent infectious illnesses.    *  No recent cardiac or pulmonary issues or symptoms.    *  No problems performing  vigorous physical activity, no changes in exercise tolerance.    *  No personal or family history of anesthesia complications.    *  No personal or family history of bleeding or clotting disorders.         atrial fibrillation: history of paroxysmal atrial fibrillation, on Eliquis.     history of TIA 2007:  had been on plavix for years, but discontinued when started on Eliquis.      HTN:  This condition is currently controlled on the current medical regimen.  Continue current therapy.         Patient Active Problem List    Diagnosis Date Noted    Nonrheumatic aortic valve stenosis 06/08/2025     Priority: Medium    Paroxysmal atrial fibrillation (H) 06/08/2025     Priority: Medium    Arthritis 06/08/2025     Priority: Medium    S/P lumbar fusion 11/26/2024     Priority: Medium    Thyroid nodule 11/05/2024     Priority: Medium    Benign prostatic hyperplasia with urinary retention 11/05/2024     Priority: Medium    Acute bilateral low back pain without sciatica 11/05/2024     Priority: Medium    Hx of total knee replacement, left 09/12/2024     Priority: Medium    Hyperlipidemia LDL goal <70 09/04/2024     Priority: Medium    Lumbar radiculopathy 09/04/2024     Priority: Medium    Class 1 obesity with serious comorbidity and body mass index (BMI) of 31.0 to 31.9 in adult, unspecified obesity type 09/04/2024     Priority: Medium    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     Priority: Medium    Carotid stenosis, asymptomatic, bilateral 11/02/2022     Priority: Medium    Palpitations 11/02/2022     Priority: Medium    Hx of transient ischemic attack (TIA) 08/01/2021     Priority: Medium    Contracture of palmar fascia 10/16/2020     Priority: Medium    Anxiety 05/26/2011     Priority: Medium    Hypertrophy of prostate without urinary obstruction      Priority: Medium    Diaphragmatic hernia 09/23/2002     Priority: Medium     Problem list name updated by automated process.  Provider to review      HISTORY OF URINARY CALCULI 08/19/2002     Priority: Medium    Essential hypertension 08/19/2002     Priority: Medium    GERD 08/19/2002     Priority: Medium      Past Medical History:   Diagnosis Date    Anxiety 05/26/2011    Arthritis     Basal cell carcinoma     Benign prostatic hyperplasia with urinary retention 11/05/2024    Benign Prostatic Hypertrophy     Carotid stenosis, asymptomatic, bilateral 11/02/2022    CEREBROVASC DISEASE, small vessel 12/2007    Chronic bilateral low back pain without sciatica 11/05/2024    Class 1 obesity with serious comorbidity and body mass index (BMI) of 31.0 to 31.9 in adult, unspecified obesity type 09/04/2024    Contracture of palmar fascia 10/16/2020    Diaphragmatic hernia 09/23/2002    Problem list name updated by automated process. Provider to review      Essential hypertension 08/19/2002    Essential hypertension, benign     GERD     HIATAL HERNIA     Hx of total knee replacement, left 09/12/2024    Hx of transient ischemic attack (TIA) 08/01/2021    Hyperlipidemia LDL goal <70 09/04/2024    HYPERPLASTIC POLYPS COLON 5/93, 3/06    Hypertrophy of prostate without urinary obstruction     Impaired fasting glucose     Low Back Pain     Lumbar radiculopathy 09/04/2024    Obesity, unspecified     Other and unspecified hyperlipidemia     Palpitations 11/02/2022    Paroxysmal atrial fibrillation (H) 06/08/2025    Personal history of urinary calculi 1960's    Pneumonia, organism unspecified(486) 1992    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    Squamous cell carcinoma of skin, unspecified     Thyroid nodule 11/05/2024    TRANSIENT CEREBRAL ISCHEMIA 12/2007     Past Surgical History:   Procedure Laterality Date    ARTHROPLASTY KNEE Left 9/12/2024    Procedure: left total knee arthroplasty;  Surgeon: Mono Nichols MD;  Location: [facility] OR    BIOPSY  2017    COLONOSCOPY      DISCECTOMY LUMBAR POSTERIOR MICROSCOPIC TWO  LEVELS  08/28/2012    DISCECTOMY LUMBAR POSTERIOR MICROSCOPIC TWO LEVELS;  RIGHT L3-L4 REDO EXTENDED HEMILAMINECTOMY AND MICRO FORAMINOTOMY, LEFT L4-L5 EXTENDED HEMILAMINECTOMY AND MICRODISCECTOMY (PRONE) (SONYA MCCALLUM, C-ARM, METRIX II);  Surgeon: Bertram Mirza MD;  Location:  OR    ENT SURGERY  2014    Cancer spot on right ear    EXPLORE SPINE, REMOVE HARDWARE, COMBINED N/A 11/26/2024    Procedure: Removal of L3-5 bilateral set screws and rods;  Surgeon: Harrison Perez MD;  Location:  OR    EYE SURGERY  2018    OPTICAL TRACKING SYSTEM FUSION POSTERIOR SPINE LUMBAR N/A 11/26/2024    Procedure: Lumbar 2 to Lumbar 3 extension of previous Lumbar 3 to Lumbar 5 fusion with stealth navigation and Lumbar 2 to Lumbar 3 decompression;  Surgeon: Harrison Perez MD;  Location:  OR    OPTICAL TRACKING SYSTEM FUSION SPINE POSTERIOR LUMBAR TWO LEVELS N/A 08/21/2019    Procedure: L3-5 LUMBAR TRANSFORAMINAL INTERBODY FUSION WITH STEALTH;  Surgeon: Harrison Perez MD;  Location:  OR    renal calculii removal 40 years ago  01/01/1965    [facility] NONSPECIFIC PROCEDURE  01/01/1959    pilonidal cystectomy    [facility] NONSPECIFIC PROCEDURE  01/01/1966    kidney stone    [facility] NONSPECIFIC PROCEDURE  approx 1999    R knee arthroscopy     Dr. Guzman    [facility] NONSPECIFIC PROCEDURE  01/01/2005    L3-4 microdiskectomy   Dr. Brantley    [facility] TOTAL KNEE ARTHROPLASTY  07/01/2010    Minimally invasive R TKA   Dr. Nichols     Current Outpatient Medications   Medication Sig Dispense Refill    apixaban ANTICOAGULANT (ELIQUIS) 5 MG tablet Take 1 tablet (5 mg) by mouth 2 times daily. 180 tablet 3    atorvastatin (LIPITOR) 40 MG tablet TAKE 1 TABLET(40 MG) BY MOUTH DAILY 90 tablet 2    benazepril (LOTENSIN) 20 MG tablet Take 1 tablet (20 mg) by mouth daily. 90 tablet 3    fexofenadine (ALLEGRA) 180 MG tablet Take 180 mg by mouth daily.      metoprolol succinate ER (TOPROL XL) 25 MG 24 hr tablet Take 3 tablets (75 mg) by mouth 2 times  "daily. 540 tablet 3    tamsulosin (FLOMAX) 0.4 MG capsule Take 2 capsules (0.8 mg) by mouth daily. 180 capsule 3       Allergies   Allergen Reactions    Meclizine Other (See Comments)     Over sedation, concentration problem    Tramadol Hcl Other (See Comments)     Pt feels very drugged, \"cloudy\" if used more than one day. Nausea also    Cardura [Doxazosin Mesylate] Other (See Comments)     fainted    Hydrochlorothiazide Other (See Comments)      lightheadedness    Naproxen Nausea     nausea        Social History     Tobacco Use    Smoking status: Former     Current packs/day: 0.00     Average packs/day: 0.5 packs/day for 11.0 years (5.5 ttl pk-yrs)     Types: Cigarettes     Start date: 1957     Quit date: 1968     Years since quittin.5    Smokeless tobacco: Never   Substance Use Topics    Alcohol use: No     Family History   Problem Relation Age of Onset    Family History Negative Brother         1 healthy brother    Diabetes Brother         d:age 66    Diabetes Mother     Cerebrovascular Disease Mother     C.A.D. Father         CABG 67    Heart Disease Father     Lipids Sister     Diabetes Sister     Hypertension Sister     Hypertension Sister     Lipids Sister     Hypertension Sister     Colon Cancer Sister     Thyroid Disease Daughter         thyroid surgery, on replacement    Hypertension Sister      History   Drug Use No             Review of Systems  Constitutional, neuro, ENT, endocrine, pulmonary, cardiac, gastrointestinal, genitourinary, musculoskeletal, integument and psychiatric systems are negative, except as otherwise noted.    Objective    /60   Pulse 74   Temp 97.9  F (36.6  C) (Temporal)   Ht 1.727 m (5' 8\")   Wt 91.4 kg (201 lb 9.6 oz)   SpO2 100%   BMI 30.65 kg/m     Estimated body mass index is 30.65 kg/m  as calculated from the following:    Height as of this encounter: 1.727 m (5' 8\").    Weight as of this encounter: 91.4 kg (201 lb 9.6 oz).  Physical Exam  Physical " Exam  Vitals and nursing note reviewed.   Constitutional:       Comments: Moves normally, alert, no apparent distress  HENT:      Head: Normocephalic and atraumatic.      Nose: Nose normal.   Eyes:      Extraocular Movements: Extraocular movements intact.   Cardiovascular:      Rate and Rhythm: Normal rate and regular rhythm.      Heart sounds: Mild aortic stenosis murmur, Normal heart sounds.   Pulmonary:      Effort: Pulmonary effort is normal.      Breath sounds: Normal breath sounds.   Abdominal:      General: There is no distension.      Palpations: There is no mass.      Tenderness: No abdominal tenderness, no distention.     Bowel sounds: normal  Musculoskeletal:         General: Normal range of motion, moves into the room normal, moves in and out of chair normally.    Skin:     General: Skin is warm and dry.   Neurological:      General: No focal deficit present.      Mental Status: Alert, responds to questions, Speech coherent  Psychiatric:         Mood and Affect: Mood normal.      Recent Labs   Lab Test 05/01/25  1048 12/13/24  0950 12/01/24  0811 11/30/24 2008   HGB  --  11.6*  --  10.5*   PLT  --  436  --  266    136   < > 126*   POTASSIUM 4.5 4.6   < > 3.9   CR 0.77 0.76   < > 0.73    < > = values in this interval not displayed.        Diagnostics  No labs were ordered during this visit.     No EKG required for low risk surgery (cataract, skin procedure, breast biopsy, etc).    ECHOCARDIOGRAM (12/2/24):  Mild valvular aortic stenosis.  The visual ejection fraction is 60-65%.  The right ventricle is normal in size and function.  There is no pericardial effusion.      Revised Cardiac Risk Index (RCRI)  The patient has the following serious cardiovascular risks for perioperative complications:   - history of TIA = 1 point     RCRI Interpretation: 1 point: Class II (low risk - 0.9% complication rate)       The longitudinal plan of care for the diagnosis(es)/condition(s) as documented were addressed  during this visit. Due to the added complexity in care, I will continue to support Ricardo in the subsequent management and with ongoing continuity of care.      Signed Electronically by: Rod Delgado MD  A copy of this evaluation report is provided to the requesting physician.

## 2025-06-26 NOTE — PATIENT INSTRUCTIONS
"      PRE-OPERATIVE INSTRUCTIONS FOR CATARACT SURGERY:     Contact your surgeon if there is any change in your health. This includes signs of new infection, such as cold or flu (such as a sore throat, runny nose, cough, rash or fever).  Elective surgeries may need to be postponed if there is significant illness present.     Pre-procedure Covid testing is no longer required.      There is no expected blood loss with cataract surgery.      No need to stop aspirin or NSAIDS (Advil, Motrin, Aleve, Ibuprofen, or prescription versions of these medications)    Tylenol (acetaminophen) is OK to take if needed, this medication has no effect on platelet function.  Follow instructions on the bottle    Prepare your body as instructed by the ophthalmology clinic.  Take all eye drops as instructed.      No solid food after midnight    Okay for clear liquids up to 4 hours prior to the surgery procedure time     ON THE MORNING OF SURGERY:     --Take all usual medications       Resume all medications at the same doses after surgery (no \"make up\" doses needed), unless instructed otherwise by the medical staff.     Follow all postoperative instructions (including postoperative medications) as instructed by the ophthalmologist.    Attend all follow up appointments with the surgeons (and/or therapists if applicable) as instructed.     Contact the surgeon's office for any specific questions about after-surgery cares and follow up instructions.      You do not need to necessarily return to see anyone in the primary care clinic after your surgery unless specifically instructed to do so.       If this specific planned surgery gets re-scheduled to a date that falls outside the 30 day window from the date of this pre-op exam, you do not need to return to see us for another pre-op exam for this specific procedure.  Depending on the rescheduled date, we can probably still clear you for this surgery for this specific procedure with this exam " performed today, but I will have to write and addendum from the pre-op exam from today dated within the 30 days of the surgery date.   Please contact me with any changes in the date, time, and place of surgery.

## 2025-06-27 ENCOUNTER — HOSPITAL ENCOUNTER (OUTPATIENT)
Dept: RADIOLOGY | Facility: CLINIC | Age: 85
Discharge: HOME | End: 2025-06-27
Attending: INTERNAL MEDICINE
Payer: COMMERCIAL

## 2025-06-27 ENCOUNTER — LAB (OUTPATIENT)
Dept: LAB | Facility: CLINIC | Age: 85
End: 2025-06-27
Attending: INTERNAL MEDICINE
Payer: COMMERCIAL

## 2025-06-27 DIAGNOSIS — R76.8 ANTINUCLEAR ANTIBODY (ANA) POSITIVE: ICD-10-CM

## 2025-06-27 DIAGNOSIS — M19.90 INFLAMMATORY ARTHRITIS: ICD-10-CM

## 2025-06-27 DIAGNOSIS — M25.50 MULTIPLE JOINT PAIN: ICD-10-CM

## 2025-06-27 DIAGNOSIS — Z11.59 NEED FOR HEPATITIS B SCREENING TEST: ICD-10-CM

## 2025-06-27 DIAGNOSIS — Z11.1 SCREENING-PULMONARY TB: ICD-10-CM

## 2025-06-27 LAB
ALT SERPL W P-5'-P-CCNC: 13 U/L (ref 0–70)
CREAT SERPL-MCNC: 0.75 MG/DL (ref 0.67–1.17)
CRP SERPL-MCNC: 18.4 MG/L
EGFRCR SERPLBLD CKD-EPI 2021: 88 ML/MIN/1.73M2
ERYTHROCYTE [DISTWIDTH] IN BLOOD BY AUTOMATED COUNT: 13.2 % (ref 10–15)
ERYTHROCYTE [SEDIMENTATION RATE] IN BLOOD BY WESTERGREN METHOD: 26 MM/HR (ref 0–20)
HBV CORE AB SERPL QL IA: NONREACTIVE
HBV SURFACE AB SERPL IA-ACNC: <3.5 M[IU]/ML
HBV SURFACE AB SERPL IA-ACNC: NONREACTIVE M[IU]/ML
HBV SURFACE AG SERPL QL IA: NONREACTIVE
HCT VFR BLD AUTO: 38.2 % (ref 40–53)
HCV AB SERPL QL IA: NONREACTIVE
HGB BLD-MCNC: 13.4 G/DL (ref 13.3–17.7)
LAB ORDER RESULT STATUS: NORMAL
Lab: NORMAL
MCH RBC QN AUTO: 28.8 PG (ref 26.5–33)
MCHC RBC AUTO-ENTMCNC: 35.1 G/DL (ref 31.5–36.5)
MCV RBC AUTO: 82 FL (ref 78–100)
PERFORMING LABORATORY: NORMAL
PLATELET # BLD AUTO: 327 10E3/UL (ref 150–450)
RBC # BLD AUTO: 4.66 10E6/UL (ref 4.4–5.9)
RHEUMATOID FACT SERPL-ACNC: <10 IU/ML
TEST NAME: NORMAL
WBC # BLD AUTO: 6.9 10E3/UL (ref 4–11)

## 2025-06-27 PROCEDURE — 84460 ALANINE AMINO (ALT) (SGPT): CPT

## 2025-06-27 PROCEDURE — 36415 COLL VENOUS BLD VENIPUNCTURE: CPT

## 2025-06-27 PROCEDURE — 86140 C-REACTIVE PROTEIN: CPT

## 2025-06-27 PROCEDURE — 73130 X-RAY EXAM OF HAND: CPT | Mod: 50

## 2025-06-27 PROCEDURE — 82565 ASSAY OF CREATININE: CPT

## 2025-06-27 PROCEDURE — 85014 HEMATOCRIT: CPT

## 2025-06-28 LAB
A PHAGOCYTOPH DNA BLD QL NAA+PROBE: NOT DETECTED
BABESIA DNA BLD QL NAA+PROBE: NOT DETECTED
EHRLICHIA DNA SPEC QL NAA+PROBE: NOT DETECTED

## 2025-06-29 LAB
GAMMA INTERFERON BACKGROUND BLD IA-ACNC: 0.04 IU/ML
M TB IFN-G BLD-IMP: NEGATIVE
M TB IFN-G CD4+ BCKGRND COR BLD-ACNC: 9.96 IU/ML
MITOGEN IGNF BCKGRD COR BLD-ACNC: 0.02 IU/ML
MITOGEN IGNF BCKGRD COR BLD-ACNC: 0.05 IU/ML
QUANTIFERON MITOGEN: 10 IU/ML
QUANTIFERON NIL TUBE: 0.04 IU/ML
QUANTIFERON TB1 TUBE: 0.06 IU/ML
QUANTIFERON TB2 TUBE: 0.09

## 2025-06-30 LAB
CCP AB SER IA-ACNC: 1.3 U/ML
ENA SM IGG SER IA-ACNC: <0.7 U/ML
ENA SM IGG SER IA-ACNC: NEGATIVE
ENA SS-A AB SER IA-ACNC: <0.5 U/ML
ENA SS-A AB SER IA-ACNC: NEGATIVE
ENA SS-B IGG SER IA-ACNC: <0.6 U/ML
ENA SS-B IGG SER IA-ACNC: NEGATIVE
IL6 SERPL-MCNC: 68.63 PG/ML
MAYO MISC RESULT: NORMAL
U1 SNRNP IGG SER IA-ACNC: 2.4 U/ML
U1 SNRNP IGG SER IA-ACNC: NEGATIVE

## 2025-07-01 LAB
MYELOPEROXIDASE AB SER IA-ACNC: 0.3 U/ML
MYELOPEROXIDASE AB SER IA-ACNC: NEGATIVE
PROTEINASE3 AB SER IA-ACNC: <1 U/ML
PROTEINASE3 AB SER IA-ACNC: NEGATIVE

## 2025-07-01 NOTE — TELEPHONE ENCOUNTER
REASON FOR CALL: Pt called stating that he would like to get another INJ done.   Received message from pt regarding concern.     Seen in April - see note.  Pt has hx HSV and recently has 'sx'.  Little bumps in front of hip and end of penis.  Onset - last Wed.    Initially, he had itching, no pain.      I am unsure it pt has HSV.    Discussed with pt that 'blood test' would not diagnosis active HSV.  He said he desires periodic STD testing    Referred pt to STD clinic -   [facility] or [facility] are options.

## 2025-07-10 ENCOUNTER — OFFICE VISIT (OUTPATIENT)
Dept: RHEUMATOLOGY | Facility: CLINIC | Age: 85
End: 2025-07-10
Payer: COMMERCIAL

## 2025-07-10 VITALS
HEIGHT: 68 IN | BODY MASS INDEX: 30.77 KG/M2 | SYSTOLIC BLOOD PRESSURE: 151 MMHG | HEART RATE: 69 BPM | OXYGEN SATURATION: 95 % | WEIGHT: 203 LBS | DIASTOLIC BLOOD PRESSURE: 67 MMHG

## 2025-07-10 DIAGNOSIS — M06.09 RHEUMATOID ARTHRITIS OF MULTIPLE SITES WITH NEGATIVE RHEUMATOID FACTOR (H): Primary | ICD-10-CM

## 2025-07-10 DIAGNOSIS — Z71.89 ENCOUNTER TO DISCUSS TREATMENT OPTIONS: ICD-10-CM

## 2025-07-10 DIAGNOSIS — Z79.899 LONG-TERM USE OF PLAQUENIL: ICD-10-CM

## 2025-07-10 RX ORDER — PREDNISONE 5 MG/1
5 TABLET ORAL 2 TIMES DAILY
Qty: 60 TABLET | Refills: 1 | Status: SHIPPED | OUTPATIENT
Start: 2025-07-10

## 2025-07-10 RX ORDER — HYDROXYCHLOROQUINE SULFATE 200 MG/1
200 TABLET, FILM COATED ORAL 2 TIMES DAILY
Qty: 60 TABLET | Refills: 2 | Status: SHIPPED | OUTPATIENT
Start: 2025-07-10

## 2025-07-10 NOTE — PROGRESS NOTES
"  Assessment and Plan    Ricardo has seronegative rheumatoid arthritis.  He is more comfortable on prednisone (5 mg twice daily).  Likes to play the guitar, was unable to do so, feeling more comfortable but still has some soreness and stiffness in his fingers.  At this point we will add hydroxychloroquine to his regimen so that he can eventually come off prednisone.    We discussed hydroxychloroquine (Plaquenil), its role in treating rheumatic disorders (RA, lupus etc.), route of administration (oral, once or twice daily), delay in its onset of action (6-8 weeks), common side effects (rash 2-3%).  We also discussed for patients taking hydroxychloroquine long-term (more than 6 months) the need for yearly Plaquenil eye exams by an ophthalmologist or optometrist to monitor for Plaquenil maculopathy (rare)    He will reduce prednisone to 5 mg daily in early September, to give hydroxychloroquine time to take effect (hydroxychloroquine 200 mg twice daily).  In early September he will reduce prednisone to 5 mg daily and if he is doing well we will hopefully take him off prednisone after that.  If he has difficulty tolerating hydroxychloroquine or his RA does not respond to it then methotrexate would be the next option.    Follow-up 2 months    CC PCP              Rheumatology follow-up visit note         HPI          Ricardo is a very nice 85-year-old gentleman, he is a retired .  He was in his usual health until March 2025 when he came down with an acute onset of painful swelling in his hands, wrists, the fingers were \"locking \".  He had difficulty gripping and holding objects because of the pain and swelling in his fingers.  The pain then went on to involve his shoulders and he has been having shoulder pain.  He does not have pain or swelling involving his his ankles or feet.  His symptoms started while he was in [place].  Nonsteroidals are contraindicated because he is on anticoagulation for intermittent atrial " "fibrillation.  He has been taking Tylenol but that did not help.  He does not have any known history of gout or pseudogout.  He has been in quite significant pain and \"dare not even pick a glass with 1 hand because of pain \".  He did not have the symptoms 6 months ago.  His sister may have rheumatoid arthritis but he is not sure.  He denies any personal or family history of psoriasis.  No rashes, no known tick bites.  He had some posterior headaches, denies any jaw claudication, amaurosis or double vision.  He was given a few week course of prednisone during which his joint symptoms improved significantly but they returned after he stopped prednisone.  Does not recall the dose or specifics of his prednisone taper.    All the additional serologies for lupus came back negative.  CCP also came back negative.  X-rays of the hands reviewed showing some degenerative changes but no erosions or chondrocalcinosis.    Denies fevers or chills, no night sweats, no rash or purpura.    Labs: CATHY positive, Lyme negative, CRP 6.9 (CRP was 137 9 months ago), ESR 13, uric acid 6.3, rheumatoid factor negative, creatinine 0.77    Past medical history significant for left total knee arthroplasty, history of TIA, paroxysmal atrial fibrillation, history of kidney stones, right bundle branch block      DETAILED EXAMINATION  07/10/25  :    Patient is alert and oriented x 3.    Head/neck: No facial asymmetry, no rash, no jaundice, no conjunctival pallor, no alopecia    Lungs clear    Heart sounds regular    Musculoskeletal: Note patient on 10 mg prednisone which can mask synovitis    Hands.  The synovitis in his MCP joints is significantly resolved on prednisone but still some tenderness of the MCPs is noted.  No swan-neck or boutonniere deformities.    Wrists.  Synovitis seen previously resolved on prednisone, mild tenderness of both wrists    Elbows normal range of motion    Shoulders normal ROM    Hips pain-free ROM    Knees normal range " of motion    Ankles no swelling or redness           Patient Active Problem List    Diagnosis Date Noted    Nonrheumatic aortic valve stenosis 06/08/2025     Priority: Medium    Paroxysmal atrial fibrillation (H) 06/08/2025     Priority: Medium    Arthritis 06/08/2025     Priority: Medium    S/P lumbar fusion 11/26/2024     Priority: Medium    Thyroid nodule 11/05/2024     Priority: Medium    Benign prostatic hyperplasia with urinary retention 11/05/2024     Priority: Medium    Acute bilateral low back pain without sciatica 11/05/2024     Priority: Medium    Hx of total knee replacement, left 09/12/2024     Priority: Medium    Hyperlipidemia LDL goal <70 09/04/2024     Priority: Medium    Lumbar radiculopathy 09/04/2024     Priority: Medium    Class 1 obesity with serious comorbidity and body mass index (BMI) of 31.0 to 31.9 in adult, unspecified obesity type 09/04/2024     Priority: Medium    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     Priority: Medium    Carotid stenosis, asymptomatic, bilateral 11/02/2022     Priority: Medium    Palpitations 11/02/2022     Priority: Medium    Hx of transient ischemic attack (TIA) 08/01/2021     Priority: Medium    Contracture of palmar fascia 10/16/2020     Priority: Medium    Anxiety 05/26/2011     Priority: Medium    Hypertrophy of prostate without urinary obstruction      Priority: Medium    Diaphragmatic hernia 09/23/2002     Priority: Medium     Problem list name updated by automated process. Provider to review      HISTORY OF URINARY CALCULI 08/19/2002     Priority: Medium    Essential hypertension 08/19/2002     Priority: Medium    GERD 08/19/2002     Priority: Medium     Past Surgical History:   Procedure Laterality Date    ARTHROPLASTY KNEE Left 9/12/2024    Procedure: left total knee arthroplasty;  Surgeon: Mono Nichols MD;  Location: [facility] OR    BIOPSY  2017    COLONOSCOPY      DISCECTOMY LUMBAR POSTERIOR MICROSCOPIC TWO LEVELS   08/28/2012    DISCECTOMY LUMBAR POSTERIOR MICROSCOPIC TWO LEVELS;  RIGHT L3-L4 REDO EXTENDED HEMILAMINECTOMY AND MICRO FORAMINOTOMY, LEFT L4-L5 EXTENDED HEMILAMINECTOMY AND MICRODISCECTOMY (PRONE) (SONYA MCCALLUM, C-ARM, METRIX II);  Surgeon: Bertram Mirza MD;  Location:  OR    ENT SURGERY  2014    Cancer spot on right ear    EXPLORE SPINE, REMOVE HARDWARE, COMBINED N/A 11/26/2024    Procedure: Removal of L3-5 bilateral set screws and rods;  Surgeon: Harrison Perez MD;  Location:  OR    EYE SURGERY  2018    OPTICAL TRACKING SYSTEM FUSION POSTERIOR SPINE LUMBAR N/A 11/26/2024    Procedure: Lumbar 2 to Lumbar 3 extension of previous Lumbar 3 to Lumbar 5 fusion with stealth navigation and Lumbar 2 to Lumbar 3 decompression;  Surgeon: Harrison Perez MD;  Location:  OR    OPTICAL TRACKING SYSTEM FUSION SPINE POSTERIOR LUMBAR TWO LEVELS N/A 08/21/2019    Procedure: L3-5 LUMBAR TRANSFORAMINAL INTERBODY FUSION WITH STEALTH;  Surgeon: Harrison Perez MD;  Location:  OR    renal calculii removal 40 years ago  01/01/1965    [facility] NONSPECIFIC PROCEDURE  01/01/1959    pilonidal cystectomy    [facility] NONSPECIFIC PROCEDURE  01/01/1966    kidney stone    [facility] NONSPECIFIC PROCEDURE  approx 1999    R knee arthroscopy     Dr. Guzman    [facility] NONSPECIFIC PROCEDURE  01/01/2005    L3-4 microdiskectomy   Dr. Brantley    [facility] TOTAL KNEE ARTHROPLASTY  07/01/2010    Minimally invasive R TKA   Dr. Nichols      Past Medical History:   Diagnosis Date    Anxiety 05/26/2011    Arthritis     Basal cell carcinoma     Benign prostatic hyperplasia with urinary retention 11/05/2024    Benign Prostatic Hypertrophy     Carotid stenosis, asymptomatic, bilateral 11/02/2022    CEREBROVASC DISEASE, small vessel 12/2007    Chronic bilateral low back pain without sciatica 11/05/2024    Class 1 obesity with serious comorbidity and body mass index (BMI) of 31.0 to 31.9 in adult, unspecified obesity type 09/04/2024    Contracture of palmar  "fascia 10/16/2020    Diaphragmatic hernia 09/23/2002    Problem list name updated by automated process. Provider to review      Essential hypertension 08/19/2002    Essential hypertension, benign     GERD     HIATAL HERNIA     Hx of total knee replacement, left 09/12/2024    Hx of transient ischemic attack (TIA) 08/01/2021    Hyperlipidemia LDL goal <70 09/04/2024    HYPERPLASTIC POLYPS COLON 5/93, 3/06    Hypertrophy of prostate without urinary obstruction     Impaired fasting glucose     Low Back Pain     Lumbar radiculopathy 09/04/2024    Obesity, unspecified     Other and unspecified hyperlipidemia     Palpitations 11/02/2022    Paroxysmal atrial fibrillation (H) 06/08/2025    Personal history of urinary calculi 1960's    Pneumonia, organism [phone]    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    Squamous cell carcinoma of skin, unspecified     Thyroid nodule 11/05/2024    TRANSIENT CEREBRAL ISCHEMIA 12/2007     Allergies   Allergen Reactions    Meclizine Other (See Comments)     Over sedation, concentration problem    Tramadol Hcl Other (See Comments)     Pt feels very drugged, \"cloudy\" if used more than one day. Nausea also    Cardura [Doxazosin Mesylate] Other (See Comments)     fainted    Hydrochlorothiazide Other (See Comments)      lightheadedness    Naproxen Nausea     nausea     Current Outpatient Medications   Medication Sig Dispense Refill    apixaban ANTICOAGULANT (ELIQUIS) 5 MG tablet Take 1 tablet (5 mg) by mouth 2 times daily. 180 tablet 3    atorvastatin (LIPITOR) 40 MG tablet TAKE 1 TABLET(40 MG) BY MOUTH DAILY 90 tablet 2    benazepril (LOTENSIN) 20 MG tablet Take 1 tablet (20 mg) by mouth daily. 90 tablet 3    fexofenadine (ALLEGRA) 180 MG tablet Take 180 mg by mouth daily.      metoprolol succinate ER (TOPROL XL) 25 MG 24 hr tablet Take 3 tablets (75 mg) by mouth 2 times daily. 540 tablet 3    predniSONE (DELTASONE) 5 MG tablet Take 1 tablet " (5mg) twice daily for 2 weeks (total 10mg a day), then 1 tablet (5mg) daily for 1 week, then stop. 60 tablet 1    tamsulosin (FLOMAX) 0.4 MG capsule Take 2 capsules (0.8 mg) by mouth daily. 180 capsule 3     family history includes C.A.D. in his father; Cerebrovascular Disease in his mother; Colon Cancer in his sister; Diabetes in his brother, mother, and sister; Family History Negative in his brother; Heart Disease in his father; Hypertension in his sister, sister, sister, and sister; Lipids in his sister and sister; Thyroid Disease in his daughter.  Social Connections: Unknown (5/4/2025)    Social Connection and Isolation Panel [NHANES]     Frequency of Communication with Friends and Family: Not on file     Frequency of Social Gatherings with Friends and Family: Once a week     Attends [employer] Services: Not on file     Active Member of Clubs or Organizations: Not on file     Attends Club or Organization Meetings: Not on file     Marital Status: Not on file          WBC   Date Value Ref Range Status   08/24/2019 11.0 4.0 - 11.0 10e9/L Final     WBC Count   Date Value Ref Range Status   06/27/2025 6.9 4.0 - 11.0 10e3/uL Final     RBC Count   Date Value Ref Range Status   06/27/2025 4.66 4.40 - 5.90 10e6/uL Final   08/24/2019 3.40 (L) 4.4 - 5.9 10e12/L Final     Hemoglobin   Date Value Ref Range Status   06/27/2025 13.4 13.3 - 17.7 g/dL Final   08/24/2019 10.1 (L) 13.3 - 17.7 g/dL Final     Hematocrit   Date Value Ref Range Status   06/27/2025 38.2 (L) 40.0 - 53.0 % Final   08/24/2019 28.8 (L) 40.0 - 53.0 % Final     MCV   Date Value Ref Range Status   06/27/2025 82 78 - 100 fL Final   08/24/2019 85 78 - 100 fl Final     MCH   Date Value Ref Range Status   06/27/2025 28.8 26.5 - 33.0 pg Final   08/24/2019 29.7 26.5 - 33.0 pg Final     Platelet Count   Date Value Ref Range Status   06/27/2025 327 150 - 450 10e3/uL Final   08/24/2019 211 150 - 450 10e9/L Final     % Lymphocytes   Date Value Ref Range Status    09/17/2024 15 % Final   10/16/2022 34 % Final   12/27/2016 26.3 % Final     AST   Date Value Ref Range Status   05/01/2025 21 0 - 45 U/L Final   05/20/2021 21 0 - 45 U/L Final     ALT   Date Value Ref Range Status   06/27/2025 13 0 - 70 U/L Final   05/20/2021 30 0 - 70 U/L Final     Albumin   Date Value Ref Range Status   05/01/2025 4.1 3.5 - 5.2 g/dL Final   10/16/2022 3.5 3.4 - 5.0 g/dL Final   05/20/2021 3.8 3.4 - 5.0 g/dL Final     Alkaline Phosphatase   Date Value Ref Range Status   05/01/2025 135 40 - 150 U/L Final   05/20/2021 98 40 - 150 U/L Final     Creatinine   Date Value Ref Range Status   06/27/2025 0.75 0.67 - 1.17 mg/dL Final   05/20/2021 0.88 0.66 - 1.25 mg/dL Final     GFR Estimate   Date Value Ref Range Status   06/27/2025 88 >60 mL/min/1.73m2 Final     Comment:     eGFR calculated using 2021 CKD-EPI equation.   05/20/2021 80 >60 mL/min/[1.73_m2] Final     Comment:     Non  GFR Calc  Starting 12/18/2018, serum creatinine based estimated GFR (eGFR) will be   calculated using the Chronic Kidney Disease Epidemiology Collaboration   (CKD-EPI) equation.       GFR Estimate If Black   Date Value Ref Range Status   05/20/2021 >90 >60 mL/min/[1.73_m2] Final     Comment:      GFR Calc  Starting 12/18/2018, serum creatinine based estimated GFR (eGFR) will be   calculated using the Chronic Kidney Disease Epidemiology Collaboration   (CKD-EPI) equation.       Creatinine Urine   Date Value Ref Range Status   02/17/2015 139 mg/dL Final     Sed Rate   Date Value Ref Range Status   11/15/2017 7 0 - 20 mm/h Final     Erythrocyte Sedimentation Rate   Date Value Ref Range Status   06/27/2025 26 (H) 0 - 20 mm/hr Final     N terminal Pro BNP Inpatient   Date Value Ref Range Status   10/16/2022 84 0 - 1,800 pg/mL Final     Comment:     Reference range shown and results flagged as abnormal are suggested inpatient cut points for confirming diagnosis if CHF in an acute setting. Establishing  a baseline value for each individual patient is useful for follow-up. An inpatient or emergency department NT-proPBNP <300 pg/mL effectively rules out acute CHF, with 99% negative predictive value.    The outpatient non-acute reference range for ruling out CHF is:  0-125 pg/mL (age 18 to less than 75)  0-450 pg/mL (age 75 yrs and older)

## 2025-07-13 PROBLEM — M06.00 SERONEGATIVE RHEUMATOID ARTHRITIS (H): Status: ACTIVE | Noted: 2025-07-13

## 2025-08-26 ENCOUNTER — OFFICE VISIT (OUTPATIENT)
Dept: CARDIOLOGY | Facility: CLINIC | Age: 85
End: 2025-08-26
Attending: NURSE PRACTITIONER
Payer: COMMERCIAL

## 2025-08-26 VITALS
BODY MASS INDEX: 30.92 KG/M2 | HEART RATE: 68 BPM | OXYGEN SATURATION: 96 % | HEIGHT: 68 IN | WEIGHT: 204 LBS | SYSTOLIC BLOOD PRESSURE: 143 MMHG | DIASTOLIC BLOOD PRESSURE: 70 MMHG

## 2025-08-26 DIAGNOSIS — I48.0 PAROXYSMAL ATRIAL FIBRILLATION (H): ICD-10-CM

## 2025-08-26 DIAGNOSIS — I65.23 BILATERAL CAROTID ARTERY STENOSIS: Primary | ICD-10-CM

## 2025-08-26 DIAGNOSIS — R00.2 PALPITATIONS: ICD-10-CM

## 2025-08-26 DIAGNOSIS — I35.0 NONRHEUMATIC AORTIC VALVE STENOSIS: ICD-10-CM

## 2025-08-26 DIAGNOSIS — E78.5 HYPERLIPIDEMIA LDL GOAL <100: ICD-10-CM

## 2025-08-26 DIAGNOSIS — I10 ESSENTIAL HYPERTENSION: ICD-10-CM

## (undated) DEVICE — SU VICRYL 2-0 CT-2 CR 8X18" J726D

## (undated) DEVICE — CATH TRAY FOLEY COUDE SURESTEP 16FR W/DRN BAG LATEX A304416A

## (undated) DEVICE — PREP DURAPREP 26ML APL 8630

## (undated) DEVICE — MANIFOLD NEPTUNE 4 PORT 700-20

## (undated) DEVICE — SOL NACL 0.9% INJ 250ML BAG 2B1322Q

## (undated) DEVICE — PACK LARGE SPINE SNE15LSFSE

## (undated) DEVICE — KIT PATIENT JACKSON 1 SPK10118

## (undated) DEVICE — NDL SPINAL 18GA 3.5" 405184

## (undated) DEVICE — DRAPE COVER C-ARM SEAMLESS SNAP-KAP 03-KP26 LATEX FREE

## (undated) DEVICE — STPL SKIN 35W 6.9MM  PXW35

## (undated) DEVICE — DRAIN JACKSON PRATT RESERVOIR 100ML SU130-1305

## (undated) DEVICE — GLOVE BIOGEL PI MICRO INDICATOR UNDERGLOVE SZ 8.0 48980

## (undated) DEVICE — ESU BIPOLAR SEALER AQUAMANTYS 6MM 23-112-1

## (undated) DEVICE — LINEN TOWEL PACK X5 5464

## (undated) DEVICE — DRAIN JACKSON PRATT 07MM FLAT SU130-1310

## (undated) DEVICE — TUBING SUCTION MEDI-VAC SOFT 3/16"X20' N520A

## (undated) DEVICE — POSITIONER PT PRONESAFE HEAD REST W/DERMAPROX INSERT 40599

## (undated) DEVICE — PREP SKIN SCRUB TRAY 4461A

## (undated) DEVICE — DRSG KERLIX FLUFFS X5

## (undated) DEVICE — GLOVE PROTEXIS W/NEU-THERA 6.5  2D73TE65

## (undated) DEVICE — NDL 19GA 1.5"

## (undated) DEVICE — GLOVE PROTEXIS BLUE W/NEU-THERA 6.5  2D73EB65

## (undated) DEVICE — GLOVE BIOGEL PI MICRO SZ 7.5 48575

## (undated) DEVICE — ESU GROUND PAD UNIVERSAL W/O CORD

## (undated) DEVICE — NDL BX BONE MARROW 11GA 6" JAMSHIDI DJ6011X

## (undated) DEVICE — SOL NACL 0.9% IRRIG 1000ML BOTTLE 2F7124

## (undated) DEVICE — DRAPE MAYO STAND 23X54 8337

## (undated) DEVICE — KIT SURGICAL TURNOVER FVSD-01D

## (undated) DEVICE — SUCTION IRR SYSTEM W/O TIP INTERPULSE HANDPIECE 0210-100-000

## (undated) DEVICE — SOL WATER IRRIG 1000ML BOTTLE 2F7114

## (undated) DEVICE — NDL 22GA 1.5"

## (undated) DEVICE — MIDAS REX DISSECTING TOOL  14MH30

## (undated) DEVICE — WIPES FOLEY CARE SURESTEP PROVON DFC100

## (undated) DEVICE — SU VICRYL 2-0 CT-2 27" J333H

## (undated) DEVICE — DRAIN ROUND W/RESERV KIT JACKSON PRATT 10FR 400ML SU130-402D

## (undated) DEVICE — NDL 20GA 1.5"

## (undated) DEVICE — KIT SEALER DURASEAL EXACT SP HYDROGEL 5ML SGL USE 20-6520

## (undated) DEVICE — GLOVE PROTEXIS BLUE W/NEU-THERA 8.0  2D73EB80

## (undated) DEVICE — SU VICRYL 0 CT-1 27" UND J260H

## (undated) DEVICE — SU VICRYL 0 CT-1 CR 8X18" J740D

## (undated) DEVICE — BONE CLEANING TIP INTERPULSE  0210-010-000

## (undated) DEVICE — DRAPE MICROSCOPE LEICA 54X150" AR8033650

## (undated) DEVICE — Device

## (undated) DEVICE — SUTURE STRATAFIX SPIRAL MONOCRYL PLUS 3-0 PS-1 SP SXMP1B101

## (undated) DEVICE — DRAPE SHEET REV FOLD 3/4 9349

## (undated) DEVICE — TUBING SUCTION SOFT 20'X3/16" 0036570

## (undated) DEVICE — PACK UNIVERSAL SPLIT 29131

## (undated) DEVICE — SOL NACL 0.9% INJ 1000ML BAG 2B1324X

## (undated) DEVICE — TOOL DISSECT MIDAS MR8 14CM MATCH HEAD 3MM MR8-14MH30

## (undated) DEVICE — SU DERMABOND PRINEO 22CM CLR222US

## (undated) DEVICE — GLOVE PROTEXIS W/NEU-THERA 7.5  2D73TE75

## (undated) DEVICE — COVER TABLE POLY 65X90" 8186

## (undated) DEVICE — SU ETHILON 2-0 FS 18" 664H

## (undated) DEVICE — RX SURGIFLO HEMOSTATIC MATRIX W/THROMBIN 8ML 2994

## (undated) DEVICE — WRAP EZY KNEE 1213PP

## (undated) DEVICE — SPONGE SURGIFOAM 100 1974

## (undated) DEVICE — DRAPE STERI U 1015

## (undated) DEVICE — ESU ELEC BLADE 2.75" COATED/INSULATED E1455

## (undated) DEVICE — SU ETHIBOND 0 CTX CR  8X18" CX31D

## (undated) DEVICE — BLADE BONE MILL STRK 3.2MM FINE 5400-702-000

## (undated) DEVICE — MARKER SPHERES PASSIVE MEDT PACK 5 8801075

## (undated) DEVICE — GLOVE BIOGEL PI ULTRATOUCH SZ 8.0 41180

## (undated) DEVICE — DRAPE MICROSCOPE EQUIP 48X120" 6130VL2

## (undated) DEVICE — SUCTION FRAZIER 12FR W/HANDLE K73

## (undated) DEVICE — ESU PENCIL W/HOLSTER E2350H

## (undated) DEVICE — HOOD SURG T7PLUS PEEL AWAY FACE SHIELD STRL LF 0416-801-100

## (undated) DEVICE — DRSG TELFA 3X8" 1238

## (undated) DEVICE — PACK TOTAL KNEE SOP15TKFSD

## (undated) DEVICE — SU VICRYL 2-0 CP-1 27" UND J266H

## (undated) DEVICE — GOWN XLG DISP 9545

## (undated) DEVICE — SU STRATAFIX PDS PLUS 2 CTX SPIRAL L14 IN SXPP2B405

## (undated) DEVICE — BLADE SAW SAGITTAL STRK 18X90X1.27MM HD SYS 6 6118-127-090

## (undated) DEVICE — DRSG TEGADERM 4X10" 1627

## (undated) DEVICE — BONE CEMENT MIXEVAC III HI VAC KIT  0206-015-000

## (undated) DEVICE — CAST PADDING 6" UNSTERILE 9046

## (undated) DEVICE — SU VICRYL 0 CP-1 27" J467H

## (undated) DEVICE — SYR 10ML FINGER CONTROL W/O NDL 309695

## (undated) RX ORDER — ONDANSETRON 2 MG/ML
INJECTION INTRAMUSCULAR; INTRAVENOUS
Status: DISPENSED
Start: 2019-08-21

## (undated) RX ORDER — LIDOCAINE HYDROCHLORIDE 10 MG/ML
INJECTION, SOLUTION EPIDURAL; INFILTRATION; INTRACAUDAL; PERINEURAL
Status: DISPENSED
Start: 2023-10-18

## (undated) RX ORDER — PROPOFOL 10 MG/ML
INJECTION, EMULSION INTRAVENOUS
Status: DISPENSED
Start: 2024-11-26

## (undated) RX ORDER — DEXAMETHASONE SODIUM PHOSPHATE 10 MG/ML
INJECTION, SOLUTION INTRAMUSCULAR; INTRAVENOUS
Status: DISPENSED
Start: 2017-09-11

## (undated) RX ORDER — BUPIVACAINE HYDROCHLORIDE AND EPINEPHRINE 5; 5 MG/ML; UG/ML
INJECTION, SOLUTION EPIDURAL; INTRACAUDAL; PERINEURAL
Status: DISPENSED
Start: 2019-08-21

## (undated) RX ORDER — ALBUMIN, HUMAN INJ 5% 5 %
SOLUTION INTRAVENOUS
Status: DISPENSED
Start: 2019-08-21

## (undated) RX ORDER — BUPIVACAINE HYDROCHLORIDE 2.5 MG/ML
INJECTION, SOLUTION EPIDURAL; INFILTRATION; INTRACAUDAL
Status: DISPENSED
Start: 2023-10-18

## (undated) RX ORDER — ONDANSETRON 2 MG/ML
INJECTION INTRAMUSCULAR; INTRAVENOUS
Status: DISPENSED
Start: 2024-09-12

## (undated) RX ORDER — DEXAMETHASONE SODIUM PHOSPHATE 4 MG/ML
INJECTION, SOLUTION INTRA-ARTICULAR; INTRALESIONAL; INTRAMUSCULAR; INTRAVENOUS; SOFT TISSUE
Status: DISPENSED
Start: 2024-11-26

## (undated) RX ORDER — GINSENG 100 MG
CAPSULE ORAL
Status: DISPENSED
Start: 2019-08-21

## (undated) RX ORDER — FENTANYL CITRATE 0.05 MG/ML
INJECTION, SOLUTION INTRAMUSCULAR; INTRAVENOUS
Status: DISPENSED
Start: 2024-11-26

## (undated) RX ORDER — CEFAZOLIN SODIUM 1 G/3ML
INJECTION, POWDER, FOR SOLUTION INTRAMUSCULAR; INTRAVENOUS
Status: DISPENSED
Start: 2019-08-21

## (undated) RX ORDER — LIDOCAINE HYDROCHLORIDE 20 MG/ML
INJECTION, SOLUTION EPIDURAL; INFILTRATION; INTRACAUDAL; PERINEURAL
Status: DISPENSED
Start: 2019-08-21

## (undated) RX ORDER — HYDROMORPHONE HYDROCHLORIDE 1 MG/ML
INJECTION, SOLUTION INTRAMUSCULAR; INTRAVENOUS; SUBCUTANEOUS
Status: DISPENSED
Start: 2019-08-21

## (undated) RX ORDER — GABAPENTIN 100 MG/1
CAPSULE ORAL
Status: DISPENSED
Start: 2024-11-26

## (undated) RX ORDER — TRANEXAMIC ACID 650 MG/1
TABLET ORAL
Status: DISPENSED
Start: 2024-09-12

## (undated) RX ORDER — FENTANYL CITRATE 50 UG/ML
INJECTION, SOLUTION INTRAMUSCULAR; INTRAVENOUS
Status: DISPENSED
Start: 2019-08-21

## (undated) RX ORDER — VECURONIUM BROMIDE 1 MG/ML
INJECTION, POWDER, LYOPHILIZED, FOR SOLUTION INTRAVENOUS
Status: DISPENSED
Start: 2019-08-21

## (undated) RX ORDER — KETOROLAC TROMETHAMINE 30 MG/ML
INJECTION, SOLUTION INTRAMUSCULAR; INTRAVENOUS
Status: DISPENSED
Start: 2019-08-21

## (undated) RX ORDER — HYDROMORPHONE HCL IN WATER/PF 6 MG/30 ML
PATIENT CONTROLLED ANALGESIA SYRINGE INTRAVENOUS
Status: DISPENSED
Start: 2024-11-26

## (undated) RX ORDER — TRIAMCINOLONE ACETONIDE 40 MG/ML
INJECTION, SUSPENSION INTRA-ARTICULAR; INTRAMUSCULAR
Status: DISPENSED
Start: 2023-10-18

## (undated) RX ORDER — ONDANSETRON 2 MG/ML
INJECTION INTRAMUSCULAR; INTRAVENOUS
Status: DISPENSED
Start: 2024-11-26

## (undated) RX ORDER — LIDOCAINE HYDROCHLORIDE 10 MG/ML
INJECTION, SOLUTION EPIDURAL; INFILTRATION; INTRACAUDAL; PERINEURAL
Status: DISPENSED
Start: 2017-09-11

## (undated) RX ORDER — FENTANYL CITRATE 50 UG/ML
INJECTION, SOLUTION INTRAMUSCULAR; INTRAVENOUS
Status: DISPENSED
Start: 2024-11-26

## (undated) RX ORDER — PROPOFOL 10 MG/ML
INJECTION, EMULSION INTRAVENOUS
Status: DISPENSED
Start: 2019-08-21

## (undated) RX ORDER — GABAPENTIN 100 MG/1
CAPSULE ORAL
Status: DISPENSED
Start: 2019-08-21

## (undated) RX ORDER — BUPIVACAINE HYDROCHLORIDE AND EPINEPHRINE 5; 5 MG/ML; UG/ML
INJECTION, SOLUTION EPIDURAL; INTRACAUDAL; PERINEURAL
Status: DISPENSED
Start: 2024-11-26

## (undated) RX ORDER — PHENYLEPHRINE HYDROCHLORIDE 10 MG/ML
INJECTION INTRAVENOUS
Status: DISPENSED
Start: 2019-08-21

## (undated) RX ORDER — CEFAZOLIN SODIUM 2 G/100ML
INJECTION, SOLUTION INTRAVENOUS
Status: DISPENSED
Start: 2019-08-21

## (undated) RX ORDER — PROPOFOL 10 MG/ML
INJECTION, EMULSION INTRAVENOUS
Status: DISPENSED
Start: 2024-09-12

## (undated) RX ORDER — ACETAMINOPHEN 325 MG/1
TABLET ORAL
Status: DISPENSED
Start: 2019-08-21

## (undated) RX ORDER — EPHEDRINE SULFATE 50 MG/ML
INJECTION, SOLUTION INTRAMUSCULAR; INTRAVENOUS; SUBCUTANEOUS
Status: DISPENSED
Start: 2024-11-26

## (undated) RX ORDER — GLYCOPYRROLATE 0.2 MG/ML
INJECTION, SOLUTION INTRAMUSCULAR; INTRAVENOUS
Status: DISPENSED
Start: 2019-08-21

## (undated) RX ORDER — NEOSTIGMINE METHYLSULFATE 1 MG/ML
VIAL (ML) INJECTION
Status: DISPENSED
Start: 2019-08-21

## (undated) RX ORDER — DEXAMETHASONE SODIUM PHOSPHATE 4 MG/ML
INJECTION, SOLUTION INTRA-ARTICULAR; INTRALESIONAL; INTRAMUSCULAR; INTRAVENOUS; SOFT TISSUE
Status: DISPENSED
Start: 2024-09-12